# Patient Record
Sex: FEMALE | Race: WHITE | NOT HISPANIC OR LATINO | Employment: UNEMPLOYED | URBAN - METROPOLITAN AREA
[De-identification: names, ages, dates, MRNs, and addresses within clinical notes are randomized per-mention and may not be internally consistent; named-entity substitution may affect disease eponyms.]

---

## 2023-09-09 ENCOUNTER — HOSPITAL ENCOUNTER (INPATIENT)
Facility: HOSPITAL | Age: 63
LOS: 90 days | Discharge: NURSING, REHAB OR LONG-TERM CARE | End: 2023-12-08
Attending: PSYCHIATRY & NEUROLOGY | Admitting: PSYCHIATRY & NEUROLOGY
Payer: MEDICAID

## 2023-09-09 PROCEDURE — 9990 CHARGE CONVERSION

## 2023-09-10 LAB
APPEARANCE PLAS: ABNORMAL
CHOLEST SERPL-MCNC: 184 MG/DL (ref 133–200)
CHOLEST/HDLC SERPL: 4.1 RATIO
COLOR SPUN FLD: ABNORMAL
FASTING STATUS PATIENT QL REPORTED: ABNORMAL
HDLC SERPL-MCNC: 45 MG/DL
LDLC SERPL CALC-MCNC: 119 MG/DL (ref 65–130)
TRIGL SERPL-MCNC: 99 MG/DL (ref 40–150)

## 2023-09-10 PROCEDURE — 9990 CHARGE CONVERSION

## 2023-09-11 LAB
25(OH)D3 SERPL-MCNC: 11 NG/ML (ref 31–100)
ALBUMIN SERPL-MCNC: 3.9 GM/DL (ref 3.5–5)
ALBUMIN/GLOB SERPL: 1.3 RATIO (ref 1.5–3)
ALP BLD-CCNC: 97 U/L (ref 35–125)
ALT SERPL-CCNC: 15 U/L (ref 5–40)
ANION GAP SERPL CALCULATED.3IONS-SCNC: 10 MMOL/L (ref 0–19)
AST SERPL-CCNC: 15 U/L (ref 5–40)
BASOPHILS # BLD AUTO: 0.04 K/UL (ref 0–0.22)
BASOPHILS NFR BLD AUTO: 0.5 % (ref 0–1)
BILIRUB SERPL-MCNC: 0.2 MG/DL (ref 0.1–1.2)
BUN SERPL-MCNC: 13 MG/DL (ref 8–25)
BUN/CREAT SERPL: 21.7 RATIO (ref 8–21)
CALCIUM SERPL-MCNC: 9.8 MG/DL (ref 8.5–10.4)
CHLORIDE SERPL-SCNC: 104 MMOL/L (ref 97–107)
CO2 SERPL-SCNC: 22 MMOL/L (ref 24–31)
CREAT SERPL-MCNC: 0.6 MG/DL (ref 0.4–1.6)
DEPRECATED RDW RBC AUTO: 51.2 FL (ref 37–54)
DIFFERENTIAL METHOD BLD: ABNORMAL
EOSINOPHIL # BLD AUTO: 0.3 K/UL (ref 0–0.45)
EOSINOPHIL NFR BLD: 3.4 % (ref 0–3)
ERYTHROCYTE [DISTWIDTH] IN BLOOD BY AUTOMATED COUNT: 17.6 % (ref 11.7–15)
GFR SERPL CREATININE-BSD FRML MDRD: 102 ML/MIN/1.73 M2
GLOBULIN SER-MCNC: 2.9 G/DL (ref 1.9–3.7)
GLUCOSE SERPL-MCNC: 117 MG/DL (ref 65–99)
HBA1C MFR BLD: 5.8 % (ref 4–6)
HCT VFR BLD AUTO: 36.3 % (ref 36–44)
HGB BLD-MCNC: 11.9 GM/DL (ref 12–15)
IMM GRANULOCYTES # BLD AUTO: 0.02 K/UL (ref 0–0.1)
LYMPHOCYTES # BLD AUTO: 2.62 K/UL (ref 1.2–3.2)
LYMPHOCYTES NFR BLD MANUAL: 29.9 % (ref 20–40)
MCH RBC QN AUTO: 26.4 PG (ref 26–34)
MCHC RBC AUTO-ENTMCNC: 32.8 % (ref 31–37)
MCV RBC AUTO: 80.5 FL (ref 80–100)
MONOCYTES # BLD AUTO: 0.68 K/UL (ref 0–0.8)
MONOCYTES NFR BLD MANUAL: 7.8 % (ref 0–8)
NEUTROPHILS # BLD AUTO: 5.09 K/UL
NEUTROPHILS # BLD AUTO: 5.09 K/UL (ref 1.8–7.7)
NEUTROPHILS.IMMATURE NFR BLD: 0.2 % (ref 0–1)
NEUTS SEG NFR BLD: 58.2 % (ref 50–70)
NRBC BLD-RTO: 0 /100 WBC
PLATELET # BLD AUTO: 299 K/UL (ref 150–450)
PMV BLD AUTO: 9.6 CU (ref 7–12.6)
POTASSIUM SERPL-SCNC: 4.6 MMOL/L (ref 3.4–5.1)
PROT SERPL-MCNC: 6.8 G/DL (ref 5.9–7.9)
RBC # BLD AUTO: 4.51 M/UL (ref 4–4.9)
SODIUM SERPL-SCNC: 136 MMOL/L (ref 133–145)
TSH SERPL DL<=0.05 MIU/L-ACNC: 1.64 MIU/L (ref 0.27–4.2)
VIT B12 SERPL-MCNC: 402 PG/ML (ref 211–946)
WBC # BLD AUTO: 8.8 K/UL (ref 4.5–11)

## 2023-09-11 PROCEDURE — 84443 ASSAY THYROID STIM HORMONE: CPT

## 2023-09-11 PROCEDURE — 82306 VITAMIN D 25 HYDROXY: CPT

## 2023-09-11 PROCEDURE — 82607 VITAMIN B-12: CPT

## 2023-09-11 PROCEDURE — 85025 COMPLETE CBC W/AUTO DIFF WBC: CPT

## 2023-09-11 PROCEDURE — 97116 GAIT TRAINING THERAPY: CPT | Mod: GP

## 2023-09-11 PROCEDURE — 9990 CHARGE CONVERSION: Mod: GP

## 2023-09-11 PROCEDURE — 80053 COMPREHEN METABOLIC PANEL: CPT

## 2023-09-11 PROCEDURE — 97161 PT EVAL LOW COMPLEX 20 MIN: CPT | Mod: GP

## 2023-09-11 PROCEDURE — 83036 HEMOGLOBIN GLYCOSYLATED A1C: CPT

## 2023-09-11 PROCEDURE — 80061 LIPID PANEL: CPT

## 2023-09-12 PROCEDURE — 84443 ASSAY THYROID STIM HORMONE: CPT

## 2023-09-12 PROCEDURE — 97166 OT EVAL MOD COMPLEX 45 MIN: CPT | Mod: GO

## 2023-09-12 PROCEDURE — 82306 VITAMIN D 25 HYDROXY: CPT

## 2023-09-12 PROCEDURE — 9990 CHARGE CONVERSION

## 2023-09-12 PROCEDURE — 80053 COMPREHEN METABOLIC PANEL: CPT

## 2023-09-12 PROCEDURE — 82607 VITAMIN B-12: CPT

## 2023-09-12 PROCEDURE — 80061 LIPID PANEL: CPT

## 2023-09-12 PROCEDURE — 85025 COMPLETE CBC W/AUTO DIFF WBC: CPT

## 2023-09-12 PROCEDURE — 97530 THERAPEUTIC ACTIVITIES: CPT | Mod: GO

## 2023-09-12 PROCEDURE — 83036 HEMOGLOBIN GLYCOSYLATED A1C: CPT

## 2023-09-13 PROCEDURE — 97535 SELF CARE MNGMENT TRAINING: CPT | Mod: GO,CO

## 2023-09-13 PROCEDURE — 97116 GAIT TRAINING THERAPY: CPT | Mod: GP

## 2023-09-13 PROCEDURE — 97110 THERAPEUTIC EXERCISES: CPT | Mod: GP

## 2023-09-13 PROCEDURE — 9990 CHARGE CONVERSION

## 2023-09-14 PROCEDURE — 93005 ELECTROCARDIOGRAM TRACING: CPT

## 2023-09-14 PROCEDURE — 9990 CHARGE CONVERSION

## 2023-09-15 PROCEDURE — 9990 CHARGE CONVERSION: Mod: GP

## 2023-09-15 PROCEDURE — 97110 THERAPEUTIC EXERCISES: CPT | Mod: GP

## 2023-09-16 PROCEDURE — 9990 CHARGE CONVERSION

## 2023-09-17 LAB
AMPHETAMINES UR QL SCN>1000 NG/ML: NEGATIVE
BARBITURATES UR QL SCN>300 NG/ML: NEGATIVE
BENZODIAZ UR QL SCN>300 NG/ML: NEGATIVE
BZE UR QL SCN>300 NG/ML: NEGATIVE
CANNABINOIDS UR QL SCN>50 NG/ML: NEGATIVE
DRUG SCREEN COMMENT UR-IMP: NORMAL
FENTANYL+NORFENTANYL UR QL SCN: NEGATIVE
METHADONE UR QL SCN>300 NG/ML: NEGATIVE
OPIATES UR QL SCN>300 NG/ML: NEGATIVE
OXYCODONE UR QL: NEGATIVE
PCP UR QL SCN>25 NG/ML: NEGATIVE

## 2023-09-17 PROCEDURE — 80307 DRUG TEST PRSMV CHEM ANLYZR: CPT

## 2023-09-17 PROCEDURE — 9990 CHARGE CONVERSION

## 2023-09-18 PROCEDURE — 9990 CHARGE CONVERSION: Mod: GP

## 2023-09-18 PROCEDURE — 97116 GAIT TRAINING THERAPY: CPT | Mod: GP

## 2023-09-18 PROCEDURE — 80307 DRUG TEST PRSMV CHEM ANLYZR: CPT

## 2023-09-19 PROCEDURE — 9990 CHARGE CONVERSION

## 2023-09-19 PROCEDURE — 93005 ELECTROCARDIOGRAM TRACING: CPT

## 2023-09-20 PROCEDURE — 9990 CHARGE CONVERSION

## 2023-09-21 PROCEDURE — 9990 CHARGE CONVERSION

## 2023-09-22 PROCEDURE — 9990 CHARGE CONVERSION

## 2023-09-23 PROCEDURE — 9990 CHARGE CONVERSION

## 2023-09-24 PROCEDURE — 9990 CHARGE CONVERSION

## 2023-09-25 PROCEDURE — 9990 CHARGE CONVERSION

## 2023-09-26 PROCEDURE — 9990 CHARGE CONVERSION

## 2023-09-27 PROCEDURE — 9990 CHARGE CONVERSION

## 2023-09-28 DIAGNOSIS — M25.552 BILATERAL HIP PAIN: ICD-10-CM

## 2023-09-28 DIAGNOSIS — M25.551 BILATERAL HIP PAIN: ICD-10-CM

## 2023-09-28 DIAGNOSIS — Z93.3 COLOSTOMY PRESENT (MULTI): Chronic | ICD-10-CM

## 2023-09-28 DIAGNOSIS — E63.9 NUTRITIONAL DEFICIENCY: ICD-10-CM

## 2023-09-28 DIAGNOSIS — Z43.3 COLOSTOMY CARE (MULTI): ICD-10-CM

## 2023-09-28 DIAGNOSIS — F33.2 SEVERE RECURRENT MAJOR DEPRESSION WITHOUT PSYCHOTIC FEATURES (MULTI): Primary | ICD-10-CM

## 2023-09-28 DIAGNOSIS — F11.90 OPIOID USE DISORDER: ICD-10-CM

## 2023-09-28 PROCEDURE — 9990 CHARGE CONVERSION

## 2023-09-28 RX ORDER — CELECOXIB 100 MG/1
100 CAPSULE ORAL DAILY
Status: DISCONTINUED | OUTPATIENT
Start: 2023-09-30 | End: 2023-09-30

## 2023-09-28 RX ORDER — ACETAMINOPHEN 325 MG/1
650 TABLET ORAL EVERY 6 HOURS PRN
Status: DISCONTINUED | OUTPATIENT
Start: 2023-09-30 | End: 2023-12-08 | Stop reason: HOSPADM

## 2023-09-28 RX ORDER — ACETAMINOPHEN 500 MG
5 TABLET ORAL NIGHTLY
Status: DISCONTINUED | OUTPATIENT
Start: 2023-09-30 | End: 2023-10-23

## 2023-09-28 RX ORDER — QUETIAPINE FUMARATE 25 MG/1
25 TABLET, FILM COATED ORAL
Status: DISCONTINUED | OUTPATIENT
Start: 2023-09-30 | End: 2023-10-04

## 2023-09-28 RX ORDER — CHOLECALCIFEROL (VITAMIN D3) 125 MCG
125 CAPSULE ORAL DAILY
Status: DISCONTINUED | OUTPATIENT
Start: 2023-09-30 | End: 2023-12-08 | Stop reason: HOSPADM

## 2023-09-28 RX ORDER — HYDROXYZINE PAMOATE 25 MG/1
25 CAPSULE ORAL EVERY 8 HOURS PRN
Status: DISCONTINUED | OUTPATIENT
Start: 2023-09-30 | End: 2023-12-08 | Stop reason: HOSPADM

## 2023-09-28 RX ORDER — BUPROPION HYDROCHLORIDE 150 MG/1
150 TABLET ORAL DAILY
Status: DISCONTINUED | OUTPATIENT
Start: 2023-09-30 | End: 2023-10-03

## 2023-09-28 RX ORDER — LIDOCAINE 560 MG/1
1 PATCH PERCUTANEOUS; TOPICAL; TRANSDERMAL DAILY
Status: DISCONTINUED | OUTPATIENT
Start: 2023-09-30 | End: 2023-12-08 | Stop reason: HOSPADM

## 2023-09-28 RX ORDER — DIPHENHYDRAMINE HCL 25 MG
25 TABLET ORAL EVERY 8 HOURS PRN
Status: DISCONTINUED | OUTPATIENT
Start: 2023-09-30 | End: 2023-12-08 | Stop reason: HOSPADM

## 2023-09-28 RX ORDER — QUETIAPINE FUMARATE 300 MG/1
300 TABLET, FILM COATED ORAL NIGHTLY
Status: DISCONTINUED | OUTPATIENT
Start: 2023-09-30 | End: 2023-10-05

## 2023-09-28 RX ORDER — OLANZAPINE 5 MG/1
5 TABLET, ORALLY DISINTEGRATING ORAL EVERY 8 HOURS PRN
Status: DISCONTINUED | OUTPATIENT
Start: 2023-09-30 | End: 2023-12-08 | Stop reason: HOSPADM

## 2023-09-28 RX ORDER — OLANZAPINE 10 MG/2ML
5 INJECTION, POWDER, FOR SOLUTION INTRAMUSCULAR EVERY 8 HOURS PRN
Status: DISCONTINUED | OUTPATIENT
Start: 2023-09-30 | End: 2023-12-08 | Stop reason: HOSPADM

## 2023-09-28 RX ORDER — ALUMINUM HYDROXIDE, MAGNESIUM HYDROXIDE, AND SIMETHICONE 1200; 120; 1200 MG/30ML; MG/30ML; MG/30ML
30 SUSPENSION ORAL EVERY 6 HOURS PRN
Status: DISCONTINUED | OUTPATIENT
Start: 2023-09-30 | End: 2023-12-08 | Stop reason: HOSPADM

## 2023-09-29 PROCEDURE — 9990 CHARGE CONVERSION

## 2023-09-30 PROBLEM — Z93.3 COLOSTOMY PRESENT (MULTI): Chronic | Status: ACTIVE | Noted: 2023-09-30

## 2023-09-30 PROBLEM — F33.2 SEVERE RECURRENT MAJOR DEPRESSION WITHOUT PSYCHOTIC FEATURES (MULTI): Status: ACTIVE | Noted: 2023-09-30

## 2023-09-30 PROBLEM — F11.10 OPIOID ABUSE (MULTI): Chronic | Status: ACTIVE | Noted: 2023-09-30

## 2023-09-30 PROCEDURE — 1140000001 HC PRIVATE PSYCH ROOM DAILY

## 2023-09-30 PROCEDURE — 99232 SBSQ HOSP IP/OBS MODERATE 35: CPT | Performed by: PSYCHIATRY & NEUROLOGY

## 2023-09-30 RX ORDER — HYDROXYZINE PAMOATE 25 MG/1
25 CAPSULE ORAL 2 TIMES DAILY
COMMUNITY
End: 2024-02-09 | Stop reason: SDUPTHER

## 2023-09-30 RX ORDER — TRAZODONE HYDROCHLORIDE 150 MG/1
150 TABLET ORAL NIGHTLY
COMMUNITY
End: 2024-02-09 | Stop reason: SDUPTHER

## 2023-09-30 RX ORDER — ACETAMINOPHEN 325 MG/1
650 TABLET ORAL 2 TIMES DAILY
COMMUNITY

## 2023-09-30 RX ORDER — CELECOXIB 200 MG/1
200 CAPSULE ORAL DAILY
Status: DISCONTINUED | OUTPATIENT
Start: 2023-09-30 | End: 2023-12-08 | Stop reason: HOSPADM

## 2023-09-30 RX ORDER — ACETAMINOPHEN 500 MG
5 TABLET ORAL NIGHTLY
COMMUNITY

## 2023-09-30 RX ORDER — RISPERIDONE 2 MG/1
2 TABLET ORAL NIGHTLY
COMMUNITY
End: 2023-12-08 | Stop reason: HOSPADM

## 2023-09-30 RX ORDER — CALCIUM CARBONATE 300MG(750)
400 TABLET,CHEWABLE ORAL 2 TIMES DAILY
COMMUNITY
End: 2023-12-08 | Stop reason: HOSPADM

## 2023-09-30 RX ORDER — BUPROPION HYDROCHLORIDE 150 MG/1
TABLET ORAL
Status: DISPENSED
Start: 2023-09-30 | End: 2023-09-30

## 2023-09-30 RX ORDER — QUETIAPINE FUMARATE 25 MG/1
TABLET, FILM COATED ORAL
Status: DISPENSED
Start: 2023-09-30 | End: 2023-09-30

## 2023-09-30 RX ORDER — CHOLECALCIFEROL (VITAMIN D3) 125 MCG
CAPSULE ORAL
Status: DISPENSED
Start: 2023-09-30 | End: 2023-09-30

## 2023-09-30 ASSESSMENT — PAIN - FUNCTIONAL ASSESSMENT: PAIN_FUNCTIONAL_ASSESSMENT: FLACC (FACE, LEGS, ACTIVITY, CRY, CONSOLABILITY)

## 2023-09-30 ASSESSMENT — ENCOUNTER SYMPTOMS
AGITATION: 1
SLEEP DISTURBANCE: 1
NERVOUS/ANXIOUS: 1
DYSPHORIC MOOD: 1

## 2023-09-30 ASSESSMENT — PAIN SCALES - GENERAL: PAINLEVEL_OUTOF10: 2

## 2023-09-30 ASSESSMENT — PAIN DESCRIPTION - DESCRIPTORS: DESCRIPTORS: ACHING

## 2023-09-30 NOTE — NURSING NOTE
Behavior: Depression  Pt is isolative to her room, resistant to allowing staff to help empty her colostomy bag which exploded on her this shift.  Pt refusing all medications this shift.      Interventions:  Performed hygiene care, applied new colostomy and educated patient about emptying her colostomy, every 15 minute checks, give scheduled medications, environmental rounds,1:1 interaction.    Response:  Pt continues to resist encouragement to perform her own colostomy maintenance.  She continues to isolate to her room.      Plan:  Give scheduled medications, every 15 minute checks, environmental rounds, 1:1 interaction, encourage participation in therapeutic milieu,

## 2023-09-30 NOTE — GROUP NOTE
Group Topic: Art Creative   Group Date: 9/30/2023  Start Time: 1015  End Time: 1145  Facilitators: BRENDA Madera   Department: [facility] REHABTH VIRTUAL    Number of Participants: 5   Group Focus: self-awareness  Treatment Modality: Other: Recreation Therapy  Interventions utilized were exploration  Purpose: feelings    Name: Azul Roberts YOB: 1960   MR: [id number]      Facilitator: Recreational Therapist  Level of Participation: did not attend  Quality of Participation:  did not attend.  Interactions with others:     Mood/Affect:     Triggers (if applicable):   Cognition:     Progress: None  Comments:   Plan: continue with services

## 2023-09-30 NOTE — PROGRESS NOTES
"Azul Roberts is a 63 y.o. female on day 21 of admission presenting with Severe recurrent major depression without psychotic features (CMS/HCC).      Subjective     Case discussed with treatment team and chart reviewed.  Azul remains highly irritable and becoming easily agitated screaming and yelling swearing.  She reports she does not want to live anymore she just wants to be left alone so she can die.  She poorly tolerated visit from her .  She reports she would  like to go home.  Attempts to discuss with her reasons for admission are met with patient responding that she does not care.  Patient continues to escalate and agitate more as the encounter persists.  Patient asks to be left alone multiple times.  Eventually this provider obliges and leaves patient alone.       Objective     Last Recorded Vitals  Blood pressure 110/77, pulse 70, temperature 36.5 °C (97.7 °F), temperature source Temporal, resp. rate 20, height 1.626 m (5' 4\"), weight 79.4 kg (174 lb 15.7 oz), SpO2 99 %.    Review of Systems   Psychiatric/Behavioral:  Positive for agitation, behavioral problems, dysphoric mood, sleep disturbance and suicidal ideas. The patient is nervous/anxious.        Psychiatric ROS - Adult  Anxiety: General Anxiety Disorder (KAYLEEN)KAYLEEN Behaviors: difficult to control worry, difficulty concentrating, irritability, restlessness, and sleep disturbance  Depression: anhedonia, appetite increased, concentration, energy, helpless, hopeless, interest, persistent thoughts of death, psychomotor agitation, sleep decreased , suicidal thoughts, and withdrawn  Delirium: negative  Psychosis: negative  Ioana: negative  Safety Issues: passive death wish and suicidal ideation  Psychiatric ROS Comment: as noted    Physical Exam  Abdominal:      Comments: Presence of colostomy bag   Psychiatric:         Attention and Perception: She is inattentive.         Mood and Affect: Mood is anxious and depressed. Affect is labile, angry and " "inappropriate.         Behavior: Behavior is uncooperative, agitated, aggressive and withdrawn.         Thought Content: Thought content includes suicidal ideation.         Judgment: Judgment is impulsive.           Mental Status Examination  General Appearance: Disheveled., Malodorous., Averted gaze., and Limited eye contact.  Gait/Station:  antalgic gait with walker assist device  Speech: Loud volume, yelling  Mood: \"I dont care\"  Affect: Irritable and Angry  Thought Process:  impoverished  Thought Associations: No loosening of associations  Thought Content: suicidal  Perception: No perceptual abnormalities noted  Level of Consciousness: Drowsy  Orientation: Alert  Attention and Concentration: Mild impairment in attention  Recent Memory: Intact as evidenced by ability to recall details from the past 24 hours   Remote Memory: Intact as evidenced by ability to recall previous medical issues   Executive function: Intact  Language: Naming intact  Fund of knowledge: Good  Insight: Limited, as evidenced by disengagement  Judgment: Limited, as evidence by inability to reason through medical decision making and highly impaired reality testing       Psychiatric Risk Assessment  Violence Risk Assessment: major mental illness and substance abuse  Acute Risk of Harm to Others is Considered: moderate   Suicide Risk Assessment: , chronic medical illness, chronic pain, current psychiatric illness, feelings of hopelessness, global insomnia, history of trauma or abuse, life crisis (shame/despair), prior suicide attempt, severe anxiety, substance abuse, and suicidal ideations  Protective Factors against Suicide: adherence to  treatment and marriage/partnership  Acute Risk of Harm to Self is Considered: moderate    Relevant Results             Current Facility-Administered Medications   Medication Dose Route Frequency Provider Last Rate Last Admin    acetaminophen (Tylenol) tablet 650 mg  650 mg oral q6h PRN Eddie H " Subhash, DO        alum-mag hydroxide-simeth (Mylanta) 200-200-20 mg/5 mL oral suspension 30 mL  30 mL oral q6h PRN [facility] Subhash, DO        buPROPion XL (Wellbutrin XL) 24 hr tablet 150 mg  150 mg oral Daily Eddie  Subhash, DO        celecoxib (CeleBREX) capsule 200 mg  200 mg oral Daily Eddie  Subhash, DO        cholecalciferol (Vitamin D-3) capsule 125 mcg  125 mcg oral Daily Eddie  Subhash, DO        diphenhydrAMINE (Sominex) tablet 25 mg  25 mg oral q8h PRN [facility] Subhash, DO        hydrOXYzine pamoate (Vistaril) capsule 25 mg  25 mg oral q8h PRN [facility] Subhash, DO        lidocaine 4 % patch 1 patch  1 patch transdermal Daily Eddie  Subhash, DO        melatonin tablet 5 mg  5 mg oral Nightly Eddie  Subhash, DO        OLANZapine (ZyPREXA) injection 5 mg  5 mg intramuscular q8h PRN [facility] Subhash, DO        OLANZapine zydis (ZyPREXA) disintegrating tablet 5 mg  5 mg oral q8h PRN [facility] Subhash, DO        QUEtiapine (SEROquel) tablet 25 mg  25 mg oral Daily with breakfast Eddie H Subhash, DO        QUEtiapine (SEROquel) tablet 300 mg  300 mg oral Nightly [facility] Subhash, DO        traZODone (Desyrel) split tablet 150 mg  150 mg oral Nightly [facility] Subhash, DO           Assessment/Plan   Principal Problem:    Severe recurrent major depression without psychotic features (CMS/HCC)  Active Problems:    Colostomy present (CMS/HCC)    Opioid abuse (CMS/HCC)    Biological -     Wellbutrin xl 150 mg titration to 300 mg early next week for adjunctive depression treatment  Celexa 30 mg daily for depression, anxiety  Seroquel 25 mg daily, and 300 mg at bedtime for mood stabilization, depression  Trazodone 150 mg at bedtime for sleep     Discussion with patient regarding risk benefits and alternatives and side effects with opportunity to ask questions and questions answered.  Patient given consent to the plan as noted    2. Psychological -       Patient is encouraged to participate with  the therapeutic milieu and program and group therapy      3. Sociological -      Patient encouraged to cooperate with social work staff on issues relevant to discharge planning                      I spent 25 minutes in the professional and overall care of this patient.      Eddie Cullen, DO

## 2023-10-01 PROBLEM — M25.551 BILATERAL HIP PAIN: Status: ACTIVE | Noted: 2023-10-01

## 2023-10-01 PROBLEM — M25.552 BILATERAL HIP PAIN: Status: ACTIVE | Noted: 2023-10-01

## 2023-10-01 PROCEDURE — 99232 SBSQ HOSP IP/OBS MODERATE 35: CPT | Performed by: INTERNAL MEDICINE

## 2023-10-01 PROCEDURE — 2500000001 HC RX 250 WO HCPCS SELF ADMINISTERED DRUGS (ALT 637 FOR MEDICARE OP): Performed by: PSYCHIATRY & NEUROLOGY

## 2023-10-01 PROCEDURE — 2500000004 HC RX 250 GENERAL PHARMACY W/ HCPCS (ALT 636 FOR OP/ED): Performed by: PSYCHIATRY & NEUROLOGY

## 2023-10-01 PROCEDURE — 1140000001 HC PRIVATE PSYCH ROOM DAILY

## 2023-10-01 PROCEDURE — 99232 SBSQ HOSP IP/OBS MODERATE 35: CPT | Performed by: PSYCHIATRY & NEUROLOGY

## 2023-10-01 RX ADMIN — Medication 125 MCG: at 08:20

## 2023-10-01 RX ADMIN — QUETIAPINE FUMARATE 25 MG: 25 TABLET ORAL at 08:20

## 2023-10-01 RX ADMIN — QUETIAPINE FUMARATE 300 MG: 300 TABLET ORAL at 22:42

## 2023-10-01 RX ADMIN — CITALOPRAM HYDROBROMIDE 30 MG: 20 TABLET ORAL at 08:19

## 2023-10-01 RX ADMIN — Medication 5 MG: at 22:41

## 2023-10-01 RX ADMIN — BUPROPION HYDROCHLORIDE 150 MG: 150 TABLET, FILM COATED, EXTENDED RELEASE ORAL at 08:18

## 2023-10-01 ASSESSMENT — PAIN - FUNCTIONAL ASSESSMENT
PAIN_FUNCTIONAL_ASSESSMENT: FLACC (FACE, LEGS, ACTIVITY, CRY, CONSOLABILITY)

## 2023-10-01 ASSESSMENT — ENCOUNTER SYMPTOMS
SLEEP DISTURBANCE: 1
DYSPHORIC MOOD: 1
AGITATION: 1
NERVOUS/ANXIOUS: 1

## 2023-10-01 ASSESSMENT — PAIN SCALES - GENERAL
PAINLEVEL_OUTOF10: 3
PAINLEVEL_OUTOF10: 0 - NO PAIN
PAINLEVEL_OUTOF10: 2

## 2023-10-01 ASSESSMENT — PAIN DESCRIPTION - DESCRIPTORS: DESCRIPTORS: ACHING

## 2023-10-01 NOTE — GROUP NOTE
"Group Topic: Feeling Awareness/Expression   Group Date: 10/1/2023  Start Time: 1020  End Time: 1130  Facilitators: BRENDA Howard   Department: [facility]    Number of Participants: 8   Group Focus: communication, feeling awareness/expression, problem solving, and self-awareness  Treatment Modality: Other: Recreation Therapy  Interventions utilized were exploration and problem solving  Purpose: feelings and communication skills    Name: Azul Roberts YOB: 1960   MR: [id number]      Facilitator: Recreational Therapist  Level of Participation: did not attend  Quality of Participation:  Did not attend  Interactions with others:     Mood/Affect:     Triggers (if applicable):   Cognition:     Progress: None  Comments: Pt refused group reports \"doesn't feel well\"  Plan: continue with services      "

## 2023-10-01 NOTE — PROGRESS NOTES
"Azul Roberts is a 63 y.o. female seen for follow up at [facility].pain is fairly controlled.denies any other complaints.      Physical Exam   constitutional: Patient does not appear to be in any acute distress  Head and Face: NCAT  Eyes: Normal external exam, EOMI  ENT: Normal external inspection of ears and nose. Oropharynx normal.  Cardiovascular: RRR, S1/S2, no murmurs, rubs, or gallops, radial pulses +2, no edema of extremities  Pulmonary: CTAB, no respiratory distress.  Abdomen: +BS, soft, non-tender, nondistended, no guarding or rebound, no masses noted.colostomy present   MSK: No joint swelling, normal movements of all extremities. Range of motion- normal.  Skin- No lesions, contusions, or erythema.  Psychiatric: Judgment intact. Appropriate mood and behavior   Last Recorded Vitals  Blood pressure (!) 143/92, pulse 75, temperature 36.8 °C (98.2 °F), temperature source Oral, resp. rate 19, height 1.626 m (5' 4\"), weight 79.4 kg (174 lb 15.7 oz), SpO2 100 %.  Intake/Output last 3 Shifts:  No intake/output data recorded.    Relevant Results    Scheduled medications  buPROPion XL, 150 mg, oral, Daily  celecoxib, 200 mg, oral, Daily  cholecalciferol, 125 mcg, oral, Daily  citalopram, 30 mg, oral, Daily  lidocaine, 1 patch, transdermal, Daily  melatonin, 5 mg, oral, Nightly  QUEtiapine, 25 mg, oral, Daily with breakfast  QUEtiapine, 300 mg, oral, Nightly  traZODone, 150 mg, oral, Nightly      Continuous medications     PRN medications  PRN medications: acetaminophen, alum-mag hydroxide-simeth, diphenhydrAMINE, hydrOXYzine pamoate, OLANZapine, OLANZapine zydis                     Assessment/Plan   Principal Problem:    Severe recurrent major depression without psychotic features (CMS/HCC)  Active Problems:    Colostomy present (CMS/HCC)    Opioid abuse (CMS/HCC)    Bilateral hip pain    Pain is fairly controlled.pt. has been participating in activities.        I spent 20 minutes in the professional and overall " care of this patient.      Jay Knox MD

## 2023-10-01 NOTE — GROUP NOTE
Group Topic: Social Skills   Group Date: 10/1/2023  Start Time: 1525  End Time: 1610  Facilitators: BRENDA Howard   Department: [facility] Geriatric     Number of Participants: 9   Group Focus: other Leisure, relaxation, and social skills  Treatment Modality: Other: Recreation Therapy  Interventions utilized were other   and reminiscence  Purpose: other: social skills, relaxation    Name: Azul Roberts YOB: 1960   MR: [id number]      Facilitator: Recreational Therapist  Level of Participation: did not attend  Quality of Participation:  did not attend  Interactions with others:   Mood/Affect:     Triggers (if applicable):   Cognition:     Progress: None  Comments: pt refuses to attend group. Resting in bed at this time  Plan: continue with services

## 2023-10-01 NOTE — NURSING NOTE
"Pt rocking back and forth in chair, stated she was in pain.  She has been refusing Celebrex scheduled in the morning and Lidocaine patch.  Notified Dr Knox to see if the order for Celebrex can be changed to  as patient agreed to take it at HS.    Offered alternative medications available and other pain interventions and patient refused them stating \"I need to get out of here.\"  "

## 2023-10-01 NOTE — NURSING NOTE
SARAN NOTE    Problem:  Depression      Behavior:  Patient is in patient’s room and has just gotten into bed to sleep. Patient is irritable but calm. Patient's affect is blunted. Patient is not very talkative.      Interventions:  This nurse completed a shift assessment. This nurse attempted to administer patient's scheduled night time medications.      Response:  Patient remained irritable but calm throughout this interacting. Patient refused to take all of patient's tonight's scheduled night time medications. Patient allowed this nurse to conduct and complete a physical assessment.       Plan:   Continue to monitor for depression. Continue to monitor for patient safety.

## 2023-10-01 NOTE — PROGRESS NOTES
"Azul Roberts is a 63 y.o. female on day 22 of admission presenting with Severe recurrent major depression without psychotic features (CMS/[facility]).      Subjective     Case discussed with treatment team and chart reviewed.  Azul refused all of her medications yesterday.  She is less irritable today but remaining withdrawn and isolated.  We continued to approach with consideration for her ongoing impairment.  She remains brief with single or double word responses only and does not answer open-ended questions when posed.  She expresses frustration and intolerance of the encounter often sighing, making direct eye contact briefly, and then looking away from provider.  We continue to encourage Azul to accept her medications to give them a chance to become efficacious to help treat her symptoms.  We do note with her that we will discuss with social work reaching her  to consider her progress thus far although we do not yet observe a robust response.  She does not participate in group, her activities of daily living are poor and she is malodorous of feces, she continues to present with acute features consistent with that on admission requiring the stabilization offered by the hospitalization.       Objective     Last Recorded Vitals  Blood pressure (!) 143/92, pulse 75, temperature 36.8 °C (98.2 °F), temperature source Oral, resp. rate 19, height 1.626 m (5' 4\"), weight 79.4 kg (174 lb 15.7 oz), SpO2 100 %.    Review of Systems   Psychiatric/Behavioral:  Positive for agitation, behavioral problems, dysphoric mood, sleep disturbance and suicidal ideas. The patient is nervous/anxious.        Psychiatric ROS - Adult  Anxiety: General Anxiety Disorder (KAYLEEN)KAYLEEN Behaviors: difficult to control worry, difficulty concentrating, irritability, restlessness, and sleep disturbance  Depression: anhedonia, appetite increased, concentration, energy, helpless, hopeless, interest, persistent thoughts of death, psychomotor " "agitation, sleep decreased , suicidal thoughts, and withdrawn  Delirium: negative  Psychosis: negative  Ioana: negative  Safety Issues: passive death wish and suicidal ideation  Psychiatric ROS Comment: as noted    Physical Exam  Abdominal:      Comments: Presence of colostomy bag   Psychiatric:         Attention and Perception: She is inattentive.         Mood and Affect: Mood is anxious and depressed. Affect is labile, angry and inappropriate.         Behavior: Behavior is uncooperative, agitated, aggressive and withdrawn.         Thought Content: Thought content includes suicidal ideation.         Judgment: Judgment is impulsive.           Mental Status Examination  General Appearance: Disheveled., Malodorous., Averted gaze., and Limited eye contact.  Gait/Station:  antalgic gait with walker assist device  Speech: Loud volume, yelling  Mood: \"what do you want today?\"  Affect: Irritable and Angry  Thought Process:  impoverished  Thought Associations: No loosening of associations  Thought Content: suicidal  Perception: No perceptual abnormalities noted  Level of Consciousness: Drowsy  Orientation: Alert  Attention and Concentration: Mild impairment in attention  Recent Memory: Intact as evidenced by ability to recall details from the past 24 hours   Remote Memory: Intact as evidenced by ability to recall previous medical issues   Executive function: Intact  Language: Naming intact  Fund of knowledge: Good  Insight: Limited, as evidenced by disengagement  Judgment: Limited, as evidence by inability to reason through medical decision making and highly impaired reality testing       Psychiatric Risk Assessment  Violence Risk Assessment: major mental illness and substance abuse  Acute Risk of Harm to Others is Considered: moderate   Suicide Risk Assessment: , chronic medical illness, chronic pain, current psychiatric illness, feelings of hopelessness, global insomnia, history of trauma or abuse, life crisis " (shame/despair), prior suicide attempt, severe anxiety, substance abuse, and suicidal ideations  Protective Factors against Suicide: adherence to  treatment and marriage/partnership  Acute Risk of Harm to Self is Considered: moderate    Relevant Results             Current Facility-Administered Medications   Medication Dose Route Frequency Provider Last Rate Last Admin    acetaminophen (Tylenol) tablet 650 mg  650 mg oral q6h PRN Eddie Cullen, DO        alum-mag hydroxide-simeth (Mylanta) 200-200-20 mg/5 mL oral suspension 30 mL  30 mL oral q6h PRN Eddie Cullen, DO        buPROPion XL (Wellbutrin XL) 24 hr tablet 150 mg  150 mg oral Daily Eddie  Subhash, DO   150 mg at 10/01/23 0818    celecoxib (CeleBREX) capsule 200 mg  200 mg oral Daily Eddie Cullen, DO        cholecalciferol (Vitamin D-3) capsule 125 mcg  125 mcg oral Daily Eddie  Subhash, DO   125 mcg at 10/01/23 0820    citalopram (CeleXA) tablet 30 mg  30 mg oral Daily Eddie  Subhash, DO   30 mg at 10/01/23 0819    diphenhydrAMINE (Sominex) tablet 25 mg  25 mg oral q8h PRN Eddie  Subhash, DO        hydrOXYzine pamoate (Vistaril) capsule 25 mg  25 mg oral q8h PRN Eddie  Subhash, DO        lidocaine 4 % patch 1 patch  1 patch transdermal Daily Eddie NOAH Cullen, DO        melatonin tablet 5 mg  5 mg oral Nightly Eddie Cullen, DO        OLANZapine (ZyPREXA) injection 5 mg  5 mg intramuscular q8h PRN Eddierobert Cullen, DO        OLANZapine zydis (ZyPREXA) disintegrating tablet 5 mg  5 mg oral q8h PRN Eddie Cullen, DO        QUEtiapine (SEROquel) tablet 25 mg  25 mg oral Daily with breakfast Eddie Cullen, DO   25 mg at 10/01/23 0820    QUEtiapine (SEROquel) tablet 300 mg  300 mg oral Nightly Eddie Cullen, DO        traZODone (Desyrel) split tablet 150 mg  150 mg oral Nightly Eddie Cullen, DO           Assessment/Plan   Principal Problem:    Severe recurrent major depression without psychotic features  (CMS/[facility])  Active Problems:    Colostomy present (CMS/HCC)    Opioid abuse (CMS/[facility])    Bilateral hip pain    Biological -     Given sporadic/intermittent medication refusal, will delay titrations until able to more regularly accept the following:  Wellbutrin xl 150 mg titration to 300 mg early next week for adjunctive depression treatment  Celexa 30 mg daily for depression, anxiety  Seroquel 25 mg daily, and 300 mg at bedtime for mood stabilization, depression  Trazodone 150 mg at bedtime for sleep     Discussion with patient regarding risk benefits and alternatives and side effects with opportunity to ask questions and questions answered.  Patient given consent to the plan as noted    2. Psychological -       Patient is encouraged to participate with the therapeutic milieu and program and group therapy      3. Sociological -      Patient encouraged to cooperate with social work staff on issues relevant to discharge planning  Will plan for family outreach to obtain additional collateral and review progress and limitations from improvement thus far.                    I spent 25 minutes in the professional and overall care of this patient.      Eddie Cullen, DO

## 2023-10-01 NOTE — NURSING NOTE
Behavior:  Depression  Pt is not participating in groups this shift, she isolates to her room inbetween meals, she is not expressing her needs.  She did care for her colostomy bag this shift without prompting.  She took scheduled medications during breakfast but refused Celebrex and lidocaine patch    Interventions:  Pt given scheduled medications, every 15 minute checks, environmental rounds, encourage participation in therapeutic milieu, 1:1 interaction, open blinds in pts room.    Response:  Pt went to dinner and ate 100%, she sat out in the hallway for a short period of time.  She continues to empty her colostomy this shift.    Plan:  Give scheduled medications, every 15 minute checks, environmental rounds, encourage participation in therapeutic milieu, 1:1 interaction, open blinds in pts room.

## 2023-10-02 PROCEDURE — 2500000004 HC RX 250 GENERAL PHARMACY W/ HCPCS (ALT 636 FOR OP/ED): Performed by: PSYCHIATRY & NEUROLOGY

## 2023-10-02 PROCEDURE — 1140000001 HC PRIVATE PSYCH ROOM DAILY

## 2023-10-02 PROCEDURE — 99232 SBSQ HOSP IP/OBS MODERATE 35: CPT | Performed by: PSYCHIATRY & NEUROLOGY

## 2023-10-02 PROCEDURE — 2500000001 HC RX 250 WO HCPCS SELF ADMINISTERED DRUGS (ALT 637 FOR MEDICARE OP): Performed by: PSYCHIATRY & NEUROLOGY

## 2023-10-02 RX ADMIN — Medication 5 MG: at 21:35

## 2023-10-02 RX ADMIN — QUETIAPINE FUMARATE 25 MG: 25 TABLET ORAL at 08:53

## 2023-10-02 RX ADMIN — TRAZODONE HYDROCHLORIDE 150 MG: 50 TABLET ORAL at 21:35

## 2023-10-02 RX ADMIN — BUPROPION HYDROCHLORIDE 150 MG: 150 TABLET, FILM COATED, EXTENDED RELEASE ORAL at 08:53

## 2023-10-02 RX ADMIN — Medication 125 MCG: at 08:53

## 2023-10-02 RX ADMIN — CELECOXIB 200 MG: 200 CAPSULE ORAL at 08:53

## 2023-10-02 RX ADMIN — CITALOPRAM HYDROBROMIDE 30 MG: 20 TABLET ORAL at 08:53

## 2023-10-02 RX ADMIN — QUETIAPINE FUMARATE 300 MG: 300 TABLET ORAL at 21:34

## 2023-10-02 ASSESSMENT — PAIN SCALES - GENERAL
PAINLEVEL_OUTOF10: 0 - NO PAIN
PAINLEVEL_OUTOF10: 0 - NO PAIN

## 2023-10-02 ASSESSMENT — ENCOUNTER SYMPTOMS
AGITATION: 1
NERVOUS/ANXIOUS: 1
DYSPHORIC MOOD: 1
SLEEP DISTURBANCE: 1

## 2023-10-02 ASSESSMENT — COLUMBIA-SUICIDE SEVERITY RATING SCALE - C-SSRS
2. HAVE YOU ACTUALLY HAD ANY THOUGHTS OF KILLING YOURSELF?: NO
6. HAVE YOU EVER DONE ANYTHING, STARTED TO DO ANYTHING, OR PREPARED TO DO ANYTHING TO END YOUR LIFE?: NO
1. SINCE LAST CONTACT, HAVE YOU WISHED YOU WERE DEAD OR WISHED YOU COULD GO TO SLEEP AND NOT WAKE UP?: NO

## 2023-10-02 ASSESSMENT — PAIN - FUNCTIONAL ASSESSMENT
PAIN_FUNCTIONAL_ASSESSMENT: 0-10
PAIN_FUNCTIONAL_ASSESSMENT: 0-10

## 2023-10-02 NOTE — GROUP NOTE
Group Topic: Coping Skills   Group Date: 10/2/2023  Start Time: 1100  End Time: 1130  Facilitators: BRENDA Fernandes   Department: [facility] REHABTH VIRTUAL    Number of Participants: 5   Group Focus: activities of daily living skills, communication, concentration, and coping skills  Treatment Modality: Other: Recreation Therapy  Interventions utilized were clarification, exploration, orientation, patient education, problem solving, and reality testing  Purpose: coping skills, feelings, insight or knowledge, self-worth, and self-care    Name: Azul Roberts YOB: 1960   MR: [id number]      Facilitator: Recreational Therapist   Level of Participation: did not attend.  Pt is laying in her bed, refuses to attend group at this time.  Did sit in the hallway in a chair about senior care through the session.  Quality of Participation:  did not attend  Interactions with others:  did not attend  Mood/Affect:  did not attend  Triggers (if applicable): n/a  Cognition:  n/a  Progress: None  Comments: n/a  Plan: continue with services

## 2023-10-02 NOTE — NURSING NOTE
BIRP NOTE    Problem:  Depression      Behavior:  Earlier at 2158 on 10/1/2023, this nurse attempted to conduct a shift assessment and administer patient's scheduled night time medications, at this time patient was in patient room laying in bed sleeping and refused to talk all of patient's scheduled night time medications. Patient refused to allow this nurse to conduct a physical assessment. Now, Patient is sitting in a chair near the nurse station. Patient is pleasant and calm. Patient’s affect is blunted. Patient is not very talkative. Patient maintains brief eye contact.      Interventions:  This nurse completed a shift assessment and administered patient's scheduled night time medications.      Response:  Patient remained pleasant and calm and was cooperative throughout this shift assessment and medication administration.      Plan:  Continue to monitor for depression. Continue to monitor for patient safety.

## 2023-10-02 NOTE — CARE PLAN
LSW received several responses from [facility] declining pt due to behavioral concerns as well as that pt's Medicaid is [place] of [place].   Nikki Andrews, DRAGAN

## 2023-10-02 NOTE — GROUP NOTE
Group Topic: Social Skills   Group Date: 10/2/2023  Start Time: 1510  End Time: 1545  Facilitators: BRENDA Fernandes   Department: [facility] REHABTH VIRTUAL    Number of Participants: 6   Group Focus: communication, concentration, and social skills  Treatment Modality: Other: Recreation Therapy  Interventions utilized were reminiscence  Purpose: other: concentration    Name: Azul Roberts YOB: 1960   MR: [id number]      Facilitator: Recreational Therapist  Level of Participation: did not attend  Quality of Participation:  pt refused to attend group at this time.  Was sitting in the hallway watching staff and peers.  Declined invitation to join group.  Interactions with others:  n/a  Mood/Affect:  n/a  Triggers (if applicable): n/a  Cognition:  n/a  Progress: None  Comments: n/a  Plan: continue with services

## 2023-10-02 NOTE — CARE PLAN
The patient's goals for the shift include none    The clinical goals for the shift include no falls    Over the shift, the patient did make progress toward the following goals of not falling. Barriers to progression include weakness and lack of motivation to thrive. Recommendations to address these barriers include increased positive reinforcement.

## 2023-10-02 NOTE — PROGRESS NOTES
"Azul Roberts is a 63 y.o. female on day 23 of admission presenting with Severe recurrent major depression without psychotic features (CMS/[facility]).      Subjective     Case discussed with treatment team and chart reviewed.  Patient has resumed acceptance of medications.  Remains withdrawn, isolative.  Less irritable, but brief.  Being refused by multiple nursing homes on premise of behaviors, and out of state medicaid.  She reports \"nothing to live for\" and wavers when asked about wanting to die or stay alive.  She is not future oriented, and persists with poor ADLs.  Did accept shower from nursing today and was seen with increasingly mobilized affect briefly following the shower.  Limited gains to date.  Struggles to participate in discussions related to discharge treatment planning.  Cannot yet safely leave the hospital.         Objective     Last Recorded Vitals  Blood pressure 108/63, pulse 82, temperature 36.7 °C (98.1 °F), temperature source Oral, resp. rate 18, height 1.626 m (5' 4\"), weight 79.4 kg (174 lb 15.7 oz), SpO2 97 %.    Review of Systems   Psychiatric/Behavioral:  Positive for agitation, behavioral problems, dysphoric mood, sleep disturbance and suicidal ideas. The patient is nervous/anxious.        Psychiatric ROS - Adult  Anxiety: General Anxiety Disorder (KAYLEEN)AKYLEEN Behaviors: difficult to control worry, difficulty concentrating, irritability, restlessness, and sleep disturbance  Depression: anhedonia, appetite increased, concentration, energy, helpless, hopeless, interest, persistent thoughts of death, psychomotor agitation, sleep decreased , suicidal thoughts, and withdrawn  Delirium: negative  Psychosis: negative  Ioana: negative  Safety Issues: passive death wish and suicidal ideation  Psychiatric ROS Comment: as noted    Physical Exam  Abdominal:      Comments: Presence of colostomy bag   Psychiatric:         Attention and Perception: She is inattentive.         Mood and Affect: Mood is anxious " "and depressed. Affect is labile, angry and inappropriate.         Behavior: Behavior is uncooperative, agitated, aggressive and withdrawn.         Thought Content: Thought content includes suicidal ideation.         Judgment: Judgment is impulsive.           Mental Status Examination  General Appearance: Disheveled., Malodorous., Averted gaze., and Limited eye contact.  Gait/Station:  antalgic gait with walker assist device  Speech: minimal verbal responses, low volume  Mood: \"I have nothing to live for, why would I want to get better\"  Affect: less Irritable and Angry  Thought Process: remains impoverished  Thought Associations: No loosening of associations  Thought Content: suicidal  Perception: No perceptual abnormalities noted  Level of Consciousness: Drowsy  Orientation: Alert  Attention and Concentration: Mild impairment in attention  Recent Memory: Intact as evidenced by ability to recall details from the past 24 hours   Remote Memory: Intact as evidenced by ability to recall previous medical issues   Executive function: Intact  Language: Naming intact  Fund of knowledge: Good  Insight: Limited, as evidenced by disengagement  Judgment: Limited, as evidence by inability to reason through medical decision making and highly impaired reality testing       Psychiatric Risk Assessment  Violence Risk Assessment: major mental illness and substance abuse  Acute Risk of Harm to Others is Considered: moderate   Suicide Risk Assessment: , chronic medical illness, chronic pain, current psychiatric illness, feelings of hopelessness, global insomnia, history of trauma or abuse, life crisis (shame/despair), prior suicide attempt, severe anxiety, substance abuse, and suicidal ideations  Protective Factors against Suicide: adherence to  treatment and marriage/partnership  Acute Risk of Harm to Self is Considered: moderate    Relevant Results             Current Facility-Administered Medications   Medication Dose Route " Frequency Provider Last Rate Last Admin    acetaminophen (Tylenol) tablet 650 mg  650 mg oral q6h PRN Eddierobert Cullen, DO        alum-mag hydroxide-simeth (Mylanta) 200-200-20 mg/5 mL oral suspension 30 mL  30 mL oral q6h PRN Eddierobert Cullen, DO        buPROPion XL (Wellbutrin XL) 24 hr tablet 150 mg  150 mg oral Daily Eddie H Subhash, DO   150 mg at 10/02/23 0853    celecoxib (CeleBREX) capsule 200 mg  200 mg oral Daily Eddie H Subhash, DO   200 mg at 10/02/23 0853    cholecalciferol (Vitamin D-3) capsule 125 mcg  125 mcg oral Daily Eddie  Zamarbin, DO   125 mcg at 10/02/23 0853    citalopram (CeleXA) tablet 30 mg  30 mg oral Daily Eddie H Subhash, DO   30 mg at 10/02/23 0853    diphenhydrAMINE (Sominex) tablet 25 mg  25 mg oral q8h PRN Eddie  Subhash, DO        hydrOXYzine pamoate (Vistaril) capsule 25 mg  25 mg oral q8h PRN [facility] Subhash, DO        lidocaine 4 % patch 1 patch  1 patch transdermal Daily Joshua H Subhash, DO        melatonin tablet 5 mg  5 mg oral Nightly Eddie H Subhash, DO   5 mg at 10/01/23 2241    OLANZapine (ZyPREXA) injection 5 mg  5 mg intramuscular q8h PRN Eddie  Subhash, DO        OLANZapine zydis (ZyPREXA) disintegrating tablet 5 mg  5 mg oral q8h PRN Eddie  Subhash, DO        QUEtiapine (SEROquel) tablet 25 mg  25 mg oral Daily with breakfast Eddiecatina Cullen, DO   25 mg at 10/02/23 0853    QUEtiapine (SEROquel) tablet 300 mg  300 mg oral Nightly Eddie  Subhash, DO   300 mg at 10/01/23 2242    traZODone (Desyrel) split tablet 150 mg  150 mg oral Nightly Eddie NOAH Cullen, DO           Assessment/Plan   Principal Problem:    Severe recurrent major depression without psychotic features (CMS/HCC)  Active Problems:    Colostomy present (CMS/HCC)    Opioid abuse (CMS/HCC)    Bilateral hip pain    Biological -     Given sporadic/intermittent medication refusal, will delay titrations until able to more regularly accept the following:  Wellbutrin xl 150 mg  titration to 300 mg early next week for adjunctive depression treatment  Celexa 30 mg daily for depression, anxiety - if accepts dose tomorrow, will titrate for wednesday  Seroquel 25 mg daily, and 300 mg at bedtime for mood stabilization, depression  Trazodone 150 mg at bedtime for sleep     Discussion with patient regarding risk benefits and alternatives and side effects with opportunity to ask questions and questions answered.  Patient given consent to the plan as noted    2. Psychological -       Patient is encouraged to participate with the therapeutic milieu and program and group therapy      3. Sociological -      Patient encouraged to cooperate with social work staff on issues relevant to discharge planning  Will plan for family outreach to obtain additional collateral and review progress and limitations from improvement thus far.                    I spent 25 minutes in the professional and overall care of this patient.      Eddie Cullen, DO

## 2023-10-03 PROCEDURE — 1140000001 HC PRIVATE PSYCH ROOM DAILY

## 2023-10-03 PROCEDURE — 2500000001 HC RX 250 WO HCPCS SELF ADMINISTERED DRUGS (ALT 637 FOR MEDICARE OP): Performed by: PSYCHIATRY & NEUROLOGY

## 2023-10-03 PROCEDURE — 2500000004 HC RX 250 GENERAL PHARMACY W/ HCPCS (ALT 636 FOR OP/ED): Performed by: PSYCHIATRY & NEUROLOGY

## 2023-10-03 PROCEDURE — 99232 SBSQ HOSP IP/OBS MODERATE 35: CPT | Performed by: PSYCHIATRY & NEUROLOGY

## 2023-10-03 RX ORDER — BUPROPION HYDROCHLORIDE 300 MG/1
300 TABLET ORAL DAILY
Status: DISCONTINUED | OUTPATIENT
Start: 2023-10-04 | End: 2023-12-08 | Stop reason: HOSPADM

## 2023-10-03 RX ADMIN — QUETIAPINE FUMARATE 25 MG: 25 TABLET ORAL at 08:21

## 2023-10-03 RX ADMIN — TRAZODONE HYDROCHLORIDE 150 MG: 50 TABLET ORAL at 20:30

## 2023-10-03 RX ADMIN — Medication 125 MCG: at 08:18

## 2023-10-03 RX ADMIN — QUETIAPINE FUMARATE 300 MG: 300 TABLET ORAL at 20:30

## 2023-10-03 RX ADMIN — CELECOXIB 200 MG: 200 CAPSULE ORAL at 08:18

## 2023-10-03 RX ADMIN — CITALOPRAM HYDROBROMIDE 30 MG: 20 TABLET ORAL at 08:18

## 2023-10-03 RX ADMIN — BUPROPION HYDROCHLORIDE 150 MG: 150 TABLET, FILM COATED, EXTENDED RELEASE ORAL at 08:18

## 2023-10-03 RX ADMIN — Medication 5 MG: at 20:30

## 2023-10-03 ASSESSMENT — COLUMBIA-SUICIDE SEVERITY RATING SCALE - C-SSRS
6. HAVE YOU EVER DONE ANYTHING, STARTED TO DO ANYTHING, OR PREPARED TO DO ANYTHING TO END YOUR LIFE?: NO
1. SINCE LAST CONTACT, HAVE YOU WISHED YOU WERE DEAD OR WISHED YOU COULD GO TO SLEEP AND NOT WAKE UP?: NO
2. HAVE YOU ACTUALLY HAD ANY THOUGHTS OF KILLING YOURSELF?: NO

## 2023-10-03 ASSESSMENT — ENCOUNTER SYMPTOMS
DYSPHORIC MOOD: 1
SLEEP DISTURBANCE: 1
NERVOUS/ANXIOUS: 1

## 2023-10-03 ASSESSMENT — PAIN SCALES - GENERAL
PAINLEVEL_OUTOF10: 2
PAINLEVEL_OUTOF10: 1
PAINLEVEL_OUTOF10: 1
PAINLEVEL_OUTOF10: 2
PAINLEVEL_OUTOF10: 1

## 2023-10-03 ASSESSMENT — PAIN - FUNCTIONAL ASSESSMENT
PAIN_FUNCTIONAL_ASSESSMENT: FLACC (FACE, LEGS, ACTIVITY, CRY, CONSOLABILITY)

## 2023-10-03 NOTE — PROGRESS NOTES
"Azul Roberts is a 63 y.o. female on day 24 of admission presenting with Severe recurrent major depression without psychotic features (CMS/HCC).      Subjective     Case discussed with treatment team and chart reviewed.  Patient has resumed acceptance of medications and remains withdrawn, isolative but less irritable. Still brief.  Cracked a smile or two while speaking to provider.  When asked how she is doing, responded, \"how does it look?\"  Continues to endorse both emotional and physical discomfort impairing daily functioning.  Describes physical pain as 8/10.  Has no ambitions presently.  Presents as dreary.  Discussed possibility of trying to attend groups tomorrow, and again smiled, and said would consider this.  Provider offered would come and see patient earlier in the day tomorrow before scheduled group time to encourage group attendance, and patient responded, \"ok, ill try.\"  Made aware plan of titrating wellbutrin xl tomorrow to 300 mg daily to further treat depression.    SW contact patient  who notes patient must be functioning independently prior to returning home.  Progress thus far is limited to enable that level of function.      She emerges from her room for meals then quickly returning.  Often requests for door to be closed, and lights turned off.  Appears in bed for hours at a time without position change, and appears to be staring at the side of the bedside table.      Objective     Last Recorded Vitals  Blood pressure 111/73, pulse 69, temperature 36.4 °C (97.5 °F), temperature source Temporal, resp. rate 20, height 1.626 m (5' 4\"), weight 79.4 kg (174 lb 15.7 oz), SpO2 99 %.    Review of Systems   Psychiatric/Behavioral:  Positive for behavioral problems, dysphoric mood, sleep disturbance and suicidal ideas. The patient is nervous/anxious.        Psychiatric ROS - Adult  Anxiety: General Anxiety Disorder (KAYLEEN)KAYLEEN Behaviors: difficult to control worry, difficulty concentrating, " "irritability, restlessness, and sleep disturbance  Depression: anhedonia, appetite increased, concentration, energy, helpless, hopeless, interest, persistent thoughts of death, psychomotor agitation, sleep decreased , suicidal thoughts, and withdrawn  Delirium: negative  Psychosis: negative  Ioana: negative  Safety Issues: passive death wish and suicidal ideation  Psychiatric ROS Comment: as noted    Physical Exam  Abdominal:      Comments: Presence of colostomy bag   Psychiatric:         Attention and Perception: She is inattentive.         Mood and Affect: Mood is anxious and depressed. Affect is labile, flat and inappropriate.         Speech: Speech is delayed.         Behavior: Behavior is uncooperative, agitated, aggressive and withdrawn.         Thought Content: Thought content includes suicidal ideation.         Judgment: Judgment is impulsive.           Mental Status Examination  General Appearance: Disheveled., Malodorous., Averted gaze., and Limited eye contact.  Gait/Station:  antalgic gait with walker assist device  Speech: minimal verbal responses, low volume  Mood: \"I have nothing to live for, why would I want to get better\"  Affect: less Irritable and Angry  Thought Process: remains impoverished  Thought Associations: No loosening of associations  Thought Content: suicidal  Perception: No perceptual abnormalities noted  Level of Consciousness: Drowsy  Orientation: Alert  Attention and Concentration: Mild impairment in attention  Recent Memory: Intact as evidenced by ability to recall details from the past 24 hours   Remote Memory: Intact as evidenced by ability to recall previous medical issues   Executive function: Intact  Language: Naming intact  Fund of knowledge: Good  Insight: Limited, as evidenced by disengagement  Judgment: Limited, as evidence by inability to reason through medical decision making and highly impaired reality testing       Psychiatric Risk Assessment  Violence Risk Assessment: " major mental illness and substance abuse  Acute Risk of Harm to Others is Considered: moderate   Suicide Risk Assessment: , chronic medical illness, chronic pain, current psychiatric illness, feelings of hopelessness, global insomnia, history of trauma or abuse, life crisis (shame/despair), prior suicide attempt, severe anxiety, substance abuse, and suicidal ideations  Protective Factors against Suicide: adherence to  treatment and marriage/partnership  Acute Risk of Harm to Self is Considered: moderate    Relevant Results             Current Facility-Administered Medications   Medication Dose Route Frequency Provider Last Rate Last Admin    acetaminophen (Tylenol) tablet 650 mg  650 mg oral q6h PRN Eddie Cullen, DO        alum-mag hydroxide-simeth (Mylanta) 200-200-20 mg/5 mL oral suspension 30 mL  30 mL oral q6h PRN Eddie Cullen, DO        [START ON 10/4/2023] buPROPion XL (Wellbutrin XL) 24 hr tablet 300 mg  300 mg oral Daily Eddie NOAH Cullen, DO        celecoxib (CeleBREX) capsule 200 mg  200 mg oral Daily Eddie  Subhash, DO   200 mg at 10/03/23 0818    cholecalciferol (Vitamin D-3) capsule 125 mcg  125 mcg oral Daily Joshua H Subhash, DO   125 mcg at 10/03/23 0818    citalopram (CeleXA) tablet 30 mg  30 mg oral Daily Joshua  Zamarbin, DO   30 mg at 10/03/23 0818    diphenhydrAMINE (Sominex) tablet 25 mg  25 mg oral q8h PRN Eddie  Subhash, DO        hydrOXYzine pamoate (Vistaril) capsule 25 mg  25 mg oral q8h PRN Eddie  Subhash, DO        lidocaine 4 % patch 1 patch  1 patch transdermal Daily Eddie NOAH Cullen, DO        melatonin tablet 5 mg  5 mg oral Nightly Eddie NOAH Cullen, DO   5 mg at 10/02/23 2135    OLANZapine (ZyPREXA) injection 5 mg  5 mg intramuscular q8h PRN Eddie NOAH Cullen, DO        OLANZapine zydis (ZyPREXA) disintegrating tablet 5 mg  5 mg oral q8h PRN Eddie  Subhash, DO        QUEtiapine (SEROquel) tablet 25 mg  25 mg oral Daily with breakfast Eddie H  DO Subhash   25 mg at 10/03/23 0821    QUEtiapine (SEROquel) tablet 300 mg  300 mg oral Nightly Eddie Cullen DO   300 mg at 10/02/23 2134    traZODone (Desyrel) split tablet 150 mg  150 mg oral Nightly Eddie Cullen DO   150 mg at 10/02/23 2135       Assessment/Plan   Principal Problem:    Severe recurrent major depression without psychotic features (CMS/HCC)  Active Problems:    Colostomy present (CMS/HCC)    Opioid abuse (CMS/HCC)    Bilateral hip pain    Biological -     Wellbutrin xl to 300 mg for adjunctive depression treatment  Celexa 30 mg daily for depression, anxiety - if accepts dose tomorrow, will titrate for wednesday  Seroquel 25 mg daily, and 300 mg at bedtime for mood stabilization, depression  Trazodone 150 mg at bedtime for sleep     Discussion with patient regarding risk benefits and alternatives and side effects with opportunity to ask questions and questions answered.  Patient given consent to the plan as noted    2. Psychological -       Patient is encouraged to participate with the therapeutic milieu and program and group therapy      3. Sociological -      Patient encouraged to cooperate with social work staff on issues relevant to discharge planning  Will plan for family outreach to obtain additional collateral and review progress and limitations from improvement thus far.                    I spent 25 minutes in the professional and overall care of this patient.      Eddie Cullen DO

## 2023-10-03 NOTE — NURSING NOTE
"Problem: Depression    Behavior: Pt isolated herself to her room, refused to come out for a snack, denied need for self care, refused to empty colostomy bag. Pt repeatedly stating \"Oh God.....leave me alone\" toward this nurse, brief eye contact, irritability noted    Intervention: HS meds administered, snack offered, self care encouraged, supplies for colostomy care provided    Response: Pt is compliant with her medication, refused to proceed with self care regarding colostomy bag to be emptied, refused help from this nurse as well    Plan: positive reinforcement, encouragement towards self care  "

## 2023-10-03 NOTE — GROUP NOTE
Group Topic: Reminiscence   Group Date: 10/3/2023  Start Time: 1515  End Time: 1600  Facilitators: RANDALL FernandesS   Department: [facility]    Number of Participants: 7   Group Focus: self-esteem  Treatment Modality: Other: Recreation Therapy  Interventions utilized were reminiscence  Purpose: feelings    Name: Azul Roberts YOB: 1960   MR: [id number]      Facilitator: Recreational Therapist  Level of Participation: did not attend  Quality of Participation:  did not attend  Interactions with others:  Pt is laying in her bed and refused to attend group.  Irritable with mood and limited verbal interaction with CTRS with invitation.  Mood/Affect:  n/a  Triggers (if applicable): n/a  Cognition:  n/a  Progress: None  Comments: pt problem is depressed mood  Plan: continue with services

## 2023-10-03 NOTE — PROGRESS NOTES
Social Work Note    LSW spoke with pt's  regarding disposition and discharge. Pt's  states that he might be going out of state for work but this would not be for another 3-4 weeks. At which time he would be gone approximately 5-6 weeks. However this is not confirmed. Pt's  states that he will not be able to stay home with pt as he has to work to pay the bills. He would need pt to be able to take care of herself when she returns home. LSW discussed pt's uncooperativeness which has hindered her ability to admit to a SNF as well as the out of state Medicaid. LSW will attempt to work with pt on steps needed to covert Medicaid to [place]. Pt's  provided name and number to pt's David  to which LSW will reach out to for possible assistance.   DRAGAN Low

## 2023-10-03 NOTE — CARE PLAN
The patient's goals for the shift include no falls    The clinical goals for the shift include no falls    Over the shift, the patient did not make progress toward the following goals. Barriers to progression include refusing care. Recommendations to address these barriers include allow care and keep colostomy cared for.

## 2023-10-03 NOTE — CARE PLAN
The patient's goals for the shift include none    The clinical goals for the shift include no falls    Over the shift, the patient did not make progress toward the following goals. Barriers to progression include chronic pain. Recommendations to address these barriers include treatment adherence .

## 2023-10-03 NOTE — NURSING NOTE
"Pt refused to examine her colostomy bag this morning, stating \"It is fine,  leave me alone\", Patient refused to empty the bag.  "

## 2023-10-03 NOTE — NURSING NOTE
SARAN NOTE     Problem:  depression     Behavior:  Pt cooperative with staff. Pt isolative to her room and only comes out for meals. Pt took all medications except lidocaine patch.  Group Participation: pt does not attend group   Appetite/Meals: pt has a good appetite       Interventions:  1:1 provided, encouraged pt to attend group therapy and not isolate to her room, administered scheduled medications.    Response:  Pt continues to isolate to her room.      Plan:  Continue q15 minute checks and current care plan, maintain pt safety.  Alida Mendez RN

## 2023-10-03 NOTE — GROUP NOTE
"Group Topic: Personal Responsibility   Group Date: 10/3/2023  Start Time: 1000  End Time: 1100  Facilitators: RANDALL FernandesS   Department: [facility]    Number of Participants: 7   Group Focus: communication  Treatment Modality: Other: Recreation Therapy  Interventions utilized were clarification, exploration, mental fitness, orientation, and problem solving  Purpose: communication skills    Name: Azul Roberts YOB: 1960   MR: [id number]      Facilitator: Recreational Therapist  Level of Participation: did not attend  Quality of Participation:  did not attend  Interactions with others:  Pt refused to attend group at this time.  Was laying in her bed with the covers over her eyes.  Refuses to engage with CTRS and reported \"I am not coming, you can't make me cause I don't want to\".  CTRS engaged for a few minutes about the importance of attending session.  Pt displays irritable mood and becamse increasing irritable as visit went on.  Mood/Affect: angry and irritable  Triggers (if applicable): n/a  Cognition: not focused  Progress: Minimal  Comments: pt problem is depressed mood  Plan: continue with services      "

## 2023-10-04 PROCEDURE — 99232 SBSQ HOSP IP/OBS MODERATE 35: CPT | Performed by: INTERNAL MEDICINE

## 2023-10-04 PROCEDURE — 1140000001 HC PRIVATE PSYCH ROOM DAILY

## 2023-10-04 PROCEDURE — 2500000004 HC RX 250 GENERAL PHARMACY W/ HCPCS (ALT 636 FOR OP/ED): Performed by: PSYCHIATRY & NEUROLOGY

## 2023-10-04 PROCEDURE — 99232 SBSQ HOSP IP/OBS MODERATE 35: CPT | Performed by: PSYCHIATRY & NEUROLOGY

## 2023-10-04 PROCEDURE — 2500000001 HC RX 250 WO HCPCS SELF ADMINISTERED DRUGS (ALT 637 FOR MEDICARE OP): Performed by: PSYCHIATRY & NEUROLOGY

## 2023-10-04 RX ADMIN — QUETIAPINE FUMARATE 25 MG: 25 TABLET ORAL at 08:12

## 2023-10-04 RX ADMIN — BUPROPION HYDROCHLORIDE 300 MG: 300 TABLET, FILM COATED, EXTENDED RELEASE ORAL at 08:12

## 2023-10-04 RX ADMIN — CELECOXIB 200 MG: 200 CAPSULE ORAL at 08:13

## 2023-10-04 RX ADMIN — TRAZODONE HYDROCHLORIDE 150 MG: 50 TABLET ORAL at 20:23

## 2023-10-04 RX ADMIN — Medication 125 MCG: at 08:12

## 2023-10-04 RX ADMIN — QUETIAPINE FUMARATE 300 MG: 300 TABLET ORAL at 20:23

## 2023-10-04 RX ADMIN — Medication 5 MG: at 20:23

## 2023-10-04 RX ADMIN — CITALOPRAM HYDROBROMIDE 30 MG: 20 TABLET ORAL at 08:13

## 2023-10-04 ASSESSMENT — ENCOUNTER SYMPTOMS
WEAKNESS: 0
NERVOUS/ANXIOUS: 1
HEADACHES: 0
WHEEZING: 0
COUGH: 0
DIFFICULTY URINATING: 0
DYSPHORIC MOOD: 1
FEVER: 0
SLEEP DISTURBANCE: 1
SHORTNESS OF BREATH: 0
CHILLS: 0

## 2023-10-04 ASSESSMENT — COLUMBIA-SUICIDE SEVERITY RATING SCALE - C-SSRS
2. HAVE YOU ACTUALLY HAD ANY THOUGHTS OF KILLING YOURSELF?: NO
6. HAVE YOU EVER DONE ANYTHING, STARTED TO DO ANYTHING, OR PREPARED TO DO ANYTHING TO END YOUR LIFE?: NO
1. SINCE LAST CONTACT, HAVE YOU WISHED YOU WERE DEAD OR WISHED YOU COULD GO TO SLEEP AND NOT WAKE UP?: NO
1. SINCE LAST CONTACT, HAVE YOU WISHED YOU WERE DEAD OR WISHED YOU COULD GO TO SLEEP AND NOT WAKE UP?: NO
6. HAVE YOU EVER DONE ANYTHING, STARTED TO DO ANYTHING, OR PREPARED TO DO ANYTHING TO END YOUR LIFE?: NO
2. HAVE YOU ACTUALLY HAD ANY THOUGHTS OF KILLING YOURSELF?: NO

## 2023-10-04 ASSESSMENT — PAIN - FUNCTIONAL ASSESSMENT
PAIN_FUNCTIONAL_ASSESSMENT: 0-10
PAIN_FUNCTIONAL_ASSESSMENT: 0-10

## 2023-10-04 ASSESSMENT — PAIN SCALES - GENERAL
PAINLEVEL_OUTOF10: 0 - NO PAIN
PAINLEVEL_OUTOF10: 0 - NO PAIN

## 2023-10-04 NOTE — GROUP NOTE
Group Topic: Social Skills   Group Date: 10/4/2023  Start Time: 1500  End Time: 1550  Facilitators: RANDALL HowardS   Department: [facility]    Number of Participants: 7   Group Focus: other association, recall, memory, relaxation, reminiscence, and social skills  Treatment Modality: Other: Recreation Therapy  Interventions utilized were mental fitness and reminiscence  Purpose: other: Relaxation, memory recall, social skills    Name: Azul Roberts YOB: 1960   MR: [id number]      Facilitator: Recreational Therapist  Level of Participation: did not attend  Quality of Participation:  did not attend  Interactions with others: pt refuses group after maximum encouragement from CTRS. Laying in bed at this time  Mood/Affect: n/a     Triggers (if applicable): n/a   Cognition:  n/a  Progress: None  Comments: pt problem in depressed mood  Plan: continue with services

## 2023-10-04 NOTE — PROGRESS NOTES
[facility]: [facility]  PROGRESS NOTE      Azul Roberts is a 63 y.o. female that is presenting today for follow-up and general psych.  Patient has been more compliant with taking her medications.  Pain has been fairly controlled.  Patient colostomy has been working well.  Denies any significant complaints.  Review of Systems   Constitutional:  Negative for chills and fever.   HENT:  Negative for congestion and ear pain.    Eyes:  Negative for visual disturbance.   Respiratory:  Negative for cough, shortness of breath and wheezing.    Cardiovascular:  Negative for chest pain and leg swelling.   Genitourinary:  Negative for difficulty urinating.   Neurological:  Negative for weakness and headaches.   All other systems reviewed and are negative.       Objective   Vitals:    10/04/23 0659   BP: 120/73   Pulse: 69   Resp: 19   Temp: 36.3 °C (97.3 °F)   SpO2: 99%      Physical Exam   constitutional: Patient does not appear to be in any acute distress  Head and Face: NCAT  Eyes: Normal external exam, EOMI  ENT: Normal external inspection of ears and nose. Oropharynx normal.  Cardiovascular: RRR, S1/S2, no murmurs, rubs, or gallops, radial pulses +2, no edema of extremities  Pulmonary: CTAB, no respiratory distress.  Abdomen: +BS, soft, non-tender, nondistended, no guarding or rebound, no masses noted.  Colostomy present  MSK: No joint swelling, normal movements of all extremities. Range of motion- normal.  Skin- No lesions, contusions, or erythema.  Psychiatric: Judgment intact. Appropriate mood and behavior   Diagnostic Results   Lab Results   Component Value Date    GLUCOSE 113 (H) 09/05/2023    CALCIUM 10.6 (H) 09/05/2023     09/05/2023    K 3.7 09/05/2023    CO2 24 09/05/2023     09/05/2023    BUN 12 09/05/2023    CREATININE 0.72 09/05/2023     Lab Results   Component Value Date    ALT 12 09/05/2023    AST 14 09/05/2023    ALKPHOS 103 09/05/2023    BILITOT 0.4 09/05/2023     Lab  "Results   Component Value Date    WBC 13.2 (H) 09/05/2023    HGB 13.9 09/05/2023    HCT 43.0 09/05/2023    MCV 81 09/05/2023     09/05/2023     No results found for: \"CHOL\"  No results found for: \"HDL\"  No results found for: \"LDLCALC\"  No results found for: \"TRIG\"  No results found for: \"HGBA1C\"  Other labs not included in the list above were reviewed either before or during this encounter.    History    No past medical history on file.  No past surgical history on file.  No family history on file.  No Known Allergies  No current facility-administered medications on file prior to encounter.     Current Outpatient Medications on File Prior to Encounter   Medication Sig Dispense Refill    acetaminophen (Tylenol) 325 mg tablet Take 2 tablets (650 mg) by mouth 2 times a day.      hydrOXYzine pamoate (Vistaril) 25 mg capsule Take 1 capsule (25 mg) by mouth 2 times a day.      magnesium oxide (Mag-Ox) 400 mg tablet Take 1 tablet (400 mg) by mouth 2 times a day.      melatonin 5 mg tablet Take 1 tablet (5 mg) by mouth once daily at bedtime.      risperiDONE (RisperDAL) 2 mg tablet Take 1 tablet (2 mg) by mouth once daily at bedtime.      traZODone (Desyrel) 150 mg tablet Take 1 tablet (150 mg) by mouth once daily at bedtime.         There is no immunization history on file for this patient.  Patient's medical history was reviewed and updated either before or during this encounter.  ASSESSMENT / PLAN:  Patient Active Problem List   Diagnosis    Severe recurrent major depression without psychotic features (CMS/HCC)    Colostomy present (CMS/HCC)    Opioid abuse (CMS/HCC)    Bilateral hip pain      Hip pain is stable.  Patient has been more compliant with the medications.  Jay Knox MD   "

## 2023-10-04 NOTE — PROGRESS NOTES
Azul Roberts is a 63 y.o. female on day 25 of admission presenting with Severe recurrent major depression without psychotic features (CMS/[facility]).      Subjective     Case discussed with treatment team and chart reviewed.      Approach patient this morning as had been documented yesterday to encourage group participation.  She responded with a shoulder shrug and a sigh and a negative verbal response.  Reminded patient this was one of the goals for hospitalization today to demonstrate increased participation leading towards increased demonstration of independence.  Patient contested participating in group.  Group therapist also attempted to engage patient to this extent.  The therapist noted that the patient yelled at the therapist herself using profanity multiple times.  Patient did emerge from her room briefly and sat outside of the group room for a few minutes but then returned to her room continuing to yell profanity.  Patient continuing to make limited improvements during hospitalization thus far.    Patient's insurance company contesting continued stay despite patient's failure to demonstrate a robust treatment response thus far.  If patient was to discharge at this time she would require near immediate readmission for the same reasons that she is currently in this facility.  This would delay her from receiving the appropriate care necessary to stabilize her and as such continued hospitalization remains indicated at this time.  She is remaining adherent with medications that are being given to her however this is in a setting where medications are brought to her each tablet by itself with encouragement to accept them.  Her self-care remains minimal including that of her colostomy.  There is significant risk for skin infection and poor colostomy care in addition to medication mismanagement if she was to be discharged at this time.  Additionally she continues to endorse mood features reporting that she is  "depressed and hopeless with nothing to live for.  Discharge is absolutely recommended against for this patient.    Objective     Last Recorded Vitals  Blood pressure 120/73, pulse 69, temperature 36.3 °C (97.3 °F), temperature source Oral, resp. rate 19, height 1.626 m (5' 4\"), weight 79.4 kg (174 lb 15.7 oz), SpO2 99 %.    Review of Systems   Psychiatric/Behavioral:  Positive for behavioral problems, dysphoric mood, sleep disturbance and suicidal ideas. The patient is nervous/anxious.        Psychiatric ROS - Adult  Anxiety: General Anxiety Disorder (KAYLEEN)KAYLEEN Behaviors: difficult to control worry, difficulty concentrating, irritability, restlessness, and sleep disturbance  Depression: anhedonia, appetite increased, concentration, energy, helpless, hopeless, interest, persistent thoughts of death, psychomotor agitation, sleep decreased , suicidal thoughts, and withdrawn  Delirium: negative  Psychosis: negative  Ioana: negative  Safety Issues: passive death wish and suicidal ideation  Psychiatric ROS Comment: as noted    Physical Exam  Abdominal:      Comments: Presence of colostomy bag   Psychiatric:         Attention and Perception: She is inattentive.         Mood and Affect: Mood is anxious and depressed. Affect is labile, flat and inappropriate.         Speech: Speech is delayed.         Behavior: Behavior is uncooperative, agitated, aggressive and withdrawn.         Thought Content: Thought content includes suicidal ideation.         Judgment: Judgment is impulsive.           Mental Status Examination  General Appearance: Disheveled., Malodorous., Averted gaze., and Limited eye contact.  Gait/Station:  antalgic gait with walker assist device  Speech: minimal verbal responses, low volume  Mood: \"I dont care what I said yesterday, Im not going to group today\"  Affect: less Irritable and Angry  Thought Process: remains impoverished  Thought Associations: No loosening of associations  Thought Content: " suicidal  Perception: No perceptual abnormalities noted  Level of Consciousness: Drowsy  Orientation: Alert  Attention and Concentration: Mild impairment in attention  Recent Memory: Intact as evidenced by ability to recall details from the past 24 hours   Remote Memory: Intact as evidenced by ability to recall previous medical issues   Executive function: Intact  Language: Naming intact  Fund of knowledge: Good  Insight: Limited, as evidenced by disengagement  Judgment: Limited, as evidence by inability to reason through medical decision making and highly impaired reality testing       Psychiatric Risk Assessment  Violence Risk Assessment: major mental illness and substance abuse  Acute Risk of Harm to Others is Considered: moderate   Suicide Risk Assessment: , chronic medical illness, chronic pain, current psychiatric illness, feelings of hopelessness, global insomnia, history of trauma or abuse, life crisis (shame/despair), prior suicide attempt, severe anxiety, substance abuse, and suicidal ideations  Protective Factors against Suicide: adherence to  treatment and marriage/partnership  Acute Risk of Harm to Self is Considered: moderate    Relevant Results             Current Facility-Administered Medications   Medication Dose Route Frequency Provider Last Rate Last Admin    acetaminophen (Tylenol) tablet 650 mg  650 mg oral q6h PRN Eddie Cullen DO        alum-mag hydroxide-simeth (Mylanta) 200-200-20 mg/5 mL oral suspension 30 mL  30 mL oral q6h PRN Eddie Cullen DO        buPROPion XL (Wellbutrin XL) 24 hr tablet 300 mg  300 mg oral Daily Eddie Cullen DO   300 mg at 10/04/23 0812    celecoxib (CeleBREX) capsule 200 mg  200 mg oral Daily Eddie Cullen, DO   200 mg at 10/04/23 0813    cholecalciferol (Vitamin D-3) capsule 125 mcg  125 mcg oral Daily Eddie Cullen, DO   125 mcg at 10/04/23 0812    citalopram (CeleXA) tablet 30 mg  30 mg oral Daily Eddie Cullen, DO   30 mg at  10/04/23 0813    diphenhydrAMINE (Sominex) tablet 25 mg  25 mg oral q8h PRN [facility] Zadorotheaitz, DO        hydrOXYzine pamoate (Vistaril) capsule 25 mg  25 mg oral q8h PRN [facility] Zadorotheaitz, DO        lidocaine 4 % patch 1 patch  1 patch transdermal Daily Eddieua H Zadorotheaitz, DO        melatonin tablet 5 mg  5 mg oral Nightly Eddie  Zadorotheaitz, DO   5 mg at 10/03/23 2030    OLANZapine (ZyPREXA) injection 5 mg  5 mg intramuscular q8h PRN Eddie H Zarowitz, DO        OLANZapine zydis (ZyPREXA) disintegrating tablet 5 mg  5 mg oral q8h PRN [facility] Zadorotheaitz, DO        QUEtiapine (SEROquel) tablet 25 mg  25 mg oral Daily with breakfast Eddieua H Zadorotheaitz, DO   25 mg at 10/04/23 0812    QUEtiapine (SEROquel) tablet 300 mg  300 mg oral Nightly [facility] Zadorotheaitz, DO   300 mg at 10/03/23 2030    traZODone (Desyrel) tablet 150 mg  150 mg oral Nightly Eddie  Zadorotheaitz, DO           Assessment/Plan   Principal Problem:    Severe recurrent major depression without psychotic features (CMS/HCC)  Active Problems:    Colostomy present (CMS/HCC)    Opioid abuse (CMS/HCC)    Bilateral hip pain    Biological -     Wellbutrin xl to 300 mg for adjunctive depression treatment  Celexa 30 mg daily for depression, anxiety    Will shift all seroquel to HS to try and reduce factors that may be limiting daytime activation - Seroquel 300 mg qhs    Trazodone 150 mg at bedtime for sleep    Repeat EKG as routine to continue monitoring QTc     Discussion with patient regarding risk benefits and alternatives and side effects with opportunity to ask questions and questions answered.  Patient given consent to the plan as noted    2. Psychological -       Patient is encouraged to participate with the therapeutic milieu and program and group therapy      3. Sociological -      Patient encouraged to cooperate with social work staff on issues relevant to discharge planning  Will plan for family outreach to obtain additional collateral and review progress and  limitations from improvement thus far.                    I spent 25 minutes in the professional and overall care of this patient.      Eddie Cullen, DO

## 2023-10-04 NOTE — PROGRESS NOTES
Social Work Note    LSW contacted pt's  with [employer] [facility] [phone], regarding pt's relocation to [place] from [place]. CM will submit a request to the state of [place] to close out pt's Medicaid effective 9/9/23 which is the date that pt admitted to the hospital. CM will inquire as to what would need to take place to switch pt's Medicaid to [place], however she believes pt would need to then apply with the local office. This [facility] LSW will assist pt in applying for benefits.   DRAGAN Low

## 2023-10-04 NOTE — NURSING NOTE
"LIORP NOTE     Problem:  depression     Behavior:  Pt isolated herself to her room, refused to come out for a snack, repeatedly stated \"Leave me alone\" and \"I don't care\", refused colostomy care, denied SI  Group Participation: n/a  Appetite/Meals: WNL       Interventions:  HS snack offered, scheduled medication administered, colostomy care encouraged - supplies provided, 1:1 intervention provided, education regarding medication adherence implemented    Response:  Pt wasn't cooperative at the beginning, refused to take her medication but after this nurse's explanation of the reasons for medication adherence, Pt agreed and took her prescribed meds. Pt demonstrated her colostomy bag on request but refused to have it emptied, pulled the pants back on and asked to leave her room.      Plan:  Continue monitoring, adhere to care plan, provide positive reinforcement  "

## 2023-10-04 NOTE — PROGRESS NOTES
Recreation Therapy Note:     CTRS in to invite pt to group for MT group. PT refusing group with maximum encouragement. PT yells at this CTRS multiple times including using some profanity. CTRS returned to Pt room a few minutes later to encourage again and suggested pt try to come out in hallway and listen to MT group. PT agreeable to sit in hallway, but continues to yell with some profanity. PT stayed seated in hallway for a few minutes then returned to room. CTRS will continue to encourage pt to attend daily groups of choice.       Freya Murphy, CTRS

## 2023-10-04 NOTE — CARE PLAN
The patient's goals for the shift include no falls    The clinical goals for the shift include no falls    Over the shift, the patient did not make progress toward the following goals. Barriers to progression include unwillingness. Recommendations to address these barriers include encourage participation in group and encourage care for self.

## 2023-10-04 NOTE — CARE PLAN
The patient's goals for the shift include no falls    The clinical goals for the shift include no falls    Over the shift, the patient did not make progress toward the following goals. Barriers to progression include chronic pain, infrequent ambulation. Recommendations to address these barriers include positive reinforcement, pain management.

## 2023-10-04 NOTE — NURSING NOTE
Team discussion this day regarding pt care/current status/treatment plans. Present: Kiet Shin CNP, Lee CHOW, James SW, Connie RT, Lorna Shen RN, Armando RN

## 2023-10-05 PROCEDURE — 99232 SBSQ HOSP IP/OBS MODERATE 35: CPT | Performed by: PSYCHIATRY & NEUROLOGY

## 2023-10-05 PROCEDURE — 2500000001 HC RX 250 WO HCPCS SELF ADMINISTERED DRUGS (ALT 637 FOR MEDICARE OP): Performed by: PSYCHIATRY & NEUROLOGY

## 2023-10-05 PROCEDURE — 99232 SBSQ HOSP IP/OBS MODERATE 35: CPT | Performed by: INTERNAL MEDICINE

## 2023-10-05 PROCEDURE — 2500000004 HC RX 250 GENERAL PHARMACY W/ HCPCS (ALT 636 FOR OP/ED): Performed by: PSYCHIATRY & NEUROLOGY

## 2023-10-05 PROCEDURE — 1140000001 HC PRIVATE PSYCH ROOM DAILY

## 2023-10-05 RX ADMIN — BUPROPION HYDROCHLORIDE 300 MG: 300 TABLET, FILM COATED, EXTENDED RELEASE ORAL at 08:15

## 2023-10-05 RX ADMIN — QUETIAPINE FUMARATE 350 MG: 300 TABLET ORAL at 20:18

## 2023-10-05 RX ADMIN — Medication 5 MG: at 20:18

## 2023-10-05 RX ADMIN — Medication 125 MCG: at 08:15

## 2023-10-05 RX ADMIN — CELECOXIB 200 MG: 200 CAPSULE ORAL at 08:15

## 2023-10-05 RX ADMIN — TRAZODONE HYDROCHLORIDE 150 MG: 50 TABLET ORAL at 20:18

## 2023-10-05 RX ADMIN — CITALOPRAM HYDROBROMIDE 30 MG: 20 TABLET ORAL at 08:15

## 2023-10-05 ASSESSMENT — ENCOUNTER SYMPTOMS
SLEEP DISTURBANCE: 1
DYSPHORIC MOOD: 1
NERVOUS/ANXIOUS: 1

## 2023-10-05 ASSESSMENT — COLUMBIA-SUICIDE SEVERITY RATING SCALE - C-SSRS
2. HAVE YOU ACTUALLY HAD ANY THOUGHTS OF KILLING YOURSELF?: NO
6. HAVE YOU EVER DONE ANYTHING, STARTED TO DO ANYTHING, OR PREPARED TO DO ANYTHING TO END YOUR LIFE?: NO
2. HAVE YOU ACTUALLY HAD ANY THOUGHTS OF KILLING YOURSELF?: NO
2. HAVE YOU ACTUALLY HAD ANY THOUGHTS OF KILLING YOURSELF?: NO
1. SINCE LAST CONTACT, HAVE YOU WISHED YOU WERE DEAD OR WISHED YOU COULD GO TO SLEEP AND NOT WAKE UP?: NO
1. SINCE LAST CONTACT, HAVE YOU WISHED YOU WERE DEAD OR WISHED YOU COULD GO TO SLEEP AND NOT WAKE UP?: NO
6. HAVE YOU EVER DONE ANYTHING, STARTED TO DO ANYTHING, OR PREPARED TO DO ANYTHING TO END YOUR LIFE?: NO
6. HAVE YOU EVER DONE ANYTHING, STARTED TO DO ANYTHING, OR PREPARED TO DO ANYTHING TO END YOUR LIFE?: NO
1. SINCE LAST CONTACT, HAVE YOU WISHED YOU WERE DEAD OR WISHED YOU COULD GO TO SLEEP AND NOT WAKE UP?: NO

## 2023-10-05 ASSESSMENT — PAIN SCALES - GENERAL
PAINLEVEL_OUTOF10: 0 - NO PAIN

## 2023-10-05 ASSESSMENT — PAIN - FUNCTIONAL ASSESSMENT
PAIN_FUNCTIONAL_ASSESSMENT: 0-10

## 2023-10-05 NOTE — NURSING NOTE
SARAN NOTE     Problem:  Depression     Behavior:  Pt compliant with HS meds, pleasant with flat affect, came out for HS snack, gave short one word answers, returned immediately to room when finished with snack,   Group Participation: N/A  Appetite/Meals: N/A       Interventions:  HS med administered per MAR    Response:  Pt. Compliant with medication administration, remains quiet throughout the night     Plan:  Cont to monitor and assist

## 2023-10-05 NOTE — PROGRESS NOTES
"Azul Roberts is a 63 y.o. female on day 26 of admission presenting with Severe recurrent major depression without psychotic features (CMS/[facility]).      Subjective     Case discussed with treatment team and chart reviewed.  Initially refusing group this morning but then emerged and spent about half an hour seated outside the group room door.  Limited engagement for the remainder of the day.  On approach for encounter, she is still in bed as is typically seen.  She is supine staring at the ceiling.  She is less irritable and more easily engaged.  She endorses minimal improvement but notes that she is feeling \"okay\" today.  Previous encounters have been with patient reporting that she was far worse than okay.  She does endorse that she thinks she might be starting to feel somewhat better.  While continuing to endorse poor sleep, her overall function and self-care remain poor.  She and her room are malodorous of feces.  She remains brief during encounter with limited spontaneous speech.  She asks no questions when given the opportunity.  She is in agreement with endorsement to continue accepting medications as ordered.    Objective     Last Recorded Vitals  Blood pressure 116/78, pulse 68, temperature 36.6 °C (97.9 °F), temperature source Temporal, resp. rate 16, height 1.626 m (5' 4\"), weight 79.4 kg (174 lb 15.7 oz), SpO2 99 %.    Review of Systems   Psychiatric/Behavioral:  Positive for behavioral problems, dysphoric mood, sleep disturbance and suicidal ideas. The patient is nervous/anxious.        Psychiatric ROS - Adult  Anxiety: General Anxiety Disorder (KAYLEEN)KAYLEEN Behaviors: difficult to control worry, difficulty concentrating, irritability, restlessness, and sleep disturbance  Depression: anhedonia, appetite increased, concentration, energy, helpless, hopeless, interest, persistent thoughts of death, psychomotor agitation, sleep decreased , suicidal thoughts, and withdrawn  Delirium: negative  Psychosis: " "negative  Ioana: negative  Safety Issues: passive death wish and suicidal ideation  Psychiatric ROS Comment: as noted    Physical Exam  Abdominal:      Comments: Presence of colostomy bag   Psychiatric:         Attention and Perception: She is inattentive.         Mood and Affect: Mood is anxious and depressed. Affect is labile, flat and inappropriate.         Speech: Speech is delayed.         Behavior: Behavior is uncooperative, agitated, aggressive and withdrawn.         Thought Content: Thought content includes suicidal ideation.         Judgment: Judgment is impulsive.           Mental Status Examination  General Appearance: Disheveled., Malodorous., Averted gaze., and Limited eye contact.  Gait/Station:  antalgic gait with walker assist device  Speech: minimal verbal responses, low volume  Mood: \"okay\"  Affect: less Irritable and Angry  Thought Process: remains impoverished  Thought Associations: No loosening of associations  Thought Content: suicidal  Perception: No perceptual abnormalities noted  Level of Consciousness: Drowsy  Orientation: Alert  Attention and Concentration: Mild impairment in attention  Recent Memory: Intact as evidenced by ability to recall details from the past 24 hours   Remote Memory: Intact as evidenced by ability to recall previous medical issues   Executive function: Intact  Language: Naming intact  Fund of knowledge: Good  Insight: Limited, as evidenced by disengagement  Judgment: Limited, as evidence by inability to reason through medical decision making and highly impaired reality testing       Psychiatric Risk Assessment  Violence Risk Assessment: major mental illness and substance abuse  Acute Risk of Harm to Others is Considered: moderate   Suicide Risk Assessment: , chronic medical illness, chronic pain, current psychiatric illness, feelings of hopelessness, global insomnia, history of trauma or abuse, life crisis (shame/despair), prior suicide attempt, severe " anxiety, substance abuse, and suicidal ideations  Protective Factors against Suicide: adherence to  treatment and marriage/partnership  Acute Risk of Harm to Self is Considered: moderate    Relevant Results             Current Facility-Administered Medications   Medication Dose Route Frequency Provider Last Rate Last Admin    acetaminophen (Tylenol) tablet 650 mg  650 mg oral q6h PRN Eddie Cullen, DO        alum-mag hydroxide-simeth (Mylanta) 200-200-20 mg/5 mL oral suspension 30 mL  30 mL oral q6h PRN Eddierobert Cullen, DO        buPROPion XL (Wellbutrin XL) 24 hr tablet 300 mg  300 mg oral Daily Joshua H Zamarbin, DO   300 mg at 10/05/23 0815    celecoxib (CeleBREX) capsule 200 mg  200 mg oral Daily Joshua H Zamarbin, DO   200 mg at 10/05/23 0815    cholecalciferol (Vitamin D-3) capsule 125 mcg  125 mcg oral Daily Joshua H Zadorotheaitz, DO   125 mcg at 10/05/23 0815    citalopram (CeleXA) tablet 30 mg  30 mg oral Daily Joshua H Zamarbin, DO   30 mg at 10/05/23 0815    diphenhydrAMINE (Sominex) tablet 25 mg  25 mg oral q8h PRN [facility] Zamarbin, DO        hydrOXYzine pamoate (Vistaril) capsule 25 mg  25 mg oral q8h PRN Eddie  Zamarbin, DO        lidocaine 4 % patch 1 patch  1 patch transdermal Daily Joshua H Zamarbin, DO        melatonin tablet 5 mg  5 mg oral Nightly Eddieua H Zamarbin, DO   5 mg at 10/04/23 2023    OLANZapine (ZyPREXA) injection 5 mg  5 mg intramuscular q8h PRN Eddie  Subhash, DO        OLANZapine zydis (ZyPREXA) disintegrating tablet 5 mg  5 mg oral q8h PRN Eddie  Subhash, DO        QUEtiapine (SEROquel) tablet 300 mg  300 mg oral Nightly Joshua H Zamarbin, DO   300 mg at 10/04/23 2023    traZODone (Desyrel) tablet 150 mg  150 mg oral Nightly Eddie H Zadorotheaitz, DO   150 mg at 10/04/23 2023       Assessment/Plan   Principal Problem:    Severe recurrent major depression without psychotic features (CMS/HCC)  Active Problems:    Colostomy present (CMS/HCC)    Opioid abuse (CMS/HCC)     Bilateral hip pain    Biological -     Wellbutrin xl 300 mg for adjunctive depression treatment  Celexa 30 mg daily for depression, anxiety    Will shift all seroquel to HS to try and reduce factors that may be limiting daytime activation - Seroquel to 350 mg qhs    Trazodone 150 mg at bedtime for sleep    Repeat EKG as routine to continue monitoring QTc     Discussion with patient regarding risk benefits and alternatives and side effects with opportunity to ask questions and questions answered.  Patient given consent to the plan as noted    2. Psychological -       Patient is encouraged to participate with the therapeutic milieu and program and group therapy      3. Sociological -      Patient encouraged to cooperate with social work staff on issues relevant to discharge planning  Will plan for family outreach to obtain additional collateral and review progress and limitations from improvement thus far.                    I spent 25 minutes in the professional and overall care of this patient.      Eddie Cullen, DO

## 2023-10-05 NOTE — PROGRESS NOTES
"Social Work Note    LSW contacted [facility] regarding their Medicaid Pending Financial Resource Program. They stated that they are able to assist pt in applying for Medicaid and will accept pt's that are Medicaid Pending. However in pt's situation her Medicaid needs to be terminated with [facility] and receive documentation of that. LSW will follow up with pt's David  to see if she is able to assist with this. LSW met with pt to discuss the need to obtain [place] Medicaid and to complete application. Pt was irritable and had limited engagement with LSW. Pt attempted to discuss with pt about going to SNF upon discharge and was dismissive of LSW, stating \"I'll have to think about that\". LSW will continue to work with pt on her insurance and discharge planning.   DRAGAN Low   "

## 2023-10-05 NOTE — GROUP NOTE
Group Topic: Social Skills   Group Date: 10/5/2023  Start Time: 1500  End Time: 1600  Facilitators: BRENDA Howard   Department: [facility]    Number of Participants: 8   Group Focus: leisure skills and relaxation  Treatment Modality: Other: Recreation Therapy  Interventions utilized were leisure development  Purpose: other: leisure, socialization, relaxation    Name: Azul Roberts YOB: 1960   MR: [id number]      Facilitator: Recreational Therapist  Level of Participation: did not attend  Quality of Participation:  n/a  Interactions with others:  n/a  Mood/Affect:  n/a  Triggers (if applicable): n/a  Cognition:  n/a  Progress: None  Comments: pt problem is depressed mood   Plan: continue with services

## 2023-10-05 NOTE — GROUP NOTE
Group Topic: Problem Solving   Group Date: 10/5/2023  Start Time: 1000  End Time: 1110  Facilitators: BRENDA Howard   Department: [facility]    Number of Participants: 7   Group Focus: anxiety, communication, coping skills, feeling awareness/expression, problem solving, and self-awareness  Treatment Modality: Other: Recreation Therapy  Interventions utilized were other communication, positive thinking and problem solving  Purpose: coping skills, feelings, communication skills, and insight or knowledge    Name: Azul Roberts YOB: 1960   MR: [id number]      Facilitator: Recreational Therapist  Level of Participation: did not attend  Quality of Participation:  did not attend  Interactions with others: Pt is laying in her bed and refused to attend group.  Irritable with mood and limited verbal interaction. Pt agreeable to sit in hallway for 30 minutes when offered a snack    Mood/Affect:     Triggers (if applicable): n/a  Cognition:     Progress: None  Comments: pt problem is depressed mood  Plan: continue with services

## 2023-10-05 NOTE — PROGRESS NOTES
[facility]: [facility]  PROGRESS NOTE      Azul Roberts is a 63 y.o. female that is being seen follow-up at [facility]  Subjective   Patient has been stable.  Has been more compliant with the medications.  Colostomy is working well.  Denies any significant complaints.  Hip pain is fairly controlled.  Patient refused lidocaine patches.    ROS  Negative for fever or chills  Negative for sore throat, ear pain, nasal discharge  Negative for cough, shortness of breath or wheezing  Negative for chest pain, palpitations, swelling of legs  Negative for abdominal pain, patient has colostomy  Negative for urinary complaints  Negative for headache, dizziness, weakness or numbness  Positive for hip pain  Positive for depression and anxiety  All other systems reviewed and were negative   Vitals:    10/05/23 0616   BP: 116/78   Pulse: 68   Resp: 16   Temp: 36.6 °C (97.9 °F)   SpO2: 99%      Vitals:    09/28/23 1745   Weight: 79.4 kg (174 lb 15.7 oz)     Body mass index is 30.04 kg/m².  Physical Exam  Constitutional: Patient does not appear to be in any acute distress  Head and Face: NCAT  Eyes: Normal external exam, EOMI  ENT: Normal external inspection of ears and nose. Oropharynx normal.  Cardiovascular: RRR, S1/S2, no murmurs, rubs, or gallops, radial pulses +2, no edema of extremities  Pulmonary: CTAB, no respiratory distress.  Abdomen: +BS, soft, non-tender, nondistended, no guarding or rebound, no masses noted.  Colostomy present  MSK: No joint swelling, normal movements of all extremities. Range of motion- normal.  Skin- No lesions, contusions, or erythema.  Peripheral puslses palpable bilaterally 2+  Neuro: AAO X3, Cranial nerves 2-12 grossly intact,DTR 2+ in all 4 limbs   Psychiatric: Judgment intact. Appropriate mood and behavior   Diagnostic Results   Lab Results   Component Value Date    GLUCOSE 113 (H) 09/05/2023    CALCIUM 10.6 (H) 09/05/2023     09/05/2023    K 3.7 09/05/2023    CO2  "24 09/05/2023     09/05/2023    BUN 12 09/05/2023    CREATININE 0.72 09/05/2023     Lab Results   Component Value Date    ALT 12 09/05/2023    AST 14 09/05/2023    ALKPHOS 103 09/05/2023    BILITOT 0.4 09/05/2023     Lab Results   Component Value Date    WBC 13.2 (H) 09/05/2023    HGB 13.9 09/05/2023    HCT 43.0 09/05/2023    MCV 81 09/05/2023     09/05/2023     No results found for: \"CHOL\"  No results found for: \"HDL\"  No results found for: \"LDLCALC\"  No results found for: \"TRIG\"  No results found for: \"HGBA1C\"  Other labs not included in the list above were reviewed either before or during this encounter.    History    No past medical history on file.  No past surgical history on file.  No family history on file.  No Known Allergies  No current facility-administered medications on file prior to encounter.     Current Outpatient Medications on File Prior to Encounter   Medication Sig Dispense Refill    acetaminophen (Tylenol) 325 mg tablet Take 2 tablets (650 mg) by mouth 2 times a day.      hydrOXYzine pamoate (Vistaril) 25 mg capsule Take 1 capsule (25 mg) by mouth 2 times a day.      magnesium oxide (Mag-Ox) 400 mg tablet Take 1 tablet (400 mg) by mouth 2 times a day.      melatonin 5 mg tablet Take 1 tablet (5 mg) by mouth once daily at bedtime.      risperiDONE (RisperDAL) 2 mg tablet Take 1 tablet (2 mg) by mouth once daily at bedtime.      traZODone (Desyrel) 150 mg tablet Take 1 tablet (150 mg) by mouth once daily at bedtime.         There is no immunization history on file for this patient.  Patient's medical history was reviewed and updated either before or during this encounter.  ASSESSMENT / PLAN:    Patient Active Problem List   Diagnosis    Severe recurrent major depression without psychotic features (CMS/HCC)    Colostomy present (CMS/HCC)    Opioid abuse (CMS/HCC)    Bilateral hip pain   Patient's colostomy is working well.  Hip pain has been stable.  Patient is on antidepressant " medication and seen by geropsych team.  [unfilled]    Jay Knox MD

## 2023-10-05 NOTE — NURSING NOTE
SARAN NOTE     Problem:  Depression     Behavior:  Pt has been cooperative with staff giving care and doing her own self care.  She agreed to sit in the hallway during group for a half hour.  She took her scheduled medications and she is eating her meals.      Group Participation:   Patient did not participate in group    Appetite/Meals: patient is eating 100% meals       Interventions:  Encourage patient to perform own self care, 1:1 interaction with encouragement, every 15 minute checks, encourage participation in therapeutic milieu.    Response:  Patient did not go to afternoon group, she continues to empty her own colostomy.     Plan:  Encourage patient to perform own self care, every 15 min checks, 1:1 interaction, give scheduled medications.

## 2023-10-05 NOTE — NURSING NOTE
Team discussion this day regarding pt care/current status/treatment plans. Present: Chelsea ShinTre CNP, Lee CHOW, James SW, Connie RT, Lorna Shen RN, Heath RN, Opal BETHEA

## 2023-10-06 PROCEDURE — 99232 SBSQ HOSP IP/OBS MODERATE 35: CPT | Performed by: PSYCHIATRY & NEUROLOGY

## 2023-10-06 PROCEDURE — 2500000004 HC RX 250 GENERAL PHARMACY W/ HCPCS (ALT 636 FOR OP/ED): Performed by: PSYCHIATRY & NEUROLOGY

## 2023-10-06 PROCEDURE — 2500000001 HC RX 250 WO HCPCS SELF ADMINISTERED DRUGS (ALT 637 FOR MEDICARE OP): Performed by: PSYCHIATRY & NEUROLOGY

## 2023-10-06 PROCEDURE — 93010 ELECTROCARDIOGRAM REPORT: CPT | Performed by: INTERNAL MEDICINE

## 2023-10-06 PROCEDURE — 1140000001 HC PRIVATE PSYCH ROOM DAILY

## 2023-10-06 RX ADMIN — Medication 5 MG: at 20:06

## 2023-10-06 RX ADMIN — BUPROPION HYDROCHLORIDE 300 MG: 300 TABLET, FILM COATED, EXTENDED RELEASE ORAL at 08:11

## 2023-10-06 RX ADMIN — Medication 125 MCG: at 08:11

## 2023-10-06 RX ADMIN — CELECOXIB 200 MG: 200 CAPSULE ORAL at 08:11

## 2023-10-06 RX ADMIN — CITALOPRAM HYDROBROMIDE 30 MG: 20 TABLET ORAL at 08:11

## 2023-10-06 RX ADMIN — QUETIAPINE FUMARATE 350 MG: 300 TABLET ORAL at 20:06

## 2023-10-06 RX ADMIN — TRAZODONE HYDROCHLORIDE 150 MG: 50 TABLET ORAL at 20:06

## 2023-10-06 ASSESSMENT — COLUMBIA-SUICIDE SEVERITY RATING SCALE - C-SSRS
2. HAVE YOU ACTUALLY HAD ANY THOUGHTS OF KILLING YOURSELF?: NO
2. HAVE YOU ACTUALLY HAD ANY THOUGHTS OF KILLING YOURSELF?: NO
6. HAVE YOU EVER DONE ANYTHING, STARTED TO DO ANYTHING, OR PREPARED TO DO ANYTHING TO END YOUR LIFE?: NO
1. SINCE LAST CONTACT, HAVE YOU WISHED YOU WERE DEAD OR WISHED YOU COULD GO TO SLEEP AND NOT WAKE UP?: NO
1. SINCE LAST CONTACT, HAVE YOU WISHED YOU WERE DEAD OR WISHED YOU COULD GO TO SLEEP AND NOT WAKE UP?: NO
6. HAVE YOU EVER DONE ANYTHING, STARTED TO DO ANYTHING, OR PREPARED TO DO ANYTHING TO END YOUR LIFE?: NO

## 2023-10-06 ASSESSMENT — PAIN SCALES - GENERAL
PAINLEVEL_OUTOF10: 0 - NO PAIN

## 2023-10-06 ASSESSMENT — PAIN - FUNCTIONAL ASSESSMENT
PAIN_FUNCTIONAL_ASSESSMENT: 0-10

## 2023-10-06 ASSESSMENT — ENCOUNTER SYMPTOMS
NERVOUS/ANXIOUS: 1
DYSPHORIC MOOD: 1
SLEEP DISTURBANCE: 1

## 2023-10-06 NOTE — PROGRESS NOTES
REHAB Therapy Assessment & Treatment    Patient Name: Azul Roberts  MRN: [id number]  Today's Date: 10/6/2023    Recreation note : pt is alert and oriented x 1-2 with some confusion, memory loss and poor insight/judgement.  Pt continues to refuse most groups this week, displays very irritable  mood and becomes more annoyed with encouragement to attend group.  Pt is dismissive with attempts to engage and often displays no eye contact and flat affect with interaction.  Pt continues to require encouragement to complete ADL's, is eating well and sleeping well.  Will continue to encourage pt to attend groups of choice daily.

## 2023-10-06 NOTE — NURSING NOTE
"SARAN NOTE     Problem:  depression     Behavior:  Pt came out of her room for a snack, makes eye contact, pleasant mood, flat affect, cooperative, denied SI  Group Participation: n/a  Appetite/Meals: n/a       Interventions:  HS snack provided, scheduled medication administered, self care related to colostomy bag reviewed    Response:  Pt is compliant with her medication, refused to demonstrate her colostomy bag stating \"it was emptied earlier by the day nurse\", Pt reported that she's been taking care of her colostomy bag.     Plan:  Continue monitoring, provide positive reinforcement  "

## 2023-10-06 NOTE — GROUP NOTE
"Group Topic: Other   Group Date: 10/6/2023  Start Time: 1520  End Time: 1600  Facilitators: RANDALL FernandesS   Department: [facility] REHABTH VIRTUAL    Number of Participants: 5   Group Focus: concentration  Treatment Modality: Other: Recreation Therapy  Interventions utilized were mental fitness  Purpose: other: Memory Challenge    Name: Azul Roberts YOB: 1960   MR: [id number]      Facilitator: Recreational Therapist  Level of Participation: did not attend  Quality of Participation:  did not attend  Interactions with others:  Pt is laying in her bed and refused to attend session.  Reports \"I am not coming\".  CTRS reminded pt of agreement made this morning to attend the afternoon group.  Pt responded \"so what, I am not coming\".  Pt does leave her room and sit in a chair by the nurses station during the group.    Does engage with other unit staff at that time.    Mood/Affect:  did not attend  Triggers (if applicable): n/a  Cognition:  did not attend  Progress: None  Comments: pt problem is depressed mood.  Plan: continue with services      "

## 2023-10-06 NOTE — NURSING NOTE
"SARAN NOTE     Problem:  Depression     Behavior:  Patient laying in bed most of the day, she does get up for meals but would not get out of bed for group.  She is taking her medications as scheduled.  She reports her mood as   \"No good.\"  She has not been performing self care this shift.    Group Participation: No participation  Appetite/Meals: Patient is eating her meals, 100% at all meals so far.       Interventions:  Pt encouraged to get out of bed this shift, Pt given scheduled medications, 1:1 interaction with encouragement, every 15 minute checks, encourage patient to perform self care.    Response:  Pt has allowed staff to empty colostomy bag, she has not performed self care this shift.       Plan:  Encourage patient to perform self care, go to group, give scheduled medications, every 15 minute checks, 1:1 interaction.    "

## 2023-10-06 NOTE — PROGRESS NOTES
"Azul Roberts is a 63 y.o. female on day 27 of admission presenting with Severe recurrent major depression without psychotic features (CMS/HCC).      Subjective     Case discussed with treatment team and chart reviewed.  Refusing group even in the context of being offered a special privilege to attend group.  Remains disengaged withdrawn staying in bed.  Limited attention to completing activities of daily living.  Sarcastic in conversation often chuckling and laughing and chortling in response to queries posed to her.  Reports has no intentions of participating and reports that she feels like she will be stuck in the hospital \"forever\" and that she \"will not get better.\"  Remains highly depressed and withdrawn, neurovegetative.  Continues to demonstrate reduced irritability and has endorsed over the last couple days that she feels maybe the medications are starting to help a little bit to offer some level of improvement and relief but this is not recognized by a major change in her level of function at this time.    Objective     Last Recorded Vitals  Blood pressure 93/57, pulse 70, temperature 36.5 °C (97.7 °F), temperature source Oral, resp. rate 20, height 1.626 m (5' 4\"), weight 78.9 kg (174 lb), SpO2 98 %.    Review of Systems   Psychiatric/Behavioral:  Positive for behavioral problems, dysphoric mood, sleep disturbance and suicidal ideas. The patient is nervous/anxious.        Psychiatric ROS - Adult  Anxiety: General Anxiety Disorder (KAYLEEN)KAYLEEN Behaviors: difficult to control worry, difficulty concentrating, irritability, restlessness, and sleep disturbance  Depression: anhedonia, appetite increased, concentration, energy, helpless, hopeless, interest, persistent thoughts of death, psychomotor agitation, sleep decreased , suicidal thoughts, and withdrawn  Delirium: negative  Psychosis: negative  Ioana: negative  Safety Issues: passive death wish and suicidal ideation  Psychiatric ROS Comment: as " "noted    Physical Exam  Abdominal:      Comments: Presence of colostomy bag   Psychiatric:         Attention and Perception: She is inattentive.         Mood and Affect: Mood is anxious and depressed. Affect is labile, flat and inappropriate.         Speech: Speech is delayed.         Behavior: Behavior is uncooperative, agitated, aggressive and withdrawn.         Thought Content: Thought content includes suicidal ideation.         Judgment: Judgment is impulsive.           Mental Status Examination  General Appearance: Disheveled., Malodorous., Averted gaze., and Limited eye contact.  Gait/Station:  antalgic gait with walker assist device  Speech: minimal verbal responses, low volume  Mood: \"okay\"  Affect: less Irritable and Angry  Thought Process: remains impoverished  Thought Associations: No loosening of associations  Thought Content: suicidal  Perception: No perceptual abnormalities noted  Level of Consciousness: Drowsy  Orientation: Alert  Attention and Concentration: Mild impairment in attention  Recent Memory: Intact as evidenced by ability to recall details from the past 24 hours   Remote Memory: Intact as evidenced by ability to recall previous medical issues   Executive function: Intact  Language: Naming intact  Fund of knowledge: Good  Insight: Limited, as evidenced by disengagement  Judgment: Limited, as evidence by inability to reason through medical decision making and highly impaired reality testing       Psychiatric Risk Assessment  Violence Risk Assessment: major mental illness and substance abuse  Acute Risk of Harm to Others is Considered: moderate   Suicide Risk Assessment: , chronic medical illness, chronic pain, current psychiatric illness, feelings of hopelessness, global insomnia, history of trauma or abuse, life crisis (shame/despair), prior suicide attempt, severe anxiety, substance abuse, and suicidal ideations  Protective Factors against Suicide: adherence to  treatment and " marriage/partnership  Acute Risk of Harm to Self is Considered: moderate    Relevant Results             Current Facility-Administered Medications   Medication Dose Route Frequency Provider Last Rate Last Admin    acetaminophen (Tylenol) tablet 650 mg  650 mg oral q6h PRN Eddie Cullen, DO        alum-mag hydroxide-simeth (Mylanta) 200-200-20 mg/5 mL oral suspension 30 mL  30 mL oral q6h PRN Eddierobert Cullen, DO        buPROPion XL (Wellbutrin XL) 24 hr tablet 300 mg  300 mg oral Daily Eddie  Zamarbin, DO   300 mg at 10/06/23 0811    celecoxib (CeleBREX) capsule 200 mg  200 mg oral Daily Eddie  Zamarbin, DO   200 mg at 10/06/23 0811    cholecalciferol (Vitamin D-3) capsule 125 mcg  125 mcg oral Daily Eddie  Zamarbin, DO   125 mcg at 10/06/23 0811    citalopram (CeleXA) tablet 30 mg  30 mg oral Daily Eddie  Zamarbin, DO   30 mg at 10/06/23 0811    diphenhydrAMINE (Sominex) tablet 25 mg  25 mg oral q8h PRN [facility] Subhash, DO        hydrOXYzine pamoate (Vistaril) capsule 25 mg  25 mg oral q8h PRN [facility] Subhash, DO        lidocaine 4 % patch 1 patch  1 patch transdermal Daily Joshua H Subhash, DO        melatonin tablet 5 mg  5 mg oral Nightly Eddie  Subhash, DO   5 mg at 10/05/23 2018    OLANZapine (ZyPREXA) injection 5 mg  5 mg intramuscular q8h PRN Eddie  Subhash, DO        OLANZapine zydis (ZyPREXA) disintegrating tablet 5 mg  5 mg oral q8h PRN Eddie  Subhash, DO        QUEtiapine (SEROquel) tablet 350 mg  350 mg oral Nightly Eddie  Zamarbin, DO   350 mg at 10/05/23 2018    traZODone (Desyrel) tablet 150 mg  150 mg oral Nightly Eddie  Zamarbin, DO   150 mg at 10/05/23 2018       Assessment/Plan   Principal Problem:    Severe recurrent major depression without psychotic features (CMS/HCC)  Active Problems:    Colostomy present (CMS/HCC)    Opioid abuse (CMS/HCC)    Bilateral hip pain    Biological -     Wellbutrin xl 300 mg for adjunctive depression treatment  Celexa 30 mg daily for  depression, anxiety    Will shift all seroquel to HS to try and reduce factors that may be limiting daytime activation - Seroquel to 350 mg qhs    Trazodone 150 mg at bedtime for sleep    Repeat EKG as routine to continue monitoring Qtc - nursing report will perform today     Discussion with patient regarding risk benefits and alternatives and side effects with opportunity to ask questions and questions answered.  Patient given consent to the plan as noted    2. Psychological -       Patient is encouraged to participate with the therapeutic milieu and program and group therapy      3. Sociological -      Patient encouraged to cooperate with social work staff on issues relevant to discharge planning  Will plan for family outreach to obtain additional collateral and review progress and limitations from improvement thus far.                    I spent 25 minutes in the professional and overall care of this patient.      Eddie Cullen, DO

## 2023-10-06 NOTE — PROGRESS NOTES
Nutrition Follow up Note    Admitted 9/9/23 for MDD. Noted to be eating well since admission. No nutrition related concerns. Pt was admitted under [facility] clinicals and transferred to [facility].     9/27 - noted to be eating 100% of majority of meals. ensure ordered on 9/19 per pt request. she likely does not need supplemental nutrition but will continue to provide per psych.  9/18 - Pt admitted to [facility] for depression and psychosis. Seeing pt for LOS. Pt moved in from [place] with  to move in with sister in law.    Patient Active Problem List   Diagnosis    Severe recurrent major depression without psychotic features (CMS/HCC)    Colostomy present (CMS/HCC)    Opioid abuse (CMS/[facility])    Bilateral hip pain       has no past medical history on file.    has no past surgical history on file.         No Known Allergies       Current Facility-Administered Medications:     acetaminophen (Tylenol) tablet 650 mg, 650 mg, oral, q6h PRN, Eddie Cullen DO    alum-mag hydroxide-simeth (Mylanta) 200-200-20 mg/5 mL oral suspension 30 mL, 30 mL, oral, q6h PRN, Eddie Cullen, DO    buPROPion XL (Wellbutrin XL) 24 hr tablet 300 mg, 300 mg, oral, Daily, Eddie Cullen, , 300 mg at 10/06/23 0811    celecoxib (CeleBREX) capsule 200 mg, 200 mg, oral, Daily, Eddie Cullen, DO, 200 mg at 10/06/23 0811    cholecalciferol (Vitamin D-3) capsule 125 mcg, 125 mcg, oral, Daily, Eddie Cullen, DO, 125 mcg at 10/06/23 0811    citalopram (CeleXA) tablet 30 mg, 30 mg, oral, Daily, Eddie Cullen, DO, 30 mg at 10/06/23 0811    diphenhydrAMINE (Sominex) tablet 25 mg, 25 mg, oral, q8h PRN, Eddie Cullen, DO    hydrOXYzine pamoate (Vistaril) capsule 25 mg, 25 mg, oral, q8h PRN, Eddie Cullen, DO    lidocaine 4 % patch 1 patch, 1 patch, transdermal, Daily, Eddie Cullen DO    melatonin tablet 5 mg, 5 mg, oral, Nightly, Eddie Cullen DO, 5 mg at 10/05/23 2018    OLANZapine (ZyPREXA) injection 5 mg, 5  "mg, intramuscular, q8h PRN, Eddie Cullen DO    OLANZapine zydis (ZyPREXA) disintegrating tablet 5 mg, 5 mg, oral, q8h PRN, Eddie Cullen, DO    QUEtiapine (SEROquel) tablet 350 mg, 350 mg, oral, Nightly, Eddie Cullen, , 350 mg at 10/05/23 2018    traZODone (Desyrel) tablet 150 mg, 150 mg, oral, Nightly, Eddie Cullen, , 150 mg at 10/05/23 2018  Nutrition Pertinent meds: vitamin D, seroquel     Lab Results   Component Value Date    WBC 13.2 (H) 09/05/2023    HGB 13.9 09/05/2023    HCT 43.0 09/05/2023     09/05/2023    ALT 12 09/05/2023    AST 14 09/05/2023     09/05/2023    K 3.7 09/05/2023     09/05/2023    CREATININE 0.72 09/05/2023    BUN 12 09/05/2023    CO2 24 09/05/2023       Dietary Orders (From admission, onward)       Start     Ordered    09/28/23 1750  Adult diet Regular  Diet effective now        Question:  Diet type  Answer:  Regular    09/28/23 1800    09/28/23 1750  Oral nutritional supplements  Until discontinued        Comments: Chocolate ensure high protein   Question Answer Comment   Deliver with  three times daily between meals   Select supplement: Ensure High Protein        09/28/23 1800                     Food and Nutrient History  Energy Intake: Good > 75 %  Food and Nutrient History: good po intake documented    Anthropometrics:  Height: 162.6 cm (5' 4\")  Weight: 78.9 kg (174 lb)  BMI (Calculated): 29.85    IBW/kg (Dietitian Calculated): 54.55 kg     Adjusted Body Weight (kg): 60.91 kg    Estimated Energy Needs  Total Energy Estimated Needs (kCal): 1517 kCal  Method for Estimating Needs: 25 kcals/kg ABW    Estimated Protein Needs  Total Protein Estimated Needs (g): -12 g  Method for Estimating Needs: 0.8-1 g/kg ABW    Estimated Fluid Needs  Total Fluid Estimated Needs (mL): 1517 mL  Method for Estimating Needs: 1 ml/kcal ABW    Nutrition Focused Physical Findings: deferred - Pt not available at this time.     Physical Findings (Nutrition " Deficiency/Toxicity)  Skin:  (colostomy)    Nutrition Diagnosis:  Malnutrition Diagnosis  Patient has Malnutrition Diagnosis: No    Patient has Nutrition Diagnosis: Yes  Nutrition Diagnosis 1: No nutrition diagnosis at this time  Diagnosis Status (1): Ongoing           Nutrition Interventions/Recommendations: None at this time.    Education Documentation  No documentation found.      Nutrition Monitoring/Evaluation: None at this time.    RD Recommendations: None at this time.      Follow Up  Time Spent (min): 30 minutes  Last Date of Nutrition Visit: 10/06/23  Nutrition Follow-Up Needed?: 7-10 days  Follow up Comment: 10/16/23

## 2023-10-06 NOTE — GROUP NOTE
"Group Topic: Other   Group Date: 10/6/2023  Start Time: 1115  End Time: 1145  Facilitators: RANDALL FernandesS   Department: [facility] REHABTH VIRTUAL    Number of Participants: 7   Group Focus: other Getting to Know you  Treatment Modality: Other: Recreation Therapy  Interventions utilized were mental fitness, orientation, and reminiscence  Purpose: feelings    Name: Azul Roberts YOB: 1960   MR: [id number]      Facilitator:  did not attend  Level of Participation: did not attend  Quality of Participation:  Pt is laying in her bed with the cover up almost over her eyes.  Does not make any eye contact with CTRS during interaction and refuses to attend group at this time.  Reports \"I am not coming today\".  Pt does reports she \"might join later\".  CTRS reminds pt where group is being held in case she changes her mind.  Interactions with others:  did not attend  Mood/Affect:  did not attend  Triggers (if applicable): n/a  Cognition:  did not attend  Progress: None  Comments: pt problem is depressed mood  Plan: continue with services      "

## 2023-10-07 PROCEDURE — 2500000001 HC RX 250 WO HCPCS SELF ADMINISTERED DRUGS (ALT 637 FOR MEDICARE OP): Performed by: PSYCHIATRY & NEUROLOGY

## 2023-10-07 PROCEDURE — 99231 SBSQ HOSP IP/OBS SF/LOW 25: CPT

## 2023-10-07 PROCEDURE — 99232 SBSQ HOSP IP/OBS MODERATE 35: CPT | Performed by: INTERNAL MEDICINE

## 2023-10-07 PROCEDURE — 1140000001 HC PRIVATE PSYCH ROOM DAILY

## 2023-10-07 PROCEDURE — 2500000004 HC RX 250 GENERAL PHARMACY W/ HCPCS (ALT 636 FOR OP/ED): Performed by: PSYCHIATRY & NEUROLOGY

## 2023-10-07 RX ADMIN — CELECOXIB 200 MG: 200 CAPSULE ORAL at 09:15

## 2023-10-07 RX ADMIN — BUPROPION HYDROCHLORIDE 300 MG: 300 TABLET, FILM COATED, EXTENDED RELEASE ORAL at 09:15

## 2023-10-07 RX ADMIN — CITALOPRAM HYDROBROMIDE 30 MG: 20 TABLET ORAL at 09:15

## 2023-10-07 RX ADMIN — TRAZODONE HYDROCHLORIDE 150 MG: 50 TABLET ORAL at 20:49

## 2023-10-07 RX ADMIN — QUETIAPINE FUMARATE 350 MG: 300 TABLET ORAL at 20:49

## 2023-10-07 RX ADMIN — Medication 125 MCG: at 09:15

## 2023-10-07 RX ADMIN — Medication 5 MG: at 20:49

## 2023-10-07 ASSESSMENT — PAIN - FUNCTIONAL ASSESSMENT
PAIN_FUNCTIONAL_ASSESSMENT: 0-10
PAIN_FUNCTIONAL_ASSESSMENT: FLACC (FACE, LEGS, ACTIVITY, CRY, CONSOLABILITY)

## 2023-10-07 ASSESSMENT — ENCOUNTER SYMPTOMS
SLEEP DISTURBANCE: 1
NERVOUS/ANXIOUS: 1
DYSPHORIC MOOD: 1

## 2023-10-07 ASSESSMENT — PAIN SCALES - GENERAL
PAINLEVEL_OUTOF10: 0 - NO PAIN
PAINLEVEL_OUTOF10: 2

## 2023-10-07 NOTE — NURSING NOTE
SARAN NOTE     Problem:  Depression     Behavior:  Pt lying in bed when this RN came on shift. She requires a lot of encouragement to clean her colostomy but still refuses and resists no matter what is said. Pt maintains a flat affect. She was compliant with her HS medications tonight and she did come to the hallway for an HS snack. When she was finished, pt throws her garbage on the floor and goes back to her room.   Group Participation: n/a  Appetite/Meals: HS snack provided       Interventions:  HS medications given per MAR   Hs snack given  Minimal interaction with patient.     Response:  She is now resting in her bed, she is observed resting with her eyes closed, no s/s of distress noted     Plan:  Continue to monitor and assist

## 2023-10-07 NOTE — PROGRESS NOTES
[facility]: [facility]  PROGRESS NOTE      Azul Roberts is a 63 y.o. female that is being seen  today for follow-up geropsych.    Patient has been doing well has been more compliant with the medication.  Colostomy is working well.  Vital signs are stable.  Denies any significant issues at present.      ROS  Negative for fever or chills  Negative for sore throat, ear pain, nasal discharge  Negative for cough, shortness of breath or wheezing  Negative for chest pain, palpitations, swelling of legs  Negative for abdominal pain, constipation, diarrhea, blood in the stools  Negative for urinary complaints  Negative for headache, dizziness, weakness or numbness  Negative for joint pain  Negative for depression or anxiety  All other systems reviewed and were negative   Vitals:    10/07/23 0552   BP: 88/51   Pulse: 69   Resp: 16   Temp: 36.6 °C (97.9 °F)   SpO2: 96%      Vitals:    10/06/23 1400   Weight: 78.9 kg (174 lb)     Body mass index is 29.87 kg/m².  Physical Exam  Constitutional: Patient does not appear to be in any acute distress  Head and Face: NCAT  Eyes: Normal external exam, EOMI  ENT: Normal external inspection of ears and nose. Oropharynx normal.  Cardiovascular: RRR, S1/S2, no murmurs, rubs, or gallops, radial pulses +2, no edema of extremities  Pulmonary: CTAB, no respiratory distress.  Abdomen: +BS, soft, non-tender, nondistended, no guarding or rebound, no masses noted, colostomy present  MSK: No joint swelling, normal movements of all extremities. Range of motion- normal.  Skin- No lesions, contusions, or erythema.  Peripheral puslses palpable bilaterally 2+  Neuro: AAO X3, Cranial nerves 2-12 grossly intact,DTR 2+ in all 4 limbs   Psychiatric: Judgment intact. Appropriate mood and behavior    Diagnostic Results   Lab Results   Component Value Date    GLUCOSE 113 (H) 09/05/2023    CALCIUM 10.6 (H) 09/05/2023     09/05/2023    K 3.7 09/05/2023    CO2 24 09/05/2023    CL  "106 09/05/2023    BUN 12 09/05/2023    CREATININE 0.72 09/05/2023     Lab Results   Component Value Date    ALT 12 09/05/2023    AST 14 09/05/2023    ALKPHOS 103 09/05/2023    BILITOT 0.4 09/05/2023     Lab Results   Component Value Date    WBC 13.2 (H) 09/05/2023    HGB 13.9 09/05/2023    HCT 43.0 09/05/2023    MCV 81 09/05/2023     09/05/2023     No results found for: \"CHOL\"  No results found for: \"HDL\"  No results found for: \"LDLCALC\"  No results found for: \"TRIG\"  No results found for: \"HGBA1C\"  Other labs not included in the list above were reviewed either before or during this encounter.    History    No past medical history on file.  No past surgical history on file.  No family history on file.  No Known Allergies  No current facility-administered medications on file prior to encounter.     Current Outpatient Medications on File Prior to Encounter   Medication Sig Dispense Refill    acetaminophen (Tylenol) 325 mg tablet Take 2 tablets (650 mg) by mouth 2 times a day.      hydrOXYzine pamoate (Vistaril) 25 mg capsule Take 1 capsule (25 mg) by mouth 2 times a day.      magnesium oxide (Mag-Ox) 400 mg tablet Take 1 tablet (400 mg) by mouth 2 times a day.      melatonin 5 mg tablet Take 1 tablet (5 mg) by mouth once daily at bedtime.      risperiDONE (RisperDAL) 2 mg tablet Take 1 tablet (2 mg) by mouth once daily at bedtime.      traZODone (Desyrel) 150 mg tablet Take 1 tablet (150 mg) by mouth once daily at bedtime.         There is no immunization history on file for this patient.  Patient's medical history was reviewed and updated either before or during this encounter.  ASSESSMENT / PLAN:  Active Hospital Problems    Bilateral hip pain      *Severe recurrent major depression without psychotic features (CMS/HCC)      Colostomy present (CMS/HCC)      Opioid abuse (CMS/HCC)    Patient has been doing well pain has been fairly controlled.        Jay Knox MD   "

## 2023-10-07 NOTE — CARE PLAN
"The patient's goals for the shift include \"nothing\"    The clinical goals for the shift include encouraged to perform self care with colostomy    Over the shift, the patient did not make progress toward the following goals. Barriers to progression include pt unwilling to care for self. Recommendations to address these barriers include encourage and educate importance of ADLs.    "

## 2023-10-07 NOTE — PROGRESS NOTES
"Azul Roberts is a 63 y.o. female on day 28 of admission presenting with Severe recurrent major depression without psychotic features (CMS/HCC).      Subjective   Chart reviewed, discussed case with RN.  Pt laying in bed on her side this morning, irritable, stating, \"I'm fine\".  She replies, \"fine\" or \"ok\" to most questions.  She did not attend groups, remains isolative to room.  Nursing staff report that she is refusing to change her colostomy even with significant encouragement.  Has been cooperative with taking medications.     Objective     Last Recorded Vitals  Blood pressure 88/51, pulse 69, temperature 36.6 °C (97.9 °F), temperature source Oral, resp. rate 16, height 1.626 m (5' 4\"), weight 78.9 kg (174 lb), SpO2 96 %.    Review of Systems   Psychiatric/Behavioral:  Positive for behavioral problems, dysphoric mood, sleep disturbance and suicidal ideas. The patient is nervous/anxious.         Irritable       Psychiatric ROS - Adult  Did not engage with questions, stating \"fine\" or \"ok\" to questions    Physical Exam  Abdominal:      Comments: Presence of colostomy bag   Psychiatric:         Attention and Perception: She is inattentive.         Mood and Affect: Mood is anxious and depressed. Affect is flat.         Speech: Speech is delayed.         Behavior: Behavior is uncooperative, agitated and withdrawn.         Thought Content: Thought content includes suicidal ideation.         Judgment: Judgment is impulsive.     Mental Status Examination  General Appearance: Disheveled., Malodorous., Averted gaze., and Limited eye contact.  Gait/Station:  not observed  Speech: minimal verbal responses, low volume  Mood: \"I'm okay\"  Affect: less Irritable and Angry  Thought Process: remains impoverished  Thought Associations: No loosening of associations  Thought Content:  does not answer but has been reported to be suicidal  Perception: No perceptual abnormalities noted  Level of Consciousness: Alert  Orientation: " Alert  Attention and Concentration: Mild impairment in attention  Insight: Limited, as evidenced by disengagement  Judgment: Limited, as evidence by inability to reason through medical decision making and highly impaired reality testing     Psychiatric Risk Assessment  Violence Risk Assessment: major mental illness and substance abuse  Acute Risk of Harm to Others is Considered: moderate   Suicide Risk Assessment: , chronic medical illness, chronic pain, current psychiatric illness, feelings of hopelessness, global insomnia, history of trauma or abuse, life crisis (shame/despair), prior suicide attempt, severe anxiety, substance abuse, and suicidal ideations  Protective Factors against Suicide: adherence to  treatment and marriage/partnership  Acute Risk of Harm to Self is Considered: moderate    Relevant Results    Scheduled medications  buPROPion XL, 300 mg, oral, Daily  celecoxib, 200 mg, oral, Daily  cholecalciferol, 125 mcg, oral, Daily  citalopram, 30 mg, oral, Daily  lidocaine, 1 patch, transdermal, Daily  melatonin, 5 mg, oral, Nightly  QUEtiapine, 350 mg, oral, Nightly  traZODone, 150 mg, oral, Nightly      Continuous medications     PRN medications  PRN medications: acetaminophen, alum-mag hydroxide-simeth, diphenhydrAMINE, hydrOXYzine pamoate, OLANZapine, OLANZapine zydis       Assessment/Plan   Principal Problem:    Severe recurrent major depression without psychotic features (CMS/HCC)  Active Problems:    Colostomy present (CMS/HCC)    Opioid abuse (CMS/HCC)    Bilateral hip pain    Biological -     Wellbutrin xl 300 mg for adjunctive depression treatment  Celexa 30 mg daily for depression, anxiety    Will shift all seroquel to HS to try and reduce factors that may be limiting daytime activation - Seroquel to 350 mg qhs    Trazodone 150 mg at bedtime for sleep    Repeat EKG as routine to continue monitoring Qtc -      Discussion with patient regarding risk benefits and alternatives and side  effects with opportunity to ask questions and questions answered.  Patient given consent to the plan as noted    2. Psychological -       Patient is encouraged to participate with the therapeutic milieu and program and group therapy      3. Sociological -     Patient encouraged to cooperate with social work staff on issues relevant to discharge planning  Will plan for family outreach to obtain additional collateral and review progress and limitations from improvement thus far.       I spent 25 minutes in the professional and overall care of this patient.      Daisy Vasquez, APRN-CNP

## 2023-10-07 NOTE — GROUP NOTE
Group Topic: Symptom Management   Group Date: 10/7/2023  Start Time: 1100  End Time: 1145  Facilitators: BRENDA Madera   Department: [facility]    Number of Participants: 5   Group Focus: other Stress Management  Treatment Modality: Psychoeducation  Interventions utilized were patient education  Purpose: coping skills and insight or knowledge    Name: Azul Roberts YOB: 1960   MR: [id number]      Facilitator: Recreational Therapist  Level of Participation: did not attend  Quality of Participation:  pt was sitting in hallway and refused.   Interactions with others:   Mood/Affect:     Triggers (if applicable):  Cognition:     Progress: Minimal  Comments:   Pt problem is depression.  Plan: continue with services

## 2023-10-07 NOTE — GROUP NOTE
Group Topic: Leisure Skills   Group Date: 10/7/2023  Start Time: 1015  End Time: 1100  Facilitators: BRENDA Madera   Department: [facility]    Number of Participants: 5   Group Focus: leisure skills  Treatment Modality: Leisure Development  Interventions utilized were leisure development  Purpose: other: Pt will be able to focus on topic and Identify leisure activities and the benefits of leisure.    Name: Azul Roberts YOB: 1960   MR: [id number]      Facilitator: Recreational Therapist  Level of Participation: did not attend  Quality of Participation:  Pt in room with nurse .   Interactions with others:  NA  Mood/Affect:   Triggers (if applicable):   Cognition:   Progress: Moderate  Comments: Pt problem is depressed mood.  Plan: continue with services

## 2023-10-07 NOTE — NURSING NOTE
SARAN NOTE     Problem:  depression     Behavior:  Pt pleasant and cooperative. Pt did not want to change colostomy bag at first but then asked this nurse to help. This nurse helped pt and she has been pleasant all shift. Pt med compliant and makes needs known.  Group Participation: none  Appetite/Meals: pt has a good appetite       Interventions:  1:1 provided, redirected and reassured pt, encouraged pt to care for self, administered scheduled medications.    Response:  Pt continue as above.     Plan:  Continue q15 minute checks and current care plan, maintain pt safety.

## 2023-10-08 PROCEDURE — 99231 SBSQ HOSP IP/OBS SF/LOW 25: CPT

## 2023-10-08 PROCEDURE — 1140000001 HC PRIVATE PSYCH ROOM DAILY

## 2023-10-08 PROCEDURE — 2500000001 HC RX 250 WO HCPCS SELF ADMINISTERED DRUGS (ALT 637 FOR MEDICARE OP): Performed by: PSYCHIATRY & NEUROLOGY

## 2023-10-08 PROCEDURE — 2500000004 HC RX 250 GENERAL PHARMACY W/ HCPCS (ALT 636 FOR OP/ED): Performed by: PSYCHIATRY & NEUROLOGY

## 2023-10-08 RX ADMIN — Medication 5 MG: at 20:57

## 2023-10-08 RX ADMIN — QUETIAPINE FUMARATE 350 MG: 300 TABLET ORAL at 20:57

## 2023-10-08 RX ADMIN — CITALOPRAM HYDROBROMIDE 30 MG: 20 TABLET ORAL at 08:23

## 2023-10-08 RX ADMIN — TRAZODONE HYDROCHLORIDE 150 MG: 50 TABLET ORAL at 20:57

## 2023-10-08 RX ADMIN — BUPROPION HYDROCHLORIDE 300 MG: 300 TABLET, FILM COATED, EXTENDED RELEASE ORAL at 08:23

## 2023-10-08 RX ADMIN — Medication 125 MCG: at 08:23

## 2023-10-08 RX ADMIN — CELECOXIB 200 MG: 200 CAPSULE ORAL at 08:23

## 2023-10-08 ASSESSMENT — COLUMBIA-SUICIDE SEVERITY RATING SCALE - C-SSRS
6. HAVE YOU EVER DONE ANYTHING, STARTED TO DO ANYTHING, OR PREPARED TO DO ANYTHING TO END YOUR LIFE?: NO
2. HAVE YOU ACTUALLY HAD ANY THOUGHTS OF KILLING YOURSELF?: NO
2. HAVE YOU ACTUALLY HAD ANY THOUGHTS OF KILLING YOURSELF?: NO
6. HAVE YOU EVER DONE ANYTHING, STARTED TO DO ANYTHING, OR PREPARED TO DO ANYTHING TO END YOUR LIFE?: NO
1. SINCE LAST CONTACT, HAVE YOU WISHED YOU WERE DEAD OR WISHED YOU COULD GO TO SLEEP AND NOT WAKE UP?: NO
1. SINCE LAST CONTACT, HAVE YOU WISHED YOU WERE DEAD OR WISHED YOU COULD GO TO SLEEP AND NOT WAKE UP?: NO

## 2023-10-08 ASSESSMENT — PAIN SCALES - GENERAL
PAINLEVEL_OUTOF10: 0 - NO PAIN

## 2023-10-08 ASSESSMENT — PAIN - FUNCTIONAL ASSESSMENT
PAIN_FUNCTIONAL_ASSESSMENT: 0-10

## 2023-10-08 ASSESSMENT — ENCOUNTER SYMPTOMS
NERVOUS/ANXIOUS: 1
DYSPHORIC MOOD: 1
SLEEP DISTURBANCE: 1

## 2023-10-08 NOTE — GROUP NOTE
"Group Topic: Leisure Skills   Group Date: 10/8/2023  Start Time: 1015  End Time: 1145  Facilitators: Kenneth Benedict CTRS   Department: [facility]    Number of Participants: 6   Group Focus: concentration, leisure skills, and social skills  Treatment Modality: Leisure Development and Skills Training  Interventions utilized were leisure development, mental fitness, and problem solving  Purpose: other: To help boost mood . Relieve symptoms of anxiety, depressions or worry.     Name: Azul Roberts YOB: 1960   MR: [id number]      Facilitator: Recreational Therapist  Level of Participation: did not attend  Quality of Participation:  Pt refused was in bed sleeping.  Interactions with others:  Pr did not attend .   Mood/Affect:  Depressed , withdrawn, when CTRS encourage participation she made a loud noise and said \"No\"  Triggers (if applicable):   Cognition:  unable to assess.   Progress: Minimal  Comments: Pt problem is depressed mood.  Plan: continue with services      "

## 2023-10-08 NOTE — NURSING NOTE
SARAN NOTE     Problem:  depression     Behavior:  Pt isolative to room, does not attend group therapy, lays flat in bed with blinds closed and lights off. Does not want to be bothered by any means.  Group Participation: pt does not attend group therapy  Appetite/Meals: pt has a good appetite, eating 100% of meals.       Interventions:  1:1 provided, reassured pt and encouraged pt to come out for group therapy and to engage with others, administered scheduled medications.    Response:  Pt continues as above.     Plan:  Continue q15 minute checks and current care plan, maintain pt safety.

## 2023-10-08 NOTE — PROGRESS NOTES
"Azul Roberts is a 63 y.o. female on day 29 of admission presenting with Severe recurrent major depression without psychotic features (CMS/HCC).      Subjective   Chart reviewed, discussed case with RN.  Limited change today.  Pt continues to be disengaged.  States, \"I'm ok\" to questions, clipped responses. RN reports that she was able to convince Azul to change her colostomy bag yesterday and that she asked for help.      Objective     Last Recorded Vitals  Blood pressure 106/70, pulse 69, temperature 36.5 °C (97.7 °F), temperature source Oral, resp. rate 17, height 1.626 m (5' 4\"), weight 78.9 kg (174 lb), SpO2 98 %.    Review of Systems   Psychiatric/Behavioral:  Positive for behavioral problems, dysphoric mood, sleep disturbance and suicidal ideas. The patient is nervous/anxious.         Irritable       Psychiatric ROS - Adult  Did not engage with questions, stating \"fine\" or \"ok\" to questions    Physical Exam  Abdominal:      Comments: Presence of colostomy bag   Psychiatric:         Attention and Perception: She is inattentive.         Mood and Affect: Mood is anxious and depressed. Affect is flat.         Speech: Speech is delayed.         Behavior: Behavior is uncooperative, agitated and withdrawn.         Thought Content: Thought content includes suicidal ideation.         Judgment: Judgment is impulsive.     Mental Status Examination  General Appearance: Disheveled., Malodorous., Averted gaze., and Limited eye contact.  Gait/Station:  not observed  Speech: minimal verbal responses, low volume  Mood: \"I'm okay\"  Affect: less Irritable and Angry  Thought Process: remains impoverished  Thought Associations: No loosening of associations  Thought Content:  does not answer but has been reported to be suicidal  Perception: No perceptual abnormalities noted  Level of Consciousness: Alert  Orientation: Alert  Attention and Concentration: Mild impairment in attention  Insight: Limited, as evidenced by " disengagement  Judgment: Limited, as evidence by inability to reason through medical decision making and highly impaired reality testing     Psychiatric Risk Assessment  Violence Risk Assessment: major mental illness and substance abuse  Acute Risk of Harm to Others is Considered: moderate   Suicide Risk Assessment: , chronic medical illness, chronic pain, current psychiatric illness, feelings of hopelessness, global insomnia, history of trauma or abuse, life crisis (shame/despair), prior suicide attempt, severe anxiety, substance abuse, and suicidal ideations  Protective Factors against Suicide: adherence to  treatment and marriage/partnership  Acute Risk of Harm to Self is Considered: moderate    Relevant Results    Scheduled medications  buPROPion XL, 300 mg, oral, Daily  celecoxib, 200 mg, oral, Daily  cholecalciferol, 125 mcg, oral, Daily  citalopram, 30 mg, oral, Daily  lidocaine, 1 patch, transdermal, Daily  melatonin, 5 mg, oral, Nightly  QUEtiapine, 350 mg, oral, Nightly  traZODone, 150 mg, oral, Nightly      Continuous medications     PRN medications  PRN medications: acetaminophen, alum-mag hydroxide-simeth, diphenhydrAMINE, hydrOXYzine pamoate, OLANZapine, OLANZapine zydis       Assessment/Plan   Principal Problem:    Severe recurrent major depression without psychotic features (CMS/HCC)  Active Problems:    Colostomy present (CMS/HCC)    Opioid abuse (CMS/HCC)    Bilateral hip pain    Biological -     Wellbutrin xl 300 mg for adjunctive depression treatment  Celexa 30 mg daily for depression, anxiety    Will shift all seroquel to HS to try and reduce factors that may be limiting daytime activation - Seroquel to 350 mg qhs    Trazodone 150 mg at bedtime for sleep    EKG reviewed. NSR, HR 72 Cpo=454.     Discussion with patient regarding risk benefits and alternatives and side effects with opportunity to ask questions and questions answered.  Patient given consent to the plan as noted    2.  Psychological -       Patient is encouraged to participate with the therapeutic milieu and program and group therapy      3. Sociological -     Patient encouraged to cooperate with social work staff on issues relevant to discharge planning  Will plan for family outreach to obtain additional collateral and review progress and limitations from improvement thus far.       I spent 25 minutes in the professional and overall care of this patient.      Daisy Vasquez, APRN-CNP

## 2023-10-08 NOTE — NURSING NOTE
SARAN NOTE     Problem:  Depression     Behavior:  Pt is isolative to her room but does come out of room for snack when it is time. She is irritable, doesn't not like to be interacted with majority of the time on night shift. Pt complains and huffs when staff come into her room to perform tasks like pass meds. Pt was compliant with her HS medications. She is able to make her needs known-still refusing to empty or perform self care to her colostomy without much encouragement.   Group Participation: N/a  Appetite/Meals: Hs snack provided       Interventions:  HS medications given whole with water, no issues noted  Assessment completed  HS snack provided    Response:  Pt continues to rest in her bed throughout the night, no issues noted. No s/s of distress noted     Plan:  Continue to monitor and assist

## 2023-10-08 NOTE — CARE PLAN
The patient's goals for the shift include to go home    The clinical goals for the shift include encourage self care, encourage social engagement    Over the shift, the patient did not make progress toward the following goals. Barriers to progression include pt not caring for self and isolating to room. Recommendations to address these barriers include encourage participation in group and attendance, as well as care for self.

## 2023-10-09 PROCEDURE — 2500000004 HC RX 250 GENERAL PHARMACY W/ HCPCS (ALT 636 FOR OP/ED): Performed by: PSYCHIATRY & NEUROLOGY

## 2023-10-09 PROCEDURE — 99232 SBSQ HOSP IP/OBS MODERATE 35: CPT | Performed by: REGISTERED NURSE

## 2023-10-09 PROCEDURE — 2500000001 HC RX 250 WO HCPCS SELF ADMINISTERED DRUGS (ALT 637 FOR MEDICARE OP): Performed by: PSYCHIATRY & NEUROLOGY

## 2023-10-09 PROCEDURE — 1140000001 HC PRIVATE PSYCH ROOM DAILY

## 2023-10-09 RX ADMIN — CITALOPRAM HYDROBROMIDE 30 MG: 20 TABLET ORAL at 08:27

## 2023-10-09 RX ADMIN — QUETIAPINE FUMARATE 350 MG: 300 TABLET ORAL at 20:13

## 2023-10-09 RX ADMIN — Medication 125 MCG: at 08:27

## 2023-10-09 RX ADMIN — TRAZODONE HYDROCHLORIDE 150 MG: 50 TABLET ORAL at 20:12

## 2023-10-09 RX ADMIN — Medication 5 MG: at 20:12

## 2023-10-09 RX ADMIN — BUPROPION HYDROCHLORIDE 300 MG: 300 TABLET, FILM COATED, EXTENDED RELEASE ORAL at 08:27

## 2023-10-09 RX ADMIN — CELECOXIB 200 MG: 200 CAPSULE ORAL at 08:28

## 2023-10-09 ASSESSMENT — COLUMBIA-SUICIDE SEVERITY RATING SCALE - C-SSRS
6. HAVE YOU EVER DONE ANYTHING, STARTED TO DO ANYTHING, OR PREPARED TO DO ANYTHING TO END YOUR LIFE?: NO
1. SINCE LAST CONTACT, HAVE YOU WISHED YOU WERE DEAD OR WISHED YOU COULD GO TO SLEEP AND NOT WAKE UP?: NO
1. SINCE LAST CONTACT, HAVE YOU WISHED YOU WERE DEAD OR WISHED YOU COULD GO TO SLEEP AND NOT WAKE UP?: NO
2. HAVE YOU ACTUALLY HAD ANY THOUGHTS OF KILLING YOURSELF?: NO
1. SINCE LAST CONTACT, HAVE YOU WISHED YOU WERE DEAD OR WISHED YOU COULD GO TO SLEEP AND NOT WAKE UP?: NO
6. HAVE YOU EVER DONE ANYTHING, STARTED TO DO ANYTHING, OR PREPARED TO DO ANYTHING TO END YOUR LIFE?: NO
6. HAVE YOU EVER DONE ANYTHING, STARTED TO DO ANYTHING, OR PREPARED TO DO ANYTHING TO END YOUR LIFE?: NO

## 2023-10-09 ASSESSMENT — PAIN - FUNCTIONAL ASSESSMENT
PAIN_FUNCTIONAL_ASSESSMENT: 0-10

## 2023-10-09 ASSESSMENT — PAIN SCALES - GENERAL
PAINLEVEL_OUTOF10: 0 - NO PAIN

## 2023-10-09 ASSESSMENT — ENCOUNTER SYMPTOMS
DYSPHORIC MOOD: 1
SLEEP DISTURBANCE: 1
NERVOUS/ANXIOUS: 1

## 2023-10-09 NOTE — NURSING NOTE
SARAN NOTE     Problem:  Depression     Behavior:  Pt is resistant to care, she refuses when staff attempts  to encourage her to shower, she did empty her colostomy bag early this morning.      Group Participation: no participation in group  Appetite/Meals: Pt eating 100% meals       Interventions:  Much encouragement to get patient to shower, 1:1 interaction, give scheduled medications, every 15 min checks, encourage participation in therapeutic milieu.    Response:  Pt continues to refuse a shower, she has been emptying her colostomy.  She is also going to meals and eating 100%.       Plan:  Encourage patient to shower, 1:1 interaction, give scheduled medications, every 15 min checks, encourage participation in therapeutic milieu.

## 2023-10-09 NOTE — PROGRESS NOTES
"Azul Roberts is a 63 y.o. female on day 30 of admission presenting with Severe recurrent major depression without psychotic features (CMS/HCC).      Subjective   Case discussed with treatment team and chart reviewed. No significant changes. States that her mood is \"fine\" and continues to give one word responses to questions during the interview. Pt remains withdrawn staying in bed and completely isolating. She is disengaged and does not want to participate in group therapy or leave her room.  She states that her depression is better compared to when she came in on admission, however still reports that her depression is a \"9/10\" in severity.  Nursing staff this morning reports that patient remains disinterested in changing her colostomy bag. She denies any active thoughts of self harm or harming others today.     Objective     Last Recorded Vitals  Blood pressure 96/65, pulse 80, temperature 36.8 °C (98.2 °F), temperature source Oral, resp. rate 17, height 1.626 m (5' 4\"), weight 78.9 kg (174 lb), SpO2 97 %.    Review of Systems   Psychiatric/Behavioral:  Positive for behavioral problems, dysphoric mood, sleep disturbance and suicidal ideas. The patient is nervous/anxious.        Psychiatric ROS - Adult  Anxiety: General Anxiety Disorder (KAYLEEN)KAYLEEN Behaviors: difficult to control worry, difficulty concentrating, irritability, restlessness, and sleep disturbance  Depression: anhedonia, appetite increased, concentration, energy, helpless, hopeless, interest, persistent thoughts of death, psychomotor agitation, sleep decreased , suicidal thoughts, and withdrawn  Delirium: negative  Psychosis: negative  Ioana: negative  Safety Issues: passive death wish and suicidal ideation  Psychiatric ROS Comment: as noted    Physical Exam  Abdominal:      Comments: Presence of colostomy bag   Psychiatric:         Attention and Perception: She is inattentive.         Mood and Affect: Mood is anxious and depressed. Affect is labile, " "flat and inappropriate.         Speech: Speech is delayed.         Behavior: Behavior is uncooperative, agitated, aggressive and withdrawn.         Thought Content: Thought content includes suicidal ideation.         Judgment: Judgment is impulsive.           Mental Status Examination  General Appearance: Disheveled., Malodorous., Averted gaze., and Limited eye contact.  Gait/Station: Not assessed today   Speech: minimal verbal responses, low volume  Mood: \"fine\"  Affect: less Irritable and Angry  Thought Process: remains impoverished  Thought Associations: No loosening of associations  Thought Content: suicidal  Perception: No perceptual abnormalities noted  Level of Consciousness: Drowsy  Orientation: Alert  Attention and Concentration: Mild impairment in attention  Recent Memory: Intact as evidenced by ability to recall details from the past 24 hours   Executive function: Intact  Language: Naming intact  Fund of knowledge: Good  Insight: Limited, as evidenced by disengagement  Judgment: Limited, as evidence by inability to reason through medical decision making and highly impaired reality testing       Psychiatric Risk Assessment  Violence Risk Assessment: major mental illness and substance abuse  Acute Risk of Harm to Others is Considered: moderate   Suicide Risk Assessment: , chronic medical illness, chronic pain, current psychiatric illness, feelings of hopelessness, global insomnia, history of trauma or abuse, life crisis (shame/despair), prior suicide attempt, severe anxiety, substance abuse, and suicidal ideations  Protective Factors against Suicide: adherence to  treatment and marriage/partnership  Acute Risk of Harm to Self is Considered: moderate    Relevant Results             Current Facility-Administered Medications   Medication Dose Route Frequency Provider Last Rate Last Admin    acetaminophen (Tylenol) tablet 650 mg  650 mg oral q6h PRN Eddie Cullen,         alum-mag " hydroxide-simeth (Mylanta) 200-200-20 mg/5 mL oral suspension 30 mL  30 mL oral q6h PRN Eddieua H Zadorotheaitz, DO        buPROPion XL (Wellbutrin XL) 24 hr tablet 300 mg  300 mg oral Daily Eddieua H Zarowitz, DO   300 mg at 10/09/23 0827    celecoxib (CeleBREX) capsule 200 mg  200 mg oral Daily Joshua H Zarowitz, DO   200 mg at 10/09/23 0828    cholecalciferol (Vitamin D-3) capsule 125 mcg  125 mcg oral Daily Joshua H Zarowitz, DO   125 mcg at 10/09/23 0827    citalopram (CeleXA) tablet 30 mg  30 mg oral Daily Eddieua H Zarowitz, DO   30 mg at 10/09/23 0827    diphenhydrAMINE (Sominex) tablet 25 mg  25 mg oral q8h PRN [facility] Zadorotheaitz, DO        hydrOXYzine pamoate (Vistaril) capsule 25 mg  25 mg oral q8h PRN Eddie  Zadorotheaitz, DO        lidocaine 4 % patch 1 patch  1 patch transdermal Daily Joshua H Zarowitz, DO        melatonin tablet 5 mg  5 mg oral Nightly Eddieua H Zarowitz, DO   5 mg at 10/08/23 2057    OLANZapine (ZyPREXA) injection 5 mg  5 mg intramuscular q8h PRN Eddie  Zadorotheaitz, DO        OLANZapine zydis (ZyPREXA) disintegrating tablet 5 mg  5 mg oral q8h PRN Eddie  Zarowitz, DO        QUEtiapine (SEROquel) tablet 350 mg  350 mg oral Nightly Eddie  Zadorotheaitz, DO   350 mg at 10/08/23 2057    traZODone (Desyrel) tablet 150 mg  150 mg oral Nightly Eddie  Zarowitz, DO   150 mg at 10/08/23 2057       Assessment/Plan   Principal Problem:    Severe recurrent major depression without psychotic features (CMS/HCC)  Active Problems:    Colostomy present (CMS/HCC)    Opioid abuse (CMS/HCC)    Bilateral hip pain    Biological -   Cont meds as ordered below.  Wellbutrin xl 300 mg for adjunctive depression treatment  Celexa 30 mg daily for depression, anxiety    Will shift all seroquel to HS to try and reduce factors that may be limiting daytime activation - Seroquel to 350 mg qhs    Trazodone 150 mg at bedtime for sleep    ECG (10/4/23): Normal sinus rhythm. Heart  rate 72 bpm. Qtc 442     Discussion with patient  regarding risk benefits and alternatives and side effects with opportunity to ask questions and questions answered.  Patient given consent to the plan as noted    2. Psychological -       Patient is encouraged to participate with the therapeutic milieu and program and group therapy      3. Sociological -      Patient encouraged to cooperate with social work staff on issues relevant to discharge planning  Will plan for family outreach to obtain additional collateral and review progress and limitations from improvement thus far.                    I spent 25 minutes in the professional and overall care of this patient.      JERRY DENT    I reviewed this medcial student note today.

## 2023-10-09 NOTE — GROUP NOTE
Group Topic: Social Skills   Group Date: 10/9/2023  Start Time: 1510  End Time: 1600  Facilitators: BRENDA Howard   Department: [facility]    Number of Participants: 6   Group Focus: affirmation, feeling awareness/expression, reminiscence, self-awareness, self-esteem, and social skills  Treatment Modality: Other: Recreation Therapy  Interventions utilized were other socialization and reminiscence, self awareness/expression  Purpose: feelings, self-worth, and other: increase socialization,  decrease anxiety    Name: Azul Roberts YOB: 1960   MR: [id number]      Facilitator: Recreational Therapist  Level of Participation: did not attend  Quality of Participation: PT refused group.   Interactions with others: Pt sitting in hallway by nurses station  Mood/Affect: flat, withdrawn  Triggers (if applicable): n/m  Cognition:  unable to assess  Progress: None  Comments: pt problem is depressed mood  Plan: continue with services

## 2023-10-09 NOTE — NURSING NOTE
"SARAN NOTE     Problem:  Depression     Behavior:  Pt is isolative to her room, she does not want to partake in hs snack tonight. She refuses to perform self care to her colostomy- is always telling staff that \"it's fine\" or \"I just emptied it\" despite being able to see that the bag is full. Pt is disheveled, she doesn't not maintains eye contact, her affect is flat and she gets very irritable and starts huffing and puffing when staff enters her room.   Group Participation: n/a  Appetite/Meals: declined hs snack       Interventions:  HS medications administered whole with water  Encouraged pt to use the restroom and empty her colostomy before bed.     Response:  \"Its fine alright?! Stop asking me to do that.\"   Pt continues to rest in bed, no s/s of distress noted. No needs voiced.      Plan:  Continue to monitor and assist. Maintain q15 minutes rounds for safety.   "

## 2023-10-10 PROCEDURE — 2500000004 HC RX 250 GENERAL PHARMACY W/ HCPCS (ALT 636 FOR OP/ED): Performed by: PSYCHIATRY & NEUROLOGY

## 2023-10-10 PROCEDURE — 2500000001 HC RX 250 WO HCPCS SELF ADMINISTERED DRUGS (ALT 637 FOR MEDICARE OP): Performed by: PSYCHIATRY & NEUROLOGY

## 2023-10-10 PROCEDURE — 1140000001 HC PRIVATE PSYCH ROOM DAILY

## 2023-10-10 PROCEDURE — 99232 SBSQ HOSP IP/OBS MODERATE 35: CPT | Performed by: PSYCHIATRY & NEUROLOGY

## 2023-10-10 RX ADMIN — CITALOPRAM HYDROBROMIDE 30 MG: 20 TABLET ORAL at 08:11

## 2023-10-10 RX ADMIN — CELECOXIB 200 MG: 200 CAPSULE ORAL at 08:11

## 2023-10-10 RX ADMIN — QUETIAPINE FUMARATE 350 MG: 300 TABLET ORAL at 20:16

## 2023-10-10 RX ADMIN — TRAZODONE HYDROCHLORIDE 150 MG: 50 TABLET ORAL at 20:16

## 2023-10-10 RX ADMIN — Medication 125 MCG: at 08:11

## 2023-10-10 RX ADMIN — Medication 5 MG: at 20:16

## 2023-10-10 RX ADMIN — BUPROPION HYDROCHLORIDE 300 MG: 300 TABLET, FILM COATED, EXTENDED RELEASE ORAL at 08:11

## 2023-10-10 ASSESSMENT — PAIN SCALES - GENERAL
PAINLEVEL_OUTOF10: 0 - NO PAIN

## 2023-10-10 ASSESSMENT — PAIN - FUNCTIONAL ASSESSMENT
PAIN_FUNCTIONAL_ASSESSMENT: 0-10

## 2023-10-10 ASSESSMENT — ENCOUNTER SYMPTOMS
DYSPHORIC MOOD: 1
NERVOUS/ANXIOUS: 1
SLEEP DISTURBANCE: 1

## 2023-10-10 ASSESSMENT — COLUMBIA-SUICIDE SEVERITY RATING SCALE - C-SSRS
1. SINCE LAST CONTACT, HAVE YOU WISHED YOU WERE DEAD OR WISHED YOU COULD GO TO SLEEP AND NOT WAKE UP?: NO
2. HAVE YOU ACTUALLY HAD ANY THOUGHTS OF KILLING YOURSELF?: NO
6. HAVE YOU EVER DONE ANYTHING, STARTED TO DO ANYTHING, OR PREPARED TO DO ANYTHING TO END YOUR LIFE?: NO

## 2023-10-10 NOTE — NURSING NOTE
Team discussion this day regarding pt care/current status/treatment plans. Present: Kiet Shin CNP, Kassidy CNP, Lee SW, James SW, Nina RTEffie, Lorna RN, Heath BETHEA

## 2023-10-10 NOTE — PROGRESS NOTES
Social Work Note    LS[facility] yesterday, in order for pt's to apply for [place] Medicaid. Pt had Medicaid when she was in [place]. This was requested to be terminated so that pt could apply in [place] to have in [facility] Medicaid.   DRAGAN Low

## 2023-10-10 NOTE — NURSING NOTE
SARAN NOTE     Problem:  Depression     Behavior:  Patient unwilling to participate in self care, refusing shower, she complains when staff attempts to enourage her to perform self care, she is taking scheduled medications and sitting in the hallway during group.    Group Participation: Patient sits in the hallway    Appetite/Meals: Pt eating 100% meals       Interventions:  Open the blinds to patient's room, encourage patient to participate in self care and milieu, give scheduled medications, every 15 minute checks, 1:1 interaction.    Response:  Pt took a shower this afternoon, she allowed staff to observe if she emptied her colostomy bag.      Plan:  encourage patient to participate in self care and milieu, give scheduled medications, every 15 minute checks, 1:1 interaction.

## 2023-10-10 NOTE — GROUP NOTE
Group Topic: Other   Group Date: 10/10/2023  Start Time: 1510  End Time: 1600  Facilitators: RANDALL FernandesS   Department: [facility]    Number of Participants: 5   Group Focus: concentration  Treatment Modality: Other: Recreation Therapy  Interventions utilized were group exercise and mental fitness  Purpose: other: Mental exercise    Name: Azul Roberts YOB: 1960   MR: [id number]      Facilitator: Recreational Therapist  Level of Participation: did not attend  Quality of Participation:  did not attend  Interactions with others:  did not attend  Mood/Affect:  did not attend  Triggers (if applicable): n/a  Cognition:  did not attend  Progress: None  Comments: Pt problem is depressed mood.  Pt is laying in her bed, refuses to engage with CTRS and is dismissive with invitation.  Refuses to attend group at this time.  Plan: continue with services

## 2023-10-10 NOTE — NURSING NOTE
"SARAN NOTE     Problem:  depression     Behavior:  Pt isolated herself to her room, came out for a snack, denied need for self care, denied SI. Pt's appearance is disheveled, she speaks in short sentences, fair eye contact, low mood, refusing to maintain conversation, repeatedly says \"Oh God\", irritable   Group Participation: n/a  Appetite/Meals: had a snack       Interventions:  1:1 intervention provided, Hs medication administered, self care encouraged    Response:  Pt is compliant with her medication, demonstrated colostomy bag upon request     Plan:  Continue monitoring, provide positive reinforcement  "

## 2023-10-10 NOTE — PROGRESS NOTES
Azul Roberts is a 63 y.o. female on day 31 of admission presenting with Severe recurrent major depression without psychotic features (CMS/[facility]).      Subjective   Case discussed with treatment team and chart reviewed. No significant changes in the level of function.  Azul does however endorse and demonstrate perhaps an increased level of appreciation for the circumstance.  She notes that she has been ill mentally for an extended period of time even well before leaving [place] to come to [place] which has caused major dysfunctions in performing activities of daily living.  She remains anergic, withdrawn, and isolated/self-secluded to her room.  Fathoming participation results in increased irritability and challenging the need for such levels of participation.  However, she does recognize that her current level of functioning is not consistent with independence.  She notes that she does not feel she could transition home at this time and that she has significant additional gains to be made prior to this being considered.  She is open to considering nursing home placement although scoffs at this idea with an audible response when presented to her.  She does not report whether she feels being hospitalized longer or being transitioned to a nursing home would be a better setting for her.  She did shower today after great encouragement by staff eventually acquiescing but she did not participate in group and became irritable with staff members who were encouraging her to participate in group.  She continues to spend most of the day in bed not leaving her room but for meals.  She while she remains brief during encounter as she is less so she is starting to use sentences in full when speaking to this provider and she has not yelled nor sworn at this provider in several days.  She remains at grave risk for decompensation if discharged currently.  She is not functioning independently to take care of herself including  "ADLs medications.  Her sedentary presentation requires further stabilization prior to being able to transition to a less restrictive setting.    Objective     Last Recorded Vitals  Blood pressure 90/51, pulse 68, temperature 36.5 °C (97.7 °F), temperature source Oral, resp. rate 17, height 1.626 m (5' 4\"), weight 78.9 kg (174 lb), SpO2 98 %.    Review of Systems   Psychiatric/Behavioral:  Positive for behavioral problems, dysphoric mood, sleep disturbance and suicidal ideas. The patient is nervous/anxious.        Psychiatric ROS - Adult  Anxiety: General Anxiety Disorder (KAYLEEN)KAYLEEN Behaviors: difficult to control worry, difficulty concentrating, irritability, restlessness, and sleep disturbance  Depression: anhedonia, appetite increased, concentration, energy, helpless, hopeless, interest, persistent thoughts of death, psychomotor agitation, sleep decreased , suicidal thoughts, and withdrawn  Delirium: negative  Psychosis: negative  Ioana: negative  Safety Issues: passive death wish and suicidal ideation  Psychiatric ROS Comment: as noted    Physical Exam  Abdominal:      Comments: Presence of colostomy bag   Psychiatric:         Attention and Perception: She is inattentive.         Mood and Affect: Mood is anxious and depressed. Affect is labile, flat and inappropriate.         Speech: Speech is delayed.         Behavior: Behavior is uncooperative, agitated, aggressive and withdrawn.         Thought Content: Thought content includes suicidal ideation.         Judgment: Judgment is impulsive.           Mental Status Examination  General Appearance: Disheveled., Malodorous., Averted gaze., and Limited eye contact.  Gait/Station: Not assessed today   Speech: minimal verbal responses, low volume  Mood: \"fine\"  Affect: less Irritable and Angry  Thought Process: remains impoverished  Thought Associations: No loosening of associations  Thought Content: suicidal  Perception: No perceptual abnormalities noted  Level of " Consciousness: Drowsy  Orientation: Alert  Attention and Concentration: Mild impairment in attention  Recent Memory: Intact as evidenced by ability to recall details from the past 24 hours   Executive function: Intact  Language: Naming intact  Fund of knowledge: Good  Insight: Limited, as evidenced by disengagement  Judgment: Limited, as evidence by inability to reason through medical decision making and highly impaired reality testing       Psychiatric Risk Assessment  Violence Risk Assessment: major mental illness and substance abuse  Acute Risk of Harm to Others is Considered: moderate   Suicide Risk Assessment: , chronic medical illness, chronic pain, current psychiatric illness, feelings of hopelessness, global insomnia, history of trauma or abuse, life crisis (shame/despair), prior suicide attempt, severe anxiety, substance abuse, and suicidal ideations  Protective Factors against Suicide: adherence to  treatment and marriage/partnership  Acute Risk of Harm to Self is Considered: moderate    Relevant Results             Current Facility-Administered Medications   Medication Dose Route Frequency Provider Last Rate Last Admin    acetaminophen (Tylenol) tablet 650 mg  650 mg oral q6h PRN Eddie Cullen DO        alum-mag hydroxide-simeth (Mylanta) 200-200-20 mg/5 mL oral suspension 30 mL  30 mL oral q6h PRN Eddie Cullen DO        buPROPion XL (Wellbutrin XL) 24 hr tablet 300 mg  300 mg oral Daily Eddie Cullen, DO   300 mg at 10/10/23 0811    celecoxib (CeleBREX) capsule 200 mg  200 mg oral Daily Eddie Cullen, DO   200 mg at 10/10/23 0811    cholecalciferol (Vitamin D-3) capsule 125 mcg  125 mcg oral Daily Eddie Cullen, DO   125 mcg at 10/10/23 0811    citalopram (CeleXA) tablet 30 mg  30 mg oral Daily Eddie Cullen, DO   30 mg at 10/10/23 0811    diphenhydrAMINE (Sominex) tablet 25 mg  25 mg oral q8h PRN Eddie Cullen DO        hydrOXYzine pamoate (Vistaril) capsule 25  mg  25 mg oral q8h PRN Eddie Cullen, DO        lidocaine 4 % patch 1 patch  1 patch transdermal Daily Eddie Cullen,         melatonin tablet 5 mg  5 mg oral Nightly Eddie Cullen, DO   5 mg at 10/09/23 2012    OLANZapine (ZyPREXA) injection 5 mg  5 mg intramuscular q8h PRN Eddie Cullen, DO        OLANZapine zydis (ZyPREXA) disintegrating tablet 5 mg  5 mg oral q8h PRN Eddie Cullen, DO        QUEtiapine (SEROquel) tablet 350 mg  350 mg oral Nightly Eddie H Subhash, DO   350 mg at 10/09/23 2013    traZODone (Desyrel) tablet 150 mg  150 mg oral Nightly Eddie NOAH Cullen, DO   150 mg at 10/09/23 2012       Assessment/Plan   Principal Problem:    Severe recurrent major depression without psychotic features (CMS/HCC)  Active Problems:    Colostomy present (CMS/HCC)    Opioid abuse (CMS/HCC)    Bilateral hip pain    Biological -   Cont meds as ordered below.  Wellbutrin xl 300 mg for adjunctive depression treatment  Celexa 30 mg daily for depression, anxiety  seroquel 350 mg qhs    Trazodone 150 mg at bedtime for sleep    ECG (10/4/23): Normal sinus rhythm. Heart  rate 72 bpm. Qtc 442     Discussion with patient regarding risk benefits and alternatives and side effects with opportunity to ask questions and questions answered.  Patient given consent to the plan as noted    2. Psychological -       Patient is encouraged to participate with the therapeutic milieu and program and group therapy      3. Sociological -      Patient encouraged to cooperate with social work staff on issues relevant to discharge planning  Will plan for family outreach to obtain additional collateral and review progress and limitations from improvement thus far.                    I spent 25 minutes in the professional and overall care of this patient.      Eddie Cullen DO    I reviewed this medcial student note today.

## 2023-10-11 PROCEDURE — 99232 SBSQ HOSP IP/OBS MODERATE 35: CPT | Performed by: INTERNAL MEDICINE

## 2023-10-11 PROCEDURE — 1140000001 HC PRIVATE PSYCH ROOM DAILY

## 2023-10-11 PROCEDURE — 2500000004 HC RX 250 GENERAL PHARMACY W/ HCPCS (ALT 636 FOR OP/ED): Performed by: PSYCHIATRY & NEUROLOGY

## 2023-10-11 PROCEDURE — 2500000001 HC RX 250 WO HCPCS SELF ADMINISTERED DRUGS (ALT 637 FOR MEDICARE OP): Performed by: PSYCHIATRY & NEUROLOGY

## 2023-10-11 PROCEDURE — 99232 SBSQ HOSP IP/OBS MODERATE 35: CPT | Performed by: PSYCHIATRY & NEUROLOGY

## 2023-10-11 RX ADMIN — TRAZODONE HYDROCHLORIDE 150 MG: 50 TABLET ORAL at 21:52

## 2023-10-11 RX ADMIN — Medication 5 MG: at 21:52

## 2023-10-11 RX ADMIN — QUETIAPINE FUMARATE 350 MG: 300 TABLET ORAL at 21:52

## 2023-10-11 RX ADMIN — BUPROPION HYDROCHLORIDE 300 MG: 300 TABLET, FILM COATED, EXTENDED RELEASE ORAL at 08:22

## 2023-10-11 RX ADMIN — CELECOXIB 200 MG: 200 CAPSULE ORAL at 08:23

## 2023-10-11 RX ADMIN — CITALOPRAM HYDROBROMIDE 30 MG: 20 TABLET ORAL at 08:22

## 2023-10-11 RX ADMIN — Medication 125 MCG: at 08:22

## 2023-10-11 ASSESSMENT — COLUMBIA-SUICIDE SEVERITY RATING SCALE - C-SSRS
1. SINCE LAST CONTACT, HAVE YOU WISHED YOU WERE DEAD OR WISHED YOU COULD GO TO SLEEP AND NOT WAKE UP?: NO
6. HAVE YOU EVER DONE ANYTHING, STARTED TO DO ANYTHING, OR PREPARED TO DO ANYTHING TO END YOUR LIFE?: NO
2. HAVE YOU ACTUALLY HAD ANY THOUGHTS OF KILLING YOURSELF?: NO

## 2023-10-11 ASSESSMENT — PAIN SCALES - GENERAL
PAINLEVEL_OUTOF10: 0 - NO PAIN
PAINLEVEL_OUTOF10: 2

## 2023-10-11 ASSESSMENT — ENCOUNTER SYMPTOMS
NERVOUS/ANXIOUS: 1
SLEEP DISTURBANCE: 1
DYSPHORIC MOOD: 1

## 2023-10-11 NOTE — NURSING NOTE
Team discussion regarding patients current status/plan of care: Dr Cullen, Shannan Gan CNS, Alida Mendez RN, Martha Mcdonald RN, Joey Azul SW, Nikki Andrews SW, Nancy Kelley RT, Keke Ordoñez, NM

## 2023-10-11 NOTE — GROUP NOTE
Group Topic: Other   Group Date: 10/11/2023  Start Time: 1515  End Time: 1600  Facilitators: BRENDA Fernandes   Department: [facility]    Number of Participants: 6   Group Focus: concentration  Treatment Modality: Other: Recreation Therapy  Interventions utilized were mental fitness  Purpose: other: fun, memory jogging    Name: Azlu Roberts YOB: 1960   MR: [id number]      Facilitator: Recreational Therapist  Level of Participation: did not attend  Quality of Participation:  did not attend  Interactions with others:  did not attend  Mood/Affect:  did not attend  Triggers (if applicable): n/a  Cognition:  did not attend  Progress: None  Comments: pt problem is depressed mood.  Pt refused to attend group but did sit at the nurses station during group.  Plan: continue with services

## 2023-10-11 NOTE — PROGRESS NOTES
"Azul Roberts is a 63 y.o. female on day 32 of admission presenting with Severe recurrent major depression without psychotic features (CMS/[facility]).      Subjective   Case discussed with treatment team and chart reviewed.     Improvement being seen today.  Friendlier, more engaged, out of room more.  Seen as supportive to other patients.  Less irritable, notes that she wants to get better but doesn't know how.  Sarcastically funny, but appropriate and on point.  Willing to continue working with team to seek further gains prior to discharge.  Willing to attempt tomorrow to be more engaged as was today.  Endorsed that is having a hard time trusting the treatment team, but open to trying to follow recommendations more to seek said needed improvements.  Remains depressed, wishing to be withdrawn, and self isolating.    Peer reviewer reports continued stay was denied despite findings as noted, and lack of overall progress.  Patient remains acute, lacking independence due to impaired function stemming from exacerbated mental health condition.  It is most unlikely given how long patient had been decompensated that she would have dramatically improved thus far, but we are seeing minimal gains accumulate within the past few days likely significant for upcoming robust response.  She continues to benefit from inpatient level of psychiatric care as such while these gains further consolidate.      Objective     Last Recorded Vitals  Blood pressure 101/68, pulse 74, temperature 36.5 °C (97.7 °F), temperature source Temporal, resp. rate 19, height 1.626 m (5' 4\"), weight 78.9 kg (174 lb), SpO2 96 %.    Review of Systems   Psychiatric/Behavioral:  Positive for behavioral problems, dysphoric mood, sleep disturbance and suicidal ideas. The patient is nervous/anxious.        Psychiatric ROS - Adult  Anxiety: General Anxiety Disorder (KAYLEEN)KAYLEEN Behaviors: difficult to control worry, difficulty concentrating, irritability, restlessness, and " "sleep disturbance  Depression: anhedonia, appetite increased, concentration, energy, helpless, hopeless, interest, persistent thoughts of death, psychomotor agitation, sleep decreased , suicidal thoughts, and withdrawn  Delirium: negative  Psychosis: negative  Ioana: negative  Safety Issues: passive death wish and suicidal ideation  Psychiatric ROS Comment: as noted    Physical Exam  Abdominal:      Comments: Presence of colostomy bag   Psychiatric:         Attention and Perception: She is inattentive.         Mood and Affect: Mood is anxious and depressed. Affect is labile, flat and inappropriate.         Speech: Speech is delayed.         Behavior: Behavior is uncooperative, agitated, aggressive and withdrawn.         Thought Content: Thought content includes suicidal ideation.         Judgment: Judgment is impulsive.           Mental Status Examination  General Appearance:  less disheveled, malodorous, improved gaze and eye contact.  Gait/Station: ambulating with walker assist device  Speech: increasing verbal responses  Mood: \"how do I look?\"  Affect: less Irritable and Angry  Thought Process: remains impoverished  but less so  Thought Associations: No loosening of associations  Thought Content: depressed, less hopeless, persists as helpless  Perception: No perceptual abnormalities noted  Level of Consciousness: Drowsy  Orientation: Alert  Attention and Concentration: Mild impairment in attention  Recent Memory: Intact as evidenced by ability to recall details from the past 24 hours   Executive function: Intact  Language: Naming intact  Fund of knowledge: Good  Insight: Limited, as evidenced by disengagement  Judgment: Limited, as evidence by inability to reason through medical decision making and highly impaired reality testing       Psychiatric Risk Assessment  Violence Risk Assessment: major mental illness and substance abuse  Acute Risk of Harm to Others is Considered: moderate   Suicide Risk Assessment: " , chronic medical illness, chronic pain, current psychiatric illness, feelings of hopelessness, global insomnia, history of trauma or abuse, life crisis (shame/despair), prior suicide attempt, severe anxiety, substance abuse, and suicidal ideations  Protective Factors against Suicide: adherence to  treatment and marriage/partnership  Acute Risk of Harm to Self is Considered: moderate    Relevant Results             Current Facility-Administered Medications   Medication Dose Route Frequency Provider Last Rate Last Admin    acetaminophen (Tylenol) tablet 650 mg  650 mg oral q6h PRN Eddie Cullen, DO        alum-mag hydroxide-simeth (Mylanta) 200-200-20 mg/5 mL oral suspension 30 mL  30 mL oral q6h PRN Eddie Cullen, DO        buPROPion XL (Wellbutrin XL) 24 hr tablet 300 mg  300 mg oral Daily Eddie  Subhash, DO   300 mg at 10/11/23 0822    celecoxib (CeleBREX) capsule 200 mg  200 mg oral Daily Eddie  Subhash, DO   200 mg at 10/11/23 0823    cholecalciferol (Vitamin D-3) capsule 125 mcg  125 mcg oral Daily Eddie  Subhash, DO   125 mcg at 10/11/23 0822    citalopram (CeleXA) tablet 30 mg  30 mg oral Daily Eddie  Sbuhash, DO   30 mg at 10/11/23 0822    diphenhydrAMINE (Sominex) tablet 25 mg  25 mg oral q8h PRN Eddie  Subhash, DO        hydrOXYzine pamoate (Vistaril) capsule 25 mg  25 mg oral q8h PRN Eddie  Subhash, DO        lidocaine 4 % patch 1 patch  1 patch transdermal Daily Eddie Cullen, DO        melatonin tablet 5 mg  5 mg oral Nightly Eddie Cullen, DO   5 mg at 10/10/23 2016    OLANZapine (ZyPREXA) injection 5 mg  5 mg intramuscular q8h PRN Eddie Cullen, DO        OLANZapine zydis (ZyPREXA) disintegrating tablet 5 mg  5 mg oral q8h PRN Eddie Cullen, DO        QUEtiapine (SEROquel) tablet 350 mg  350 mg oral Nightly Eddie Cullen, DO   350 mg at 10/10/23 2016    traZODone (Desyrel) tablet 150 mg  150 mg oral Nightly Eddie Cullen DO   150 mg at  10/10/23 2016       Assessment/Plan   Principal Problem:    Severe recurrent major depression without psychotic features (CMS/HCC)  Active Problems:    Colostomy present (CMS/HCC)    Opioid abuse (CMS/HCC)    Bilateral hip pain    Biological -   Cont meds as ordered below as she is starting to show a clinical response:  Wellbutrin xl 300 mg for adjunctive depression treatment  Celexa 30 mg daily for depression, anxiety  seroquel 350 mg qhs    Trazodone 150 mg at bedtime for sleep    ECG (10/4/23): Normal sinus rhythm. Heart  rate 72 bpm. Qtc 442     Discussion with patient regarding risk benefits and alternatives and side effects with opportunity to ask questions and questions answered.  Patient given consent to the plan as noted    2. Psychological -       Patient is encouraged to participate with the therapeutic milieu and program and group therapy      3. Sociological -      Patient encouraged to cooperate with social work staff on issues relevant to discharge planning  Will plan for family outreach to obtain additional collateral and review progress and limitations from improvement thus far.                    I spent 25 minutes in the professional and overall care of this patient.      Eddie Cullen, DO    I reviewed this medcial student note today.

## 2023-10-11 NOTE — NURSING NOTE
SARAN NOTE     Problem:  depression     Behavior:  Pt isolated herself to her room, came out for a snack, low mood, low energy, did not address her needs, answers questions in one word or short sentences, denied SI  Group Participation: n/a  Appetite/Meals: had a snack       Interventions:  Scheduled medication administered, self care reviewed, colostomy bag inspected    Response:  Pt is compliant with her medication, was cooperative during colostomy assessment but refused to have it emptied, agreed to have it performed in the morning. Pt stated she had a shower today     Plan:  Continue monitoring, provide positive reinforcement

## 2023-10-11 NOTE — CARE PLAN
The patient's goals for the shift include none    The clinical goals for the shift include no fall    Over the shift, the patient did not make progress toward the following goals. Barriers to progression include unacceptance of staff help. Recommendations to address these barriers include encourage pt to allow staff to help with pt care.

## 2023-10-11 NOTE — PROGRESS NOTES
[facility]: [facility]  PROGRESS NOTE      Azul Roberts is a 63 y.o. female that is being seen  today for follow-up at [facility].    Subjective   Patient has been doing well has been able to take medications.  Vital signs are stable.  Colostomy has been working well.  He denies any new medical complaints      ROS  Negative for fever or chills  Negative for sore throat, ear pain, nasal discharge  Negative for cough, shortness of breath or wheezing  Negative for chest pain, palpitations, swelling of legs  Negative for abdominal pain, constipation, diarrhea, blood in the stools  Negative for urinary complaints  Negative for headache, dizziness, weakness or numbness  Negative for joint pain  Positive for depression and anxiety  All other systems reviewed and were negative   Vitals:    10/11/23 0644   BP: 101/68   Pulse: 74   Resp: 19   Temp: 36.5 °C (97.7 °F)   SpO2: 96%      Vitals:    10/06/23 1400   Weight: 78.9 kg (174 lb)     Body mass index is 29.87 kg/m².  Physical Exam  Constitutional: Patient does not appear to be in any acute distress  Head and Face: NCAT  Eyes: Normal external exam, EOMI  ENT: Normal external inspection of ears and nose. Oropharynx normal.  Cardiovascular: RRR, S1/S2, no murmurs, rubs, or gallops, radial pulses +2, no edema of extremities  Pulmonary: CTAB, no respiratory distress.  Abdomen: +BS, soft, non-tender, nondistended, no guarding or rebound, no masses noted.  Colostomy present  MSK: positive for hip pain   Skin- No lesions, contusions, or erythema.  Peripheral puslses palpable bilaterally 2+  Neuro: AAO X3, Cranial nerves 2-12 grossly intact,DTR 2+ in all 4 limbs   Psychiatric: Judgment intact. Appropriate mood and behavior    Diagnostic Results   Lab Results   Component Value Date    GLUCOSE 113 (H) 09/05/2023    CALCIUM 10.6 (H) 09/05/2023     09/05/2023    K 3.7 09/05/2023    CO2 24 09/05/2023     09/05/2023    BUN 12 09/05/2023    CREATININE  "0.72 09/05/2023     Lab Results   Component Value Date    ALT 12 09/05/2023    AST 14 09/05/2023    ALKPHOS 103 09/05/2023    BILITOT 0.4 09/05/2023     Lab Results   Component Value Date    WBC 13.2 (H) 09/05/2023    HGB 13.9 09/05/2023    HCT 43.0 09/05/2023    MCV 81 09/05/2023     09/05/2023     No results found for: \"CHOL\"  No results found for: \"HDL\"  No results found for: \"LDLCALC\"  No results found for: \"TRIG\"  No results found for: \"HGBA1C\"  Other labs not included in the list above were reviewed either before or during this encounter.    History    No past medical history on file.  No past surgical history on file.  No family history on file.  No Known Allergies  No current facility-administered medications on file prior to encounter.     Current Outpatient Medications on File Prior to Encounter   Medication Sig Dispense Refill    acetaminophen (Tylenol) 325 mg tablet Take 2 tablets (650 mg) by mouth 2 times a day.      hydrOXYzine pamoate (Vistaril) 25 mg capsule Take 1 capsule (25 mg) by mouth 2 times a day.      magnesium oxide (Mag-Ox) 400 mg tablet Take 1 tablet (400 mg) by mouth 2 times a day.      melatonin 5 mg tablet Take 1 tablet (5 mg) by mouth once daily at bedtime.      risperiDONE (RisperDAL) 2 mg tablet Take 1 tablet (2 mg) by mouth once daily at bedtime.      traZODone (Desyrel) 150 mg tablet Take 1 tablet (150 mg) by mouth once daily at bedtime.         There is no immunization history on file for this patient.  Patient's medical history was reviewed and updated either before or during this encounter.  ASSESSMENT / PLAN:  Active Hospital Problems    Bilateral hip pain      *Severe recurrent major depression without psychotic features (CMS/HCC)      Colostomy present (CMS/HCC)      Opioid abuse (CMS/HCC)    Patient has been doing well.  Colostomy has been working well vital signs are stable.  Patient has been on psych medications and behavior has been better.        Jay S " MD Lisette

## 2023-10-11 NOTE — GROUP NOTE
"Group Topic: Coping Skills   Group Date: 10/11/2023  Start Time: 1100  End Time: 1145  Facilitators: BRENDA Fernandes   Department: [facility]    Number of Participants: 5   Group Focus: activities of daily living skills, coping skills, daily focus, and self-awareness  Treatment Modality: Other: Recreation Therapy  Interventions utilized were clarification, exploration, mental fitness, and problem solving  Purpose: coping skills and insight or knowledge    Name: Azul Roberts YOB: 1960   MR: [id number]      Facilitator: Recreational Therapist  Level of Participation: did not attend  Quality of Participation:  did not attend  Interactions with others:  did not attend  Mood/Affect:  did not attend  Triggers (if applicable): n/a  Cognition:  did not attend  Progress: None  Comments: Pt problem is depressed mood.  Pt declines invitation to join group.  Pt does sit in a chair outside her room by the nurses station.  Pt reports \"I am not going to group but I will sit here\".   Plan: continue with services      "

## 2023-10-12 ENCOUNTER — APPOINTMENT (OUTPATIENT)
Dept: CARDIOLOGY | Facility: HOSPITAL | Age: 63
End: 2023-10-12
Payer: MEDICAID

## 2023-10-12 LAB
ATRIAL RATE: 72 BPM
P AXIS: 64 DEGREES
P OFFSET: 172 MS
P ONSET: 116 MS
PR INTERVAL: 196 MS
Q ONSET: 214 MS
QRS COUNT: 12 BEATS
QRS DURATION: 90 MS
QT INTERVAL: 404 MS
QTC CALCULATION(BAZETT): 442 MS
QTC FREDERICIA: 429 MS
R AXIS: 44 DEGREES
T AXIS: 50 DEGREES
T OFFSET: 416 MS
VENTRICULAR RATE: 72 BPM

## 2023-10-12 PROCEDURE — 2500000004 HC RX 250 GENERAL PHARMACY W/ HCPCS (ALT 636 FOR OP/ED): Performed by: PSYCHIATRY & NEUROLOGY

## 2023-10-12 PROCEDURE — 2500000001 HC RX 250 WO HCPCS SELF ADMINISTERED DRUGS (ALT 637 FOR MEDICARE OP): Performed by: PSYCHIATRY & NEUROLOGY

## 2023-10-12 PROCEDURE — 93005 ELECTROCARDIOGRAM TRACING: CPT

## 2023-10-12 PROCEDURE — 99232 SBSQ HOSP IP/OBS MODERATE 35: CPT | Performed by: PSYCHIATRY & NEUROLOGY

## 2023-10-12 PROCEDURE — 1140000001 HC PRIVATE PSYCH ROOM DAILY

## 2023-10-12 PROCEDURE — 99232 SBSQ HOSP IP/OBS MODERATE 35: CPT | Performed by: INTERNAL MEDICINE

## 2023-10-12 RX ADMIN — CITALOPRAM HYDROBROMIDE 30 MG: 20 TABLET ORAL at 08:36

## 2023-10-12 RX ADMIN — TRAZODONE HYDROCHLORIDE 150 MG: 50 TABLET ORAL at 20:29

## 2023-10-12 RX ADMIN — QUETIAPINE FUMARATE 350 MG: 300 TABLET ORAL at 20:28

## 2023-10-12 RX ADMIN — Medication 125 MCG: at 08:36

## 2023-10-12 RX ADMIN — BUPROPION HYDROCHLORIDE 300 MG: 300 TABLET, FILM COATED, EXTENDED RELEASE ORAL at 08:36

## 2023-10-12 RX ADMIN — CELECOXIB 200 MG: 200 CAPSULE ORAL at 08:36

## 2023-10-12 RX ADMIN — Medication 5 MG: at 20:29

## 2023-10-12 ASSESSMENT — COLUMBIA-SUICIDE SEVERITY RATING SCALE - C-SSRS
6. HAVE YOU EVER DONE ANYTHING, STARTED TO DO ANYTHING, OR PREPARED TO DO ANYTHING TO END YOUR LIFE?: NO
6. HAVE YOU EVER DONE ANYTHING, STARTED TO DO ANYTHING, OR PREPARED TO DO ANYTHING TO END YOUR LIFE?: NO
2. HAVE YOU ACTUALLY HAD ANY THOUGHTS OF KILLING YOURSELF?: NO
6. HAVE YOU EVER DONE ANYTHING, STARTED TO DO ANYTHING, OR PREPARED TO DO ANYTHING TO END YOUR LIFE?: NO
2. HAVE YOU ACTUALLY HAD ANY THOUGHTS OF KILLING YOURSELF?: NO
1. SINCE LAST CONTACT, HAVE YOU WISHED YOU WERE DEAD OR WISHED YOU COULD GO TO SLEEP AND NOT WAKE UP?: NO
2. HAVE YOU ACTUALLY HAD ANY THOUGHTS OF KILLING YOURSELF?: NO

## 2023-10-12 ASSESSMENT — PAIN - FUNCTIONAL ASSESSMENT
PAIN_FUNCTIONAL_ASSESSMENT: 0-10

## 2023-10-12 ASSESSMENT — PAIN SCALES - GENERAL
PAINLEVEL_OUTOF10: 0 - NO PAIN

## 2023-10-12 ASSESSMENT — ENCOUNTER SYMPTOMS
SLEEP DISTURBANCE: 1
DYSPHORIC MOOD: 1
NERVOUS/ANXIOUS: 1

## 2023-10-12 NOTE — NURSING NOTE
"BIRP NOTE     Problem:  DEPRESSION     Behavior:  Pt pleasant and cooperative this shift. Pt out of room much more this shift, engaging and smiling with staff members, encouraging other peers to be nice to staff! \"They are just here to help you.\"  Group Participation: pt sat outside of group room  Appetite/Meals: pt has a good appetite       Interventions:  1:1 provided, redirected and reassured pt, encouraged pt to come to staff for any needs or assistance, administered scheduled medications.    Response:  Pt continues to be pleasant and stay out of room.     Plan:  Maintain pt safety.   "

## 2023-10-12 NOTE — NURSING NOTE
BIRP NOTE     Problem:  depression     Behavior:  Pt pleasant and cooperative with staff and care, pt up out of room a lot of shift. Pt smiling and laughing more this shift.  Group Participation: pt sat outside of group room  Appetite/Meals: pt has a good appetite       Interventions:  1:1 provided, reassured pt and encourage pt to come to staff for any needs or assistance, administered scheduled medications    Response:  Pt continues to sit in hallway, up out of room.     Plan:  Maintain pt safety.

## 2023-10-12 NOTE — PROGRESS NOTES
[facility]: [facility]  PROGRESS NOTE      Azul Roberts is a 63 y.o. female that is being seen  today for follow-up at [facility].  Subjective   Patient has been taking her medication.  Patient still try to keep herself in the room.  Has been eating well colostomy has been working well.  Pain has been stable.  Patient sometimes refuses lidocaine patch      ROS  Negative for fever or chills  Negative for sore throat, ear pain, nasal discharge  Negative for cough, shortness of breath or wheezing  Negative for chest pain, palpitations, swelling of legs  Negative for abdominal pain, constipation, diarrhea, blood in the stools.  Has colostomy  Negative for urinary complaints  Negative for headache, dizziness, weakness or numbness  Negative for joint pain  Negative for depression or anxiety  All other systems reviewed and were negative   Vitals:    10/12/23 0614   BP: 102/60   Pulse: 82   Resp: 18   Temp: 36.4 °C (97.5 °F)   SpO2: 97%      Vitals:    10/06/23 1400   Weight: 78.9 kg (174 lb)     Body mass index is 29.87 kg/m².  Physical Exam  Constitutional: Patient does not appear to be in any acute distress  Head and Face: NCAT  Eyes: Normal external exam, EOMI  ENT: Normal external inspection of ears and nose. Oropharynx normal.  Cardiovascular: RRR, S1/S2, no murmurs, rubs, or gallops, radial pulses +2, no edema of extremities  Pulmonary: CTAB, no respiratory distress.  Abdomen: +BS, soft, non-tender, nondistended, no guarding or rebound, no masses noted  MSK: No joint swelling, normal movements of all extremities. Range of motion- normal.  Skin- No lesions, contusions, or erythema.  Peripheral puslses palpable bilaterally 2+  Neuro: AAO X3, Cranial nerves 2-12 grossly intact,DTR 2+ in all 4 limbs   Psychiatric: Judgment intact. Appropriate mood and behavior    Diagnostic Results   Lab Results   Component Value Date    GLUCOSE 113 (H) 09/05/2023    CALCIUM 10.6 (H) 09/05/2023      "09/05/2023    K 3.7 09/05/2023    CO2 24 09/05/2023     09/05/2023    BUN 12 09/05/2023    CREATININE 0.72 09/05/2023     Lab Results   Component Value Date    ALT 12 09/05/2023    AST 14 09/05/2023    ALKPHOS 103 09/05/2023    BILITOT 0.4 09/05/2023     Lab Results   Component Value Date    WBC 13.2 (H) 09/05/2023    HGB 13.9 09/05/2023    HCT 43.0 09/05/2023    MCV 81 09/05/2023     09/05/2023     No results found for: \"CHOL\"  No results found for: \"HDL\"  No results found for: \"LDLCALC\"  No results found for: \"TRIG\"  No results found for: \"HGBA1C\"  Other labs not included in the list above were reviewed either before or during this encounter.    History    No past medical history on file.  No past surgical history on file.  No family history on file.  No Known Allergies  No current facility-administered medications on file prior to encounter.     Current Outpatient Medications on File Prior to Encounter   Medication Sig Dispense Refill    acetaminophen (Tylenol) 325 mg tablet Take 2 tablets (650 mg) by mouth 2 times a day.      hydrOXYzine pamoate (Vistaril) 25 mg capsule Take 1 capsule (25 mg) by mouth 2 times a day.      magnesium oxide (Mag-Ox) 400 mg tablet Take 1 tablet (400 mg) by mouth 2 times a day.      melatonin 5 mg tablet Take 1 tablet (5 mg) by mouth once daily at bedtime.      risperiDONE (RisperDAL) 2 mg tablet Take 1 tablet (2 mg) by mouth once daily at bedtime.      traZODone (Desyrel) 150 mg tablet Take 1 tablet (150 mg) by mouth once daily at bedtime.         There is no immunization history on file for this patient.  Patient's medical history was reviewed and updated either before or during this encounter.  ASSESSMENT / PLAN:  Active Hospital Problems    Bilateral hip pain      *Severe recurrent major depression without psychotic features (CMS/HCC)      Colostomy present (CMS/HCC)      Opioid abuse (CMS/HCC)       Patient vital signs are stable.  Patient overall has been stable.  " Has been taking her medication.  Does not participate in much of the activities.      Jay Knox MD

## 2023-10-12 NOTE — PROGRESS NOTES
Social Work Note    LSW attempted to call [facility]'s automated system to inquired about pt's Medicaid. Pt's information did not match the records and was unable to obtain information at this time. LSW will have to call back during normal business hours and speak with a representative for more information.   DRAGAN Low

## 2023-10-12 NOTE — PROGRESS NOTES
"Azul Roberts is a 63 y.o. female on day 33 of admission presenting with Severe recurrent major depression without psychotic features (CMS/[facility]).      Subjective   Case discussed with treatment team and chart reviewed.     Continues with gains as recognized yesterday.  Staying out of her room more and sitting by the nursing station during group.  Continues to decline to enter the group room reporting \"I am not a people person.\"  Remains hopeless endorsed by reporting she does not believe things will happen that are typically routine and have been happening.  For example, patient says she does not believe that dinner is coming.  She scoffs and chuckles in response.  She is able to mobilize her affect some more but notes that she does not feel that she is getting better yet.  She continues to endorse poor sleep and we discussed with her that given her daytime energy and activity levels are quite low it is probable that she is not able to sleep at night because she is not expending enough energy in the day and also finds herself napping for significant periods of time during the day.  We encouraged her to stay out of her room as much as possible use of tabletop pedaling machine or go for walks on the unit.  To this as well she scoffs and chuckle sarcastically in response to this provider.  She has no other requests or acute concerns noted today.  She does remain depressed appearing so with limited function as has been described extensively in other charting thus far during her hospitalization.  Hospitalization remaining the least restrictive means at this time while working to transition her to [place] [employer] for nursing home consideration.  There is no other option at this time reasonable for providing the necessary level of care to maintain her basic needs.    Objective     Last Recorded Vitals  Blood pressure 102/60, pulse 82, temperature 36.4 °C (97.5 °F), temperature source Oral, resp. rate 18, height 1.626 m (5' " "4\"), weight 78.9 kg (174 lb), SpO2 97 %.    Review of Systems   Psychiatric/Behavioral:  Positive for behavioral problems, dysphoric mood, sleep disturbance and suicidal ideas. The patient is nervous/anxious.        Psychiatric ROS - Adult  Anxiety: General Anxiety Disorder (KAYLEEN)KAYLEEN Behaviors: difficult to control worry, difficulty concentrating, irritability, restlessness, and sleep disturbance  Depression: anhedonia, appetite increased, concentration, energy, helpless, hopeless, interest, persistent thoughts of death, psychomotor agitation, sleep decreased , suicidal thoughts, and withdrawn  Delirium: negative  Psychosis: negative  Ioana: negative  Safety Issues: passive death wish and suicidal ideation  Psychiatric ROS Comment: as noted    Physical Exam  Abdominal:      Comments: Presence of colostomy bag   Psychiatric:         Attention and Perception: She is inattentive.         Mood and Affect: Mood is anxious and depressed. Affect is labile, flat and inappropriate.         Speech: Speech is delayed.         Behavior: Behavior is uncooperative, agitated, aggressive and withdrawn.         Thought Content: Thought content includes suicidal ideation.         Judgment: Judgment is impulsive.           Mental Status Examination  General Appearance:  less disheveled, malodorous, improved gaze and eye contact. Rocking in stereotypic fashion while seated near nursing station.  Gait/Station: ambulating with walker assist device  Speech: increasing verbal responses  Mood: \"aint nothing gonna happen like you say\"  Affect: less Irritable and Angry, sarcastic at times  Thought Process: remains impoverished  but less so  Thought Associations: No loosening of associations  Thought Content: depressed, less hopeless, persists as helpless  Perception: No perceptual abnormalities noted  Level of Consciousness: Drowsy  Orientation: Alert  Attention and Concentration: Mild impairment in attention  Recent Memory: Intact as " evidenced by ability to recall details from the past 24 hours   Executive function: Intact  Language: Naming intact  Fund of knowledge: Good  Insight: Limited, as evidenced by disengagement  Judgment: Limited, as evidence by inability to reason through medical decision making and highly impaired reality testing       Psychiatric Risk Assessment  Violence Risk Assessment: major mental illness and substance abuse  Acute Risk of Harm to Others is Considered: moderate   Suicide Risk Assessment: , chronic medical illness, chronic pain, current psychiatric illness, feelings of hopelessness, global insomnia, history of trauma or abuse, life crisis (shame/despair), prior suicide attempt, severe anxiety, substance abuse, and suicidal ideations  Protective Factors against Suicide: adherence to  treatment and marriage/partnership  Acute Risk of Harm to Self is Considered: moderate    Relevant Results             Current Facility-Administered Medications   Medication Dose Route Frequency Provider Last Rate Last Admin    acetaminophen (Tylenol) tablet 650 mg  650 mg oral q6h PRN Eddie Cullen DO        alum-mag hydroxide-simeth (Mylanta) 200-200-20 mg/5 mL oral suspension 30 mL  30 mL oral q6h PRN Eddie Cullen DO        buPROPion XL (Wellbutrin XL) 24 hr tablet 300 mg  300 mg oral Daily Eddie Cullen, DO   300 mg at 10/12/23 0836    celecoxib (CeleBREX) capsule 200 mg  200 mg oral Daily Eddie Cullen, DO   200 mg at 10/12/23 0836    cholecalciferol (Vitamin D-3) capsule 125 mcg  125 mcg oral Daily Eddie Cullen, DO   125 mcg at 10/12/23 0836    citalopram (CeleXA) tablet 30 mg  30 mg oral Daily Eddie Cullen, DO   30 mg at 10/12/23 0836    diphenhydrAMINE (Sominex) tablet 25 mg  25 mg oral q8h PRN Eddie Cullen,         hydrOXYzine pamoate (Vistaril) capsule 25 mg  25 mg oral q8h PRN Eddie Cullen, DO        lidocaine 4 % patch 1 patch  1 patch transdermal Daily Eddie Cullen DO         melatonin tablet 5 mg  5 mg oral Nightly Eddie Cullen, DO   5 mg at 10/11/23 2152    OLANZapine (ZyPREXA) injection 5 mg  5 mg intramuscular q8h PRN Eddie Cullen, DO        OLANZapine zydis (ZyPREXA) disintegrating tablet 5 mg  5 mg oral q8h PRN Eddie H Subhash, DO        QUEtiapine (SEROquel) tablet 350 mg  350 mg oral Nightly Eddie H Zadorotheaitz, DO   350 mg at 10/11/23 2152    traZODone (Desyrel) tablet 150 mg  150 mg oral Nightly Eddie H Zarowitz, DO   150 mg at 10/11/23 2152       Assessment/Plan   Principal Problem:    Severe recurrent major depression without psychotic features (CMS/HCC)  Active Problems:    Colostomy present (CMS/HCC)    Opioid abuse (CMS/HCC)    Bilateral hip pain    Biological -   Cont meds as ordered below as she is starting to show a clinical response:  Wellbutrin xl 300 mg for adjunctive depression treatment  Celexa 30 mg daily for depression, anxiety  seroquel 350 mg qhs    Trazodone 150 mg at bedtime for sleep    ECG (10/4/23): Normal sinus rhythm. Heart  rate 72 bpm. Qtc 442     Discussion with patient regarding risk benefits and alternatives and side effects with opportunity to ask questions and questions answered.  Patient given consent to the plan as noted    2. Psychological -       Patient is encouraged to participate with the therapeutic milieu and program and group therapy      3. Sociological -      Patient encouraged to cooperate with social work staff on issues relevant to discharge planning  Will plan for family outreach to obtain additional collateral and review progress and limitations from improvement thus far.                    I spent 25 minutes in the professional and overall care of this patient.      Eddie Cullen DO    I reviewed this medcial student note today.

## 2023-10-12 NOTE — GROUP NOTE
"Group Topic: Self-Care/Wellness   Group Date: 10/12/2023  Start Time: 1000  End Time: 1100  Facilitators: RANDALL FernandesS   Department: [facility]    Number of Participants: 5   Group Focus: anxiety, clarity of thought, communication, concentration, and coping skills  Treatment Modality: Other: Recreation Therapy  Interventions utilized were exploration, mental fitness, orientation, and patient education  Purpose: insight or knowledge and self-care    Name: Azul Roberts YOB: 1960   MR: [id number]      Facilitator: Recreational Therapist  Level of Participation: did not attend  Quality of Participation:  pt did not attend  Interactions with others:  did not attend  Mood/Affect:  did not attend  Triggers (if applicable): n/a  Cognition:  did not attend  Progress: None  Comments: Pt problem is depressed mood.  Pt refused to attend group at this time but was cooperative and more pleasant with interaction with CTRS compared to previous days.  CTRS engaged with pt for about 15 minutes before group began.  Pt identifies she does not \"want to come to group, but I will come and sit at the nurses station\".  Pt mood was calm and cooperative and was not grumpy or sarcastic with interaction.  Pt made good eye contact with CTRS also.  Plan: continue with services      "

## 2023-10-12 NOTE — NURSING NOTE
SARAN NOTE     Problem:  Depression     Behavior:  Pt is observed sitting in the hallway at the beginning of the shift. She is flat and blunted but she is calm, pleasant and friendly. Pt is compliant with her medications. She has minimal interaction with this RN and zero social engagement with peers. HS snack provided.   Group Participation: n/a  Appetite/Meals: meals and snack       Interventions:  HS medications given whole with water, no issues noted. Assessment completed.   HS snack provided.     Response:  Pt sat in the hallway until she was finished with her snack, she took her trash to the garbage tonight opposed to throwing it on the floor like she generally does. Pt has been resting in her bed throughout the night, no s/s of distress  noted.     Plan:  Continue to monitor and assist. Maintain q15 minutes rounds for safety.

## 2023-10-12 NOTE — GROUP NOTE
"Group Topic: Other   Group Date: 10/12/2023  Start Time: 1515  End Time: 1600  Facilitators: BRENDA Fernandes   Department: [facility]    Number of Participants: 7   Group Focus: concentration  Treatment Modality: Other: Recreation Therapy  Interventions utilized were mental fitness  Purpose: other: Mind joggers    Name: Azul Roberts YOB: 1960   MR: [id number]      Facilitator: Recreational Therapist  Level of Participation: did not attend  Quality of Participation:  did not attend  Interactions with others:  did not attend  Mood/Affect:  did not attend  Triggers (if applicable): n/a  Cognition:  did not attend  Progress: None  Comments: pt problem depressed mood.  Pt refused to attend group, reports not \"being a group person\".  Does agree to sit at the nurses station during group.    Plan: continue with services      "

## 2023-10-12 NOTE — CARE PLAN
The patient's goals for the shift include to get better    The clinical goals for the shift include no falls    Over the shift, the patient did not make progress toward the following goals. Barriers to progression include encourage daily care. Recommendations to address these barriers include educate on importance of daily care.

## 2023-10-13 PROCEDURE — 2500000001 HC RX 250 WO HCPCS SELF ADMINISTERED DRUGS (ALT 637 FOR MEDICARE OP): Performed by: PSYCHIATRY & NEUROLOGY

## 2023-10-13 PROCEDURE — 99232 SBSQ HOSP IP/OBS MODERATE 35: CPT | Performed by: PSYCHIATRY & NEUROLOGY

## 2023-10-13 PROCEDURE — 1140000001 HC PRIVATE PSYCH ROOM DAILY

## 2023-10-13 PROCEDURE — 2500000004 HC RX 250 GENERAL PHARMACY W/ HCPCS (ALT 636 FOR OP/ED): Performed by: PSYCHIATRY & NEUROLOGY

## 2023-10-13 PROCEDURE — 99232 SBSQ HOSP IP/OBS MODERATE 35: CPT | Performed by: INTERNAL MEDICINE

## 2023-10-13 RX ORDER — LORAZEPAM 1 MG/1
1 TABLET ORAL 3 TIMES DAILY
Status: DISCONTINUED | OUTPATIENT
Start: 2023-10-13 | End: 2023-10-15

## 2023-10-13 RX ADMIN — CITALOPRAM HYDROBROMIDE 30 MG: 20 TABLET ORAL at 08:19

## 2023-10-13 RX ADMIN — CELECOXIB 200 MG: 200 CAPSULE ORAL at 08:20

## 2023-10-13 RX ADMIN — Medication 125 MCG: at 08:19

## 2023-10-13 RX ADMIN — Medication 5 MG: at 21:43

## 2023-10-13 RX ADMIN — BUPROPION HYDROCHLORIDE 300 MG: 300 TABLET, FILM COATED, EXTENDED RELEASE ORAL at 08:19

## 2023-10-13 RX ADMIN — LORAZEPAM 1 MG: 1 TABLET ORAL at 17:13

## 2023-10-13 RX ADMIN — QUETIAPINE FUMARATE 350 MG: 300 TABLET ORAL at 21:45

## 2023-10-13 RX ADMIN — LORAZEPAM 1 MG: 1 TABLET ORAL at 21:43

## 2023-10-13 RX ADMIN — TRAZODONE HYDROCHLORIDE 150 MG: 50 TABLET ORAL at 21:43

## 2023-10-13 ASSESSMENT — COLUMBIA-SUICIDE SEVERITY RATING SCALE - C-SSRS
2. HAVE YOU ACTUALLY HAD ANY THOUGHTS OF KILLING YOURSELF?: NO
2. HAVE YOU ACTUALLY HAD ANY THOUGHTS OF KILLING YOURSELF?: NO
6. HAVE YOU EVER DONE ANYTHING, STARTED TO DO ANYTHING, OR PREPARED TO DO ANYTHING TO END YOUR LIFE?: NO
6. HAVE YOU EVER DONE ANYTHING, STARTED TO DO ANYTHING, OR PREPARED TO DO ANYTHING TO END YOUR LIFE?: NO
1. SINCE LAST CONTACT, HAVE YOU WISHED YOU WERE DEAD OR WISHED YOU COULD GO TO SLEEP AND NOT WAKE UP?: NO
1. SINCE LAST CONTACT, HAVE YOU WISHED YOU WERE DEAD OR WISHED YOU COULD GO TO SLEEP AND NOT WAKE UP?: NO

## 2023-10-13 ASSESSMENT — ENCOUNTER SYMPTOMS
DYSPHORIC MOOD: 1
SLEEP DISTURBANCE: 1
NERVOUS/ANXIOUS: 1

## 2023-10-13 ASSESSMENT — PAIN SCALES - GENERAL
PAINLEVEL_OUTOF10: 0 - NO PAIN
PAINLEVEL_OUTOF10: 3

## 2023-10-13 NOTE — PROGRESS NOTES
"Azul Roberts is a 63 y.o. female on day 34 of admission presenting with Severe recurrent major depression without psychotic features (CMS/[facility]).      Subjective   Case discussed with treatment team and chart reviewed.     Continues to present with limited motivation to engage in the milieu.  Staying in bed much of the day.  Limited responses and very brief on interaction.  Reports that she feels hopeless and \"does not know what to do.\"  Discussed with patient we are seeking transition from [place] to [place] Medicaid such that we may evaluate placement in a convalescence facility.  Patient reports feeling disheartened to understand this but recognizing there is no other optimal setting for her at this time.  Her ability to take care of herself remains limited and there remains grave concern if she was to transition to the community at this time in an independent living arrangement she would quickly destabilize likely requiring readmission.  Without encouragement, she does not participate in activities of daily living, she would not be able to administer her medications to herself reliably, she would have no supervision as her  is not home with her to be able to help with these tasks.  Additionally she continues to endorse feeling depressed hopeless helpless more early humiliated and unable to function.  She has been cleared by physical and occupational therapy not meeting criteria for a skilled nursing setting otherwise.  She remains depressed self isolated anhedonic with predominant neurovegetative presentation.  She remains less irritable and is more willing to engage with provider on approach but again provides very little content and discussion.    Considering a catatonia spectrum to her current presentation.  We will introduce low-dose benzodiazepines to evaluate for response and improvement.    Objective     Last Recorded Vitals  Blood pressure 146/83, pulse 74, temperature 36.5 °C (97.7 °F), " "temperature source Temporal, resp. rate 20, height 1.626 m (5' 4\"), weight 78.9 kg (174 lb), SpO2 100 %.    Review of Systems   Psychiatric/Behavioral:  Positive for behavioral problems, dysphoric mood, sleep disturbance and suicidal ideas. The patient is nervous/anxious.        Psychiatric ROS - Adult  Anxiety: General Anxiety Disorder (KAYLEEN)KAYLEEN Behaviors: difficult to control worry, difficulty concentrating, irritability, restlessness, and sleep disturbance  Depression: anhedonia, appetite increased, concentration, energy, helpless, hopeless, interest, persistent thoughts of death, psychomotor agitation, sleep decreased , suicidal thoughts, and withdrawn  Delirium: negative  Psychosis: negative  Ioana: negative  Safety Issues: passive death wish and suicidal ideation  Psychiatric ROS Comment: as noted    Physical Exam  Abdominal:      Comments: Presence of colostomy bag   Psychiatric:         Attention and Perception: She is inattentive.         Mood and Affect: Mood is anxious and depressed. Affect is labile, flat and inappropriate.         Speech: Speech is delayed.         Behavior: Behavior is uncooperative, agitated, aggressive and withdrawn.         Thought Content: Thought content includes suicidal ideation.         Judgment: Judgment is impulsive.           Mental Status Examination  General Appearance:  less disheveled, malodorous, improved gaze and eye contact. In bed, staring at ceiling  Gait/Station: ambulating with walker assist device  Speech: increasing verbal responses  Mood: no response given when asked her mood today - made eye contact and remained silent  Affect: less Irritable and Angry,  Thought Process: remains impoverished  but less so  Thought Associations: No loosening of associations  Thought Content: depressed, less hopeless, persists as helpless  Perception: No perceptual abnormalities noted  Level of Consciousness: Drowsy  Orientation: Alert  Attention and Concentration: Mild impairment " in attention  Recent Memory: Intact as evidenced by ability to recall details from the past 24 hours   Executive function: Intact  Language: Naming intact  Fund of knowledge: Good  Insight: Limited, as evidenced by disengagement  Judgment: Limited, as evidence by inability to reason through medical decision making and highly impaired reality testing       Psychiatric Risk Assessment  Violence Risk Assessment: major mental illness and substance abuse  Acute Risk of Harm to Others is Considered: moderate   Suicide Risk Assessment: , chronic medical illness, chronic pain, current psychiatric illness, feelings of hopelessness, global insomnia, history of trauma or abuse, life crisis (shame/despair), prior suicide attempt, severe anxiety, substance abuse, and suicidal ideations  Protective Factors against Suicide: adherence to  treatment and marriage/partnership  Acute Risk of Harm to Self is Considered: moderate    Relevant Results             Current Facility-Administered Medications   Medication Dose Route Frequency Provider Last Rate Last Admin    acetaminophen (Tylenol) tablet 650 mg  650 mg oral q6h PRN Eddie Cullen,         alum-mag hydroxide-simeth (Mylanta) 200-200-20 mg/5 mL oral suspension 30 mL  30 mL oral q6h PRN Eddie Cullen,         buPROPion XL (Wellbutrin XL) 24 hr tablet 300 mg  300 mg oral Daily Eddie Cullen, DO   300 mg at 10/13/23 0819    celecoxib (CeleBREX) capsule 200 mg  200 mg oral Daily Eddie Cullen, DO   200 mg at 10/13/23 0820    cholecalciferol (Vitamin D-3) capsule 125 mcg  125 mcg oral Daily Eddie Cullen, DO   125 mcg at 10/13/23 0819    citalopram (CeleXA) tablet 30 mg  30 mg oral Daily Eddie Cullen, DO   30 mg at 10/13/23 0819    diphenhydrAMINE (Sominex) tablet 25 mg  25 mg oral q8h PRN Eddie Cullen, DO        hydrOXYzine pamoate (Vistaril) capsule 25 mg  25 mg oral q8h PRN Eddie Cullen, DO        lidocaine 4 % patch 1 patch  1 patch  transdermal Daily Eddie Cullen DO        melatonin tablet 5 mg  5 mg oral Nightly Eddie NOAH Cullen, DO   5 mg at 10/12/23 2029    OLANZapine (ZyPREXA) injection 5 mg  5 mg intramuscular q8h PRN Eddie Cullen, DO        OLANZapine zydis (ZyPREXA) disintegrating tablet 5 mg  5 mg oral q8h PRN Eddie Cullen, DO        QUEtiapine (SEROquel) tablet 350 mg  350 mg oral Nightly Eddie H Zadorotheaitz, DO   350 mg at 10/12/23 2028    traZODone (Desyrel) tablet 150 mg  150 mg oral Nightly Eddie H Zarowitz, DO   150 mg at 10/12/23 2029       Assessment/Plan   Principal Problem:    Severe recurrent major depression without psychotic features (CMS/HCC)  Active Problems:    Colostomy present (CMS/HCC)    Opioid abuse (CMS/HCC)    Bilateral hip pain    Biological -   Cont meds as ordered below as she is starting to show a clinical response:  Wellbutrin xl 300 mg for adjunctive depression treatment  Celexa 30 mg daily for depression, anxiety  seroquel 350 mg qhs  Trazodone 150 mg at bedtime for sleep    Initiate ativan 1 mg tid for catatonia spectrum presentation    ECG (10/4/23): Normal sinus rhythm. Heart  rate 72 bpm. Qtc 442     Discussion with patient regarding risk benefits and alternatives and side effects with opportunity to ask questions and questions answered.  Patient given consent to the plan as noted    2. Psychological -       Patient is encouraged to participate with the therapeutic milieu and program and group therapy      3. Sociological -      Patient encouraged to cooperate with social work staff on issues relevant to discharge planning  Will plan for family outreach to obtain additional collateral and review progress and limitations from improvement thus far.                    I spent 25 minutes in the professional and overall care of this patient.      Eddie Cullen DO    I reviewed this medcial student note today.

## 2023-10-13 NOTE — CARE PLAN
Over the shift, the patient did make progress toward the following goals. Barriers to progression include lack of understanding and focus on education. Recommendations to address these barriers include walking around swiftly w/ the walker receiving heavy downward force.  Problem: Fall/Injury  Goal: Verbalize understanding of personal risk factors for fall in the hospital  Outcome: Progressing   The patient's goals for the shift include be happy    The clinical goals for the shift include no falls

## 2023-10-13 NOTE — GROUP NOTE
Group Topic: Other   Group Date: 10/13/2023  Start Time: 1100  End Time: 1145  Facilitators: BRENDA Fernandes   Department: [facility]    Number of Participants: 6   Group Focus: relaxation and reminiscence  Treatment Modality: Other: Recreation Therapy  Interventions utilized were mental fitness and reminiscence  Purpose: other: Fun    Name: Azul Roberts YOB: 1960   MR: [id number]      Facilitator: Recreational Therapist  Level of Participation: active  Quality of Participation: appropriate/pleasant, cooperative, and distractible  Interactions with others:  Pt displays cooperative and pleasant behaviors and mood.  Does appear to be laughing inappropriately and nodding her head as if she is engaging with someone who is not present.    Mood/Affect: appropriate  Triggers (if applicable): n/a  Cognition:  appropriate  Progress: Moderate  Comments: pt problem is delusional thought  Plan: continue with services

## 2023-10-13 NOTE — PROGRESS NOTES
Physical Therapy                 Therapy Communication Note    Patient Name: Azul Roberts  MRN: [id number]  Today's Date: 10/13/2023     Discipline: Physical Therapy    Missed Visit Reason: Missed Visit Reason: Other (Comment) (Patient initially evaluated on 9/11/23. Was discharged from therapy 9/20/23 as patient was up without assist, ad adriana,on roxana unit and reprted she was at her baseline and didin't want physical therapy.)    Missed Time: Attempt    Comment: spoke with  regarding patient up ad adriana on unit without assist, no skilled PT needs, patient not agreeable to therapy, and PT will not be putting patient on POC.

## 2023-10-13 NOTE — PROGRESS NOTES
[facility]: [facility]  PROGRESS NOTE      Azul Roberts is a 63 y.o. female that is being seen  today for follow up..  Subjective   Pt. Is being seen for follow up.  Patient has been at her baseline.  Denies any significant complaints      ROS  Negative for fever or chills  Negative for sore throat, ear pain, nasal discharge  Negative for cough, shortness of breath or wheezing  Negative for chest pain, palpitations, swelling of legs  Negative for abdominal pain, constipation, diarrhea, blood in the stools,has colostomy  Negative for urinary complaints  Negative for headache, dizziness, weakness or numbness  Negative for joint pain  Positive for depression or anxiety  All other systems reviewed and were negative   Vitals:    10/13/23 0500   BP: 146/83   Pulse: 74   Resp: 20   Temp: 36.5 °C (97.7 °F)   SpO2: 100%      Vitals:    10/06/23 1400   Weight: 78.9 kg (174 lb)     Body mass index is 29.87 kg/m².  Physical Exam  Constitutional: Patient does not appear to be in any acute distress  Head and Face: NCAT  Eyes: Normal external exam, EOMI  ENT: Normal external inspection of ears and nose. Oropharynx normal.  Cardiovascular: RRR, S1/S2, no murmurs, rubs, or gallops, radial pulses +2, no edema of extremities  Pulmonary: CTAB, no respiratory distress.  Abdomen: +BS, soft, non-tender, nondistended, no guarding or rebound, no masses noted  MSK: No joint swelling, normal movements of all extremities. Range of motion- normal.  Skin- No lesions, contusions, or erythema.  Peripheral puslses palpable bilaterally 2+  Neuro: AAO X3, Cranial nerves 2-12 grossly intact,DTR 2+ in all 4 limbs       Diagnostic Results   Lab Results   Component Value Date    GLUCOSE 113 (H) 09/05/2023    CALCIUM 10.6 (H) 09/05/2023     09/05/2023    K 3.7 09/05/2023    CO2 24 09/05/2023     09/05/2023    BUN 12 09/05/2023    CREATININE 0.72 09/05/2023     Lab Results   Component Value Date    ALT 12 09/05/2023  "   AST 14 09/05/2023    ALKPHOS 103 09/05/2023    BILITOT 0.4 09/05/2023     Lab Results   Component Value Date    WBC 13.2 (H) 09/05/2023    HGB 13.9 09/05/2023    HCT 43.0 09/05/2023    MCV 81 09/05/2023     09/05/2023     No results found for: \"CHOL\"  No results found for: \"HDL\"  No results found for: \"LDLCALC\"  No results found for: \"TRIG\"  No results found for: \"HGBA1C\"  Other labs not included in the list above were reviewed either before or during this encounter.    History    No past medical history on file.  No past surgical history on file.  No family history on file.  No Known Allergies  No current facility-administered medications on file prior to encounter.     Current Outpatient Medications on File Prior to Encounter   Medication Sig Dispense Refill    acetaminophen (Tylenol) 325 mg tablet Take 2 tablets (650 mg) by mouth 2 times a day.      hydrOXYzine pamoate (Vistaril) 25 mg capsule Take 1 capsule (25 mg) by mouth 2 times a day.      magnesium oxide (Mag-Ox) 400 mg tablet Take 1 tablet (400 mg) by mouth 2 times a day.      melatonin 5 mg tablet Take 1 tablet (5 mg) by mouth once daily at bedtime.      risperiDONE (RisperDAL) 2 mg tablet Take 1 tablet (2 mg) by mouth once daily at bedtime.      traZODone (Desyrel) 150 mg tablet Take 1 tablet (150 mg) by mouth once daily at bedtime.     Scheduled medications  buPROPion XL, 300 mg, oral, Daily  celecoxib, 200 mg, oral, Daily  cholecalciferol, 125 mcg, oral, Daily  citalopram, 30 mg, oral, Daily  lidocaine, 1 patch, transdermal, Daily  melatonin, 5 mg, oral, Nightly  QUEtiapine, 350 mg, oral, Nightly  traZODone, 150 mg, oral, Nightly      Continuous medications     PRN medications  PRN medications: acetaminophen, alum-mag hydroxide-simeth, diphenhydrAMINE, hydrOXYzine pamoate, OLANZapine, OLANZapine zydis     There is no immunization history on file for this patient.  Patient's medical history was reviewed and updated either before or during this " encounter.  ASSESSMENT / PLAN:  Active Hospital Problems    Bilateral hip pain      *Severe recurrent major depression without psychotic features (CMS/HCC)      Colostomy present (CMS/HCC)      Opioid abuse (CMS/HCC)     Pt. Has been stable.clostomy is working well.        Jay Knox MD

## 2023-10-13 NOTE — CARE PLAN
The patient's goals for the shift include to get better    The clinical goals for the shift include no falls    Over the shift, the patient did not make progress toward the following goals.   Problem: Fall/Injury  Goal: Not fall by end of shift  Outcome: Met  Goal: Be free from injury by end of the shift  Outcome: Met     Problem: Pain  Goal: My pain/discomfort is manageable  Outcome: Met

## 2023-10-13 NOTE — GROUP NOTE
"Group Topic: Other   Group Date: 10/13/2023  Start Time: 1100  End Time: 1145  Facilitators: RANDALL FernandesS   Department: [facility] REHABTH VIRTUAL    Number of Participants: 6   Group Focus: relaxation and reminiscence  Treatment Modality: Other: Recreation Therpay  Interventions utilized were reminiscence  Purpose: other: Fun    Name: Azul Roberts YOB: 1960   MR: [id number]      Facilitator: Recreational Therapist  Level of Participation: did not attend  Quality of Participation:  did not attend  Interactions with others:  did not attend  Mood/Affect:  did not attend  Triggers (if applicable): n/a  Cognition:  did not attend  Progress: None  Comments: Pt problem is depressed mood.  Pt is laying in her bed when CTRS invites her to attend group.  Pt refuses to attend session but does engage for about 10 minutes with CTRS.  Pt displays flat affect, complains of pain in her hip (RN aware) and reports \"wanting to feel better\".  Pts mood was less irritable than previous visits and eye contact was more appropriate.  Pt did agree to sit in  the hallway during session and CTRS did see pt sitting outside her room at the nurses station for some time during group.    Plan: continue with services      "

## 2023-10-13 NOTE — NURSING NOTE
"LIORP NOTE     Problem:  Depression; not doing ADL's     Behavior:  Reclusive to room with the exception of snack time    Appetite/Meals: ate HS snacks       Interventions:  Encouraged to continue caring for her colostomy herself    Response:  Irritable, \"oh my god, I am\"     Plan:  Maintain pt safety  "

## 2023-10-13 NOTE — PROGRESS NOTES
SARAHW contacted [facility] regarding pt's Medicaid application. After a significant amount of time on hold LSW was informed that they have not processed her application as of yet as her information is not coming up in the system. LSW was instructed to follow up approximately Wednesday next week, to allow significant time for them to process the application.   LSW attempted to contact pt's  to provide update on pt's disposition and left a message requesting a returned phone call.     Nikki Andrews, DRAGNA

## 2023-10-13 NOTE — PROGRESS NOTES
REHAB Therapy Assessment & Treatment    Patient Name: Azul Roberts  MRN: [id number]  Today's Date: 10/13/2023      Recreation note : pt is alert and oriented x 2-3 with some confusion and poor insight/judgement.  Attends few groups of choice this week as she continues to refuse most interactions.  Pt is slightly more cooperative with sitting outside her room in the hallway during the day, but continues to display irritable mood with encouragement to join group.  Pt more cooperative with completing ADL's but often has a flat affect with interactions.  Pt is eating and sleeping well.  Will continue to encourage pt to attend groups of choice daily.

## 2023-10-13 NOTE — GROUP NOTE
Group Topic: Other   Group Date: 10/13/2023  Start Time: 1530  End Time: 1600  Facilitators: BRENDA Fernandes   Department: [facility] REHABTH VIRTUAL    Number of Participants: 5   Group Focus: other [employer]  Treatment Modality: Other: Recreation Therapy  Interventions utilized were reminiscence  Purpose: other: Fun    Name: Azul Roberts YOB: 1960   MR: [id number]      Facilitator: Recreational Therapist  Level of Participation: did not attend  Quality of Participation:  did not attend  Interactions with others:  did not attend  Mood/Affect:  did not attend  Triggers (if applicable): n/a  Cognition:  did not attend  Progress: None  Comments: pt problem is depressed mood. Pt refused to attend group at this time.  Plan: continue with services

## 2023-10-14 PROCEDURE — 99232 SBSQ HOSP IP/OBS MODERATE 35: CPT | Performed by: PSYCHIATRY & NEUROLOGY

## 2023-10-14 PROCEDURE — 2500000004 HC RX 250 GENERAL PHARMACY W/ HCPCS (ALT 636 FOR OP/ED): Performed by: PSYCHIATRY & NEUROLOGY

## 2023-10-14 PROCEDURE — 2500000001 HC RX 250 WO HCPCS SELF ADMINISTERED DRUGS (ALT 637 FOR MEDICARE OP): Performed by: PSYCHIATRY & NEUROLOGY

## 2023-10-14 PROCEDURE — 1140000001 HC PRIVATE PSYCH ROOM DAILY

## 2023-10-14 RX ADMIN — BUPROPION HYDROCHLORIDE 300 MG: 300 TABLET, FILM COATED, EXTENDED RELEASE ORAL at 08:51

## 2023-10-14 RX ADMIN — CITALOPRAM HYDROBROMIDE 30 MG: 20 TABLET ORAL at 08:51

## 2023-10-14 RX ADMIN — CELECOXIB 200 MG: 200 CAPSULE ORAL at 08:51

## 2023-10-14 RX ADMIN — Medication 125 MCG: at 08:51

## 2023-10-14 RX ADMIN — QUETIAPINE FUMARATE 350 MG: 300 TABLET ORAL at 21:01

## 2023-10-14 RX ADMIN — Medication 5 MG: at 21:01

## 2023-10-14 RX ADMIN — TRAZODONE HYDROCHLORIDE 150 MG: 50 TABLET ORAL at 21:02

## 2023-10-14 RX ADMIN — LORAZEPAM 1 MG: 1 TABLET ORAL at 21:01

## 2023-10-14 RX ADMIN — LORAZEPAM 1 MG: 1 TABLET ORAL at 08:55

## 2023-10-14 RX ADMIN — LORAZEPAM 1 MG: 1 TABLET ORAL at 15:35

## 2023-10-14 ASSESSMENT — PAIN SCALES - GENERAL
PAINLEVEL_OUTOF10: 0 - NO PAIN
PAINLEVEL_OUTOF10: 0 - NO PAIN

## 2023-10-14 ASSESSMENT — ENCOUNTER SYMPTOMS
DYSPHORIC MOOD: 1
SLEEP DISTURBANCE: 1
NERVOUS/ANXIOUS: 1

## 2023-10-14 ASSESSMENT — COLUMBIA-SUICIDE SEVERITY RATING SCALE - C-SSRS
2. HAVE YOU ACTUALLY HAD ANY THOUGHTS OF KILLING YOURSELF?: NO
1. SINCE LAST CONTACT, HAVE YOU WISHED YOU WERE DEAD OR WISHED YOU COULD GO TO SLEEP AND NOT WAKE UP?: NO
6. HAVE YOU EVER DONE ANYTHING, STARTED TO DO ANYTHING, OR PREPARED TO DO ANYTHING TO END YOUR LIFE?: NO

## 2023-10-14 ASSESSMENT — PAIN - FUNCTIONAL ASSESSMENT
PAIN_FUNCTIONAL_ASSESSMENT: 0-10
PAIN_FUNCTIONAL_ASSESSMENT: 0-10

## 2023-10-14 NOTE — CARE PLAN
Problem: Fall/Injury  Goal: Verbalize understanding of personal risk factors for fall in the hospital  Outcome: Met   The patient's goals for the shift include be happy    The clinical goals for the shift include no falls    Over the shift, the patient did not make progress toward the following goals.

## 2023-10-14 NOTE — NURSING NOTE
BIRP NOTE     Problem:  Depression; not doing ADL's     Behavior:  Reclusive to room    Group Participation: no HS group  Appetite/Meals: snack given       Interventions:  Medicate per orders    Response:  receptive    Plan:  Maintain safety of pt

## 2023-10-14 NOTE — GROUP NOTE
Group Topic: Self-Care/Wellness   Group Date: 10/14/2023  Start Time: 1015  End Time: 1135  Facilitators: BRENDA Howard   Department: [facility]    Number of Participants: 8   Group Focus: other healthy living and social skills  Treatment Modality: Other: Recreation Therapy  Interventions utilized were mental fitness and patient education  Purpose: self-care and other: social skills    Name: Azul Roberts YOB: 1960   MR: [id number]      Facilitator: Recreational Therapist  Level of Participation: did not attend  Quality of Participation:  did not attend  Interactions with others:  did not attend  Mood/Affect:  n/a  Triggers (if applicable): n/a  Cognition:  n/a  Progress: None  Comments: pt problem is depressed mood. Pt refuses group at this time  Plan: continue with services

## 2023-10-14 NOTE — NURSING NOTE
SARAN NOTE     Problem:  depression     Behavior:  Pt isolates to room inbetween meals, she does not attend group, she is taking scheduled medications and allowing staff to check to see if she has emptied her colostomy.    Group Participation: Does not attend group    Appetite/Meals: Pt eats 100% meals       Interventions:  1:1 interaction, encourage pt to go to group, give scheduled medications, every 15 minute checks, open blinds to room.    Response:  Pt continues to be unwilling to participate in group, she comes out to eat meals and snacks.     Plan:  Encourage participation in milieu, open blinds to room, 1:1 interaction, give scheduled medications, every 15 minute checks.

## 2023-10-14 NOTE — PROGRESS NOTES
"Azul Roberts is a 63 y.o. female on day 35 of admission presenting with Severe recurrent major depression without psychotic features (CMS/HCC).      Subjective   Case discussed with treatment team and chart reviewed.     Continues to present with limited motivation to engage in the milieu.  Staying in bed much of the day.  Limited responses and very brief on interaction.    No discernible improvement from Ativan for potentially treating catatonia spectrum.  Patient made aware after an additional 24 hours if nonresponse to Ativan will be discontinued.  She reports understanding.  She contributes little to nothing to the encounter today.  She is more malodorous of feces again today.  She is found in bed staring at the wall.  She is without spontaneous speech.  She is delayed in responding to queries made of her.  She has no requests and she has nothing else to offer she reports.  She requests for the lights to be turned off and her door closed.  She is not participating in group.    She is not able to take care of herself and this is a setting required to maintain her basic needs at this time.    Objective     Last Recorded Vitals  Blood pressure 126/64, pulse 74, temperature 36.5 °C (97.7 °F), temperature source Temporal, resp. rate 20, height 1.626 m (5' 4\"), weight 78.9 kg (174 lb), SpO2 97 %.    Review of Systems   Psychiatric/Behavioral:  Positive for behavioral problems, dysphoric mood, sleep disturbance and suicidal ideas. The patient is nervous/anxious.        Psychiatric ROS - Adult  Anxiety: General Anxiety Disorder (KAYLEEN)KAYLEEN Behaviors: difficult to control worry, difficulty concentrating, irritability, restlessness, and sleep disturbance  Depression: anhedonia, appetite increased, concentration, energy, helpless, hopeless, interest, persistent thoughts of death, psychomotor agitation, sleep decreased , suicidal thoughts, and withdrawn  Delirium: negative  Psychosis: negative  Ioana: negative  Safety " Issues: passive death wish and suicidal ideation  Psychiatric ROS Comment: as noted    Physical Exam  Abdominal:      Comments: Presence of colostomy bag   Psychiatric:         Attention and Perception: She is inattentive.         Mood and Affect: Mood is anxious and depressed. Affect is labile, flat and inappropriate.         Speech: Speech is delayed.         Behavior: Behavior is uncooperative, agitated, aggressive and withdrawn.         Thought Content: Thought content includes suicidal ideation.         Judgment: Judgment is impulsive.           Mental Status Examination  General Appearance:  less disheveled, malodorous, improved gaze and eye contact. In bed, staring at ceiling  Gait/Station: ambulating with walker assist device  Speech: increasing verbal responses  Mood: no response given when asked her mood today - made eye contact and remained silent  Affect: less Irritable and Angry,  Thought Process: remains impoverished  but less so  Thought Associations: No loosening of associations  Thought Content: depressed, less hopeless, persists as helpless  Perception: No perceptual abnormalities noted  Level of Consciousness: Drowsy  Orientation: Alert  Attention and Concentration: Mild impairment in attention  Recent Memory: Intact as evidenced by ability to recall details from the past 24 hours   Executive function: Intact  Language: Naming intact  Fund of knowledge: Good  Insight: Limited, as evidenced by disengagement  Judgment: Limited, as evidence by inability to reason through medical decision making and highly impaired reality testing       Psychiatric Risk Assessment  Violence Risk Assessment: major mental illness and substance abuse  Acute Risk of Harm to Others is Considered: moderate   Suicide Risk Assessment: , chronic medical illness, chronic pain, current psychiatric illness, feelings of hopelessness, global insomnia, history of trauma or abuse, life crisis (shame/despair), prior suicide  attempt, severe anxiety, substance abuse, and suicidal ideations  Protective Factors against Suicide: adherence to  treatment and marriage/partnership  Acute Risk of Harm to Self is Considered: moderate    Relevant Results             Current Facility-Administered Medications   Medication Dose Route Frequency Provider Last Rate Last Admin    acetaminophen (Tylenol) tablet 650 mg  650 mg oral q6h PRN Eddie Cullen, DO        alum-mag hydroxide-simeth (Mylanta) 200-200-20 mg/5 mL oral suspension 30 mL  30 mL oral q6h PRN Eddie Cullen, DO        buPROPion XL (Wellbutrin XL) 24 hr tablet 300 mg  300 mg oral Daily Eddie H Subhash, DO   300 mg at 10/14/23 0851    celecoxib (CeleBREX) capsule 200 mg  200 mg oral Daily Eddieua H Zamarbin, DO   200 mg at 10/14/23 0851    cholecalciferol (Vitamin D-3) capsule 125 mcg  125 mcg oral Daily Eddieua H Zamarbin, DO   125 mcg at 10/14/23 0851    citalopram (CeleXA) tablet 30 mg  30 mg oral Daily Joshua H Subhash, DO   30 mg at 10/14/23 0851    diphenhydrAMINE (Sominex) tablet 25 mg  25 mg oral q8h PRN Eddie Cullen, DO        hydrOXYzine pamoate (Vistaril) capsule 25 mg  25 mg oral q8h PRN Eddie  Subhash, DO        lidocaine 4 % patch 1 patch  1 patch transdermal Daily Eddie NOAH Cullen, DO        LORazepam (Ativan) tablet 1 mg  1 mg oral TID Eddie Cullen, DO   1 mg at 10/14/23 0855    melatonin tablet 5 mg  5 mg oral Nightly Eddie Cullen, DO   5 mg at 10/13/23 2143    OLANZapine (ZyPREXA) injection 5 mg  5 mg intramuscular q8h PRN Eddie Cullen, DO        OLANZapine zydis (ZyPREXA) disintegrating tablet 5 mg  5 mg oral q8h PRN Eddie Cullen, DO        QUEtiapine (SEROquel) tablet 350 mg  350 mg oral Nightly Eddie Cullen, DO   350 mg at 10/13/23 2145    traZODone (Desyrel) tablet 150 mg  150 mg oral Nightly Eddie Cullen DO   150 mg at 10/13/23 1222       Assessment/Plan   Principal Problem:    Severe recurrent major depression without  psychotic features (CMS/HCC)  Active Problems:    Colostomy present (CMS/HCC)    Opioid abuse (CMS/HCC)    Bilateral hip pain    Biological -   Cont meds as ordered below as she is starting to show a clinical response:  Wellbutrin xl 300 mg for adjunctive depression treatment  Celexa 30 mg daily for depression, anxiety  seroquel 350 mg qhs  Trazodone 150 mg at bedtime for sleep    Additional 24 hrs of ativan 1 mg tid for catatonia spectrum presentation, then will discontinue if no recognized gains    ECG (10/4/23): Normal sinus rhythm. Heart  rate 72 bpm. Qtc 442     Discussion with patient regarding risk benefits and alternatives and side effects with opportunity to ask questions and questions answered.  Patient given consent to the plan as noted    2. Psychological -       Patient is encouraged to participate with the therapeutic milieu and program and group therapy      3. Sociological -      Patient encouraged to cooperate with social work staff on issues relevant to discharge planning  Will plan for family outreach to obtain additional collateral and review progress and limitations from improvement thus far.                    I spent 25 minutes in the professional and overall care of this patient.      Eddie Cullen, DO    I reviewed this medcial student note today.

## 2023-10-14 NOTE — GROUP NOTE
Group Topic: Music   Group Date: 10/14/2023  Start Time: 1520  End Time: 1600  Facilitators: BRENDA Howard   Department: [facility] Geriatric     Number of Participants: 4   Group Focus: music therapy, relaxation, reminiscence, and social skills  Treatment Modality: Other: Recreation Therapy  Interventions utilized were mental fitness and reminiscence  Purpose: other: cognitive skills, social functioning, relaxation    Name: Azul Roberts YOB: 1960   MR: [id number]      Facilitator: Recreational Therapist  Level of Participation: did not attend  Quality of Participation:  n/a  Interactions with others:  n/a  Mood/Affect:  n/a  Triggers (if applicable): n/a  Cognition:  n/a  Progress: None  Comments: pt problem is depressed mood. Pt declined invitation to group. Pt is resting in bed   Plan: continue with services

## 2023-10-15 PROCEDURE — 99232 SBSQ HOSP IP/OBS MODERATE 35: CPT | Performed by: PSYCHIATRY & NEUROLOGY

## 2023-10-15 PROCEDURE — 2500000004 HC RX 250 GENERAL PHARMACY W/ HCPCS (ALT 636 FOR OP/ED): Performed by: PSYCHIATRY & NEUROLOGY

## 2023-10-15 PROCEDURE — 2500000001 HC RX 250 WO HCPCS SELF ADMINISTERED DRUGS (ALT 637 FOR MEDICARE OP): Performed by: PSYCHIATRY & NEUROLOGY

## 2023-10-15 PROCEDURE — 1140000001 HC PRIVATE PSYCH ROOM DAILY

## 2023-10-15 RX ADMIN — BUPROPION HYDROCHLORIDE 300 MG: 300 TABLET, FILM COATED, EXTENDED RELEASE ORAL at 08:52

## 2023-10-15 RX ADMIN — CITALOPRAM HYDROBROMIDE 30 MG: 20 TABLET ORAL at 08:52

## 2023-10-15 RX ADMIN — Medication 5 MG: at 21:16

## 2023-10-15 RX ADMIN — Medication 125 MCG: at 08:53

## 2023-10-15 RX ADMIN — TRAZODONE HYDROCHLORIDE 150 MG: 50 TABLET ORAL at 21:16

## 2023-10-15 RX ADMIN — QUETIAPINE FUMARATE 350 MG: 300 TABLET ORAL at 21:15

## 2023-10-15 RX ADMIN — CELECOXIB 200 MG: 200 CAPSULE ORAL at 08:53

## 2023-10-15 RX ADMIN — LORAZEPAM 1 MG: 1 TABLET ORAL at 08:51

## 2023-10-15 ASSESSMENT — PAIN - FUNCTIONAL ASSESSMENT
PAIN_FUNCTIONAL_ASSESSMENT: 0-10

## 2023-10-15 ASSESSMENT — ENCOUNTER SYMPTOMS
NERVOUS/ANXIOUS: 1
SLEEP DISTURBANCE: 1
DYSPHORIC MOOD: 1

## 2023-10-15 ASSESSMENT — COLUMBIA-SUICIDE SEVERITY RATING SCALE - C-SSRS
6. HAVE YOU EVER DONE ANYTHING, STARTED TO DO ANYTHING, OR PREPARED TO DO ANYTHING TO END YOUR LIFE?: NO
1. SINCE LAST CONTACT, HAVE YOU WISHED YOU WERE DEAD OR WISHED YOU COULD GO TO SLEEP AND NOT WAKE UP?: NO
1. SINCE LAST CONTACT, HAVE YOU WISHED YOU WERE DEAD OR WISHED YOU COULD GO TO SLEEP AND NOT WAKE UP?: NO
2. HAVE YOU ACTUALLY HAD ANY THOUGHTS OF KILLING YOURSELF?: NO
6. HAVE YOU EVER DONE ANYTHING, STARTED TO DO ANYTHING, OR PREPARED TO DO ANYTHING TO END YOUR LIFE?: NO
2. HAVE YOU ACTUALLY HAD ANY THOUGHTS OF KILLING YOURSELF?: NO
6. HAVE YOU EVER DONE ANYTHING, STARTED TO DO ANYTHING, OR PREPARED TO DO ANYTHING TO END YOUR LIFE?: NO
2. HAVE YOU ACTUALLY HAD ANY THOUGHTS OF KILLING YOURSELF?: NO
1. SINCE LAST CONTACT, HAVE YOU WISHED YOU WERE DEAD OR WISHED YOU COULD GO TO SLEEP AND NOT WAKE UP?: NO

## 2023-10-15 ASSESSMENT — PAIN SCALES - GENERAL
PAINLEVEL_OUTOF10: 0 - NO PAIN

## 2023-10-15 NOTE — GROUP NOTE
Group Topic: Social Skills   Group Date: 10/15/2023  Start Time: 1515  End Time: 1600  Facilitators: BRENDA Howard   Department: [facility] REHABTH VIRTUAL    Number of Participants: 10   Group Focus: feeling awareness/expression, reminiscence, and social skills  Treatment Modality: Other: Recreation Therapy  Interventions utilized were group exercise, other focus, and reminiscence  Purpose: coping skills, feelings, and other: ROM    Name: Azul Roberts YOB: 1960   MR: [id number]      Facilitator: Recreational Therapist  Level of Participation: did not attend  Quality of Participation:  n/a  Interactions with others:  n/a  Mood/Affect:  n/a  Triggers (if applicable): n/a  Cognition:  n/a  Progress: None  Comments: pt problem is depressed mood. Pt declined to attend group  Plan: continue with services

## 2023-10-15 NOTE — PROGRESS NOTES
"Azul Roberts is a 63 y.o. female on day 36 of admission presenting with Severe recurrent major depression without psychotic features (CMS/[facility]).      Subjective   Case discussed with treatment team and chart reviewed.     Minimal change noted from use of Ativan.  Discussed with patient we will discontinue at this time.  Patient endorses and agrees that it was unhelpful.  Patient also made aware that long-term use of this medication is ill-advised particularly with lack of efficacy from its use.  Patient remains disengaged withdrawn staying in bed.  She reports \"nothing new\" as her mood today.  She continues to chuckle sarcastically when suggestions about engaging in milieu are made to her.  She is more malodorous today.  She is taking medications and continues not to endorse any improvements in her mood.      Objective     Last Recorded Vitals  Blood pressure 105/72, pulse 70, temperature 36.2 °C (97.2 °F), temperature source Temporal, resp. rate 20, height 1.626 m (5' 4\"), weight 78.9 kg (174 lb), SpO2 97 %.    Review of Systems   Psychiatric/Behavioral:  Positive for behavioral problems, dysphoric mood, sleep disturbance and suicidal ideas. The patient is nervous/anxious.        Psychiatric ROS - Adult  Anxiety: General Anxiety Disorder (KAYLEEN)KAYLEEN Behaviors: difficult to control worry, difficulty concentrating, irritability, restlessness, and sleep disturbance  Depression: anhedonia, appetite increased, concentration, energy, helpless, hopeless, interest, persistent thoughts of death, psychomotor agitation, sleep decreased , suicidal thoughts, and withdrawn  Delirium: negative  Psychosis: negative  Ioana: negative  Safety Issues: passive death wish and suicidal ideation  Psychiatric ROS Comment: as noted    Physical Exam  Abdominal:      Comments: Presence of colostomy bag   Psychiatric:         Attention and Perception: She is inattentive.         Mood and Affect: Mood is anxious and depressed. Affect is labile, " "flat and inappropriate.         Speech: Speech is delayed.         Behavior: Behavior is uncooperative, agitated, aggressive and withdrawn.         Thought Content: Thought content includes suicidal ideation.         Judgment: Judgment is impulsive.           Mental Status Examination  General Appearance:  less disheveled, malodorous, improved gaze and eye contact. In bed, staring at ceiling  Gait/Station: ambulating with walker assist device  Speech: increasing verbal responses  Mood: \"nothing new\"  Affect: less Irritable and Angry,  Thought Process: remains impoverished  but less so  Thought Associations: No loosening of associations  Thought Content: depressed, less hopeless, persists as helpless  Perception: No perceptual abnormalities noted  Level of Consciousness: Drowsy  Orientation: Alert  Attention and Concentration: Mild impairment in attention  Recent Memory: Intact as evidenced by ability to recall details from the past 24 hours   Executive function: Intact  Language: Naming intact  Fund of knowledge: Good  Insight: Limited, as evidenced by disengagement  Judgment: Limited, as evidence by inability to reason through medical decision making and highly impaired reality testing       Psychiatric Risk Assessment  Violence Risk Assessment: major mental illness and substance abuse  Acute Risk of Harm to Others is Considered: moderate   Suicide Risk Assessment: , chronic medical illness, chronic pain, current psychiatric illness, feelings of hopelessness, global insomnia, history of trauma or abuse, life crisis (shame/despair), prior suicide attempt, severe anxiety, substance abuse, and suicidal ideations  Protective Factors against Suicide: adherence to  treatment and marriage/partnership  Acute Risk of Harm to Self is Considered: moderate    Relevant Results             Current Facility-Administered Medications   Medication Dose Route Frequency Provider Last Rate Last Admin    acetaminophen (Tylenol) " tablet 650 mg  650 mg oral q6h PRN [facility] Zamarbin, DO        alum-mag hydroxide-simeth (Mylanta) 200-200-20 mg/5 mL oral suspension 30 mL  30 mL oral q6h PRN [facility] Zadorotheaitz, DO        buPROPion XL (Wellbutrin XL) 24 hr tablet 300 mg  300 mg oral Daily Eddie  Zamarbin, DO   300 mg at 10/15/23 0852    celecoxib (CeleBREX) capsule 200 mg  200 mg oral Daily Eddie  Zadorotheaitz, DO   200 mg at 10/15/23 0853    cholecalciferol (Vitamin D-3) capsule 125 mcg  125 mcg oral Daily Eddie H Zarowitz, DO   125 mcg at 10/15/23 0853    citalopram (CeleXA) tablet 30 mg  30 mg oral Daily Eddie  Zarowitz, DO   30 mg at 10/15/23 0852    diphenhydrAMINE (Sominex) tablet 25 mg  25 mg oral q8h PRN [facility] Zamarbin, DO        hydrOXYzine pamoate (Vistaril) capsule 25 mg  25 mg oral q8h PRN [facility] Zadorotheaitz, DO        lidocaine 4 % patch 1 patch  1 patch transdermal Daily Eddie  Zadorotheaitz, DO        melatonin tablet 5 mg  5 mg oral Nightly Eddie  Zadorotheaitz, DO   5 mg at 10/14/23 2101    OLANZapine (ZyPREXA) injection 5 mg  5 mg intramuscular q8h PRN [facility] Zamarbin, DO        OLANZapine zydis (ZyPREXA) disintegrating tablet 5 mg  5 mg oral q8h PRN [facility] Zadorotheaitz, DO        QUEtiapine (SEROquel) tablet 350 mg  350 mg oral Nightly [facility] Zadorotheaitz, DO   350 mg at 10/14/23 2101    traZODone (Desyrel) tablet 150 mg  150 mg oral Nightly Eddie  Zarowitz, DO   150 mg at 10/14/23 2102       Assessment/Plan   Principal Problem:    Severe recurrent major depression without psychotic features (CMS/HCC)  Active Problems:    Colostomy present (CMS/HCC)    Opioid abuse (CMS/HCC)    Bilateral hip pain    Biological -   Cont meds as ordered below as she is starting to show a clinical response:  Wellbutrin xl 300 mg for adjunctive depression treatment  Celexa 30 mg daily for depression, anxiety  seroquel 350 mg qhs  Trazodone 150 mg at bedtime for sleep    discontinue ativan due to no change in function  Limited concern of catatonic  presentation given 48 hrs trial    ECG (10/4/23): Normal sinus rhythm. Heart  rate 72 bpm. Qtc 442     Discussion with patient regarding risk benefits and alternatives and side effects with opportunity to ask questions and questions answered.  Patient given consent to the plan as noted    2. Psychological -       Patient is encouraged to participate with the therapeutic milieu and program and group therapy      3. Sociological -      Patient encouraged to cooperate with social work staff on issues relevant to discharge planning  Will plan for family outreach to obtain additional collateral and review progress and limitations from improvement thus far.                    I spent 25 minutes in the professional and overall care of this patient.      Eddie Cullen, DO    I reviewed this medcial student note today.

## 2023-10-15 NOTE — CARE PLAN
Problem: Fall/Injury  Goal: Not fall by end of shift  Outcome: Met  Goal: Be free from injury by end of the shift  Outcome: Met   The patient's goals for the shift include I dont know    The clinical goals for the shift include pariticpate in milieu    Over the shift, the patient did not make progress toward the following goals.

## 2023-10-15 NOTE — NURSING NOTE
Problem:  Depression; not doing ADL's     Behavior:  Reclusive to room     Group Participation: no HS group  Appetite/Meals: snack given        Interventions:  Medicate per orders     Response:  receptive     Plan:  Maintain safety of pt

## 2023-10-15 NOTE — GROUP NOTE
Group Topic: Safety   Group Date: 10/15/2023  Start Time: 1015  End Time: 1130  Facilitators: BRENDA Howard   Department: [facility]    Number of Participants: 9   Group Focus: coping skills, problem solving, safety plan, and self-awareness  Treatment Modality: Other: Recreation Therapy  Interventions utilized were patient education and problem solving  Purpose: coping skills and feelings    Name: Azul Roberts YOB: 1960   MR: [id number]      Facilitator: Recreational Therapist  Level of Participation: did not attend  Quality of Participation:  n/a  Interactions with others:  n/a  Mood/Affect:  n/a  Triggers (if applicable): n/a  Cognition:  n/a  Progress: None  Comments: pt problem is depressed mood. Pt declines to attend group. Resting in room  Plan: continue with services

## 2023-10-15 NOTE — NURSING NOTE
SARAN NOTE     Problem:  depression     Behavior:  Pt isolates to room inbetween meals.  She has nhot been to group this shift.  She does come out for snacks.  Pt states her mood is not good.  She denies suicidal ideation.     She states she is emptying her colostomy.    Group Participation: no participation  Appetite/Meals: eating 100% meals       Interventions:  1:1 interaction, encouraged pt to go to group, every 15 min checks, open blinds to room, give scheduled medications.    Response:  Pt continues to isolate to her room, she comes out for snacks and meals.     Plan:  1:1 interaction, encouraged pt to go to group, every 15 min checks, open blinds to room, give scheduled medications.

## 2023-10-16 PROCEDURE — 1140000001 HC PRIVATE PSYCH ROOM DAILY

## 2023-10-16 PROCEDURE — 2500000004 HC RX 250 GENERAL PHARMACY W/ HCPCS (ALT 636 FOR OP/ED): Performed by: PSYCHIATRY & NEUROLOGY

## 2023-10-16 PROCEDURE — 2500000001 HC RX 250 WO HCPCS SELF ADMINISTERED DRUGS (ALT 637 FOR MEDICARE OP): Performed by: PSYCHIATRY & NEUROLOGY

## 2023-10-16 PROCEDURE — 99232 SBSQ HOSP IP/OBS MODERATE 35: CPT | Performed by: INTERNAL MEDICINE

## 2023-10-16 PROCEDURE — 97166 OT EVAL MOD COMPLEX 45 MIN: CPT | Mod: GO

## 2023-10-16 PROCEDURE — 99232 SBSQ HOSP IP/OBS MODERATE 35: CPT | Performed by: PSYCHIATRY & NEUROLOGY

## 2023-10-16 RX ORDER — DULOXETIN HYDROCHLORIDE 20 MG/1
20 CAPSULE, DELAYED RELEASE ORAL DAILY
Status: DISCONTINUED | OUTPATIENT
Start: 2023-10-16 | End: 2023-10-18

## 2023-10-16 RX ADMIN — BUPROPION HYDROCHLORIDE 300 MG: 300 TABLET, FILM COATED, EXTENDED RELEASE ORAL at 08:17

## 2023-10-16 RX ADMIN — Medication 125 MCG: at 08:18

## 2023-10-16 RX ADMIN — Medication 5 MG: at 20:06

## 2023-10-16 RX ADMIN — TRAZODONE HYDROCHLORIDE 150 MG: 50 TABLET ORAL at 21:00

## 2023-10-16 RX ADMIN — DULOXETINE HYDROCHLORIDE 20 MG: 20 CAPSULE, DELAYED RELEASE ORAL at 17:04

## 2023-10-16 RX ADMIN — QUETIAPINE FUMARATE 350 MG: 300 TABLET ORAL at 20:06

## 2023-10-16 RX ADMIN — CITALOPRAM HYDROBROMIDE 30 MG: 20 TABLET ORAL at 08:18

## 2023-10-16 RX ADMIN — CELECOXIB 200 MG: 200 CAPSULE ORAL at 08:18

## 2023-10-16 ASSESSMENT — COLUMBIA-SUICIDE SEVERITY RATING SCALE - C-SSRS
1. SINCE LAST CONTACT, HAVE YOU WISHED YOU WERE DEAD OR WISHED YOU COULD GO TO SLEEP AND NOT WAKE UP?: NO
1. SINCE LAST CONTACT, HAVE YOU WISHED YOU WERE DEAD OR WISHED YOU COULD GO TO SLEEP AND NOT WAKE UP?: NO
2. HAVE YOU ACTUALLY HAD ANY THOUGHTS OF KILLING YOURSELF?: NO
2. HAVE YOU ACTUALLY HAD ANY THOUGHTS OF KILLING YOURSELF?: NO
6. HAVE YOU EVER DONE ANYTHING, STARTED TO DO ANYTHING, OR PREPARED TO DO ANYTHING TO END YOUR LIFE?: NO
6. HAVE YOU EVER DONE ANYTHING, STARTED TO DO ANYTHING, OR PREPARED TO DO ANYTHING TO END YOUR LIFE?: NO

## 2023-10-16 ASSESSMENT — COGNITIVE AND FUNCTIONAL STATUS - GENERAL
DAILY ACTIVITIY SCORE: 13
DRESSING REGULAR LOWER BODY CLOTHING: A LOT
HELP NEEDED FOR BATHING: A LOT
TOILETING: A LOT
EATING MEALS: A LITTLE
DRESSING REGULAR UPPER BODY CLOTHING: A LOT
PERSONAL GROOMING: A LOT

## 2023-10-16 ASSESSMENT — ACTIVITIES OF DAILY LIVING (ADL)
ADL_ASSISTANCE: NEEDS ASSISTANCE
BATHING_ASSISTANCE: NOT PERFORMED

## 2023-10-16 ASSESSMENT — ENCOUNTER SYMPTOMS
SLEEP DISTURBANCE: 1
NERVOUS/ANXIOUS: 1
DYSPHORIC MOOD: 1

## 2023-10-16 ASSESSMENT — PAIN - FUNCTIONAL ASSESSMENT: PAIN_FUNCTIONAL_ASSESSMENT: 0-10

## 2023-10-16 ASSESSMENT — PAIN SCALES - GENERAL
PAINLEVEL_OUTOF10: 3
PAINLEVEL_OUTOF10: 8

## 2023-10-16 NOTE — GROUP NOTE
"Group Topic: Other   Group Date: 10/16/2023  Start Time: 1510  End Time: 1600  Facilitators: RANDALL FernandesS   Department: [facility]    Number of Participants: 8   Group Focus: other Lifestories  Treatment Modality: Other: Recreation Therapy  Interventions utilized were exploration and reminiscence  Purpose: feelings    Name: Azul Roberts YOB: 1960   MR: [id number]      Facilitator: Recreational Therapist  Level of Participation: did not attend  Quality of Participation:  did not attend  Interactions with others:  did not attend  Mood/Affect:  did not attend  Triggers (if applicable): n/a  Cognition:  did not attend  Progress: None  Comments: pt problem is depressed mood.  Pt refused to attend group at this time  Reports \"I don't want to go\".  CTRS explains importance of attending group therapy and pt continues to decline.  Poor eye contact with short interaction.  Plan: continue with services      "

## 2023-10-16 NOTE — PROGRESS NOTES
Occupational Therapy    Evaluation    Patient Name: Azul Roberts  MRN: [id number]  Today's Date: 10/16/2023  Time Calculation  Start Time: 0905  Stop Time: 0915  Time Calculation (min): 10 min    Assessment  IP OT Assessment  OT Assessment: 62 y/o female here with severe recurrent depression.  On eval, she presents below baseline, requiring increased assist for xfers, mobility and self care d/t decreased strength, balance, safety awareness. Would benefit from skilled OT services to maximize safety/IND prior to DC.  Prognosis: Good  Barriers to Discharge: None  Evaluation/Treatment Tolerance: Patient limited by fatigue  Medical Staff Made Aware: Yes  End of Session Communication: Bedside nurse  End of Session Patient Position: Bed, 2 rail up  Plan:  Treatment Interventions: ADL retraining, Functional transfer training, UE strengthening/ROM, Patient/family training, Endurance training, Cognitive reorientation, Equipment evaluation/education, Compensatory technique education  OT Frequency: 3 times per week  OT Discharge Recommendations: Moderate intensity level of continued care  Equipment Recommended upon Discharge: Wheeled walker  OT Recommended Transfer Status: Assist of 1    Subjective   General:  General  Reason for Referral: decreased ADLs. safety assessment  Referred By: Dr. Cullen  Past Medical History Relevant to Rehab: depression, anxiety, colostomy bag, opiod abuse, hip pain  Prior to Session Communication: Bedside nurse  Patient Position Received: Bed, 2 rail up  Preferred Learning Style: verbal  General Comment: Cleared by nsg, pt met in supine, agreeable to therapy with some encouragement.  Irritable at times but cooperative.    Precautions:  Medical Precautions: Fall precautions    Pain:  Pain Assessment  Pain Assessment: 0-10  Pain Score: 8  Pain Type: Acute pain  Pain Location: Hip  Pain Orientation: Left    Objective   Cognition:  Overall Cognitive Status: Impaired (depressed, flat affect. Poor  safety awareness.)    Home Living:  Type of Home: House  Lives With: Spouse  Home Adaptive Equipment: Walker rolling or standard  Home Layout: One level  Home Access: Stairs to enter with rails  Entrance Stairs-Number of Steps: 4   Prior Function:  Level of Pana: Needs assistance with ADLs, Needs assistance with homemaking, Needs assistance with functional transfers  Receives Help From: Family  ADL Assistance: Needs assistance (reports spouse was assisting with ADLs, cannot elaborate much more than that)  Homemaking Assistance: Needs assistance (spouse does all IADLs)  Ambulatory Assistance: Needs assistance  Transfers: Assistive device  Gait: Assistive device  Hand Dominance: Left  IADL History:  Mode of Transportation: Family (spouse does all driving)  ADL:  Eating Assistance: Stand by  Eating Deficit: Setup  Grooming Assistance: Moderate  Grooming Deficit: Brushing hair  Bathing Assistance: Not performed  LE Dressing Assistance: Moderate (per clinical judgement)  Toileting Assistance with Device: Maximal  Toileting Deficit: Incontinent  Activity Tolerance:  Endurance: Decreased tolerance for upright activites (d/t pain and prolonged immobility)  Bed Mobility/Transfers: Bed Mobility  Bed Mobility: Yes  Bed Mobility 1  Bed Mobility 1: Supine to sitting  Level of Assistance 1: Close supervision  Bed Mobility Comments 1: increased effort required  Bed Mobility 2  Bed Mobility  2: Sitting to supine  Level of Assistance 2: Close supervision  Bed Mobility Comments 2: cues for safety, impulsive   and Transfers  Transfer: Yes  Transfer 1  Technique 1: Sit to stand  Transfer Device 1: Walker  Transfer Level of Assistance 1: Minimum assistance  Trials/Comments 1: Min A for STS from EOB, cues for hand placement/walker mgmt, increased effort d/t c/o pain  Transfers 2  Technique 2:  (functional mobility)  Transfer Device 2: Walker  Transfer Level of Assistance 2: Minimum assistance  Trials/Comments 2: short household  distance mobility with FWW, requires cues to maintain proximity to device.  unsteady at times.  Transfers 3  Transfer From 3: Stand to  Transfer to 3: Toilet  Technique 3: Stand to sit  Transfer Device 3: Walker  Transfer Level of Assistance 3: Moderate assistance  Trials/Comments 3: poor awareness to body positoining in relation to toilet. requires mod A to maintain safety. highly impulsive at times.    IADL's:   Mode of Transportation: Family (spouse does all driving)    Strength:  Strength Comments: 4-/5 at UEs  Extremities: RUE   RUE : Exceptions to WFL  RUE Strength  RUE Overall Strength:  (4-/5 grossly) and LUE   LUE: Exceptions to WFL  LUE Strength  LUE Overall Strength:  (4-/5 grossly)    Outcome Measures: [facility] Daily Activity  Putting on and taking off regular lower body clothing: A lot  Bathing (including washing, rinsing, drying): A lot  Putting on and taking off regular upper body clothing: A lot  Toileting, which includes using toilet, bedpan or urinal: A lot  Taking care of personal grooming such as brushing teeth: A lot  Eating Meals: A little  Daily Activity - Total Score: 13    Education Documentation  Body Mechanics, taught by Heber Goins OT at 10/16/2023  9:58 AM.  Learner: Patient  Readiness: Acceptance  Method: Explanation  Response: Needs Reinforcement    Precautions, taught by Heber Goins OT at 10/16/2023  9:58 AM.  Learner: Patient  Readiness: Acceptance  Method: Explanation  Response: Needs Reinforcement    ADL Training, taught by Heber Goins OT at 10/16/2023  9:58 AM.  Learner: Patient  Readiness: Acceptance  Method: Explanation  Response: Needs Reinforcement    Education Comments  No comments found.      Goals:   Encounter Problems       Encounter Problems (Active)       Balance       LTG - Patient will maintain standing and sitting balance to allow for completion of daily activities (Progressing)       Start:  10/16/23    Expected End:  10/30/23               Bathing        LTG - Patient will utilize adaptive techniques to bathe body (Progressing)       Start:  10/16/23    Expected End:  10/30/23               Dressings Lower Extremities       LTG - Patient will utilize adaptive techniques/equipment to dress lower body (Progressing)       Start:  10/16/23    Expected End:  10/30/23               Grooming       LTG - Patient will demonstrate use of appropriate Intervention for safe grooming habits (Progressing)       Start:  10/16/23    Expected End:  10/30/23               Mobility       LTG - Patient will ambulate household distance (Progressing)       Start:  10/16/23    Expected End:  10/30/23               Safety       LTG - Patient will demonstrate safety requirements appropriate to situation/environment (Progressing)       Start:  10/16/23    Expected End:  10/30/23

## 2023-10-16 NOTE — PROGRESS NOTES
"Azul Roberts is a 63 y.o. female on day 37 of admission presenting with Severe recurrent major depression without psychotic features (CMS/Formerly [facility]).      Subjective   Case discussed with treatment team and chart reviewed.     Presents as largely unchanged.  Staying in room disengaged from milieu not participating with unit function.  Not attending group.  Ativan was unhelpful so it was discontinued yesterday.  No effects of its discontinuation noted.  Discussion with patient today reviewing bipolar disorder history patient denies previous bipolar disorder or symptoms of jensen.  As such, discussed with patient transitioning to SNRI antidepressant medication in lieu of Celexa.  She is agreeable with this.  She maintains a pessimistic attitude answering most questions as though has been asked to solve and answer a much larger task.  She sighs heavily often and provide sarcastic responses in reply.  She is aware we will start the new medication today and titrate as such.  She continues to endorse feeling depressed unable to take care of herself with no motivation to get out of bed or do anything.  Despite routine on the unit including daily meals and groups patient reports she does not believe that these will continue to happen.  She is not making threats toward others and unless she is asked if she is having suicidal thoughts does not speak about these spontaneously.      Objective     Last Recorded Vitals  Blood pressure 118/81, pulse 63, temperature 36.8 °C (98.2 °F), temperature source Temporal, resp. rate 18, height 1.626 m (5' 4\"), weight 78.9 kg (174 lb), SpO2 97 %.    Review of Systems   Psychiatric/Behavioral:  Positive for behavioral problems, dysphoric mood, sleep disturbance and suicidal ideas. The patient is nervous/anxious.        Psychiatric ROS - Adult  Anxiety: General Anxiety Disorder (KAYLEEN)KAYLEEN Behaviors: difficult to control worry, difficulty concentrating, irritability, restlessness, and sleep " "disturbance  Depression: anhedonia, appetite increased, concentration, energy, helpless, hopeless, interest, persistent thoughts of death, psychomotor agitation, sleep decreased , suicidal thoughts, and withdrawn  Delirium: negative  Psychosis: negative  Ioana: negative  Safety Issues: passive death wish and suicidal ideation  Psychiatric ROS Comment: as noted    Physical Exam  Abdominal:      Comments: Presence of colostomy bag   Psychiatric:         Attention and Perception: She is inattentive.         Mood and Affect: Mood is anxious and depressed. Affect is labile, flat and inappropriate.         Speech: Speech is delayed.         Behavior: Behavior is uncooperative, agitated, aggressive and withdrawn.         Thought Content: Thought content includes suicidal ideation.         Judgment: Judgment is impulsive.           Mental Status Examination  General Appearance:  less disheveled, malodorous, improved gaze and eye contact. In bed, staring at ceiling  Gait/Station: ambulating with walker assist device  Speech: increasing verbal responses  Mood: \"what now?\"  Affect: less Irritable and Angry,  Thought Process: remains impoverished  but less so  Thought Associations: No loosening of associations  Thought Content: depressed, less hopeless, persists as helpless  Perception: No perceptual abnormalities noted  Level of Consciousness: Drowsy  Orientation: Alert  Attention and Concentration: Mild impairment in attention  Recent Memory: Intact as evidenced by ability to recall details from the past 24 hours   Executive function: Intact  Language: Naming intact  Fund of knowledge: Good  Insight: Limited, as evidenced by disengagement  Judgment: Limited, as evidence by inability to reason through medical decision making and highly impaired reality testing       Psychiatric Risk Assessment  Violence Risk Assessment: major mental illness and substance abuse  Acute Risk of Harm to Others is Considered: moderate   Suicide " Risk Assessment: , chronic medical illness, chronic pain, current psychiatric illness, feelings of hopelessness, global insomnia, history of trauma or abuse, life crisis (shame/despair), prior suicide attempt, severe anxiety, substance abuse, and suicidal ideations  Protective Factors against Suicide: adherence to  treatment and marriage/partnership  Acute Risk of Harm to Self is Considered: moderate    Relevant Results             Current Facility-Administered Medications   Medication Dose Route Frequency Provider Last Rate Last Admin    acetaminophen (Tylenol) tablet 650 mg  650 mg oral q6h PRN Eddie Cullen, DO        alum-mag hydroxide-simeth (Mylanta) 200-200-20 mg/5 mL oral suspension 30 mL  30 mL oral q6h PRN Eddie Cullen, DO        buPROPion XL (Wellbutrin XL) 24 hr tablet 300 mg  300 mg oral Daily Eddie Cullen, DO   300 mg at 10/16/23 0817    celecoxib (CeleBREX) capsule 200 mg  200 mg oral Daily Eddie Cullen, DO   200 mg at 10/16/23 0818    cholecalciferol (Vitamin D-3) capsule 125 mcg  125 mcg oral Daily Eddie  Subhash, DO   125 mcg at 10/16/23 0818    citalopram (CeleXA) tablet 30 mg  30 mg oral Daily Eddie  Subhash, DO   30 mg at 10/16/23 0818    diphenhydrAMINE (Sominex) tablet 25 mg  25 mg oral q8h PRN Eddie Cullen, DO        DULoxetine (Cymbalta) DR capsule 20 mg  20 mg oral Daily Eddie Cullen, DO        hydrOXYzine pamoate (Vistaril) capsule 25 mg  25 mg oral q8h PRN Eddie Cullen, DO        lidocaine 4 % patch 1 patch  1 patch transdermal Daily Eddie Cullen, DO        melatonin tablet 5 mg  5 mg oral Nightly Eddie Cullen, DO   5 mg at 10/15/23 2116    OLANZapine (ZyPREXA) injection 5 mg  5 mg intramuscular q8h PRN Eddie Cullen, DO        OLANZapine zydis (ZyPREXA) disintegrating tablet 5 mg  5 mg oral q8h PRN Eddie Cullen, DO        QUEtiapine (SEROquel) tablet 350 mg  350 mg oral Nightly Eddie Cullen DO   350 mg at 10/15/23  2115    traZODone (Desyrel) tablet 150 mg  150 mg oral Nightly Eddie Cullen DO   150 mg at 10/15/23 2116       Assessment/Plan   Principal Problem:    Severe recurrent major depression without psychotic features (CMS/HCC)  Active Problems:    Colostomy present (CMS/HCC)    Opioid abuse (CMS/HCC)    Bilateral hip pain    Biological -   Cont meds as ordered below as she is starting to show a clinical response:  Wellbutrin xl 300 mg for adjunctive depression treatment  Celexa 30 mg daily for depression, anxiety  seroquel 350 mg qhs  Trazodone 150 mg at bedtime for sleep    Initiate cymbalta 20 mg daily for depression  Plan to taper and discontinue celexa once on potential treatment dose of cymbalta (~60 mg or higher)    ECG (10/4/23): Normal sinus rhythm. Heart  rate 72 bpm. Qtc 442     Discussion with patient regarding risk benefits and alternatives and side effects with opportunity to ask questions and questions answered.  Patient given consent to the plan as noted    2. Psychological -       Patient is encouraged to participate with the therapeutic milieu and program and group therapy      3. Sociological -      Patient encouraged to cooperate with social work staff on issues relevant to discharge planning  [place] Medicaid have directed us not to call them again until Wednesday of this week to check on an update.  Plan remains transition to convalescence nursing care once this becomes available to patient                    I spent 25 minutes in the professional and overall care of this patient.      Eddie Cullen DO

## 2023-10-16 NOTE — NURSING NOTE
BIRP NOTE     Problem:  Depression     Behavior:  Pt isolative to room this shift. OOR for snack then back to room. Compliant with meds, dismissive with care. Pt rates depression 8/10. Denies SI, plan, intent.  Group Participation: n/a  Appetite/Meals: HS snack provided       Interventions:  1:1 provided with reassurance. Evening meds given per orders. Encouraged to use call light for needs.    Response:  Pt resting in bed at this time. No s/s of distress noted.     Plan:  Will continue to monitor behaviors and effectiveness of interventions while maintaining pt safety.

## 2023-10-16 NOTE — PROGRESS NOTES
Nutrition Follow up Note    Pt remains unable to care for self although is noted to be eating well at all meals. Social work working on Medicaid application.     Lab Results   Component Value Date    WBC 8.8 09/11/2023    HGB 11.9 (L) 09/11/2023    HCT 36.3 09/11/2023     09/11/2023    CHOL 184 09/11/2023    TRIG 99 09/11/2023    HDL 45 (L) 09/11/2023    ALT 15 09/11/2023    AST 15 09/11/2023     09/11/2023    K 4.6 09/11/2023     09/11/2023    CREATININE 0.6 09/11/2023    BUN 13 09/11/2023    CO2 22 (L) 09/11/2023    TSH 1.64 09/11/2023    HGBA1C 5.8 09/11/2023       Current Facility-Administered Medications:     acetaminophen (Tylenol) tablet 650 mg, 650 mg, oral, q6h PRN, Eddie Cullen DO    alum-mag hydroxide-simeth (Mylanta) 200-200-20 mg/5 mL oral suspension 30 mL, 30 mL, oral, q6h PRN, Eddie Cullen DO    buPROPion XL (Wellbutrin XL) 24 hr tablet 300 mg, 300 mg, oral, Daily, Eddie Cullen DO, 300 mg at 10/16/23 0817    celecoxib (CeleBREX) capsule 200 mg, 200 mg, oral, Daily, Eddie Cullen DO, 200 mg at 10/16/23 0818    cholecalciferol (Vitamin D-3) capsule 125 mcg, 125 mcg, oral, Daily, Eddie Cullen DO, 125 mcg at 10/16/23 0818    citalopram (CeleXA) tablet 30 mg, 30 mg, oral, Daily, Eddie Cullen DO, 30 mg at 10/16/23 0818    diphenhydrAMINE (Sominex) tablet 25 mg, 25 mg, oral, q8h PRN, Eddie Cullen DO    hydrOXYzine pamoate (Vistaril) capsule 25 mg, 25 mg, oral, q8h PRN, Eddie Cullen DO    lidocaine 4 % patch 1 patch, 1 patch, transdermal, Daily, Eddie Cullen DO    melatonin tablet 5 mg, 5 mg, oral, Nightly, Eddie Cullen DO, 5 mg at 10/15/23 2116    OLANZapine (ZyPREXA) injection 5 mg, 5 mg, intramuscular, q8h PRN, Eddie Cullen DO    OLANZapine zydis (ZyPREXA) disintegrating tablet 5 mg, 5 mg, oral, q8h PRN, Eddie Cullen DO    QUEtiapine (SEROquel) tablet 350 mg, 350 mg, oral, Nightly, Eddie Cullen DO, 350 mg at  "10/15/23 2115    traZODone (Desyrel) tablet 150 mg, 150 mg, oral, Nightly, Eddie Cullen, , 150 mg at 10/15/23 2116    Dietary Orders (From admission, onward)       Start     Ordered    09/28/23 1750  Adult diet Regular  Diet effective now        Question:  Diet type  Answer:  Regular    09/28/23 1800    09/28/23 1750  Oral nutritional supplements  Until discontinued        Comments: Chocolate ensure high protein   Question Answer Comment   Deliver with  three times daily between meals   Select supplement: Ensure High Protein        09/28/23 1800                  Food and Nutrient History  Energy Intake: Good > 75 %    Anthropometrics:  Height: 162.6 cm (5' 4\")  Weight: 78.9 kg (174 lb)  BMI (Calculated): 29.85    Weight Change: 0    Weight Change  Weight History / % Weight Change: no updated wt    IBW/kg (Dietitian Calculated): 54.55 kg     Adjusted Body Weight (kg): 60.91 kg    Estimated Energy Needs  Total Energy Estimated Needs (kCal): 1517 kCal  Method for Estimating Needs: 25 kcals/kg ABW    Estimated Protein Needs  Total Protein Estimated Needs (g):  (49-61)  Method for Estimating Needs: 0.8-1 g/kg ABW    Estimated Fluid Needs  Total Fluid Estimated Needs (mL): 1517 mL  Method for Estimating Needs: 1 ml/kcal ABW    Nutrition Focused Physical Findings: deferred -Pt not available at this time.   Physical Findings (Nutrition Deficiency/Toxicity)  Skin:  (colostomy)    Nutrition Diagnosis:  Malnutrition Diagnosis  Patient has Malnutrition Diagnosis: No    Patient has Nutrition Diagnosis: No    Nutrition Interventions/Recommendations: None at this time.    Education Documentation  No documentation found.      Nutrition Monitoring/Evaluation: None at this time.    RD Recommendations: None at this time.      Follow Up  Time Spent (min): 30 minutes  Last Date of Nutrition Visit: 10/16/23  Nutrition Follow-Up Needed?: 7-10 days  Follow up Comment: 10/26/23    "

## 2023-10-16 NOTE — CARE PLAN
The patient's goals for the shift include i don't have any    The clinical goals for the shift include no falls    Over the shift, the patient did make progress toward the following goals. Barriers to progression include lack of motivation and energy. Recommendations to address these barriers include continued encouragement, and positive reinforcement when active in Tx. plan.      Problem: Fall/Injury  Goal: Verbalize understanding of risk factor reduction measures to prevent injury from fall in the home  Outcome: Progressing     Problem: Fall/Injury  Goal: Use assistive devices by end of the shift  Outcome: Progressing     Problem: Fall/Injury  Goal: Pace activities to prevent fatigue by end of the shift  Outcome: Progressing

## 2023-10-16 NOTE — CARE PLAN
The patient's goals for the shift include sleep    The clinical goals for the shift include no falls    Over the shift, the patient did not make progress toward the following goals. Barriers to progression include lack of motivation, any sense of self care is absent. Recommendations to address these barriers include further encouragement in a gentle and nurturing manner.      Problem: Fall/Injury  Goal: Verbalize understanding of personal risk factors for fall in the hospital  10/16/2023 1939 by Narcisa Darden RN  Outcome: Progressing  10/16/2023 0802 by Narcisa Darden RN  Outcome: Progressing     Problem: Grooming  Goal: LTG - Patient will demonstrate use of appropriate Intervention for safe grooming habits  Outcome: Progressing

## 2023-10-16 NOTE — CARE PLAN
Problem: Balance  Goal: LTG - Patient will maintain standing and sitting balance to allow for completion of daily activities  Outcome: Progressing     Problem: Bathing  Goal: LTG - Patient will utilize adaptive techniques to bathe body  Outcome: Progressing     Problem: Dressings Lower Extremities  Goal: LTG - Patient will utilize adaptive techniques/equipment to dress lower body  Outcome: Progressing     Problem: Grooming  Goal: LTG - Patient will demonstrate use of appropriate Intervention for safe grooming habits  Outcome: Progressing     Problem: Mobility  Goal: LTG - Patient will ambulate household distance  Outcome: Progressing     Problem: Safety  Goal: LTG - Patient will demonstrate safety requirements appropriate to situation/environment  Outcome: Progressing

## 2023-10-16 NOTE — GROUP NOTE
Group Topic: Other   Group Date: 10/16/2023  Start Time: 1100  End Time: 1130  Facilitators: BRENDA Fernandes   Department: [facility]    Number of Participants: 8   Group Focus: other Values  Treatment Modality: Other: Recreation Therapy  Interventions utilized were exploration, orientation, reality testing, and reminiscence  Purpose: feelings and insight or knowledge    Name: Azul Roberts YOB: 1960   MR: [id number]      Facilitator: Recreational Therapist  Level of Participation: did not attend  Quality of Participation:  did not attend  Interactions with others:  did not attend  Mood/Affect:  did not attend  Triggers (if applicable): n/a  Cognition:  did not attend  Progress: None  Comments: pt problem is depressed mood.  Pt Is laying in her bed and refuses to attend group at this time.  Limited eye contact and limited interaction.  Plan: continue with services

## 2023-10-16 NOTE — PROGRESS NOTES
[facility]: [facility]  PROGRESS NOTE      Azul Roberts is a 63 y.o. female that is being seen  today for follow up at [facility]..  Subjective   Patient has been staying in her room.  Does not participate in activities.  Pain is fairly controlled.  Patient has been eating moderately.  Not able to take care of herself.        Vitals:    10/16/23 0600   BP: 118/81   Pulse: 63   Resp: 18   Temp: 36.8 °C (98.2 °F)   SpO2: 97%      Vitals:    10/16/23 1300   Weight: 78.9 kg (174 lb)     Body mass index is 29.87 kg/m².  Physical Exam  Constitutional: Patient does not appear to be in any acute distress  Cardiovascular: RRR, S1/S2, no murmurs, rubs, or gallops, radial pulses +2, no edema of extremities  Pulmonary: CTAB, no respiratory distress.  Abdomen: +BS, soft, non-tender, nondistended, no guarding or rebound, no masses noted.  Colostomy present working well  MSK: Hip joint pain   Skin- No lesions, contusions, or erythema.  Peripheral puslses palpable bilaterally 2+  Neuro: AAO X3, Cranial nerves 2-12 grossly intact,DTR 2+ in all 4 limbs   Psychiatric: Judgment intact. Appropriate mood and behavior    Diagnostic Results   Lab Results   Component Value Date    GLUCOSE 117 (H) 09/11/2023    CALCIUM 9.8 09/11/2023     09/11/2023    K 4.6 09/11/2023    CO2 22 (L) 09/11/2023     09/11/2023    BUN 13 09/11/2023    CREATININE 0.6 09/11/2023     Lab Results   Component Value Date    ALT 15 09/11/2023    AST 15 09/11/2023    ALKPHOS 97 09/11/2023    BILITOT 0.2 09/11/2023     Lab Results   Component Value Date    WBC 8.8 09/11/2023    HGB 11.9 (L) 09/11/2023    HCT 36.3 09/11/2023    MCV 80.5 09/11/2023     09/11/2023     Lab Results   Component Value Date    CHOL 184 09/11/2023     Lab Results   Component Value Date    HDL 45 (L) 09/11/2023     Lab Results   Component Value Date    LDLCALC 119 09/11/2023     Lab Results   Component Value Date    TRIG 99 09/11/2023     Lab Results    Component Value Date    HGBA1C 5.8 09/11/2023     Other labs not included in the list above were reviewed either before or during this encounter.    History    No past medical history on file.  No past surgical history on file.  No family history on file.  No Known Allergies  No current facility-administered medications on file prior to encounter.     Current Outpatient Medications on File Prior to Encounter   Medication Sig Dispense Refill    acetaminophen (Tylenol) 325 mg tablet Take 2 tablets (650 mg) by mouth 2 times a day.      hydrOXYzine pamoate (Vistaril) 25 mg capsule Take 1 capsule (25 mg) by mouth 2 times a day.      magnesium oxide (Mag-Ox) 400 mg tablet Take 1 tablet (400 mg) by mouth 2 times a day.      melatonin 5 mg tablet Take 1 tablet (5 mg) by mouth once daily at bedtime.      risperiDONE (RisperDAL) 2 mg tablet Take 1 tablet (2 mg) by mouth once daily at bedtime.      traZODone (Desyrel) 150 mg tablet Take 1 tablet (150 mg) by mouth once daily at bedtime.         There is no immunization history on file for this patient.  Patient's medical history was reviewed and updated either before or during this encounter.  ASSESSMENT / PLAN:  Active Hospital Problems    Bilateral hip pain      *Severe recurrent major depression without psychotic features (CMS/HCC)      Colostomy present (CMS/HCC)      Opioid abuse (CMS/HCC)    Patient has been staying in the room not participating in activities.  Geropsych team on the case.  No significant new medical issues        Jay Knox MD

## 2023-10-17 PROCEDURE — 1140000001 HC PRIVATE PSYCH ROOM DAILY

## 2023-10-17 PROCEDURE — 2500000004 HC RX 250 GENERAL PHARMACY W/ HCPCS (ALT 636 FOR OP/ED): Performed by: PSYCHIATRY & NEUROLOGY

## 2023-10-17 PROCEDURE — 2500000001 HC RX 250 WO HCPCS SELF ADMINISTERED DRUGS (ALT 637 FOR MEDICARE OP): Performed by: PSYCHIATRY & NEUROLOGY

## 2023-10-17 PROCEDURE — 99232 SBSQ HOSP IP/OBS MODERATE 35: CPT | Performed by: PSYCHIATRY & NEUROLOGY

## 2023-10-17 RX ADMIN — CITALOPRAM HYDROBROMIDE 30 MG: 20 TABLET ORAL at 08:15

## 2023-10-17 RX ADMIN — CELECOXIB 200 MG: 200 CAPSULE ORAL at 08:15

## 2023-10-17 RX ADMIN — DULOXETINE HYDROCHLORIDE 20 MG: 20 CAPSULE, DELAYED RELEASE ORAL at 08:15

## 2023-10-17 RX ADMIN — QUETIAPINE FUMARATE 350 MG: 300 TABLET ORAL at 22:18

## 2023-10-17 RX ADMIN — BUPROPION HYDROCHLORIDE 300 MG: 300 TABLET, FILM COATED, EXTENDED RELEASE ORAL at 08:15

## 2023-10-17 RX ADMIN — Medication 5 MG: at 22:18

## 2023-10-17 RX ADMIN — Medication 125 MCG: at 08:14

## 2023-10-17 RX ADMIN — TRAZODONE HYDROCHLORIDE 150 MG: 50 TABLET ORAL at 22:18

## 2023-10-17 ASSESSMENT — COLUMBIA-SUICIDE SEVERITY RATING SCALE - C-SSRS
1. SINCE LAST CONTACT, HAVE YOU WISHED YOU WERE DEAD OR WISHED YOU COULD GO TO SLEEP AND NOT WAKE UP?: NO
6. HAVE YOU EVER DONE ANYTHING, STARTED TO DO ANYTHING, OR PREPARED TO DO ANYTHING TO END YOUR LIFE?: NO
2. HAVE YOU ACTUALLY HAD ANY THOUGHTS OF KILLING YOURSELF?: NO
1. SINCE LAST CONTACT, HAVE YOU WISHED YOU WERE DEAD OR WISHED YOU COULD GO TO SLEEP AND NOT WAKE UP?: NO
2. HAVE YOU ACTUALLY HAD ANY THOUGHTS OF KILLING YOURSELF?: NO
6. HAVE YOU EVER DONE ANYTHING, STARTED TO DO ANYTHING, OR PREPARED TO DO ANYTHING TO END YOUR LIFE?: NO

## 2023-10-17 ASSESSMENT — PAIN - FUNCTIONAL ASSESSMENT: PAIN_FUNCTIONAL_ASSESSMENT: 0-10

## 2023-10-17 ASSESSMENT — ENCOUNTER SYMPTOMS
NERVOUS/ANXIOUS: 1
DYSPHORIC MOOD: 1
SLEEP DISTURBANCE: 1

## 2023-10-17 ASSESSMENT — PAIN SCALES - GENERAL
PAINLEVEL_OUTOF10: 0 - NO PAIN
PAINLEVEL_OUTOF10: 3

## 2023-10-17 NOTE — GROUP NOTE
Group Topic: Dialectical Behavioral Therapy - Emotional Regulation   Group Date: 10/17/2023  Start Time: 0950  End Time: 1030  Facilitators: BRENDA Howard   Department: [facility]    Number of Participants: 6   Group Focus: coping skills and feeling awareness/expression  Treatment Modality: Other: Recreation Therapy  Interventions utilized were exploration and mental fitness  Purpose: coping skills, feelings, and insight or knowledge    Name: Azul Roberts YOB: 1960   MR: [id number]      Facilitator: Recreational Therapist  Level of Participation: did not attend  Quality of Participation:  n/a  Interactions with others:  n/a  Mood/Affect:  n/a  Triggers (if applicable): n/a  Cognition:  n/a  Progress: None  Comments: pt problem is depressed mood Pt refuses invitation to group. Handout offered and pt declined to accept  Plan: continue with services

## 2023-10-17 NOTE — PROGRESS NOTES
"Azul Roberts is a 63 y.o. female on day 38 of admission presenting with Severe recurrent major depression without psychotic features (CMS/HCC).      Subjective   Case discussed with treatment team and chart reviewed.     Patient has no significant changes since yesterday. She remains in her room, withdrawn and laying in bed. Upon being asked about her willingness to participate in the interview patient responded \"oh God\" and to \"just get it over with.\" She continues to give short one word responses to most questions and is unwilling to provide more details regarding her mood which she states is \"not good.\" When asked about group, she states that she does not want to participate and wants to remain in her room. She states that she has not noticed any side effects since starting her SNRI yesterday. States that she does not sleep well, and never has. She specifically denied any thoughts of self harm or harming others.     Patient observed out of her room later in the afternoon sitting by the nursing station opposite from the group room.  She remains malodorous of feces likely related to care of her colostomy.  Nursing did report that she appears to be taking better care of her colostomy however still with significant concern for her level of independence to return from the hospital at this functional level.        Objective     Last Recorded Vitals  Blood pressure 112/80, pulse 71, temperature 36.3 °C (97.3 °F), temperature source Temporal, resp. rate 19, height 1.626 m (5' 4\"), weight 78.9 kg (174 lb), SpO2 100 %.    Review of Systems   Psychiatric/Behavioral:  Positive for behavioral problems, dysphoric mood, sleep disturbance and suicidal ideas. The patient is nervous/anxious.        Psychiatric ROS - Adult  Anxiety: General Anxiety Disorder (KAYLEEN)KAYLEEN Behaviors: difficult to control worry, difficulty concentrating, irritability, restlessness, and sleep disturbance  Depression: anhedonia, appetite increased, " "concentration, energy, helpless, hopeless, interest, persistent thoughts of death, psychomotor agitation, sleep decreased , suicidal thoughts, and withdrawn  Delirium: negative  Psychosis: negative  Ioana: negative  Safety Issues: passive death wish and suicidal ideation  Psychiatric ROS Comment: as noted    Physical Exam  Abdominal:      Comments: Presence of colostomy bag   Psychiatric:         Attention and Perception: She is inattentive.         Mood and Affect: Mood is anxious and depressed. Affect is labile, flat and inappropriate.         Speech: Speech is delayed.         Behavior: Behavior is uncooperative, agitated, aggressive and withdrawn.         Thought Content: Thought content includes suicidal ideation.         Judgment: Judgment is impulsive.           Mental Status Examination  General Appearance:  less disheveled, malodorous, improved gaze and eye contact. In bed, turned to the side staring at the ground  Gait/Station: ambulating with walker assist device  Speech: increasing verbal responses which are short   Mood: \"not good\"  Affect: less Irritable and Angry,  Thought Process: remains impoverished  but less so  Thought Associations: No loosening of associations  Thought Content: depressed, less hopeless, persists as helpless  Perception: No perceptual abnormalities noted  Level of Consciousness: Drowsy  Orientation: Alert  Attention and Concentration: Mild impairment in attention  Recent Memory: Intact as evidenced by ability to recall details from the past 24 hours   Executive function: Intact  Language: Naming intact  Fund of knowledge: Good  Insight: Limited, as evidenced by disengagement  Judgment: Limited, as evidence by inability to reason through medical decision making and highly impaired reality testing       Psychiatric Risk Assessment  Violence Risk Assessment: major mental illness and substance abuse  Acute Risk of Harm to Others is Considered: moderate   Suicide Risk Assessment: " , chronic medical illness, chronic pain, current psychiatric illness, feelings of hopelessness, global insomnia, history of trauma or abuse, life crisis (shame/despair), prior suicide attempt, severe anxiety, substance abuse, and suicidal ideations  Protective Factors against Suicide: adherence to  treatment and marriage/partnership  Acute Risk of Harm to Self is Considered: moderate    Relevant Results             Current Facility-Administered Medications   Medication Dose Route Frequency Provider Last Rate Last Admin    acetaminophen (Tylenol) tablet 650 mg  650 mg oral q6h PRN Eddie Cullen, DO        alum-mag hydroxide-simeth (Mylanta) 200-200-20 mg/5 mL oral suspension 30 mL  30 mL oral q6h PRN Eddie Cullen, DO        buPROPion XL (Wellbutrin XL) 24 hr tablet 300 mg  300 mg oral Daily Eddie Cullen, DO   300 mg at 10/17/23 0815    celecoxib (CeleBREX) capsule 200 mg  200 mg oral Daily Eddie Cullen, DO   200 mg at 10/17/23 0815    cholecalciferol (Vitamin D-3) capsule 125 mcg  125 mcg oral Daily Eddie Cullen, DO   125 mcg at 10/17/23 0814    citalopram (CeleXA) tablet 30 mg  30 mg oral Daily Eddie Cullen, DO   30 mg at 10/17/23 0815    diphenhydrAMINE (Sominex) tablet 25 mg  25 mg oral q8h PRN Eddie Cullen, DO        DULoxetine (Cymbalta) DR capsule 20 mg  20 mg oral Daily Eddie Cullen, DO   20 mg at 10/17/23 0815    hydrOXYzine pamoate (Vistaril) capsule 25 mg  25 mg oral q8h PRN Eddie Cullen, DO        lidocaine 4 % patch 1 patch  1 patch transdermal Daily Eddie Cullen DO        melatonin tablet 5 mg  5 mg oral Nightly Eddie Cullen, DO   5 mg at 10/16/23 2006    OLANZapine (ZyPREXA) injection 5 mg  5 mg intramuscular q8h PRN Eddie Cullen, DO        OLANZapine zydis (ZyPREXA) disintegrating tablet 5 mg  5 mg oral q8h PRN Eddie Cullen,         QUEtiapine (SEROquel) tablet 350 mg  350 mg oral Nightly Eddie Cullen DO   350 mg at  10/16/23 2006    traZODone (Desyrel) tablet 150 mg  150 mg oral Nightly Eddie Cullen DO   150 mg at 10/16/23 2100       Assessment/Plan   Principal Problem:    Severe recurrent major depression without psychotic features (CMS/HCC)  Active Problems:    Colostomy present (CMS/HCC)    Opioid abuse (CMS/HCC)    Bilateral hip pain    Biological -   Cymbalta 20 mg daily for depression, ideally titrate to target dose of ~60 mg or higher - will titrate to 30 mg on Thursday  Wellbutrin xl 300 mg for adjunctive depression treatment  Celexa 30 mg daily for depression, anxiety, plan to taper and discontinue once on potential treatment dose of Cymbalta - will begin taper on Thursday  seroquel 350 mg at bedtime  Trazodone 150 mg at bedtime for sleep      ECG (10/4/23): Normal sinus rhythm. Heart  rate 72 bpm. Qtc 442     Discussion with patient regarding risk benefits and alternatives and side effects with opportunity to ask questions and questions answered.  Patient given consent to the plan as noted    2. Psychological -       Patient is encouraged to participate with the therapeutic milieu and program and group therapy      3. Sociological -      Patient encouraged to cooperate with social work staff on issues relevant to discharge planning  [place] Medicaid have directed us not to call them again until Wednesday of this week to check on an update.  Plan remains transition to convalescence nursing care once this becomes available to patient        I spent 25 minutes in the professional and overall care of this patient.      Ba Carrizales    I have personally seen and examined the patient and performed the medical decision-making components. I have reviewed the medical student documentation and/or resident documentation and verified the findings in the note as written with additions or exceptions as stated in the body of the note.    Eddie Cullen DO

## 2023-10-17 NOTE — GROUP NOTE
Group Topic: Social Skills   Group Date: 10/17/2023  Start Time: 1510  End Time: 1600  Facilitators: BRENDA Howard   Department: [facility] RE[facility]IRT[facility]    Number of Participants: 8   Group Focus: leisure skills and social skills  Treatment Modality: Other: Recreation Therapy  Interventions utilized were group exercise and mental fitness  Purpose: communication skills and other: social skills, team building    Name: Azul Roberts YOB: 1960   MR: [id number]      Facilitator: Recreational Therapist  Level of Participation: did not attend  Quality of Participation:  n/a  Interactions with others:  n/a  Mood/Affect:  n/a  Triggers (if applicable): n/a  Cognition:  n/a  Progress: None  Comments: pt problem is depressed mood. Pt declines group invitation  Plan: continue with services

## 2023-10-17 NOTE — PROGRESS NOTES
Occupational Therapy                 Therapy Communication Note    Patient Name: Azul Roberts  MRN: [id number]  Today's Date: 10/17/2023     Discipline: Occupational Therapy    Missed Visit Reason: Patient refused    Missed Time: Attempt

## 2023-10-17 NOTE — PROGRESS NOTES
"Occupational Therapy                 Therapy Communication Note    Patient Name: Azul Roberts  MRN: [id number]  Today's Date: 10/17/2023     Discipline: Occupational Therapy    Missed Visit Reason: Missed Visit Reason: Patient refused    Missed Time: Attempt    Comment: Therapist approached Pt at 10:45 Pt supine stating \"Not today\"  "

## 2023-10-17 NOTE — CARE PLAN
The patient's goals for the shift include don't care    The clinical goals for the shift include no falls    Over the shift, the patient did not make progress toward the following goals. Barriers to progression include apathy and hopelessness. Recommendations to address these barriers include positive reinforcement and cautious guidance toward encouraging ADLs .      Problem: Fall/Injury  Goal: Verbalize understanding of personal risk factors for fall in the hospital  10/17/2023 0910 by Narcisa Darden RN  Outcome: Progressing  10/16/2023 1939 by Narcisa Darden RN  Outcome: Progressing     Problem: Fall/Injury  Goal: Pace activities to prevent fatigue by end of the shift  10/17/2023 0910 by Narcisa Darden RN  Outcome: Progressing  10/16/2023 1939 by Narcisa Darden RN  Outcome: Progressing

## 2023-10-17 NOTE — GROUP NOTE
Group Topic: Other   Group Date: 10/17/2023  Start Time: 1030  End Time: 1100  Facilitators: BRENDA Howard   Department: [facility] RE[facility]IRT[facility]    Number of Participants: 6   Group Focus: other Stress Management  Treatment Modality: Other: Recreation Therapy  Interventions utilized were patient education  Purpose: coping skills and insight or knowledge    Name: Azul Roberts YOB: 1960   MR: [id number]      Facilitator: Recreational Therapist  Level of Participation: did not attend  Quality of Participation:  n/a  Interactions with others:  n/a  Mood/Affect:  n/a  Triggers (if applicable): n/a  Cognition:  n/a  Progress: None  Comments: pt problem is depressed mood  Plan: continue with services

## 2023-10-17 NOTE — GROUP NOTE
Group Topic: Dialectical Behavioral Therapy - Emotional Regulation   Group Date: 10/17/2023  Start Time: 0950  End Time: 1030  Facilitators: BRENDA Howard   Department: [facility] REH[facility]IRT[facility]    Number of Participants: 6   Group Focus: coping skills and feeling awareness/expression  Treatment Modality: Other: Recreation Therapy  Interventions utilized were exploration and mental fitness  Purpose: coping skills, feelings, and insight or knowledge    Name: Azul Roberts YOB: 1960   MR: [id number]      Facilitator: Recreational Therapist  Level of Participation: did not attend  Quality of Participation:  n/a  Interactions with others:  n/a  Mood/Affect:  n/a  Triggers (if applicable): n/a  Cognition:  n/a  Progress: None  Comments: pt problem Is depressed mood.   Plan: continue with services

## 2023-10-18 PROCEDURE — 99232 SBSQ HOSP IP/OBS MODERATE 35: CPT | Performed by: INTERNAL MEDICINE

## 2023-10-18 PROCEDURE — 99232 SBSQ HOSP IP/OBS MODERATE 35: CPT | Performed by: PSYCHIATRY & NEUROLOGY

## 2023-10-18 PROCEDURE — 2500000001 HC RX 250 WO HCPCS SELF ADMINISTERED DRUGS (ALT 637 FOR MEDICARE OP): Performed by: PSYCHIATRY & NEUROLOGY

## 2023-10-18 PROCEDURE — 2500000004 HC RX 250 GENERAL PHARMACY W/ HCPCS (ALT 636 FOR OP/ED): Performed by: PSYCHIATRY & NEUROLOGY

## 2023-10-18 PROCEDURE — 1140000001 HC PRIVATE PSYCH ROOM DAILY

## 2023-10-18 RX ORDER — CITALOPRAM 20 MG/1
20 TABLET, FILM COATED ORAL DAILY
Status: DISCONTINUED | OUTPATIENT
Start: 2023-10-19 | End: 2023-10-23

## 2023-10-18 RX ORDER — DULOXETIN HYDROCHLORIDE 30 MG/1
30 CAPSULE, DELAYED RELEASE ORAL DAILY
Status: DISCONTINUED | OUTPATIENT
Start: 2023-10-19 | End: 2023-10-23

## 2023-10-18 RX ADMIN — DULOXETINE HYDROCHLORIDE 20 MG: 20 CAPSULE, DELAYED RELEASE ORAL at 08:23

## 2023-10-18 RX ADMIN — Medication 5 MG: at 20:06

## 2023-10-18 RX ADMIN — CITALOPRAM HYDROBROMIDE 30 MG: 20 TABLET ORAL at 08:23

## 2023-10-18 RX ADMIN — CELECOXIB 200 MG: 200 CAPSULE ORAL at 08:23

## 2023-10-18 RX ADMIN — BUPROPION HYDROCHLORIDE 300 MG: 300 TABLET, FILM COATED, EXTENDED RELEASE ORAL at 08:23

## 2023-10-18 RX ADMIN — TRAZODONE HYDROCHLORIDE 150 MG: 50 TABLET ORAL at 20:06

## 2023-10-18 RX ADMIN — Medication 125 MCG: at 08:23

## 2023-10-18 RX ADMIN — QUETIAPINE FUMARATE 350 MG: 300 TABLET ORAL at 20:06

## 2023-10-18 ASSESSMENT — COLUMBIA-SUICIDE SEVERITY RATING SCALE - C-SSRS
6. HAVE YOU EVER DONE ANYTHING, STARTED TO DO ANYTHING, OR PREPARED TO DO ANYTHING TO END YOUR LIFE?: NO
2. HAVE YOU ACTUALLY HAD ANY THOUGHTS OF KILLING YOURSELF?: NO
1. SINCE LAST CONTACT, HAVE YOU WISHED YOU WERE DEAD OR WISHED YOU COULD GO TO SLEEP AND NOT WAKE UP?: NO

## 2023-10-18 ASSESSMENT — ENCOUNTER SYMPTOMS
DYSPHORIC MOOD: 1
SLEEP DISTURBANCE: 1
NERVOUS/ANXIOUS: 1

## 2023-10-18 ASSESSMENT — PAIN SCALES - GENERAL
PAINLEVEL_OUTOF10: 5 - MODERATE PAIN
PAINLEVEL_OUTOF10: 0 - NO PAIN

## 2023-10-18 ASSESSMENT — PAIN - FUNCTIONAL ASSESSMENT
PAIN_FUNCTIONAL_ASSESSMENT: 0-10
PAIN_FUNCTIONAL_ASSESSMENT: 0-10

## 2023-10-18 NOTE — CARE PLAN
The patient's goals for the shift include unsure    The clinical goals for the shift include no falls    Over the shift, the patient did not make progress toward the following goals. Barriers to progression include isolating to room. Recommendations to address these barriers include encouraged participation and activity with peers.

## 2023-10-18 NOTE — PROGRESS NOTES
LSW waited on hold with Medicaid for over an hour. LSW was informed that they have not received the application for pt. LSW resent the application and will follow up daily until confirmation that it has been received and processed.       Nikki Andrews, DRAGAN

## 2023-10-18 NOTE — NURSING NOTE
"SARAN NOTE     Problem:  Depression     Behavior:  Pt isolative to room. Awake in bed for med pass. Resistant but cooperative with care. Dismissive with assessment questions, pt states \"not good\" when asked about her depression, but does not elaborate. Denies SI, plan, intent this shift. Compliant with meds.  Group Participation: n/a  Appetite/Meals: Declined evening snack       Interventions:  1:1 provided with reassurance. Evening meds given per orders. Encouraged to make needs known.    Response:  Pt calm, resting in bed at this time.      Plan:  Will continue to monitor behaviors and effectiveness of interventions while maintaining pt safety.    "

## 2023-10-18 NOTE — PROGRESS NOTES
"Occupational Therapy                 Therapy Communication Note    Patient Name: Azul Roberts  MRN: [id number]  Today's Date: 10/18/2023     Discipline: Occupational Therapy    Missed Visit Reason: Missed Visit Reason: Patient refused    Missed Time: Attempt    Comment:Therapist approached Pt at 6:45a Pt supine stating \"not today\"despite gentle discussion.   "

## 2023-10-18 NOTE — PROGRESS NOTES
"[facility]: [facility]  PROGRESS NOTE      Azul Roberts is a 63 y.o. female that is being seen  today for follow up..  Subjective   Pt. Is being seen for follow up.  Patient has been at her baseline.  Denies any significant complaints      ROS  Negative for fever or chills  Negative for sore throat, ear pain, nasal discharge  Negative for cough, shortness of breath or wheezing  Negative for chest pain, palpitations, swelling of legs  Negative for abdominal pain, constipation, diarrhea, blood in the stools,has colostomy  Negative for urinary complaints  Negative for headache, dizziness, weakness or numbness  Negative for joint pain  Positive for depression or anxiety  All other systems reviewed and were negative       10/15/2023     4:42 PM 10/16/2023     6:00 AM 10/16/2023     1:00 PM 10/16/2023     4:00 PM 10/17/2023     6:23 AM 10/17/2023     4:13 PM 10/18/2023     5:00 AM   Vitals   Systolic 100 118  104 112 103 93   Diastolic 67 81  58 80 58 60   Heart Rate 77 63  79 71 91 69   Temp 36.6 °C (97.9 °F) 36.8 °C (98.2 °F)  36.9 °C (98.4 °F) 36.3 °C (97.3 °F) 37.1 °C (98.8 °F) 36.5 °C (97.7 °F)   Resp 18 18  18 19 18 18   Height (in)   1.626 m (5' 4\")       Weight (lb)   174       BMI   29.87 kg/m2       BSA (m2)   1.89 m2            Body mass index is 29.87 kg/m².  Physical Exam  Constitutional: Patient does not appear to be in any acute distress  Head and Face: NCAT  Eyes: Normal external exam, EOMI  ENT: Normal external inspection of ears and nose. Oropharynx normal.  Cardiovascular: RRR, S1/S2, no murmurs, rubs, or gallops, radial pulses +2, no edema of extremities  Pulmonary: CTAB, no respiratory distress.  Abdomen: +BS, soft, non-tender, nondistended, no guarding or rebound, no masses noted  MSK: No joint swelling, normal movements of all extremities. Range of motion- normal.  Skin- No lesions, contusions, or erythema.  Peripheral puslses palpable bilaterally 2+  Neuro: AAO X3, " "Cranial nerves 2-12 grossly intact,DTR 2+ in all 4 limbs       Diagnostic Results   Lab Results   Component Value Date    GLUCOSE 113 (H) 09/05/2023    CALCIUM 10.6 (H) 09/05/2023     09/05/2023    K 3.7 09/05/2023    CO2 24 09/05/2023     09/05/2023    BUN 12 09/05/2023    CREATININE 0.72 09/05/2023     Lab Results   Component Value Date    ALT 12 09/05/2023    AST 14 09/05/2023    ALKPHOS 103 09/05/2023    BILITOT 0.4 09/05/2023     Lab Results   Component Value Date    WBC 13.2 (H) 09/05/2023    HGB 13.9 09/05/2023    HCT 43.0 09/05/2023    MCV 81 09/05/2023     09/05/2023     No results found for: \"CHOL\"  No results found for: \"HDL\"  No results found for: \"LDLCALC\"  No results found for: \"TRIG\"  No results found for: \"HGBA1C\"  Other labs not included in the list above were reviewed either before or during this encounter.    History    No past medical history on file.  No past surgical history on file.  No family history on file.  No Known Allergies  No current facility-administered medications on file prior to encounter.     Current Outpatient Medications on File Prior to Encounter   Medication Sig Dispense Refill    acetaminophen (Tylenol) 325 mg tablet Take 2 tablets (650 mg) by mouth 2 times a day.      hydrOXYzine pamoate (Vistaril) 25 mg capsule Take 1 capsule (25 mg) by mouth 2 times a day.      magnesium oxide (Mag-Ox) 400 mg tablet Take 1 tablet (400 mg) by mouth 2 times a day.      melatonin 5 mg tablet Take 1 tablet (5 mg) by mouth once daily at bedtime.      risperiDONE (RisperDAL) 2 mg tablet Take 1 tablet (2 mg) by mouth once daily at bedtime.      traZODone (Desyrel) 150 mg tablet Take 1 tablet (150 mg) by mouth once daily at bedtime.     Scheduled medications  buPROPion XL, 300 mg, oral, Daily  celecoxib, 200 mg, oral, Daily  cholecalciferol, 125 mcg, oral, Daily  citalopram, 30 mg, oral, Daily  lidocaine, 1 patch, transdermal, Daily  melatonin, 5 mg, oral, Nightly  QUEtiapine, " 350 mg, oral, Nightly  traZODone, 150 mg, oral, Nightly      Continuous medications     PRN medications  PRN medications: acetaminophen, alum-mag hydroxide-simeth, diphenhydrAMINE, hydrOXYzine pamoate, OLANZapine, OLANZapine zydis     There is no immunization history on file for this patient.  Patient's medical history was reviewed and updated either before or during this encounter.  ASSESSMENT / PLAN:  Active Hospital Problems    Bilateral hip pain      *Severe recurrent major depression without psychotic features (CMS/HCC)      Colostomy present (CMS/HCC)      Opioid abuse (CMS/HCC)     Pt. Has been stable.clostomy is working well.        Jay Knox MD

## 2023-10-18 NOTE — PROGRESS NOTES
"Azul Roberts is a 63 y.o. female on day 39 of admission presenting with Severe recurrent major depression without psychotic features (CMS/[facility]).      Subjective   Case discussed with treatment team and chart reviewed.     Patient in her room lying in bed on initial interview today. No significant changes. Continues to report mood as \"not good\" and denies getting any sleep despite nursing reports that patient sleeps at night. Responds to most questions in the interview with one word answers and does not make eye contact. She denied wanting to harm herself or others, but with ongoing passive suicidal ideation stating that she would rather not wake up tomorrow if she could choose that versus seeing where she could be in 6 months from now. Continues to deny wanting to participate in group or wanting to sit outside of group.     Provider note:  Perhaps a mild change in her level of activation today.  Patient is seen out of her room sitting in the rocking chairs by the cafeteria.  She presents as calm but still disengaged endorsing severe symptoms.  We discussed titration of medication as has been the plan.  Social work is pending updates from Medicaid regarding transitioning to the [place] Medicaid service.    Objective     Last Recorded Vitals  Blood pressure 93/60, pulse 69, temperature 36.5 °C (97.7 °F), temperature source Oral, resp. rate 18, height 1.626 m (5' 4\"), weight 78.9 kg (174 lb), SpO2 99 %.    Review of Systems   Psychiatric/Behavioral:  Positive for behavioral problems, dysphoric mood, sleep disturbance and suicidal ideas. The patient is nervous/anxious.        Psychiatric ROS - Adult  Anxiety: General Anxiety Disorder (KAYLEEN)KAYLEEN Behaviors: difficult to control worry, difficulty concentrating, irritability, restlessness, and sleep disturbance  Depression: anhedonia, appetite increased, concentration, energy, helpless, hopeless, interest, persistent thoughts of death, psychomotor agitation, sleep decreased , " "suicidal thoughts, and withdrawn  Delirium: negative  Psychosis: negative  Ioana: negative  Safety Issues: passive death wish and suicidal ideation  Psychiatric ROS Comment: as noted    Physical Exam  Abdominal:      Comments: Presence of colostomy bag   Psychiatric:         Attention and Perception: She is inattentive.         Mood and Affect: Mood is anxious and depressed. Affect is labile, flat and inappropriate.         Speech: Speech is delayed.         Behavior: Behavior is uncooperative, agitated, aggressive and withdrawn.         Thought Content: Thought content includes suicidal ideation.         Judgment: Judgment is impulsive.         Mental Status Examination  General Appearance:  less disheveled, malodorous, improved gaze and eye contact. In bed, turned to the side staring at the ground  Gait/Station: ambulating with walker assist device  Speech: increasing verbal responses which are short   Mood: \"not good\"  Affect: less Irritable and Angry,  Thought Process: remains impoverished  but less so  Thought Associations: No loosening of associations  Thought Content: depressed, less hopeless, persists as helpless  Perception: No perceptual abnormalities noted  Level of Consciousness: Drowsy  Orientation: Alert  Attention and Concentration: Mild impairment in attention  Recent Memory: Intact as evidenced by ability to recall details from the past 24 hours   Executive function: Intact  Language: Naming intact  Fund of knowledge: Good  Insight: Limited, as evidenced by disengagement  Judgment: Limited, as evidence by inability to reason through medical decision making and highly impaired reality testing       Psychiatric Risk Assessment  Violence Risk Assessment: major mental illness and substance abuse  Acute Risk of Harm to Others is Considered: moderate   Suicide Risk Assessment: , chronic medical illness, chronic pain, current psychiatric illness, feelings of hopelessness, global insomnia, history " of trauma or abuse, life crisis (shame/despair), prior suicide attempt, severe anxiety, substance abuse, and suicidal ideations  Protective Factors against Suicide: adherence to  treatment and marriage/partnership  Acute Risk of Harm to Self is Considered: moderate    Relevant Results             Current Facility-Administered Medications   Medication Dose Route Frequency Provider Last Rate Last Admin    acetaminophen (Tylenol) tablet 650 mg  650 mg oral q6h PRN Eddie Cullen, DO        alum-mag hydroxide-simeth (Mylanta) 200-200-20 mg/5 mL oral suspension 30 mL  30 mL oral q6h PRN Eddie Cullen, DO        buPROPion XL (Wellbutrin XL) 24 hr tablet 300 mg  300 mg oral Daily Eddie Cullen, DO   300 mg at 10/18/23 0823    celecoxib (CeleBREX) capsule 200 mg  200 mg oral Daily Eddie H Subhash, DO   200 mg at 10/18/23 0823    cholecalciferol (Vitamin D-3) capsule 125 mcg  125 mcg oral Daily Eddie  Subhash, DO   125 mcg at 10/18/23 0823    citalopram (CeleXA) tablet 30 mg  30 mg oral Daily Eddie H Subhash, DO   30 mg at 10/18/23 0823    diphenhydrAMINE (Sominex) tablet 25 mg  25 mg oral q8h PRN Eddie Cullen, DO        DULoxetine (Cymbalta) DR capsule 20 mg  20 mg oral Daily Eddiecatina Cullen, DO   20 mg at 10/18/23 0823    hydrOXYzine pamoate (Vistaril) capsule 25 mg  25 mg oral q8h PRN Eddie Cullen, DO        lidocaine 4 % patch 1 patch  1 patch transdermal Daily Eddie Cullen, DO        melatonin tablet 5 mg  5 mg oral Nightly Eddie Cullen, DO   5 mg at 10/17/23 2218    OLANZapine (ZyPREXA) injection 5 mg  5 mg intramuscular q8h PRN Eddie Cullen, DO        OLANZapine zydis (ZyPREXA) disintegrating tablet 5 mg  5 mg oral q8h PRN Eddie Cullen, DO        QUEtiapine (SEROquel) tablet 350 mg  350 mg oral Nightly Eddie Cullen, DO   350 mg at 10/17/23 2218    traZODone (Desyrel) tablet 150 mg  150 mg oral Nightly Eddie Cullen DO   150 mg at 10/17/23 2218        Assessment/Plan   Principal Problem:    Severe recurrent major depression without psychotic features (CMS/HCC)  Active Problems:    Colostomy present (CMS/HCC)    Opioid abuse (CMS/HCC)    Bilateral hip pain    Biological -   Cymbalta titration to 30 mg daily for depression, ideally titrate to target dose of ~60 mg or higher as tolerated and beneficial    Celexa down to 20 mg daily for depression, anxiety, plan to taper and discontinue once on potential treatment dose of Cymbalta - continue taper to discontinue    Continue unchanged:  Wellbutrin xl 300 mg for adjunctive depression treatment  seroquel 350 mg at bedtime  Trazodone 150 mg at bedtime for sleep      ECG (10/4/23): Normal sinus rhythm. Heart  rate 72 bpm. Qtc 442     Discussion with patient regarding risk benefits and alternatives and side effects with opportunity to ask questions and questions answered.  Patient given consent to the plan as noted    2. Psychological -       Patient is encouraged to participate with the therapeutic milieu and program and group therapy      3. Sociological -      Patient encouraged to cooperate with social work staff on issues relevant to discharge planning  [place] Medicaid have directed us not to call them again until Wednesday of this week to check on an update.  Plan remains transition to convalescence nursing care once this becomes available to patient        I spent 25 minutes in the professional and overall care of this patient.      Ba Carrizales    I have personally seen and examined the patient and performed the medical decision-making components. I have reviewed the medical student documentation and/or resident documentation and verified the findings in the note as written with additions or exceptions as stated in the body of the note.    Eddie Cullen, DO

## 2023-10-18 NOTE — GROUP NOTE
Group Topic: Cognitive Focus   Group Date: 10/18/2023  Start Time: 1400  End Time: 1445  Facilitators: BRENDA Madera   Department: [facility]    Number of Participants: 11   Group Focus: problem solving  Treatment Modality: Other: Recreation Therapy  Interventions utilized were problem solving  Purpose: other: Memory Recall.     Name: Azul Roberts YOB: 1960   MR: [id number]      Facilitator: Recreational Therapist  Level of Participation: moderate  Quality of Participation: attentive  Interactions with others: appropriate  Mood/Affect: depressed  Triggers (if applicable): N/A  Cognition: concrete  Progress: Moderate  Comments: pt problem is depressed mood.  Plan: continue with services

## 2023-10-18 NOTE — NURSING NOTE
SARAN NOTE     Problem:  depression     Behavior:  Pt pleasant and cooperative with staff and care, pt isolative to her room this shift.  Group Participation: no  Appetite/Meals: good appetite       Interventions:  1:1 provided, reassured pt and encouraged pt to come to staff for any needs or assistance, administered scheduled medications.    Response:  Pt continues to isolate to her room.     Plan:  Maintain pt safety.

## 2023-10-19 PROCEDURE — 2500000001 HC RX 250 WO HCPCS SELF ADMINISTERED DRUGS (ALT 637 FOR MEDICARE OP): Performed by: PSYCHIATRY & NEUROLOGY

## 2023-10-19 PROCEDURE — 97535 SELF CARE MNGMENT TRAINING: CPT | Mod: GO,CO

## 2023-10-19 PROCEDURE — 1140000001 HC PRIVATE PSYCH ROOM DAILY

## 2023-10-19 PROCEDURE — 99232 SBSQ HOSP IP/OBS MODERATE 35: CPT | Performed by: INTERNAL MEDICINE

## 2023-10-19 PROCEDURE — 97530 THERAPEUTIC ACTIVITIES: CPT | Mod: GO,CO

## 2023-10-19 PROCEDURE — 2500000004 HC RX 250 GENERAL PHARMACY W/ HCPCS (ALT 636 FOR OP/ED): Performed by: PSYCHIATRY & NEUROLOGY

## 2023-10-19 RX ADMIN — TRAZODONE HYDROCHLORIDE 150 MG: 50 TABLET ORAL at 20:20

## 2023-10-19 RX ADMIN — CITALOPRAM HYDROBROMIDE 20 MG: 20 TABLET ORAL at 08:56

## 2023-10-19 RX ADMIN — DULOXETINE HYDROCHLORIDE 30 MG: 30 CAPSULE, DELAYED RELEASE ORAL at 08:56

## 2023-10-19 RX ADMIN — BUPROPION HYDROCHLORIDE 300 MG: 300 TABLET, FILM COATED, EXTENDED RELEASE ORAL at 08:56

## 2023-10-19 RX ADMIN — Medication 125 MCG: at 08:56

## 2023-10-19 RX ADMIN — QUETIAPINE FUMARATE 350 MG: 300 TABLET ORAL at 20:20

## 2023-10-19 RX ADMIN — Medication 5 MG: at 20:20

## 2023-10-19 RX ADMIN — CELECOXIB 200 MG: 200 CAPSULE ORAL at 08:56

## 2023-10-19 ASSESSMENT — COLUMBIA-SUICIDE SEVERITY RATING SCALE - C-SSRS
2. HAVE YOU ACTUALLY HAD ANY THOUGHTS OF KILLING YOURSELF?: NO
2. HAVE YOU ACTUALLY HAD ANY THOUGHTS OF KILLING YOURSELF?: NO
6. HAVE YOU EVER DONE ANYTHING, STARTED TO DO ANYTHING, OR PREPARED TO DO ANYTHING TO END YOUR LIFE?: NO
1. SINCE LAST CONTACT, HAVE YOU WISHED YOU WERE DEAD OR WISHED YOU COULD GO TO SLEEP AND NOT WAKE UP?: NO
6. HAVE YOU EVER DONE ANYTHING, STARTED TO DO ANYTHING, OR PREPARED TO DO ANYTHING TO END YOUR LIFE?: NO
1. SINCE LAST CONTACT, HAVE YOU WISHED YOU WERE DEAD OR WISHED YOU COULD GO TO SLEEP AND NOT WAKE UP?: NO

## 2023-10-19 ASSESSMENT — ENCOUNTER SYMPTOMS
DYSPHORIC MOOD: 1
NERVOUS/ANXIOUS: 1
SLEEP DISTURBANCE: 1

## 2023-10-19 ASSESSMENT — COGNITIVE AND FUNCTIONAL STATUS - GENERAL
DRESSING REGULAR LOWER BODY CLOTHING: A LITTLE
PERSONAL GROOMING: A LITTLE
HELP NEEDED FOR BATHING: A LITTLE
DAILY ACTIVITIY SCORE: 20
TOILETING: A LITTLE

## 2023-10-19 ASSESSMENT — PAIN - FUNCTIONAL ASSESSMENT: PAIN_FUNCTIONAL_ASSESSMENT: 0-10

## 2023-10-19 ASSESSMENT — PAIN SCALES - GENERAL: PAINLEVEL_OUTOF10: 9

## 2023-10-19 ASSESSMENT — ACTIVITIES OF DAILY LIVING (ADL): HOME_MANAGEMENT_TIME_ENTRY: 15

## 2023-10-19 NOTE — GROUP NOTE
Group Topic: Other   Group Date: 10/19/2023  Start Time: 1515  End Time: 1600  Facilitators: BRENDA Fernandes   Department: [facility]    Number of Participants: 7   Group Focus: reality orientation, reminiscence, and self-esteem  Treatment Modality: Other: Recreation Therapy  Interventions utilized were exploration, mental fitness, and reminiscence  Purpose: feelings and self-worth    Name: Azul Roberts YOB: 1960   MR: [id number]      Facilitator: Recreational Therapist  Level of Participation: did not attend  Quality of Participation:  did not attend  Interactions with others:  did not attend  Mood/Affect:  did not attend  Triggers (if applicable): n/a  Cognition:  did not attend  Progress: None  Comments: pt problem is depressed mood.  Pt is sitting in the hallway at the nurses station.  Pt declines invitation to join group but does sit in the hallway for about half of the session.  After that, pt returns to her room.  Plan: continue with services

## 2023-10-19 NOTE — GROUP NOTE
"Group Topic: Reminiscence   Group Date: 10/19/2023  Start Time: 1000  End Time: 1050  Facilitators: BRENDA Fernandes   Department: [facility]    Number of Participants: 6   Group Focus: other Lifestories  Treatment Modality: Other: Recreation Therapy  Interventions utilized were exploration, mental fitness, and reminiscence  Purpose: feelings and communication skills    Name: Azul Roberts YOB: 1960   MR: [id number]      Facilitator: Recreational Therapist  Level of Participation: did not attend  Quality of Participation:  did not attend  Interactions with others:  did not attend  Mood/Affect:  did not attend  Triggers (if applicable): n/a  Cognition:  did not attend  Progress: None  Comments: pt problem is depressed mood.  Pt is laying in her bed, refuses to attend group at this time.  Reports \"feeling like I am never going to get out of here\".  CTRS encourages pt to attend group  \"to help get her mind offf of things\".  Pt declined.    Plan: continue with services      "

## 2023-10-19 NOTE — NURSING NOTE
"SARAN NOTE     Problem:  depression     Behavior:  Pt came out of her room for a snack, addressed her needs, denied SI. Pt is rocking in a regular chair due to \"chronic hip pain\" as stated by the Pt.  Group Participation: n/a  Appetite/Meals: appropriate       Interventions:  HS snack provided, scheduled medication administered, pain relieving methods addressed and discussed, colostomy care reviewed    Response:  Pt is cooperative and compliant with her meds, refused any pain intervention stating \"whatever you have is not strong enough\", Pt reported taking care of her colostomy bag promptly     Plan:  Adhere to care plan, continue monitoring  "

## 2023-10-19 NOTE — NURSING NOTE
Offered PRN pain medication or ice pack or pillows for repositioning and patient refuses all pain relieving measures stating they wont help her pain.  Pt has also been refusing Lidocain patch everyday.

## 2023-10-19 NOTE — PROGRESS NOTES
[facility]: [facility]  PROGRESS NOTE      Azul Roberts is a 63 y.o. female that is being seen  today for follow up at [facility]   Pt. Is being seen for follow up.  Remains in her room.  Does not possibly much in the activities.  Pain has been fairly controlled.  Patient's colostomy is working well.  Vital signs have been stable  ROS  Negative for fever or chills  Negative for sore throat, ear pain, nasal discharge  Negative for cough, shortness of breath or wheezing  Negative for chest pain, palpitations, swelling of legs  Negative for abdominal pain, constipation, diarrhea, blood in the stools,has colostomy  Negative for urinary complaints  Negative for headache, dizziness, weakness or numbness  Negative for joint pain  Positive for depression or anxiety  All other systems reviewed and were negative  Vitals:    10/19/23 0500   BP: 94/59   Pulse: 71   Resp: 18   Temp: 36.5 °C (97.7 °F)   SpO2: 98%      Body mass index is 29.87 kg/m².  Physical Exam  Constitutional: Patient does not appear to be in any acute distress  Head and Face: NCAT  Eyes: Normal external exam, EOMI  ENT: Normal external inspection of ears and nose. Oropharynx normal.  Cardiovascular: RRR, S1/S2, no murmurs, rubs, or gallops, radial pulses +2, no edema of extremities  Pulmonary: CTAB, no respiratory distress.  Abdomen: +BS, soft, non-tender, nondistended, no guarding or rebound, no masses noted.colostomy present   MSK: hip joint pain   Skin- No lesions, contusions, or erythema.  Peripheral puslses palpable bilaterally 2+  Neuro: AAO X3, Cranial nerves 2-12 grossly intact,DTR 2+ in all 4 limbs       Diagnostic Results   Lab Results   Component Value Date    GLUCOSE 113 (H) 09/05/2023    CALCIUM 10.6 (H) 09/05/2023     09/05/2023    K 3.7 09/05/2023    CO2 24 09/05/2023     09/05/2023    BUN 12 09/05/2023    CREATININE 0.72 09/05/2023     Lab Results   Component Value Date    ALT 12 09/05/2023     "AST 14 09/05/2023    ALKPHOS 103 09/05/2023    BILITOT 0.4 09/05/2023     Lab Results   Component Value Date    WBC 13.2 (H) 09/05/2023    HGB 13.9 09/05/2023    HCT 43.0 09/05/2023    MCV 81 09/05/2023     09/05/2023     No results found for: \"CHOL\"  No results found for: \"HDL\"  No results found for: \"LDLCALC\"  No results found for: \"TRIG\"  No results found for: \"HGBA1C\"  Other labs not included in the list above were reviewed either before or during this encounter.    History    No past medical history on file.  No past surgical history on file.  No family history on file.  No Known Allergies  No current facility-administered medications on file prior to encounter.     Current Outpatient Medications on File Prior to Encounter   Medication Sig Dispense Refill    acetaminophen (Tylenol) 325 mg tablet Take 2 tablets (650 mg) by mouth 2 times a day.      hydrOXYzine pamoate (Vistaril) 25 mg capsule Take 1 capsule (25 mg) by mouth 2 times a day.      magnesium oxide (Mag-Ox) 400 mg tablet Take 1 tablet (400 mg) by mouth 2 times a day.      melatonin 5 mg tablet Take 1 tablet (5 mg) by mouth once daily at bedtime.      risperiDONE (RisperDAL) 2 mg tablet Take 1 tablet (2 mg) by mouth once daily at bedtime.      traZODone (Desyrel) 150 mg tablet Take 1 tablet (150 mg) by mouth once daily at bedtime.     Scheduled medications  buPROPion XL, 300 mg, oral, Daily  celecoxib, 200 mg, oral, Daily  cholecalciferol, 125 mcg, oral, Daily  citalopram, 30 mg, oral, Daily  lidocaine, 1 patch, transdermal, Daily  melatonin, 5 mg, oral, Nightly  QUEtiapine, 350 mg, oral, Nightly  traZODone, 150 mg, oral, Nightly      Continuous medications     PRN medications  PRN medications: acetaminophen, alum-mag hydroxide-simeth, diphenhydrAMINE, hydrOXYzine pamoate, OLANZapine, OLANZapine zydis     There is no immunization history on file for this patient.  Patient's medical history was reviewed and updated either before or during this " encounter.  ASSESSMENT / PLAN:  Active Hospital Problems    Bilateral hip pain      *Severe recurrent major depression without psychotic features (CMS/HCC)      Colostomy present (CMS/HCC)      Opioid abuse (CMS/HCC)     Pt. Has been stable.clostomy is working well.  Hip pain has been fairly controlled with current medications.  Patient still is not participating much in the activities.  Mostly stays in the room.  Has been eating adequately        Jay Knox MD

## 2023-10-19 NOTE — PROGRESS NOTES
"LSW met with pt to discuss disposition and discharge. Pt states that she is \"not good\" as well as that she wants to go home. LSW commended pt on attending group yesterday but reports that it was \"awful\". LSW encouraged pt to attend group tomorrow and that doing so as well as socializing with other patients would be good for her. LSW encouraged pt to engage in an effort to get her home where she wants to be. Pt presented less irritable and more willing to answer questions. LSW to continue to provide support and encouragement to pt.       LSW contacted Medicaid and according to the automated system it does not appear that they have processed pt's application. LSW will call again tomorrow to inquire about pt's Medicaid status.     Nikki Andrews, DRAGAN      "

## 2023-10-19 NOTE — NURSING NOTE
SARAN NOTE     Problem:  depression     Behavior:  Pt isolates to room between meals, she did come out during group and sat in the hallway.  She still describes her mood as bad but denies suicidal ideation.    Group Participation: sitting in the hallway    Appetite/Meals: eating 100% meals       Interventions:  Encourage pt to participate in milieu, open blinds, give scheduled medications, every 15 minute checks, 1:1 interaction.    Response:  Pt sat out in hallway for afternoon group, she still wont empty her colostomy bag.  She ate 100% dinner     Plan:  Encourage pt to participate in milieu, open blinds, give scheduled medications, every 15 minute checks, 1:1 interaction.  \

## 2023-10-19 NOTE — PROGRESS NOTES
Occupational Therapy    OT Treatment    Patient Name: Azul Roberts  MRN: [id number]  Today's Date: 10/19/2023  Time Calculation  Start Time: 1348  Stop Time: 1415  Time Calculation (min): 27 min         Assessment:  Medical Staff Made Aware: Yes (Discussion with NSG.)  End of Session Communication: Bedside nurse (OTR/L)  Medical Staff Made Aware: Yes (Discussion with NSG.)  Plan:  Treatment Interventions: ADL retraining, Functional transfer training, Equipment evaluation/education, Compensatory technique education  OT Frequency: 3 times per week  OT Discharge Recommendations: Moderate intensity level of continued care  Equipment Recommended upon Discharge: Wheeled walker  OT Recommended Transfer Status: Stand by assist  Treatment Interventions: ADL retraining, Functional transfer training, Equipment evaluation/education, Compensatory technique education    Subjective   General:  OT Received On: 10/19/23  Reason for Referral: decreased ADLs. safety assessment  Referred By: Dr. Cullen  Past Medical History Relevant to Rehab: depression, anxiety, colostomy bag, opiod abuse, hip pain  Missed Visit: Yes  Missed Visit Reason: Patient refused  Prior to Session Communication: Bedside nurse  Patient Position Received: Bed, 2 rail up  Preferred Learning Style: verbal  General Comment: Discussion with NSG Pt declining treatment  Vital Signs:     Pain:  Pain Assessment  Pain Assessment: 0-10  Pain Score: 9  Pain Type: Chronic pain  Pain Location: Hip  Pain Orientation: Left  Pain Frequency: Constant/continuous  Pain Onset: Ongoing  Pain Interventions: Repositioned (NSG notified)    Objective    Activities of Daily Living: Grooming  Grooming Level of Assistance: Modified independent  Grooming Where Assessed: Standing sinkside, Edge of bed  Grooming Comments: Pt washing hands/seated to comb hair       LE Dressing  LE Dressing: Yes  LE Dressing Adaptive Equipment: Reacher, Sock aide, Devon/  Pants Level of Assistance:  Close supervision  Sock Level of Assistance: Close supervision  LE Dressing Where Assessed: Edge of bed    Toileting  Toileting Level of Assistance: Setup  Where Assessed: Toilet  Toileting Comments: Pt adjusting clothing prior/after no wiping Pt +colostomy bag  Functional Standing Tolerance:  Time: 5 minutes  Activity: grooming  Bed Mobility/Transfers:      Transfer 1  Transfer From 1: Bed to, Stand to  Transfer to 1: Toilet  Technique 1: Sit to stand  Transfer Device 1: Walker (wheeled walker)  Transfer Level of Assistance 1: Distant supervision    Toilet Transfers  Toilet Transfer From: Rolling walker  Toilet Transfer Type: To and from  Toilet Transfer to: Standard toilet  Toilet Transfer Technique: Ambulating  Toilet Transfers: Supervision       Therapy/Activity:      Therapeutic Activity  Therapeutic Activity Performed: Yes (functional mobility 20' x 2 Dist S d/t Pt guarding LLE use RW)         Outcome Measures:[facility] Daily Activity  Putting on and taking off regular lower body clothing: A little  Bathing (including washing, rinsing, drying): A little  Putting on and taking off regular upper body clothing: None  Toileting, which includes using toilet, bedpan or urinal: A little  Taking care of personal grooming such as brushing teeth: A little  Eating Meals: None  Daily Activity - Total Score: 20      Education Documentation  Body Mechanics, taught by ALLA Lopez at 10/19/2023  2:18 PM.  Learner: Patient  Readiness: Acceptance  Method: Explanation, Demonstration, Teach-back  Response: Verbalizes Understanding, Demonstrated Understanding    Precautions, taught by ALLA Lopez at 10/19/2023  2:18 PM.  Learner: Patient  Readiness: Acceptance  Method: Explanation, Demonstration, Teach-back  Response: Verbalizes Understanding, Demonstrated Understanding    ADL Training, taught by ALLA Lopez at 10/19/2023  2:18 PM.  Learner: Patient  Readiness: Acceptance  Method: Explanation, Demonstration,  Teach-back  Response: Verbalizes Understanding, Demonstrated Understanding    Education Comments  No comments found.        OP EDUCATION:  Education  Individual(s) Educated: Patient  Education Provided: Fall precautons, Risk and benefits of OT discussed with patient or other, POC discussed and agreed upon  Risk and Benefits Discussed with Patient/Caregiver/Other: yes  Patient/Caregiver Demonstrated Understanding: yes  Plan of Care Discussed and Agreed Upon: yes  Patient Response to Education: Patient/Caregiver Verbalized Understanding of Information    Goals:  Encounter Problems       Encounter Problems (Active)       Balance       LTG - Patient will maintain standing and sitting balance to allow for completion of daily activities (Progressing)       Start:  10/16/23    Expected End:  10/30/23               Bathing       LTG - Patient will utilize adaptive techniques to bathe body (Progressing)       Start:  10/16/23    Expected End:  10/30/23               Dressings Lower Extremities       LTG - Patient will utilize adaptive techniques/equipment to dress lower body (Progressing)       Start:  10/16/23    Expected End:  10/30/23               Grooming       LTG - Patient will demonstrate use of appropriate Intervention for safe grooming habits (Progressing)       Start:  10/16/23    Expected End:  10/30/23               Mobility       LTG - Patient will ambulate household distance (Progressing)       Start:  10/16/23    Expected End:  10/30/23               Safety       LTG - Patient will demonstrate safety requirements appropriate to situation/environment (Progressing)       Start:  10/16/23    Expected End:  10/30/23

## 2023-10-19 NOTE — PROGRESS NOTES
"Azul Roberts is a 63 y.o. female on day 40 of admission presenting with Severe recurrent major depression without psychotic features (CMS/[facility]).      Subjective   Case discussed with treatment team and chart reviewed.     Patient is noted to be sitting outside of group therapy this morning prior to initial interview.     In the afternoon, patient is seen spending more time outside of her room sitting out in the hallway next to some of the other patients on the unit who are engaged in conversation. Pt also seen asking for assistance with emptying her colostomy bag earlier this afternoon.     On interview today, pt is seen out in the hallway eating snacks. Does not engage with eye contact, but pt appears more minimally more animated today. States that she is \"emotional\" and that she \"wants to go home\" and \"I've had better days.\" She states that she is still agreeable to treatment plan and titration of her Cymbalta and tapering of the Celexa. Is not overall engaged in the interview and continues to answer most questions with one or two words answers, or grunts.     Objective     Last Recorded Vitals  Blood pressure 94/59, pulse 71, temperature 36.5 °C (97.7 °F), temperature source Temporal, resp. rate 18, height 1.626 m (5' 4\"), weight 78.9 kg (174 lb), SpO2 98 %.    Review of Systems   Psychiatric/Behavioral:  Positive for behavioral problems, dysphoric mood, sleep disturbance and suicidal ideas. The patient is nervous/anxious.        Psychiatric ROS - Adult  Anxiety: General Anxiety Disorder (KAYLEEN)KAYLEEN Behaviors: difficult to control worry, difficulty concentrating, irritability, restlessness, and sleep disturbance  Depression: anhedonia, appetite increased, concentration, energy, helpless, hopeless, interest, persistent thoughts of death, psychomotor agitation, sleep decreased , suicidal thoughts, and withdrawn  Delirium: negative  Psychosis: negative  Ioana: negative  Safety Issues: passive death wish and suicidal " "ideation  Psychiatric ROS Comment: as noted    Physical Exam  Abdominal:      Comments: Presence of colostomy bag   Psychiatric:         Attention and Perception: She is inattentive.         Mood and Affect: Mood is anxious and depressed. Affect is labile, flat and inappropriate.         Speech: Speech is delayed.         Behavior: Behavior is uncooperative, agitated, aggressive and withdrawn.         Thought Content: Thought content includes suicidal ideation.         Judgment: Judgment is impulsive.           Mental Status Examination  General Appearance:  less disheveled, malodorous, improved gaze and eye contact. In bed, turned to the side staring at the ground  Gait/Station: ambulating with walker assist device  Speech: increasing verbal responses which are short   Mood: \"not good\"  Affect: less Irritable and Angry,  Thought Process: remains impoverished  but less so  Thought Associations: No loosening of associations  Thought Content: depressed, less hopeless, persists as helpless  Perception: No perceptual abnormalities noted  Level of Consciousness: Drowsy  Orientation: Alert  Attention and Concentration: Mild impairment in attention  Recent Memory: Intact as evidenced by ability to recall details from the past 24 hours   Executive function: Intact  Language: Naming intact  Fund of knowledge: Good  Insight: Limited, as evidenced by disengagement  Judgment: Limited, as evidence by inability to reason through medical decision making and highly impaired reality testing       Psychiatric Risk Assessment  Violence Risk Assessment: major mental illness and substance abuse  Acute Risk of Harm to Others is Considered: moderate   Suicide Risk Assessment: , chronic medical illness, chronic pain, current psychiatric illness, feelings of hopelessness, global insomnia, history of trauma or abuse, life crisis (shame/despair), prior suicide attempt, severe anxiety, substance abuse, and suicidal " ideations  Protective Factors against Suicide: adherence to  treatment and marriage/partnership  Acute Risk of Harm to Self is Considered: moderate    Relevant Results  {If you would like to pull in Medications, type .meds     If you would like to pull in Lab results for the last 24 hours, type .[id number]    If you would like to pull in Imaging results, type .imgrslt :99}      {Link to Stroke Scoring tools - Link :99}     Current Facility-Administered Medications   Medication Dose Route Frequency Provider Last Rate Last Admin    acetaminophen (Tylenol) tablet 650 mg  650 mg oral q6h PRN Eddie Cullen, DO        alum-mag hydroxide-simeth (Mylanta) 200-200-20 mg/5 mL oral suspension 30 mL  30 mL oral q6h PRN Eddie Cullen, DO        buPROPion XL (Wellbutrin XL) 24 hr tablet 300 mg  300 mg oral Daily Eddie Cullen, DO   300 mg at 10/19/23 0856    celecoxib (CeleBREX) capsule 200 mg  200 mg oral Daily Eddie Cullen, DO   200 mg at 10/19/23 0856    cholecalciferol (Vitamin D-3) capsule 125 mcg  125 mcg oral Daily Eddie Cullen, DO   125 mcg at 10/19/23 0856    citalopram (CeleXA) tablet 20 mg  20 mg oral Daily Eddie Cullen, DO   20 mg at 10/19/23 0856    diphenhydrAMINE (Sominex) tablet 25 mg  25 mg oral q8h PRN Eddie Cullen, DO        DULoxetine (Cymbalta) DR capsule 30 mg  30 mg oral Daily Eddie Cullen, DO   30 mg at 10/19/23 0856    hydrOXYzine pamoate (Vistaril) capsule 25 mg  25 mg oral q8h PRN Eddie Cullen, DO        lidocaine 4 % patch 1 patch  1 patch transdermal Daily Eddie Cullen, DO        melatonin tablet 5 mg  5 mg oral Nightly Eddie Cullen, DO   5 mg at 10/18/23 2006    OLANZapine (ZyPREXA) injection 5 mg  5 mg intramuscular q8h PRN Eddie Cullen, DO        OLANZapine zydis (ZyPREXA) disintegrating tablet 5 mg  5 mg oral q8h PRN Eddie Cullen DO        QUEtiapine (SEROquel) tablet 350 mg  350 mg oral Nightly Eddie Cullen DO   350 mg at  10/18/23 2006    traZODone (Desyrel) tablet 150 mg  150 mg oral Nightly Eddie Cullen, DO   150 mg at 10/18/23 2006       Assessment/Plan   Principal Problem:    Severe recurrent major depression without psychotic features (CMS/HCC)  Active Problems:    Colostomy present (CMS/HCC)    Opioid abuse (CMS/HCC)    Bilateral hip pain    Biological -   Continue current meds as ordered below:     Cymbalta titration to 30 mg daily for depression, ideally titrate to target dose of ~60 mg or higher as tolerated and beneficial  Celexa 20 mg daily for depression, anxiety, plan to taper and discontinue once on potential treatment dose of Cymbalta - continue taper to discontinue    Continue unchanged:  Wellbutrin xl 300 mg for adjunctive depression treatment  seroquel 350 mg at bedtime  Trazodone 150 mg at bedtime for sleep      ECG (10/4/23): Normal sinus rhythm. Heart  rate 72 bpm. Qtc 442     Discussion with patient regarding risk benefits and alternatives and side effects with opportunity to ask questions and questions answered.  Patient given consent to the plan as noted    2. Psychological -       Patient is encouraged to participate with the therapeutic milieu and program and group therapy      3. Sociological -      Patient encouraged to cooperate with social work staff on issues relevant to discharge planning  [place] Medicaid have directed us not to call them again until Wednesday of this week to check on an update.  Plan remains transition to convalescence nursing care once this becomes available to patient    {This patient does not have an ACP note on file for this encounter, please fill one out - Advance Care Planning Activity :99}    I spent 25 minutes in the professional and overall care of this patient.      Ba Carrizales    I have personally seen and examined the patient and performed the medical decision-making components. I have reviewed the medical student documentation and/or resident documentation and  verified the findings in the note as written with additions or exceptions as stated in the body of the note.    JERRY DENT

## 2023-10-20 PROCEDURE — 99232 SBSQ HOSP IP/OBS MODERATE 35: CPT

## 2023-10-20 PROCEDURE — 2500000004 HC RX 250 GENERAL PHARMACY W/ HCPCS (ALT 636 FOR OP/ED): Performed by: PSYCHIATRY & NEUROLOGY

## 2023-10-20 PROCEDURE — 1140000001 HC PRIVATE PSYCH ROOM DAILY

## 2023-10-20 PROCEDURE — 99232 SBSQ HOSP IP/OBS MODERATE 35: CPT | Performed by: INTERNAL MEDICINE

## 2023-10-20 PROCEDURE — 2500000001 HC RX 250 WO HCPCS SELF ADMINISTERED DRUGS (ALT 637 FOR MEDICARE OP): Performed by: PSYCHIATRY & NEUROLOGY

## 2023-10-20 RX ADMIN — Medication 125 MCG: at 08:27

## 2023-10-20 RX ADMIN — Medication 5 MG: at 20:04

## 2023-10-20 RX ADMIN — DULOXETINE HYDROCHLORIDE 30 MG: 30 CAPSULE, DELAYED RELEASE ORAL at 08:27

## 2023-10-20 RX ADMIN — QUETIAPINE FUMARATE 350 MG: 300 TABLET ORAL at 20:04

## 2023-10-20 RX ADMIN — CITALOPRAM HYDROBROMIDE 20 MG: 20 TABLET ORAL at 08:27

## 2023-10-20 RX ADMIN — TRAZODONE HYDROCHLORIDE 150 MG: 50 TABLET ORAL at 20:04

## 2023-10-20 RX ADMIN — CELECOXIB 200 MG: 200 CAPSULE ORAL at 08:27

## 2023-10-20 RX ADMIN — BUPROPION HYDROCHLORIDE 300 MG: 300 TABLET, FILM COATED, EXTENDED RELEASE ORAL at 08:27

## 2023-10-20 ASSESSMENT — ENCOUNTER SYMPTOMS
NERVOUS/ANXIOUS: 1
DYSPHORIC MOOD: 1
SLEEP DISTURBANCE: 1

## 2023-10-20 ASSESSMENT — COLUMBIA-SUICIDE SEVERITY RATING SCALE - C-SSRS
2. HAVE YOU ACTUALLY HAD ANY THOUGHTS OF KILLING YOURSELF?: NO
2. HAVE YOU ACTUALLY HAD ANY THOUGHTS OF KILLING YOURSELF?: NO
6. HAVE YOU EVER DONE ANYTHING, STARTED TO DO ANYTHING, OR PREPARED TO DO ANYTHING TO END YOUR LIFE?: NO
2. HAVE YOU ACTUALLY HAD ANY THOUGHTS OF KILLING YOURSELF?: NO
1. SINCE LAST CONTACT, HAVE YOU WISHED YOU WERE DEAD OR WISHED YOU COULD GO TO SLEEP AND NOT WAKE UP?: NO
6. HAVE YOU EVER DONE ANYTHING, STARTED TO DO ANYTHING, OR PREPARED TO DO ANYTHING TO END YOUR LIFE?: NO
1. SINCE LAST CONTACT, HAVE YOU WISHED YOU WERE DEAD OR WISHED YOU COULD GO TO SLEEP AND NOT WAKE UP?: NO
1. SINCE LAST CONTACT, HAVE YOU WISHED YOU WERE DEAD OR WISHED YOU COULD GO TO SLEEP AND NOT WAKE UP?: NO
6. HAVE YOU EVER DONE ANYTHING, STARTED TO DO ANYTHING, OR PREPARED TO DO ANYTHING TO END YOUR LIFE?: NO

## 2023-10-20 ASSESSMENT — PAIN - FUNCTIONAL ASSESSMENT
PAIN_FUNCTIONAL_ASSESSMENT: FLACC (FACE, LEGS, ACTIVITY, CRY, CONSOLABILITY)
PAIN_FUNCTIONAL_ASSESSMENT: 0-10

## 2023-10-20 ASSESSMENT — PAIN SCALES - GENERAL
PAINLEVEL_OUTOF10: 0 - NO PAIN
PAINLEVEL_OUTOF10: 0 - NO PAIN

## 2023-10-20 NOTE — PROGRESS NOTES
LSW met with pt to provide update on Medicaid application. Pt states that her  will need to provide his information as she does not know his SSN. Pt presents less irritable and willing to answer questions. LSW encouraged pt to attend the afternoon group. Pt states that she will try. When approached later at the time of group pt is seen sitting outside the group room and declines to attend. LSW left message for pt's  requesting information needed for Medicaid.       Nikki Andrews, DRAGAN

## 2023-10-20 NOTE — PROGRESS NOTES
LSW contacted S regarding pt's Medicaid application. LSW was informed that they have received it and will start to process it. LSW was informed that they will need pt's 's information including: SSN, , and income. LSW will contact pt's  and try to obtain, in order to assist pt with [place] Medicaid, which is need for placement.     Nikki Andrews, DRAGAN

## 2023-10-20 NOTE — PROGRESS NOTES
10/20/23 1340   OT Last Visit   OT Received On 10/20/23   General   Missed Visit Yes   Missed Visit Reason Patient refused

## 2023-10-20 NOTE — PROGRESS NOTES
"Azul Roberts is a 63 y.o. female on day 41 of admission presenting with Severe recurrent major depression without psychotic features (CMS/HCC).      Subjective   Case discussed with treatment team and chart reviewed.     Patient sitting outside of common room for interview.  Requested we go to her room or into the common room which is currently empty, however she declines, appearing to be put out from the suggestion.  She is rocking back and forth in her chair and when asked why she states her hip is hurting and Tylenol doesn't help.  She states she needs something stronger, \"an injection of something\".    No significant changes, appears slightly less irritable then past interactions.  Remains disengaged, although a bit more interactive. She reports continued depression which is \"worse\" than it was on admission.  She states this is due to her wanting to go home.  She continues to endorse passive suicidal ideation, denies plan or intent.     Objective     Last Recorded Vitals  Blood pressure 125/78, pulse 70, temperature 36.4 °C (97.5 °F), temperature source Temporal, resp. rate 19, height 1.626 m (5' 4\"), weight 78.9 kg (174 lb), SpO2 99 %.    Review of Systems   Psychiatric/Behavioral:  Positive for behavioral problems, dysphoric mood, sleep disturbance and suicidal ideas. The patient is nervous/anxious.      Psychiatric ROS - Adult  Anxiety: General Anxiety Disorder (KAYLEEN)KAYLEEN Behaviors: difficult to control worry, difficulty concentrating, irritability, restlessness, and sleep disturbance  Depression: anhedonia, appetite increased, concentration, energy, helpless, hopeless, interest, persistent thoughts of death, psychomotor agitation, sleep decreased , suicidal thoughts, and withdrawn  Delirium: negative  Psychosis: negative  Ioana: negative  Safety Issues: passive death wish and suicidal ideation  Psychiatric ROS Comment: as noted    Physical Exam  Abdominal:      Comments: Presence of colostomy bag " "  Psychiatric:         Attention and Perception: She is inattentive.         Mood and Affect: Mood is anxious and depressed. Affect is labile, flat and inappropriate.         Speech: Speech is delayed.         Behavior: Behavior is uncooperative, agitated, aggressive and withdrawn.         Thought Content: Thought content includes suicidal ideation.         Judgment: Judgment is impulsive.     Mental Status Examination  General Appearance: Disheveled. and Limited eye contact.   Gait/Station: ambulating with walker assist device  Speech: increasing verbal responses which are short   Mood: \"not good\"  Affect: less Irritable and Angry,  Thought Process: remains impoverished  but less so  Thought Associations: No loosening of associations  Thought Content: depressed, less hopeless, persists as helpless  Perception: No perceptual abnormalities noted  Level of Consciousness: Drowsy  Orientation: Alert  Attention and Concentration: Mild impairment in attention  Recent Memory: Intact as evidenced by ability to recall details from the past 24 hours   Executive function: Intact  Language: Naming intact  Fund of knowledge: Good  Insight: Limited, as evidenced by disengagement  Judgment: Limited, as evidence by inability to reason through medical decision making and highly impaired reality testing       Psychiatric Risk Assessment  Violence Risk Assessment: major mental illness and substance abuse  Acute Risk of Harm to Others is Considered: moderate   Suicide Risk Assessment: , chronic medical illness, chronic pain, current psychiatric illness, feelings of hopelessness, global insomnia, history of trauma or abuse, life crisis (shame/despair), prior suicide attempt, severe anxiety, substance abuse, and suicidal ideations  Protective Factors against Suicide: adherence to  treatment and marriage/partnership  Acute Risk of Harm to Self is Considered: moderate    Relevant Results  Scheduled medications  buPROPion XL, 300 " mg, oral, Daily  celecoxib, 200 mg, oral, Daily  cholecalciferol, 125 mcg, oral, Daily  citalopram, 20 mg, oral, Daily  DULoxetine, 30 mg, oral, Daily  lidocaine, 1 patch, transdermal, Daily  melatonin, 5 mg, oral, Nightly  QUEtiapine, 350 mg, oral, Nightly  traZODone, 150 mg, oral, Nightly      Continuous medications     PRN medications  PRN medications: acetaminophen, alum-mag hydroxide-simeth, diphenhydrAMINE, hydrOXYzine pamoate, OLANZapine, OLANZapine zydis         Assessment/Plan   Principal Problem:    Severe recurrent major depression without psychotic features (CMS/HCC)  Active Problems:    Colostomy present (CMS/HCC)    Opioid abuse (CMS/HCC)    Bilateral hip pain    Biological -   Cymbalta titration to 30 mg daily for depression, ideally titrate to target dose of ~60 mg or higher as tolerated and beneficial    Celexa down to 20 mg daily for depression, anxiety, plan to taper and discontinue once on potential treatment dose of Cymbalta - continue taper to discontinue    Continue unchanged:  Wellbutrin xl 300 mg for adjunctive depression treatment  seroquel 350 mg at bedtime  Trazodone 150 mg at bedtime for sleep    ECG (10/4/23): Normal sinus rhythm. Heart  rate 72 bpm. Qtc 442     Discussion with patient regarding risk benefits and alternatives and side effects with opportunity to ask questions and questions answered.  Patient given consent to the plan as noted    2. Psychological -       Patient is encouraged to participate with the therapeutic milieu and program and group therapy    3. Sociological -      Patient encouraged to cooperate with social work staff on issues relevant to discharge planning  [place] Medicaid have directed us not to call them again until Wednesday of this week to check on an update.  Plan remains transition to convalescence nursing care once this becomes available to patient      Daisy Vasquez, APRN-CNP

## 2023-10-20 NOTE — NURSING NOTE
BIRP NOTE     Problem:  Depression     Behavior:  Patient is in sitting in a chair near the nurse station. Patient is pleasant and calm. Patient's affect is blunted. Patient is very little talkative. Patient maintains fair eye contact.    Group Participation: N/A  Appetite/Meals: N/A       Interventions:  This nurse completed a shift assessment and administered patient's scheduled night time medications.    Response:  Patient remained pleasant and calm and was cooperative throughout this shift assessment and medication administration.     Plan:  Continue to monitor for depression. Continue to monitor for patient safety.

## 2023-10-20 NOTE — GROUP NOTE
"Group Topic: Other   Group Date: 10/20/2023  Start Time: 1100  End Time: 1120  Facilitators: Nancy Kelley CTRS   Department: [facility] REHABTH VIRTUAL    Number of Participants: 4   Group Focus: other Music is...  Treatment Modality: Other: Recreation Therapy  Interventions utilized were exploration and mental fitness  Purpose: feelings and self-care    Name: Azul Roberts YOB: 1960   MR: [id number]      Facilitator: Recreational Therapist  Level of Participation: did not attend  Quality of Participation:  did not attend  Interactions with others:  did not attend  Mood/Affect:  did not attend  Triggers (if applicable): n/a  Cognition:  did not attend  Progress: None  Comments: pt problem is depressed mood.  Pt is sitting in the hallway when CTRS invites pt to join group.  Pt declines, reports \"I don't want to go\".  Pt does continue to sit in the hallway for about half of the group time.    Plan: continue with services      "

## 2023-10-20 NOTE — NURSING NOTE
SARAN NOTE     Problem:  Depression     Behavior:  Pt is coming out of her room more often throughout the day, she is cooperative with care but still complains, she is taking her scheduled medications.    Group Participation: Pt sits in hallway during group    Appetite/Meals: 100%       Interventions:  Give scheduled medications, every 15 minute checks, encourage participation in milieu, 1:1 interaction    Response:  Pt did allow staff to empty her colostomy this afternoon.  She sat out in the hallway this afternoon.  She reported feeling hopeless that she will get out of here.     Plan:  Give scheduled medications, every 15 minute checks, encourage participation in milieu, 1:1 interaction

## 2023-10-20 NOTE — PROGRESS NOTES
" REHAB Therapy Assessment & Treatment    Patient Name: Azul Roberts  MRN: [id number]  Today's Date: 10/20/2023      Recreation note : pt is alert and oriented x 2-3 with some confusion, memory loss and poor insight/judgement.  Continues to refuse to attend groups this week but has been sitting in the hallway more and more.  Pt expresses frustration and sadness at complex discharge situation and reports feeling \"like I am never going to get out of here\".  Pt continue to require encouragement to participate in ADL's.  Pt is eating well and sleeping well.      "

## 2023-10-20 NOTE — GROUP NOTE
Group Topic: Other   Group Date: 10/20/2023  Start Time: 1500  End Time: 1600  Facilitators: RANDALL FernandesS   Department: [facility] REHABTH VIRTUAL    Number of Participants: 8   Group Focus: other Music memories  Treatment Modality: Other: Recreation Therapy  Interventions utilized were exploration, mental fitness, and reminiscence  Purpose: feelings    Name: Azul Roberts YOB: 1960   MR: [id number]      Facilitator: Recreational Therapist  Level of Participation: did not attend  Quality of Participation:  did not attend  Interactions with others:  did not attend  Mood/Affect:  did not attend  Triggers (if applicable): n/a  Cognition:  did not attend  Progress: None  Comments: pt problem is depressed mood.  Pt was sitting in the hallway when CTRS invited her to attend group.  Pt declined invitation but sat in the hallway for the entire length of session.  Plan: continue with services

## 2023-10-21 PROCEDURE — 1140000001 HC PRIVATE PSYCH ROOM DAILY

## 2023-10-21 PROCEDURE — 2500000001 HC RX 250 WO HCPCS SELF ADMINISTERED DRUGS (ALT 637 FOR MEDICARE OP): Performed by: PSYCHIATRY & NEUROLOGY

## 2023-10-21 PROCEDURE — 2500000004 HC RX 250 GENERAL PHARMACY W/ HCPCS (ALT 636 FOR OP/ED): Performed by: PSYCHIATRY & NEUROLOGY

## 2023-10-21 RX ADMIN — BUPROPION HYDROCHLORIDE 300 MG: 300 TABLET, FILM COATED, EXTENDED RELEASE ORAL at 08:13

## 2023-10-21 RX ADMIN — Medication 5000 UNITS: at 08:12

## 2023-10-21 RX ADMIN — CITALOPRAM HYDROBROMIDE 20 MG: 20 TABLET ORAL at 08:13

## 2023-10-21 RX ADMIN — QUETIAPINE FUMARATE 350 MG: 300 TABLET ORAL at 20:32

## 2023-10-21 RX ADMIN — TRAZODONE HYDROCHLORIDE 150 MG: 50 TABLET ORAL at 20:32

## 2023-10-21 RX ADMIN — CELECOXIB 200 MG: 200 CAPSULE ORAL at 08:13

## 2023-10-21 RX ADMIN — Medication 5 MG: at 20:32

## 2023-10-21 RX ADMIN — DULOXETINE HYDROCHLORIDE 30 MG: 30 CAPSULE, DELAYED RELEASE ORAL at 08:13

## 2023-10-21 ASSESSMENT — PAIN - FUNCTIONAL ASSESSMENT: PAIN_FUNCTIONAL_ASSESSMENT: FLACC (FACE, LEGS, ACTIVITY, CRY, CONSOLABILITY)

## 2023-10-21 ASSESSMENT — PAIN SCALES - GENERAL: PAINLEVEL_OUTOF10: 0 - NO PAIN

## 2023-10-21 NOTE — GROUP NOTE
Group Topic: Other   Group Date: 10/21/2023  Start Time: 1015  End Time: 1100  Facilitators: BRENDA Fernandes   Department: [facility] REHABTH VIRTUAL    Number of Participants: 6   Group Focus: other Leisure awareness  Treatment Modality: Other: Recreation therapy  Interventions utilized were exploration, mental fitness, orientation, and problem solving  Purpose: feelings and insight or knowledge    Name: Azul Roberts YOB: 1960   MR: [id number]      Facilitator: Recreational Therapist  Level of Participation: did not attend  Quality of Participation:  did not attend  Interactions with others:  did not attend  Mood/Affect:  did not attend  Triggers (if applicable): n/a  Cognition:  did not attend  Progress: None  Comments: pt problem is depressed mood.  Pt is sitting outside the group room during group.  Plan: continue with services

## 2023-10-21 NOTE — PROGRESS NOTES
[facility]: [facility]  PROGRESS NOTE      Azul Roberts is a 63 y.o. female that is being seen  today for follow up at [facility]   Pt. Is being seen for follow up.  Remains in her room.  Does not possibly much in the activities.  Pain has been fairly controlled.  Patient's colostomy is working well.  Vital signs have been stable  ROS  Negative for fever or chills  Negative for sore throat, ear pain, nasal discharge  Negative for cough, shortness of breath or wheezing  Negative for chest pain, palpitations, swelling of legs  Negative for abdominal pain, constipation, diarrhea, blood in the stools,has colostomy  Negative for urinary complaints  Negative for headache, dizziness, weakness or numbness  Negative for joint pain  Positive for depression or anxiety  All other systems reviewed and were negative  Vitals:    10/20/23 1640   BP: 113/73   Pulse: 78   Resp: 16   Temp: 36.4 °C (97.5 °F)   SpO2: 100%      Body mass index is 29.87 kg/m².  Physical Exam  Constitutional: Patient does not appear to be in any acute distress  Head and Face: NCAT  Eyes: Normal external exam, EOMI  ENT: Normal external inspection of ears and nose. Oropharynx normal.  Cardiovascular: RRR, S1/S2, no murmurs, rubs, or gallops, radial pulses +2, no edema of extremities  Pulmonary: CTAB, no respiratory distress.  Abdomen: +BS, soft, non-tender, nondistended, no guarding or rebound, no masses noted.colostomy present   MSK: hip joint pain   Skin- No lesions, contusions, or erythema.  Peripheral puslses palpable bilaterally 2+  Neuro: AAO X3, Cranial nerves 2-12 grossly intact,DTR 2+ in all 4 limbs       Diagnostic Results   Lab Results   Component Value Date    GLUCOSE 113 (H) 09/05/2023    CALCIUM 10.6 (H) 09/05/2023     09/05/2023    K 3.7 09/05/2023    CO2 24 09/05/2023     09/05/2023    BUN 12 09/05/2023    CREATININE 0.72 09/05/2023     Lab Results   Component Value Date    ALT 12 09/05/2023  "   AST 14 09/05/2023    ALKPHOS 103 09/05/2023    BILITOT 0.4 09/05/2023     Lab Results   Component Value Date    WBC 13.2 (H) 09/05/2023    HGB 13.9 09/05/2023    HCT 43.0 09/05/2023    MCV 81 09/05/2023     09/05/2023     No results found for: \"CHOL\"  No results found for: \"HDL\"  No results found for: \"LDLCALC\"  No results found for: \"TRIG\"  No results found for: \"HGBA1C\"  Other labs not included in the list above were reviewed either before or during this encounter.    History    No past medical history on file.  No past surgical history on file.  No family history on file.  No Known Allergies  No current facility-administered medications on file prior to encounter.     Current Outpatient Medications on File Prior to Encounter   Medication Sig Dispense Refill    acetaminophen (Tylenol) 325 mg tablet Take 2 tablets (650 mg) by mouth 2 times a day.      hydrOXYzine pamoate (Vistaril) 25 mg capsule Take 1 capsule (25 mg) by mouth 2 times a day.      magnesium oxide (Mag-Ox) 400 mg tablet Take 1 tablet (400 mg) by mouth 2 times a day.      melatonin 5 mg tablet Take 1 tablet (5 mg) by mouth once daily at bedtime.      risperiDONE (RisperDAL) 2 mg tablet Take 1 tablet (2 mg) by mouth once daily at bedtime.      traZODone (Desyrel) 150 mg tablet Take 1 tablet (150 mg) by mouth once daily at bedtime.     Scheduled medications  buPROPion XL, 300 mg, oral, Daily  celecoxib, 200 mg, oral, Daily  cholecalciferol, 125 mcg, oral, Daily  citalopram, 30 mg, oral, Daily  lidocaine, 1 patch, transdermal, Daily  melatonin, 5 mg, oral, Nightly  QUEtiapine, 350 mg, oral, Nightly  traZODone, 150 mg, oral, Nightly      Continuous medications     PRN medications  PRN medications: acetaminophen, alum-mag hydroxide-simeth, diphenhydrAMINE, hydrOXYzine pamoate, OLANZapine, OLANZapine zydis     There is no immunization history on file for this patient.  Patient's medical history was reviewed and updated either before or during this " encounter.  ASSESSMENT / PLAN:  Active Hospital Problems    Bilateral hip pain      *Severe recurrent major depression without psychotic features (CMS/HCC)      Colostomy present (CMS/HCC)      Opioid abuse (CMS/HCC)     Pt. Has been stable.clostomy is working well.  Hip pain has been fairly controlled with current medications.  Patient still is not participating much in the activities.  Mostly stays in the room.  Has been eating adequately        Jay Knox MD

## 2023-10-21 NOTE — GROUP NOTE
Group Topic: Other   Group Date: 10/21/2023  Start Time: 0945  End Time: 1015  Facilitators: BRENDA Fernandes   Department: [facility] REHABTH VIRTUAL    Number of Participants: 6   Group Focus: other What's up?  Treatment Modality: Other: Recreation Therpay  Interventions utilized were exploration, mental fitness, orientation, and reminiscence  Purpose: feelings and insight or knowledge    Name: Azul Roberts YOB: 1960   MR: [id number]      Facilitator: Recreational Therapist  Level of Participation: did not attend  Quality of Participation:  did not attend  Interactions with others:  did not attend  Mood/Affect:  did not attend  Triggers (if applicable): n/a  Cognition:  did not attend  Progress: None  Comments: pt problem is depressed mood.  Refuses to attend group at this time.  Does sit in the hallway and watches staff at the nurses station.    Plan: continue with services

## 2023-10-21 NOTE — CARE PLAN
The patient's goals for the shift include doesn't matter    The clinical goals for the shift include encourage participation in milieu    Over the shift, the patient did not make progress toward the following goals. Barriers to progression include lack of interest in Tx. Plan and self care. Recommendations to address these barriers include continual gentle positive encouragement.

## 2023-10-21 NOTE — GROUP NOTE
Group Topic: Other   Group Date: 10/21/2023  Start Time: 1330  End Time: 1400  Facilitators: BRENDA Fernandes   Department: [facility] REHABTH VIRTUAL    Number of Participants: 1   Group Focus: communication, concentration, depression, and feeling awareness/expression  Treatment Modality: Other: Recreation therapy  Interventions utilized were exploration, mental fitness, orientation, and reality testing  Purpose: feelings, insight or knowledge, and self-worth    Name: Azul Roberts YOB: 1960   MR: [id number]      Facilitator: Recreational Therapist  Level of Participation: active  Quality of Participation: cooperative and engaged  Interactions with others: appropriate  Mood/Affect: blunted  Triggers (if applicable): n/a  Cognition:  pt engages with CTRS, poor eye contact but displays calm behaviors and jokes a little bit.  Smiles a few times during interaction.  Progress: Moderate  Comments: pt problem is depressed mood.  Plan: continue with services

## 2023-10-21 NOTE — GROUP NOTE
Group Topic: Other   Group Date: 10/21/2023  Start Time: 1500  End Time: 1600  Facilitators: BRENDA Fernandes   Department: [facility] REHABTH VIRTUAL    Number of Participants: 5   Group Focus: other Name that tune...Country style  Treatment Modality: Other: Recreation therapy  Interventions utilized were exploration, mental fitness, and reminiscence  Purpose: feelings and other: fun    Name: Azul Roberts YOB: 1960   MR: [id number]      Facilitator: Recreational Therapist  Level of Participation: did not attend  Quality of Participation:  did not attend  Interactions with others:  did not attend  Mood/Affect:  did not attend  Triggers (if applicable): n/a  Cognition:  did not attend  Progress: None  Comments: pt problem is depressed mood.  Pt refuses to attend group but does agree to sit in the hallway during group.    Plan: continue with services

## 2023-10-22 PROCEDURE — 2500000001 HC RX 250 WO HCPCS SELF ADMINISTERED DRUGS (ALT 637 FOR MEDICARE OP): Performed by: PSYCHIATRY & NEUROLOGY

## 2023-10-22 PROCEDURE — 2500000004 HC RX 250 GENERAL PHARMACY W/ HCPCS (ALT 636 FOR OP/ED): Performed by: PSYCHIATRY & NEUROLOGY

## 2023-10-22 PROCEDURE — 1140000001 HC PRIVATE PSYCH ROOM DAILY

## 2023-10-22 RX ADMIN — BUPROPION HYDROCHLORIDE 300 MG: 300 TABLET, FILM COATED, EXTENDED RELEASE ORAL at 08:17

## 2023-10-22 RX ADMIN — CITALOPRAM HYDROBROMIDE 20 MG: 20 TABLET ORAL at 08:17

## 2023-10-22 RX ADMIN — CELECOXIB 200 MG: 200 CAPSULE ORAL at 08:19

## 2023-10-22 RX ADMIN — TRAZODONE HYDROCHLORIDE 150 MG: 50 TABLET ORAL at 21:07

## 2023-10-22 RX ADMIN — Medication 125 MCG: at 08:18

## 2023-10-22 RX ADMIN — QUETIAPINE FUMARATE 350 MG: 300 TABLET ORAL at 21:07

## 2023-10-22 RX ADMIN — DULOXETINE HYDROCHLORIDE 30 MG: 30 CAPSULE, DELAYED RELEASE ORAL at 08:18

## 2023-10-22 RX ADMIN — Medication 5 MG: at 21:07

## 2023-10-22 ASSESSMENT — PAIN SCALES - GENERAL
PAINLEVEL_OUTOF10: 0 - NO PAIN
PAINLEVEL_OUTOF10: 3
PAINLEVEL_OUTOF10: 2

## 2023-10-22 NOTE — NURSING NOTE
SARAN NOTE     Problem:  Depression     Behavior:  Pt out of room for a significant amount of time without a group or snack time but no peer interaction witnessed.     Group Participation: no HS group  Appetite/Meals: 100% of all meals and snacks       Interventions:  Encouraged to continue being out on the milieu and taking care of her colostomy herself.     Response:  Pt more talkative and upbeat.     Plan:  Continue to encourage milieu participation and praise for timely care of colostomy.

## 2023-10-22 NOTE — CARE PLAN
Problem: Fall/Injury  Goal: Verbalize understanding of personal risk factors for fall in the hospital  Outcome: Met  Goal: Verbalize understanding of risk factor reduction measures to prevent injury from fall in the home  Outcome: Met   The patient's goals for the shift include doesn't matter    The clinical goals for the shift include encourage participation in milieu    Over the shift, the patient did not make progress toward the following goals.

## 2023-10-22 NOTE — GROUP NOTE
Group Topic: Self Esteem   Group Date: 10/22/2023  Start Time: 1030  End Time: 1100  Facilitators: BRENDA Fernandes   Department: [facility] REHABTH VIRTUAL    Number of Participants: 6   Group Focus: self-esteem  Treatment Modality: Other: Recreation therapy  Interventions utilized were exploration, mental fitness, and problem solving  Purpose: feelings, self-worth, and self-care    Name: Azul Roberts YOB: 1960   MR: [id number]      Facilitator: Recreational Therapist  Level of Participation: did not attend  Quality of Participation:  did not attend  Interactions with others:  did not attend  Mood/Affect:  did not attend  Triggers (if applicable): n/a  Cognition:  did not attend  Progress: None  Comments: pt problem is depressed mood.  Pt continues to sit at the nurses station during group.  Plan: continue with services

## 2023-10-22 NOTE — GROUP NOTE
Group Topic: Spiritual/Devotional/Thought of the Day   Group Date: 10/22/2023  Start Time: 0945  End Time: 1030  Facilitators: BRENDA Fernandes   Department: [facility] REHABTH VIRTUAL    Number of Participants: 4   Group Focus: affirmation, daily focus, feeling awareness/expression, mindfulness, and self-awareness  Treatment Modality: Other: Recreation Therapy  Interventions utilized were exploration and mental fitness  Purpose: feelings, insight or knowledge, and self-worth    Name: Azul Roberts YOB: 1960   MR: [id number]      Facilitator: Recreational Therapist  Level of Participation: did not attend  Quality of Participation:  did not attend  Interactions with others:  did not attend  Mood/Affect:  did not attend  Triggers (if applicable): n/a  Cognition:  did not attend  Progress: None  Comments: pt problem is depressed mood.  Pt refused to attend group at this time.  Does sit in the hallway and engage with staff at the station during group.  Plan: continue with services

## 2023-10-22 NOTE — GROUP NOTE
Group Topic: Other   Group Date: 10/22/2023  Start Time: 1530  End Time: 1600  Facilitators: BRENDA Fernandes   Department: [facility] REHABTH VIRTUAL    Number of Participants: 5   Group Focus: other [facility]  Treatment Modality: Other: Recreation therapy  Interventions utilized were mental fitness  Purpose: Mental exercise    Name: Azul Roberts YOB: 1960   MR: [id number]      Facilitator: Recreational Therapist  Level of Participation: did not attend  Quality of Participation:  did not attend  Interactions with others:  did not attend  Mood/Affect:  did not attend  Triggers (if applicable): n/a  Cognition:  did not attend  Progress: None  Comments: pt problem is depressed mood.  Pt declined invitation to join group but does sit in the hallway for the entire length of session and even after group.  Plan: continue with services

## 2023-10-22 NOTE — CARE PLAN
The patient's goals for the shift include not sure    The clinical goals for the shift include encourage participation in milieu    Over the shift, the patient did make progress toward the following goals. Barriers to progression include lack of motivation and interest in self care. Recommendations to address these barriers include gentle but continual positive reinforecement.      Problem: Fall/Injury  Goal: Use assistive devices by end of the shift  Outcome: Progressing     Problem: Fall/Injury  Goal: Use assistive devices by end of the shift  Outcome: Progressing

## 2023-10-23 ENCOUNTER — APPOINTMENT (OUTPATIENT)
Dept: RADIOLOGY | Facility: HOSPITAL | Age: 63
End: 2023-10-23
Payer: MEDICAID

## 2023-10-23 PROCEDURE — 99232 SBSQ HOSP IP/OBS MODERATE 35: CPT | Performed by: REGISTERED NURSE

## 2023-10-23 PROCEDURE — 97535 SELF CARE MNGMENT TRAINING: CPT | Mod: GO

## 2023-10-23 PROCEDURE — 73502 X-RAY EXAM HIP UNI 2-3 VIEWS: CPT | Mod: LT,FY

## 2023-10-23 PROCEDURE — 1140000001 HC PRIVATE PSYCH ROOM DAILY

## 2023-10-23 PROCEDURE — 99232 SBSQ HOSP IP/OBS MODERATE 35: CPT | Performed by: INTERNAL MEDICINE

## 2023-10-23 PROCEDURE — 2500000001 HC RX 250 WO HCPCS SELF ADMINISTERED DRUGS (ALT 637 FOR MEDICARE OP): Performed by: PSYCHIATRY & NEUROLOGY

## 2023-10-23 PROCEDURE — 2500000004 HC RX 250 GENERAL PHARMACY W/ HCPCS (ALT 636 FOR OP/ED): Performed by: PSYCHIATRY & NEUROLOGY

## 2023-10-23 RX ORDER — CITALOPRAM 10 MG/1
10 TABLET ORAL DAILY
Status: DISCONTINUED | OUTPATIENT
Start: 2023-10-24 | End: 2023-10-25

## 2023-10-23 RX ORDER — DULOXETIN HYDROCHLORIDE 20 MG/1
40 CAPSULE, DELAYED RELEASE ORAL DAILY
Status: DISCONTINUED | OUTPATIENT
Start: 2023-10-24 | End: 2023-10-25

## 2023-10-23 RX ORDER — ACETAMINOPHEN 500 MG
5 TABLET ORAL NIGHTLY PRN
Status: DISCONTINUED | OUTPATIENT
Start: 2023-10-23 | End: 2023-11-14

## 2023-10-23 RX ADMIN — BUPROPION HYDROCHLORIDE 300 MG: 300 TABLET, FILM COATED, EXTENDED RELEASE ORAL at 08:20

## 2023-10-23 RX ADMIN — QUETIAPINE FUMARATE 350 MG: 300 TABLET ORAL at 20:12

## 2023-10-23 RX ADMIN — CELECOXIB 200 MG: 200 CAPSULE ORAL at 08:20

## 2023-10-23 RX ADMIN — DULOXETINE HYDROCHLORIDE 30 MG: 30 CAPSULE, DELAYED RELEASE ORAL at 08:20

## 2023-10-23 RX ADMIN — CITALOPRAM HYDROBROMIDE 20 MG: 20 TABLET ORAL at 08:20

## 2023-10-23 RX ADMIN — Medication 125 MCG: at 08:20

## 2023-10-23 ASSESSMENT — PAIN - FUNCTIONAL ASSESSMENT
PAIN_FUNCTIONAL_ASSESSMENT: FLACC (FACE, LEGS, ACTIVITY, CRY, CONSOLABILITY)
PAIN_FUNCTIONAL_ASSESSMENT: 0-10
PAIN_FUNCTIONAL_ASSESSMENT: 0-10

## 2023-10-23 ASSESSMENT — ENCOUNTER SYMPTOMS
SLEEP DISTURBANCE: 1
DYSPHORIC MOOD: 1
NERVOUS/ANXIOUS: 1

## 2023-10-23 ASSESSMENT — COGNITIVE AND FUNCTIONAL STATUS - GENERAL
EATING MEALS: A LITTLE
TOILETING: A LOT
DRESSING REGULAR LOWER BODY CLOTHING: A LOT
HELP NEEDED FOR BATHING: A LITTLE
PERSONAL GROOMING: A LITTLE
DAILY ACTIVITIY SCORE: 16
DRESSING REGULAR UPPER BODY CLOTHING: A LITTLE

## 2023-10-23 ASSESSMENT — COLUMBIA-SUICIDE SEVERITY RATING SCALE - C-SSRS
1. SINCE LAST CONTACT, HAVE YOU WISHED YOU WERE DEAD OR WISHED YOU COULD GO TO SLEEP AND NOT WAKE UP?: NO
2. HAVE YOU ACTUALLY HAD ANY THOUGHTS OF KILLING YOURSELF?: NO
2. HAVE YOU ACTUALLY HAD ANY THOUGHTS OF KILLING YOURSELF?: NO
6. HAVE YOU EVER DONE ANYTHING, STARTED TO DO ANYTHING, OR PREPARED TO DO ANYTHING TO END YOUR LIFE?: NO
1. SINCE LAST CONTACT, HAVE YOU WISHED YOU WERE DEAD OR WISHED YOU COULD GO TO SLEEP AND NOT WAKE UP?: NO
6. HAVE YOU EVER DONE ANYTHING, STARTED TO DO ANYTHING, OR PREPARED TO DO ANYTHING TO END YOUR LIFE?: NO

## 2023-10-23 ASSESSMENT — ACTIVITIES OF DAILY LIVING (ADL): HOME_MANAGEMENT_TIME_ENTRY: 8

## 2023-10-23 ASSESSMENT — PAIN SCALES - GENERAL
PAINLEVEL_OUTOF10: 1
PAINLEVEL_OUTOF10: 6
PAINLEVEL_OUTOF10: 8

## 2023-10-23 NOTE — NURSING NOTE
SARAN NOTE     Problem:  depression     Behavior:  Pt was sitting in the hallway at the beginning of the shift, greeted this nurse, made eye contact, smiled. Pt is cooperative, pleasant, denied SI, addressed her needs appropriately.  Group Participation: n/a  Appetite/Meals: HS snack provided     Interventions:  1:1 intervention with active listening provided, scheduled medication administered, medication side effects reviewed, colostomy care discussed    Response:  Pt is compliant with her medication, reported that she's been taking care of her colostomy bag, asked for assistance to relieve the gas from the bag, verbalized understanding of medication taken and side effects to watch     Plan:  Continue monitoring

## 2023-10-23 NOTE — NURSING NOTE
05:35 Pt reassessed for pain following the fall earlier at night. Pt reported left hip pain is worsening, refused interventions  05:53 Dr. Knox notified via secure chat

## 2023-10-23 NOTE — CARE PLAN
Problem: Balance  Goal: LTG - Patient will maintain standing and sitting balance to allow for completion of daily activities  Outcome: Progressing     Problem: Grooming  Goal: LTG - Patient will demonstrate use of appropriate Intervention for safe grooming habits  Outcome: Progressing     Problem: Mobility  Goal: LTG - Patient will ambulate household distance  Outcome: Progressing     Problem: Safety  Goal: LTG - Patient will demonstrate safety requirements appropriate to situation/environment  Outcome: Progressing

## 2023-10-23 NOTE — PROGRESS NOTES
Occupational Therapy    Occupational Therapy Treatment     10/23/23 0704   OT Last Visit   OT Received On 10/23/23   General   Reason for Referral decreased ADLs   Referred By Dr. Cullen   Past Medical History Relevant to Rehab depression, anxiety, colostomy bag, opiod abuse, hip pain   Prior to Session Communication Bedside nurse   Patient Position Received Bed, 2 rail up   Preferred Learning Style verbal   General Comment Cleared by nsg, pt met in supine, agreeable to therapy with some encouragement. Pt reports she fell off of toilet last night   Pain Assessment   Pain Assessment 0-10   Pain Score 6   Pain Type Acute pain   Pain Location Hip   Pain Orientation Left   Grooming   Grooming Level of Assistance Setup   Grooming Where Assessed Edge of bed   Grooming Comments Pt requires set up assist for grooming tasks while sitting EOB.  Requires vc to thoroughly complete   Toileting   Toileting Comments declined   Bed Mobility   Bed Mobility Yes   Bed Mobility 1   Bed Mobility 1 Supine to sitting   Level of Assistance 1 Close supervision   Bed Mobility Comments 1 HOB slightly elevated to enhance ease of transition. Cues for safety. C/o pain   Bed Mobility 2   Bed Mobility  2 Sitting to supine   Level of Assistance 2 Close supervision   Bed Mobility Comments 2 For safe and controlled descent   Transfers   Transfer Yes   Transfer 1   Transfer From 1 Bed to   Transfer to 1 Stand   Technique 1 Sit to stand   Transfer Device 1 Walker   Transfer Level of Assistance 1 Contact guard   Trials/Comments 1 STS from EOB x 5 reps to increase strength/safety during functional xfers. Cues for fully upright posture, hand placement/walker mgmt   Therapeutic Exercise   Therapeutic Exercise Performed Yes   Therapeutic Exercise Activity 1 BUE ther ex performed with pt seated unsupported at EOB to challenge strength and activity tolerance in preparation for ADLs. Emphasis on shoulder flexion, bicep flexion, scapular  retraction/protraction. Rest breaks provided as needed.   IP OT Assessment   OT Assessment Recommend continued OT services to address remaining deficits and POC   Prognosis Good   Barriers to Discharge None   Evaluation/Treatment Tolerance Patient limited by pain   Medical Staff Made Aware Yes   End of Session Communication Bedside nurse   End of Session Patient Position Bed, 2 rail up   OT Assessment   OT Assessment Results Decreased ADL status;Decreased safe judgment during ADL;Decreased cognition;Decreased endurance;Decreased functional mobility;Decreased IADLs   Education   Individual(s) Educated Patient   Education Provided Fall precautons;Risk and benefits of OT discussed with patient or other;POC discussed and agreed upon   Risk and Benefits Discussed with Patient/Caregiver/Other yes   Patient/Caregiver Demonstrated Understanding yes   Plan of Care Discussed and Agreed Upon yes   Patient Response to Education Patient/Caregiver Verbalized Understanding of Information   Inpatient/Swing Bed or Outpatient   Inpatient/Swing Bed or Outpatient Inpatient   Inpatient Plan   Treatment Interventions ADL retraining;Functional transfer training;UE strengthening/ROM;Cognitive reorientation;Patient/family training;Equipment evaluation/education;Endurance training;Compensatory technique education   OT Frequency 3 times per week   OT Discharge Recommendations Moderate intensity level of continued care   Equipment Recommended upon Discharge Wheeled walker   OT Recommended Transfer Status Assist of 1

## 2023-10-23 NOTE — GROUP NOTE
Group Topic: Other   Group Date: 10/23/2023  Start Time: 1520  End Time: 1545  Facilitators: BRENDA Fernandes   Department: [facility] REHABTH VIRTUAL    Number of Participants: 4   Group Focus: reminiscence  Treatment Modality: Other: Recreation therapy  Interventions utilized were exploration and reminiscence  Purpose: feelings and insight or knowledge    Name: Azul Roberts YOB: 1960   MR: [id number]      Facilitator: Recreational Therapist  Level of Participation: did not attend  Quality of Participation:  did not attend  Interactions with others:  did not attend  Mood/Affect:  did not attend  Triggers (if applicable): n/a  Cognition:  did not attend  Progress: None  Comments: pt problem is depressed mood.  Pt is sitting in the hallway, refuses to attend group at this time.  Sits in the hallway without prompting for the rest of the afternoon.  Plan: continue with services

## 2023-10-23 NOTE — PROGRESS NOTES
DRAGAN met with pt who was up sitting in a wheelchair initially in the hallway but then proceeded to her room. Pt feel last night and is in pain. Pt wanted to lay down and did not want to talk. Pt did answer a few questions but was otherwise dismissive. Pt at the time was scheduled to have an x-ray of her hip. DRAGAN continues to assist pt in switching her Medicaid from [place] to [place]. DRAGAN has left a message for pt's  in regarding to additional documentation needed. DRAGAN will continue to try to obtain.     Nikki Andrews, DRAGAN

## 2023-10-23 NOTE — PROGRESS NOTES
[facility]: [facility]  PROGRESS NOTE      Azul Roberts is a 63 y.o. female that is being seen  today for follow up at [facility]   Pt. Is being seen for follow up.  Patient fell in the bathroom yesterday.  Patient complained of follow-up left hip pain which has been chronic.  X-ray of the hip ordered.  Otherwise patient was able to walk with a walker after the fall.  Denies hitting her head.        ROS  Negative for fever or chills  Negative for sore throat, ear pain, nasal discharge  Negative for cough, shortness of breath or wheezing  Negative for chest pain, palpitations, swelling of legs  Negative for abdominal pain, constipation, diarrhea, blood in the stools,has colostomy  Negative for urinary complaints  Negative for headache, dizziness, weakness or numbness  Negative for joint pain  Positive for depression or anxiety  All other systems reviewed and were negative  Vitals:    10/23/23 0500   BP: 96/66   Pulse: 66   Resp: 17   Temp: 36 °C (96.8 °F)   SpO2: 99%      Body mass index is 29.87 kg/m².  Physical Exam  Constitutional: Patient does not appear to be in any acute distress  Head and Face: NCAT  Eyes: Normal external exam, EOMI  ENT: Normal external inspection of ears and nose. Oropharynx normal.  Cardiovascular: RRR, S1/S2, no murmurs, rubs, or gallops, radial pulses +2, no edema of extremities  Pulmonary: CTAB, no respiratory distress.  Abdomen: +BS, soft, non-tender, nondistended, no guarding or rebound, no masses noted.colostomy present   MSK: hip joint pain   Skin- No lesions, contusions, or erythema.  Peripheral puslses palpable bilaterally 2+  Neuro: AAO X3, Cranial nerves 2-12 grossly intact,DTR 2+ in all 4 limbs       Diagnostic Results   Lab Results   Component Value Date    GLUCOSE 113 (H) 09/05/2023    CALCIUM 10.6 (H) 09/05/2023     09/05/2023    K 3.7 09/05/2023    CO2 24 09/05/2023     09/05/2023    BUN 12 09/05/2023    CREATININE 0.72  "09/05/2023     Lab Results   Component Value Date    ALT 12 09/05/2023    AST 14 09/05/2023    ALKPHOS 103 09/05/2023    BILITOT 0.4 09/05/2023     Lab Results   Component Value Date    WBC 13.2 (H) 09/05/2023    HGB 13.9 09/05/2023    HCT 43.0 09/05/2023    MCV 81 09/05/2023     09/05/2023     No results found for: \"CHOL\"  No results found for: \"HDL\"  No results found for: \"LDLCALC\"  No results found for: \"TRIG\"  No results found for: \"HGBA1C\"  Other labs not included in the list above were reviewed either before or during this encounter.    History    No past medical history on file.  No past surgical history on file.  No family history on file.  No Known Allergies  No current facility-administered medications on file prior to encounter.     Current Outpatient Medications on File Prior to Encounter   Medication Sig Dispense Refill    acetaminophen (Tylenol) 325 mg tablet Take 2 tablets (650 mg) by mouth 2 times a day.      hydrOXYzine pamoate (Vistaril) 25 mg capsule Take 1 capsule (25 mg) by mouth 2 times a day.      magnesium oxide (Mag-Ox) 400 mg tablet Take 1 tablet (400 mg) by mouth 2 times a day.      melatonin 5 mg tablet Take 1 tablet (5 mg) by mouth once daily at bedtime.      risperiDONE (RisperDAL) 2 mg tablet Take 1 tablet (2 mg) by mouth once daily at bedtime.      traZODone (Desyrel) 150 mg tablet Take 1 tablet (150 mg) by mouth once daily at bedtime.     Scheduled medications  buPROPion XL, 300 mg, oral, Daily  celecoxib, 200 mg, oral, Daily  cholecalciferol, 125 mcg, oral, Daily  citalopram, 30 mg, oral, Daily  lidocaine, 1 patch, transdermal, Daily  melatonin, 5 mg, oral, Nightly  QUEtiapine, 350 mg, oral, Nightly  traZODone, 150 mg, oral, Nightly      Continuous medications     PRN medications  PRN medications: acetaminophen, alum-mag hydroxide-simeth, diphenhydrAMINE, hydrOXYzine pamoate, OLANZapine, OLANZapine zydis     There is no immunization history on file for this " patient.  Patient's medical history was reviewed and updated either before or during this encounter.  ASSESSMENT / PLAN:  Active Hospital Problems    Bilateral hip pain      *Severe recurrent major depression without psychotic features (CMS/HCC)      Colostomy present (CMS/HCC)      Opioid abuse (CMS/[facility])  S/p fall.  Hip x-rays pending.  Patient is on a high dose of Seroquel 325 mg daily.  Patient was also on trazodone 150 mg.  Nurse practitioner is reviewing the psych medication which possibly can give her hypertension and dizziness and increase chances of fall.    Pt. Has been stable.clostomy is working well.        Jay Knox MD

## 2023-10-23 NOTE — NURSING NOTE
"Patient was noted in her bathroom on the floor at 01:25, sitting by the wall next to the toilet with colostomy bag opened and feces covering Pt's leg and floor, Pt last observed sleeping in her bed during 15 min roundings, Pt is A+Ox3, she stated \"I got up from the toilet, felt dizzy and fell on by butt\", Pt denied hitting the head.   Physical assessment completed, colostomy bag emptied, Pt cleaned up, vital signs obtained: BP 80/70, HR 78 while sitting on the floor, then Pt was assisted to the toilet BP 78/53 HR 80, Pt asymptomatic, denied dizziness, was able to get up from the toilet and walk to her bed. Pt has chronic back pain and stated that the pain level was not affected by the fall, vital signs obtained third time when Pt was in bed: BP 97/60, HR78, T 36.4, SpO2 98%, RR 18. Small abrasion noted on left elbow, non-skid yellow socks in place prior to fall, education provided to patient to call for assistance prior to getting up, demonstrated call light use.   Dr. Knox notified at 01:43, no new orders, continue monitoring, offer PRN pain relieving medication.  02:00 Pt was reassessed, denied pain at this time, refused pain intervention. Pt is resting in bed, no s/s of distress noted  "

## 2023-10-23 NOTE — CARE PLAN
The patient's goals for the shift include no falls    The clinical goals for the shift include no falls    Over the shift, the patient did not make progress toward the following goals. Barriers to progression include dizziness, hypotension. Recommendations to address these barriers include monitor for BP and dizziness.    Problem: Risk for falls  Goal: I will remain free from falls  Outcome: Not Progressing

## 2023-10-23 NOTE — PROGRESS NOTES
"Azul Roberts is a 63 y.o. female on day 44 of admission presenting with Severe recurrent major depression without psychotic features (CMS/[facility]).      Subjective   Case discussed with treatment team and chart reviewed.     Patient sitting outside of common room for interview.  She says she continues tofeel depressed and does not want to be alive anymore(no intent or plan).  She states this is due to her wanting to go home. She fell last night and her hip is hurting- she will have an xray today.  She reported to nursing that she was dizzy when stood-says not all the time- BP was lower and nursing is monitoring and encouraging fluids.  She cont to not attend group. She continues to endorse passive suicidal ideation, denies plan or intent.     Objective     Last Recorded Vitals  Blood pressure 96/66, pulse 66, temperature 36 °C (96.8 °F), temperature source Temporal, resp. rate 17, height 1.626 m (5' 4\"), weight 78.9 kg (174 lb), SpO2 99 %.    Review of Systems   Psychiatric/Behavioral:  Positive for behavioral problems, dysphoric mood, sleep disturbance and suicidal ideas. The patient is nervous/anxious.      Psychiatric ROS - Adult  Anxiety: General Anxiety Disorder (KAYLEEN)KAYLEEN Behaviors: difficult to control worry, difficulty concentrating, irritability, restlessness, and sleep disturbance  Depression: anhedonia, appetite increased, concentration, energy, helpless, hopeless, interest, persistent thoughts of death, psychomotor agitation, sleep decreased , suicidal thoughts, and withdrawn  Delirium: negative  Psychosis: negative  Ioana: negative  Safety Issues: passive death wish and suicidal ideation  Psychiatric ROS Comment: as noted    Physical Exam  Abdominal:      Comments: Presence of colostomy bag   Psychiatric:         Attention and Perception: She is inattentive.         Mood and Affect: Mood is anxious and depressed. Affect is labile, flat and inappropriate.         Speech: Speech is delayed.         Behavior: " "Behavior is uncooperative, agitated, aggressive and withdrawn.         Thought Content: Thought content includes suicidal ideation.         Judgment: Judgment is impulsive.     Mental Status Examination  General Appearance: Disheveled. and Limited eye contact.   Gait/Station: ambulating with walker assist device  Speech: increasing verbal responses which are short   Mood: \"not good\"  Affect: less Irritable and Angry,  Thought Process: remains impoverished  but less so  Thought Associations: No loosening of associations  Thought Content: depressed, less hopeless, persists as helpless  Perception: No perceptual abnormalities noted  Level of Consciousness: Drowsy  Orientation: Alert  Attention and Concentration: Mild impairment in attention  Recent Memory: Intact as evidenced by ability to recall details from the past 24 hours   Executive function: Intact  Language: Naming intact  Fund of knowledge: Good  Insight: Limited, as evidenced by disengagement  Judgment: Limited, as evidence by inability to reason through medical decision making and highly impaired reality testing       Psychiatric Risk Assessment  Violence Risk Assessment: major mental illness and substance abuse  Acute Risk of Harm to Others is Considered: moderate   Suicide Risk Assessment: , chronic medical illness, chronic pain, current psychiatric illness, feelings of hopelessness, global insomnia, history of trauma or abuse, life crisis (shame/despair), prior suicide attempt, severe anxiety, substance abuse, and suicidal ideations  Protective Factors against Suicide: adherence to  treatment and marriage/partnership  Acute Risk of Harm to Self is Considered: moderate    Relevant Results  Scheduled medications  buPROPion XL, 300 mg, oral, Daily  celecoxib, 200 mg, oral, Daily  cholecalciferol, 125 mcg, oral, Daily  citalopram, 20 mg, oral, Daily  DULoxetine, 30 mg, oral, Daily  lidocaine, 1 patch, transdermal, Daily  melatonin, 5 mg, oral, " Nightly  QUEtiapine, 350 mg, oral, Nightly  traZODone, 150 mg, oral, Nightly      Continuous medications     PRN medications  PRN medications: acetaminophen, alum-mag hydroxide-simeth, diphenhydrAMINE, hydrOXYzine pamoate, OLANZapine, OLANZapine zydis         Assessment/Plan   Principal Problem:    Severe recurrent major depression without psychotic features (CMS/HCC)  Active Problems:    Colostomy present (CMS/HCC)    Opioid abuse (CMS/HCC)    Bilateral hip pain    Biological -   Cymbalta titration to 40 mg daily for depression, ideally titrate to target dose of ~60 mg or higher as tolerated and beneficial    Celexa down to 10mg daily for depression, anxiety, plan to taper and discontinue once on potential treatment dose of Cymbalta - continue taper to discontinue    Continue:  Wellbutrin xl 300 mg for adjunctive depression treatment  seroquel 350 mg at bedtime  Will change Trazodone 150 mg at bedtime for sleep too prn    ECG (10/4/23): Normal sinus rhythm. Heart  rate 72 bpm. Qtc 442     Discussion with patient regarding risk benefits and alternatives and side effects with opportunity to ask questions and questions answered.  Patient given consent to the plan as noted    2. Psychological -       Patient is encouraged to participate with the therapeutic milieu and program and group therapy    3. Sociological -      Patient encouraged to cooperate with social work staff on issues relevant to discharge planning  [place] Medicaid have directed us not to call them again until Wednesday of this week to check on an update.  Plan remains transition to convalescence nursing care once this becomes available to patient      Shannan Gan, JANET-CNS

## 2023-10-23 NOTE — GROUP NOTE
Group Topic: Other   Group Date: 10/23/2023  Start Time: 1100  End Time: 1140  Facilitators: BRENDA Fernandes   Department: [facility] REHABTH VIRTUAL    Number of Participants: 5   Group Focus: reminiscence  Treatment Modality: Other: Recreation therapy  Interventions utilized were exploration, reality testing, and reminiscence  Purpose: feelings    Name: Azul Roberts YOB: 1960   MR: [id number]      Facilitator: Recreational Therapist  Level of Participation: did not attend  Quality of Participation:  did not attend  Interactions with others:  did not attend  Mood/Affect:  did not attend  Triggers (if applicable): n/a  Cognition:  did not attend  Progress: None  Comments: pt problem is depressed mood.   Plan: continue with services

## 2023-10-23 NOTE — PROGRESS NOTES
" REHAB Therapy Assessment & Treatment    Patient Name: Azul Roberts  MRN: [id number]  Today's Date: 10/23/2023      Recreation note : pt is alert and oriented x 2 with some confusion and memory loss.  Refused group today but did agree to sit in the hallway.  Pt did engage with CTRS for about 15 minutes.  Smiles at a few comments made with CTRS and engaged about how she feels.  Poor eye contact with interaction.  Reports she \"feels like I am never going to get out of here\".  CTRS re-assures pt we are working on a safe and appropriate discharge plan.  Pt smiles and says \"thanks\".  Will continue to encourage pt to attend groups of choice daily.       "

## 2023-10-23 NOTE — NURSING NOTE
SARAN NOTE     Problem:  depression     Behavior:  Pt has been out of her room more often this shift.  She is taking scheduled medications and sitting out in the hallway for groups.  She has been cooperative with care    Group Participation: sits in hallway  Appetite/Meals: 100%       Interventions:  Give scheduled medications, every 15 minute checks, encouraged patient to drink water throughout the day, encouraged participation in milieu.    Response:  Pt stayed up out of her room most of the day, she drank water when asked and allowed her colostomy to be birped and emptied.     Plan:  Give scheduled medications, every 15 minute checks, encouraged patient to drink water throughout the day, encouraged participation in milieu.

## 2023-10-24 PROCEDURE — 2500000001 HC RX 250 WO HCPCS SELF ADMINISTERED DRUGS (ALT 637 FOR MEDICARE OP): Performed by: REGISTERED NURSE

## 2023-10-24 PROCEDURE — 99232 SBSQ HOSP IP/OBS MODERATE 35: CPT | Performed by: REGISTERED NURSE

## 2023-10-24 PROCEDURE — 1140000001 HC PRIVATE PSYCH ROOM DAILY

## 2023-10-24 PROCEDURE — 2500000001 HC RX 250 WO HCPCS SELF ADMINISTERED DRUGS (ALT 637 FOR MEDICARE OP): Performed by: PSYCHIATRY & NEUROLOGY

## 2023-10-24 PROCEDURE — 2500000004 HC RX 250 GENERAL PHARMACY W/ HCPCS (ALT 636 FOR OP/ED): Performed by: PSYCHIATRY & NEUROLOGY

## 2023-10-24 RX ADMIN — DULOXETINE HYDROCHLORIDE 40 MG: 20 CAPSULE, DELAYED RELEASE ORAL at 08:19

## 2023-10-24 RX ADMIN — CELECOXIB 200 MG: 200 CAPSULE ORAL at 08:19

## 2023-10-24 RX ADMIN — CITALOPRAM HYDROBROMIDE 10 MG: 10 TABLET ORAL at 08:19

## 2023-10-24 RX ADMIN — QUETIAPINE FUMARATE 350 MG: 300 TABLET ORAL at 21:19

## 2023-10-24 RX ADMIN — Medication 125 MCG: at 08:19

## 2023-10-24 RX ADMIN — BUPROPION HYDROCHLORIDE 300 MG: 300 TABLET, FILM COATED, EXTENDED RELEASE ORAL at 08:19

## 2023-10-24 ASSESSMENT — COLUMBIA-SUICIDE SEVERITY RATING SCALE - C-SSRS
6. HAVE YOU EVER DONE ANYTHING, STARTED TO DO ANYTHING, OR PREPARED TO DO ANYTHING TO END YOUR LIFE?: NO
1. SINCE LAST CONTACT, HAVE YOU WISHED YOU WERE DEAD OR WISHED YOU COULD GO TO SLEEP AND NOT WAKE UP?: NO
2. HAVE YOU ACTUALLY HAD ANY THOUGHTS OF KILLING YOURSELF?: NO
6. HAVE YOU EVER DONE ANYTHING, STARTED TO DO ANYTHING, OR PREPARED TO DO ANYTHING TO END YOUR LIFE?: NO
2. HAVE YOU ACTUALLY HAD ANY THOUGHTS OF KILLING YOURSELF?: NO
1. SINCE LAST CONTACT, HAVE YOU WISHED YOU WERE DEAD OR WISHED YOU COULD GO TO SLEEP AND NOT WAKE UP?: NO

## 2023-10-24 ASSESSMENT — ENCOUNTER SYMPTOMS
DYSPHORIC MOOD: 1
NERVOUS/ANXIOUS: 1
SLEEP DISTURBANCE: 1

## 2023-10-24 ASSESSMENT — PAIN SCALES - GENERAL
PAINLEVEL_OUTOF10: 0 - NO PAIN
PAINLEVEL_OUTOF10: 0 - NO PAIN

## 2023-10-24 NOTE — PROGRESS NOTES
"Azul Roberts is a 63 y.o. female on day 45 of admission presenting with Severe recurrent major depression without psychotic features (CMS/HCC).    Subjective          Objective     Physical Exam    Last Recorded Vitals  Blood pressure 120/81, pulse 73, temperature 36.5 °C (97.7 °F), temperature source Temporal, resp. rate 18, height 1.626 m (5' 4\"), weight 78.9 kg (174 lb), SpO2 100 %.  Intake/Output last 3 Shifts:  No intake/output data recorded.    Relevant Results                             Assessment/Plan   Principal Problem:    Severe recurrent major depression without psychotic features (CMS/HCC)  Active Problems:    Colostomy present (CMS/HCC)    Opioid abuse (CMS/HCC)    Bilateral hip pain               Nikki Andrews, DRAGAN      "

## 2023-10-24 NOTE — NURSING NOTE
SARAN NOTE     Problem:  depression     Behavior:  Pt has been out of her room sitting in the hallway more often throughout the shift.  She is complaining less when staff performs ADL care with her.  She is taking scheduled medications and sitting in hallway during group.  Group Participation: partially  Appetite/Meals: 100%       Interventions:  1:1 interaction, open blinds to room, encourage participation in group, give scheduled medications, every 15 minute checks    Response:  Pt continues to come out of her room and spend time in the hallway, she smiled a little this afternoon.       Plan:  1:1 interaction, open blinds to room, encourage participation in group, give scheduled medications, every 15 minute checks

## 2023-10-24 NOTE — PROGRESS NOTES
SARAHW spoke with pt's  who provided his information so that it can be shared with JFS for Medicaid purposes. Pt's  will gather bank statements and provide those as soon as he is able to, likely within the next couple of days.       Nikki Andrews, DRAAGN

## 2023-10-24 NOTE — GROUP NOTE
Group Topic: Other   Group Date: 10/24/2023  Start Time: 1045  End Time: 1115  Facilitators: BRENDA Howard   Department: [facility] REHABTH VIRTUAL    Number of Participants: 5   Group Focus: other   Social skills, Trivi  Treatment Modality: Other: Recreation Therapy  Interventions utilized were mental fitness  Purpose: other: social skills, mental fitness    Name: Azul Roberts YOB: 1960   MR: [id number]      Facilitator: Recreational Therapist  Level of Participation: did not attend  Quality of Participation:  did not attend  Interactions with others:  did not attend  Mood/Affect:  n/a  Triggers (if applicable): n/a  Cognition:  n/a  Progress: None  Comments: pt problem id depressed mood   Plan: continue with services

## 2023-10-24 NOTE — PROGRESS NOTES
"zAul Roberts is a 63 y.o. female on day 45 of admission presenting with Severe recurrent major depression without psychotic features (CMS/[facility]).      Subjective   Case discussed with treatment team and chart reviewed.     Patient sitting outside of common area for interview.  She says she continues tofeel depressed.   Nursing reports she slept 8hs with no prns- she says she laid awake(provider told her prns are available as needed but explained wanted to minimize meds that might effect her balance and BP)  She cont to not attend group. She continues to endorse passive suicidal ideation, denies plan or intent.     Objective     Last Recorded Vitals  Blood pressure 120/81, pulse 73, temperature 36.5 °C (97.7 °F), temperature source Temporal, resp. rate 18, height 1.626 m (5' 4\"), weight 78.9 kg (174 lb), SpO2 100 %.    Review of Systems   Psychiatric/Behavioral:  Positive for behavioral problems, dysphoric mood, sleep disturbance and suicidal ideas. The patient is nervous/anxious.      Psychiatric ROS - Adult  Anxiety: General Anxiety Disorder (KAYLEEN)KAYLEEN Behaviors: difficult to control worry, difficulty concentrating, irritability, restlessness, and sleep disturbance  Depression: anhedonia, appetite increased, concentration, energy, helpless, hopeless, interest, persistent thoughts of death, psychomotor agitation, sleep decreased , suicidal thoughts, and withdrawn  Delirium: negative  Psychosis: negative  Ioana: negative  Safety Issues: passive death wish and suicidal ideation  Psychiatric ROS Comment: as noted    Physical Exam  Abdominal:      Comments: Presence of colostomy bag   Psychiatric:         Attention and Perception: She is inattentive.         Mood and Affect: Mood is anxious and depressed. Affect is labile, flat and inappropriate.         Speech: Speech is delayed.         Behavior: Behavior is uncooperative, agitated, aggressive and withdrawn.         Thought Content: Thought content includes suicidal " "ideation.         Judgment: Judgment is impulsive.     Mental Status Examination  General Appearance: Disheveled. and Limited eye contact.   Gait/Station: ambulating with walker assist device  Speech: increasing verbal responses which are short   Mood: \"not good\"  Affect: less Irritable and Angry,  Thought Process: remains impoverished  but less so  Thought Associations: No loosening of associations  Thought Content: depressed, less hopeless, persists as helpless  Perception: No perceptual abnormalities noted  Level of Consciousness: Drowsy  Orientation: Alert  Attention and Concentration: Mild impairment in attention  Recent Memory: Intact as evidenced by ability to recall details from the past 24 hours   Executive function: Intact  Language: Naming intact  Fund of knowledge: Good  Insight: Limited, as evidenced by disengagement  Judgment: Limited, as evidence by inability to reason through medical decision making and highly impaired reality testing       Psychiatric Risk Assessment  Violence Risk Assessment: major mental illness and substance abuse  Acute Risk of Harm to Others is Considered: moderate   Suicide Risk Assessment: , chronic medical illness, chronic pain, current psychiatric illness, feelings of hopelessness, global insomnia, history of trauma or abuse, life crisis (shame/despair), prior suicide attempt, severe anxiety, substance abuse, and suicidal ideations  Protective Factors against Suicide: adherence to  treatment and marriage/partnership  Acute Risk of Harm to Self is Considered: moderate    Relevant Results  Scheduled medications  buPROPion XL, 300 mg, oral, Daily  celecoxib, 200 mg, oral, Daily  cholecalciferol, 125 mcg, oral, Daily  citalopram, 10 mg, oral, Daily  DULoxetine, 40 mg, oral, Daily  lidocaine, 1 patch, transdermal, Daily  QUEtiapine, 350 mg, oral, Nightly      Continuous medications     PRN medications  PRN medications: acetaminophen, alum-mag hydroxide-peterson, " diphenhydrAMINE, hydrOXYzine pamoate, melatonin, OLANZapine, OLANZapine zydis, traZODone         Assessment/Plan   Principal Problem:    Severe recurrent major depression without psychotic features (CMS/HCC)  Active Problems:    Colostomy present (CMS/HCC)    Opioid abuse (CMS/HCC)    Bilateral hip pain    Biological -   Cymbalta titration to 40 mg daily for depression, ideally titrate to target dose of ~60 mg or higher as tolerated and beneficial    Celexa down to 10mg daily for depression, anxiety, plan to taper and discontinue once on potential treatment dose of Cymbalta - continue taper to discontinue    Continue:  Wellbutrin xl 300 mg for adjunctive depression treatment  seroquel 350 mg at bedtime  Will change Trazodone 150 mg  and melatonin at bedtime for sleep prn  Plan to discontinue celexa thursday and increase cymbalta 60mg every day fro depression/anxiety    ECG (10/4/23): Normal sinus rhythm. Heart  rate 72 bpm. Qtc 442     Discussion with patient regarding risk benefits and alternatives and side effects with opportunity to ask questions and questions answered.  Patient given consent to the plan as noted    2. Psychological -       Patient is encouraged to participate with the therapeutic milieu and program and group therapy    3. Sociological -      Patient encouraged to cooperate with social work staff on issues relevant to discharge planning  [place] Medicaid have directed us not to call them again until Wednesday of this week to check on an update.  Plan remains transition to convalescence nursing care once this becomes available to patient      Shannan Gan, JANET-CNS

## 2023-10-24 NOTE — PROGRESS NOTES
SARAHW attempted to contact pt's  again regarding his information which is needed to further process the Medicaid application. DRAGAN is working with pt to establish [place] Medicaid benefits. SARAHW requested a returned phone call with this information so that it can be provided to JFS.       Nikki Andrews, DRAGAN

## 2023-10-24 NOTE — NURSING NOTE
BIRP NOTE     Problem:  Depression     Behavior:  Patient is sitting in patient’s wheelchair near the nurse station and waiting for a snack. Patient is pleasant and calm. Patient's affect is blunted. Patient is very little talkative. Patient maintains brief eye contact.    Group Participation: N/A  Appetite/Meals: N/A       Interventions:  This nurse completed a shift assessment and administered patient's scheduled night time medications.    Response:  Patient remained pleasant and calm and was cooperative throughout this shift assessment and medication administration.     Plan:  Continue to monitor for depression. Continue to monitor for patient safety.

## 2023-10-24 NOTE — GROUP NOTE
Group Topic: Self Esteem   Group Date: 10/24/2023  Start Time: 1000  End Time: 1045  Facilitators: RANDALL HowardS   Department: [facility] REHABTH VIRTUAL    Number of Participants: 5   Group Focus: affirmation and self-esteem  Treatment Modality: Other: Recreational Therapy  Interventions utilized were mental fitness  Purpose: self-worth and self-care    Name: Azul Roberts YOB: 1960   MR: [id number]      Facilitator: Recreational Therapist  Level of Participation: did not attend  Quality of Participation:  did not attend  Interactions with others:  did not attend  Mood/Affect:  n/a  Triggers (if applicable): n/a  Cognition:  n/a  Progress: None  Comments: pt problem is depressed mood. Pt sitting in hallway and refuses to attend group. Pt affect seems brighter and more pleasant then previous interactions with this CTRS.   Plan: continue with services

## 2023-10-24 NOTE — GROUP NOTE
Group Topic: Other   Group Date: 10/24/2023  Start Time: 1515  End Time: 1555  Facilitators: BRENDA Howard   Department: [facility] REHABTH VIRTUAL    Number of Participants: 9   Group Focus: relaxation  Treatment Modality: Other: Recreation Therapy  Interventions utilized were mental fitness and other Relaxation   Purpose: self-care and other: Wellness, relaxation     Name: Azul Roberts YOB: 1960   MR: [id number]      Facilitator: Recreational Therapist  Level of Participation: did not attend  Quality of Participation:  did not attend  Interactions with others:  did not attend  Mood/Affect:  n/a  Triggers (if applicable): n/a  Cognition:  n/a  Progress: None  Comments: pt problem is depressed mood. Pt refuses to attend group session but does come out of room and sits in the hallway.   Plan: continue with services

## 2023-10-24 NOTE — PROGRESS NOTES
DRAGAN met with pt who was sitting up in wheelchair near nurses station. DRAGAN spoke briefly with pt who reports that she is hopeless and thinks she is never getting leaving. SARAHW encouraged pt to participate in therapy and activities to help improve her mood and get her closer to getting home. SARAHW discussed that it can be a slower process to getting to where things need to be but eventually she will get there. Pt presented less irritable and was able to open up a little. Pt is presenting with some progress in mood.     Nikki Andrews, DRAGAN

## 2023-10-25 PROCEDURE — 2500000001 HC RX 250 WO HCPCS SELF ADMINISTERED DRUGS (ALT 637 FOR MEDICARE OP): Performed by: PSYCHIATRY & NEUROLOGY

## 2023-10-25 PROCEDURE — 97530 THERAPEUTIC ACTIVITIES: CPT | Mod: GO,CO

## 2023-10-25 PROCEDURE — 1140000001 HC PRIVATE PSYCH ROOM DAILY

## 2023-10-25 PROCEDURE — 2500000004 HC RX 250 GENERAL PHARMACY W/ HCPCS (ALT 636 FOR OP/ED): Performed by: PSYCHIATRY & NEUROLOGY

## 2023-10-25 PROCEDURE — 97535 SELF CARE MNGMENT TRAINING: CPT | Mod: GO,CO

## 2023-10-25 PROCEDURE — 2500000001 HC RX 250 WO HCPCS SELF ADMINISTERED DRUGS (ALT 637 FOR MEDICARE OP): Performed by: REGISTERED NURSE

## 2023-10-25 PROCEDURE — 99232 SBSQ HOSP IP/OBS MODERATE 35: CPT | Performed by: INTERNAL MEDICINE

## 2023-10-25 PROCEDURE — 99232 SBSQ HOSP IP/OBS MODERATE 35: CPT | Performed by: REGISTERED NURSE

## 2023-10-25 RX ORDER — DULOXETIN HYDROCHLORIDE 60 MG/1
60 CAPSULE, DELAYED RELEASE ORAL DAILY
Status: DISCONTINUED | OUTPATIENT
Start: 2023-10-26 | End: 2023-10-29

## 2023-10-25 RX ADMIN — CITALOPRAM HYDROBROMIDE 10 MG: 10 TABLET ORAL at 10:07

## 2023-10-25 RX ADMIN — BUPROPION HYDROCHLORIDE 300 MG: 300 TABLET, FILM COATED, EXTENDED RELEASE ORAL at 10:07

## 2023-10-25 RX ADMIN — DULOXETINE HYDROCHLORIDE 40 MG: 20 CAPSULE, DELAYED RELEASE ORAL at 10:07

## 2023-10-25 RX ADMIN — CELECOXIB 200 MG: 200 CAPSULE ORAL at 10:06

## 2023-10-25 RX ADMIN — Medication 125 MCG: at 10:08

## 2023-10-25 RX ADMIN — QUETIAPINE FUMARATE 350 MG: 300 TABLET ORAL at 20:22

## 2023-10-25 ASSESSMENT — COLUMBIA-SUICIDE SEVERITY RATING SCALE - C-SSRS
6. HAVE YOU EVER DONE ANYTHING, STARTED TO DO ANYTHING, OR PREPARED TO DO ANYTHING TO END YOUR LIFE?: NO
2. HAVE YOU ACTUALLY HAD ANY THOUGHTS OF KILLING YOURSELF?: NO
1. SINCE LAST CONTACT, HAVE YOU WISHED YOU WERE DEAD OR WISHED YOU COULD GO TO SLEEP AND NOT WAKE UP?: NO
6. HAVE YOU EVER DONE ANYTHING, STARTED TO DO ANYTHING, OR PREPARED TO DO ANYTHING TO END YOUR LIFE?: NO
1. SINCE LAST CONTACT, HAVE YOU WISHED YOU WERE DEAD OR WISHED YOU COULD GO TO SLEEP AND NOT WAKE UP?: NO
2. HAVE YOU ACTUALLY HAD ANY THOUGHTS OF KILLING YOURSELF?: NO

## 2023-10-25 ASSESSMENT — COGNITIVE AND FUNCTIONAL STATUS - GENERAL
TOILETING: A LITTLE
DAILY ACTIVITIY SCORE: 21
DRESSING REGULAR LOWER BODY CLOTHING: A LITTLE
HELP NEEDED FOR BATHING: A LITTLE

## 2023-10-25 ASSESSMENT — ENCOUNTER SYMPTOMS
SLEEP DISTURBANCE: 1
NERVOUS/ANXIOUS: 1
DYSPHORIC MOOD: 1

## 2023-10-25 ASSESSMENT — PAIN SCALES - GENERAL
PAINLEVEL_OUTOF10: 7
PAINLEVEL_OUTOF10: 3
PAINLEVEL_OUTOF10: 0 - NO PAIN
PAINLEVEL_OUTOF10: 0 - NO PAIN

## 2023-10-25 ASSESSMENT — PAIN SCALES - PAIN ASSESSMENT IN ADVANCED DEMENTIA (PAINAD)
BREATHING: NORMAL
BREATHING: NORMAL

## 2023-10-25 ASSESSMENT — ACTIVITIES OF DAILY LIVING (ADL)
HOME_MANAGEMENT_TIME_ENTRY: 38
BATHING_LEVEL_OF_ASSISTANCE: MINIMUM ASSISTANCE
BATHING_WHERE_ASSESSED: SHOWER

## 2023-10-25 ASSESSMENT — PAIN - FUNCTIONAL ASSESSMENT
PAIN_FUNCTIONAL_ASSESSMENT: 0-10
PAIN_FUNCTIONAL_ASSESSMENT: 0-10

## 2023-10-25 NOTE — CARE PLAN
The patient's goals for the shift include I dont know    The clinical goals for the shift include encourage participation in milieu    Over the shift, the patient did not make progress toward the following goals. Barriers to progression include n/a. Recommendations to address these barriers include n/a.

## 2023-10-25 NOTE — PROGRESS NOTES
[facility]: [facility]  PROGRESS NOTE      Azul Roberts is a 63 y.o. female that is being seen  today for follow up at [facility]   Pt. Is being seen for follow up.XRAY results are pending.Pain is fairly controlled.        ROS  Negative for fever or chills  Negative for sore throat, ear pain, nasal discharge  Negative for cough, shortness of breath or wheezing  Negative for chest pain, palpitations, swelling of legs  Negative for abdominal pain, constipation, diarrhea, blood in the stools,has colostomy  Negative for urinary complaints  Negative for headache, dizziness, weakness or numbness  Negative for joint pain  Positive for depression or anxiety  All other systems reviewed and were negative  Vitals:    10/23/23 0500   BP: 96/66   Pulse: 66   Resp: 17   Temp: 36 °C (96.8 °F)   SpO2: 99%      Body mass index is 29.87 kg/m².  Physical Exam  Constitutional: Patient does not appear to be in any acute distress  Head and Face: NCAT  Eyes: Normal external exam, EOMI  ENT: Normal external inspection of ears and nose. Oropharynx normal.  Cardiovascular: RRR, S1/S2, no murmurs, rubs, or gallops, radial pulses +2, no edema of extremities  Pulmonary: CTAB, no respiratory distress.  Abdomen: +BS, soft, non-tender, nondistended, no guarding or rebound, no masses noted.colostomy present   MSK: hip joint pain   Skin- No lesions, contusions, or erythema.  Peripheral puslses palpable bilaterally 2+  Neuro: AAO X3, Cranial nerves 2-12 grossly intact,DTR 2+ in all 4 limbs       Diagnostic Results   Lab Results   Component Value Date    GLUCOSE 113 (H) 09/05/2023    CALCIUM 10.6 (H) 09/05/2023     09/05/2023    K 3.7 09/05/2023    CO2 24 09/05/2023     09/05/2023    BUN 12 09/05/2023    CREATININE 0.72 09/05/2023     Lab Results   Component Value Date    ALT 12 09/05/2023    AST 14 09/05/2023    ALKPHOS 103 09/05/2023    BILITOT 0.4 09/05/2023     Lab Results   Component Value Date     "WBC 13.2 (H) 09/05/2023    HGB 13.9 09/05/2023    HCT 43.0 09/05/2023    MCV 81 09/05/2023     09/05/2023     No results found for: \"CHOL\"  No results found for: \"HDL\"  No results found for: \"LDLCALC\"  No results found for: \"TRIG\"  No results found for: \"HGBA1C\"  Other labs not included in the list above were reviewed either before or during this encounter.    History    No past medical history on file.  No past surgical history on file.  No family history on file.  No Known Allergies  No current facility-administered medications on file prior to encounter.     Current Outpatient Medications on File Prior to Encounter   Medication Sig Dispense Refill    acetaminophen (Tylenol) 325 mg tablet Take 2 tablets (650 mg) by mouth 2 times a day.      hydrOXYzine pamoate (Vistaril) 25 mg capsule Take 1 capsule (25 mg) by mouth 2 times a day.      magnesium oxide (Mag-Ox) 400 mg tablet Take 1 tablet (400 mg) by mouth 2 times a day.      melatonin 5 mg tablet Take 1 tablet (5 mg) by mouth once daily at bedtime.      risperiDONE (RisperDAL) 2 mg tablet Take 1 tablet (2 mg) by mouth once daily at bedtime.      traZODone (Desyrel) 150 mg tablet Take 1 tablet (150 mg) by mouth once daily at bedtime.     Scheduled medications  buPROPion XL, 300 mg, oral, Daily  celecoxib, 200 mg, oral, Daily  cholecalciferol, 125 mcg, oral, Daily  citalopram, 30 mg, oral, Daily  lidocaine, 1 patch, transdermal, Daily  melatonin, 5 mg, oral, Nightly  QUEtiapine, 350 mg, oral, Nightly  traZODone, 150 mg, oral, Nightly      Continuous medications     PRN medications  PRN medications: acetaminophen, alum-mag hydroxide-simeth, diphenhydrAMINE, hydrOXYzine pamoate, OLANZapine, OLANZapine zydis     There is no immunization history on file for this patient.  Patient's medical history was reviewed and updated either before or during this encounter.  ASSESSMENT / PLAN:  Active Hospital Problems    Bilateral hip pain      *Severe recurrent major " depression without psychotic features (CMS/HCC)      Colostomy present (CMS/HCC)      Opioid abuse (CMS/HCC)  S/p fall.  Hip x-rays pending.     Pt. Has been stable.clostomy is working well.        Jay Knox MD

## 2023-10-25 NOTE — CARE PLAN
The patient's goals for the shift include taking showers.    The clinical goals for the shift include good hygiene.    Over the shift, the patient did make progress toward the following goals. Progression included of taking a shower today. Recommendations to address these barriers include taking a shower daily.    Problem: Fall/Injury  Goal: Use assistive devices by end of the shift  Outcome: Not Progressing  Goal: Pace activities to prevent fatigue by end of the shift  Outcome: Not Progressing     Problem: Bathing  Goal: LTG - Patient will utilize adaptive techniques to bathe body  Outcome: Not Progressing     Problem: Dressings Lower Extremities  Goal: LTG - Patient will utilize adaptive techniques/equipment to dress lower body  Outcome: Not Progressing     Problem: Grooming  Goal: LTG - Patient will demonstrate use of appropriate Intervention for safe grooming habits  Outcome: Not Progressing     Problem: Risk for falls  Goal: I will remain free from falls  Outcome: Not Progressing

## 2023-10-25 NOTE — NURSING NOTE
BIRP NOTE     Problem:  SI     Behavior:  Denies any SI  Group Participation: no  Appetite/Meals: 100x3       Interventions:  Advised too seek staff with any issues    Response:  Pt states she understands to seek staff with any issues     Plan:  Will continue to monitor

## 2023-10-25 NOTE — PROGRESS NOTES
Nutrition Follow up Note    Pt continues to eat well.    Lab Results   Component Value Date    WBC 8.8 09/11/2023    HGB 11.9 (L) 09/11/2023    HCT 36.3 09/11/2023     09/11/2023    CHOL 184 09/11/2023    TRIG 99 09/11/2023    HDL 45 (L) 09/11/2023    ALT 15 09/11/2023    AST 15 09/11/2023     09/11/2023    K 4.6 09/11/2023     09/11/2023    CREATININE 0.6 09/11/2023    BUN 13 09/11/2023    CO2 22 (L) 09/11/2023    TSH 1.64 09/11/2023    HGBA1C 5.8 09/11/2023       Current Facility-Administered Medications:     acetaminophen (Tylenol) tablet 650 mg, 650 mg, oral, q6h PRN, Eddie Cullen DO    alum-mag hydroxide-simeth (Mylanta) 200-200-20 mg/5 mL oral suspension 30 mL, 30 mL, oral, q6h PRN, Eddei Cullen DO    buPROPion XL (Wellbutrin XL) 24 hr tablet 300 mg, 300 mg, oral, Daily, Eddie Cullen DO, 300 mg at 10/25/23 1007    celecoxib (CeleBREX) capsule 200 mg, 200 mg, oral, Daily, Eddie Cullen DO, 200 mg at 10/25/23 1006    cholecalciferol (Vitamin D-3) capsule 125 mcg, 125 mcg, oral, Daily, Eddie Cullen DO, 125 mcg at 10/25/23 1008    diphenhydrAMINE (Sominex) tablet 25 mg, 25 mg, oral, q8h PRN, Eddie Cullen DO    [START ON 10/26/2023] DULoxetine (Cymbalta) DR capsule 60 mg, 60 mg, oral, Daily, JANET Sanon-CNS    hydrOXYzine pamoate (Vistaril) capsule 25 mg, 25 mg, oral, q8h PRN, Eddie Cullen DO    lidocaine 4 % patch 1 patch, 1 patch, transdermal, Daily, Eddie Cullen DO    melatonin tablet 5 mg, 5 mg, oral, Nightly PRN, Shannan Gan, APRN-CNS    OLANZapine (ZyPREXA) injection 5 mg, 5 mg, intramuscular, q8h PRN, Eddie Cullen DO    OLANZapine zydis (ZyPREXA) disintegrating tablet 5 mg, 5 mg, oral, q8h PRN, Eddie Cullen DO    QUEtiapine (SEROquel) tablet 350 mg, 350 mg, oral, Nightly, Eddie Cullen DO, 350 mg at 10/24/23 2119    traZODone (Desyrel) tablet 150 mg, 150 mg, oral, Nightly PRN, Shannan MENDOZA  "Malik, APRN-CNS    Dietary Orders (From admission, onward)       Start     Ordered    09/28/23 1750  Adult diet Regular  Diet effective now        Question:  Diet type  Answer:  Regular    09/28/23 1800    09/28/23 1750  Oral nutritional supplements  Until discontinued        Comments: Chocolate ensure high protein   Question Answer Comment   Deliver with  three times daily between meals   Select supplement: Ensure High Protein        09/28/23 1800                  Food and Nutrient History  Energy Intake: Good > 75 %    Anthropometrics:  Ht: 162.6 cm (5' 4\"), Wt: 78.9 kg (174 lb), BMI: 29.85    Weight Change  Weight History / % Weight Change: no updated wt    IBW/kg (Dietitian Calculated): 54.55 kg    Adjusted Body Weight (kg): 60.91 kg    Estimated Energy Needs  Total Energy Estimated Needs (kCal): 1517 kCal  Method for Estimating Needs: 25 kcals/kg ABW    Estimated Protein Needs  Total Protein Estimated Needs (g):  (49-61)  Method for Estimating Needs: 0.8-1 g/kg ABW    Estimated Fluid Needs  Total Fluid Estimated Needs (mL): 1517 mL  Method for Estimating Needs: 1 ml/kcal ABW    Nutrition Focused Physical Findings: deferred   Physical Findings (Nutrition Deficiency/Toxicity)  Skin:  (colostomy)    Nutrition Diagnosis:  Malnutrition Diagnosis  Patient has Malnutrition Diagnosis: No    Patient has Nutrition Diagnosis: No    Nutrition Interventions/Recommendations: None at this time.    Education Documentation  No documentation found.      Nutrition Monitoring/Evaluation: None at this time.    RD Recommendations: None at this time.      Follow Up  Time Spent (min): 20 minutes  Last Date of Nutrition Visit: 10/25/23  Nutrition Follow-Up Needed?: 7-10 days  Follow up Comment: 11/1/23    "

## 2023-10-25 NOTE — GROUP NOTE
Group Topic: Other   Group Date: 10/25/2023  Start Time: 1515  End Time: 1600  Facilitators: BRENDA Fernandes   Department: [facility]    Number of Participants: 5   Group Focus: other Life questions  Treatment Modality: Other: Recreation Therapy  Interventions utilized were exploration, mental fitness, reminiscence, and story telling  Purpose: feelings    Name: Azul Roberts YOB: 1960   MR: [id number]      Facilitator: Recreational Therapist  Level of Participation: did not attend  Quality of Participation:  did not attend  Interactions with others:  did not attend  Mood/Affect:  did not attend  Triggers (if applicable): n/a  Cognition:  did not attend  Progress: None  Comments: pt problem is depressed mood.  Pt is resting in her room.  Declines invitation to join group.   Plan: continue with services

## 2023-10-25 NOTE — PROGRESS NOTES
"Azul Roberts is a 63 y.o. female on day 46 of admission presenting with Severe recurrent major depression without psychotic features (CMS/HCC).      Subjective   Case discussed with treatment team and chart reviewed.     Patient sitting outside of common area for interview.  She says she continues tofeel depressed.   Nursing reports she slept 9hs with no prns.  She cont to not attend group. She continues to endorse passive suicidal ideation, denies plan or intent.     Objective     Last Recorded Vitals  Blood pressure 113/71, pulse 72, temperature 36.4 °C (97.5 °F), temperature source Temporal, resp. rate 18, height 1.626 m (5' 4\"), weight 78.9 kg (174 lb), SpO2 100 %.    Review of Systems   Psychiatric/Behavioral:  Positive for behavioral problems, dysphoric mood, sleep disturbance and suicidal ideas. The patient is nervous/anxious.      Psychiatric ROS - Adult  Anxiety: General Anxiety Disorder (KAYLEEN)KAYLEEN Behaviors: difficult to control worry, difficulty concentrating, irritability, restlessness, and sleep disturbance  Depression: anhedonia, appetite increased, concentration, energy, helpless, hopeless, interest, persistent thoughts of death, psychomotor agitation, sleep decreased , suicidal thoughts, and withdrawn  Delirium: negative  Psychosis: negative  Ioana: negative  Safety Issues: passive death wish and suicidal ideation  Psychiatric ROS Comment: as noted    Physical Exam  Abdominal:      Comments: Presence of colostomy bag   Psychiatric:         Attention and Perception: She is inattentive.         Mood and Affect: Mood is anxious and depressed. Affect is labile, flat and inappropriate.         Speech: Speech is delayed.         Behavior: Behavior is uncooperative, agitated, aggressive and withdrawn.         Thought Content: Thought content includes suicidal ideation.         Judgment: Judgment is impulsive.     Mental Status Examination  General Appearance: Disheveled. and Limited eye contact. " "  Gait/Station: ambulating with walker assist device  Speech: increasing verbal responses which are short   Mood: \"not good\"  Affect: less Irritable and Angry,  Thought Process: remains impoverished  but less so  Thought Associations: No loosening of associations  Thought Content: depressed, less hopeless, persists as helpless  Perception: No perceptual abnormalities noted  Level of Consciousness: Drowsy  Orientation: Alert  Attention and Concentration: Mild impairment in attention  Recent Memory: Intact as evidenced by ability to recall details from the past 24 hours   Executive function: Intact  Language: Naming intact  Fund of knowledge: Good  Insight: Limited, as evidenced by disengagement  Judgment: Limited, as evidence by inability to reason through medical decision making and highly impaired reality testing       Psychiatric Risk Assessment  Violence Risk Assessment: major mental illness and substance abuse  Acute Risk of Harm to Others is Considered: moderate   Suicide Risk Assessment: , chronic medical illness, chronic pain, current psychiatric illness, feelings of hopelessness, global insomnia, history of trauma or abuse, life crisis (shame/despair), prior suicide attempt, severe anxiety, substance abuse, and suicidal ideations  Protective Factors against Suicide: adherence to  treatment and marriage/partnership  Acute Risk of Harm to Self is Considered: moderate    Relevant Results  Scheduled medications  buPROPion XL, 300 mg, oral, Daily  celecoxib, 200 mg, oral, Daily  cholecalciferol, 125 mcg, oral, Daily  citalopram, 10 mg, oral, Daily  DULoxetine, 40 mg, oral, Daily  lidocaine, 1 patch, transdermal, Daily  QUEtiapine, 350 mg, oral, Nightly      Continuous medications     PRN medications  PRN medications: acetaminophen, alum-mag hydroxide-simeth, diphenhydrAMINE, hydrOXYzine pamoate, melatonin, OLANZapine, OLANZapine zydis, traZODone         Assessment/Plan   Principal Problem:    Severe " recurrent major depression without psychotic features (CMS/HCC)  Active Problems:    Colostomy present (CMS/HCC)    Opioid abuse (CMS/HCC)    Bilateral hip pain    Biological -   Cymbalta titration to 60 mg daily for depression, ideally titrate to target dose of ~60 mg or higher as tolerated and beneficial    Will discontinue Celexa  for depression, anxiety, plan to taper and discontinue once on potential treatment dose of Cymbalta - continue taper to discontinue    Continue:  Wellbutrin xl 300 mg for adjunctive depression treatment  seroquel 350 mg at bedtime  Will change Trazodone 150 mg  and melatonin at bedtime for sleep prn  Plan to discontinue celexa thursday and increase cymbalta 60mg every day fro depression/anxiety    ECG (10/4/23): Normal sinus rhythm. Heart  rate 72 bpm. Qtc 442     Discussion with patient regarding risk benefits and alternatives and side effects with opportunity to ask questions and questions answered.  Patient given consent to the plan as noted    2. Psychological -       Patient is encouraged to participate with the therapeutic milieu and program and group therapy    3. Sociological -      Patient encouraged to cooperate with social work staff on issues relevant to discharge planning  [place] Medicaid have directed us not to call them again until Wednesday of this week to check on an update.  Plan remains transition to convalescence nursing care once this becomes available to patient      JANET Sanon-CNS

## 2023-10-25 NOTE — GROUP NOTE
Group Topic: Other   Group Date: 10/25/2023  Start Time: 1100  End Time: 1140  Facilitators: BRENDA Fernandes   Department: [facility]    Number of Participants: 5   Group Focus: other Let's talk  Treatment Modality: Other: Recreation Therapy  Interventions utilized were exploration, mental fitness, and reminiscence  Purpose: feelings    Name: Azul Roberts YOB: 1960   MR: [id number]      Facilitator: Recreational Therapist  Level of Participation: did not attend  Quality of Participation:  did not attend  Interactions with others:  did not attend  Mood/Affect:  did not attend  Triggers (if applicable): n/a  Cognition:  did not attend  Progress: None  Comments: pt problem is depressed mood.  Pt refused to attend group at this time but does sit in the hallway near the nurses station.    Plan: continue with services

## 2023-10-25 NOTE — PROGRESS NOTES
LSW encouraged pt to attend group. Pt politely declined. LSW stated that she had to ask and pt responded with a smile. Pt's mood appears to be improving slightly. Pt has improvement with coming out of her room and will sit in the hallway at times throughout the day. Pt has been agreeable to working with OT. There appears to not be an order in for PT and therefore pt has not been working with them. Pt continues to be in a wheelchair for safety after fall over the weekend. Pt utilizes the wheelchair appropriately.     Nikki Andrews, DRAGAN

## 2023-10-25 NOTE — PROGRESS NOTES
Occupational Therapy    OT Treatment    Patient Name: Azul Roberts  MRN: [id number]  Today's Date: 10/25/2023  Time Calculation  Start Time: 1418  Stop Time: 1514  Time Calculation (min): 56 min         Assessment:  Medical Staff Made Aware: Yes (Discussion with NSG.)  End of Session Communication: Bedside nurse (OTR/L)  Medical Staff Made Aware: Yes (Discussion with NSG.)  Plan:  Treatment Interventions: ADL retraining, Functional transfer training, Endurance training, Patient/family training, Compensatory technique education  OT Frequency: 3 times per week  OT Discharge Recommendations: Moderate intensity level of continued care  Equipment Recommended upon Discharge: Wheeled walker  OT Recommended Transfer Status: Assist of 1  Treatment Interventions: ADL retraining, Functional transfer training, Endurance training, Patient/family training, Compensatory technique education    Subjective   General:  OT Received On: 10/25/23  Reason for Referral: decreased ADLs  Referred By: Dr. Cullen  Past Medical History Relevant to Rehab: depression, anxiety, colostomy bag, opiod abuse, hip pain  Missed Visit: Yes  Missed Visit Reason: Patient refused  Prior to Session Communication: Bedside nurse (OTR/L)  Patient Position Received: Up in chair  Preferred Learning Style: verbal  General Comment: Conferred with NSG.  Pt agreeable to skilled therapeutic intervention.  Vital Signs:     Pain:  Pain Assessment  Pain Assessment: 0-10  Pain Score: 7  Pain Type: Chronic pain  Pain Location: Hip  Pain Orientation: Left  Pain Frequency: Constant/continuous  Pain Onset: Ongoing  Pain Interventions: Relaxation technique, Shower, Therapeutic touch, Other (Comment) (Discussion with NSG.)    Objective    Activities of Daily Living: Grooming  Grooming Level of Assistance: Modified independent  Grooming Where Assessed: Standing sinkside, Edge of bed  Grooming Comments: Pt washing hands/seated to comb hair    UE Bathing  UE Bathing Level of  Assistance: Setup  UE Bathing Where Assessed: Shower (seated shower chair)    LE Bathing  LE Bathing Level of Assistance: Minimum assistance  LE Bathing Where Assessed: Shower (seated shower chair)  LE Bathing Comments: seated shower chair assist LLE d/t hip comfort    UE Dressing  UE Dressing Level of Assistance: Modified independent  UE Dressing Where Assessed: Wheelchair  UE Dressing Comments: Pt doff/don pullover garment    LE Dressing  LE Dressing: Yes  LE Dressing Adaptive Equipment: Reacher, Sock aide, Devon/  Pants Level of Assistance: Distant supervision  Sock Level of Assistance: Setup  LE Dressing Where Assessed: Wheelchair  LE Dressing Comments: Pt doff/don socks pants use AE however  initially resistant    Toileting  Toileting Level of Assistance: Modified independent  Where Assessed: Toilet  Toileting Comments: Pt manage colostomy bag  Functional Standing Tolerance:    Bed Mobility/Transfers:   Toilet Transfers  Toilet Transfer From: Wheelchair  Toilet Transfer Type: To and from  Toilet Transfer to: Standard toilet  Toilet Transfer Technique: Stand pivot  Toilet Transfers: Modified independence  Toilet Transfers Comments: grab bar use  Shower Transfers  Shower Transfer From: Wheelchair  Shower Transfer Type: To and from  Shower Transfer to: Shower seat with back  Shower Transfer Technique: Stand pivot  Shower Transfers: Supervision  Shower Transfers Comments: grab bar use     Outcome Measures:[facility] Daily Activity  Putting on and taking off regular lower body clothing: A little  Bathing (including washing, rinsing, drying): A little  Putting on and taking off regular upper body clothing: None  Toileting, which includes using toilet, bedpan or urinal: A little  Taking care of personal grooming such as brushing teeth: None  Eating Meals: None  Daily Activity - Total Score: 21    Education Documentation  No documentation found.  Education Comments  No comments found.        OP  EDUCATION:  Education  Individual(s) Educated: Patient  Education Provided: Fall precautons, Risk and benefits of OT discussed with patient or other, POC discussed and agreed upon  Risk and Benefits Discussed with Patient/Caregiver/Other: yes  Patient/Caregiver Demonstrated Understanding: yes  Plan of Care Discussed and Agreed Upon: yes  Patient Response to Education: Patient/Caregiver Verbalized Understanding of Information    Goals:  Encounter Problems       Encounter Problems (Active)       Balance       LTG - Patient will maintain standing and sitting balance to allow for completion of daily activities (Progressing)       Start:  10/16/23    Expected End:  10/30/23               Bathing       LTG - Patient will utilize adaptive techniques to bathe body (Progressing)       Start:  10/16/23    Expected End:  10/30/23               Dressings Lower Extremities       LTG - Patient will utilize adaptive techniques/equipment to dress lower body (Progressing)       Start:  10/16/23    Expected End:  10/30/23               Grooming       LTG - Patient will demonstrate use of appropriate Intervention for safe grooming habits (Progressing)       Start:  10/16/23    Expected End:  10/30/23               Mobility       LTG - Patient will ambulate household distance (Progressing)       Start:  10/16/23    Expected End:  10/30/23               Safety       LTG - Patient will demonstrate safety requirements appropriate to situation/environment (Progressing)       Start:  10/16/23    Expected End:  10/30/23

## 2023-10-25 NOTE — NURSING NOTE
SARAN NOTE     Problem:  Depression     Behavior:  Pt was cooperative with taking meds. Pt was not talkative with flat affect. When asked about her colostomy pt became agitated and said it was fine. Bag was full of stool and pt said it would be fine for the night. Pt was uncooperative when she was told that it needed to be emptied.         Interventions:  Colostomy care was provided.  HS meds given     Response:  Pt remained in bed throughout shift, pt was not happy about colostomy being emptied but allowed us to do so, took HS meds without incident     Plan:  Monitor pt during the night, provide colostomy care if needed, cont with 15 min checks.

## 2023-10-25 NOTE — CARE PLAN
Problem: Fall/Injury  Goal: Use assistive devices by end of the shift  10/25/2023 1546 by Martha Mcdonald RN  Outcome: Progressing  10/25/2023 1545 by Martha Mcdonald RN    The clinical goals for the shift include using a wheelchair to assist with ambulating.    Over the shift, the patient did make progress toward the following goals. Barriers to progression include knowing limits for ambulating. Recommendations to address these barriers include using a wheelchair during the shift.    Problem: Fall/Injury  Goal: Use assistive devices by end of the shift  10/25/2023 1546 by Martha Mcdonald RN  Outcome: Progressing  10/25/2023 1545 by Martha Mcdonald RN  Outcome: Not Progressing  Goal: Pace activities to prevent fatigue by end of the shift  10/25/2023 1546 by Martha Mcdonald RN  Outcome: Progressing  10/25/2023 1545 by Martha Mcdonald RN  Outcome: Not Progressing     Problem: Bathing  Goal: LTG - Patient will utilize adaptive techniques to bathe body  10/25/2023 1546 by Martha Mcdonald RN  Outcome: Progressing  10/25/2023 1545 by Martha Mcdonald RN  Outcome: Not Progressing     Problem: Dressings Lower Extremities  Goal: LTG - Patient will utilize adaptive techniques/equipment to dress lower body  10/25/2023 1546 by Martha Mcdonald RN  Outcome: Progressing  10/25/2023 1545 by Martha Mcdonald RN  Outcome: Not Progressing     Problem: Grooming  Goal: LTG - Patient will demonstrate use of appropriate Intervention for safe grooming habits  10/25/2023 1546 by Martha Mcdonald RN  Outcome: Progressing  10/25/2023 1545 by Martha Mcdonald RN  Outcome: Not Progressing     Problem: Risk for falls  Goal: I will remain free from falls  10/25/2023 1546 by Martha Mcdonald RN  Outcome: Progressing  10/25/2023 1545 by Martha Mcdonald RN  Outcome: Not Progressing

## 2023-10-26 PROCEDURE — 2500000004 HC RX 250 GENERAL PHARMACY W/ HCPCS (ALT 636 FOR OP/ED): Performed by: PSYCHIATRY & NEUROLOGY

## 2023-10-26 PROCEDURE — 99232 SBSQ HOSP IP/OBS MODERATE 35: CPT | Performed by: REGISTERED NURSE

## 2023-10-26 PROCEDURE — 2500000001 HC RX 250 WO HCPCS SELF ADMINISTERED DRUGS (ALT 637 FOR MEDICARE OP): Performed by: PSYCHIATRY & NEUROLOGY

## 2023-10-26 PROCEDURE — 1140000001 HC PRIVATE PSYCH ROOM DAILY

## 2023-10-26 PROCEDURE — 2500000002 HC RX 250 W HCPCS SELF ADMINISTERED DRUGS (ALT 637 FOR MEDICARE OP, ALT 636 FOR OP/ED): Performed by: REGISTERED NURSE

## 2023-10-26 RX ADMIN — QUETIAPINE FUMARATE 350 MG: 300 TABLET ORAL at 20:39

## 2023-10-26 RX ADMIN — Medication 125 MCG: at 08:10

## 2023-10-26 RX ADMIN — DULOXETINE HYDROCHLORIDE 60 MG: 60 CAPSULE, DELAYED RELEASE ORAL at 08:10

## 2023-10-26 RX ADMIN — BUPROPION HYDROCHLORIDE 300 MG: 300 TABLET, FILM COATED, EXTENDED RELEASE ORAL at 08:10

## 2023-10-26 RX ADMIN — CELECOXIB 200 MG: 200 CAPSULE ORAL at 08:10

## 2023-10-26 ASSESSMENT — COLUMBIA-SUICIDE SEVERITY RATING SCALE - C-SSRS
6. HAVE YOU EVER DONE ANYTHING, STARTED TO DO ANYTHING, OR PREPARED TO DO ANYTHING TO END YOUR LIFE?: NO
6. HAVE YOU EVER DONE ANYTHING, STARTED TO DO ANYTHING, OR PREPARED TO DO ANYTHING TO END YOUR LIFE?: NO
1. SINCE LAST CONTACT, HAVE YOU WISHED YOU WERE DEAD OR WISHED YOU COULD GO TO SLEEP AND NOT WAKE UP?: NO
2. HAVE YOU ACTUALLY HAD ANY THOUGHTS OF KILLING YOURSELF?: NO
1. SINCE LAST CONTACT, HAVE YOU WISHED YOU WERE DEAD OR WISHED YOU COULD GO TO SLEEP AND NOT WAKE UP?: NO
2. HAVE YOU ACTUALLY HAD ANY THOUGHTS OF KILLING YOURSELF?: NO

## 2023-10-26 ASSESSMENT — ENCOUNTER SYMPTOMS
DYSPHORIC MOOD: 1
NERVOUS/ANXIOUS: 1
SLEEP DISTURBANCE: 1

## 2023-10-26 ASSESSMENT — PAIN SCALES - GENERAL
PAINLEVEL_OUTOF10: 3

## 2023-10-26 ASSESSMENT — PAIN - FUNCTIONAL ASSESSMENT: PAIN_FUNCTIONAL_ASSESSMENT: 0-10

## 2023-10-26 NOTE — GROUP NOTE
Group Topic: Other   Group Date: 10/26/2023  Start Time: 1000  End Time: 1100  Facilitators: BRENDA Fernandes   Department: [facility] REHABTH VIRTUAL    Number of Participants: 5   Group Focus: other Tickling your Funnybone  Treatment Modality: Other: Recreation Therapy  Interventions utilized were exploration, mental fitness, patient education, and problem solving  Purpose: feelings and self-care    Name: Azul Roberts YOB: 1960   MR: [id number]      Facilitator: Recreational Therapist  Level of Participation: did not attend  Quality of Participation:  did not attend  Interactions with others:  did not attend  Mood/Affect:  did not attend  Triggers (if applicable): n/a  Cognition:  did not attend  Progress: None  Comments: pt problem is depressed mood.  Pt refused to attend group at this time.  Does sit in the hallway by the nurses station during group.  Plan: continue with services

## 2023-10-26 NOTE — CARE PLAN
The patient's goals for the shift include use w/c    The clinical goals for the shift include encourage use of w/c    Over the shift, the patient did not make progress toward the following goals. Barriers to progression include n/a. Recommendations to address these barriers include n/a.

## 2023-10-26 NOTE — NURSING NOTE
SARAN NOTE     Problem:  Depression     Behavior:    Pt. Spent some time in w/c this evening in [place] by nurses station, refused hs snack, cooperative with flat affect, hs care offered, very little conversation      Interventions:  HS meds given, shift assessment completed    Response:  Pt remained cooperative, refused any hs care, took hs meds w/out incident.     Plan:  Continue to monitor for safety and any negative behaviors

## 2023-10-26 NOTE — GROUP NOTE
Group Topic: Reminiscence   Group Date: 10/26/2023  Start Time: 1515  End Time: 1600  Facilitators: BRENDA Fernandes   Department: [facility] REHABTH VIRTUAL    Number of Participants: 5   Group Focus: mindfulness and reminiscence  Treatment Modality: Other: Recreation therapy  Interventions utilized were exploration, mental fitness, and reminiscence  Purpose: other: increase socialization, elevate mood, increase alertness, enhance self esteem, increase stimulation    Name: Azul Roberts YOB: 1960   MR: [id number]      Facilitator: Recreational Therapist  Level of Participation: did not attend  Quality of Participation:  did not attend  Interactions with others:  did not attend  Mood/Affect:  did not attend  Triggers (if applicable): n/a  Cognition:  did not attend  Progress: None  Comments: pt problem is depressed mood.  Pt refused to attend group at this time.  Did sit in the hallway during group and engage with Rns at the station.  Plan: continue with services

## 2023-10-26 NOTE — PROGRESS NOTES
"LSW met briefly with pt. Pt states that \"things are no better\" and there is no change. Pt continues to present hopeless and unwilling to engage. As well as unwilling to participate in groups. Pt has limited interactions with staff and not interaction with other patients. Pt will come out of her room at times during the day and sit by the nurses station for a short period of time but then will go back into her room and lay down. LSW received bank statements from pt's  and will forward them to Medicaid to further process pt's application.       Nikki Andrews, DRAGAN      "

## 2023-10-26 NOTE — CARE PLAN
The patient's goals for the shift include I don't know    The clinical goals for the shift include promote rest    Over the shift, the patient did make progress toward the following goals. Barriers to progression include pain and reluctance to try our pain prevention offerings. Recommendations to address these barriers include continued encouragement.      Problem: Fall/Injury  Goal: Use assistive devices by end of the shift  Outcome: Progressing     Problem: Fall/Injury  Goal: Pace activities to prevent fatigue by end of the shift  Outcome: Progressing

## 2023-10-26 NOTE — PROGRESS NOTES
"Azul Roberts is a 63 y.o. female on day 47 of admission presenting with Severe recurrent major depression without psychotic features (CMS/[facility]).      Subjective   Case discussed with treatment team and chart reviewed.     Patient sitting outside of common area for interview.  She says she continues tofeel depressed.   Nursing reports she slept well with no prns.  She cont to not attend group but is sitting in hallway outside of group room more- less isolating self. She continues to endorse passive suicidal ideation, denies plan or intent. Her  is coming tovisit this weekend.  Xray report came back with report of no fracture in hip- nursing will report result to medical team.    Objective     Last Recorded Vitals  Blood pressure 132/81, pulse 79, temperature 36.5 °C (97.7 °F), temperature source Temporal, resp. rate 16, height 1.626 m (5' 4\"), weight 78.9 kg (174 lb), SpO2 100 %.    Review of Systems   Psychiatric/Behavioral:  Positive for behavioral problems, dysphoric mood, sleep disturbance and suicidal ideas. The patient is nervous/anxious.      Psychiatric ROS - Adult  Anxiety: General Anxiety Disorder (KAYLEEN)KAYLEEN Behaviors: difficult to control worry, difficulty concentrating, irritability, restlessness, and sleep disturbance  Depression: anhedonia, appetite increased, concentration, energy, helpless, hopeless, interest, persistent thoughts of death, psychomotor agitation, sleep decreased , suicidal thoughts, and withdrawn  Delirium: negative  Psychosis: negative  Ioana: negative  Safety Issues: passive death wish and suicidal ideation  Psychiatric ROS Comment: as noted    Physical Exam  Abdominal:      Comments: Presence of colostomy bag   Psychiatric:         Attention and Perception: She is inattentive.         Mood and Affect: Mood is anxious and depressed. Affect is labile, flat and inappropriate.         Speech: Speech is delayed.         Behavior: Behavior is uncooperative, agitated, aggressive and " "withdrawn.         Thought Content: Thought content includes suicidal ideation.         Judgment: Judgment is impulsive.     Mental Status Examination  General Appearance: Disheveled. and Limited eye contact.   Gait/Station: ambulating with walker assist device  Speech: increasing verbal responses which are short   Mood: \"not good\"  Affect: less Irritable and Angry,  Thought Process: remains impoverished  but less so  Thought Associations: No loosening of associations  Thought Content: depressed, less hopeless, persists as helpless  Perception: No perceptual abnormalities noted  Level of Consciousness: Drowsy  Orientation: Alert  Attention and Concentration: Mild impairment in attention  Recent Memory: Intact as evidenced by ability to recall details from the past 24 hours   Executive function: Intact  Language: Naming intact  Fund of knowledge: Good  Insight: Limited, as evidenced by disengagement  Judgment: Limited, as evidence by inability to reason through medical decision making and highly impaired reality testing       Psychiatric Risk Assessment  Violence Risk Assessment: major mental illness and substance abuse  Acute Risk of Harm to Others is Considered: moderate   Suicide Risk Assessment: , chronic medical illness, chronic pain, current psychiatric illness, feelings of hopelessness, global insomnia, history of trauma or abuse, life crisis (shame/despair), prior suicide attempt, severe anxiety, substance abuse, and suicidal ideations  Protective Factors against Suicide: adherence to  treatment and marriage/partnership  Acute Risk of Harm to Self is Considered: moderate    Relevant Results  Scheduled medications  buPROPion XL, 300 mg, oral, Daily  celecoxib, 200 mg, oral, Daily  cholecalciferol, 125 mcg, oral, Daily  DULoxetine, 60 mg, oral, Daily  lidocaine, 1 patch, transdermal, Daily  QUEtiapine, 350 mg, oral, Nightly      Continuous medications     PRN medications  PRN medications: " acetaminophen, alum-mag hydroxide-simeth, diphenhydrAMINE, hydrOXYzine pamoate, melatonin, OLANZapine, OLANZapine zydis, traZODone         Assessment/Plan   Principal Problem:    Severe recurrent major depression without psychotic features (CMS/HCC)  Active Problems:    Colostomy present (CMS/HCC)    Opioid abuse (CMS/HCC)    Bilateral hip pain    Biological -   Cymbalta titration to 60 mg daily for depression, ideally titrate to target dose of ~60 mg or higher as tolerated and beneficial    Will discontinue Celexa  for depression, anxiety, plan to taper and discontinue once on potential treatment dose of Cymbalta - continue taper to discontinue    Continue:  Wellbutrin xl 300 mg for adjunctive depression treatment  seroquel 350 mg at bedtime  Will change Trazodone 150 mg  and melatonin at bedtime for sleep prn  Plan to discontinue celexa thursday and increase cymbalta 60mg every day fro depression/anxiety    ECG (10/4/23): Normal sinus rhythm. Heart  rate 72 bpm. Qtc 442     Discussion with patient regarding risk benefits and alternatives and side effects with opportunity to ask questions and questions answered.  Patient given consent to the plan as noted  Cont meds as ordered.  2. Psychological -       Patient is encouraged to participate with the therapeutic milieu and program and group therapy    3. Sociological -      Patient encouraged to cooperate with social work staff on issues relevant to discharge planning  [place] Medicaid have directed us not to call them again until Wednesday of this week to check on an update.  Plan remains transition to convalescence nursing care once this becomes available to patient      Shannan Gan, JANET-CNS

## 2023-10-27 PROCEDURE — 97161 PT EVAL LOW COMPLEX 20 MIN: CPT | Mod: GP

## 2023-10-27 PROCEDURE — 99232 SBSQ HOSP IP/OBS MODERATE 35: CPT | Performed by: REGISTERED NURSE

## 2023-10-27 PROCEDURE — 99232 SBSQ HOSP IP/OBS MODERATE 35: CPT | Performed by: INTERNAL MEDICINE

## 2023-10-27 PROCEDURE — 1140000001 HC PRIVATE PSYCH ROOM DAILY

## 2023-10-27 PROCEDURE — 97530 THERAPEUTIC ACTIVITIES: CPT | Mod: GP

## 2023-10-27 PROCEDURE — 2500000002 HC RX 250 W HCPCS SELF ADMINISTERED DRUGS (ALT 637 FOR MEDICARE OP, ALT 636 FOR OP/ED): Performed by: REGISTERED NURSE

## 2023-10-27 PROCEDURE — 2500000001 HC RX 250 WO HCPCS SELF ADMINISTERED DRUGS (ALT 637 FOR MEDICARE OP): Performed by: PSYCHIATRY & NEUROLOGY

## 2023-10-27 PROCEDURE — 2500000004 HC RX 250 GENERAL PHARMACY W/ HCPCS (ALT 636 FOR OP/ED): Performed by: PSYCHIATRY & NEUROLOGY

## 2023-10-27 RX ADMIN — Medication 125 MCG: at 10:18

## 2023-10-27 RX ADMIN — CELECOXIB 200 MG: 200 CAPSULE ORAL at 10:19

## 2023-10-27 RX ADMIN — DULOXETINE HYDROCHLORIDE 60 MG: 60 CAPSULE, DELAYED RELEASE ORAL at 10:18

## 2023-10-27 RX ADMIN — QUETIAPINE FUMARATE 350 MG: 300 TABLET ORAL at 20:28

## 2023-10-27 RX ADMIN — BUPROPION HYDROCHLORIDE 300 MG: 300 TABLET, FILM COATED, EXTENDED RELEASE ORAL at 10:18

## 2023-10-27 ASSESSMENT — COGNITIVE AND FUNCTIONAL STATUS - GENERAL
MOVING FROM LYING ON BACK TO SITTING ON SIDE OF FLAT BED WITH BEDRAILS: A LITTLE
CLIMB 3 TO 5 STEPS WITH RAILING: A LOT
MOBILITY SCORE: 17
TURNING FROM BACK TO SIDE WHILE IN FLAT BAD: A LITTLE
WALKING IN HOSPITAL ROOM: A LITTLE
STANDING UP FROM CHAIR USING ARMS: A LITTLE
MOVING TO AND FROM BED TO CHAIR: A LITTLE

## 2023-10-27 ASSESSMENT — ENCOUNTER SYMPTOMS
NERVOUS/ANXIOUS: 1
DYSPHORIC MOOD: 1
SLEEP DISTURBANCE: 1

## 2023-10-27 ASSESSMENT — COLUMBIA-SUICIDE SEVERITY RATING SCALE - C-SSRS
1. SINCE LAST CONTACT, HAVE YOU WISHED YOU WERE DEAD OR WISHED YOU COULD GO TO SLEEP AND NOT WAKE UP?: NO
6. HAVE YOU EVER DONE ANYTHING, STARTED TO DO ANYTHING, OR PREPARED TO DO ANYTHING TO END YOUR LIFE?: NO
2. HAVE YOU ACTUALLY HAD ANY THOUGHTS OF KILLING YOURSELF?: NO
2. HAVE YOU ACTUALLY HAD ANY THOUGHTS OF KILLING YOURSELF?: NO
6. HAVE YOU EVER DONE ANYTHING, STARTED TO DO ANYTHING, OR PREPARED TO DO ANYTHING TO END YOUR LIFE?: NO
1. SINCE LAST CONTACT, HAVE YOU WISHED YOU WERE DEAD OR WISHED YOU COULD GO TO SLEEP AND NOT WAKE UP?: NO

## 2023-10-27 ASSESSMENT — PAIN - FUNCTIONAL ASSESSMENT
PAIN_FUNCTIONAL_ASSESSMENT: 0-10
PAIN_FUNCTIONAL_ASSESSMENT: 0-10

## 2023-10-27 ASSESSMENT — PAIN SCALES - GENERAL
PAINLEVEL_OUTOF10: 0 - NO PAIN
PAINLEVEL_OUTOF10: 9

## 2023-10-27 NOTE — GROUP NOTE
Group Topic: Music Therapy   Group Date: 10/27/2023  Start Time: 1000  End Time: 1100  Facilitators: GABRIELA Garcia   Department: [facility] EXPRESSIVE THER VIRTUAL    Number of Participants: 6   Group Focus: music therapy  Treatment Modality: Music Therapy  Interventions Utilized were: active music engagement, empathic listening/validating emotions, reminiscence, sharing/discussion, songwriting/composition, and support      Name: Azul Roberts YOB: 1960   MR: [id number]      Level of Participation: withdrawn  Quality of Participation: appropriate/pleasant  Interactions with others: appropriate  Mood/Affect: appropriate, closed / guarded, and tearful  Cognition, Pre Treatment: inattentive  Cognition, Post Treatment: attentive  Progress: Moderate  Plan: continue with services

## 2023-10-27 NOTE — PROGRESS NOTES
SARAHW submitted new PASRR for pt as previously completed application expires tomorrow. SARAHW contacted [facility] and was informed that the documents that they need are a statement by the  reporting no current income and the termination of Medicaid from [place]. LSW will assist pt obtain these documents and provide to [facility] for [place] Medicaid. Pt has been working with PT and OT with level of moderate intensity. Pt potentially may be able to admit to a facility under skilled, however Medicaid would at least need to be pending for that to take place.       Nikki Andrews, DRAGAN

## 2023-10-27 NOTE — PROGRESS NOTES
"Physical Therapy    Physical Therapy Evaluation & Treatment    Patient Name: Azul Roberts  MRN: [id number]  Today's Date: 10/27/2023   Time Calculation  Start Time: 0915  Stop Time: 0940  Time Calculation (min): 25 min    Assessment/Plan   PT Assessment  PT Assessment Results: Decreased strength, Decreased endurance, Impaired balance, Decreased mobility, Decreased coordination, Decreased safety awareness, Pain  Rehab Prognosis: Good  Evaluation/Treatment Tolerance: Patient limited by pain, Treatment limited secondary to agitation  Medical Staff Made Aware: Yes  Barriers to Participation: Coping skills  End of Session Communication:  (PCA---nurse in a meeting)  Assessment Comment:  (pt demonstrates need for fluctuating min assist of 1 to distant supervision of 1 for the above mobility; + increased fall risk due to functional and safety insight deficits; + becomes \" irritated\" easily with PT. Pt is functioning below baseline;)  End of Session Patient Position:  (up in w/c at nurse's station)  IP OR SWING BED PT PLAN  Inpatient or Swing Bed: Inpatient  PT Plan  Treatment/Interventions: Bed mobility, Transfer training, Gait training, Balance training, Strengthening, Endurance training, Therapeutic exercise, Therapeutic activity, Wheelchair management  PT Plan: Skilled PT  PT Frequency: 3 times per week  Equipment Recommended upon Discharge: Wheeled walker  PT Recommended Transfer Status: Assist x1      Subjective     General Visit Information:  General  Reason for Referral: impaired mobility; recent fall  Past Medical History Relevant to Rehab: severe depression, colostomy, bilateral hip pain, opiod abuse  Family/Caregiver Present: No  Patient Position Received:  (up in w/c at nurse's station)  General Comment:  (pt reluctantly agreeable to therapy)  Home Living:  Home Living  Type of Home: House  Lives With: Spouse  Home Access: Stairs to enter with rails  Entrance Stairs-Rails: Right  Entrance Stairs-Number of Steps: " " (2)  Home Living Comments:  (spouse works full time per pt; patient easily \" irriated\" with questions regarding home situation---limited verbal responses)  Prior Level of Function:  Prior Function Per Pt/Caregiver Report  Level of [place]: Independent with ADLs and functional transfers  Receives Help From:  (spouse per pt)  Prior Function Comments:  (pt with limited verbal responses)  Precautions:  Precautions  Medical Precautions: Fall precautions  Precautions Comment:  (+ colostomy)  Vital Signs:       Objective   Pain:  Pain Assessment  Pain Assessment: 0-10  Pain Score: 9  Pain Type: Acute pain  Pain Location: Hip  Pain Orientation: Left  Pain Descriptors:  (\" It hurts\"---patient's response)  Pain Frequency: Constant/continuous  Pain Onset:  (s/p fall in the bathroom 10/23/23---X-ray of left hip negative)  Patient's Stated Pain Goal: No pain  Pain Interventions:  (use of FWW during amb; pain meds per MD prescribed regimine)  Cognition:  Cognition  Safety/Judgement:  (decreased safety insight and carryover during transfers, amb with FWw)  Impulsive: Mildly    General Assessments:  General Observation  General Observation:  (pt appears \" irritated\" while working with PT during session. Frequent breathing heavy, eye rolling, grunting.)             Activity Tolerance  Endurance: Decreased tolerance for upright activites    Sensation  Light Touch: No apparent deficits    Coordination  Movements are Fluid and Coordinated:  (impaired left LE due to left hip pain complaints)    Postural Control  Posture Comment:  (mild forward head, protracted shldrs)    Static Sitting Balance  Static Sitting-Balance Support: Feet supported  Static Sitting-Level of Assistance: Distant supervision  Static Sitting-Comment/Number of Minutes:  (5 min----good balance)  Dynamic Sitting Balance  Dynamic Sitting-Balance Support: Feet supported  Dynamic Sitting-Comments:  (good; distant supervision of 1)    Static Standing Balance  Static " "Standing-Balance Support: Bilateral upper extremity supported  Static Standing-Level of Assistance: Minimum assistance  Static Standing-Comment/Number of Minutes:  (1 min with fair + balance)  Dynamic Standing Balance  Dynamic Standing-Balance Support: Bilateral upper extremity supported  Dynamic Standing-Comments:  (1 min with fair balance and min assist of 1 to steady, using FWw)  Functional Assessments:  Transfers  Transfer: Yes  Transfer 1  Transfer to 1: Sit  Technique 1: Sit to stand  Transfer Device 1: Walker  Transfer Level of Assistance 1: Contact guard  Trials/Comments 1:  (sit to stand from w/c with contact guard of 1, verbal/tactile cues for proper bilateral hand placement)  Transfers 2  Transfer From 2: Stand to  Transfer to 2: Sit  Technique 2: Stand to sit  Transfer Device 2: Walker  Trials/Comments 2:  (stand to sit with contact guard of 1, verbal/tactile cues for proper bilateral hand placement)    Ambulation/Gait Training  Ambulation/Gait Training Performed: Yes  Ambulation/Gait Training 1  Surface 1: Level tile  Device 1: Rolling walker  Assistance 1: Minimum assistance  Comments/Distance (ft) 1:  (pt amb 20 ft x 2 with FWW, + turns, min assist of 1, verbal cues for erect posture, staying close to FWW at all times; pt \" grunting and breathing heavy\" during amb trial.)    Wheelchair Activities  Propulsion:  (pt self propelled manual w/c on level surfaces 30 ft x 2 with + turns, distant supervision of 1)  Extremity/Trunk Assessments:     RLE   RLE : Exceptions to WFL  Strength RLE  R Hip Flexion: 3+/5  R Hip Extension: 3+/5  R Hip ABduction: 3+/5  R Hip ADduction: 3+/5  R Knee Flexion: 3+/5  R Knee Extension: 4-/5  R Ankle Dorsiflexion: 3+/5  R Ankle Plantar Flexion: 3+/5  LLE   LLE : Exceptions to WFL  Strength LLE  L Hip Flexion: 2-/5  L Hip Extension: 2-/5  L Hip ABduction: 2-/5  L Hip ADduction: 2-/5  L Knee Flexion: 3+/5  L Knee Extension: 3+/5  L Ankle Dorsiflexion: 3+/5  L Ankle Plantar " "Flexion: 3+/5  Treatments:  Therapeutic Activity  Therapeutic Activity Performed: Yes (see transfers, amb with FWW, w/c mobility)    Ambulation/Gait Training  Ambulation/Gait Training Performed: Yes  Ambulation/Gait Training 1  Surface 1: Level tile  Device 1: Rolling walker  Assistance 1: Minimum assistance  Comments/Distance (ft) 1:  (pt amb 20 ft x 2 with FWW, + turns, min assist of 1, verbal cues for erect posture, staying close to FWW at all times; pt \" grunting and breathing heavy\" during amb trial.)  Transfers  Transfer: Yes  Transfer 1  Transfer to 1: Sit  Technique 1: Sit to stand  Transfer Device 1: Walker  Transfer Level of Assistance 1: Contact guard  Trials/Comments 1:  (sit to stand from w/c with contact guard of 1, verbal/tactile cues for proper bilateral hand placement)  Transfers 2  Transfer From 2: Stand to  Transfer to 2: Sit  Technique 2: Stand to sit  Transfer Device 2: Walker  Trials/Comments 2:  (stand to sit with contact guard of 1, verbal/tactile cues for proper bilateral hand placement)    Wheelchair Activities  Propulsion:  (pt self propelled manual w/c on level surfaces 30 ft x 2 with + turns, distant supervision of 1)  Outcome Measures:  [facility] Basic Mobility  Turning from your back to your side while in a flat bed without using bedrails: A little  Moving from lying on your back to sitting on the side of a flat bed without using bedrails: A little  Moving to and from bed to chair (including a wheelchair): A little  Standing up from a chair using your arms (e.g. wheelchair or bedside chair): A little  To walk in hospital room: A little  Climbing 3-5 steps with railing: A lot  Basic Mobility - Total Score: 17    Encounter Problems       Encounter Problems (Active)       Balance       STG - Maintains dynamic standing balance with upper extremity support (Progressing)       Start:  10/27/23    Expected End:  11/10/23       INTERVENTIONS:  1. Practice standing with minimal support.  2. Educate " patient about standing tolerance.  3. Educate patient about independence with gait, transfers, and ADL's.  4. Educate patient about use of assistive device.  5. Educate patient about self-directed care.            Balance       LTG - Patient will maintain standing and sitting balance to allow for completion of daily activities (Progressing)       Start:  10/16/23    Expected End:  10/30/23               Mobility       STG - Patient will ambulate 300 ft with FWW , + turns, distant supervision of 1. (Progressing)       Start:  10/27/23    Expected End:  11/10/23               Mobility       LTG - Patient will ambulate household distance (Progressing)       Start:  10/16/23    Expected End:  10/30/23               Pain       pt will demonstrate < 4/10 left hip pain during functional mobility/therapy session (Progressing)       Start:  10/27/23    Expected End:  11/10/23               Safety       LTG - Patient will demonstrate safety requirements appropriate to situation/environment (Progressing)       Start:  10/16/23    Expected End:  10/30/23               Transfers       STG - Patient to transfer to and from sit to supine mod independent (Progressing)       Start:  10/27/23    Expected End:  11/10/23            STG - Patient will transfer sit to and from stand mod independent (Progressing)       Start:  10/27/23    Expected End:  11/10/23                   Education Documentation  Mobility Training, taught by Debbie Reveles PT at 10/27/2023 10:20 AM.  Learner: Patient  Readiness: Acceptance  Method: Explanation, Demonstration  Response: Needs Reinforcement    Education Comments  No comments found.

## 2023-10-27 NOTE — PROGRESS NOTES
DRAGAN left a message for pt's  with David, Steph [phone], to inquire as to whether she would be able to assist in getting termination letter from [place] Medicaid.     DRAGAN Low

## 2023-10-27 NOTE — PROGRESS NOTES
" REHAB Therapy Assessment & Treatment    Patient Name: Azul Roberts  MRN: [id number]  Today's Date: 10/27/2023      Recreation note : pt is alert and oriented x 2-3 with some confusion and poor insight/judgement.  Pt has refused groups this week, reporting she \"is not interested in going to anything\".  Also declines offers of word searches, books and coloring sheets.  Pt has displayed cooperative behaviors and mood is more pleasant with interactions.  Pt has been much less isolative this week, sits and engages with staff at the nurses station often during the day.  Pt continues to require encouragement to perform ADL's.  Pt denies SI but does continues to endorse frustration with long hospital stay.  Pt is eating well and sleeping well.  Will continue to encourage pt to attend groups of choice daily.       "

## 2023-10-27 NOTE — NURSING NOTE
BIRP NOTE     Problem:  SI     Behavior:  Denies SI but is depressed about colostomy bag  Group Participation: none  Appetite/Meals: 100x3       Interventions:  Advised to seek staff with any issues    Response:  Pt states she understands to seek staff with any concerns     Plan:  Will continue to monitor

## 2023-10-27 NOTE — PROGRESS NOTES
"LSW met with pt to discuss status of Medicaid. LSW had pt sign an Authorized Representative form in order to speak with JFS in [place] regarding a termination of coverage which is needed by local J[employer]. Pt agreed to sign. LSW discussed with pt about the goal of SNF upon discharge. LSW asked if pt would like to go to a facility that is closer to home so that it would be easier for her  to visit, to which she agreed and then began to cry. Pt became emotional about how hopeless she is regarding the situation and that she feels that things will never get better. Pt states \"I just want to walk again\". LSW explained that is what we all want and are working on. LSW explained the importance of participation such as with therapy. LSW provided encouragement and commended pt on the improvements she has been making lately. LSW offered to have pt speak with her  on the phone and she said that would make things worse. Pt was able to laugh and smile at an funny event that happened on the unit. DRAGAN will continue to work with pt regarding Medicaid and SNF placement.       Nikki Andrews, DRAGAN      "

## 2023-10-27 NOTE — NURSING NOTE
"SARAN NOTE     Problem:  Depression     Behavior:  Pt isolative to room. OOR briefly during snack time, but only requested rootbeer, declined snack. Flat affect noted, minimal interaction with staff. Pt endorses depression, reports her day was \"uneventful.\" Pt was heard groaning when transferring self to  in room, but declined offers of assist and PRN pain meds.  Group Participation: n/a  Appetite/Meals: Refused HS snack     Interventions:  1:1 provided with reassurance. Evening meds given per orders. Encouraged to use call light for needs.    Response:  Pt calm, appears to be resting in bed.     Plan:  Will continue to monitor behaviors and effectiveness of interventions while maintaining pt safety.    "

## 2023-10-27 NOTE — GROUP NOTE
Group Topic: Other   Group Date: 10/27/2023  Start Time: 1500  End Time: 1545  Facilitators: BRENDA Fernandes   Department: [facility]    Number of Participants: 5   Group Focus: other Top 5...  Treatment Modality: Other: RecreationTherapy  Interventions utilized were exploration, mental fitness, reminiscence, and story telling  Purpose: other: increase socialization,  , stimulate memory, elevate mood, express feelings, increase stimulation, enhance self esteem     Name: Azul Roberts YOB: 1960   MR: [id number]      Facilitator: Recreational Therapist  Level of Participation: did not attend  Quality of Participation:  did not attend  Interactions with others:  did not attend  Mood/Affect:  did not attend  Triggers (if applicable): n/a  Cognition:  did not attend  Progress: None  Comments: pt problem is depressed mood.  Pt refuses to attend group.  Does make good eye contact, smile and engages during a visit before group.  Declines offers for independent leisure activities.   Plan: continue with services

## 2023-10-27 NOTE — PROGRESS NOTES
[facility]: [facility]  PROGRESS NOTE      Azul Roberts is a 63 y.o. female that is being seen  today for follow up at [facility]   Pt. Is being seen for follow up.X-RAY hip is negative for any fracture . Pain is fairly controled  ROS  Negative for fever or chills  Negative for sore throat, ear pain, nasal discharge  Negative for cough, shortness of breath or wheezing  Negative for chest pain, palpitations, swelling of legs  Negative for abdominal pain, constipation, diarrhea, blood in the stools,has colostomy  Negative for urinary complaints  Negative for headache, dizziness, weakness or numbness  Negative for joint pain  Positive for depression or anxiety  All other systems reviewed and were negative  Vitals:    10/27/23 0610   BP: 96/66   Pulse: 81   Resp: 16   Temp: 36.3 °C (97.3 °F)   SpO2: 98%      Body mass index is 29.87 kg/m².  Physical Exam  Constitutional: Patient does not appear to be in any acute distress  Head and Face: NCAT  Eyes: Normal external exam, EOMI  ENT: Normal external inspection of ears and nose. Oropharynx normal.  Cardiovascular: RRR, S1/S2, no murmurs, rubs, or gallops, radial pulses +2, no edema of extremities  Pulmonary: CTAB, no respiratory distress.  Abdomen: +BS, soft, non-tender, nondistended, no guarding or rebound, no masses noted.colostomy present   MSK: hip joint pain   Skin- No lesions, contusions, or erythema.  Peripheral puslses palpable bilaterally 2+  Neuro: AAO X3, Cranial nerves 2-12 grossly intact,DTR 2+ in all 4 limbs       Diagnostic Results   Lab Results   Component Value Date    GLUCOSE 113 (H) 09/05/2023    CALCIUM 10.6 (H) 09/05/2023     09/05/2023    K 3.7 09/05/2023    CO2 24 09/05/2023     09/05/2023    BUN 12 09/05/2023    CREATININE 0.72 09/05/2023     Lab Results   Component Value Date    ALT 12 09/05/2023    AST 14 09/05/2023    ALKPHOS 103 09/05/2023    BILITOT 0.4 09/05/2023     Lab Results   Component  "Value Date    WBC 13.2 (H) 09/05/2023    HGB 13.9 09/05/2023    HCT 43.0 09/05/2023    MCV 81 09/05/2023     09/05/2023     No results found for: \"CHOL\"  No results found for: \"HDL\"  No results found for: \"LDLCALC\"  No results found for: \"TRIG\"  No results found for: \"HGBA1C\"  Other labs not included in the list above were reviewed either before or during this encounter.    History    No past medical history on file.  No past surgical history on file.  No family history on file.  No Known Allergies  No current facility-administered medications on file prior to encounter.     Current Outpatient Medications on File Prior to Encounter   Medication Sig Dispense Refill    acetaminophen (Tylenol) 325 mg tablet Take 2 tablets (650 mg) by mouth 2 times a day.      hydrOXYzine pamoate (Vistaril) 25 mg capsule Take 1 capsule (25 mg) by mouth 2 times a day.      magnesium oxide (Mag-Ox) 400 mg tablet Take 1 tablet (400 mg) by mouth 2 times a day.      melatonin 5 mg tablet Take 1 tablet (5 mg) by mouth once daily at bedtime.      risperiDONE (RisperDAL) 2 mg tablet Take 1 tablet (2 mg) by mouth once daily at bedtime.      traZODone (Desyrel) 150 mg tablet Take 1 tablet (150 mg) by mouth once daily at bedtime.     Scheduled medications  buPROPion XL, 300 mg, oral, Daily  celecoxib, 200 mg, oral, Daily  cholecalciferol, 125 mcg, oral, Daily  citalopram, 30 mg, oral, Daily  lidocaine, 1 patch, transdermal, Daily  melatonin, 5 mg, oral, Nightly  QUEtiapine, 350 mg, oral, Nightly  traZODone, 150 mg, oral, Nightly      Continuous medications     PRN medications  PRN medications: acetaminophen, alum-mag hydroxide-simeth, diphenhydrAMINE, hydrOXYzine pamoate, OLANZapine, OLANZapine zydis     There is no immunization history on file for this patient.  Patient's medical history was reviewed and updated either before or during this encounter.  ASSESSMENT / PLAN:  Active Hospital Problems    Bilateral hip pain      *Severe " recurrent major depression without psychotic features (CMS/HCC)      Colostomy present (CMS/HCC)      Opioid abuse (CMS/HCC)  S/p fall.  Hip x-rays negative      Pt. Has been stable.clostomy is working well.        Jay Knox MD

## 2023-10-27 NOTE — PROGRESS NOTES
"Azul Roberts is a 63 y.o. female on day 48 of admission presenting with Severe recurrent major depression without psychotic features (CMS/[facility]).      Subjective   Case discussed with treatment team and chart reviewed.     Patient sitting outside of common area for interview.  She says she continues tofeel depressed.   Nursing reports she slept 7hs with no prns.  Appetie ok. She cont to not attend group but is sitting in hallway outside of group room more- less isolating self. She continues to endorse passive suicidal ideation, denies plan or intent. Her  is coming tovisit this weekend.      Objective     Last Recorded Vitals  Blood pressure 96/66, pulse 81, temperature 36.3 °C (97.3 °F), temperature source Oral, resp. rate 16, height 1.626 m (5' 4\"), weight 78.9 kg (174 lb), SpO2 98 %.    Review of Systems   Psychiatric/Behavioral:  Positive for behavioral problems, dysphoric mood, sleep disturbance and suicidal ideas. The patient is nervous/anxious.      Psychiatric ROS - Adult  Anxiety: General Anxiety Disorder (KAYLEEN)KAYLEEN Behaviors: difficult to control worry, difficulty concentrating, irritability, restlessness, and sleep disturbance  Depression: anhedonia, appetite increased, concentration, energy, helpless, hopeless, interest, persistent thoughts of death, psychomotor agitation, sleep decreased , suicidal thoughts, and withdrawn  Delirium: negative  Psychosis: negative  Ioana: negative  Safety Issues: passive death wish and suicidal ideation  Psychiatric ROS Comment: as noted    Physical Exam  Abdominal:      Comments: Presence of colostomy bag   Psychiatric:         Attention and Perception: She is inattentive.         Mood and Affect: Mood is anxious and depressed. Affect is labile, flat and inappropriate.         Speech: Speech is delayed.         Behavior: Behavior is uncooperative, agitated, aggressive and withdrawn.         Thought Content: Thought content includes suicidal ideation.         " "Judgment: Judgment is impulsive.     Mental Status Examination  General Appearance: Disheveled. and Limited eye contact.   Gait/Station: ambulating with walker assist device  Speech: increasing verbal responses which are short   Mood: \"not good\"  Affect: less Irritable and Angry,  Thought Process: remains impoverished  but less so  Thought Associations: No loosening of associations  Thought Content: depressed, less hopeless, persists as helpless  Perception: No perceptual abnormalities noted  Level of Consciousness: Drowsy  Orientation: Alert  Attention and Concentration: Mild impairment in attention  Recent Memory: Intact as evidenced by ability to recall details from the past 24 hours   Executive function: Intact  Language: Naming intact  Fund of knowledge: Good  Insight: Limited, as evidenced by disengagement  Judgment: Limited, as evidence by inability to reason through medical decision making and highly impaired reality testing       Psychiatric Risk Assessment  Violence Risk Assessment: major mental illness and substance abuse  Acute Risk of Harm to Others is Considered: moderate   Suicide Risk Assessment: , chronic medical illness, chronic pain, current psychiatric illness, feelings of hopelessness, global insomnia, history of trauma or abuse, life crisis (shame/despair), prior suicide attempt, severe anxiety, substance abuse, and suicidal ideations  Protective Factors against Suicide: adherence to  treatment and marriage/partnership  Acute Risk of Harm to Self is Considered: moderate    Relevant Results  Scheduled medications  buPROPion XL, 300 mg, oral, Daily  celecoxib, 200 mg, oral, Daily  cholecalciferol, 125 mcg, oral, Daily  DULoxetine, 60 mg, oral, Daily  lidocaine, 1 patch, transdermal, Daily  QUEtiapine, 350 mg, oral, Nightly      Continuous medications     PRN medications  PRN medications: acetaminophen, alum-mag hydroxide-simeth, diphenhydrAMINE, hydrOXYzine pamoate, melatonin, " OLANZapine, OLANZapine zydis, traZODone         Assessment/Plan   Principal Problem:    Severe recurrent major depression without psychotic features (CMS/HCC)  Active Problems:    Colostomy present (CMS/HCC)    Opioid abuse (CMS/HCC)    Bilateral hip pain    Biological -   Cymbalta titration to 60 mg daily for depression, ideally titrate to target dose of ~60 mg or higher as tolerated and beneficial    Will discontinue Celexa  for depression, anxiety, plan to taper and discontinue once on potential treatment dose of Cymbalta - continue taper to discontinue    Continue:  Wellbutrin xl 300 mg for adjunctive depression treatment  seroquel 350 mg at bedtime  Will change Trazodone 150 mg  and melatonin at bedtime for sleep prn  Plan to discontinue celexa thursday and increase cymbalta 60mg every day fro depression/anxiety  No adverse side effects of medications noted or reported.  10/27/23 Cont current meds.  ECG (10/4/23): Normal sinus rhythm. Heart  rate 72 bpm. Qtc 442     Discussion with patient regarding risk benefits and alternatives and side effects with opportunity to ask questions and questions answered.  Patient given consent to the plan as noted  Cont meds as ordered.  2. Psychological -       Patient is encouraged to participate with the therapeutic milieu and program and group therapy    3. Sociological -      Patient encouraged to cooperate with social work staff on issues relevant to discharge planning  [place] Medicaid have directed us not to call them again until Wednesday of this week to check on an update.  Plan remains transition to convalescence nursing care once this becomes available to patient      Shannan Gan, JANET-CNS

## 2023-10-27 NOTE — GROUP NOTE
Group Topic: Other   Group Date: 10/27/2023  Start Time: 1110  End Time: 1145  Facilitators: BRENDA Fernandes   Department: [facility] REHABTH [facility]IRTUAL    Number of Participants: 6   Group Focus: other Top 5  Treatment Modality: Other: Recreation Therapy  Interventions utilized were exploration, mental fitness, and reminiscence  Purpose: other: increase attention span, enhance self esteem,  increase stimulation, increase alertness, increase activity level, elevate mood, stimulate memory, increase socialization      Name: Azul Roberts YOB: 1960   MR: [id number]      Facilitator: Recreational Therapist  Level of Participation: did not attend  Quality of Participation:  did not attend  Interactions with others:  did not attend  Mood/Affect:  did not attend  Triggers (if applicable): n/a  Cognition:  did not attend  Progress: None  Comments: pt problem is depressed mood.  Pt refused to attend group but she sits at the nursing station during session.  Pt engages with staff and declines offers for independent leisure activities.  Plan: continue with services

## 2023-10-28 PROCEDURE — 99232 SBSQ HOSP IP/OBS MODERATE 35: CPT | Performed by: STUDENT IN AN ORGANIZED HEALTH CARE EDUCATION/TRAINING PROGRAM

## 2023-10-28 PROCEDURE — 2500000002 HC RX 250 W HCPCS SELF ADMINISTERED DRUGS (ALT 637 FOR MEDICARE OP, ALT 636 FOR OP/ED): Performed by: REGISTERED NURSE

## 2023-10-28 PROCEDURE — 2500000001 HC RX 250 WO HCPCS SELF ADMINISTERED DRUGS (ALT 637 FOR MEDICARE OP): Performed by: PSYCHIATRY & NEUROLOGY

## 2023-10-28 PROCEDURE — 1140000001 HC PRIVATE PSYCH ROOM DAILY

## 2023-10-28 PROCEDURE — 2500000004 HC RX 250 GENERAL PHARMACY W/ HCPCS (ALT 636 FOR OP/ED): Performed by: PSYCHIATRY & NEUROLOGY

## 2023-10-28 RX ADMIN — Medication 125 MCG: at 08:13

## 2023-10-28 RX ADMIN — QUETIAPINE FUMARATE 350 MG: 300 TABLET ORAL at 20:07

## 2023-10-28 RX ADMIN — CELECOXIB 200 MG: 200 CAPSULE ORAL at 15:13

## 2023-10-28 RX ADMIN — DULOXETINE HYDROCHLORIDE 60 MG: 60 CAPSULE, DELAYED RELEASE ORAL at 08:13

## 2023-10-28 RX ADMIN — BUPROPION HYDROCHLORIDE 300 MG: 300 TABLET, FILM COATED, EXTENDED RELEASE ORAL at 08:13

## 2023-10-28 ASSESSMENT — COLUMBIA-SUICIDE SEVERITY RATING SCALE - C-SSRS
6. HAVE YOU EVER DONE ANYTHING, STARTED TO DO ANYTHING, OR PREPARED TO DO ANYTHING TO END YOUR LIFE?: NO
6. HAVE YOU EVER DONE ANYTHING, STARTED TO DO ANYTHING, OR PREPARED TO DO ANYTHING TO END YOUR LIFE?: NO
1. SINCE LAST CONTACT, HAVE YOU WISHED YOU WERE DEAD OR WISHED YOU COULD GO TO SLEEP AND NOT WAKE UP?: NO
6. HAVE YOU EVER DONE ANYTHING, STARTED TO DO ANYTHING, OR PREPARED TO DO ANYTHING TO END YOUR LIFE?: NO
2. HAVE YOU ACTUALLY HAD ANY THOUGHTS OF KILLING YOURSELF?: NO

## 2023-10-28 ASSESSMENT — PAIN - FUNCTIONAL ASSESSMENT: PAIN_FUNCTIONAL_ASSESSMENT: 0-10

## 2023-10-28 ASSESSMENT — ENCOUNTER SYMPTOMS
NERVOUS/ANXIOUS: 1
SLEEP DISTURBANCE: 1
DYSPHORIC MOOD: 1

## 2023-10-28 ASSESSMENT — PAIN SCALES - GENERAL
PAINLEVEL_OUTOF10: 0 - NO PAIN
PAINLEVEL_OUTOF10: 0 - NO PAIN

## 2023-10-28 NOTE — NURSING NOTE
SARAN NOTE     Problem:  Depression     Behavior:  Pt has a blunted affect, smiling occasionally and interacting some with staff, she is complaining less, and emptying her own colostomy.  She is taking scheduled medications.    Group Participation: Pt sitting outside in hallway  Appetite/Meals: 100%       Interventions:  1:1 interaction, give scheduled medications, every 15 minute checks, encourage participation in milieu.    Response:  Pt attempted to empty her own colostomy this afternoon, she called for assistance as her bag began to come off as she was emptying it.  Assisted patient to change the bag.  She did most of the work.  Pt did not complain throughout.  Positive reinforcement and encouragement given to the patient.     Plan:  1:1 interaction, give scheduled medications, every 15 minute checks, encourage participation in milieu.

## 2023-10-28 NOTE — GROUP NOTE
Group Topic: Dialectical Behavioral Therapy - Emotional Regulation   Group Date: 10/28/2023  Start Time: 1010  End Time: 1100  Facilitators: BRENDA Howard   Department: [facility] REHABTH [facility]IRTUAL    Number of Participants: 4   Group Focus: coping skills and feeling awareness/expression  Treatment Modality: Other: Recreation Therapy  Interventions utilized were exploration, mental fitness, and reminiscence  Purpose: coping skills, feelings, and insight or knowledge    Name: Azul Roberts YOB: 1960   MR: [id number]      Facilitator: Recreational Therapist  Level of Participation: did not attend  Quality of Participation:  did not attend  Interactions with others:  did not attend  Mood/Affect:  did not attend  Triggers (if applicable): n/a  Cognition:  did not attend  Progress: None  Comments: pt problem is depressed mood. Pt pleasant with CTRS during interaction in room. Declines invitation to group. Pt declines worksheet offered.    Plan: continue with services

## 2023-10-28 NOTE — PROGRESS NOTES
"Azul Roberts is a 63 y.o. female on day 49 of admission presenting with Severe recurrent major depression without psychotic features (CMS/HCC).      Subjective   Case discussed with treatment team and chart reviewed.     Met with patient in her room, she states that \"life in general is bad\" and she is upset about being admitted. States the primary team \"has no plan to get [her] out of here\" and does want to speak to me any further.      Objective     Last Recorded Vitals  Blood pressure 120/74, pulse 82, temperature 36.8 °C (98.2 °F), temperature source Oral, resp. rate 16, height 1.626 m (5' 4\"), weight 78.9 kg (174 lb), SpO2 99 %.    Review of Systems   Psychiatric/Behavioral:  Positive for behavioral problems, dysphoric mood, sleep disturbance and suicidal ideas. The patient is nervous/anxious.      Psychiatric ROS - Adult  Anxiety: General Anxiety Disorder (KAYLEEN)KAYLEEN Behaviors: difficult to control worry, difficulty concentrating, irritability, restlessness, and sleep disturbance  Depression: anhedonia, appetite increased, concentration, energy, helpless, hopeless, interest, persistent thoughts of death, psychomotor agitation, sleep decreased , suicidal thoughts, and withdrawn  Delirium: negative  Psychosis: negative  Ioana: negative  Safety Issues: passive death wish and suicidal ideation  Psychiatric ROS Comment: as noted    Physical Exam  Abdominal:      Comments: Presence of colostomy bag   Psychiatric:         Attention and Perception: She is inattentive.         Mood and Affect: Mood is anxious and depressed. Affect is labile, flat and inappropriate.         Speech: Speech is delayed.         Behavior: Behavior is uncooperative, agitated, aggressive and withdrawn.         Thought Content: Thought content includes suicidal ideation.         Judgment: Judgment is impulsive.     Mental Status Examination  General Appearance: Disheveled. and Limited eye contact.   Gait/Station: not assessed  Speech: swetha " "verbal responses   Mood: \"life in general is bad\"  Affect: Irritable and Angry,  Thought Process: remains impoverished , perseverative  Thought Associations: No loosening of associations  Thought Content: depressed, hopeless, persists as helpless  Perception: No perceptual abnormalities noted  Level of Consciousness: Alert  Orientation: Alert  Attention and Concentration: Mild impairment in attention  Recent Memory: Intact as evidenced by ability to recall details from the past 24 hours   Executive function: Intact  Language: Naming intact  Fund of knowledge: Good  Insight: Limited, as evidenced by disengagement  Judgment: Limited, as evidence by inability to reason through medical decision making and highly impaired reality testing       Psychiatric Risk Assessment  Violence Risk Assessment: major mental illness and substance abuse  Acute Risk of Harm to Others is Considered: moderate   Suicide Risk Assessment: , chronic medical illness, chronic pain, current psychiatric illness, feelings of hopelessness, global insomnia, history of trauma or abuse, life crisis (shame/despair), prior suicide attempt, severe anxiety, substance abuse, and suicidal ideations  Protective Factors against Suicide: adherence to  treatment and marriage/partnership  Acute Risk of Harm to Self is Considered: moderate    Relevant Results  Scheduled medications  buPROPion XL, 300 mg, oral, Daily  celecoxib, 200 mg, oral, Daily  cholecalciferol, 125 mcg, oral, Daily  DULoxetine, 60 mg, oral, Daily  lidocaine, 1 patch, transdermal, Daily  QUEtiapine, 350 mg, oral, Nightly      Continuous medications     PRN medications  PRN medications: acetaminophen, alum-mag hydroxide-simeth, diphenhydrAMINE, hydrOXYzine pamoate, melatonin, OLANZapine, OLANZapine zydis, traZODone         Assessment/Plan   Principal Problem:    Severe recurrent major depression without psychotic features (CMS/HCC)  Active Problems:    Colostomy present (CMS/HCC)    " Opioid abuse (CMS/HCC)    Bilateral hip pain    Biological -   Cymbalta titration to 60 mg daily for depression, ideally titrate to target dose of ~60 mg or higher as tolerated and beneficial  -discontinued Celexa    Continue:  Wellbutrin xl 300 mg for adjunctive depression treatment  seroquel 350 mg at bedtime  changed Trazodone 150 mg  and melatonin at bedtime for sleep prn  No adverse side effects of medications noted or reported.  10/27/23 Cont current meds.  ECG (10/4/23): Normal sinus rhythm. Heart  rate 72 bpm. Qtc 442     Discussion with patient regarding risk benefits and alternatives and side effects with opportunity to ask questions and questions answered.  Patient given consent to the plan as noted  Cont meds as ordered.  2. Psychological -       Patient is encouraged to participate with the therapeutic milieu and program and group therapy    3. Sociological -      Patient encouraged to cooperate with social work staff on issues relevant to discharge planning  [place] Medicaid have directed us not to call them again until Wednesday of this week to check on an update.  Plan remains transition to convalescence nursing care once this becomes available to patient      Massimo Barroso MD

## 2023-10-28 NOTE — GROUP NOTE
Group Topic: Stress Reduction/Relaxation   Group Date: 10/28/2023  Start Time: 1510  End Time: 1555  Facilitators: RANDALL HowardS   Department: [facility] REHABTH VIRTUAL    Number of Participants: 5   Group Focus: mindfulness and relaxation  Treatment Modality: Other: Recreation Therapy  Interventions utilized were other Guided Imagery   Purpose: self-care and other: relaxation     Name: Azul Roberts YOB: 1960   MR: [id number]      Facilitator: Recreational Therapist  Level of Participation: did not attend  Quality of Participation:  did not attend  Interactions with others:  did not attend  Mood/Affect:  did not attend  Triggers (if applicable): n/a  Cognition:  did not attend  Progress: None  Comments: pt problem is depressed mood. Pt declines invitation to attend group. Pt in room tearful after visit with . Pt thanked this CTRS for speaking with her.   Plan: continue with services

## 2023-10-28 NOTE — GROUP NOTE
Group Topic: Other   Group Date: 10/28/2023  Start Time: 1100  End Time: 1130  Facilitators: BRENDA Howard   Department: [facility] REHABTH VIRTUAL    Number of Participants: 4   Group Focus: other Stress management  Treatment Modality: Other: Recreation Therapy  Interventions utilized were patient education  Purpose: coping skills and insight or knowledge    Name: Azul Roberts YOB: 1960   MR: [id number]      Facilitator: Recreational Therapist  Level of Participation: did not attend  Quality of Participation:  did not attend  Interactions with others:  did not attend  Mood/Affect:  did not attend  Triggers (if applicable): n/a  Cognition:  did not attend  Progress: None  Comments: pt problem is depressed mood. Pt came out of room and was in hallway by nurses station when group ended  Plan: continue with services

## 2023-10-28 NOTE — PROGRESS NOTES
Physical Therapy                 Therapy Communication Note    Patient Name: Azul Roberts  MRN: [id number]  Today's Date: 10/28/2023     Discipline: Physical Therapy    Missed Visit Reason: Missed Visit Reason: Patient refused    Missed Time: Attempt    Comment: Patient in wheelchair near nurses station. Patient tearful as her souse just left from visiting hours. Benefit of therapy explained and gentle encouragement provided. Patient continues to decline therapy.

## 2023-10-28 NOTE — PROGRESS NOTES
Occupational Therapy                 Therapy Communication Note    Patient Name: Azul Roberts  MRN: [id number]  Today's Date: 10/28/2023     Discipline: Occupational Therapy    Missed Visit Reason: Missed Visit Reason: Patient refused    Missed Time: Attempt    Comment:  Pt laying supine in bed when OT entered the room.  Pt declined OT services and stated her back hurt and did not want to get up.  Educated pt on the importance of OT, however pt continued to decline.

## 2023-10-29 PROCEDURE — 2500000002 HC RX 250 W HCPCS SELF ADMINISTERED DRUGS (ALT 637 FOR MEDICARE OP, ALT 636 FOR OP/ED): Performed by: REGISTERED NURSE

## 2023-10-29 PROCEDURE — 2500000001 HC RX 250 WO HCPCS SELF ADMINISTERED DRUGS (ALT 637 FOR MEDICARE OP): Performed by: PSYCHIATRY & NEUROLOGY

## 2023-10-29 PROCEDURE — 99232 SBSQ HOSP IP/OBS MODERATE 35: CPT | Performed by: STUDENT IN AN ORGANIZED HEALTH CARE EDUCATION/TRAINING PROGRAM

## 2023-10-29 PROCEDURE — 2500000004 HC RX 250 GENERAL PHARMACY W/ HCPCS (ALT 636 FOR OP/ED): Performed by: PSYCHIATRY & NEUROLOGY

## 2023-10-29 PROCEDURE — 1140000001 HC PRIVATE PSYCH ROOM DAILY

## 2023-10-29 RX ADMIN — CELECOXIB 200 MG: 200 CAPSULE ORAL at 15:30

## 2023-10-29 RX ADMIN — Medication 125 MCG: at 08:17

## 2023-10-29 RX ADMIN — BUPROPION HYDROCHLORIDE 300 MG: 300 TABLET, FILM COATED, EXTENDED RELEASE ORAL at 08:17

## 2023-10-29 RX ADMIN — QUETIAPINE FUMARATE 350 MG: 300 TABLET ORAL at 20:09

## 2023-10-29 RX ADMIN — DULOXETINE HYDROCHLORIDE 60 MG: 60 CAPSULE, DELAYED RELEASE ORAL at 08:17

## 2023-10-29 ASSESSMENT — COLUMBIA-SUICIDE SEVERITY RATING SCALE - C-SSRS
2. HAVE YOU ACTUALLY HAD ANY THOUGHTS OF KILLING YOURSELF?: NO
1. SINCE LAST CONTACT, HAVE YOU WISHED YOU WERE DEAD OR WISHED YOU COULD GO TO SLEEP AND NOT WAKE UP?: NO
6. HAVE YOU EVER DONE ANYTHING, STARTED TO DO ANYTHING, OR PREPARED TO DO ANYTHING TO END YOUR LIFE?: NO
6. HAVE YOU EVER DONE ANYTHING, STARTED TO DO ANYTHING, OR PREPARED TO DO ANYTHING TO END YOUR LIFE?: NO
1. SINCE LAST CONTACT, HAVE YOU WISHED YOU WERE DEAD OR WISHED YOU COULD GO TO SLEEP AND NOT WAKE UP?: NO
2. HAVE YOU ACTUALLY HAD ANY THOUGHTS OF KILLING YOURSELF?: NO

## 2023-10-29 ASSESSMENT — ENCOUNTER SYMPTOMS
SLEEP DISTURBANCE: 1
DYSPHORIC MOOD: 1
NERVOUS/ANXIOUS: 1

## 2023-10-29 ASSESSMENT — PAIN SCALES - GENERAL
PAINLEVEL_OUTOF10: 0 - NO PAIN
PAINLEVEL_OUTOF10: 0 - NO PAIN

## 2023-10-29 NOTE — GROUP NOTE
Group Topic: Leisure Skills   Group Date: 10/29/2023  Start Time: 1010  End Time: 1110  Facilitators: BRENDA Howard   Department: [facility]    Number of Participants: 4   Group Focus: leisure skills  Treatment Modality: Other: Recreation Therapy  Interventions utilized were leisure development  Purpose: other: Leisure development , creative thinkinking    Name: Azul Roberts YOB: 1960   MR: [id number]      Facilitator: Recreational Therapist  Level of Participation: did not attend  Quality of Participation:  did not attend  Interactions with others:  did not attend  Mood/Affect:  did not attend  Triggers (if applicable): n/a  Cognition:  did not attend  Progress: None  Comments: pt problem is depressed mood. Pt laying in bed resting. Refuses to attend group. Pt pleasant with interaction  Plan: continue with services

## 2023-10-29 NOTE — PROGRESS NOTES
"Azul Roberts is a 63 y.o. female on day 50 of admission presenting with Severe recurrent major depression without psychotic features (CMS/[facility]).      Subjective   Case discussed with treatment team and chart reviewed.     Continues to refuse PT and OT, not engaging in groups.    On meeting with patient, she states she did not sleep well due to unit noise, and still feels depressed and hopeless. Is amenable to uptitration on duloxetine and for \"anything that could help.\"    Objective     Last Recorded Vitals  Blood pressure 118/80, pulse 80, temperature 36.5 °C (97.7 °F), temperature source Temporal, resp. rate 16, height 1.626 m (5' 4\"), weight 78.9 kg (174 lb), SpO2 98 %.    Review of Systems   Psychiatric/Behavioral:  Positive for behavioral problems, dysphoric mood, sleep disturbance and suicidal ideas. The patient is nervous/anxious.      Psychiatric ROS - Adult  Anxiety: General Anxiety Disorder (KAYLEEN)KAYLEEN Behaviors: difficult to control worry, difficulty concentrating, irritability, restlessness, and sleep disturbance  Depression: anhedonia, appetite increased, concentration, energy, helpless, hopeless, interest, persistent thoughts of death, psychomotor agitation, sleep decreased , suicidal thoughts, and withdrawn  Delirium: negative  Psychosis: negative  Ioana: negative  Safety Issues: passive death wish and suicidal ideation  Psychiatric ROS Comment: as noted    Physical Exam  Abdominal:      Comments: Presence of colostomy bag   Psychiatric:         Attention and Perception: She is inattentive.         Mood and Affect: Mood is anxious and depressed. Affect is labile, flat and inappropriate.         Speech: Speech is delayed.         Behavior: Behavior is uncooperative, agitated, aggressive and withdrawn.         Thought Content: Thought content includes suicidal ideation.         Judgment: Judgment is impulsive.     Mental Status Examination  General Appearance: Disheveled. and Limited eye contact. " "  Gait/Station: not assessed  Speech: swetha verbal responses   Mood: \"depressed\"  Affect: Dysphoric, constricted ,  Thought Process: remains impoverished , perseverative  Thought Associations: No loosening of associations  Thought Content: depressed, hopeless, persists as helpless  Perception: No perceptual abnormalities noted  Level of Consciousness: Alert  Orientation: Alert  Attention and Concentration: Mild impairment in attention  Recent Memory: Intact as evidenced by ability to recall details from the past 24 hours   Executive function: Intact  Language: Naming intact  Fund of knowledge: Good  Insight: Limited, as evidenced by disengagement  Judgment: Limited, as evidence by inability to reason through medical decision making and highly impaired reality testing       Psychiatric Risk Assessment  Violence Risk Assessment: major mental illness and substance abuse  Acute Risk of Harm to Others is Considered: moderate   Suicide Risk Assessment: , chronic medical illness, chronic pain, current psychiatric illness, feelings of hopelessness, global insomnia, history of trauma or abuse, life crisis (shame/despair), prior suicide attempt, severe anxiety, substance abuse, and suicidal ideations  Protective Factors against Suicide: adherence to  treatment and marriage/partnership  Acute Risk of Harm to Self is Considered: moderate    Relevant Results  Scheduled medications  buPROPion XL, 300 mg, oral, Daily  celecoxib, 200 mg, oral, Daily  cholecalciferol, 125 mcg, oral, Daily  DULoxetine, 60 mg, oral, Daily  lidocaine, 1 patch, transdermal, Daily  QUEtiapine, 350 mg, oral, Nightly      Continuous medications     PRN medications  PRN medications: acetaminophen, alum-mag hydroxide-simeth, diphenhydrAMINE, hydrOXYzine pamoate, melatonin, OLANZapine, OLANZapine zydis, traZODone         Assessment/Plan   Principal Problem:    Severe recurrent major depression without psychotic features (CMS/HCC)  Active Problems:    " Colostomy present (CMS/HCC)    Opioid abuse (CMS/HCC)    Bilateral hip pain    Biological -   Increase Cymbalta titration to 90 mg daily for depression, ideally titrate to as tolerated and beneficial  -discontinued Celexa    Continue:  Wellbutrin xl 300 mg for adjunctive depression treatment  seroquel 350 mg at bedtime  changed Trazodone 150 mg  and melatonin at bedtime for sleep prn  No adverse side effects of medications noted or reported.  ECG (10/4/23): Normal sinus rhythm. Heart  rate 72 bpm. Qtc 442     Discussion with patient regarding risk benefits and alternatives and side effects with opportunity to ask questions and questions answered.  Patient given consent to the plan as noted  Cont meds as ordered.  2. Psychological -       Patient is encouraged to participate with the therapeutic milieu and program and group therapy    3. Sociological -      Patient encouraged to cooperate with social work staff on issues relevant to discharge planning  [place] Medicaid have directed us not to call them again until Wednesday of this week to check on an update.  Plan remains transition to convalescence nursing care once this becomes available to patient      Massimo Barroso MD

## 2023-10-29 NOTE — NURSING NOTE
SARAN NOTE     Problem:  Depression     Behavior:  Pt up this morning for breakfast, she did not get out of bed for group this morning.  She is taking her scheduled medications, and emptying her own colostomy, some assistance as far as prompting still needed at times with emptying her colostomy.      Group Participation: sat in hallway for afternoon group  Appetite/Meals: 100%       Interventions:  Pt given scheduled medications, every 15 minute checks, 1:1 interaction, encouraged to participate in milieu, open blinds to room    Response:  Pt has been emtying and burping her colostomy bag when prompted.  She has not been resistant to care.  She did not go to am group. Pt also laughing along with staff at [facility] this afternoon.     Plan:  Give scheduled medications, every 15 minute checks, 1:1 interaction, encouraged to participate in milieu, open blinds to room

## 2023-10-29 NOTE — CARE PLAN
The patient's goals for the shift include to sleep    The clinical goals for the shift include maintain safety; promote sleep    Over the shift, the patient did not make progress toward the following goals. Barriers to progression include n/a. Recommendations to address these barriers include n/a.

## 2023-10-29 NOTE — GROUP NOTE
"Group Topic: Other   Group Date: 10/29/2023  Start Time: 1510  End Time: 1550  Facilitators: RANDALL HowardS   Department: [facility] REH[facility]IRT[facility]    Number of Participants: 5   Group Focus: other [facility]  Treatment Modality: Other: Recreation Therapy  Interventions utilized were other mental fitness , patient education  Purpose: other: increase socialization, increase stimulation, increase self esteem    Name: Azul Roberts YOB: 1960   MR: [id number]      Facilitator: Recreational Therapist  Level of Participation: did not attend  Quality of Participation:  did not attend  Interactions with others:  did not attend  Mood/Affect:  did not attend  Triggers (if applicable): n/a  Cognition:  did not attend  Progress: None  Comments: pt problem is depressed mood. Pt in hallway and declines to enter group room. Pt states \"I can you from here.\" Pt stays in hallway and makes eye contact with CTRS through windows multiple times. Displays brighter affect with interactions.   Plan: continue with services      "

## 2023-10-29 NOTE — NURSING NOTE
BIRP NOTE     Problem:  Depression     Behavior:  Pt laying in bed; refused hs snack; cooperative w/ flat affect; hs care offered     Interventions:  Hs meds administered; assessment completed    Response:  Pt remained pleasant and cooperative; refused hs care; compliant w/ hs meds     Plan:  Continue to monitor for safety and negative behaviors

## 2023-10-30 PROCEDURE — 97110 THERAPEUTIC EXERCISES: CPT | Mod: GP

## 2023-10-30 PROCEDURE — 97530 THERAPEUTIC ACTIVITIES: CPT | Mod: GO,CO

## 2023-10-30 PROCEDURE — 97116 GAIT TRAINING THERAPY: CPT | Mod: GP

## 2023-10-30 PROCEDURE — 2500000001 HC RX 250 WO HCPCS SELF ADMINISTERED DRUGS (ALT 637 FOR MEDICARE OP): Performed by: PSYCHIATRY & NEUROLOGY

## 2023-10-30 PROCEDURE — 97535 SELF CARE MNGMENT TRAINING: CPT | Mod: GO,CO

## 2023-10-30 PROCEDURE — 1140000001 HC PRIVATE PSYCH ROOM DAILY

## 2023-10-30 PROCEDURE — 2500000004 HC RX 250 GENERAL PHARMACY W/ HCPCS (ALT 636 FOR OP/ED): Performed by: PSYCHIATRY & NEUROLOGY

## 2023-10-30 PROCEDURE — 99232 SBSQ HOSP IP/OBS MODERATE 35: CPT | Performed by: INTERNAL MEDICINE

## 2023-10-30 PROCEDURE — 2500000002 HC RX 250 W HCPCS SELF ADMINISTERED DRUGS (ALT 637 FOR MEDICARE OP, ALT 636 FOR OP/ED): Performed by: STUDENT IN AN ORGANIZED HEALTH CARE EDUCATION/TRAINING PROGRAM

## 2023-10-30 PROCEDURE — 2500000001 HC RX 250 WO HCPCS SELF ADMINISTERED DRUGS (ALT 637 FOR MEDICARE OP): Performed by: STUDENT IN AN ORGANIZED HEALTH CARE EDUCATION/TRAINING PROGRAM

## 2023-10-30 PROCEDURE — 99232 SBSQ HOSP IP/OBS MODERATE 35: CPT | Performed by: PSYCHIATRY & NEUROLOGY

## 2023-10-30 RX ADMIN — DULOXETINE HYDROCHLORIDE 90 MG: 60 CAPSULE, DELAYED RELEASE ORAL at 10:00

## 2023-10-30 RX ADMIN — Medication 125 MCG: at 10:00

## 2023-10-30 RX ADMIN — CELECOXIB 200 MG: 200 CAPSULE ORAL at 17:01

## 2023-10-30 RX ADMIN — BUPROPION HYDROCHLORIDE 300 MG: 300 TABLET, FILM COATED, EXTENDED RELEASE ORAL at 10:00

## 2023-10-30 RX ADMIN — QUETIAPINE FUMARATE 350 MG: 300 TABLET ORAL at 20:37

## 2023-10-30 ASSESSMENT — ENCOUNTER SYMPTOMS
NERVOUS/ANXIOUS: 1
SLEEP DISTURBANCE: 1
DYSPHORIC MOOD: 1

## 2023-10-30 ASSESSMENT — ACTIVITIES OF DAILY LIVING (ADL)
BATHING_COMMENTS: PT DECLINED
HOME_MANAGEMENT_TIME_ENTRY: 14

## 2023-10-30 ASSESSMENT — COGNITIVE AND FUNCTIONAL STATUS - GENERAL
HELP NEEDED FOR BATHING: A LITTLE
DRESSING REGULAR LOWER BODY CLOTHING: A LITTLE
DAILY ACTIVITIY SCORE: 21
TOILETING: A LITTLE

## 2023-10-30 ASSESSMENT — PAIN - FUNCTIONAL ASSESSMENT
PAIN_FUNCTIONAL_ASSESSMENT: 0-10
PAIN_FUNCTIONAL_ASSESSMENT: 0-10

## 2023-10-30 ASSESSMENT — PAIN SCALES - GENERAL
PAINLEVEL_OUTOF10: 0 - NO PAIN
PAINLEVEL_OUTOF10: 0 - NO PAIN

## 2023-10-30 NOTE — CARE PLAN
The patient's goals for the shift include to sit up longer during the day    The clinical goals for the shift include maintain safety and focus on her colostomy bag.     Over the shift, the patient did not make progress toward the following goals. Barriers to progression include taking care of her hygiene. Recommendations to address these barriers include empty colostomy bag at least BID or as needed.

## 2023-10-30 NOTE — NURSING NOTE
SARAN NOTE     Problem:  Depression     Behavior:  Pt is resting in her bed already at the beginning of shift. She is calm and pleasant on approach. Compliant with her HS medications but declined wanting to get up from bed and have snack tonight.   Group Participation: n/a  Appetite/Meals: Declined       Interventions:  Hs medications administered whole with water  Assessment completed  Colostomy bag fine at this time    Response:  Pt is currently still resting in bed, no s/s of distress noted.      Plan:  Continue to monitor and assist. Maintain q15 minutes rounds for safety.

## 2023-10-30 NOTE — PROGRESS NOTES
Physical Therapy       10/30/23 2916-6862   PT  Visit   PT Received On 10/30/23   Response to Previous Treatment   (Pt cantankerous)   Cognition   Overall Cognitive Status Impaired   Impulsive Mildly   Strength RLE   R Hip Flexion 3+/5   R Hip Extension 3+/5   R Hip ABduction 3+/5   R Hip ADduction 3+/5   R Knee Flexion 3+/5   R Knee Extension 4-/5   R Ankle Dorsiflexion 3+/5   R Ankle Plantar Flexion 3+/5   Strength LLE   L Hip Flexion 2/5   L Hip Extension 2/5   L Hip ABduction 2/5   L Hip ADduction 2/5   L Knee Flexion 3+/5   L Knee Extension 3+/5   L Ankle Dorsiflexion 3+/5   L Ankle Plantar Flexion 3+/5   Therapeutic Exercise   Therapeutic Exercise Performed Yes   Therapeutic Exercise Activity 1 Seated LAQ 2x15   Therapeutic Exercise Activity 2 Standing Heel Raises 2x10 b   Therapeutic Exercise Activity 3 Seated Hip Abd 2x15  (green tband)   Therapeutic Exercise Activity 4 seated Hip Add with ball 2x15   Therapeutic Exercise Activity 5 Seated Hamstring Curls with ther band 2x15  (green tband)   Therapeutic Activity   Therapeutic Activity Performed Yes   Therapeutic Activity 1 Ambulation in to BR to empty Colostomy with RW   Ambulation/Gait Training   Ambulation/Gait Training Performed Yes   Ambulation/Gait Training 1   Surface 1 Level tile   Device 1 Rolling walker   Assistance 1 Contact guard   Quality of Gait 1 Inconsistent stride length   Comments/Distance (ft) 1 75, 50  LLE leg length discrepancy, 9/10 Left hip pain when loading.   Transfer 1   Transfer From 1 Sit to   Transfer to 1 Stand   Technique 1 Sit to stand;Stand to sit   Transfer Device 1 Walker   Transfer Level of Assistance 1 Close supervision   Transfers 2   Transfer From 2 Stand to   Transfer to 2 Sit   Transfer Device 2 Walker   Transfer Level of Assistance 2 Close supervision   Trials/Comments 2 with RW   PT Assessment   PT Assessment Results Decreased strength;Decreased endurance;Impaired balance;Decreased mobility;Decreased  coordination;Decreased safety awareness;Pain   Rehab Prognosis Good   Evaluation/Treatment Tolerance Patient limited by pain   Strengths Support of Caregivers   Barriers to Participation Coping skills   PT Plan   Treatment/Interventions Transfer training;Gait training;Therapeutic exercise;Therapeutic activity;Wheelchair management;Strengthening;Balance training  Discussed with Nsg increasing Ambulation with RW with staff.  Pt remains fall risk.    PT Plan Skilled PT   PT Frequency 3 times per week   Equipment Recommended upon Discharge Wheeled walker   PT Recommended Transfer Status Assist x1

## 2023-10-30 NOTE — PROGRESS NOTES
"Azul Roberts is a 63 y.o. female on day 51 of admission presenting with Severe recurrent major depression without psychotic features (CMS/[facility]).      Subjective   Case discussed with treatment team and chart reviewed.     Engaging physical and occupational therapy this morning.  However, continues to stay mostly in her room out of the milieu during the day.  She was seen briefly sitting in her wheelchair by the group room this morning but then returned to her room for the afternoon.  She continues to report that her mood is \"shitty.\"  She does appear to be with gains since last seen by this provider however.  She is more conversant and smiling during encounter.  She is less irritable.  She is leaving her bedroom door open where she had been keeping it closed continuously before.  Social work is updated that [place] Medicaid has received the appropriate paperwork to process her application but for the statement from [place] that she is no longer eligible for [place] Medicaid.  We continue to work toward placement to convalescence from this facility.  Her medications for depression were adjusted including Cymbalta to 90 mg with her other medications being maintained.  She denies any side effects or intolerances.  She notes she does not recognize any improvements thus far however she is clearly doing better than when she first arrived at the facility.  She is less pessimistic however continues to lack future orientation.  She describes feeling stuck in the current moment and that things will \"never get better.\"    Objective     Last Recorded Vitals  Blood pressure 112/72, pulse 82, temperature 36.9 °C (98.4 °F), temperature source Oral, resp. rate 16, height 1.626 m (5' 4\"), weight 78.9 kg (174 lb), SpO2 98 %.    Review of Systems   Psychiatric/Behavioral:  Positive for behavioral problems, dysphoric mood, sleep disturbance and suicidal ideas. The patient is nervous/anxious.      Psychiatric ROS - " "Adult  Anxiety: General Anxiety Disorder (KAYLEEN)KAYLEEN Behaviors: difficult to control worry, difficulty concentrating, irritability, restlessness, and sleep disturbance  Depression: anhedonia, appetite increased, concentration, energy, helpless, hopeless, interest, persistent thoughts of death, psychomotor agitation, sleep decreased , suicidal thoughts, and withdrawn  Delirium: negative  Psychosis: negative  Ioana: negative  Safety Issues: passive death wish and suicidal ideation  Psychiatric ROS Comment: as noted    Physical Exam  Abdominal:      Comments: Presence of colostomy bag   Psychiatric:         Attention and Perception: She is inattentive.         Mood and Affect: Mood is anxious and depressed. Affect is labile, flat and inappropriate.         Speech: Speech is delayed.         Behavior: Behavior is uncooperative, agitated, aggressive and withdrawn.         Thought Content: Thought content includes suicidal ideation.         Judgment: Judgment is impulsive.     Mental Status Examination  General Appearance:  less disheveled with improved eye contact, not malodorous on approach.   Gait/Station: using wheelchair to move about the milieu  Speech: brief, conversational volume  Mood: \"shitty\"  Affect: Dysphoric, constricted ,  Thought Process: remains impoverished but less so, perseverative  Thought Associations: No loosening of associations  Thought Content: depressed, hopeless, persists as helpless  Perception: No perceptual abnormalities noted  Level of Consciousness: Alert  Orientation: Alert  Attention and Concentration: Mild impairment in attention  Recent Memory: Intact as evidenced by ability to recall details from the past 24 hours   Executive function: Intact  Language: Naming intact  Fund of knowledge: Good  Insight: Limited, as evidenced by disengagement  Judgment: Limited, as evidence by inability to reason through medical decision making and highly impaired reality testing       Psychiatric Risk " Assessment  Violence Risk Assessment: major mental illness and substance abuse  Acute Risk of Harm to Others is Considered: moderate   Suicide Risk Assessment: , chronic medical illness, chronic pain, current psychiatric illness, feelings of hopelessness, global insomnia, history of trauma or abuse, life crisis (shame/despair), prior suicide attempt, severe anxiety, substance abuse, and suicidal ideations  Protective Factors against Suicide: adherence to  treatment and marriage/partnership  Acute Risk of Harm to Self is Considered: moderate    Relevant Results  Scheduled medications  buPROPion XL, 300 mg, oral, Daily  celecoxib, 200 mg, oral, Daily  cholecalciferol, 125 mcg, oral, Daily  DULoxetine, 90 mg, oral, Daily  lidocaine, 1 patch, transdermal, Daily  QUEtiapine, 350 mg, oral, Nightly      Continuous medications     PRN medications  PRN medications: acetaminophen, alum-mag hydroxide-simeth, diphenhydrAMINE, hydrOXYzine pamoate, melatonin, OLANZapine, OLANZapine zydis, traZODone         Assessment/Plan   Principal Problem:    Severe recurrent major depression without psychotic features (CMS/HCC)  Active Problems:    Colostomy present (CMS/HCC)    Opioid abuse (CMS/HCC)    Bilateral hip pain    Biological -   Cymbalta 90 mg daily for depression, ideally titrate to as tolerated and beneficial  Wellbutrin xl 300 mg for adjunctive depression treatment  seroquel 350 mg at bedtime  Trazodone 150 mg  nd melatonin at bedtime for sleep prn  No adverse side effects of medications noted or reported.  ECG (10/4/23): Normal sinus rhythm. Heart  rate 72 bpm. Qtc 442     Discussion with patient regarding risk benefits and alternatives and side effects with opportunity to ask questions and questions answered.  Patient given consent to the plan as noted    2. Psychological -       Patient is encouraged to participate with the therapeutic milieu and program and group therapy    3. Sociological -      Patient encouraged  to cooperate with social work staff on issues relevant to discharge planning  Pending [place] medicaid and then transition to convalescence nursing care once this becomes available to patient    30 minutes spent coordinating care for patient on this day    Eddie Cullen DO

## 2023-10-30 NOTE — GROUP NOTE
Group Topic: Other   Group Date: 10/30/2023  Start Time: 1500  End Time: 1600  Facilitators: BRENDA Fernandes   Department: [facility] RE[facility]IRT[facility]    Number of Participants: 5   Group Focus: other [employer] Game  Treatment Modality: Other: Recreation Therapy  Interventions utilized were mental fitness  Purpose: other: increase stimulation, stimulate memory, increase activity level    Name: Azul Roberts YOB: 1960   MR: [id number]      Facilitator: Recreational Therapist  Level of Participation: did not attend  Quality of Participation:  did not attend  Interactions with others:  did not attend  Mood/Affect:  did not attend  Triggers (if applicable): n/a  Cognition:  did not attend  Progress: None  Comments: pt problem is depressed mood.  Pt is sitting in the hallway, refused to attend group.  Refused offers for independent leisure activities.   Plan: continue with services

## 2023-10-30 NOTE — PROGRESS NOTES
LSW received a phone call back from pt's [employer]anger. CM reported that she filed for termination of Medicaid with the state of [place] 21 days ago. CM recommended that a phone call be placed to [employer] who manages Medicaid in that state and provided the phone number of [phone]. As well as she also suggested to contact [employer] ([phone]) and report pt's change in residency. LSW will assist pt in contacting both parties.     DRAGAN Rey

## 2023-10-30 NOTE — PROGRESS NOTES
[facility]: [facility]  PROGRESS NOTE      Azul Roberts is a 63 y.o. female that is being seen  today for follow up at [facility]   Pt. Is being seen for follow up.X-RAY hip is negative for any fracture . Pain is fairly controled  ROS  Negative for fever or chills  Negative for sore throat, ear pain, nasal discharge  Negative for cough, shortness of breath or wheezing  Negative for chest pain, palpitations, swelling of legs  Negative for abdominal pain, constipation, diarrhea, blood in the stools,has colostomy  Negative for urinary complaints  Negative for headache, dizziness, weakness or numbness  Negative for joint pain  Positive for depression or anxiety  All other systems reviewed and were negative  Vitals:    10/30/23 0500   BP: 112/72   Pulse: 82   Resp: 16   Temp: 36.9 °C (98.4 °F)   SpO2: 98%       Body mass index is 29.87 kg/m².  Physical Exam  Constitutional: Patient does not appear to be in any acute distress  Head and Face: NCAT  Eyes: Normal external exam, EOMI  ENT: Normal external inspection of ears and nose. Oropharynx normal.  Cardiovascular: RRR, S1/S2, no murmurs, rubs, or gallops, radial pulses +2, no edema of extremities  Pulmonary: CTAB, no respiratory distress.  Abdomen: +BS, soft, non-tender, nondistended, no guarding or rebound, no masses noted.colostomy present   MSK: hip joint pain   Skin- No lesions, contusions, or erythema.  Peripheral puslses palpable bilaterally 2+  Neuro: AAO X3, Cranial nerves 2-12 grossly intact,DTR 2+ in all 4 limbs       Diagnostic Results   Lab Results   Component Value Date    GLUCOSE 113 (H) 09/05/2023    CALCIUM 10.6 (H) 09/05/2023     09/05/2023    K 3.7 09/05/2023    CO2 24 09/05/2023     09/05/2023    BUN 12 09/05/2023    CREATININE 0.72 09/05/2023     Lab Results   Component Value Date    ALT 12 09/05/2023    AST 14 09/05/2023    ALKPHOS 103 09/05/2023    BILITOT 0.4 09/05/2023     Lab Results   Component  "Value Date    WBC 13.2 (H) 09/05/2023    HGB 13.9 09/05/2023    HCT 43.0 09/05/2023    MCV 81 09/05/2023     09/05/2023     No results found for: \"CHOL\"  No results found for: \"HDL\"  No results found for: \"LDLCALC\"  No results found for: \"TRIG\"  No results found for: \"HGBA1C\"  Other labs not included in the list above were reviewed either before or during this encounter.    History    No past medical history on file.  No past surgical history on file.  No family history on file.  No Known Allergies  No current facility-administered medications on file prior to encounter.     Current Outpatient Medications on File Prior to Encounter   Medication Sig Dispense Refill    acetaminophen (Tylenol) 325 mg tablet Take 2 tablets (650 mg) by mouth 2 times a day.      hydrOXYzine pamoate (Vistaril) 25 mg capsule Take 1 capsule (25 mg) by mouth 2 times a day.      magnesium oxide (Mag-Ox) 400 mg tablet Take 1 tablet (400 mg) by mouth 2 times a day.      melatonin 5 mg tablet Take 1 tablet (5 mg) by mouth once daily at bedtime.      risperiDONE (RisperDAL) 2 mg tablet Take 1 tablet (2 mg) by mouth once daily at bedtime.      traZODone (Desyrel) 150 mg tablet Take 1 tablet (150 mg) by mouth once daily at bedtime.     Scheduled medications  buPROPion XL, 300 mg, oral, Daily  celecoxib, 200 mg, oral, Daily  cholecalciferol, 125 mcg, oral, Daily  citalopram, 30 mg, oral, Daily  lidocaine, 1 patch, transdermal, Daily  melatonin, 5 mg, oral, Nightly  QUEtiapine, 350 mg, oral, Nightly  traZODone, 150 mg, oral, Nightly      Continuous medications     PRN medications  PRN medications: acetaminophen, alum-mag hydroxide-simeth, diphenhydrAMINE, hydrOXYzine pamoate, OLANZapine, OLANZapine zydis     There is no immunization history on file for this patient.  Patient's medical history was reviewed and updated either before or during this encounter.  ASSESSMENT / PLAN:  Active Hospital Problems    Bilateral hip pain      *Severe " recurrent major depression without psychotic features (CMS/HCC)      Colostomy present (CMS/HCC)      Opioid abuse (CMS/HCC)  S/p fall.  Hip x-rays negative      Pt. Has been stable.clostomy is working well.        Jay Knox MD

## 2023-10-30 NOTE — PROGRESS NOTES
LSW received results back from [employer] and they responded that the original PASRR is still in effect and there is no need to complete another one. LSW will fax BELLO to [place] [employer] in order to obtain termination letter. SARAHW is also waiting for income statement from pt's  to provide to Medicaid to further process the application.       Nikki Andrews, DRAGAN

## 2023-10-30 NOTE — PROGRESS NOTES
Occupational Therapy    OT Treatment    Patient Name: Azul Roberts  MRN: [id number]  Today's Date: 10/30/2023  Time Calculation  Start Time: 0710  Stop Time: 0734  Time Calculation (min): 24 min         Assessment:  Medical Staff Made Aware: Yes (Discussion with NSG.)  End of Session Communication: Bedside nurse (OTR/L)  Medical Staff Made Aware: Yes (Discussion with NSG.)  Barriers to Participation: Coping skills (Pt presenting with self limiting behaviors  yellling out throughout session NSG informed)  Plan:  Treatment Interventions: ADL retraining, Functional transfer training, Endurance training, Compensatory technique education  OT Frequency: 3 times per week  OT Discharge Recommendations: Moderate intensity level of continued care  Equipment Recommended upon Discharge: Wheeled walker  OT Recommended Transfer Status: Stand by assist  Treatment Interventions: ADL retraining, Functional transfer training, Endurance training, Compensatory technique education    Subjective   General:  OT Received On: 10/30/23  Reason for Referral: impaired mobility; recent fall  Referred By: Dr. Cullen  Past Medical History Relevant to Rehab: severe depression, colostomy, bilateral hip pain, opiod abuse  Missed Visit: Yes  Missed Visit Reason: Patient refused  Prior to Session Communication: Bedside nurse  Patient Position Received: Bed, 0 rail up  Preferred Learning Style: verbal  General Comment: Conferred with NSG.  Pt agreeable to skilled therapeutic intervention.  Vital Signs:     Pain:  Pain Assessment  Pain Assessment: 0-10  Pain Score: 0 - No pain  Pain Type: Chronic pain  Pain Location: Hip  Pain Orientation: Left  Pain Frequency: Constant/continuous  Pain Onset: Ongoing  Pain Interventions: Relaxation technique, Shower, Therapeutic touch, Other (Comment) (Discussion with NSG.)    Objective    Activities of Daily Living: Grooming  Grooming Level of Assistance: Modified independent  Grooming Where Assessed: Standing  sinkside  Grooming Comments: items in reach pt brushing teeth seated to comb hair    UE Bathing  UE Bathing Level of Assistance: Setup  UE Bathing Where Assessed: Shower (seated shower chair)  UE Bathing Comments: Pt declined    LE Bathing  LE Bathing Level of Assistance: Minimum assistance  LE Bathing Where Assessed: Shower (seated shower chair)  LE Bathing Comments: Pt declined    UE Dressing  UE Dressing Level of Assistance: Modified independent  UE Dressing Where Assessed: Edge of bed  UE Dressing Comments: Pt doff/son pullover garment    LE Dressing  LE Dressing: Yes  LE Dressing Adaptive Equipment: Other (Comment) (Pt declined AE)  Pants Level of Assistance: Setup  Sock Level of Assistance:  (Pt declined)  LE Dressing Where Assessed: Edge of bed  LE Dressing Comments: Pt efficiently doff/don pants no UB support in stance no LOB    Toileting  Toileting Level of Assistance: Modified independent  Where Assessed: Toilet  Toileting Comments: Pt adjusting clothing prioor/after no hygiene  Functional Standing Tolerance:  Time: 5 minutes  Activity: grooming  Functional Standing Tolerance Comments: Pt using unilateral hand support  Bed Mobility/Transfers: Bed Mobility  Bed Mobility: Yes  Bed Mobility 1  Bed Mobility 1: Supine to sitting  Level of Assistance 1: Independent  Bed Mobility Comments 1: bed to neutral no use side transfer bar    Transfers  Transfer: Yes  Transfer 1  Transfer From 1: Bed to  Transfer to 1: Sit  Technique 1: Sit to stand  Transfer Device 1: Walker  Transfer Level of Assistance 1: Distant supervision (rolling walker)  Transfers 2  Transfer From 2: Stand to  Transfer to 2: Wheelchair  Technique 2: Stand pivot  Transfer Device 2: Walker  Transfer Level of Assistance 2: Distant supervision  Trials/Comments 2:  (rolling walker cues hand placement)    Toilet Transfers  Toilet Transfer From: Rolling walker  Toilet Transfer Type: To and from  Toilet Transfer to: Standard toilet  Toilet Transfer  Technique: Ambulating  Toilet Transfers: Supervision  Toilet Transfers Comments: grab bar use  Modalities:       Therapy/Activity: Therapeutic Activity  Therapeutic Activity Performed: Yes (functional mobilitu 15' x 2 Dist S for turns)  Sensory:       Outcome Measures:[facility] Daily Activity  Putting on and taking off regular lower body clothing: A little  Bathing (including washing, rinsing, drying): A little  Putting on and taking off regular upper body clothing: None  Toileting, which includes using toilet, bedpan or urinal: A little  Taking care of personal grooming such as brushing teeth: None  Eating Meals: None  Daily Activity - Total Score: 21        Education Documentation  Mobility Training, taught by ALLA Lopez at 10/30/2023  7:38 AM.  Learner: Patient  Readiness: Nonacceptance  Method: Explanation  Response: Needs Reinforcement    Body Mechanics, taught by ALLA Lopez at 10/30/2023  7:38 AM.  Learner: Patient  Readiness: Nonacceptance  Method: Explanation  Response: Needs Reinforcement    Precautions, taught by ALLA Lopez at 10/30/2023  7:38 AM.  Learner: Patient  Readiness: Nonacceptance  Method: Explanation  Response: Needs Reinforcement    ADL Training, taught by ALLA Lopez at 10/30/2023  7:38 AM.  Learner: Patient  Readiness: Nonacceptance  Method: Explanation  Response: Needs Reinforcement    Education Comments  No comments found.        OP EDUCATION:  Education  Individual(s) Educated: Patient  Education Provided: Fall precautons, Risk and benefits of OT discussed with patient or other, POC discussed and agreed upon  Risk and Benefits Discussed with Patient/Caregiver/Other: yes  Patient/Caregiver Demonstrated Understanding: yes  Plan of Care Discussed and Agreed Upon: yes  Patient Response to Education: Patient/Caregiver Verbalized Understanding of Information    Goals:  Encounter Problems       Encounter Problems (Active)       Balance       STG - Maintains dynamic  standing balance with upper extremity support (Progressing)       Start:  10/27/23    Expected End:  11/10/23       INTERVENTIONS:  1. Practice standing with minimal support.  2. Educate patient about standing tolerance.  3. Educate patient about independence with gait, transfers, and ADL's.  4. Educate patient about use of assistive device.  5. Educate patient about self-directed care.            Balance       LTG - Patient will maintain standing and sitting balance to allow for completion of daily activities (Progressing)       Start:  10/16/23    Expected End:  10/30/23               Bathing       LTG - Patient will utilize adaptive techniques to bathe body (Progressing)       Start:  10/16/23    Expected End:  10/30/23               Dressings Lower Extremities       LTG - Patient will utilize adaptive techniques/equipment to dress lower body (Progressing)       Start:  10/16/23    Expected End:  10/30/23               Grooming       LTG - Patient will demonstrate use of appropriate Intervention for safe grooming habits (Progressing)       Start:  10/16/23    Expected End:  10/30/23               Mobility       STG - Patient will ambulate 300 ft with FWW , + turns, distant supervision of 1. (Progressing)       Start:  10/27/23    Expected End:  11/10/23               Mobility       LTG - Patient will ambulate household distance (Progressing)       Start:  10/16/23    Expected End:  10/30/23               Safety       LTG - Patient will demonstrate safety requirements appropriate to situation/environment (Progressing)       Start:  10/16/23    Expected End:  10/30/23               Transfers       STG - Patient to transfer to and from sit to supine mod independent (Progressing)       Start:  10/27/23    Expected End:  11/10/23            STG - Patient will transfer sit to and from stand mod independent (Progressing)       Start:  10/27/23    Expected End:  11/10/23

## 2023-10-30 NOTE — GROUP NOTE
Group Topic: Self-Care/Wellness   Group Date: 10/30/2023  Start Time: 1100  End Time: 1140  Facilitators: RANDALL FernandesS   Department: [facility]    Number of Participants: 5   Group Focus: other Self Care Tips  Treatment Modality: Other: Recreation Therapy  Interventions utilized were clarification, exploration, mental fitness, and patient education  Purpose: other: Increase attention, Enhance self esteem, increase stimulation, express feelings, increase activity level, elevated mood, increase socialization.    Name: Azul Roberts YOB: 1960   MR: [id number]      Facilitator: Recreational Therapist  Level of Participation: did not attend  Quality of Participation:  did not attend  Interactions with others:  did not attend  Mood/Affect:  did not attend  Triggers (if applicable): n/a  Cognition:  did not attend  Progress: None  Comments: pt problem is depressed mood.  Pt refused to attend group.  Does sit outside the group room during session.  Accepts handout about group topic after group.  Irritable with mood upon approach.  Refuses to engage with CTRS much about group topic.  Plan: continue with services

## 2023-10-31 PROCEDURE — 2500000001 HC RX 250 WO HCPCS SELF ADMINISTERED DRUGS (ALT 637 FOR MEDICARE OP): Performed by: PSYCHIATRY & NEUROLOGY

## 2023-10-31 PROCEDURE — 97530 THERAPEUTIC ACTIVITIES: CPT | Mod: GO,CO

## 2023-10-31 PROCEDURE — 1140000001 HC PRIVATE PSYCH ROOM DAILY

## 2023-10-31 PROCEDURE — 99232 SBSQ HOSP IP/OBS MODERATE 35: CPT | Performed by: PSYCHIATRY & NEUROLOGY

## 2023-10-31 PROCEDURE — 2500000001 HC RX 250 WO HCPCS SELF ADMINISTERED DRUGS (ALT 637 FOR MEDICARE OP): Performed by: REGISTERED NURSE

## 2023-10-31 PROCEDURE — 2500000002 HC RX 250 W HCPCS SELF ADMINISTERED DRUGS (ALT 637 FOR MEDICARE OP, ALT 636 FOR OP/ED): Performed by: STUDENT IN AN ORGANIZED HEALTH CARE EDUCATION/TRAINING PROGRAM

## 2023-10-31 PROCEDURE — 2500000001 HC RX 250 WO HCPCS SELF ADMINISTERED DRUGS (ALT 637 FOR MEDICARE OP): Performed by: STUDENT IN AN ORGANIZED HEALTH CARE EDUCATION/TRAINING PROGRAM

## 2023-10-31 PROCEDURE — 99232 SBSQ HOSP IP/OBS MODERATE 35: CPT | Performed by: INTERNAL MEDICINE

## 2023-10-31 PROCEDURE — 2500000004 HC RX 250 GENERAL PHARMACY W/ HCPCS (ALT 636 FOR OP/ED): Performed by: PSYCHIATRY & NEUROLOGY

## 2023-10-31 RX ADMIN — TRAZODONE HYDROCHLORIDE 150 MG: 50 TABLET ORAL at 20:19

## 2023-10-31 RX ADMIN — BUPROPION HYDROCHLORIDE 300 MG: 300 TABLET, FILM COATED, EXTENDED RELEASE ORAL at 08:47

## 2023-10-31 RX ADMIN — QUETIAPINE FUMARATE 350 MG: 300 TABLET ORAL at 20:18

## 2023-10-31 RX ADMIN — CELECOXIB 200 MG: 200 CAPSULE ORAL at 15:28

## 2023-10-31 RX ADMIN — DULOXETINE HYDROCHLORIDE 90 MG: 60 CAPSULE, DELAYED RELEASE ORAL at 08:47

## 2023-10-31 RX ADMIN — Medication 125 MCG: at 08:47

## 2023-10-31 ASSESSMENT — ENCOUNTER SYMPTOMS
NERVOUS/ANXIOUS: 1
SLEEP DISTURBANCE: 1
DECREASED CONCENTRATION: 1
DYSPHORIC MOOD: 1

## 2023-10-31 ASSESSMENT — COLUMBIA-SUICIDE SEVERITY RATING SCALE - C-SSRS
1. SINCE LAST CONTACT, HAVE YOU WISHED YOU WERE DEAD OR WISHED YOU COULD GO TO SLEEP AND NOT WAKE UP?: NO
1. SINCE LAST CONTACT, HAVE YOU WISHED YOU WERE DEAD OR WISHED YOU COULD GO TO SLEEP AND NOT WAKE UP?: NO
6. HAVE YOU EVER DONE ANYTHING, STARTED TO DO ANYTHING, OR PREPARED TO DO ANYTHING TO END YOUR LIFE?: NO
6. HAVE YOU EVER DONE ANYTHING, STARTED TO DO ANYTHING, OR PREPARED TO DO ANYTHING TO END YOUR LIFE?: NO
2. HAVE YOU ACTUALLY HAD ANY THOUGHTS OF KILLING YOURSELF?: NO
6. HAVE YOU EVER DONE ANYTHING, STARTED TO DO ANYTHING, OR PREPARED TO DO ANYTHING TO END YOUR LIFE?: NO
2. HAVE YOU ACTUALLY HAD ANY THOUGHTS OF KILLING YOURSELF?: NO
2. HAVE YOU ACTUALLY HAD ANY THOUGHTS OF KILLING YOURSELF?: NO
1. SINCE LAST CONTACT, HAVE YOU WISHED YOU WERE DEAD OR WISHED YOU COULD GO TO SLEEP AND NOT WAKE UP?: NO

## 2023-10-31 ASSESSMENT — COGNITIVE AND FUNCTIONAL STATUS - GENERAL
TOILETING: A LITTLE
HELP NEEDED FOR BATHING: A LITTLE
DRESSING REGULAR LOWER BODY CLOTHING: A LITTLE
DAILY ACTIVITIY SCORE: 21

## 2023-10-31 ASSESSMENT — PAIN - FUNCTIONAL ASSESSMENT
PAIN_FUNCTIONAL_ASSESSMENT: 0-10
PAIN_FUNCTIONAL_ASSESSMENT: 0-10

## 2023-10-31 ASSESSMENT — PAIN SCALES - GENERAL
PAINLEVEL_OUTOF10: 0 - NO PAIN
PAINLEVEL_OUTOF10: 0 - NO PAIN

## 2023-10-31 NOTE — PROGRESS NOTES
Nutrition Follow up Note    No significant changes. Continues to eat well.     Lab Results   Component Value Date    WBC 8.8 09/11/2023    HGB 11.9 (L) 09/11/2023    HCT 36.3 09/11/2023     09/11/2023    CHOL 184 09/11/2023    TRIG 99 09/11/2023    HDL 45 (L) 09/11/2023    ALT 15 09/11/2023    AST 15 09/11/2023     09/11/2023    K 4.6 09/11/2023     09/11/2023    CREATININE 0.6 09/11/2023    BUN 13 09/11/2023    CO2 22 (L) 09/11/2023    TSH 1.64 09/11/2023    HGBA1C 5.8 09/11/2023       Current Facility-Administered Medications:     acetaminophen (Tylenol) tablet 650 mg, 650 mg, oral, q6h PRN, Eddie Cullen DO    alum-mag hydroxide-simeth (Mylanta) 200-200-20 mg/5 mL oral suspension 30 mL, 30 mL, oral, q6h PRN, Eddie Cullen DO    buPROPion XL (Wellbutrin XL) 24 hr tablet 300 mg, 300 mg, oral, Daily, Eddie Cullen DO, 300 mg at 10/31/23 0847    celecoxib (CeleBREX) capsule 200 mg, 200 mg, oral, Daily, Eddie Cullen DO, 200 mg at 10/30/23 1701    cholecalciferol (Vitamin D-3) capsule 125 mcg, 125 mcg, oral, Daily, Eddie Cullen DO, 125 mcg at 10/31/23 0847    diphenhydrAMINE (Sominex) tablet 25 mg, 25 mg, oral, q8h PRN, Eddie Cullen DO    DULoxetine (Cymbalta) DR capsule 90 mg, 90 mg, oral, Daily, Massimo Barroso MD, 90 mg at 10/31/23 0847    hydrOXYzine pamoate (Vistaril) capsule 25 mg, 25 mg, oral, q8h PRN, Eddie Cullen DO    lidocaine 4 % patch 1 patch, 1 patch, transdermal, Daily, Eddie Cullen DO    melatonin tablet 5 mg, 5 mg, oral, Nightly PRN, Shannan Gan, APRN-CNS    OLANZapine (ZyPREXA) injection 5 mg, 5 mg, intramuscular, q8h PRN, Eddie Cullen DO    OLANZapine zydis (ZyPREXA) disintegrating tablet 5 mg, 5 mg, oral, q8h PRN, Eddie Cullen DO    QUEtiapine (SEROquel) tablet 350 mg, 350 mg, oral, Nightly, Eddie Cullen DO, 350 mg at 10/30/23 2037    traZODone (Desyrel) tablet 150 mg, 150 mg, oral, Nightly PRN, Shannan MENDOZA  "Malik, APRN-CNS    Dietary Orders (From admission, onward)       Start     Ordered    09/28/23 1750  Adult diet Regular  Diet effective now        Question:  Diet type  Answer:  Regular    09/28/23 1800    09/28/23 1750  Oral nutritional supplements  Until discontinued        Comments: Chocolate ensure high protein   Question Answer Comment   Deliver with  three times daily between meals   Select supplement: Ensure High Protein        09/28/23 1800                  Food and Nutrient History  Energy Intake: Good > 75 %    Anthropometrics:  Ht: 162.6 cm (5' 4\"), Wt: 78.9 kg (174 lb), BMI: 29.85    Weight Change  Weight History / % Weight Change: last updated wt from 10/16    IBW/kg (Dietitian Calculated): 54.55 kg    Adjusted Body Weight (kg): 60.91 kg    Estimated Energy Needs  Total Energy Estimated Needs (kCal): 1517 kCal  Method for Estimating Needs: 25 kcals/kg ABW    Estimated Protein Needs  Total Protein Estimated Needs (g):  (49-61)  Method for Estimating Needs: 0.8-1 g/kg ABW    Estimated Fluid Needs  Total Fluid Estimated Needs (mL): 1517 mL  Method for Estimating Needs: 1 ml/kcal ABW    Nutrition Diagnosis:  Malnutrition Diagnosis  Patient has Malnutrition Diagnosis: No    Patient has Nutrition Diagnosis: No  Nutrition Diagnosis 1: No nutrition diagnosis at this time  Diagnosis Status (1): Ongoing      Nutrition Interventions/Recommendations:  Food and/or Nutrient Delivery Interventions  Interventions: Medical food supplement  Medical Food Supplement: Commercial beverage  Goal: discontinue ensure - pt is eating 100% of all meals.    Education Documentation  No documentation found.      Nutrition Monitoring/Evaluation: None at this time.    RD Recommendations: Recommend obtaining current wt.     Follow Up  Time Spent (min): 20 minutes  Last Date of Nutrition Visit: 10/31/23  Nutrition Follow-Up Needed?: 7-10 days  Follow up Comment: 11/10/23    "

## 2023-10-31 NOTE — PROGRESS NOTES
LSW assisted pt in contacted [place] [employer] through [employer] at [phone], to request a termination letter. LSW inquired about the the option to have the letter faxed and was informed that it could only be mailed. LSW provided pt's current address where it will be sent and should be received in 7-10 business days. LSW will notify pt's  to look out for this letter and provide at time of arrival.     Nikki Andrews, DRAGAN

## 2023-10-31 NOTE — GROUP NOTE
Group Topic: Other   Group Date: 10/31/2023  Start Time: 1515  End Time: 1600  Facilitators: BRENDA Fernandes   Department: [facility]    Number of Participants: 6   Group Focus: other Can you name 5...  Treatment Modality: Other: Recreation Therapy  Interventions utilized were mental fitness and reminiscence  Purpose: other: increase attention, enhance self esteem, increase stimulation, increase activity level,increase socialization.    Name: Azul Roberts YOB: 1960   MR: [id number]      Facilitator: Recreational Therapist  Level of Participation: did not attend  Quality of Participation:  did not attend  Interactions with others:  did not attend  Mood/Affect:  did not attend  Triggers (if applicable): n/a  Cognition:  did not attend  Progress: None  Comments: pt problem is depressed mood.  Pt refuses to attend group at this time.  Sitting out at the nurses station people watching and engaging with other unit staff.  Plan: continue with services

## 2023-10-31 NOTE — PROGRESS NOTES
"LSW spoke with pt who states that she is not feeling well today. Pt is observed sitting out in the hallway for most of the afternoon engaging with staff. LSW commended pt on doing so. Pt states \"Im trying\". LSW encouraged pt to continue to do so. Pt responded with a smile. Pt is showing some improvement in the last several days to a week from when she first admitted. Pt is reportedly doing better at her self care and without prompting. Pt likely to still required SNF upon placement once Medicaid is pending. However pt's progress is moving closer to discharge.     Nikki Andrews, SARAHW      "

## 2023-10-31 NOTE — NURSING NOTE
LIORP NOTE     Problem:  SI     Behavior:  Denies any SI today  Group Participation: no  Appetite/Meals: 100x3       Interventions:  Advised to seek staff with any issues    Response:  Pt states she understands to seek staff with any concerns     Plan:  Will continue to monitor

## 2023-10-31 NOTE — PROGRESS NOTES
[facility]: [facility]  PROGRESS NOTE      Azul Roberts is a 63 y.o. female that is being seen  today for follow up at [facility]  Subjective   [facility]  Patient has been at her baseline.  Colostomy has been working well.  Pain has been fairly controlled.  Patient is still not able to take good care of herself.  Patient is being seen by [facility] team for depression and anxiety.  ROS  Negative for fever or chills  Negative for sore throat, ear pain, nasal discharge  Negative for cough, shortness of breath or wheezing  Negative for chest pain, palpitations, swelling of legs  Negative for abdominal pain, constipation, diarrhea, blood in the stools,has colostomy  Negative for urinary complaints  Negative for headache, dizziness, weakness or numbness  Negative for joint pain  Positive for depression or anxiety  All other systems reviewed and were negative  Vitals:    10/31/23 0616   BP: 142/85   Pulse: 74   Resp: 19   Temp: 36.6 °C (97.9 °F)   SpO2: 98%       Body mass index is 29.87 kg/m².  Physical Exam  Constitutional: Patient does not appear to be in any acute distress  Head and Face: NCAT  Eyes: Normal external exam, EOMI  ENT: Normal external inspection of ears and nose. Oropharynx normal.  Cardiovascular: RRR, S1/S2, no murmurs, rubs, or gallops, radial pulses +2, no edema of extremities  Pulmonary: CTAB, no respiratory distress.  Abdomen: +BS, soft, non-tender, nondistended, no guarding or rebound, no masses noted.colostomy present   MSK: hip joint pain   Skin- No lesions, contusions, or erythema.  Peripheral puslses palpable bilaterally 2+  Neuro: AAO X3, Cranial nerves 2-12 grossly intact,DTR 2+ in all 4 limbs       Diagnostic Results   Lab Results   Component Value Date    GLUCOSE 113 (H) 09/05/2023    CALCIUM 10.6 (H) 09/05/2023     09/05/2023    K 3.7 09/05/2023    CO2 24 09/05/2023     09/05/2023    BUN 12 09/05/2023     "CREATININE 0.72 09/05/2023     Lab Results   Component Value Date    ALT 12 09/05/2023    AST 14 09/05/2023    ALKPHOS 103 09/05/2023    BILITOT 0.4 09/05/2023     Lab Results   Component Value Date    WBC 13.2 (H) 09/05/2023    HGB 13.9 09/05/2023    HCT 43.0 09/05/2023    MCV 81 09/05/2023     09/05/2023     No results found for: \"CHOL\"  No results found for: \"HDL\"  No results found for: \"LDLCALC\"  No results found for: \"TRIG\"  No results found for: \"HGBA1C\"  Other labs not included in the list above were reviewed either before or during this encounter.    History    No past medical history on file.  No past surgical history on file.  No family history on file.  No Known Allergies  No current facility-administered medications on file prior to encounter.     Current Outpatient Medications on File Prior to Encounter   Medication Sig Dispense Refill    acetaminophen (Tylenol) 325 mg tablet Take 2 tablets (650 mg) by mouth 2 times a day.      hydrOXYzine pamoate (Vistaril) 25 mg capsule Take 1 capsule (25 mg) by mouth 2 times a day.      magnesium oxide (Mag-Ox) 400 mg tablet Take 1 tablet (400 mg) by mouth 2 times a day.      melatonin 5 mg tablet Take 1 tablet (5 mg) by mouth once daily at bedtime.      risperiDONE (RisperDAL) 2 mg tablet Take 1 tablet (2 mg) by mouth once daily at bedtime.      traZODone (Desyrel) 150 mg tablet Take 1 tablet (150 mg) by mouth once daily at bedtime.     Scheduled medications  buPROPion XL, 300 mg, oral, Daily  celecoxib, 200 mg, oral, Daily  cholecalciferol, 125 mcg, oral, Daily  citalopram, 30 mg, oral, Daily  lidocaine, 1 patch, transdermal, Daily  melatonin, 5 mg, oral, Nightly  QUEtiapine, 350 mg, oral, Nightly  traZODone, 150 mg, oral, Nightly      Continuous medications     PRN medications  PRN medications: acetaminophen, alum-mag hydroxide-simeth, diphenhydrAMINE, hydrOXYzine pamoate, OLANZapine, OLANZapine zydis     There is no immunization history on file for this " patient.  Patient's medical history was reviewed and updated either before or during this encounter.  ASSESSMENT / PLAN:  Active Hospital Problems    Bilateral hip pain      *Severe recurrent major depression without psychotic features (CMS/HCC)      Colostomy present (CMS/HCC)      Opioid abuse (CMS/HCC)  S/p fall.  Hip x-rays negative      Pt. Has been stable.clostomy is working well.        Jay Knox MD

## 2023-10-31 NOTE — PROGRESS NOTES
Physical Therapy                 Therapy Communication Note    Patient Name: Azul Roberts  MRN: [id number]  Today's Date: 10/31/2023     Discipline: Physical Therapy    Missed Visit Reason: Missed Visit Reason:  (Patient declined this date. Reports fatigue.)    Missed Time: Attempt    Comment:

## 2023-10-31 NOTE — CARE PLAN
The patient's goals for the shift include better hygiene and empty colostomy bag BID or PRN    The clinical goals for the shift include promote rest    Over the shift, the patient did make progress toward the following goals. Barriers to progression include group time, rest and hygiene.. Recommendations to address these barriers include group, rest and hygiene..

## 2023-10-31 NOTE — PROGRESS NOTES
"Azul Roberts is a 63 y.o. female on day 52 of admission presenting with Severe recurrent major depression without psychotic features (CMS/[facility]).      Subjective   Case discussed with treatment team and chart reviewed.     Azul continues to demonstrate gains consolidation and maintenance.  She is out of her room in the wheelchair by the nursing station interacting with the nurses and listening to country music.  She chuckle sarcastically during conversation but appropriately.  She continues to report that she does not feel well, is depressed and feels continues to function in a limited fashion but she is trying to present herself \"in all my glory.\"  We continue to encourage participation to further strengthen her improvements thus far.      She is aware and it continues to be discussed with her that we are waiting for [place] Medicaid to send a letter of termination such that [place] Medicaid can prevail.  [place] indicate this will arrive via snail male to her home address which could take an additional 7 to 10 days.  Pending this, we will then be able to coordinate convalescence nursing care from this facility.  Patient is continuing to tolerate medications but notes that her sleep is poor.  It was noted to be 7 hours overnight by nursing but patient reports she slept poorly.  We will revert to schedule trazodone at night to further aid with sleep and assess for its efficacy moving forward.  No other medication changes indicated and this was discussed with patient today.    Patient was asked to consider how she would function if discharged home at this time.  She notes she likely would remain sedentary and not taking her medications nor functioning to meet basic needs including cleaning herself nor preparing meals for herself.  This would likely lead to further decompensation and need for readmission for stabilization.  To this extent we discussed with her that continued hospitalization remains " "indicated at this time.  She does not outwardly express having suicidal ideations but continues to function poorly due to mental health symptoms which have interfered for significant period of time leading to the need for this hospitalization.  Without being stabilized patient will continue to struggle significantly and not be able to return to the community in this condition.    Objective     Last Recorded Vitals  Blood pressure 111/77, pulse 90, temperature 36.8 °C (98.2 °F), temperature source Temporal, resp. rate 18, height 1.626 m (5' 4\"), weight 78.9 kg (174 lb), SpO2 100 %.    Review of Systems   Psychiatric/Behavioral:  Positive for decreased concentration, dysphoric mood and sleep disturbance. The patient is nervous/anxious.      Psychiatric ROS - Adult  Anxiety: General Anxiety Disorder (KAYLEEN)KAYLEEN Behaviors: difficult to control worry, difficulty concentrating, irritability, restlessness, and sleep disturbance  Depression: anhedonia, appetite increased, concentration, energy, helpless, hopeless, interest, persistent thoughts of death, psychomotor agitation, sleep decreased , suicidal thoughts, and withdrawn  Delirium: negative  Psychosis: negative  Ioana: negative  Safety Issues: passive death wish and suicidal ideation  Psychiatric ROS Comment: as noted    Physical Exam  Abdominal:      Comments: Presence of colostomy bag   Psychiatric:         Attention and Perception: Attention and perception normal.         Mood and Affect: Mood is anxious and depressed. Affect is flat and inappropriate.         Behavior: Behavior is slowed.     Mental Status Examination  General Appearance:  less disheveled with improved eye contact, not malodorous on approach. Sitting in wheelchair near nursing station speaking with staff listening to country music.  Gait/Station: using wheelchair to move about the milieu  Speech: brief, conversational volume  Mood: \"here I am in all my glory\"  Affect: less Dysphoric, constricted " ,  Thought Process: remains impoverished but less so  Thought Associations: No loosening of associations  Thought Content: depressed, hopeless, persists as helpless  Perception: No perceptual abnormalities noted  Level of Consciousness: Alert  Orientation: Alert  Attention and Concentration: Mild impairment in attention  Recent Memory: Intact as evidenced by ability to recall details from the past 24 hours   Executive function: Intact  Language: Naming intact  Fund of knowledge: Good  Insight: Limited, as evidenced by disengagement but this is notable for improvement over the past 48-72 hrs  Judgment: Fair, as evidenced by ability to reason through medical decision making, compliance with treatment recommendations, and improving      Psychiatric Risk Assessment  Violence Risk Assessment: major mental illness and substance abuse  Acute Risk of Harm to Others is Considered: moderate   Suicide Risk Assessment: , chronic medical illness, chronic pain, current psychiatric illness, feelings of hopelessness, global insomnia, history of trauma or abuse, life crisis (shame/despair), prior suicide attempt, severe anxiety, substance abuse, and suicidal ideations  Protective Factors against Suicide: adherence to  treatment and marriage/partnership  Acute Risk of Harm to Self is Considered: moderate    Relevant Results  Scheduled medications  buPROPion XL, 300 mg, oral, Daily  celecoxib, 200 mg, oral, Daily  cholecalciferol, 125 mcg, oral, Daily  DULoxetine, 90 mg, oral, Daily  lidocaine, 1 patch, transdermal, Daily  QUEtiapine, 350 mg, oral, Nightly      Continuous medications     PRN medications  PRN medications: acetaminophen, alum-mag hydroxide-simeth, diphenhydrAMINE, hydrOXYzine pamoate, melatonin, OLANZapine, OLANZapine zydis, traZODone         Assessment/Plan   Principal Problem:    Severe recurrent major depression without psychotic features (CMS/HCC)  Active Problems:    Colostomy present (CMS/HCC)    Opioid  abuse (CMS/HCC)    Bilateral hip pain    Biological -   Cymbalta 90 mg daily for depression, ideally titrate to as tolerated and beneficial  Wellbutrin xl 300 mg for adjunctive depression treatment  seroquel 350 mg at bedtime  Trazodone 150 mg  nd melatonin at bedtime for sleep prn  No adverse side effects of medications noted or reported.  ECG (10/4/23): Normal sinus rhythm. Heart  rate 72 bpm. Qtc 442     Discussion with patient regarding risk benefits and alternatives and side effects with opportunity to ask questions and questions answered.  Patient given consent to the plan as noted    2. Psychological -       Patient is encouraged to participate with the therapeutic milieu and program and group therapy    3. Sociological -      Patient encouraged to cooperate with social work staff on issues relevant to discharge planning  Pending [place] medicaid and then transition to convalescence nursing care once this becomes available to patient    25 minutes spent coordinating care for patient on this day    Eddie Cullen DO

## 2023-10-31 NOTE — GROUP NOTE
Group Topic: Self-Care/Wellness   Group Date: 10/31/2023  Start Time: 1000  End Time: 1100  Facilitators: BRENDA Fernandes   Department: [facility]    Number of Participants: 5   Group Focus: activities of daily living skills, daily focus, personal responsibility, and safety plan  Treatment Modality: Other: Recreation Therapy   Interventions utilized were clarification, mental fitness, patient education, problem solving, and reality testing  Purpose: other: increase attention/concentration, enhance self esteem, increase stimulation, stimulate memory, elevate mood, express feelings    Name: Azul Roberts YOB: 1960   MR: [id number]      Facilitator: Recreational Therapist  Level of Participation: did not attend  Quality of Participation:  did not attend  Interactions with others:  did not attend  Mood/Affect:  did not attend  Triggers (if applicable): n/a  Cognition:  did not attend  Progress: None  Comments: pt problem is depressed mood.  Pt refused to attend group at this time.  Is sitting in the hallway outside the group room during group.  Plan: continue with services

## 2023-10-31 NOTE — PROGRESS NOTES
Occupational Therapy    OT Treatment    Patient Name: Azul Roberts  MRN: [id number]  Today's Date: 10/31/2023  Time Calculation  Start Time: 0703  Stop Time: 0711  Time Calculation (min): 8 min         Assessment:  Medical Staff Made Aware: Yes (Discussion with NSG.)  End of Session Communication: Bedside nurse (OTR/L)  Medical Staff Made Aware: Yes (Discussion with NSG.)  Barriers to Participation: Coping skills (Pt presenting with self limiting behaviors  yellling out throughout session NSG informed)  Plan:  Treatment Interventions: Functional transfer training, Endurance training, Equipment evaluation/education  OT Frequency: 3 times per week  OT Discharge Recommendations: Moderate intensity level of continued care  Equipment Recommended upon Discharge: Wheeled walker  OT Recommended Transfer Status: Stand by assist  OT - OK to Discharge: Yes  Treatment Interventions: Functional transfer training, Endurance training, Equipment evaluation/education    Subjective   General:  OT Received On: 10/31/23  Reason for Referral: impaired mobility; recent fall  Referred By: Dr. Cullen  Past Medical History Relevant to Rehab: severe depression, colostomy, bilateral hip pain, opiod abuse  Missed Visit: No  Missed Visit Reason: Patient refused  Family/Caregiver Present: No  Prior to Session Communication: Bedside nurse  Patient Position Received: Bed, 0 rail up  Preferred Learning Style: verbal  General Comment: Therapist aproaching Pt whom seated w/c at NSG station Pt initialy agreeable however upon initiation session Pt declining each task.  Session shortened d/t Pts behaviors.  Vital Signs:     Pain:  Pain Assessment  Pain Assessment: 0-10  Pain Score: 0 - No pain  Pain Type: Chronic pain  Pain Location: Hip  Pain Orientation: Left  Pain Frequency: Constant/continuous  Pain Onset: Ongoing  Pain Interventions: Relaxation technique, Shower, Therapeutic touch, Other (Comment) (Discussion with NSG.)    Objective       Functional Standing Tolerance:  Time: 2 minutes  Activity: functional mobility  Functional Standing Tolerance Comments: RW level  Bed Mobility/Transfers: Bed Mobility  Bed Mobility: Yes  Bed Mobility 1  Bed Mobility 1: Scooting  Level of Assistance 1: Independent  Bed Mobility Comments 1: bed to neutral no use side transfer bar    Transfers  Transfer: Yes  Transfer 1  Transfer From 1: Sit to  Transfer to 1: Stand  Technique 1: Sit to stand, Stand to sit  Transfer Device 1: Walker (rolling)  Transfer Level of Assistance 1: Close supervision  Transfers 2  Transfer From 2: Stand to  Transfer to 2: Sit  Technique 2: Stand pivot  Transfer Device 2: Walker  Transfer Level of Assistance 2: Close supervision  Trials/Comments 2: rolling cues hand placement      Therapy/Activity: Therapeutic Activity  Therapeutic Activity Performed: Yes  Therapeutic Activity 1: Functional mobility 15' x 2 RW ClClose S with turns and transition        Outcome Measures:[facility] Daily Activity  Putting on and taking off regular lower body clothing: A little  Bathing (including washing, rinsing, drying): A little  Putting on and taking off regular upper body clothing: None  Toileting, which includes using toilet, bedpan or urinal: A little  Taking care of personal grooming such as brushing teeth: None  Eating Meals: None  Daily Activity - Total Score: 21      Education Documentation  Mobility Training, taught by ALLA Lopez at 10/30/2023  7:38 AM.  Learner: Patient  Readiness: Nonacceptance  Method: Explanation  Response: Needs Reinforcement    Body Mechanics, taught by ALLA Lopez at 10/30/2023  7:38 AM.  Learner: Patient  Readiness: Nonacceptance  Method: Explanation  Response: Needs Reinforcement    Precautions, taught by ALLA Lopez at 10/30/2023  7:38 AM.  Learner: Patient  Readiness: Nonacceptance  Method: Explanation  Response: Needs Reinforcement    ADL Training, taught by ALLA Lopez at 10/30/2023  7:38  AM.  Learner: Patient  Readiness: Nonacceptance  Method: Explanation  Response: Needs Reinforcement    Education Comments  No comments found.        OP EDUCATION:  Education  Individual(s) Educated: Patient  Education Provided: Fall precautons, Risk and benefits of OT discussed with patient or other, POC discussed and agreed upon  Risk and Benefits Discussed with Patient/Caregiver/Other: yes  Patient/Caregiver Demonstrated Understanding: yes  Plan of Care Discussed and Agreed Upon: yes  Patient Response to Education: Patient/Caregiver Verbalized Understanding of Information    Goals:  Encounter Problems       Encounter Problems (Active)       Balance       STG - Maintains dynamic standing balance with upper extremity support (Progressing)       Start:  10/27/23    Expected End:  11/10/23       INTERVENTIONS:  1. Practice standing with minimal support.  2. Educate patient about standing tolerance.  3. Educate patient about independence with gait, transfers, and ADL's.  4. Educate patient about use of assistive device.  5. Educate patient about self-directed care.            Balance       LTG - Patient will maintain standing and sitting balance to allow for completion of daily activities (Progressing)       Start:  10/16/23    Expected End:  11/06/23               Bathing       LTG - Patient will utilize adaptive techniques to bathe body (Progressing)       Start:  10/16/23    Expected End:  11/06/23               Dressings Lower Extremities       LTG - Patient will utilize adaptive techniques/equipment to dress lower body (Progressing)       Start:  10/16/23    Expected End:  11/06/23               Grooming       LTG - Patient will demonstrate use of appropriate Intervention for safe grooming habits (Progressing)       Start:  10/16/23    Expected End:  11/06/23               Mobility       STG - Patient will ambulate 300 ft with FWW , + turns, distant supervision of 1. (Progressing)       Start:  10/27/23     Expected End:  11/10/23               Mobility       LTG - Patient will ambulate household distance (Progressing)       Start:  10/16/23    Expected End:  11/06/23               Safety       LTG - Patient will demonstrate safety requirements appropriate to situation/environment (Progressing)       Start:  10/16/23    Expected End:  11/06/23               Transfers       STG - Patient to transfer to and from sit to supine mod independent (Progressing)       Start:  10/27/23    Expected End:  11/10/23            STG - Patient will transfer sit to and from stand mod independent (Progressing)       Start:  10/27/23    Expected End:  11/10/23

## 2023-10-31 NOTE — NURSING NOTE
SARAN NOTE     Problem:  depression     Behavior:  Pt was seen briefly sitting in the hallway in the beginning of the shift, no agitation and/or irritability noted, denied SI, cooperative and pleasant, makes eye contact appropriately.  Group Participation: n/a  Appetite/Meals: refused HS snack       Interventions:  1:1 intervention provided, HS snack offered, scheduled medication administered, self care routine addressed and discussed, fall risk interventions reviewed    Response:  Pt is compliant with scheduled medication, verbalized understanding of safety precautions to reduce fall risk, reported taking care of her colostomy bag and having showers on the regular basis     Plan:  Continue monitoring, encourage self care and implement positive reinforcement

## 2023-11-01 PROCEDURE — 2500000004 HC RX 250 GENERAL PHARMACY W/ HCPCS (ALT 636 FOR OP/ED): Performed by: PSYCHIATRY & NEUROLOGY

## 2023-11-01 PROCEDURE — 1140000001 HC PRIVATE PSYCH ROOM DAILY

## 2023-11-01 PROCEDURE — 97530 THERAPEUTIC ACTIVITIES: CPT | Mod: GO,CO

## 2023-11-01 PROCEDURE — 2500000001 HC RX 250 WO HCPCS SELF ADMINISTERED DRUGS (ALT 637 FOR MEDICARE OP): Performed by: STUDENT IN AN ORGANIZED HEALTH CARE EDUCATION/TRAINING PROGRAM

## 2023-11-01 PROCEDURE — 2500000001 HC RX 250 WO HCPCS SELF ADMINISTERED DRUGS (ALT 637 FOR MEDICARE OP): Performed by: PSYCHIATRY & NEUROLOGY

## 2023-11-01 PROCEDURE — 2500000002 HC RX 250 W HCPCS SELF ADMINISTERED DRUGS (ALT 637 FOR MEDICARE OP, ALT 636 FOR OP/ED): Performed by: STUDENT IN AN ORGANIZED HEALTH CARE EDUCATION/TRAINING PROGRAM

## 2023-11-01 PROCEDURE — 99232 SBSQ HOSP IP/OBS MODERATE 35: CPT | Performed by: INTERNAL MEDICINE

## 2023-11-01 PROCEDURE — 97535 SELF CARE MNGMENT TRAINING: CPT | Mod: GO,CO

## 2023-11-01 PROCEDURE — 99232 SBSQ HOSP IP/OBS MODERATE 35: CPT | Performed by: PSYCHIATRY & NEUROLOGY

## 2023-11-01 RX ADMIN — CELECOXIB 200 MG: 200 CAPSULE ORAL at 16:04

## 2023-11-01 RX ADMIN — Medication 125 MCG: at 08:25

## 2023-11-01 RX ADMIN — BUPROPION HYDROCHLORIDE 300 MG: 300 TABLET, FILM COATED, EXTENDED RELEASE ORAL at 08:25

## 2023-11-01 RX ADMIN — QUETIAPINE FUMARATE 350 MG: 300 TABLET ORAL at 20:23

## 2023-11-01 RX ADMIN — DULOXETINE HYDROCHLORIDE 90 MG: 60 CAPSULE, DELAYED RELEASE ORAL at 08:25

## 2023-11-01 ASSESSMENT — COGNITIVE AND FUNCTIONAL STATUS - GENERAL
DAILY ACTIVITIY SCORE: 22
DRESSING REGULAR LOWER BODY CLOTHING: A LITTLE
HELP NEEDED FOR BATHING: A LITTLE

## 2023-11-01 ASSESSMENT — COLUMBIA-SUICIDE SEVERITY RATING SCALE - C-SSRS
6. HAVE YOU EVER DONE ANYTHING, STARTED TO DO ANYTHING, OR PREPARED TO DO ANYTHING TO END YOUR LIFE?: NO
2. HAVE YOU ACTUALLY HAD ANY THOUGHTS OF KILLING YOURSELF?: NO
2. HAVE YOU ACTUALLY HAD ANY THOUGHTS OF KILLING YOURSELF?: NO
1. SINCE LAST CONTACT, HAVE YOU WISHED YOU WERE DEAD OR WISHED YOU COULD GO TO SLEEP AND NOT WAKE UP?: NO
1. SINCE LAST CONTACT, HAVE YOU WISHED YOU WERE DEAD OR WISHED YOU COULD GO TO SLEEP AND NOT WAKE UP?: NO
6. HAVE YOU EVER DONE ANYTHING, STARTED TO DO ANYTHING, OR PREPARED TO DO ANYTHING TO END YOUR LIFE?: NO

## 2023-11-01 ASSESSMENT — ENCOUNTER SYMPTOMS
DYSPHORIC MOOD: 1
SLEEP DISTURBANCE: 1
NERVOUS/ANXIOUS: 1
DECREASED CONCENTRATION: 1

## 2023-11-01 ASSESSMENT — PAIN SCALES - GENERAL: PAINLEVEL_OUTOF10: 0 - NO PAIN

## 2023-11-01 ASSESSMENT — ACTIVITIES OF DAILY LIVING (ADL): HOME_MANAGEMENT_TIME_ENTRY: 15

## 2023-11-01 ASSESSMENT — PAIN - FUNCTIONAL ASSESSMENT: PAIN_FUNCTIONAL_ASSESSMENT: 0-10

## 2023-11-01 NOTE — GROUP NOTE
Group Topic: Goals   Group Date: 11/1/2023  Start Time: 0730  End Time: 0800  Facilitators: Alondra Nicholson   Department: [facility]    Number of Participants: 7   Group Focus: check in, communication, coping skills, daily focus, feeling awareness/expression, and self-awareness  Treatment Modality: Patient-Centered Therapy  Interventions utilized were exploration and support  Purpose: coping skills, feelings, communication skills, self-worth, self-care, and trigger / craving management    Name: Azul Roberts YOB: 1960   MR: [id number]      Facilitator: cooperative  Level of Participation: active  Quality of Participation: cooperative  Interactions with others: appropriate  Mood/Affect: appropriate  Triggers (if applicable): n/a  Cognition: concrete  Progress: Moderate  Comments: pt problem is depressed mood.     Plan: follow-up needed

## 2023-11-01 NOTE — PROGRESS NOTES
Occupational Therapy    OT Treatment    Patient Name: Azul Roberts  MRN: [id number]  Today's Date: 11/1/2023  Time Calculation  Start Time: 0658  Stop Time: 0721  Time Calculation (min): 23 min         Assessment:  Medical Staff Made Aware: Yes (Discussion with NSG.)  End of Session Communication: Bedside nurse (OTR/L)  Medical Staff Made Aware: Yes (Discussion with NSG.)  Barriers to Participation: Coping skills (Pt presenting with self limiting behaviors  yellling out throughout session NSG informed)  Plan:  Treatment Interventions: ADL retraining, Functional transfer training, Equipment evaluation/education, Compensatory technique education  OT Frequency: 3 times per week  OT Discharge Recommendations: Low intensity level of continued care  Equipment Recommended upon Discharge: Wheeled walker  OT Recommended Transfer Status: Stand by assist  OT - OK to Discharge: Yes  Treatment Interventions: ADL retraining, Functional transfer training, Equipment evaluation/education, Compensatory technique education    Subjective   General:  OT Received On: 11/01/23  Reason for Referral: impaired mobility; recent fall  Referred By: Dr. Cullen  Past Medical History Relevant to Rehab: severe depression, colostomy, bilateral hip pain, opiod abuse  Missed Visit: No  Missed Visit Reason: Patient refused  Family/Caregiver Present: No  Prior to Session Communication: Bedside nurse (OTR/L)  Patient Position Received: Bed, 2 rail up  Preferred Learning Style: verbal  General Comment: Conferred with NSG.  Pt agreeable to skilled therapeutic intervention.  Vital Signs:     Pain:  Pain Assessment  Pain Assessment: 0-10  Pain Score: 0 - No pain  Pain Type: Chronic pain  Pain Location: Hip  Pain Orientation: Left  Pain Frequency: Constant/continuous  Pain Onset: Ongoing  Pain Interventions: Relaxation technique, Shower, Therapeutic touch, Other (Comment) (Discussion with NSG.)    Objective    Activities of Daily Living: Grooming  Grooming  Level of Assistance: Modified independent  Grooming Where Assessed: Standing sinkside  Grooming Comments: items in reach Pt washing hands    LE Dressing  LE Dressing: Yes  LE Dressing Adaptive Equipment: Reacher, Sock aide  Pants Level of Assistance: Setup  Sock Level of Assistance: Modified independent  LE Dressing Where Assessed: Wheelchair  LE Dressing Comments: Pt efficiently doff/don socks with AE    Toileting  Toileting Level of Assistance: Modified independent  Where Assessed: Toilet  Toileting Comments: Pt adjusting clothing prioor/after no hygiene  Functional Standing Tolerance:  Time: 2 minutes  Activity: functional mobility and stance phase ADL skills  Functional Standing Tolerance Comments: RW level  Bed Mobility/Transfers: Bed Mobility  Bed Mobility: Yes  Bed Mobility 1  Bed Mobility 1: Supine to sitting  Level of Assistance 1: Independent  Bed Mobility Comments 1: bed to neutral no use side transfer bar    Transfers  Transfer: Yes  Transfer 1  Transfer From 1: Sit to  Transfer to 1: Stand  Technique 1: Sit to stand  Transfer Device 1: Walker (Rolling)  Transfer Level of Assistance 1: Distant supervision  Transfers 2  Transfer From 2: Stand to  Transfer to 2: Sit  Technique 2: Stand to sit  Transfer Device 2: Walker  Transfer Level of Assistance 2: Distant supervision  Trials/Comments 2: use RW cues hand placement    Toilet Transfers  Toilet Transfer From: Rolling walker  Toilet Transfer Type: To and from  Toilet Transfer to: Standard toilet  Toilet Transfer Technique: Ambulating  Toilet Transfers: Supervision  Toilet Transfers Comments: grab bar use       Therapy/Activity: Therapeutic Activity  Therapeutic Activity Performed: Yes  Therapeutic Activity 1: Functional mobility 15' x 2 RW ClClose S with turns and transition    Outcome Measures:[facility] Daily Activity  Putting on and taking off regular lower body clothing: A little  Bathing (including washing, rinsing, drying): A little  Putting on and taking  off regular upper body clothing: None  Toileting, which includes using toilet, bedpan or urinal: None  Taking care of personal grooming such as brushing teeth: None  Eating Meals: None  Daily Activity - Total Score: 22          OP EDUCATION:  Education  Individual(s) Educated: Patient  Education Provided: Fall precautons, Risk and benefits of OT discussed with patient or other, POC discussed and agreed upon  Risk and Benefits Discussed with Patient/Caregiver/Other: yes  Patient/Caregiver Demonstrated Understanding: yes  Plan of Care Discussed and Agreed Upon: yes  Patient Response to Education: Patient/Caregiver Verbalized Understanding of Information    Goals:  Encounter Problems       Encounter Problems (Active)       Balance       STG - Maintains dynamic standing balance with upper extremity support (Progressing)       Start:  10/27/23    Expected End:  11/10/23       INTERVENTIONS:  1. Practice standing with minimal support.  2. Educate patient about standing tolerance.  3. Educate patient about independence with gait, transfers, and ADL's.  4. Educate patient about use of assistive device.  5. Educate patient about self-directed care.            Balance       LTG - Patient will maintain standing and sitting balance to allow for completion of daily activities (Progressing)       Start:  10/16/23    Expected End:  11/06/23               Bathing       LTG - Patient will utilize adaptive techniques to bathe body (Progressing)       Start:  10/16/23    Expected End:  11/06/23               Dressings Lower Extremities       LTG - Patient will utilize adaptive techniques/equipment to dress lower body (Progressing)       Start:  10/16/23    Expected End:  11/06/23               Grooming       LTG - Patient will demonstrate use of appropriate Intervention for safe grooming habits (Progressing)       Start:  10/16/23    Expected End:  11/06/23               Mobility       STG - Patient will ambulate 300 ft with FWW , +  turns, distant supervision of 1. (Progressing)       Start:  10/27/23    Expected End:  11/10/23               Mobility       LTG - Patient will ambulate household distance (Progressing)       Start:  10/16/23    Expected End:  11/06/23               Safety       LTG - Patient will demonstrate safety requirements appropriate to situation/environment (Progressing)       Start:  10/16/23    Expected End:  11/06/23               Transfers       STG - Patient to transfer to and from sit to supine mod independent (Progressing)       Start:  10/27/23    Expected End:  11/10/23            STG - Patient will transfer sit to and from stand mod independent (Progressing)       Start:  10/27/23    Expected End:  11/10/23

## 2023-11-01 NOTE — GROUP NOTE
Group Topic: Other   Group Date: 11/1/2023  Start Time: 1515  End Time: 1615  Facilitators: BRENDA Fernandes   Department: [facility]    Number of Participants: 8   Group Focus: reminiscence  Treatment Modality: Other: Recreation Therapy  Interventions utilized were exploration, reminiscence, and story telling  Purpose: feelings and other: increase socialization, stimulate memory, elevate mood, express feelings, increase stimulation    Name: Azul Roberts YOB: 1960   MR: [id number]      Facilitator: Recreational Therapist  Level of Participation: active  Quality of Participation: appropriate/pleasant, attentive, cooperative, engaged, and initiates communication  Interactions with others: appropriate and gave feedback  Mood/Affect: appropriate and bright  Triggers (if applicable): n/a  Cognition: coherent/clear  Progress: Significant  Comments: pt problem is depressed mood.  Plan: continue with services

## 2023-11-01 NOTE — PROGRESS NOTES
"Azul Roberts is a 63 y.o. female on day 53 of admission presenting with Severe recurrent major depression without psychotic features (CMS/[facility]).      Subjective   Case discussed with treatment team and chart reviewed.     Met with patient after dinner where she quickly consumed her meal including chicken and cherry pie.  Bit by bit, Azul continues to show improvement in function.  Continues with significant hip and back pain, but spent much if not most of today out of her room.  Using wheelchair to move about the unit.  Interacting with staff, more optimistic, no observed hostility, seen smiling, socializing even with other patients.  Plan remains unchanged, and patient remains aware following daily discussion on this topic.  We are seeking further gains to level of independence to return home, else continue to wait for convalescence nursing care through Medicaid when approved.  Patient with no other complaints nor requests today during encounter.  She does continue to endorse feeling hopeless and helpless, still struggling to greatly explain when asked, but relates this more now to her physical discomfort, and notes that she is less on edge, keyed up, and feeling less depressed than when she first arrived.  She did offer that she felt the facility was not going to be able to help her when she got her, but now recognizes some improvements afforded by the admission being the primary intervention to help her recover.    Objective     Last Recorded Vitals  Blood pressure 94/69, pulse 87, temperature 36.8 °C (98.2 °F), temperature source Oral, resp. rate 18, height 1.626 m (5' 4\"), weight 78.9 kg (174 lb), SpO2 99 %.    Review of Systems   Psychiatric/Behavioral:  Positive for decreased concentration, dysphoric mood and sleep disturbance. The patient is nervous/anxious.      Psychiatric ROS - Adult  Anxiety: General Anxiety Disorder (KAYLEEN)KAYLEEN Behaviors: difficult to control worry, difficulty concentrating, " "irritability, restlessness, and sleep disturbance  Depression: anhedonia, appetite increased, concentration, energy, helpless, hopeless, interest, persistent thoughts of death, psychomotor agitation, sleep decreased , suicidal thoughts, and withdrawn  Delirium: negative  Psychosis: negative  Ioana: negative  Safety Issues: passive death wish and suicidal ideation  Psychiatric ROS Comment: as noted    Physical Exam  Abdominal:      Comments: Presence of colostomy bag   Psychiatric:         Attention and Perception: Attention and perception normal.         Mood and Affect: Mood is anxious and depressed. Affect is flat and inappropriate.         Behavior: Behavior is slowed.     Mental Status Examination  General Appearance:  less disheveled with improved eye contact, not malodorous on approach. Sitting in wheelchair emerging from dinner service in cafeteria.  Gait/Station: using wheelchair to move about the milieu  Speech: brief, conversational volume  Mood: \"I'm ok today, I just hurt\"  Affect: less Dysphoric, constricted ,  Thought Process: remains impoverished but less so  Thought Associations: No loosening of associations  Thought Content: depressed, hopeless, persists as helpless  Perception: No perceptual abnormalities noted  Level of Consciousness: Alert  Orientation: Alert  Attention and Concentration: Mild impairment in attention  Recent Memory: Intact as evidenced by ability to recall details from the past 24 hours   Executive function: Intact  Language: Naming intact  Fund of knowledge: Good  Insight: Limited, as evidenced by disengagement but this is notable for improvement over the past 48-72 hrs  Judgment: Fair, as evidenced by ability to reason through medical decision making, compliance with treatment recommendations, and improving      Psychiatric Risk Assessment  Violence Risk Assessment: major mental illness and substance abuse  Acute Risk of Harm to Others is Considered: moderate   Suicide Risk " Assessment: , chronic medical illness, chronic pain, current psychiatric illness, feelings of hopelessness, global insomnia, history of trauma or abuse, life crisis (shame/despair), prior suicide attempt, severe anxiety, substance abuse, and suicidal ideations  Protective Factors against Suicide: adherence to  treatment and marriage/partnership  Acute Risk of Harm to Self is Considered: moderate    Relevant Results  Scheduled medications  buPROPion XL, 300 mg, oral, Daily  celecoxib, 200 mg, oral, Daily  cholecalciferol, 125 mcg, oral, Daily  DULoxetine, 90 mg, oral, Daily  lidocaine, 1 patch, transdermal, Daily  QUEtiapine, 350 mg, oral, Nightly      Continuous medications     PRN medications  PRN medications: acetaminophen, alum-mag hydroxide-simeth, diphenhydrAMINE, hydrOXYzine pamoate, melatonin, OLANZapine, OLANZapine zydis, traZODone         Assessment/Plan   Principal Problem:    Severe recurrent major depression without psychotic features (CMS/HCC)  Active Problems:    Colostomy present (CMS/HCC)    Opioid abuse (CMS/HCC)    Bilateral hip pain    Biological -   Cymbalta 90 mg daily for depression, ideally titrate to as tolerated and beneficial  Wellbutrin xl 300 mg for adjunctive depression treatment  seroquel 350 mg at bedtime  Trazodone 150 mg and melatonin at bedtime for sleep prn  No adverse side effects of medications noted or reported.  ECG (10/4/23): Normal sinus rhythm. Heart  rate 72 bpm. Qtc 442     Discussion with patient regarding risk benefits and alternatives and side effects with opportunity to ask questions and questions answered.  Patient given consent to the plan as noted    2. Psychological -       Patient is encouraged to participate with the therapeutic milieu and program and group therapy    3. Sociological -      Patient encouraged to cooperate with social work staff on issues relevant to discharge planning  Pending [place] medicaid and then transition to convalescence nursing  care once this becomes available to patient    25 minutes spent coordinating care for patient on this day    Eddie Cullen, DO

## 2023-11-01 NOTE — PROGRESS NOTES
[facility]: [facility]  PROGRESS NOTE      Azul Roberts is a 63 y.o. female that is being seen  today for follow up at [facility]  Subjective   [facility]  Patient has been at her baseline.  Colostomy has been working well.  Pain has been fairly controlled.  Patient is still not able to take good care of herself.  Patient is being seen by [facility] team for depression and anxiety.  ROS  Negative for fever or chills  Negative for sore throat, ear pain, nasal discharge  Negative for cough, shortness of breath or wheezing  Negative for chest pain, palpitations, swelling of legs  Negative for abdominal pain, constipation, diarrhea, blood in the stools,has colostomy  Negative for urinary complaints  Negative for headache, dizziness, weakness or numbness  Negative for joint pain  Positive for depression or anxiety  All other systems reviewed and were negative  Vitals:    11/01/23 0638   BP: 136/78   Pulse: 78   Resp: 18   Temp: 36.6 °C (97.9 °F)   SpO2: 99%      Body mass index is 29.87 kg/m².  Physical Exam  Constitutional: Patient does not appear to be in any acute distress  Head and Face: NCAT  Eyes: Normal external exam, EOMI  ENT: Normal external inspection of ears and nose. Oropharynx normal.  Cardiovascular: RRR, S1/S2, no murmurs, rubs, or gallops, radial pulses +2, no edema of extremities  Pulmonary: CTAB, no respiratory distress.  Abdomen: +BS, soft, non-tender, nondistended, no guarding or rebound, no masses noted.colostomy present   MSK: hip joint pain   Skin- No lesions, contusions, or erythema.  Peripheral puslses palpable bilaterally 2+  Neuro: AAO X3, Cranial nerves 2-12 grossly intact,DTR 2+ in all 4 limbs       Diagnostic Results   Lab Results   Component Value Date    GLUCOSE 113 (H) 09/05/2023    CALCIUM 10.6 (H) 09/05/2023     09/05/2023    K 3.7 09/05/2023    CO2 24 09/05/2023     09/05/2023    BUN 12 09/05/2023     "CREATININE 0.72 09/05/2023     Lab Results   Component Value Date    ALT 12 09/05/2023    AST 14 09/05/2023    ALKPHOS 103 09/05/2023    BILITOT 0.4 09/05/2023     Lab Results   Component Value Date    WBC 13.2 (H) 09/05/2023    HGB 13.9 09/05/2023    HCT 43.0 09/05/2023    MCV 81 09/05/2023     09/05/2023     No results found for: \"CHOL\"  No results found for: \"HDL\"  No results found for: \"LDLCALC\"  No results found for: \"TRIG\"  No results found for: \"HGBA1C\"  Other labs not included in the list above were reviewed either before or during this encounter.    History    No past medical history on file.  No past surgical history on file.  No family history on file.  No Known Allergies  No current facility-administered medications on file prior to encounter.     Current Outpatient Medications on File Prior to Encounter   Medication Sig Dispense Refill    acetaminophen (Tylenol) 325 mg tablet Take 2 tablets (650 mg) by mouth 2 times a day.      hydrOXYzine pamoate (Vistaril) 25 mg capsule Take 1 capsule (25 mg) by mouth 2 times a day.      magnesium oxide (Mag-Ox) 400 mg tablet Take 1 tablet (400 mg) by mouth 2 times a day.      melatonin 5 mg tablet Take 1 tablet (5 mg) by mouth once daily at bedtime.      risperiDONE (RisperDAL) 2 mg tablet Take 1 tablet (2 mg) by mouth once daily at bedtime.      traZODone (Desyrel) 150 mg tablet Take 1 tablet (150 mg) by mouth once daily at bedtime.     Scheduled medications  buPROPion XL, 300 mg, oral, Daily  celecoxib, 200 mg, oral, Daily  cholecalciferol, 125 mcg, oral, Daily  citalopram, 30 mg, oral, Daily  lidocaine, 1 patch, transdermal, Daily  melatonin, 5 mg, oral, Nightly  QUEtiapine, 350 mg, oral, Nightly  traZODone, 150 mg, oral, Nightly      Continuous medications     PRN medications  PRN medications: acetaminophen, alum-mag hydroxide-simeth, diphenhydrAMINE, hydrOXYzine pamoate, OLANZapine, OLANZapine zydis     There is no immunization history on file for this " patient.  Patient's medical history was reviewed and updated either before or during this encounter.  ASSESSMENT / PLAN:  Active Hospital Problems    Bilateral hip pain      *Severe recurrent major depression without psychotic features (CMS/HCC)      Colostomy present (CMS/HCC)      Opioid abuse (CMS/HCC)  S/p fall.  Hip x-rays negative      Pt. Has been stable.clostomy is working well.        Jay Knox MD

## 2023-11-01 NOTE — CARE PLAN
The patient's goals for the shift include better hygiene    The clinical goals for the shift include group time, safety and hygiene.     Over the shift, the patient did make progress toward the following goals. Barriers to progression include using wheelchair . Recommendations to address these barriers include taking showers, using the wheel chair and taking care of her colostomy bag as needed..

## 2023-11-01 NOTE — GROUP NOTE
Group Topic: Other   Group Date: 11/1/2023  Start Time: 1100  End Time: 1140  Facilitators: BRENDA Fernandes   Department: [facility] REHABTH [facility]IRTUAL    Number of Participants: 5   Group Focus: other Halloween memories  Treatment Modality: Other: Recreation Therapy  Interventions utilized were mental fitness, reminiscence, and story telling  Purpose: feelings and other: increase attention, enhance self esteem, express feelings, stimulate memory, elevate mood, increase socialization.    Name: Azul Roberts YOB: 1960   MR: [id number]      Facilitator: Recreational Therapist  Level of Participation: did not attend  Quality of Participation:  did not attend  Interactions with others:  did not attend  Mood/Affect:  did not attend  Triggers (if applicable): n/a  Cognition:  did not attend  Progress: None  Comments: pt problem is depressed mood.  Pt declines invitation to join group.  Is sitting in the hallway engaging with staff.  Also working on a word search puzzle.  Plan: continue with services

## 2023-11-01 NOTE — NURSING NOTE
BIRP NOTE     Problem:  depression     Behavior:  Pt denies depression, played a game with the nurse, did a word search and and went to group.  Group Participation: yes  Appetite/Meals: 100x3       Interventions:  Advised to seek staff with any issues     Response:  Pt states she understands to seek staff with any concerns     Plan:  Will continue to monitor

## 2023-11-01 NOTE — PROGRESS NOTES
LSW met with pt who was observed transferring herself around the unit in a wheelchair. LSW provided encouragement to pt. Pt presented with improved mood and affect. Pt was smiling and referenced to being happy with her progress. Pt reports that she is trying. Pt is making great progress. SARAHW will continue to work with pt on obtaining [place] Medicaid and with discharge to SNF.     Nikki Andrews, DRAGAN

## 2023-11-01 NOTE — NURSING NOTE
SARAN NOTE     Problem:  Depression     Behavior:  Pt came out for the snack, was socially engaged wit the staff, addressed her needs, makes eye contact, denies SI, pleasant and cooperative, emptied colostomy bag independently, asked for appropriate supplies.  Group Participation: n/a  Appetite/Meals: hs snack provided       Interventions:  1:1 intervention provided, scheduled medication administered    Response:  Pt is compliant with medication,     Plan:  monitoring

## 2023-11-02 PROCEDURE — 2500000001 HC RX 250 WO HCPCS SELF ADMINISTERED DRUGS (ALT 637 FOR MEDICARE OP): Performed by: PSYCHIATRY & NEUROLOGY

## 2023-11-02 PROCEDURE — 2500000004 HC RX 250 GENERAL PHARMACY W/ HCPCS (ALT 636 FOR OP/ED): Performed by: PSYCHIATRY & NEUROLOGY

## 2023-11-02 PROCEDURE — 97110 THERAPEUTIC EXERCISES: CPT | Mod: GP,CQ

## 2023-11-02 PROCEDURE — 2500000001 HC RX 250 WO HCPCS SELF ADMINISTERED DRUGS (ALT 637 FOR MEDICARE OP): Performed by: STUDENT IN AN ORGANIZED HEALTH CARE EDUCATION/TRAINING PROGRAM

## 2023-11-02 PROCEDURE — 2500000002 HC RX 250 W HCPCS SELF ADMINISTERED DRUGS (ALT 637 FOR MEDICARE OP, ALT 636 FOR OP/ED): Performed by: STUDENT IN AN ORGANIZED HEALTH CARE EDUCATION/TRAINING PROGRAM

## 2023-11-02 PROCEDURE — 1140000001 HC PRIVATE PSYCH ROOM DAILY

## 2023-11-02 PROCEDURE — 99231 SBSQ HOSP IP/OBS SF/LOW 25: CPT | Performed by: PSYCHIATRY & NEUROLOGY

## 2023-11-02 RX ADMIN — QUETIAPINE FUMARATE 350 MG: 300 TABLET ORAL at 20:36

## 2023-11-02 RX ADMIN — Medication 125 MCG: at 08:11

## 2023-11-02 RX ADMIN — CELECOXIB 200 MG: 200 CAPSULE ORAL at 14:52

## 2023-11-02 RX ADMIN — DULOXETINE HYDROCHLORIDE 90 MG: 60 CAPSULE, DELAYED RELEASE ORAL at 08:11

## 2023-11-02 RX ADMIN — BUPROPION HYDROCHLORIDE 300 MG: 300 TABLET, FILM COATED, EXTENDED RELEASE ORAL at 08:11

## 2023-11-02 ASSESSMENT — PAIN SCALES - GENERAL
PAINLEVEL_OUTOF10: 0 - NO PAIN
PAINLEVEL_OUTOF10: 9
PAINLEVEL_OUTOF10: 0 - NO PAIN

## 2023-11-02 ASSESSMENT — PAIN - FUNCTIONAL ASSESSMENT
PAIN_FUNCTIONAL_ASSESSMENT: FLACC (FACE, LEGS, ACTIVITY, CRY, CONSOLABILITY)
PAIN_FUNCTIONAL_ASSESSMENT: 0-10
PAIN_FUNCTIONAL_ASSESSMENT: FLACC (FACE, LEGS, ACTIVITY, CRY, CONSOLABILITY)

## 2023-11-02 ASSESSMENT — ENCOUNTER SYMPTOMS
NERVOUS/ANXIOUS: 1
SLEEP DISTURBANCE: 1
DYSPHORIC MOOD: 1
DECREASED CONCENTRATION: 1

## 2023-11-02 ASSESSMENT — COGNITIVE AND FUNCTIONAL STATUS - GENERAL
TURNING FROM BACK TO SIDE WHILE IN FLAT BAD: A LITTLE
CLIMB 3 TO 5 STEPS WITH RAILING: A LOT
MOBILITY SCORE: 18
STANDING UP FROM CHAIR USING ARMS: A LITTLE
WALKING IN HOSPITAL ROOM: A LITTLE
MOVING TO AND FROM BED TO CHAIR: A LITTLE

## 2023-11-02 NOTE — PROGRESS NOTES
"Azul Roberts is a 63 y.o. female on day 54 of admission presenting with Severe recurrent major depression without psychotic features (CMS/[facility]).      Subjective   Case discussed with treatment team and chart reviewed.     Each day Azul continues to demonstrate improvement more so than the previous days.  She is more engaged today than she had been yesterday or other days.  She continues to increase socialization.  She is staying out of her room more and has been seen wheeling herself around the unit, down the halls, and back to the central area near the nursing station.    She is observed attending group and participating.  She is demonstrating more linear and logical thoughts.  Though she is brief during discussion, she is goal directed without loosening of association.  She is denying acute suicidality.  She does continue to endorse pain but she is working with the therapy teams appropriately to help medicate this.  We continue to await for Medicaid as has been discussed extensively.  Sleep reported as 8 hrs by staff, and patient continues to report is poor related to pain.  Notes that reintroduction of trazodone as scheduled was unhelpful.  As such, noted that would change trazodone back to PRN to reduce potentially unneeded medications.    Objective     Last Recorded Vitals  Blood pressure 110/74, pulse 82, temperature 36.6 °C (97.9 °F), temperature source Oral, resp. rate 17, height 1.626 m (5' 4\"), weight 78.9 kg (174 lb), SpO2 100 %.    Review of Systems   Psychiatric/Behavioral:  Positive for decreased concentration, dysphoric mood and sleep disturbance. The patient is nervous/anxious.      Psychiatric ROS - Adult  Anxiety: General Anxiety Disorder (KAYLEEN)KAYLEEN Behaviors: difficult to control worry, difficulty concentrating, irritability, restlessness, and sleep disturbance  Depression: anhedonia, appetite increased, concentration, energy, helpless, hopeless, interest, persistent thoughts of death, " "psychomotor agitation, sleep decreased , suicidal thoughts, and withdrawn  Delirium: negative  Psychosis: negative  Ioana: negative  Safety Issues: passive death wish and suicidal ideation  Psychiatric ROS Comment: as noted    Physical Exam  Abdominal:      Comments: Presence of colostomy bag   Psychiatric:         Attention and Perception: Attention and perception normal.         Mood and Affect: Mood is anxious and depressed. Affect is flat and inappropriate.         Behavior: Behavior is slowed.     Mental Status Examination  General Appearance:  less disheveled with improved eye contact, not malodorous on approach. Sitting in wheelchair in group  Gait/Station: using wheelchair to move about the milieu  Speech: brief, conversational volume  Mood: \"ok\"  Affect: less Dysphoric, constricted ,  Thought Process: remains impoverished but less so  Thought Associations: No loosening of associations  Thought Content: depressed, hopeless, persists as helpless  Perception: No perceptual abnormalities noted  Level of Consciousness: Alert  Orientation: Alert  Attention and Concentration: Mild impairment in attention  Recent Memory: Intact as evidenced by ability to recall details from the past 24 hours   Executive function: Intact  Language: Naming intact  Fund of knowledge: Good  Insight: Fair, as evidenced by increasing participation in discussion about her scenario and symptoms and their improvement  Judgment: Fair, as evidenced by ability to reason through medical decision making, compliance with treatment recommendations, and improving      Psychiatric Risk Assessment  Violence Risk Assessment: major mental illness and substance abuse  Acute Risk of Harm to Others is Considered: moderate   Suicide Risk Assessment: , chronic medical illness, chronic pain, current psychiatric illness, feelings of hopelessness, global insomnia, history of trauma or abuse, life crisis (shame/despair), prior suicide attempt, severe " anxiety, substance abuse, and suicidal ideations  Protective Factors against Suicide: adherence to  treatment and marriage/partnership  Acute Risk of Harm to Self is Considered: moderate    Relevant Results  Scheduled medications  buPROPion XL, 300 mg, oral, Daily  celecoxib, 200 mg, oral, Daily  cholecalciferol, 125 mcg, oral, Daily  DULoxetine, 90 mg, oral, Daily  lidocaine, 1 patch, transdermal, Daily  QUEtiapine, 350 mg, oral, Nightly      Continuous medications     PRN medications  PRN medications: acetaminophen, alum-mag hydroxide-simeth, diphenhydrAMINE, hydrOXYzine pamoate, melatonin, OLANZapine, OLANZapine zydis, traZODone         Assessment/Plan   Principal Problem:    Severe recurrent major depression without psychotic features (CMS/HCC)  Active Problems:    Colostomy present (CMS/HCC)    Opioid abuse (CMS/HCC)    Bilateral hip pain    Biological -   Cymbalta 90 mg daily for depression, ideally titrate to as tolerated and beneficial  Wellbutrin xl 300 mg for adjunctive depression treatment  seroquel 350 mg at bedtime  Trazodone 150 mg PRN and melatonin at bedtime for sleep  No adverse side effects of medications noted or reported.  ECG (10/4/23): Normal sinus rhythm. Heart  rate 72 bpm. Qtc 442     Discussion with patient regarding risk benefits and alternatives and side effects with opportunity to ask questions and questions answered.  Patient given consent to the plan as noted    2. Psychological -       Patient is encouraged to participate with the therapeutic milieu and program and group therapy    3. Sociological -      Patient encouraged to cooperate with social work staff on issues relevant to discharge planning  Pending [place] medicaid and then transition to convalescence nursing care once this becomes available to patient    25 minutes spent coordinating care for patient on this day    Eddie Cullen DO

## 2023-11-02 NOTE — PROGRESS NOTES
DRAGAN met with pt who was sitting in wheelchair in the hallway. Pt was observed attending group this afternoon. When asked about group she said they played [employer] and that she did ok. Pt presented with improved affect and mood. Pt engages with staff but minimal engagement with other pt's. Pt has much improvement over the past week. LSW will inquire with PT about pt continuing to use wheelchair and not resuming use of walker.       Nikki Andrews, DRAGAN

## 2023-11-02 NOTE — PROGRESS NOTES
Physical Therapy       11/02/23 2982-5930   PT  Visit   PT Received On 11/02/23   Response to Previous Treatment Patient unable to report, no changes reported from family or staff   General   Reason for Referral impaired mobility; recent fall   Referred By Dr. Cullen   Past Medical History Relevant to Rehab severe depression, colostomy, bilateral hip pain, opiod abuse   Patient Position Received Bed, 1 rail up   Preferred Learning Style verbal   General Comment Conferred with NSG.  Pt agreeable to skilled therapeutic intervention.   Precautions   Medical Precautions Fall precautions   Pain Assessment   Pain Assessment 0-10   Pain Score 9   Pain Type Acute pain   Pain Location Hip   Pain Orientation Left   Pain Frequency Constant/continuous   Therapeutic Exercise   Therapeutic Exercise Activity 1 supine BLE heel slides, LLE with limited ROM due to pain, 1x10 reps   Therapeutic Exercise Activity 2 supine bridging 1x10 reps   Therapeutic Exercise Activity 3 supine hip abduction, 1x10 reps, RLE only (LLU too painful)   Therapeutic Exercise Activity 4 supine SAQ, 1x10 reps, BLE   Bed Mobility 1   Bed Mobility 1 Rolling right;Scooting;Rolling left   Level of Assistance 1 Independent   Bed Mobility Comments 1 bed flat   Activity Tolerance   Endurance Decreased tolerance for upright activites   PT Assessment   PT Assessment Results Decreased strength;Decreased endurance;Impaired balance;Decreased mobility;Decreased coordination;Decreased safety awareness;Pain   Rehab Prognosis Good   Evaluation/Treatment Tolerance Patient limited by pain   Medical Staff Made Aware Yes   Strengths Support of Caregivers   Barriers to Participation Coping skills   PT Plan   Inpatient/Swing Bed or Outpatient Inpatient   PT Plan   Treatment/Interventions Transfer training;Gait training;Balance training;Strengthening;Therapeutic exercise;Therapeutic activity   PT Plan Skilled PT   PT Frequency 3 times per week   Equipment Recommended upon  Discharge Wheeled walker   PT Recommended Transfer Status Assist x1      11/02/23 [id number]   [facility] Basic Mobility   Turning from your back to your side while in a flat bed without using bedrails 4   Moving from lying on your back to sitting on the side of a flat bed without using bedrails 3   Moving to and from bed to chair (including a wheelchair) 3   Standing up from a chair using your arms (e.g. wheelchair or bedside chair) 3   To walk in hospital room 3   Climbing 3-5 steps with railing 2   Basic Mobility - Total Score 18

## 2023-11-02 NOTE — GROUP NOTE
Group Topic: Other   Group Date: 11/2/2023  Start Time: 1500  End Time: 1540  Facilitators: BRENDA Howard   Department: [facility] REHABTH VIRTUAL    Number of Participants: 7   Group Focus: social skills, Mental Fitness   Treatment Modality: Other: Recreation Therapy ,   Interventions utilized were mental fitness  Purpose: other: Social Skills, Mental Fitness    Name: Azul Roberts YOB: 1960   MR: [id number]      Facilitator: Recreational Therapist  Level of Participation: active  Quality of Participation: appropriate/pleasant, attentive, cooperative, and engaged  Interactions with others: appropriate  Mood/Affect: appropriate, brightens with interaction, and positive  Triggers (if applicable): n/a  Cognition: coherent/clear  Progress: Significant  Comments: pt problem is depressed mood  Plan: continue with services

## 2023-11-02 NOTE — GROUP NOTE
Group Topic: Other   Group Date: 11/2/2023  Start Time: 1000  End Time: 1100  Facilitators: BRENDA Howard   Department: [facility] REHABTH VIRTUAL    Number of Participants: 6   Group Focus: other self-disclosure  Treatment Modality: Other: Recreation Therapy  Interventions utilized were exploration and reminiscence  Purpose: feelings and other: social skills    Name: Azul Roberts YOB: 1960   MR: [id number]      Facilitator: Recreational Therapist  Level of Participation: did not attend  Quality of Participation:  did not attend   Interactions with others:  did not attend  Mood/Affect:  did not attend  Triggers (if applicable): n/a  Cognition:  did not attend  Progress: None  Comments: pt problem is depressed mood. Pt declines invitation to group. Pt up and in hallway when group ended  Plan: continue with services

## 2023-11-02 NOTE — NURSING NOTE
SARAN NOTE     Problem:  depression     Behavior:  Pt is taking scheduled medications, she did not go to group but sat in the hallway. She stated that her mood is better.  She is taking care of her colostomy.    Group Participation: partial in the morning and attended group in the afternoon.  Appetite/Meals: 100%       Interventions:  Pt given scheduled medications, every 15 minute checks, encourage participation in milieu    Response:  Pt has been up in the hallway this afternoon, she also attended group this afternoon.     Plan:  Pt given scheduled medications, every 15 minute checks, encourage participation in milieu

## 2023-11-03 PROCEDURE — 99232 SBSQ HOSP IP/OBS MODERATE 35: CPT | Performed by: INTERNAL MEDICINE

## 2023-11-03 PROCEDURE — 2500000002 HC RX 250 W HCPCS SELF ADMINISTERED DRUGS (ALT 637 FOR MEDICARE OP, ALT 636 FOR OP/ED): Performed by: STUDENT IN AN ORGANIZED HEALTH CARE EDUCATION/TRAINING PROGRAM

## 2023-11-03 PROCEDURE — 99231 SBSQ HOSP IP/OBS SF/LOW 25: CPT | Performed by: PSYCHIATRY & NEUROLOGY

## 2023-11-03 PROCEDURE — 2500000004 HC RX 250 GENERAL PHARMACY W/ HCPCS (ALT 636 FOR OP/ED): Performed by: PSYCHIATRY & NEUROLOGY

## 2023-11-03 PROCEDURE — 2500000001 HC RX 250 WO HCPCS SELF ADMINISTERED DRUGS (ALT 637 FOR MEDICARE OP): Performed by: STUDENT IN AN ORGANIZED HEALTH CARE EDUCATION/TRAINING PROGRAM

## 2023-11-03 PROCEDURE — 1140000001 HC PRIVATE PSYCH ROOM DAILY

## 2023-11-03 PROCEDURE — 2500000001 HC RX 250 WO HCPCS SELF ADMINISTERED DRUGS (ALT 637 FOR MEDICARE OP): Performed by: PSYCHIATRY & NEUROLOGY

## 2023-11-03 RX ADMIN — QUETIAPINE FUMARATE 350 MG: 300 TABLET ORAL at 20:59

## 2023-11-03 RX ADMIN — Medication 125 MCG: at 08:17

## 2023-11-03 RX ADMIN — BUPROPION HYDROCHLORIDE 300 MG: 300 TABLET, FILM COATED, EXTENDED RELEASE ORAL at 08:17

## 2023-11-03 RX ADMIN — CELECOXIB 200 MG: 200 CAPSULE ORAL at 15:34

## 2023-11-03 RX ADMIN — DULOXETINE HYDROCHLORIDE 90 MG: 60 CAPSULE, DELAYED RELEASE ORAL at 08:17

## 2023-11-03 ASSESSMENT — ENCOUNTER SYMPTOMS
DECREASED CONCENTRATION: 1
DYSPHORIC MOOD: 1
SLEEP DISTURBANCE: 1
NERVOUS/ANXIOUS: 1

## 2023-11-03 ASSESSMENT — PAIN SCALES - GENERAL
PAINLEVEL_OUTOF10: 0 - NO PAIN
PAINLEVEL_OUTOF10: 0 - NO PAIN

## 2023-11-03 NOTE — CARE PLAN
The patient's goals for the shift include I dont know, try to go to a group    The clinical goals for the shift include promote self care    Over the shift, the patient did not make progress toward the following goals. Barriers to progression include needs encouragement. Recommendations to address these barriers include encourage patient.

## 2023-11-03 NOTE — PROGRESS NOTES
SARAHW contacted pt's  by email regarding documents needed for Medicaid application. These include a statement from him indicating that he is not currently working as he indicated. As well as the termination letter from [place] Medicaid which should be arriving in the mail to their residence. SARAHW requested that he provide these documents as soon as possible to further process her [place] Medicaid application.     As long as pt continues to be compliant with and progress with therapy pt might be able to admit to a facility initially for skilled services. Otherwise pt will admit to a facility under convalescent stay until pt is able to manage her needs and return home. LSW will continue to assist pt with discharge plan and the necessary steps involved.     Nikki Andrews, DRAGAN

## 2023-11-03 NOTE — PROGRESS NOTES
REHAB Therapy Assessment & Treatment    Patient Name: Azul Roberts  MRN: [id number]  Today's Date: 11/3/2023    Recreation Therapy note: Pt is alert and oriented x 2-3 with some poor insight/judgement. Pt continues to refuse most groups daily, though has attend two afternoon groups this week which pt displayed brighter affect and engaged easily with staff and peers. Pt expressed enjoyment from group to this CTRS. Pt can be seen out of room in hallway more. PT attends dining room for all meals. Will continue to encourage daily groups of choice.

## 2023-11-03 NOTE — GROUP NOTE
Group Topic: Reminiscence   Group Date: 11/3/2023  Start Time: 1100  End Time: 1130  Facilitators: BRENDA Howard   Department: [facility] REHABTH VIRTUAL    Number of Participants: 5   Group Focus: reminiscence  Treatment Modality: Other: Recreation Therapy  Interventions utilized were reminiscence  Purpose: feelings    Name: Azul Roberts YOB: 1960   MR: [id number]      Facilitator: Recreational Therapist  Level of Participation: did not attend  Quality of Participation:  did not attend  Interactions with others:  did not attend   Mood/Affect:  did not attend   Triggers (if applicable): n/a  Cognition:  did not attend  Progress: None  Comments: pt problem is depressed mood  Plan: continue with services

## 2023-11-03 NOTE — PROGRESS NOTES
DRAGAN met with pt who was sitting outside of the group room listening to music therapy. SARAHW engaged with pt about music and what she likes. She responded by saying she enjoys Country music. DRAGAN and pt discussed some bands that she likes. Pt will engage when approached which is progress than upon admission. Pt presents less irritable and will improved affect.       Nikki Andrews, DRAGAN

## 2023-11-03 NOTE — NURSING NOTE
SARAN NOTE     Problem:  Depression, SI     Behavior:  Patient out in group room with peers, she denies SI, no behaviors noted  Group Participation: n/a  Appetite/Meals: n/a       Interventions:  1:1 Medications administered per orders    Response:  Patient is calm and cooperative      Plan:  Continue with current plan of care

## 2023-11-03 NOTE — PROGRESS NOTES
[facility]: [facility]  PROGRESS NOTE      Azul Roberts is a 63 y.o. female that is being seen  today for follow up at [facility]  Subjective   [facility]  Patient has been at her baseline.  Colostomy has been working well.  Pain has been fairly controlled.  Patient is still not able to take good care of herself.  Patient is being seen by [facility] team for depression and anxiety.  ROS  Negative for fever or chills  Negative for sore throat, ear pain, nasal discharge  Negative for cough, shortness of breath or wheezing  Negative for chest pain, palpitations, swelling of legs  Negative for abdominal pain, constipation, diarrhea, blood in the stools,has colostomy  Negative for urinary complaints  Negative for headache, dizziness, weakness or numbness  Negative for joint pain  Positive for depression or anxiety  All other systems reviewed and were negative  Vitals:    11/03/23 0500   BP: 104/71   Pulse: 86   Resp: 17   Temp: 36.4 °C (97.5 °F)   SpO2: 100%      Body mass index is 29.87 kg/m².  Physical Exam  Constitutional: Patient does not appear to be in any acute distress  Head and Face: NCAT  Eyes: Normal external exam, EOMI  ENT: Normal external inspection of ears and nose. Oropharynx normal.  Cardiovascular: RRR, S1/S2, no murmurs, rubs, or gallops, radial pulses +2, no edema of extremities  Pulmonary: CTAB, no respiratory distress.  Abdomen: +BS, soft, non-tender, nondistended, no guarding or rebound, no masses noted.colostomy present   MSK: hip joint pain   Skin- No lesions, contusions, or erythema.  Peripheral puslses palpable bilaterally 2+  Neuro: AAO X3, Cranial nerves 2-12 grossly intact,DTR 2+ in all 4 limbs       Diagnostic Results   Lab Results   Component Value Date    GLUCOSE 113 (H) 09/05/2023    CALCIUM 10.6 (H) 09/05/2023     09/05/2023    K 3.7 09/05/2023    CO2 24 09/05/2023     09/05/2023    BUN 12 09/05/2023     "CREATININE 0.72 09/05/2023     Lab Results   Component Value Date    ALT 12 09/05/2023    AST 14 09/05/2023    ALKPHOS 103 09/05/2023    BILITOT 0.4 09/05/2023     Lab Results   Component Value Date    WBC 13.2 (H) 09/05/2023    HGB 13.9 09/05/2023    HCT 43.0 09/05/2023    MCV 81 09/05/2023     09/05/2023     No results found for: \"CHOL\"  No results found for: \"HDL\"  No results found for: \"LDLCALC\"  No results found for: \"TRIG\"  No results found for: \"HGBA1C\"  Other labs not included in the list above were reviewed either before or during this encounter.    History    No past medical history on file.  No past surgical history on file.  No family history on file.  No Known Allergies  No current facility-administered medications on file prior to encounter.     Current Outpatient Medications on File Prior to Encounter   Medication Sig Dispense Refill    acetaminophen (Tylenol) 325 mg tablet Take 2 tablets (650 mg) by mouth 2 times a day.      hydrOXYzine pamoate (Vistaril) 25 mg capsule Take 1 capsule (25 mg) by mouth 2 times a day.      magnesium oxide (Mag-Ox) 400 mg tablet Take 1 tablet (400 mg) by mouth 2 times a day.      melatonin 5 mg tablet Take 1 tablet (5 mg) by mouth once daily at bedtime.      risperiDONE (RisperDAL) 2 mg tablet Take 1 tablet (2 mg) by mouth once daily at bedtime.      traZODone (Desyrel) 150 mg tablet Take 1 tablet (150 mg) by mouth once daily at bedtime.     Scheduled medications  buPROPion XL, 300 mg, oral, Daily  celecoxib, 200 mg, oral, Daily  cholecalciferol, 125 mcg, oral, Daily  citalopram, 30 mg, oral, Daily  lidocaine, 1 patch, transdermal, Daily  melatonin, 5 mg, oral, Nightly  QUEtiapine, 350 mg, oral, Nightly  traZODone, 150 mg, oral, Nightly      Continuous medications     PRN medications  PRN medications: acetaminophen, alum-mag hydroxide-simeth, diphenhydrAMINE, hydrOXYzine pamoate, OLANZapine, OLANZapine zydis     There is no immunization history on file for this " patient.  Patient's medical history was reviewed and updated either before or during this encounter.  ASSESSMENT / PLAN:  Active Hospital Problems    Bilateral hip pain      *Severe recurrent major depression without psychotic features (CMS/HCC)      Colostomy present (CMS/HCC)      Opioid abuse (CMS/HCC)  S/p fall.  Hip x-rays negative      Pt. Has been stable.clostomy is working well.        Jay Knox MD

## 2023-11-03 NOTE — NURSING NOTE
SARAN NOTE     Problem:  Depression     Behavior:  Pt did her own hair this morning, she is emptying her own colostomy, she sat out in the g[place] listening to group this morning.  She is taking her scheduled medications.  Group Participation: Partial   Appetite/Meals: 100%       Interventions:  Pt given scheduled medications, encouraged to go to group, 1:1 interaction, every 15 minute checks    Response:  Pt came out this afternoon for snack and sat in the hallway for a little while, she went back to her room and did not attend afternoon group.     Plan:   Give scheduled medications, encouraged to go to group, 1:1 interaction, every 15 minute checks

## 2023-11-03 NOTE — GROUP NOTE
Group Topic: Music Therapy   Group Date: 11/3/2023  Start Time: 1000  End Time: 1100  Facilitators: GABRIELA Garcia   Department: [facility] EXPRESSIVE THER VIRTUAL    Number of Participants: 7   Group Focus: music therapy  Treatment Modality: Music Therapy  Interventions Utilized were: active music engagement, education/instruction, empathic listening/validating emotions, sharing/discussion, and support      Name: Azul Roberts YOB: 1960   MR: [id number]      Level of Participation:  listened in from hallway  Quality of Participation: appropriate/pleasant and attentive  Interactions with others:  no interaction with others during this session  Mood/Affect: appropriate  Cognition, Pre Treatment: attentive  Cognition, Post Treatment: attentive  Progress: Moderate  Plan: continue with services

## 2023-11-03 NOTE — GROUP NOTE
Group Topic: Other   Group Date: 11/3/2023  Start Time: 1500  End Time: 1600  Facilitators: BRENDA Howard   Department: [facility] REHABTH VIRTUAL    Number of Participants: 5   Group Focus: feeling awareness/expression, goals, reminiscence, self-awareness, and social skills  Treatment Modality: Other: Recreation Therapy  Interventions utilized were group exercise and reminiscence  Purpose: coping skills and other: increase socialization, increase mental fitness, Increase ROM, Increase focus     Name: Azul Roberts YOB: 1960   MR: [id number]      Facilitator: Recreational Therapist  Level of Participation: did not attend  Quality of Participation:  did not attend  Interactions with others:  did not attend  Mood/Affect:  did not attend  Triggers (if applicable): n/a  Cognition:  did not attend  Progress: None  Comments: pt problem is depressed mood  Plan: continue with services

## 2023-11-03 NOTE — PROGRESS NOTES
"Azul Roberts is a 63 y.o. female on day 55 of admission presenting with Severe recurrent major depression without psychotic features (CMS/[facility]).    Level 1 note    Subjective   Case discussed with treatment team and chart reviewed.     More irritable today, declining group this morning and afternoon.  Remains improved overall, but not at nor near baseline.  Awaiting optimal discharge.  No requests nor new acute issues today.  Mood is \"ok.\"      Objective     Last Recorded Vitals  Blood pressure 104/71, pulse 86, temperature 36.4 °C (97.5 °F), temperature source Temporal, resp. rate 17, height 1.626 m (5' 4\"), weight 78.9 kg (174 lb), SpO2 100 %.    Review of Systems   Psychiatric/Behavioral:  Positive for decreased concentration, dysphoric mood and sleep disturbance. The patient is nervous/anxious.      Psychiatric ROS - Adult  Anxiety: General Anxiety Disorder (KAYLEEN)KAYLEEN Behaviors: difficult to control worry, difficulty concentrating, irritability, restlessness, and sleep disturbance  Depression: anhedonia, appetite increased, concentration, energy, helpless, hopeless, interest, persistent thoughts of death, psychomotor agitation, sleep decreased , suicidal thoughts, and withdrawn  Delirium: negative  Psychosis: negative  Ioana: negative  Safety Issues: passive death wish and suicidal ideation  Psychiatric ROS Comment: as noted    Physical Exam  Abdominal:      Comments: Presence of colostomy bag   Psychiatric:         Attention and Perception: Attention and perception normal.         Mood and Affect: Mood is anxious and depressed. Affect is flat and inappropriate.         Behavior: Behavior is slowed.     Mental Status Examination  General Appearance:  less disheveled with improved eye contact, not malodorous on approach. Sitting in wheelchair in group  Gait/Station: using wheelchair to move about the milieu  Speech: brief, conversational volume  Mood: \"ok\"  Affect: less Dysphoric, constricted ,  Thought Process: " remains impoverished but less so  Thought Associations: No loosening of associations  Thought Content: depressed, hopeless, persists as helpless  Perception: No perceptual abnormalities noted  Level of Consciousness: Alert  Orientation: Alert  Attention and Concentration: Mild impairment in attention  Recent Memory: Intact as evidenced by ability to recall details from the past 24 hours   Executive function: Intact  Language: Naming intact  Fund of knowledge: Good  Insight: Fair, as evidenced by increasing participation in discussion about her scenario and symptoms and their improvement  Judgment: Fair, as evidenced by ability to reason through medical decision making, compliance with treatment recommendations, and improving      Psychiatric Risk Assessment  Violence Risk Assessment: major mental illness and substance abuse  Acute Risk of Harm to Others is Considered: moderate   Suicide Risk Assessment: , chronic medical illness, chronic pain, current psychiatric illness, feelings of hopelessness, global insomnia, history of trauma or abuse, life crisis (shame/despair), prior suicide attempt, severe anxiety, substance abuse, and suicidal ideations  Protective Factors against Suicide: adherence to  treatment and marriage/partnership  Acute Risk of Harm to Self is Considered: moderate    Relevant Results  Scheduled medications  buPROPion XL, 300 mg, oral, Daily  celecoxib, 200 mg, oral, Daily  cholecalciferol, 125 mcg, oral, Daily  DULoxetine, 90 mg, oral, Daily  lidocaine, 1 patch, transdermal, Daily  QUEtiapine, 350 mg, oral, Nightly      Continuous medications     PRN medications  PRN medications: acetaminophen, alum-mag hydroxide-simeth, diphenhydrAMINE, hydrOXYzine pamoate, melatonin, OLANZapine, OLANZapine zydis, traZODone         Assessment/Plan   Principal Problem:    Severe recurrent major depression without psychotic features (CMS/HCC)  Active Problems:    Colostomy present (CMS/HCC)    Opioid abuse  (CMS/HCC)    Bilateral hip pain    Biological -   Cymbalta 90 mg daily for depression, ideally titrate to as tolerated and beneficial  Wellbutrin xl 300 mg for adjunctive depression treatment  seroquel 350 mg at bedtime  Trazodone 150 mg PRN and melatonin at bedtime for sleep  No adverse side effects of medications noted or reported.  ECG (10/4/23): Normal sinus rhythm. Heart  rate 72 bpm. Qtc 442     Discussion with patient regarding risk benefits and alternatives and side effects with opportunity to ask questions and questions answered.  Patient given consent to the plan as noted    2. Psychological -       Patient is encouraged to participate with the therapeutic milieu and program and group therapy    3. Sociological -      Patient encouraged to cooperate with social work staff on issues relevant to discharge planning  Pending [place] medicaid and then transition to convalescence nursing care once this becomes available to patient    15 minutes spent coordinating care for patient on this day    Parts of this chart have been completed using voice recognition software.  Please excuse any errors of transcription.  Despite the medical decision making time stamp, my medical decision making has taken place during the patient's entire visit.  Thought process and reason for plan has been formulated from the time that I saw the patient until the time of disposition and is not specific to one specific moment during their visit and furthermore the medical decision making encompasses the entire chart and not only that represented in this note.      Eddie Cullen,

## 2023-11-04 PROCEDURE — 2500000001 HC RX 250 WO HCPCS SELF ADMINISTERED DRUGS (ALT 637 FOR MEDICARE OP): Performed by: PSYCHIATRY & NEUROLOGY

## 2023-11-04 PROCEDURE — 97116 GAIT TRAINING THERAPY: CPT | Mod: GP

## 2023-11-04 PROCEDURE — 99232 SBSQ HOSP IP/OBS MODERATE 35: CPT | Performed by: PSYCHIATRY & NEUROLOGY

## 2023-11-04 PROCEDURE — 2500000001 HC RX 250 WO HCPCS SELF ADMINISTERED DRUGS (ALT 637 FOR MEDICARE OP): Performed by: STUDENT IN AN ORGANIZED HEALTH CARE EDUCATION/TRAINING PROGRAM

## 2023-11-04 PROCEDURE — 2500000001 HC RX 250 WO HCPCS SELF ADMINISTERED DRUGS (ALT 637 FOR MEDICARE OP): Performed by: REGISTERED NURSE

## 2023-11-04 PROCEDURE — 97110 THERAPEUTIC EXERCISES: CPT | Mod: GP

## 2023-11-04 PROCEDURE — 1140000001 HC PRIVATE PSYCH ROOM DAILY

## 2023-11-04 PROCEDURE — 2500000002 HC RX 250 W HCPCS SELF ADMINISTERED DRUGS (ALT 637 FOR MEDICARE OP, ALT 636 FOR OP/ED): Performed by: STUDENT IN AN ORGANIZED HEALTH CARE EDUCATION/TRAINING PROGRAM

## 2023-11-04 PROCEDURE — 2500000004 HC RX 250 GENERAL PHARMACY W/ HCPCS (ALT 636 FOR OP/ED): Performed by: PSYCHIATRY & NEUROLOGY

## 2023-11-04 RX ADMIN — QUETIAPINE FUMARATE 350 MG: 300 TABLET ORAL at 20:32

## 2023-11-04 RX ADMIN — TRAZODONE HYDROCHLORIDE 150 MG: 50 TABLET ORAL at 20:32

## 2023-11-04 RX ADMIN — DULOXETINE HYDROCHLORIDE 90 MG: 60 CAPSULE, DELAYED RELEASE ORAL at 09:05

## 2023-11-04 RX ADMIN — BUPROPION HYDROCHLORIDE 300 MG: 300 TABLET, FILM COATED, EXTENDED RELEASE ORAL at 09:05

## 2023-11-04 RX ADMIN — CELECOXIB 200 MG: 200 CAPSULE ORAL at 16:52

## 2023-11-04 RX ADMIN — Medication 125 MCG: at 09:05

## 2023-11-04 ASSESSMENT — ENCOUNTER SYMPTOMS
SLEEP DISTURBANCE: 1
DYSPHORIC MOOD: 1
NERVOUS/ANXIOUS: 1
DECREASED CONCENTRATION: 1

## 2023-11-04 ASSESSMENT — PAIN - FUNCTIONAL ASSESSMENT
PAIN_FUNCTIONAL_ASSESSMENT: 0-10

## 2023-11-04 ASSESSMENT — COLUMBIA-SUICIDE SEVERITY RATING SCALE - C-SSRS
1. SINCE LAST CONTACT, HAVE YOU WISHED YOU WERE DEAD OR WISHED YOU COULD GO TO SLEEP AND NOT WAKE UP?: NO
6. HAVE YOU EVER DONE ANYTHING, STARTED TO DO ANYTHING, OR PREPARED TO DO ANYTHING TO END YOUR LIFE?: NO
1. SINCE LAST CONTACT, HAVE YOU WISHED YOU WERE DEAD OR WISHED YOU COULD GO TO SLEEP AND NOT WAKE UP?: NO
2. HAVE YOU ACTUALLY HAD ANY THOUGHTS OF KILLING YOURSELF?: NO
6. HAVE YOU EVER DONE ANYTHING, STARTED TO DO ANYTHING, OR PREPARED TO DO ANYTHING TO END YOUR LIFE?: NO
2. HAVE YOU ACTUALLY HAD ANY THOUGHTS OF KILLING YOURSELF?: NO

## 2023-11-04 ASSESSMENT — COGNITIVE AND FUNCTIONAL STATUS - GENERAL
STANDING UP FROM CHAIR USING ARMS: A LITTLE
TURNING FROM BACK TO SIDE WHILE IN FLAT BAD: A LITTLE
MOBILITY SCORE: 18
MOVING TO AND FROM BED TO CHAIR: A LITTLE
WALKING IN HOSPITAL ROOM: A LITTLE
CLIMB 3 TO 5 STEPS WITH RAILING: A LOT

## 2023-11-04 ASSESSMENT — PAIN SCALES - GENERAL
PAINLEVEL_OUTOF10: 9
PAINLEVEL_OUTOF10: 0 - NO PAIN
PAINLEVEL_OUTOF10: 0 - NO PAIN

## 2023-11-04 NOTE — CARE PLAN
"The patient's goals for the shift include \"not sure right now\"    The clinical goals for the shift include encourage self care, maintain safety    Over the shift, the patient did not make progress toward the following goals. Barriers to progression include isolating to room. Recommendations to address these barriers include encourage pt to stay out of her room and engage with staff or peers, attend group therapy.    "

## 2023-11-04 NOTE — PROGRESS NOTES
"Azul Roberts is a 63 y.o. female on day 56 of admission presenting with Severe recurrent major depression without psychotic features (CMS/[facility]).        Subjective   Case discussed with treatment team and chart reviewed.     More withdrawn and pessimistic today.  Reporting does not see the improvement that we the treatment team are seeing from her this far into her admission.  Continues to endorse feeling depressed with positive responses to low energy, lack of interest, feeling hopeless and helpless, feeling sad, feeling demoralized.    No acute complaints offered however.  Maintaining calm behaviors.  Out of her room roaming the unit in her wheelchair this morning listening to group but not entering the group room.  Continuing to await appropriate documentation from [place] Medicaid to transition to [place] and then to a nursing home.  Is very brief during encounter offering limited content with significantly reduced spontaneity.    Patient has declined consideration of ECT when discussed with her.  Can make referral for the transitioning facility to consider this modality of intervention.      Objective     Last Recorded Vitals  Blood pressure 113/70, pulse 92, temperature 36.8 °C (98.2 °F), temperature source Oral, resp. rate 18, height 1.626 m (5' 4\"), weight 78.9 kg (174 lb), SpO2 99 %.    Review of Systems   Psychiatric/Behavioral:  Positive for decreased concentration, dysphoric mood and sleep disturbance. The patient is nervous/anxious.      Psychiatric ROS - Adult  Anxiety: General Anxiety Disorder (KAYLEEN)KAYLEEN Behaviors: difficult to control worry, difficulty concentrating, irritability, restlessness, and sleep disturbance  Depression: anhedonia, appetite increased, concentration, energy, helpless, hopeless, interest, persistent thoughts of death, psychomotor agitation, sleep decreased , suicidal thoughts, and withdrawn  Delirium: negative  Psychosis: negative  Ioana: negative  Safety Issues: passive " "death wish and suicidal ideation  Psychiatric ROS Comment: as noted    Physical Exam  Abdominal:      Comments: Presence of colostomy bag   Psychiatric:         Attention and Perception: Attention and perception normal.         Mood and Affect: Mood is anxious and depressed. Affect is flat and inappropriate.         Behavior: Behavior is slowed.     Mental Status Examination  General Appearance:  less disheveled with improved eye contact, not malodorous on approach. Sitting in wheelchair in group  Gait/Station: using wheelchair to move about the milieu  Speech: brief, conversational volume  Mood: \"ok\"  Affect: less Dysphoric, constricted ,  Thought Process: remains impoverished but less so  Thought Associations: No loosening of associations  Thought Content: depressed, hopeless, persists as helpless  Perception: No perceptual abnormalities noted  Level of Consciousness: Alert  Orientation: Alert  Attention and Concentration: Mild impairment in attention  Recent Memory: Intact as evidenced by ability to recall details from the past 24 hours   Executive function: Intact  Language: Naming intact  Fund of knowledge: Good  Insight: Fair, as evidenced by increasing participation in discussion about her scenario and symptoms and their improvement  Judgment: Fair, as evidenced by ability to reason through medical decision making, compliance with treatment recommendations, and improving      Psychiatric Risk Assessment  Violence Risk Assessment: major mental illness and substance abuse  Acute Risk of Harm to Others is Considered: moderate   Suicide Risk Assessment: , chronic medical illness, chronic pain, current psychiatric illness, feelings of hopelessness, global insomnia, history of trauma or abuse, life crisis (shame/despair), prior suicide attempt, severe anxiety, substance abuse, and suicidal ideations  Protective Factors against Suicide: adherence to  treatment and marriage/partnership  Acute Risk of Harm to " Self is Considered: moderate    Relevant Results  Scheduled medications  buPROPion XL, 300 mg, oral, Daily  celecoxib, 200 mg, oral, Daily  cholecalciferol, 125 mcg, oral, Daily  DULoxetine, 90 mg, oral, Daily  lidocaine, 1 patch, transdermal, Daily  QUEtiapine, 350 mg, oral, Nightly      Continuous medications     PRN medications  PRN medications: acetaminophen, alum-mag hydroxide-simeth, diphenhydrAMINE, hydrOXYzine pamoate, melatonin, OLANZapine, OLANZapine zydis, traZODone         Assessment/Plan   Principal Problem:    Severe recurrent major depression without psychotic features (CMS/[facility])  Active Problems:    Colostomy present (CMS/HCC)    Opioid abuse (CMS/HCC)    Bilateral hip pain    Biological -   Cymbalta 90 mg daily for depression, ideally titrate to as tolerated and beneficial  Wellbutrin xl 300 mg for adjunctive depression treatment  seroquel 350 mg at bedtime  Trazodone 150 mg PRN and melatonin at bedtime for sleep  No adverse side effects of medications noted or reported.  ECG (10/4/23): Normal sinus rhythm. Heart  rate 72 bpm. Qtc 442    Declines ECT consideration.  Will reapproach to discuss.  ECT is not available at this facility and this would include transitioning to another psychiatric hospital for this level of service or pursuing as an outpatient on discharge.    Discussion with patient regarding risk benefits and alternatives and side effects with opportunity to ask questions and questions answered.  Patient given consent to the plan as noted    2. Psychological -     Patient is encouraged to participate with the therapeutic milieu and program and group therapy    3. Sociological -      Patient encouraged to cooperate with social work staff on issues relevant to discharge planning  Pending [place] medicaid and then transition to convalescence nursing care once this becomes available to patient    25 minutes spent coordinating care for patient on this day    Parts of this chart have been  completed using voice recognition software.  Please excuse any errors of transcription.  Despite the medical decision making time stamp, my medical decision making has taken place during the patient's entire visit.  Thought process and reason for plan has been formulated from the time that I saw the patient until the time of disposition and is not specific to one specific moment during their visit and furthermore the medical decision making encompasses the entire chart and not only that represented in this note.      Eddie Cullen, DO

## 2023-11-04 NOTE — NURSING NOTE
BIRP NOTE     Problem:  depression     Behavior:  Pt pleasant and cooperative with staff and care. Pt out of the room a lot this shift, smiling often, makes needs known.  Group Participation: no  Appetite/Meals: good       Interventions:  1:1 provided, administered scheduled medications.    Response:  Pt continues as above.     Plan:  Maintain pt safety

## 2023-11-04 NOTE — GROUP NOTE
Group Topic: Leisure Skills   Group Date: 11/4/2023  Start Time: 0945  End Time: 1100  Facilitators: BRENDA Fernandes   Department: [facility] RE[facility] VIRTUAL    Number of Participants: 6   Group Focus: leisure skills  Treatment Modality: Other: Recreation therapy  Interventions utilized were exploration, leisure development, mental fitness, and problem solving  Purpose: coping skills, feelings, and insight or knowledge    Name: Azul Roberts YOB: 1960   MR: [id number]      Facilitator: Recreational Therapist  Level of Participation: did not attend  Quality of Participation:  did not attend  Interactions with others:  did not attend  Mood/Affect:  did not attend  Triggers (if applicable): n/a  Cognition:  did not attend  Progress: None  Comments: pt problem is depressed mood.  Pt is resting in her room, declines invitation to join group at this time.   Plan: continue with services

## 2023-11-04 NOTE — PROGRESS NOTES
Physical Therapy       11/04/23 6032-9762   PT  Visit   PT Received On 11/04/23   General   Past Medical History Relevant to Rehab severe depression, colostomy, bilateral hip pain, opiod abuse   Pain Assessment   Pain Assessment 0-10   Pain Score 9   Pain Type Chronic pain   Pain Location Hip   Pain Orientation Left   Pain Frequency Constant/continuous   Cognition   Impulsive Mildly   Therapeutic Exercise   Therapeutic Exercise Performed Yes   Therapeutic Exercise Activity 1 LAQ 1 x 15   Therapeutic Exercise Activity 2 seated hip flex 1 x 15, minimal movement left LE   Therapeutic Exercise Activity 3 seated hip abd with green theraband 1 x 15   Therapeutic Exercise Activity 4 seated hamstring curls with green theraband 1 x 15   Ambulation/Gait Training   Ambulation/Gait Training Performed Yes   Ambulation/Gait Training 1   Surface 1 Level tile   Device 1 Rolling walker   Assistance 1 Contact guard   Quality of Gait 1 Inconsistent stride length   Comments/Distance (ft) 1 75' x 2 left LE leg length longer, antalgic gait, effortful,   Transfers   Transfer Yes   Transfer 1   Transfer From 1 Sit to   Transfer to 1 Stand   Technique 1 Sit to stand;Stand to sit   Transfer Device 1 Walker   Transfer Level of Assistance 1 Close supervision   Wheelchair Activities   Propulsion Yes   Propulsion Type 1 Manual   Level 1 Level tile   Method 1 Right lower extremity;Left lower extremity   Level of Assistance 1 Distant supervision   Description/Details 1 100'   PT Assessment   Evaluation/Treatment Tolerance Patient limited by pain   Strengths Support of Caregivers   Barriers to Participation Coping skills   Assessment Comment Patient slightly impulsove with transfers and gait. Requires nsafety cues. Easily frustrated with therapy. Limited by chronic pain left hip.   Education   Individual(s) Educated Patient   Education Comment Reviwed importance of particapting in therapy.   PT Plan   Inpatient/Swing Bed or Outpatient Inpatient    PT Plan   Treatment/Interventions Transfer training;Gait training;Strengthening;Therapeutic exercise;Wheelchair management   PT Plan Skilled PT   PT Discharge Recommendations Low intensity level of continued care   Equipment Recommended upon Discharge Wheeled walker   PT Recommended Transfer Status Assist x1      11/04/23 1420   [facility] Basic Mobility   Turning from your back to your side while in a flat bed without using bedrails 4   Moving from lying on your back to sitting on the side of a flat bed without using bedrails 3   Moving to and from bed to chair (including a wheelchair) 3   Standing up from a chair using your arms (e.g. wheelchair or bedside chair) 3   To walk in hospital room 3   Climbing 3-5 steps with railing 2   Basic Mobility - Total Score 18

## 2023-11-05 PROCEDURE — 2500000002 HC RX 250 W HCPCS SELF ADMINISTERED DRUGS (ALT 637 FOR MEDICARE OP, ALT 636 FOR OP/ED): Performed by: STUDENT IN AN ORGANIZED HEALTH CARE EDUCATION/TRAINING PROGRAM

## 2023-11-05 PROCEDURE — 2500000004 HC RX 250 GENERAL PHARMACY W/ HCPCS (ALT 636 FOR OP/ED): Performed by: PSYCHIATRY & NEUROLOGY

## 2023-11-05 PROCEDURE — 1140000001 HC PRIVATE PSYCH ROOM DAILY

## 2023-11-05 PROCEDURE — 2500000001 HC RX 250 WO HCPCS SELF ADMINISTERED DRUGS (ALT 637 FOR MEDICARE OP): Performed by: PSYCHIATRY & NEUROLOGY

## 2023-11-05 PROCEDURE — 2500000001 HC RX 250 WO HCPCS SELF ADMINISTERED DRUGS (ALT 637 FOR MEDICARE OP): Performed by: STUDENT IN AN ORGANIZED HEALTH CARE EDUCATION/TRAINING PROGRAM

## 2023-11-05 PROCEDURE — 99232 SBSQ HOSP IP/OBS MODERATE 35: CPT | Performed by: INTERNAL MEDICINE

## 2023-11-05 PROCEDURE — 99232 SBSQ HOSP IP/OBS MODERATE 35: CPT | Performed by: PSYCHIATRY & NEUROLOGY

## 2023-11-05 RX ADMIN — CELECOXIB 200 MG: 200 CAPSULE ORAL at 15:51

## 2023-11-05 RX ADMIN — DULOXETINE HYDROCHLORIDE 90 MG: 60 CAPSULE, DELAYED RELEASE ORAL at 08:28

## 2023-11-05 RX ADMIN — QUETIAPINE FUMARATE 350 MG: 300 TABLET ORAL at 20:20

## 2023-11-05 RX ADMIN — Medication 125 MCG: at 08:28

## 2023-11-05 RX ADMIN — BUPROPION HYDROCHLORIDE 300 MG: 300 TABLET, FILM COATED, EXTENDED RELEASE ORAL at 08:28

## 2023-11-05 ASSESSMENT — ENCOUNTER SYMPTOMS
DECREASED CONCENTRATION: 1
NERVOUS/ANXIOUS: 1
DYSPHORIC MOOD: 1
SLEEP DISTURBANCE: 1

## 2023-11-05 ASSESSMENT — PAIN SCALES - GENERAL
PAINLEVEL_OUTOF10: 0 - NO PAIN

## 2023-11-05 ASSESSMENT — PAIN - FUNCTIONAL ASSESSMENT
PAIN_FUNCTIONAL_ASSESSMENT: 0-10

## 2023-11-05 NOTE — CARE PLAN
"The patient's goals for the shift include \"not sure right now\"    The clinical goals for the shift include maintain safety    Over the shift, the patient did not make progress toward the following goals. Barriers to progression include isolating to room. Recommendations to address these barriers include encourage pt to come out of room and attend group therapy.    "

## 2023-11-05 NOTE — GROUP NOTE
Group Topic: Other   Group Date: 11/5/2023  Start Time: 0950  End Time: 1030  Facilitators: BRENDA Howard   Department: [facility] REHAB[facility] VIRTUAL    Number of Participants: 4   Group Focus: communication, feeling awareness/expression, goals, and self-awareness  Treatment Modality: Other: Recreation Therapy  Interventions utilized were exploration and problem solving  Purpose: feelings and insight or knowledge    Name: Azul Roberts YOB: 1960   MR: [id number]      Facilitator: Recreational Therapist  Level of Participation: did not attend  Quality of Participation:  did not attend   Interactions with others:  did not attend  Mood/Affect:  did not attend  Triggers (if applicable): n/a  Cognition:  did not attend  Progress: None  Comments: pt problem is depressed mood. Pt declined group and handout offered.   Plan: continue with services

## 2023-11-05 NOTE — CARE PLAN
"  Problem: Fall/Injury  Goal: Use assistive devices by end of the shift  Outcome: Met  Goal: Pace activities to prevent fatigue by end of the shift  Outcome: Met   The patient's goals for the shift include \"not sure right now\"    The clinical goals for the shift include encourage self care, maintain safety    Over the shift, the patient did not make progress toward the following goals.   "

## 2023-11-05 NOTE — GROUP NOTE
Group Topic: Other   Group Date: 11/5/2023  Start Time: 1030  End Time: 1100  Facilitators: BRENDA Howard   Department: [facility] REHAB[facility] VIRTUAL    Number of Participants: 5   Group Focus: concentration, other  Treatment Modality: Other: Recreation Therapy  Interventions utilized were mental fitness  Purpose: other: increase socialization , increase attention span, increase self esteem, increase stimulation, elevate mood    Name: Azul Roberts YOB: 1960   MR: [id number]      Facilitator: Recreational Therapist  Level of Participation: did not attend  Quality of Participation:  did not attend  Interactions with others:  did not attend  Mood/Affect:  did not attend  Triggers (if applicable): n/a  Cognition:  did not attend  Progress: None  Comments: pt problem is depressed mood  Plan: continue with services

## 2023-11-05 NOTE — GROUP NOTE
Group Topic: Stress Reduction/Relaxation   Group Date: 11/5/2023  Start Time: 1100  End Time: 1120  Facilitators: BRENDA Howard   Department: [facility] REHAB[facility] VIRTUAL    Number of Participants: 3   Group Focus: mindfulness and relaxation  Treatment Modality: Other: Recreation Therapy  Interventions utilized were other Guided Imagery   Purpose: self-care and other: stress reduction, relaxation,     Name: Azul Roberts YOB: 1960   MR: [id number]      Facilitator: Recreational Therapist  Level of Participation: did not attend  Quality of Participation:  did not attend  Interactions with others:  did not attend  Mood/Affect:  did not attend  Triggers (if applicable): n/a  Cognition:  did not attend  Progress: None  Comments: pt problem is depressed mood  Plan: continue with services

## 2023-11-05 NOTE — NURSING NOTE
SARAN NOTE     Problem:  depression     Behavior:  Pt pleasant and engages with staff this shift, pt smiles often and is making her needs known.  Group Participation: no  Appetite/Meals: good       Interventions:  Administered scheduled medications    Response:  Pt continues as above   Plan:  Maintain pt safety

## 2023-11-05 NOTE — PROGRESS NOTES
"Azul Roberts is a 63 y.o. female on day 57 of admission presenting with Severe recurrent major depression without psychotic features (CMS/[facility]).        Subjective   Case discussed with treatment team and chart reviewed.     Overall this appears to be a fair day for patient.  She did not attend group but remains calm.  She is able to mobilize her affect mildly during interaction.  When posed with the question about feeling if she could get return home she responds, \"I do not know.\"  She is not able to explain nor coordinate in discussion how she would caretake for herself if was to transition to home at this time.  We discussed the differences between pursuing nursing home and returning to the community at this time and she remains with minimal response to this query.  She notes though that her  does have to work so she would be at home alone during the day.  Given the level of encouragement for patient to perform basic tasks in this facility even with this level of care, it is ill-advised for patient to return to a setting where she does not have continuous care available to her.  This is why we are pursuing nursing home placement still at this time.  She was observed socializing with other patients earlier in the day and reports that she enjoys this.  Additionally, she recognizes that returning home would mean she will have very limited socialization on a daily basis.  She cannot describe how she would fill her time during the day.  She remains largely unable to participate in optimal discharge planning discussions to this extent and remains with need for ongoing acute services at this level until she can be transitioned to a nursing setting.    Though she has been hospitalized for 57 days, this timeline is not out of an expected round given how decompensated patient was on admission.  The intervention remains hospitalization with a stabilizing environment.  She is benefiting from this environment.  She " "will decompensate if she does not discharge to a setting that can continue a similar level of care for longer term.      Objective     Last Recorded Vitals  Blood pressure 101/69, pulse 81, temperature 37 °C (98.6 °F), temperature source Temporal, resp. rate 18, height 1.626 m (5' 4\"), weight 78.9 kg (174 lb), SpO2 100 %.    Review of Systems   Psychiatric/Behavioral:  Positive for decreased concentration, dysphoric mood and sleep disturbance. The patient is nervous/anxious.      Psychiatric ROS - Adult  Anxiety: General Anxiety Disorder (KAYLEEN)KAYLEEN Behaviors: difficult to control worry, difficulty concentrating, irritability, restlessness, and sleep disturbance  Depression: anhedonia, appetite increased, concentration, energy, helpless, hopeless, interest, persistent thoughts of death, psychomotor agitation, sleep decreased , suicidal thoughts, and withdrawn  Delirium: negative  Psychosis: negative  Ioana: negative  Safety Issues: passive death wish and suicidal ideation  Psychiatric ROS Comment: as noted    Physical Exam  Abdominal:      Comments: Presence of colostomy bag   Psychiatric:         Attention and Perception: Attention and perception normal.         Mood and Affect: Mood is anxious and depressed. Affect is flat and inappropriate.         Behavior: Behavior is slowed.     Mental Status Examination  General Appearance:  less disheveled with improved eye contact, not malodorous on approach. Sitting in wheelchair in hallway by herself  Gait/Station: using wheelchair to move about the milieu  Speech: brief, conversational volume  Mood: \"I dont know\"  Affect: less Dysphoric, constricted ,  Thought Process: remains impoverished but less so  Thought Associations: No loosening of associations  Thought Content: depressed, hopeless, persists as helpless  Perception: No perceptual abnormalities noted  Level of Consciousness: Alert  Orientation: Alert  Attention and Concentration: Mild impairment in attention  Recent " Memory: Intact as evidenced by ability to recall details from the past 24 hours   Executive function: Intact  Language: Naming intact  Fund of knowledge: Good  Insight: Fair, as evidenced by increasing participation in discussion about her scenario and symptoms and their improvement  Judgment: Fair, as evidenced by ability to reason through medical decision making, compliance with treatment recommendations, and improving      Psychiatric Risk Assessment  Violence Risk Assessment: major mental illness and substance abuse  Acute Risk of Harm to Others is Considered: moderate   Suicide Risk Assessment: , chronic medical illness, chronic pain, current psychiatric illness, feelings of hopelessness, global insomnia, history of trauma or abuse, life crisis (shame/despair), prior suicide attempt, severe anxiety, substance abuse, and suicidal ideations  Protective Factors against Suicide: adherence to  treatment and marriage/partnership  Acute Risk of Harm to Self is Considered: moderate    Relevant Results  Scheduled medications  buPROPion XL, 300 mg, oral, Daily  celecoxib, 200 mg, oral, Daily  cholecalciferol, 125 mcg, oral, Daily  DULoxetine, 90 mg, oral, Daily  lidocaine, 1 patch, transdermal, Daily  QUEtiapine, 350 mg, oral, Nightly      Continuous medications     PRN medications  PRN medications: acetaminophen, alum-mag hydroxide-simeth, diphenhydrAMINE, hydrOXYzine pamoate, melatonin, OLANZapine, OLANZapine zydis, traZODone         Assessment/Plan   Principal Problem:    Severe recurrent major depression without psychotic features (CMS/HCC)  Active Problems:    Colostomy present (CMS/HCC)    Opioid abuse (CMS/HCC)    Bilateral hip pain    Biological -   Cymbalta 90 mg daily for depression, ideally titrate to as tolerated and beneficial  Wellbutrin xl 300 mg for adjunctive depression treatment  seroquel 350 mg at bedtime  Trazodone 150 mg PRN and melatonin at bedtime for sleep  No adverse side effects of  medications noted or reported.  ECG (10/4/23): Normal sinus rhythm. Heart  rate 72 bpm. Qtc 442    Declined ECT consideration during discussion on 11/4.  Will reapproach to discuss.  ECT is not available at this facility and this would include transitioning to another psychiatric hospital for this level of service or pursuing as an outpatient on discharge.    Discussion with patient regarding risk benefits and alternatives and side effects with opportunity to ask questions and questions answered.  Patient given consent to the plan as noted    2. Psychological -     Patient is encouraged to participate with the therapeutic milieu and program and group therapy    3. Sociological -      Patient encouraged to cooperate with social work staff on issues relevant to discharge planning  Pending [place] medicaid and then transition to convalescence nursing care once this becomes available to patient    25 minutes spent coordinating care for patient on this day    Parts of this chart have been completed using voice recognition software.  Please excuse any errors of transcription.  Despite the medical decision making time stamp, my medical decision making has taken place during the patient's entire visit.  Thought process and reason for plan has been formulated from the time that I saw the patient until the time of disposition and is not specific to one specific moment during their visit and furthermore the medical decision making encompasses the entire chart and not only that represented in this note.      Eddie Cullen, DO

## 2023-11-05 NOTE — PROGRESS NOTES
[facility]: [facility]  PROGRESS NOTE      Azul Roberts is a 63 y.o. female that is being seen  today for follow up at [facility]  Subjective   [facility]  Patient has been at her baseline.  Colostomy has been working well.  Pain has been fairly controlled.    ROS  Negative for fever or chills  Negative for sore throat, ear pain, nasal discharge  Negative for cough, shortness of breath or wheezing  Negative for chest pain, palpitations, swelling of legs  Negative for abdominal pain, constipation, diarrhea, blood in the stools,has colostomy  Negative for urinary complaints  Negative for headache, dizziness, weakness or numbness  Negative for joint pain  Positive for depression or anxiety  All other systems reviewed and were negative  Vitals:    11/03/23 0500   BP: 104/71   Pulse: 86   Resp: 17   Temp: 36.4 °C (97.5 °F)   SpO2: 100%      Body mass index is 29.87 kg/m².  Physical Exam  Constitutional: Patient does not appear to be in any acute distress  Head and Face: NCAT  Eyes: Normal external exam, EOMI  ENT: Normal external inspection of ears and nose. Oropharynx normal.  Cardiovascular: RRR, S1/S2, no murmurs, rubs, or gallops, radial pulses +2, no edema of extremities  Pulmonary: CTAB, no respiratory distress.  Abdomen: +BS, soft, non-tender, nondistended, no guarding or rebound, no masses noted.colostomy present   MSK: hip joint pain   Skin- No lesions, contusions, or erythema.  Peripheral puslses palpable bilaterally 2+  Neuro: AAO X3, Cranial nerves 2-12 grossly intact,DTR 2+ in all 4 limbs       Diagnostic Results   Lab Results   Component Value Date    GLUCOSE 113 (H) 09/05/2023    CALCIUM 10.6 (H) 09/05/2023     09/05/2023    K 3.7 09/05/2023    CO2 24 09/05/2023     09/05/2023    BUN 12 09/05/2023    CREATININE 0.72 09/05/2023     Lab Results   Component Value Date    ALT 12 09/05/2023    AST 14 09/05/2023    ALKPHOS 103  "09/05/2023    BILITOT 0.4 09/05/2023     Lab Results   Component Value Date    WBC 13.2 (H) 09/05/2023    HGB 13.9 09/05/2023    HCT 43.0 09/05/2023    MCV 81 09/05/2023     09/05/2023     No results found for: \"CHOL\"  No results found for: \"HDL\"  No results found for: \"LDLCALC\"  No results found for: \"TRIG\"  No results found for: \"HGBA1C\"  Other labs not included in the list above were reviewed either before or during this encounter.    History    No past medical history on file.  No past surgical history on file.  No family history on file.  No Known Allergies  No current facility-administered medications on file prior to encounter.     Current Outpatient Medications on File Prior to Encounter   Medication Sig Dispense Refill    acetaminophen (Tylenol) 325 mg tablet Take 2 tablets (650 mg) by mouth 2 times a day.      hydrOXYzine pamoate (Vistaril) 25 mg capsule Take 1 capsule (25 mg) by mouth 2 times a day.      magnesium oxide (Mag-Ox) 400 mg tablet Take 1 tablet (400 mg) by mouth 2 times a day.      melatonin 5 mg tablet Take 1 tablet (5 mg) by mouth once daily at bedtime.      risperiDONE (RisperDAL) 2 mg tablet Take 1 tablet (2 mg) by mouth once daily at bedtime.      traZODone (Desyrel) 150 mg tablet Take 1 tablet (150 mg) by mouth once daily at bedtime.     Scheduled medications  buPROPion XL, 300 mg, oral, Daily  celecoxib, 200 mg, oral, Daily  cholecalciferol, 125 mcg, oral, Daily  citalopram, 30 mg, oral, Daily  lidocaine, 1 patch, transdermal, Daily  melatonin, 5 mg, oral, Nightly  QUEtiapine, 350 mg, oral, Nightly  traZODone, 150 mg, oral, Nightly      Continuous medications     PRN medications  PRN medications: acetaminophen, alum-mag hydroxide-simeth, diphenhydrAMINE, hydrOXYzine pamoate, OLANZapine, OLANZapine zydis     There is no immunization history on file for this patient.  Patient's medical history was reviewed and updated either before or during this encounter.  ASSESSMENT / " PLAN:  Active Hospital Problems    Bilateral hip pain      *Severe recurrent major depression without psychotic features (CMS/HCC)      Colostomy present (CMS/HCC)      Opioid abuse (CMS/HCC)  S/p fall.  Hip x-rays negative      Pt. Has been stable.clostomy is working well.        Jay Knox MD

## 2023-11-06 PROCEDURE — 2500000001 HC RX 250 WO HCPCS SELF ADMINISTERED DRUGS (ALT 637 FOR MEDICARE OP): Performed by: REGISTERED NURSE

## 2023-11-06 PROCEDURE — 97110 THERAPEUTIC EXERCISES: CPT | Mod: GP

## 2023-11-06 PROCEDURE — 2500000002 HC RX 250 W HCPCS SELF ADMINISTERED DRUGS (ALT 637 FOR MEDICARE OP, ALT 636 FOR OP/ED): Performed by: STUDENT IN AN ORGANIZED HEALTH CARE EDUCATION/TRAINING PROGRAM

## 2023-11-06 PROCEDURE — 2500000004 HC RX 250 GENERAL PHARMACY W/ HCPCS (ALT 636 FOR OP/ED): Performed by: PSYCHIATRY & NEUROLOGY

## 2023-11-06 PROCEDURE — 2500000001 HC RX 250 WO HCPCS SELF ADMINISTERED DRUGS (ALT 637 FOR MEDICARE OP): Performed by: PSYCHIATRY & NEUROLOGY

## 2023-11-06 PROCEDURE — 2500000001 HC RX 250 WO HCPCS SELF ADMINISTERED DRUGS (ALT 637 FOR MEDICARE OP): Performed by: STUDENT IN AN ORGANIZED HEALTH CARE EDUCATION/TRAINING PROGRAM

## 2023-11-06 PROCEDURE — 99231 SBSQ HOSP IP/OBS SF/LOW 25: CPT | Performed by: PSYCHIATRY & NEUROLOGY

## 2023-11-06 PROCEDURE — 1140000001 HC PRIVATE PSYCH ROOM DAILY

## 2023-11-06 PROCEDURE — 97535 SELF CARE MNGMENT TRAINING: CPT | Mod: GO,CO

## 2023-11-06 PROCEDURE — 99232 SBSQ HOSP IP/OBS MODERATE 35: CPT | Performed by: INTERNAL MEDICINE

## 2023-11-06 PROCEDURE — 97530 THERAPEUTIC ACTIVITIES: CPT | Mod: GO,CO

## 2023-11-06 RX ADMIN — DULOXETINE HYDROCHLORIDE 90 MG: 60 CAPSULE, DELAYED RELEASE ORAL at 08:13

## 2023-11-06 RX ADMIN — QUETIAPINE FUMARATE 350 MG: 300 TABLET ORAL at 20:55

## 2023-11-06 RX ADMIN — Medication 125 MCG: at 08:13

## 2023-11-06 RX ADMIN — CELECOXIB 200 MG: 200 CAPSULE ORAL at 15:17

## 2023-11-06 RX ADMIN — BUPROPION HYDROCHLORIDE 300 MG: 300 TABLET, FILM COATED, EXTENDED RELEASE ORAL at 08:13

## 2023-11-06 RX ADMIN — TRAZODONE HYDROCHLORIDE 150 MG: 50 TABLET ORAL at 20:55

## 2023-11-06 ASSESSMENT — PAIN - FUNCTIONAL ASSESSMENT
PAIN_FUNCTIONAL_ASSESSMENT: 0-10

## 2023-11-06 ASSESSMENT — COGNITIVE AND FUNCTIONAL STATUS - GENERAL
DAILY ACTIVITIY SCORE: 22
HELP NEEDED FOR BATHING: A LITTLE
CLIMB 3 TO 5 STEPS WITH RAILING: A LOT
DRESSING REGULAR LOWER BODY CLOTHING: A LITTLE
WALKING IN HOSPITAL ROOM: A LITTLE
MOVING TO AND FROM BED TO CHAIR: A LITTLE
STANDING UP FROM CHAIR USING ARMS: A LITTLE
TURNING FROM BACK TO SIDE WHILE IN FLAT BAD: A LITTLE
MOBILITY SCORE: 18

## 2023-11-06 ASSESSMENT — PAIN SCALES - GENERAL
PAINLEVEL_OUTOF10: 0 - NO PAIN
PAINLEVEL_OUTOF10: 8
PAINLEVEL_OUTOF10: 8

## 2023-11-06 ASSESSMENT — ENCOUNTER SYMPTOMS
DYSPHORIC MOOD: 1
NERVOUS/ANXIOUS: 1
DECREASED CONCENTRATION: 1
SLEEP DISTURBANCE: 1

## 2023-11-06 ASSESSMENT — COLUMBIA-SUICIDE SEVERITY RATING SCALE - C-SSRS
1. SINCE LAST CONTACT, HAVE YOU WISHED YOU WERE DEAD OR WISHED YOU COULD GO TO SLEEP AND NOT WAKE UP?: NO
2. HAVE YOU ACTUALLY HAD ANY THOUGHTS OF KILLING YOURSELF?: NO
2. HAVE YOU ACTUALLY HAD ANY THOUGHTS OF KILLING YOURSELF?: NO
1. SINCE LAST CONTACT, HAVE YOU WISHED YOU WERE DEAD OR WISHED YOU COULD GO TO SLEEP AND NOT WAKE UP?: NO
6. HAVE YOU EVER DONE ANYTHING, STARTED TO DO ANYTHING, OR PREPARED TO DO ANYTHING TO END YOUR LIFE?: NO
6. HAVE YOU EVER DONE ANYTHING, STARTED TO DO ANYTHING, OR PREPARED TO DO ANYTHING TO END YOUR LIFE?: NO

## 2023-11-06 ASSESSMENT — ACTIVITIES OF DAILY LIVING (ADL)
HOME_MANAGEMENT_TIME_ENTRY: 15
BATHING_COMMENTS: PT DECLINED

## 2023-11-06 NOTE — PROGRESS NOTES
"Azul Roberts is a 63 y.o. female on day 58 of admission presenting with Severe recurrent major depression without psychotic features (CMS/[facility]).    Level 1 note    Subjective   Case discussed with treatment team and chart reviewed.     Persists with limited change.  Brief in discussion.  Limited spontaneous content.  Continues to respond \"I do not know\" to most questions asked.  Seen in her room in the afternoon but out in her wheelchair in the milieu in the morning.  Group attendance is limited.  Attends meals.  Grooming and hygiene are fair to poor.    Objective     Last Recorded Vitals  Blood pressure 121/79, pulse 76, temperature 36.8 °C (98.2 °F), temperature source Oral, resp. rate 17, height 1.626 m (5' 4\"), weight 78.9 kg (174 lb), SpO2 98 %.    Review of Systems   Psychiatric/Behavioral:  Positive for decreased concentration, dysphoric mood and sleep disturbance. The patient is nervous/anxious.      Psychiatric ROS - Adult  Anxiety: General Anxiety Disorder (KAYLEEN)KAYLEEN Behaviors: difficult to control worry, difficulty concentrating, irritability, restlessness, and sleep disturbance  Depression: anhedonia, appetite increased, concentration, energy, helpless, hopeless, interest, persistent thoughts of death, psychomotor agitation, sleep decreased , suicidal thoughts, and withdrawn  Delirium: negative  Psychosis: negative  Ioana: negative  Safety Issues: passive death wish and suicidal ideation  Psychiatric ROS Comment: as noted    Physical Exam  Abdominal:      Comments: Presence of colostomy bag   Psychiatric:         Attention and Perception: Attention and perception normal.         Mood and Affect: Mood is anxious and depressed. Affect is flat and inappropriate.         Behavior: Behavior is slowed.     Mental Status Examination  General Appearance:  less disheveled with improved eye contact, not malodorous on approach. Sitting in wheelchair in hallway by herself  Gait/Station: using wheelchair to move " "about the milieu  Speech: brief, conversational volume  Mood: \"I dont know\"  Affect: less Dysphoric, constricted ,  Thought Process: remains impoverished but less so  Thought Associations: No loosening of associations  Thought Content: depressed, hopeless, persists as helpless  Perception: No perceptual abnormalities noted  Level of Consciousness: Alert  Orientation: Alert  Attention and Concentration: Mild impairment in attention  Recent Memory: Intact as evidenced by ability to recall details from the past 24 hours   Executive function: Intact  Language: Naming intact  Fund of knowledge: Good  Insight: Fair, as evidenced by increasing participation in discussion about her scenario and symptoms and their improvement  Judgment: Fair, as evidenced by ability to reason through medical decision making, compliance with treatment recommendations, and improving      Psychiatric Risk Assessment  Violence Risk Assessment: major mental illness and substance abuse  Acute Risk of Harm to Others is Considered: moderate   Suicide Risk Assessment: , chronic medical illness, chronic pain, current psychiatric illness, feelings of hopelessness, global insomnia, history of trauma or abuse, life crisis (shame/despair), prior suicide attempt, severe anxiety, substance abuse, and suicidal ideations  Protective Factors against Suicide: adherence to  treatment and marriage/partnership  Acute Risk of Harm to Self is Considered: moderate    Relevant Results  Scheduled medications  buPROPion XL, 300 mg, oral, Daily  celecoxib, 200 mg, oral, Daily  cholecalciferol, 125 mcg, oral, Daily  DULoxetine, 90 mg, oral, Daily  lidocaine, 1 patch, transdermal, Daily  QUEtiapine, 350 mg, oral, Nightly      Continuous medications     PRN medications  PRN medications: acetaminophen, alum-mag hydroxide-simeth, diphenhydrAMINE, hydrOXYzine pamoate, melatonin, OLANZapine, OLANZapine zydis, traZODone         Assessment/Plan   Principal Problem:    " Severe recurrent major depression without psychotic features (CMS/HCC)  Active Problems:    Colostomy present (CMS/HCC)    Opioid abuse (CMS/HCC)    Bilateral hip pain    Biological -   Cymbalta 90 mg daily for depression, ideally titrate to as tolerated and beneficial  Wellbutrin xl 300 mg for adjunctive depression treatment  seroquel 350 mg at bedtime  Trazodone 150 mg PRN and melatonin at bedtime for sleep  No adverse side effects of medications noted or reported.  ECG (10/4/23): Normal sinus rhythm. Heart  rate 72 bpm. Qtc 442    Declined ECT consideration during discussion on 11/4.  Will reapproach to discuss.  ECT is not available at this facility and this would include transitioning to another psychiatric hospital for this level of service or pursuing as an outpatient on discharge.    Discussion with patient regarding risk benefits and alternatives and side effects with opportunity to ask questions and questions answered.  Patient given consent to the plan as noted    2. Psychological -     Patient is encouraged to participate with the therapeutic milieu and program and group therapy    3. Sociological -      Patient encouraged to cooperate with social work staff on issues relevant to discharge planning  Pending [place] medicaid and then transition to convalescence nursing care once this becomes available to patient    20 minutes spent coordinating care for patient on this day    Parts of this chart have been completed using voice recognition software.  Please excuse any errors of transcription.  Despite the medical decision making time stamp, my medical decision making has taken place during the patient's entire visit.  Thought process and reason for plan has been formulated from the time that I saw the patient until the time of disposition and is not specific to one specific moment during their visit and furthermore the medical decision making encompasses the entire chart and not only that represented in this  note.      Eddie Cullen, DO

## 2023-11-06 NOTE — CARE PLAN
Problem: Balance  Goal: STG - Maintains dynamic standing balance with upper extremity support  Description: INTERVENTIONS:  1. Practice standing with minimal support.  2. Educate patient about standing tolerance.  3. Educate patient about independence with gait, transfers, and ADL's.  4. Educate patient about use of assistive device.  5. Educate patient about self-directed care.  Outcome: Progressing     Problem: Mobility  Goal: STG - Patient will ambulate 300 ft with FWW , + turns, distant supervision of 1.  Outcome: Progressing     Problem: Transfers  Goal: STG - Patient to transfer to and from sit to supine mod independent  Outcome: Progressing  Goal: STG - Patient will transfer sit to and from stand mod independent  Outcome: Progressing

## 2023-11-06 NOTE — GROUP NOTE
Group Topic: Feeling Awareness/Expression   Group Date: 11/6/2023  Start Time: 1500  End Time: 1600  Facilitators: BRENDA Howard   Department: [facility]    Number of Participants: 6   Group Focus: feeling awareness/expression and social skills  Treatment Modality: Other: Recreation Therapy  Interventions utilized were exploration and reminiscence  Purpose: feelings, insight or knowledge, and other: exploration, increase self esteem, increase stimulation     Name: Azul Roberts YOB: 1960   MR: [id number]      Facilitator: Recreational Therapist  Level of Participation: did not attend  Quality of Participation:  did not attend   Interactions with others:  did not attend   Mood/Affect:  did not attend  Triggers (if applicable): n/a  Cognition:  did not attend  Progress: None  Comments: pt problem is depressed mood. PT resting in room after PT session  Plan: continue with services

## 2023-11-06 NOTE — NURSING NOTE
SARAN NOTE     Problem:  Depression     Behavior:  Pt is pleasant, has blunted/flat affect but smiling at times, Pt sat in hallway for group, she styled her hair and emptied her colostomy this morning.  She is taking her scheduled medications.  Group Participation: Partial    Appetite/Meals: 100%       Interventions:  Pt given scheduled medications, every 15 minute checks, encouraged to go to group, 1:1 interaction    Response:  Pt did not go to afternoon group, she did work with OT/PT.  She continues to perform self care with little prompting.     Plan:  Pt given scheduled medications, every 15 minute checks, encouraged to go to group, 1:1 interaction

## 2023-11-06 NOTE — PROGRESS NOTES
Physical Therapy       11/06/23 [id number]   PT  Visit   PT Received On 11/06/23   General   Reason for Referral impaired mobility; recent fall   Referred By Dr. Cullen   Past Medical History Relevant to Rehab severe depression, colostomy, bilateral hip pain, opiod abuse   Patient Position Received Up in chair   Precautions   Medical Precautions Fall precautions   Pain Assessment   Pain Assessment 0-10   Pain Score 8   Pain Type Chronic pain   Pain Location Hip   Pain Orientation Left   Pain Frequency Constant/continuous   Cognition   Impulsive Mildly   Therapeutic Exercise   Therapeutic Exercise Performed Yes   Therapeutic Exercise Activity 1 LAQ 1 x 15   Therapeutic Exercise Activity 2 seated hip flex 1 x 15   Therapeutic Exercise Activity 3 standing heel raises at gonzalez rail 1 x 15   Therapeutic Exercise Activity 4 standing hip flex 1 x 15 at gonzalez rail   Therapeutic Exercise Activity 5 standing knee flex at gonzalez rail 1 x 15   Ambulation/Gait Training   Ambulation/Gait Training Performed No  (Patient refused ambulation attempts even with gentle encouragment and benefits explained)   Transfers   Transfer Yes   Transfer 1   Technique 1 Stand to sit;Sit to stand   Transfer Device 1   (at gonzalez rail)   Transfer Level of Assistance 1 Distant supervision   Wheelchair Activities   Propulsion Yes   Propulsion Type 1 Manual   Level 1 Level tile   Method 1 Right lower extremity;Left lower extremity   Level of Assistance 1 Distant supervision   Description/Details 1 100'   Activity Tolerance   Endurance Decreased tolerance for upright activites   PT Assessment   Barriers to Participation Attitude of self   Assessment Comment Patient with self limiting behaviors that impact rehab and therapy participation.   End of Session Patient Position Up in chair   Education   Individual(s) Educated Patient   Education Comment Reviewed importance and benefits of particapting in therapy.   PT Plan   Treatment/Interventions Transfer  training;Gait training;Therapeutic exercise;Wheelchair management;Strengthening   PT Frequency 3 times per week   PT Discharge Recommendations Low intensity level of continued care   Equipment Recommended upon Discharge Wheeled walker   PT Recommended Transfer Status Assist x1   PT - OK to Discharge Yes      11/06/23 1431   [facility] Basic Mobility   Turning from your back to your side while in a flat bed without using bedrails 4   Moving from lying on your back to sitting on the side of a flat bed without using bedrails 3   Moving to and from bed to chair (including a wheelchair) 3   Standing up from a chair using your arms (e.g. wheelchair or bedside chair) 3   To walk in hospital room 3   Climbing 3-5 steps with railing 2   Basic Mobility - Total Score 18     Encounter Problems       Encounter Problems (Active)       Balance       STG - Maintains dynamic standing balance with upper extremity support (Progressing)       Start:  10/27/23    Expected End:  11/10/23       INTERVENTIONS:  1. Practice standing with minimal support.  2. Educate patient about standing tolerance.  3. Educate patient about independence with gait, transfers, and ADL's.  4. Educate patient about use of assistive device.  5. Educate patient about self-directed care.            Balance       LTG - Patient will maintain standing and sitting balance to allow for completion of daily activities (Progressing)       Start:  10/16/23    Expected End:  11/06/23               Mobility       STG - Patient will ambulate 300 ft with FWW , + turns, distant supervision of 1. (Progressing)       Start:  10/27/23    Expected End:  11/10/23               Mobility       LTG - Patient will ambulate household distance (Progressing)       Start:  10/16/23    Expected End:  11/06/23               Pain       pt will demonstrate < 4/10 left hip pain during functional mobility/therapy session (Not Progressing)       Start:  10/27/23    Expected End:  11/10/23                Safety       LTG - Patient will demonstrate safety requirements appropriate to situation/environment (Progressing)       Start:  10/16/23    Expected End:  11/06/23               Transfers       STG - Patient to transfer to and from sit to supine mod independent (Progressing)       Start:  10/27/23    Expected End:  11/10/23            STG - Patient will transfer sit to and from stand mod independent (Progressing)       Start:  10/27/23    Expected End:  11/10/23

## 2023-11-06 NOTE — PROGRESS NOTES
Occupational Therapy    OT Treatment    Patient Name: Azul Roberts  MRN: [id number]  Today's Date: 11/6/2023  Time Calculation  Start Time: 1400  Stop Time: 1430  Time Calculation (min): 30 min         Assessment:  Medical Staff Made Aware: Yes (Discussion with NSG.)  End of Session Communication: Bedside nurse (OTR/L)  Medical Staff Made Aware: Yes (Discussion with NSG.)  Barriers to Participation: Coping skills (Pt presenting with self limiting behaviors  yellling out throughout session NSG informed)  Plan:  Treatment Interventions: ADL retraining, Functional transfer training, Endurance training, UE strengthening/ROM, Patient/family training, Compensatory technique education  OT Frequency: 3 times per week  OT Discharge Recommendations: Low intensity level of continued care  Equipment Recommended upon Discharge: Wheeled walker  OT Recommended Transfer Status: Stand by assist  OT - OK to Discharge: Yes  Treatment Interventions: ADL retraining, Functional transfer training, Endurance training, UE strengthening/ROM, Patient/family training, Compensatory technique education    Subjective   General:  OT Received On: 11/06/23  Reason for Referral: impaired mobility; recent fall  Referred By: Dr. Cullen  Past Medical History Relevant to Rehab: severe depression, colostomy, bilateral hip pain, opiod abuse  Missed Visit: No  Missed Visit Reason: Patient refused  Family/Caregiver Present: No  Prior to Session Communication: Bedside nurse  Patient Position Received: Up in chair  Preferred Learning Style: verbal  General Comment: Conferred with NSG.  Pt agreeable to skilled therapeutic intervention. Initially hesitant however coopoerative.  Precautions:     Vital Signs:     Pain:  Pain Assessment  Pain Assessment: 0-10  Pain Score: 8  Pain Type: Chronic pain  Pain Location: Hip  Pain Orientation: Left  Pain Frequency: Constant/continuous  Pain Onset: Ongoing  Pain Interventions: Relaxation technique, Shower, Therapeutic  touch, Other (Comment) (Discussion with NSG.)    Objective    Cognition:     Coordination:     Activities of Daily Living: Feeding  Feeding Level of Assistance: Independent  Feeding Where Assessed: Chair  Feeding Comments: Pt self feed cracker and pop snack to include open packages    Grooming  Grooming Level of Assistance: Modified independent  Grooming Where Assessed: Standing sinkside  Grooming Comments: items in reach Pt washing hands, brushing teeth combing hair    UE Bathing  UE Bathing Level of Assistance: Setup  UE Bathing Where Assessed: Shower (seated shower chair)  UE Bathing Comments: Pt declined    LE Bathing  LE Bathing Level of Assistance: Minimum assistance  LE Bathing Where Assessed: Shower (seated shower chair)  LE Bathing Comments: Pt declined    UE Dressing  UE Dressing Level of Assistance: Modified independent  UE Dressing Where Assessed: Edge of bed  UE Dressing Comments: Pt  declined    LE Dressing  LE Dressing: Yes  LE Dressing Adaptive Equipment: Reacher, Sock aide  Pants Level of Assistance: Setup  Sock Level of Assistance: Modified independent  LE Dressing Where Assessed: Wheelchair  LE Dressing Comments: Pt efficiently doff/don socks with AE    Toileting  Toileting Level of Assistance: Modified independent  Where Assessed: Toilet  Toileting Comments: Pt adjusting clothing prior/after/no hygiene  Functional Standing Tolerance:  Time: 3 minutes  Activity: therapeutic exercise  Functional Standing Tolerance Comments: No UB support /LOB Pt engaged in stance task  Bed Mobility/Transfers: Bed Mobility  Bed Mobility: Yes  Bed Mobility 1  Bed Mobility 1: Sitting to supine  Level of Assistance 1: Independent  Bed Mobility Comments 1: bed to neutral  Bed Mobility 2  Bed Mobility  2: Supine to sitting  Level of Assistance 2: Independent  Bed Mobility Comments 2: bed to neutral    Transfers  Transfer: Yes  Transfer 1  Transfer From 1: Stand to  Transfer to 1: Sit, Chair with arms  Technique 1: Stand  to sit, Sit to stand  Transfer Device 1: Walker (wheeled)  Transfer Level of Assistance 1: Distant supervision  Transfers 2  Transfer From 2: Stand to  Transfer to 2: Sit  Technique 2: Stand to sit  Transfer Device 2: Walker  Transfer Level of Assistance 2: Distant supervision  Trials/Comments 2: use RW cues hand placement    Toilet Transfers  Toilet Transfer From: Rolling walker  Toilet Transfer Type: To and from  Toilet Transfer to: Standard toilet  Toilet Transfer Technique: Ambulating  Toilet Transfers: Supervision  Toilet Transfers Comments: grab bar use       Shower Transfers  Shower Transfer From: Wheelchair  Shower Transfer Type: To and from  Shower Transfer to: Shower seat with back  Shower Transfer Technique: Stand pivot  Shower Transfers: Supervision  Shower Transfers Comments: grab bar use    Therapy/Activity: Therapeutic Exercise  Therapeutic Exercise Performed: Yes  Therapeutic Exercise Activity 1: BUE HEP 15 reps x 1 set  in stance 1# resistance:shoulder flexion, elbow flexion, forearm pronation/supination, shoulder abduction/adduction with vc for joint alignment, pacing and counting reps aloud fair follow though.  Task to increase functional ADL skills.    Therapeutic Activity  Therapeutic Activity Performed: Yes  Therapeutic Activity 1: Functional mobility RW 15' x 2 to include turns and backing Dist S.  Other Activity:     Outcome Measures:[facility] Daily Activity  Putting on and taking off regular lower body clothing: A little  Bathing (including washing, rinsing, drying): A little  Putting on and taking off regular upper body clothing: None  Toileting, which includes using toilet, bedpan or urinal: None  Taking care of personal grooming such as brushing teeth: None  Eating Meals: None  Daily Activity - Total Score: 22        Education Documentation  Body Mechanics, taught by ALLA Lopez at 11/6/2023  2:31 PM.  Learner: Patient  Readiness: Acceptance  Method: Explanation, Teach-back  Response:  Demonstrated Understanding  Comment: Instructed wtih ;use AE LB catre to reduce pain , transfer safety, functional mobility, balance training    Precautions, taught by ALLA Lopez at 11/6/2023  2:31 PM.  Learner: Patient  Readiness: Acceptance  Method: Explanation, Teach-back  Response: Demonstrated Understanding  Comment: Instructed wtih ;use AE LB catre to reduce pain , transfer safety, functional mobility, balance training    ADL Training, taught by ALLA Lopez at 11/6/2023  2:31 PM.  Learner: Patient  Readiness: Acceptance  Method: Explanation, Teach-back  Response: Demonstrated Understanding  Comment: Instructed wtih ;use AE LB catre to reduce pain , transfer safety, functional mobility, balance training    Education Comments  No comments found.        OP EDUCATION:  Education  Individual(s) Educated: Patient  Education Provided: Fall precautons, Risk and benefits of OT discussed with patient or other, POC discussed and agreed upon  Risk and Benefits Discussed with Patient/Caregiver/Other: yes  Patient/Caregiver Demonstrated Understanding: yes  Plan of Care Discussed and Agreed Upon: yes  Patient Response to Education: Patient/Caregiver Verbalized Understanding of Information    Goals:  Encounter Problems       Encounter Problems (Active)       Balance       STG - Maintains dynamic standing balance with upper extremity support (Progressing)       Start:  10/27/23    Expected End:  11/10/23       INTERVENTIONS:  1. Practice standing with minimal support.  2. Educate patient about standing tolerance.  3. Educate patient about independence with gait, transfers, and ADL's.  4. Educate patient about use of assistive device.  5. Educate patient about self-directed care.            Balance       LTG - Patient will maintain standing and sitting balance to allow for completion of daily activities (Progressing)       Start:  10/16/23    Expected End:  11/06/23               Bathing       LTG - Patient  will utilize adaptive techniques to bathe body (Progressing)       Start:  10/16/23    Expected End:  11/06/23               Dressings Lower Extremities       LTG - Patient will utilize adaptive techniques/equipment to dress lower body (Progressing)       Start:  10/16/23    Expected End:  11/06/23               Grooming       LTG - Patient will demonstrate use of appropriate Intervention for safe grooming habits (Progressing)       Start:  10/16/23    Expected End:  11/06/23               Mobility       STG - Patient will ambulate 300 ft with FWW , + turns, distant supervision of 1. (Progressing)       Start:  10/27/23    Expected End:  11/10/23               Mobility       LTG - Patient will ambulate household distance (Progressing)       Start:  10/16/23    Expected End:  11/06/23               Safety       LTG - Patient will demonstrate safety requirements appropriate to situation/environment (Progressing)       Start:  10/16/23    Expected End:  11/06/23               Transfers       STG - Patient to transfer to and from sit to supine mod independent (Progressing)       Start:  10/27/23    Expected End:  11/10/23            STG - Patient will transfer sit to and from stand mod independent (Progressing)       Start:  10/27/23    Expected End:  11/10/23

## 2023-11-06 NOTE — GROUP NOTE
Group Topic: Other   Group Date: 11/6/2023  Start Time: 1100  End Time: 1135  Facilitators: BRENDA Howard   Department: [facility] REHABTH VIRTUAL    Number of Participants: 4   Group Focus: other following instructions, and social skills, memory re-call  Treatment Modality: Other: Recreation Therapy  Interventions utilized were mental fitness, reminiscence, and story telling  Purpose: other: increase attention span, increase memory re-call, increase self esteem, elevate mood,     Name: Azul Roberts YOB: 1960   MR: [id number]      Facilitator: Recreational Therapist  Level of Participation: did not attend  Quality of Participation:  did not attend  Interactions with others:  did not attend  Mood/Affect:  did not attend  Triggers (if applicable): n/a  Cognition:  did not attend  Progress: None  Comments: pt problem is depressed mood. Pt refused group. Agreed to attend afternoon session.  Plan: continue with services

## 2023-11-06 NOTE — PROGRESS NOTES
[facility]: [facility]  PROGRESS NOTE      Azul Roberts is a 63 y.o. female that is being seen  today for follow up at [facility]  Subjective   Patient is being seen for follow-up at [facility].  Patient has been at her baseline.  Colostomy has been working well.  Pain has been fairly controlled.    ROS  Negative for fever or chills  Negative for sore throat, ear pain, nasal discharge  Negative for cough, shortness of breath or wheezing  Negative for chest pain, palpitations, swelling of legs  Negative for abdominal pain, constipation, diarrhea, blood in the stools,has colostomy  Negative for urinary complaints  Negative for headache, dizziness, weakness or numbness  Negative for joint pain  Positive for depression or anxiety  All other systems reviewed and were negative  Vitals:    11/06/23 0645   BP: 121/79   Pulse: 76   Resp: 17   Temp: 36.8 °C (98.2 °F)   SpO2: 98%      Body mass index is 29.87 kg/m².  Physical Exam  Constitutional: Patient does not appear to be in any acute distress  Head and Face: NCAT  Eyes: Normal external exam, EOMI  ENT: Normal external inspection of ears and nose. Oropharynx normal.  Cardiovascular: RRR, S1/S2, no murmurs, rubs, or gallops, radial pulses +2, no edema of extremities  Pulmonary: CTAB, no respiratory distress.  Abdomen: +BS, soft, non-tender, nondistended, no guarding or rebound, no masses noted.colostomy present   MSK: hip joint pain   Skin- No lesions, contusions, or erythema.  Peripheral puslses palpable bilaterally 2+  Neuro: AAO X3, Cranial nerves 2-12 grossly intact,DTR 2+ in all 4 limbs       Diagnostic Results   Lab Results   Component Value Date    GLUCOSE 113 (H) 09/05/2023    CALCIUM 10.6 (H) 09/05/2023     09/05/2023    K 3.7 09/05/2023    CO2 24 09/05/2023     09/05/2023    BUN 12 09/05/2023    CREATININE 0.72 09/05/2023     Lab Results   Component Value Date    ALT 12 09/05/2023    AST 14 09/05/2023    ALKPHOS 103  "09/05/2023    BILITOT 0.4 09/05/2023     Lab Results   Component Value Date    WBC 13.2 (H) 09/05/2023    HGB 13.9 09/05/2023    HCT 43.0 09/05/2023    MCV 81 09/05/2023     09/05/2023     No results found for: \"CHOL\"  No results found for: \"HDL\"  No results found for: \"LDLCALC\"  No results found for: \"TRIG\"  No results found for: \"HGBA1C\"  Other labs not included in the list above were reviewed either before or during this encounter.    History    No past medical history on file.  No past surgical history on file.  No family history on file.  No Known Allergies  No current facility-administered medications on file prior to encounter.     Current Outpatient Medications on File Prior to Encounter   Medication Sig Dispense Refill    acetaminophen (Tylenol) 325 mg tablet Take 2 tablets (650 mg) by mouth 2 times a day.      hydrOXYzine pamoate (Vistaril) 25 mg capsule Take 1 capsule (25 mg) by mouth 2 times a day.      magnesium oxide (Mag-Ox) 400 mg tablet Take 1 tablet (400 mg) by mouth 2 times a day.      melatonin 5 mg tablet Take 1 tablet (5 mg) by mouth once daily at bedtime.      risperiDONE (RisperDAL) 2 mg tablet Take 1 tablet (2 mg) by mouth once daily at bedtime.      traZODone (Desyrel) 150 mg tablet Take 1 tablet (150 mg) by mouth once daily at bedtime.     Scheduled medications  buPROPion XL, 300 mg, oral, Daily  celecoxib, 200 mg, oral, Daily  cholecalciferol, 125 mcg, oral, Daily  citalopram, 30 mg, oral, Daily  lidocaine, 1 patch, transdermal, Daily  melatonin, 5 mg, oral, Nightly  QUEtiapine, 350 mg, oral, Nightly  traZODone, 150 mg, oral, Nightly      Continuous medications     PRN medications  PRN medications: acetaminophen, alum-mag hydroxide-simeth, diphenhydrAMINE, hydrOXYzine pamoate, OLANZapine, OLANZapine zydis     There is no immunization history on file for this patient.  Patient's medical history was reviewed and updated either before or during this encounter.  ASSESSMENT / " PLAN:  Active Hospital Problems    Bilateral hip pain      *Severe recurrent major depression without psychotic features (CMS/HCC)      Colostomy present (CMS/HCC)      Opioid abuse (CMS/HCC)  S/p fall.  Hip x-rays negative      Pt. Has been stable.clostomy is working well.        Jay Knox MD

## 2023-11-06 NOTE — NURSING NOTE
SARAN NOTE     Problem:  Depression     Behavior:  Pt was pleasant on approach. She was observed sitting in the hallway after report. Pt was interacting with another patient appropriately. Cooperative with care- emptying colostomy bag on her own without much prompting. Pt has a brighter affect. Smiling at times. Compliant with medications.   Group Participation: n/a  Appetite/Meals: Declined HS snack     Interventions:  HS medications given per Mar  Assessment completed    Response:  No interventions need at this time. Pt continues to be pleasant. No s/s of distress noted  Encouraged pt to ring for any needs.      Plan:  Continue to monitor and assist. Maintain q15 minutes rounds for safety.

## 2023-11-07 PROCEDURE — 2500000001 HC RX 250 WO HCPCS SELF ADMINISTERED DRUGS (ALT 637 FOR MEDICARE OP): Performed by: STUDENT IN AN ORGANIZED HEALTH CARE EDUCATION/TRAINING PROGRAM

## 2023-11-07 PROCEDURE — 97530 THERAPEUTIC ACTIVITIES: CPT | Mod: GP,CQ

## 2023-11-07 PROCEDURE — 1140000001 HC PRIVATE PSYCH ROOM DAILY

## 2023-11-07 PROCEDURE — 97530 THERAPEUTIC ACTIVITIES: CPT | Mod: GO,CO

## 2023-11-07 PROCEDURE — 99232 SBSQ HOSP IP/OBS MODERATE 35: CPT | Performed by: PSYCHIATRY & NEUROLOGY

## 2023-11-07 PROCEDURE — 2500000004 HC RX 250 GENERAL PHARMACY W/ HCPCS (ALT 636 FOR OP/ED): Performed by: PSYCHIATRY & NEUROLOGY

## 2023-11-07 PROCEDURE — 2500000002 HC RX 250 W HCPCS SELF ADMINISTERED DRUGS (ALT 637 FOR MEDICARE OP, ALT 636 FOR OP/ED): Performed by: STUDENT IN AN ORGANIZED HEALTH CARE EDUCATION/TRAINING PROGRAM

## 2023-11-07 PROCEDURE — 2500000001 HC RX 250 WO HCPCS SELF ADMINISTERED DRUGS (ALT 637 FOR MEDICARE OP): Performed by: PSYCHIATRY & NEUROLOGY

## 2023-11-07 RX ADMIN — QUETIAPINE FUMARATE 350 MG: 300 TABLET ORAL at 21:34

## 2023-11-07 RX ADMIN — CELECOXIB 200 MG: 200 CAPSULE ORAL at 16:07

## 2023-11-07 RX ADMIN — DULOXETINE HYDROCHLORIDE 90 MG: 60 CAPSULE, DELAYED RELEASE ORAL at 08:15

## 2023-11-07 RX ADMIN — BUPROPION HYDROCHLORIDE 300 MG: 300 TABLET, FILM COATED, EXTENDED RELEASE ORAL at 08:14

## 2023-11-07 RX ADMIN — Medication 125 MCG: at 08:14

## 2023-11-07 ASSESSMENT — ENCOUNTER SYMPTOMS
DYSPHORIC MOOD: 1
NERVOUS/ANXIOUS: 1
SLEEP DISTURBANCE: 1
DECREASED CONCENTRATION: 1

## 2023-11-07 ASSESSMENT — COGNITIVE AND FUNCTIONAL STATUS - GENERAL
DAILY ACTIVITIY SCORE: 23
HELP NEEDED FOR BATHING: A LITTLE

## 2023-11-07 ASSESSMENT — PAIN - FUNCTIONAL ASSESSMENT
PAIN_FUNCTIONAL_ASSESSMENT: 0-10
PAIN_FUNCTIONAL_ASSESSMENT: FLACC (FACE, LEGS, ACTIVITY, CRY, CONSOLABILITY)
PAIN_FUNCTIONAL_ASSESSMENT: 0-10
PAIN_FUNCTIONAL_ASSESSMENT: 0-10

## 2023-11-07 ASSESSMENT — PAIN SCALES - GENERAL
PAINLEVEL_OUTOF10: 0 - NO PAIN
PAINLEVEL_OUTOF10: 8
PAINLEVEL_OUTOF10: 8
PAINLEVEL_OUTOF10: 4
PAINLEVEL_OUTOF10: 0 - NO PAIN

## 2023-11-07 ASSESSMENT — ACTIVITIES OF DAILY LIVING (ADL): BATHING_COMMENTS: PT DECLINED

## 2023-11-07 NOTE — NURSING NOTE
SARAN NOTE     Problem:  depression     Behavior:  Pt performed her own self care and worked with OT.  She did  not go to am group.  She did sit in the hallway.    Group Participation: Pt sat in hallway for am and pm group  Appetite/Meals: 100%       Interventions:  Encourage participation in group, 1:1 interaction with active listening, give scheduled medications, every 15 minute checks    Response:  Pt sat out in the hallway and smiling occasionally this afternoon     Plan:  Encourage participation in group, 1:1 interaction with active listening, give scheduled medications, every 15 minute checks

## 2023-11-07 NOTE — GROUP NOTE
Group Topic: Other   Group Date: 11/7/2023  Start Time: 1100  End Time: 1130  Facilitators: BRENDA Fernandes   Department: [facility] REHABTH VIRTUAL    Number of Participants: 4   Group Focus: reminiscence  Treatment Modality: Other: Recreation Therapy  Interventions utilized were reminiscence  Purpose: feelings and other: increase socialization, increase attention, stimulate memory    Name: Azul Roberts YOB: 1960   MR: [id number]      Facilitator: Recreational Therapist  Level of Participation: did not attend  Quality of Participation:  did not attend  Interactions with others:  did not attend  Mood/Affect:  did not attend  Triggers (if applicable): n/a  Cognition:  did not attend  Progress: None  Comments: pt problem is depressed mood.  Pt declined invitation to join group at this time.  Does sit in the hallway and work on a [employer].    Plan: continue with services

## 2023-11-07 NOTE — PROGRESS NOTES
" REHAB Therapy Assessment & Treatment    CTRS in to visit with pt on this date.  Pt displays irritable mood on approach.  Grumpy and not very verbally responsive.  Did require encouragement to engage and eventually pt became less irritable.  Expresses desire to \"get out of here soon\"\" but does understand complexities of discharge plan and is \"willing to wait till I get a good, safe place to go\".  Agrees to get out of bed and wheels around unit while engaging with CTRS.  Pt also agrees to work on a word search in the hallway after wheeling around the unit.  Pt is able to focus and complete task while continuing to engage with CTRS.  Requests to go back to her room after visit, smiles and makes good eye contact as she wheels back to her room.  Will continue to encourage pt to attend groups of choice daily.       "

## 2023-11-07 NOTE — PROGRESS NOTES
Physical Therapy       11/07/23 [id number]   PT  Visit   PT Received On 11/07/23   Response to Previous Treatment Patient reporting fatigue but able to participate.;Other (Comment)  (Pt c/o severe left hip pain and needs encouragement to participate with PT.)   General   Reason for Referral impaired mobility; recent fall   Referred By Dr. Cullen   Past Medical History Relevant to Rehab severe depression, colostomy, bilateral hip pain, opiod abuse   General Comment Conferred with NSG.  Pt agreeable to skilled therapeutic intervention. Initially hesitant however coopoerative.   Precautions   Medical Precautions Fall precautions   Pain Assessment   Pain Assessment 0-10   Pain Score 8   Pain Type Chronic pain   Pain Location Hip   Pain Orientation Left   Pain Frequency Constant/continuous   Therapeutic Exercise   Therapeutic Exercise Activity 1 sit/stands 1x10 reps, supervision   Therapeutic Activity   Therapeutic Activity 1 side stepping using desk top for support, 6 ft both directions, CGA/supervision   Ambulation/Gait Training 1   Surface 1 Level tile   Device 1 Rolling walker   Gait Support Devices Gait belt   Assistance 1 Contact guard;Close supervision   Quality of Gait 1 Inconsistent stride length   Comments/Distance (ft) 1 Pt amb 60 ft x 2 with turn, antalgic gait favoring LLE, pt amb on toes of L foot due to LLE leg length disrepancy   Transfer 1   Technique 1 Stand to sit;Sit to stand   Transfer Level of Assistance 1 Distant supervision   Trials/Comments 1 Pt indicated pain with all transfers   Wheelchair Activities   Propulsion Type 1 Manual   Level 1 Level tile   Method 1 Right lower extremity;Left lower extremity   Level of Assistance 1 Distant supervision   Description/Details 1 Pt observed mobilizing in unit without difficulty   PT Assessment   PT Assessment Results Decreased strength;Decreased endurance;Impaired balance;Decreased mobility;Decreased coordination;Decreased safety awareness;Pain   Rehab  Prognosis Good   Evaluation/Treatment Tolerance Patient limited by pain   Barriers to Participation Attitude of self   PT Plan   Inpatient/Swing Bed or Outpatient Inpatient   PT Plan   Treatment/Interventions Bed mobility;Transfer training;Gait training;Therapeutic exercise;Therapeutic activity   PT Plan Skilled PT   PT Frequency 3 times per week   PT Discharge Recommendations Low intensity level of continued care   Equipment Recommended upon Discharge Wheeled walker   PT Recommended Transfer Status Independent

## 2023-11-07 NOTE — CARE PLAN
Problem: Risk for falls  Goal: I will remain free from falls  Outcome: Met   The patient's goals for the shift include go to one group    The clinical goals for the shift include encourage participation in milieu    Over the shift, the patient did not make progress toward the following goals.

## 2023-11-07 NOTE — GROUP NOTE
Group Topic: Other   Group Date: 11/7/2023  Start Time: 1000  End Time: 1050  Facilitators: Alexei Azul LCSW   Department: [facility]     Number of Participants: 5   Group Focus: The importance of life and achieving change we want.  Treatment Modality: Cognitive Behavioral Therapy  Interventions utilized were exploration  Purpose: feelings, irrational fears, communication skills, insight or knowledge, self-worth, and self-care    Name: Azul Roberts YOB: 1960   MR: [id number]      Facilitator:   Level of Participation: did not attend  Quality of Participation:  n/a  Interactions with others:  n/a  Mood/Affect:  n/a  Triggers (if applicable): n/a  Cognition:  n/a  Progress: Other  Comments: pt did not attend despite encouragement.  Plan: patient will be encouraged to attend next therapy group

## 2023-11-07 NOTE — GROUP NOTE
"Group Topic: Other   Group Date: 11/7/2023  Start Time: 1530  End Time: 1615  Facilitators: RANDALL FernandesS   Department: [facility]    Number of Participants: 4   Group Focus: [facility]   Treatment Modality: Other: Recreation Therapy  Interventions utilized were mental fitness and reminiscence  Purpose: other: increase memory, increase stimulation, elevate mood, enhance self esteem, increase alertness,     Name: Azul Roberts YOB: 1960   MR: [id number]      Facilitator: Recreational Therapist  Level of Participation: did not attend  Quality of Participation:  did not attend  Interactions with others:  did not attend  Mood/Affect:  did not attend  Triggers (if applicable): n/a  Cognition:  did not attend  Progress: None  Comments: pt problem is depressed mood.  Pt delines invitation to join group at this time.  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  Pt declines offers for independent leisure activities at this time.   Plan: continue with services      "

## 2023-11-07 NOTE — CARE PLAN
Problem: Bathing  Goal: LTG - Patient will utilize adaptive techniques to bathe body  Outcome: Not Progressing     Problem: Bathing  Goal: LTG - Patient will utilize adaptive techniques to bathe body  Outcome: Not Progressing     Problem: Bathing  Goal: LTG - Patient will utilize adaptive techniques to bathe body  Outcome: Not Progressing     Problem: Balance  Goal: LTG - Patient will maintain standing and sitting balance to allow for completion of daily activities  Outcome: Progressing     Problem: Mobility  Goal: LTG - Patient will ambulate household distance  Outcome: Progressing     Problem: Safety  Goal: LTG - Patient will demonstrate safety requirements appropriate to situation/environment  Outcome: Progressing     Problem: Dressings Lower Extremities  Goal: LTG - Patient will utilize adaptive techniques/equipment to dress lower body  Outcome: Met     Problem: Grooming  Goal: LTG - Patient will demonstrate use of appropriate Intervention for safe grooming habits  Outcome: Met     Problem: Bathing  Goal: LTG - Patient will utilize adaptive techniques to bathe body  Outcome: Not Progressing

## 2023-11-07 NOTE — PROGRESS NOTES
"LSW met with pt who had limited engagement. Pt responds with \"I don't know\" responses. LSW did encourage pt to get out of bed and to come out to the nurses station for a snack. LSW encouraged pt to continue to be out of her room as much as possible. Pt has been ambulating herself around in wheelchair as well as has been taking care of her colostomy, which is much improvement.       Nikki Andrews, LSW      "

## 2023-11-07 NOTE — PROGRESS NOTES
"Azul Roberts is a 63 y.o. female on day 59 of admission presenting with Severe recurrent major depression without psychotic features (CMS/HCC).        Subjective   Case discussed with treatment team and chart reviewed.     More dysphoric and pessimistic today.  Endorsing ongoing chronic daily intrusive suicidal thoughts and considering plans to overdose and notes she does not have access to this plan in the hospital.  She states this is her baseline and has been this way for an extended period of time.  \"It's not going to get better.\"  She notes she feels she is working as hard as she can to improve and does not feel improved yet enough to be able to go home independently.  However, she does note that she would like to be able to go home.  Discussed with patient she is not yet showing the improvement that would support this.  She did not participate in group today however was seen in her wheelchair sitting outside of the group room during group time.  She continues not to use the walker and voices concern that she feels the unit is too small and making her feel claustrophobic.  She declines offers to spend time outside in the courtyard.  We encourage her to continue being as active as she can so as to not to decondition.  She is rated as low intensity currently from physical therapy.  She continues to endorse feeling overwhelmed and hopeless.  She provides very limited responses verbally often just a few words at most.  She does not offer open-ended sentence structure for conversation.  Much of the encounter is spent silently with the patient considering her responses before finally offering just a few words.    Objective     Last Recorded Vitals  Blood pressure 133/77, pulse 77, temperature 36.3 °C (97.3 °F), temperature source Oral, resp. rate 17, height 1.626 m (5' 4\"), weight 78.9 kg (174 lb), SpO2 97 %.    Review of Systems   Psychiatric/Behavioral:  Positive for decreased concentration, dysphoric mood and " "sleep disturbance. The patient is nervous/anxious.      Psychiatric ROS - Adult  Anxiety: General Anxiety Disorder (KAYLEEN)KAYLEEN Behaviors: difficult to control worry, difficulty concentrating, irritability, restlessness, and sleep disturbance  Depression: anhedonia, appetite increased, concentration, energy, helpless, hopeless, interest, persistent thoughts of death, psychomotor agitation, sleep decreased , suicidal thoughts, and withdrawn  Delirium: negative  Psychosis: negative  Ioana: negative  Safety Issues: passive death wish and suicidal ideation  Psychiatric ROS Comment: as noted    Physical Exam  Abdominal:      Comments: Presence of colostomy bag   Psychiatric:         Attention and Perception: Attention and perception normal.         Mood and Affect: Mood is anxious and depressed. Affect is flat and inappropriate.         Behavior: Behavior is slowed.     Mental Status Examination  General Appearance:  less disheveled with improved eye contact, not malodorous on approach. Sitting in wheelchair outside of unit group room  Gait/Station: using wheelchair to move about the milieu  Speech: brief, conversational volume  Mood: \"not good\"  Affect: Dysphoric, constricted ,  Thought Process: remains impoverished but less so  Thought Associations: No loosening of associations  Thought Content: depressed, hopeless, persists as helpless, endorsing suicidal ideations today  Perception: No perceptual abnormalities noted  Level of Consciousness: Alert  Orientation: Alert  Attention and Concentration: Mild impairment in attention  Recent Memory: Intact as evidenced by ability to recall details from the past 24 hours   Executive function: Intact  Language: Naming intact  Fund of knowledge: Good  Insight: Fair, as evidenced by increasing participation in discussion about her scenario and symptoms and their improvement  Judgment: Fair, as evidenced by ability to reason through medical decision making, compliance with treatment " recommendations, and improving      Psychiatric Risk Assessment  Violence Risk Assessment: major mental illness and substance abuse  Acute Risk of Harm to Others is Considered: moderate   Suicide Risk Assessment: , chronic medical illness, chronic pain, current psychiatric illness, feelings of hopelessness, global insomnia, history of trauma or abuse, life crisis (shame/despair), prior suicide attempt, severe anxiety, substance abuse, and suicidal ideations  Protective Factors against Suicide: adherence to  treatment and marriage/partnership  Acute Risk of Harm to Self is Considered: moderate    Relevant Results  Scheduled medications  buPROPion XL, 300 mg, oral, Daily  celecoxib, 200 mg, oral, Daily  cholecalciferol, 125 mcg, oral, Daily  DULoxetine, 90 mg, oral, Daily  lidocaine, 1 patch, transdermal, Daily  QUEtiapine, 350 mg, oral, Nightly      Continuous medications     PRN medications  PRN medications: acetaminophen, alum-mag hydroxide-simeth, diphenhydrAMINE, hydrOXYzine pamoate, melatonin, OLANZapine, OLANZapine zydis, traZODone         Assessment/Plan   Principal Problem:    Severe recurrent major depression without psychotic features (CMS/HCC)  Active Problems:    Colostomy present (CMS/HCC)    Opioid abuse (CMS/HCC)    Bilateral hip pain    Biological -   Cymbalta 90 mg daily for depression, ideally titrate to as tolerated and beneficial  Wellbutrin xl 300 mg for adjunctive depression treatment  seroquel 350 mg at bedtime  Trazodone 150 mg PRN and melatonin at bedtime for sleep  No adverse side effects of medications noted or reported.  ECG (10/4/23): Normal sinus rhythm. Heart  rate 72 bpm. Qtc 442    Declined ECT consideration during discussion on 11/4, 11/7.   ECT is not available at this facility and this would include transitioning to another psychiatric hospital for this level of service or pursuing as an outpatient on discharge.    Discussion with patient regarding risk benefits and  alternatives and side effects with opportunity to ask questions and questions answered.  Patient given consent to the plan as noted    2. Psychological -     Patient is encouraged to participate with the therapeutic milieu and program and group therapy    3. Sociological -      Patient encouraged to cooperate with social work staff on issues relevant to discharge planning  Pending [place] medicaid and then transition to convalescence nursing care once this becomes available to patient    25 minutes spent coordinating care for patient on this day    Parts of this chart have been completed using voice recognition software.  Please excuse any errors of transcription.  Despite the medical decision making time stamp, my medical decision making has taken place during the patient's entire visit.  Thought process and reason for plan has been formulated from the time that I saw the patient until the time of disposition and is not specific to one specific moment during their visit and furthermore the medical decision making encompasses the entire chart and not only that represented in this note.      Eddie Cullen, DO

## 2023-11-07 NOTE — CARE PLAN
Problem: Balance  Goal: LTG - Patient will maintain standing and sitting balance to allow for completion of daily activities  Outcome: Met     Problem: Bathing  Goal: LTG - Patient will utilize adaptive techniques to bathe body  Outcome: Met     Problem: Mobility  Goal: LTG - Patient will ambulate household distance  Outcome: Met     Problem: Safety  Goal: LTG - Patient will demonstrate safety requirements appropriate to situation/environment  Outcome: Met

## 2023-11-07 NOTE — PROGRESS NOTES
Occupational Therapy    OT Treatment    Patient Name: Azul Roberts  MRN: [id number]  Today's Date: 11/7/2023  Time Calculation  Start Time: 1510  Stop Time: 1523  Time Calculation (min): 13 min         Assessment:  Medical Staff Made Aware: Yes (Discussion with NSG.)  End of Session Communication: Bedside nurse (OTR/L)  Medical Staff Made Aware: Yes (Discussion with NSG.)  Barriers to Participation: Attitude of self, Coping skills  Plan:  Treatment Interventions: ADL retraining, Functional transfer training, Endurance training, UE strengthening/ROM, Patient/family training, Compensatory technique education  OT Frequency: 3 times per week  OT Discharge Recommendations: Low intensity level of continued care  Equipment Recommended upon Discharge: Wheeled walker  OT Recommended Transfer Status: Stand by assist  OT - OK to Discharge: Yes  Treatment Interventions: ADL retraining, Functional transfer training, Endurance training, UE strengthening/ROM, Patient/family training, Compensatory technique education    Subjective   General:  OT Received On: 11/07/23  Reason for Referral: impaired mobility; recent fall  Referred By: Dr. Cullen  Past Medical History Relevant to Rehab: severe depression, colostomy, bilateral hip pain, opiod abuse  Missed Visit: No  Missed Visit Reason: Patient refused  Family/Caregiver Present: No  Prior to Session Communication: Bedside nurse (OTR/L)  Patient Position Received: Bed, 0 rail up  Preferred Learning Style: verbal  General Comment: Conferred with NSG.  Pt agreeable to skilled therapeutic intervention. Initially hesitant however coopoerative.  Precautions:     Vital Signs:     Pain:  Pain Assessment  Pain Assessment: 0-10  Pain Score: 8  Pain Type: Chronic pain  Pain Location: Hip  Pain Orientation: Left  Pain Frequency: Constant/continuous  Pain Onset: Ongoing  Pain Interventions: Relaxation technique, Shower, Therapeutic touch, Other (Comment) (Discussion with NSG.)  Response to  Interventions: Pt declining pain intervention    Objective        Grooming  Grooming Level of Assistance: Modified independent  Grooming Where Assessed: Standing sinkside  Grooming Comments: Pt in stance washing hands    UE Bathing  UE Bathing Level of Assistance: Setup  UE Bathing Where Assessed: Shower (seated shower chair)  UE Bathing Comments: Pt declined    LE Bathing  LE Bathing Level of Assistance: Minimum assistance  LE Bathing Where Assessed: Shower (seated shower chair)  LE Bathing Comments: Pt declined    UE Dressing  UE Dressing Level of Assistance: Modified independent  UE Dressing Where Assessed: Edge of bed  UE Dressing Comments: Pt  declined    LE Dressing  LE Dressing: Yes  LE Dressing Adaptive Equipment: Sock aide  Pants Level of Assistance: Modified independent  Sock Level of Assistance: Modified independent  LE Dressing Where Assessed: Edge of bed  LE Dressing Comments: Pt doff/don socks pants threading over feet declined to don to waist    Toileting  Toileting Level of Assistance: Modified independent  Where Assessed: Toilet  Toileting Comments: Pt adjusting clothing prior/after/no hygiene  Functional Standing Tolerance:  Time: 2 minutes  Activity: Functional mobility RW level  Functional Standing Tolerance Comments: Dist S  Bed Mobility/Transfers: Bed Mobility  Bed Mobility: Yes  Bed Mobility 1  Bed Mobility 1: Supine to sitting  Level of Assistance 1: Independent  Bed Mobility Comments 1: bed to neutral  Bed Mobility 2  Bed Mobility  2: Supine to sitting  Level of Assistance 2: Independent  Bed Mobility Comments 2: bed to neutral    Transfers  Transfer: Yes  Transfer 1  Transfer From 1: Bed to, Stand to  Transfer to 1:  (rolling walker)  Technique 1: Sit to stand  Transfer Device 1: Walker  Transfer Level of Assistance 1: Distant supervision  Transfers 2  Transfer From 2: Stand to  Transfer to 2: Sit  Technique 2: Stand to sit  Transfer Device 2: Walker  Transfer Level of Assistance 2: Distant  supervision  Trials/Comments 2: use RW cues hand placement    Toilet Transfers  Toilet Transfer From: Rolling walker  Toilet Transfer Type: To and from  Toilet Transfer to: Standard toilet  Toilet Transfer Technique: Ambulating  Toilet Transfers: Supervision  Toilet Transfers Comments: grab bar use     Therapy/Activity: Therapeutic Exercise  Therapeutic Exercise Performed: Yes  Therapeutic Exercise Activity 1: PRE 1# resistance in stance shoulder flexion/ extension/ elbow flexioin, horuzontal abduction, forearm pronation/supination.  15 rteps x 1 set cues pacing and joint alignment.    Therapeutic Activity  Therapeutic Activity Performed: Yes  Therapeutic Activity 1: Functional mobility RW 15' x 2 to include turns and backing Dist S.        Outcome Measures:[facility] Daily Activity  Putting on and taking off regular lower body clothing: None  Bathing (including washing, rinsing, drying): A little  Putting on and taking off regular upper body clothing: None  Toileting, which includes using toilet, bedpan or urinal: None  Taking care of personal grooming such as brushing teeth: None  Eating Meals: None  Daily Activity - Total Score: 23        Education Documentation  Body Mechanics, taught by ALLA Lopez at 11/7/2023  3:33 PM.  Learner: Patient  Readiness: Nonacceptance  Method: Demonstration  Response: Needs Reinforcement    Precautions, taught by ALLA Lopez at 11/7/2023  3:33 PM.  Learner: Patient  Readiness: Nonacceptance  Method: Demonstration  Response: Needs Reinforcement    ADL Training, taught by ALLA Lopez at 11/7/2023  3:33 PM.  Learner: Patient  Readiness: Nonacceptance  Method: Demonstration  Response: Needs Reinforcement    Education Comments  No comments found.        OP EDUCATION:  Education  Individual(s) Educated: Patient  Education Provided: Fall precautons, Risk and benefits of OT discussed with patient or other, POC discussed and agreed upon  Risk and Benefits Discussed with  Patient/Caregiver/Other: yes  Patient/Caregiver Demonstrated Understanding: yes  Plan of Care Discussed and Agreed Upon: yes  Patient Response to Education: Patient/Caregiver Verbalized Understanding of Information    Goals:  Encounter Problems       Encounter Problems (Active)       Balance       STG - Maintains dynamic standing balance with upper extremity support (Progressing)       Start:  10/27/23    Expected End:  11/10/23       INTERVENTIONS:  1. Practice standing with minimal support.  2. Educate patient about standing tolerance.  3. Educate patient about independence with gait, transfers, and ADL's.  4. Educate patient about use of assistive device.  5. Educate patient about self-directed care.            Balance       LTG - Patient will maintain standing and sitting balance to allow for completion of daily activities (Progressing)       Start:  10/16/23    Expected End:  11/13/23               Bathing       LTG - Patient will utilize adaptive techniques to bathe body (Not Progressing)       Start:  10/16/23    Expected End:  11/13/23               Mobility       STG - Patient will ambulate 300 ft with FWW , + turns, distant supervision of 1. (Progressing)       Start:  10/27/23    Expected End:  11/10/23               Mobility       LTG - Patient will ambulate household distance (Progressing)       Start:  10/16/23    Expected End:  11/13/23               Safety       LTG - Patient will demonstrate safety requirements appropriate to situation/environment (Progressing)       Start:  10/16/23    Expected End:  11/13/23               Transfers       STG - Patient to transfer to and from sit to supine mod independent (Progressing)       Start:  10/27/23    Expected End:  11/10/23            STG - Patient will transfer sit to and from stand mod independent (Progressing)       Start:  10/27/23    Expected End:  11/10/23                  Encounter Problems (Resolved)       Dressings Lower Extremities       LTG -  Patient will utilize adaptive techniques/equipment to dress lower body (Met)       Start:  10/16/23    Expected End:  11/13/23    Resolved:  11/07/23            Grooming       LTG - Patient will demonstrate use of appropriate Intervention for safe grooming habits (Met)       Start:  10/16/23    Expected End:  11/13/23    Resolved:  11/07/23

## 2023-11-07 NOTE — NURSING NOTE
BIRP NOTE     Problem:  Depression      Behavior:  Reclusive to room  Group Participation: no HS group  Appetite/Meals: pt eats well       Interventions:  Encouraged to come out of room and watch TV/socialize with peers    Response:  Refused    Plan:  Monitor for safety

## 2023-11-08 ENCOUNTER — APPOINTMENT (OUTPATIENT)
Dept: RADIOLOGY | Facility: HOSPITAL | Age: 63
End: 2023-11-08
Payer: MEDICAID

## 2023-11-08 ENCOUNTER — HOSPITAL ENCOUNTER (EMERGENCY)
Facility: HOSPITAL | Age: 63
Discharge: HOME | End: 2023-11-08
Attending: EMERGENCY MEDICINE
Payer: MEDICAID

## 2023-11-08 VITALS
RESPIRATION RATE: 15 BRPM | HEIGHT: 69 IN | WEIGHT: 168.21 LBS | OXYGEN SATURATION: 96 % | DIASTOLIC BLOOD PRESSURE: 74 MMHG | HEART RATE: 79 BPM | SYSTOLIC BLOOD PRESSURE: 106 MMHG | BODY MASS INDEX: 24.91 KG/M2 | TEMPERATURE: 98.1 F

## 2023-11-08 DIAGNOSIS — S00.83XA CONTUSION OF OTHER PART OF HEAD, INITIAL ENCOUNTER: Primary | ICD-10-CM

## 2023-11-08 DIAGNOSIS — E86.1 HYPOTENSION DUE TO HYPOVOLEMIA: ICD-10-CM

## 2023-11-08 DIAGNOSIS — N39.0 URINARY TRACT INFECTION WITHOUT HEMATURIA, SITE UNSPECIFIED: ICD-10-CM

## 2023-11-08 DIAGNOSIS — Z87.39 HISTORY OF CHRONIC ARTHRITIS: ICD-10-CM

## 2023-11-08 DIAGNOSIS — S70.02XA CONTUSION OF LEFT HIP, INITIAL ENCOUNTER: ICD-10-CM

## 2023-11-08 DIAGNOSIS — I95.89 HYPOTENSION DUE TO HYPOVOLEMIA: ICD-10-CM
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
## 2023-11-08 LAB
ANION GAP SERPL CALC-SCNC: 10 MMOL/L
APPEARANCE UR: CLEAR
BACTERIA #/AREA URNS AUTO: ABNORMAL /HPF
BILIRUB UR STRIP.AUTO-MCNC: NEGATIVE MG/DL
BUN SERPL-MCNC: 21 MG/DL (ref 8–25)
CALCIUM SERPL-MCNC: 9.9 MG/DL (ref 8.5–10.4)
CHLORIDE SERPL-SCNC: 104 MMOL/L (ref 97–107)
CO2 SERPL-SCNC: 25 MMOL/L (ref 24–31)
COLOR UR: ABNORMAL
CREAT SERPL-MCNC: 0.8 MG/DL (ref 0.4–1.6)
GFR SERPL CREATININE-BSD FRML MDRD: 84 ML/MIN/1.73M*2
GLUCOSE BLD MANUAL STRIP-MCNC: 99 MG/DL (ref 74–99)
GLUCOSE SERPL-MCNC: 99 MG/DL (ref 65–99)
GLUCOSE UR STRIP.AUTO-MCNC: NORMAL MG/DL
HYALINE CASTS #/AREA URNS AUTO: ABNORMAL /LPF
KETONES UR STRIP.AUTO-MCNC: NEGATIVE MG/DL
LEUKOCYTE ESTERASE UR QL STRIP.AUTO: ABNORMAL
MAGNESIUM SERPL-MCNC: 1.9 MG/DL (ref 1.6–3.1)
NITRITE UR QL STRIP.AUTO: NEGATIVE
PH UR STRIP.AUTO: 5 [PH]
POTASSIUM SERPL-SCNC: 4.4 MMOL/L (ref 3.4–5.1)
PROT UR STRIP.AUTO-MCNC: NEGATIVE MG/DL
RBC # UR STRIP.AUTO: NEGATIVE /UL
RBC #/AREA URNS AUTO: ABNORMAL /HPF
SODIUM SERPL-SCNC: 139 MMOL/L (ref 133–145)
SP GR UR STRIP.AUTO: 1.01
SQUAMOUS #/AREA URNS AUTO: ABNORMAL /HPF
TROPONIN T SERPL-MCNC: 19 NG/L
TROPONIN T SERPL-MCNC: 19 NG/L
UROBILINOGEN UR STRIP.AUTO-MCNC: NORMAL MG/DL
WBC #/AREA URNS AUTO: ABNORMAL /HPF

## 2023-11-08 PROCEDURE — 2500000004 HC RX 250 GENERAL PHARMACY W/ HCPCS (ALT 636 FOR OP/ED): Performed by: EMERGENCY MEDICINE

## 2023-11-08 PROCEDURE — 87086 URINE CULTURE/COLONY COUNT: CPT | Mod: TRILAB | Performed by: EMERGENCY MEDICINE

## 2023-11-08 PROCEDURE — 96361 HYDRATE IV INFUSION ADD-ON: CPT

## 2023-11-08 PROCEDURE — 2500000001 HC RX 250 WO HCPCS SELF ADMINISTERED DRUGS (ALT 637 FOR MEDICARE OP): Performed by: PSYCHIATRY & NEUROLOGY

## 2023-11-08 PROCEDURE — 82947 ASSAY GLUCOSE BLOOD QUANT: CPT

## 2023-11-08 PROCEDURE — 70450 CT HEAD/BRAIN W/O DYE: CPT

## 2023-11-08 PROCEDURE — 73502 X-RAY EXAM HIP UNI 2-3 VIEWS: CPT | Mod: LT,FY

## 2023-11-08 PROCEDURE — 36415 COLL VENOUS BLD VENIPUNCTURE: CPT | Performed by: EMERGENCY MEDICINE

## 2023-11-08 PROCEDURE — 81001 URINALYSIS AUTO W/SCOPE: CPT | Performed by: EMERGENCY MEDICINE

## 2023-11-08 PROCEDURE — 2500000001 HC RX 250 WO HCPCS SELF ADMINISTERED DRUGS (ALT 637 FOR MEDICARE OP): Performed by: EMERGENCY MEDICINE

## 2023-11-08 PROCEDURE — 84484 ASSAY OF TROPONIN QUANT: CPT | Mod: 59 | Performed by: EMERGENCY MEDICINE

## 2023-11-08 PROCEDURE — 71045 X-RAY EXAM CHEST 1 VIEW: CPT | Mod: FY

## 2023-11-08 PROCEDURE — 99232 SBSQ HOSP IP/OBS MODERATE 35: CPT | Performed by: PSYCHIATRY & NEUROLOGY

## 2023-11-08 PROCEDURE — 84484 ASSAY OF TROPONIN QUANT: CPT | Performed by: EMERGENCY MEDICINE

## 2023-11-08 PROCEDURE — 2500000004 HC RX 250 GENERAL PHARMACY W/ HCPCS (ALT 636 FOR OP/ED): Performed by: PSYCHIATRY & NEUROLOGY

## 2023-11-08 PROCEDURE — 99285 EMERGENCY DEPT VISIT HI MDM: CPT | Mod: 25 | Performed by: EMERGENCY MEDICINE

## 2023-11-08 PROCEDURE — 2500000005 HC RX 250 GENERAL PHARMACY W/O HCPCS: Performed by: EMERGENCY MEDICINE

## 2023-11-08 PROCEDURE — 96374 THER/PROPH/DIAG INJ IV PUSH: CPT

## 2023-11-08 PROCEDURE — 1140000001 HC PRIVATE PSYCH ROOM DAILY

## 2023-11-08 PROCEDURE — 83735 ASSAY OF MAGNESIUM: CPT | Performed by: EMERGENCY MEDICINE

## 2023-11-08 PROCEDURE — 80048 BASIC METABOLIC PNL TOTAL CA: CPT | Performed by: EMERGENCY MEDICINE

## 2023-11-08 PROCEDURE — 72125 CT NECK SPINE W/O DYE: CPT

## 2023-11-08 PROCEDURE — 2500000002 HC RX 250 W HCPCS SELF ADMINISTERED DRUGS (ALT 637 FOR MEDICARE OP, ALT 636 FOR OP/ED): Performed by: PSYCHIATRY & NEUROLOGY

## 2023-11-08 RX ORDER — LIDOCAINE 560 MG/1
1 PATCH PERCUTANEOUS; TOPICAL; TRANSDERMAL ONCE
Status: DISCONTINUED | OUTPATIENT
Start: 2023-11-08 | End: 2023-11-08 | Stop reason: HOSPADM

## 2023-11-08 RX ORDER — KETOROLAC TROMETHAMINE 30 MG/ML
15 INJECTION, SOLUTION INTRAMUSCULAR; INTRAVENOUS ONCE
Status: COMPLETED | OUTPATIENT
Start: 2023-11-08 | End: 2023-11-08

## 2023-11-08 RX ORDER — CEPHALEXIN 500 MG/1
500 CAPSULE ORAL 2 TIMES DAILY
Qty: 10 CAPSULE | Refills: 0 | Status: SHIPPED | OUTPATIENT
Start: 2023-11-08 | End: 2023-11-13

## 2023-11-08 RX ORDER — ACETAMINOPHEN 325 MG/1
650 TABLET ORAL ONCE
Status: COMPLETED | OUTPATIENT
Start: 2023-11-08 | End: 2023-11-08

## 2023-11-08 RX ORDER — CEPHALEXIN 500 MG/1
500 CAPSULE ORAL ONCE
Status: COMPLETED | OUTPATIENT
Start: 2023-11-08 | End: 2023-11-08

## 2023-11-08 RX ADMIN — LIDOCAINE 1 PATCH: 4 PATCH TOPICAL at 02:28

## 2023-11-08 RX ADMIN — BUPROPION HYDROCHLORIDE 300 MG: 300 TABLET, FILM COATED, EXTENDED RELEASE ORAL at 08:55

## 2023-11-08 RX ADMIN — DULOXETINE HYDROCHLORIDE 90 MG: 60 CAPSULE, DELAYED RELEASE ORAL at 08:56

## 2023-11-08 RX ADMIN — CELECOXIB 200 MG: 200 CAPSULE ORAL at 15:31

## 2023-11-08 RX ADMIN — KETOROLAC TROMETHAMINE 15 MG: 30 INJECTION, SOLUTION INTRAMUSCULAR at 02:27

## 2023-11-08 RX ADMIN — SODIUM CHLORIDE 1000 ML: 900 INJECTION, SOLUTION INTRAVENOUS at 02:20

## 2023-11-08 RX ADMIN — CEPHALEXIN 500 MG: 500 CAPSULE ORAL at 06:21

## 2023-11-08 RX ADMIN — Medication 125 MCG: at 08:56

## 2023-11-08 RX ADMIN — ACETAMINOPHEN 650 MG: 325 TABLET ORAL at 02:26

## 2023-11-08 RX ADMIN — QUETIAPINE FUMARATE 350 MG: 300 TABLET ORAL at 20:34

## 2023-11-08 ASSESSMENT — ENCOUNTER SYMPTOMS
NERVOUS/ANXIOUS: 1
DYSPHORIC MOOD: 1
DECREASED CONCENTRATION: 1
SLEEP DISTURBANCE: 1

## 2023-11-08 ASSESSMENT — PAIN - FUNCTIONAL ASSESSMENT
PAIN_FUNCTIONAL_ASSESSMENT: 0-10

## 2023-11-08 ASSESSMENT — PAIN DESCRIPTION - ORIENTATION: ORIENTATION: LEFT

## 2023-11-08 ASSESSMENT — PAIN DESCRIPTION - LOCATION: LOCATION: HIP

## 2023-11-08 ASSESSMENT — PAIN DESCRIPTION - PAIN TYPE: TYPE: ACUTE PAIN

## 2023-11-08 ASSESSMENT — PAIN SCALES - GENERAL
PAINLEVEL_OUTOF10: 8
PAINLEVEL_OUTOF10: 0 - NO PAIN
PAINLEVEL_OUTOF10: 5 - MODERATE PAIN

## 2023-11-08 ASSESSMENT — COLUMBIA-SUICIDE SEVERITY RATING SCALE - C-SSRS
6. HAVE YOU EVER DONE ANYTHING, STARTED TO DO ANYTHING, OR PREPARED TO DO ANYTHING TO END YOUR LIFE?: NO
1. SINCE LAST CONTACT, HAVE YOU WISHED YOU WERE DEAD OR WISHED YOU COULD GO TO SLEEP AND NOT WAKE UP?: NO
6. HAVE YOU EVER DONE ANYTHING, STARTED TO DO ANYTHING, OR PREPARED TO DO ANYTHING TO END YOUR LIFE?: NO
2. HAVE YOU ACTUALLY HAD ANY THOUGHTS OF KILLING YOURSELF?: NO
1. IN THE PAST MONTH, HAVE YOU WISHED YOU WERE DEAD OR WISHED YOU COULD GO TO SLEEP AND NOT WAKE UP?: NO
2. HAVE YOU ACTUALLY HAD ANY THOUGHTS OF KILLING YOURSELF?: NO

## 2023-11-08 NOTE — ED PROVIDER NOTES
HPI   Chief Complaint   Patient presents with    Fall     Patient had unwitnessed fall at [facility]. She states she doesn't remember how she landed. Patient is complaining of pain in her left hip and the left side of her head.       63-year-old female with a history of bipolar disorder, arthritis and polysubstance abuse comes to the emergency department by EMS from [facility] with a chief complaint of fall.  The patient had ambulated to the bathroom and when she sat down to urinate she states she just fell forward and hit her head without loss of consciousness.  She states that she has mild pain at the area where she struck her head and has a contusion as well as left hip pain.  She states that she has chronic arthritis in that she does not get up out of the wheelchair on her own.  When staff came to find her, they took her blood pressure and it was low and they called 911.  EMS notes that her blood pressure was 100 systolic.  Patient denies any dark stools, diarrhea, nausea, vomiting.  She has taken her medications as administered by the facility.  Patient is a poor historian and when asked about her colostomy she states that she had an infection in her rear end that required them to do the procedure.      History provided by:  Patient and EMS personnel  History limited by:  Psychiatric disorder   used: No                        No data recorded                Patient History   No past medical history on file.  No past surgical history on file.  No family history on file.  Social History     Tobacco Use    Smoking status: Not on file    Smokeless tobacco: Not on file   Substance Use Topics    Alcohol use: Not on file    Drug use: Not on file       Physical Exam   ED Triage Vitals [11/08/23 0210]   Temp Heart Rate Resp BP   36.7 °C (98.1 °F) 79 16 96/74      SpO2 Temp Source Heart Rate Source Patient Position   98 % Oral Monitor Lying      BP Location FiO2 (%)     Left arm --       Physical  Exam  Vitals and nursing note reviewed.   Constitutional:       General: She is not in acute distress.     Appearance: Normal appearance. She is well-developed and normal weight. She is not ill-appearing or toxic-appearing.   HENT:      Head: Normocephalic.      Comments: Left frontal contusion without abrasion or laceration     Nose: Nose normal.      Mouth/Throat:      Mouth: Mucous membranes are dry.   Eyes:      General: No scleral icterus.     Extraocular Movements: Extraocular movements intact.      Conjunctiva/sclera: Conjunctivae normal.   Cardiovascular:      Rate and Rhythm: Normal rate and regular rhythm.      Heart sounds: No murmur heard.  Pulmonary:      Effort: Pulmonary effort is normal. No respiratory distress.      Breath sounds: Normal breath sounds.   Abdominal:      Palpations: Abdomen is soft.      Tenderness: There is no abdominal tenderness. There is no guarding or rebound.      Comments: Colostomy in place with brown stool   Musculoskeletal:         General: Tenderness present. No swelling.      Cervical back: Normal range of motion and neck supple.      Right lower leg: No edema.      Left lower leg: No edema.      Comments: Left hip with tenderness to palpation without gross deformity, brisk capillary refill, no sensory deficits   Skin:     General: Skin is warm and dry.      Capillary Refill: Capillary refill takes less than 2 seconds.   Neurological:      General: No focal deficit present.      Mental Status: She is alert.   Psychiatric:         Mood and Affect: Mood normal.         Behavior: Behavior normal.         ED Course & MDM   ED Course as of 11/08/23 0536   Wed Nov 08, 2023   0233 ECG performed on November 8, 2023 at 2:23 AM and interpreted at 224 showing NSR, NAD, intervals within normal limits, no STEMI. No previous available for comparison [EG]   0316 BMP reviewed and no electrolyte disorder.  Minimally elevated troponin at 19, no development of chest pain, shortness of  breath, palpitations or chest pain equivalents. [EG]      ED Course User Index  [EG] Melia Childress MD         Diagnoses as of 11/08/23 0536   Contusion of other part of head, initial encounter   Fall, initial encounter   Hypotension due to hypovolemia   Contusion of left hip, initial encounter   Urinary tract infection without hematuria, site unspecified   History of chronic arthritis       Medical Decision Making    HPI:  As Above  PMHx/PSHx/Meds/Allergies/SH/FH as per nursing documentation and reviewed.  Review of systems: Total of 10 systems reviewed and otherwise negative except as noted elsewhere    DDX: As described in MDM    If performed, radiology listed above interpreted by me and confirmed by the Radiologist.  Medications administered during this visit (name and route): see MAR  Social determinants of health considered for this visit: Currently resides at [facility]  If performed, EKG interpreted by me and detailed above    [facility] Summary/considerations:  63-year-old female presenting after a fall while in the bathroom that was unwitnessed at [facility].  Patient has sustained a significant contusion to her left forehead/scalp.  There is no evidence of skull fracture on CT or acute intracranial pathology.  Patient was also complaining of left hip pain.  X-rays performed today show significant osteoarthritis of the left hip without acute fracture or dislocation.  As this was an unwitnessed fall occurring at a psychiatric facility and no specific description of mechanical fall, a work-up with ECG and labs was performed.  CBC and CMP are unremarkable.  Urinalysis is showing evidence of urinary tract infection.  Patient has a minimally elevated troponin that is less than 2 times the upper limit and the patient has not had any chest pain.  ECG is not consistent with arrhythmia or evidence of acute ischemia.  Patient will be discharged home with cephalexin with her first dose given in the emergency  department and will return to her [facility] facility.  After 1 L of normal saline, the patient had a blood pressure of 106/74 and her hypotension was likely hypovolemia.  The patient also takes multiple sedating medications specifically at night and her hypotensive episode may be secondary to drug side effects, however with significant improvement with 1 L of normal saline hypovolemia is more likely.  Patient should follow-up with her [facility] physician for medication review and diet review with appropriate hydration.    Prescriptions provided include: Oral cephalexin,    The patient was seen and triaged by our nursing/medic staff, their vitals were taken and the staff notes were reviewed.  If the patient arrived by an EMS squad or an outside agency, we discussed the case with transporting EMS medic, police, or other historians. My initial assessment was attention to their airway, breathing, and circulatory status.  We addressed any immediate or life threatening findings and completed a medical history and a physical exam if the patient or those legally responsible were in agreement with this.   Prior to the patient being discharged, I or my PA/NP or the nursing staff discussed the differential, results and discharge plan with the patient and/or family/friend/caregiver if present.  I emphasized the importance of follow-up in 2-3 days unless otherwise specified.  I explained reasons for the patient to return to the Emergency Department. Additional verbal discharge instructions were also given and discussed with the patient to supplement those generated by the EMR. We also discussed medications that were prescribed (if any) including common side effects and interactions. The patient was advised to abstain from driving, operating heavy machinery or making significant decisions while taking medications such as antihistamines, benzodiazepines, opiates and muscle relaxers. All questions were addressed.  They understand  return precautions and discharge instructions. The patient and/or family/friend/caregiver expressed understanding.  **Disclaimer:  This note was dictated by speech recognition technology.  Minor errors in transcription may be present.  Please contact for clarification or corrections.    In the case the patient eloped or refused treatment/admission, we offered to the best of our ability to provide care to the patient at the time of this encounter.        Procedure  Procedures     Melia Childress MD  11/08/23 0513

## 2023-11-08 NOTE — NURSING NOTE
0645 patient returned to unit from [facility] ED on stretcher via [employer] transportation, patient baseline mentation, return vitals stable, no report of pain or discomfort at this time, both physicians and unit manager informed.

## 2023-11-08 NOTE — GROUP NOTE
"Group Topic: Other   Group Date: 11/8/2023  Start Time: 1000  End Time: 1020  Facilitators: BRENDA Fernandes   Department: [facility] V[facility]    Number of Participants: 1   Group Focus: check in, communication, daily focus, depression, and feeling awareness/expression  Treatment Modality: Other: Recreation therapy  Interventions utilized were exploration, mental fitness, and reminiscence  Purpose: feelings and self-care    Name: Azul Robrets YOB: 1960   MR: [id number]      Facilitator: Recreational Therapist  Level of Participation: moderate  Quality of Participation: defensive and drowsy  Interactions with others:  pt declined invitation to play games, declined other independent leisure activities offered.  Sleepy during interaction secondary to being in the ED most of the night.  Pt expresses feeling \"down, cause I want to get out of here\".  Does smile at times during interaction.  Eye contact moderate.    Mood/Affect: blunted  Triggers (if applicable): n/a  Cognition: coherent/clear  Progress: Moderate  Comments: pt problem is depressed mood.    Plan: continue with services      "

## 2023-11-08 NOTE — NURSING NOTE
0130 this nurse responding to unusual sound. Upon entering 412 patient observed lying on bedroom floor lying on her left side with her back parallel to her bed and wheelchair adjacent to the patient, sheets on the floor twined around her legs and feet stretched out straight. Bed alarm softly sounding, yellow slip socks on both feet, bed in lowest position, rounds had been completed 5 minutes prior to incident and patient appeared to be sleeping soundly. Patient verbalized she slipped attempting to self transfer to go to the bathroom, and states she hit her head. 0134 Vital signs assessed after patient assisted back into the bed; BP 69/42, , Resp 16,Temp 36.8 (t), pulse ox 93 % RA, BS 99. 0139 Medical MD informed and order obtained to send patient to [facility] ED for evaluation, patient informed, 0141 transportation called, 0143 this nurse then called report to Ed charge nurse, patient experienced no loss of consciousness, 0155 patient alert and verbal with noted baseline mentation at time of transfer, unit manager and psych Md informed of transfer.

## 2023-11-08 NOTE — CARE PLAN
The patient's goals for the shift include get rest    The clinical goals for the shift include no falls, maintain pt safety    Over the shift, the patient did not make progress toward the following goals. Barriers to progression include isolating to room, not managing pain. Recommendations to address these barriers include encourage pt to come out of room into gonzalez or group, educate pt on different pain interventions.

## 2023-11-08 NOTE — GROUP NOTE
Group Topic: Other   Group Date: 11/8/2023  Start Time: 1100  End Time: 1150  Facilitators: BRENDA Fernandes   Department: [facility] RE[facility]IRT[facility]    Number of Participants: 4   Group Focus: reminiscence  Treatment Modality: Other: Recreation Therapy  Interventions utilized were reminiscence  Purpose: feelings and other: increase stimulation, elevate mood, stimulate memory, express feelings    Name: Azul Roberts YOB: 1960   MR: [id number]      Facilitator: Recreational Therapist  Level of Participation: active  Quality of Participation: appropriate/pleasant, attentive, and cooperative  Interactions with others: appropriate  Mood/Affect: appropriate  Triggers (if applicable): n/a  Cognition: coherent/clear  Progress: Moderate  Comments: pt problem is delusional thought.  Plan: continue with services

## 2023-11-08 NOTE — PROGRESS NOTES
"Azul Roberts is a 63 y.o. female on day 60 of admission presenting with Severe recurrent major depression without psychotic features (CMS/[facility]).        Subjective   Case discussed with treatment team and chart reviewed.     Reported that patient fell overnight while trying to get to the bathroom.  Patient was sent to the emergency room for workup and returned to the unit without any findings resulting from the fall.  Reports has somewhat of a headache this afternoon but otherwise no change.  Mood remains poor.  Endorsing feeling hopeless and unable to care for herself.    Discussed with patient potential orthostatics related to evening medications and gave patient a technique for getting out of bed at night to use the bathroom that could be safer.  She is encouraged to first set up and then count 3 to 4 breaths after which she could stand up while holding onto a fixed object and continuing to count 3 of 4 breaths and after that she could then move to the bathroom cautiously/slowly.  Patient is also encouraged to use the call light and notify staff prior to getting out of bed in the middle of the night for safety purposes.  She reports understanding.  No other questions or concerns from patient today.    Objective     Last Recorded Vitals  Blood pressure 111/63, pulse 79, temperature 36.7 °C (98.1 °F), temperature source Tympanic, resp. rate 18, height 1.626 m (5' 4\"), weight 78.9 kg (174 lb), SpO2 97 %.    Review of Systems   Psychiatric/Behavioral:  Positive for decreased concentration, dysphoric mood and sleep disturbance. The patient is nervous/anxious.      Psychiatric ROS - Adult  Anxiety: General Anxiety Disorder (KAYLEEN)KAYLEEN Behaviors: difficult to control worry, difficulty concentrating, irritability, restlessness, and sleep disturbance  Depression: anhedonia, appetite increased, concentration, energy, helpless, hopeless, interest, persistent thoughts of death, psychomotor agitation, sleep decreased , suicidal " "thoughts, and withdrawn  Delirium: negative  Psychosis: negative  Ioana: negative  Safety Issues: passive death wish and suicidal ideation  Psychiatric ROS Comment: as noted    Physical Exam  Abdominal:      Comments: Presence of colostomy bag   Psychiatric:         Attention and Perception: Attention and perception normal.         Mood and Affect: Mood is anxious and depressed. Affect is flat and inappropriate.         Behavior: Behavior is slowed.     Mental Status Examination  General Appearance:  less disheveled with improved eye contact, not malodorous on approach. Sitting in wheelchair outside of unit group room  Gait/Station: using wheelchair to move about the milieu  Speech: brief, conversational volume  Mood: \"I have a headache\"  Affect: Dysphoric, constricted ,  Thought Process: remains impoverished but less so  Thought Associations: No loosening of associations  Thought Content: depressed, hopeless, persists as helpless, endorsing suicidal ideations today  Perception: No perceptual abnormalities noted  Level of Consciousness: Alert  Orientation: Alert  Attention and Concentration: Mild impairment in attention  Recent Memory: Intact as evidenced by ability to recall details from the past 24 hours   Executive function: Intact  Language: Naming intact  Fund of knowledge: Good  Insight: Fair, as evidenced by increasing participation in discussion about her scenario and symptoms and their improvement  Judgment: Fair, as evidenced by ability to reason through medical decision making, compliance with treatment recommendations, and improving      Psychiatric Risk Assessment  Violence Risk Assessment: major mental illness and substance abuse  Acute Risk of Harm to Others is Considered: moderate   Suicide Risk Assessment: , chronic medical illness, chronic pain, current psychiatric illness, feelings of hopelessness, global insomnia, history of trauma or abuse, life crisis (shame/despair), prior suicide " attempt, severe anxiety, substance abuse, and suicidal ideations  Protective Factors against Suicide: adherence to  treatment and marriage/partnership  Acute Risk of Harm to Self is Considered: moderate    Relevant Results  Scheduled medications  buPROPion XL, 300 mg, oral, Daily  celecoxib, 200 mg, oral, Daily  cholecalciferol, 125 mcg, oral, Daily  DULoxetine, 90 mg, oral, Daily  lidocaine, 1 patch, transdermal, Daily  QUEtiapine, 350 mg, oral, Nightly      Continuous medications     PRN medications  PRN medications: acetaminophen, alum-mag hydroxide-simeth, diphenhydrAMINE, hydrOXYzine pamoate, melatonin, OLANZapine, OLANZapine zydis, traZODone         Assessment/Plan   Principal Problem:    Severe recurrent major depression without psychotic features (CMS/HCC)  Active Problems:    Colostomy present (CMS/HCC)    Opioid abuse (CMS/HCC)    Bilateral hip pain    Biological -     Orthostatics - patient encouraged to move out of bed and change positions more slowly to help prevent falling. we will measure orthostatics as well both well before evening meds and then after evening med administration to help determine impact of evening meds.    Cymbalta 90 mg daily for depression, ideally titrate to as tolerated and beneficial  Wellbutrin xl 300 mg for adjunctive depression treatment  seroquel 350 mg at bedtime  Trazodone 150 mg PRN and melatonin at bedtime for sleep  No adverse side effects of medications noted or reported.  ECG (10/4/23): Normal sinus rhythm. Heart  rate 72 bpm. Qtc 442    Declined ECT consideration during discussion on 11/4, 11/7.   ECT is not available at this facility and this would include transitioning to another psychiatric hospital for this level of service or pursuing as an outpatient on discharge.    Discussion with patient regarding risk benefits and alternatives and side effects with opportunity to ask questions and questions answered.  Patient given consent to the plan as noted    2.  Psychological -     Patient is encouraged to participate with the therapeutic milieu and program and group therapy    3. Sociological -      Patient encouraged to cooperate with social work staff on issues relevant to discharge planning  Pending [place] medicaid and then transition to convalescence nursing care once this becomes available to patient    25 minutes spent coordinating care for patient on this day    Parts of this chart have been completed using voice recognition software.  Please excuse any errors of transcription.  Despite the medical decision making time stamp, my medical decision making has taken place during the patient's entire visit.  Thought process and reason for plan has been formulated from the time that I saw the patient until the time of disposition and is not specific to one specific moment during their visit and furthermore the medical decision making encompasses the entire chart and not only that represented in this note.      Eddie Cullen, DO

## 2023-11-08 NOTE — PROGRESS NOTES
LSW provided support and encouragement for pt to attend afternoon group. LSW observed pt attend group from the doorway. Pt was engaged and answered several trivia questions appropriately. Pt laughed and interacted during activity. LSW provided support and encouragement to pt after the group, congratulating her on getting a lot of answers correct. Pt is pleasant and cooperative. Pt presents with much improvement with participation and decreasing isolation in her room.       Nikki Andrews, DRAGAN

## 2023-11-08 NOTE — GROUP NOTE
Group Topic: Other   Group Date: 11/8/2023  Start Time: 1510  End Time: 1600  Facilitators: BRENDA Fernandes   Department: [facility] REHABTH VIRTUAL    Number of Participants: 5   Group Focus: other [employer] Game  Treatment Modality: Other: Recreation therapy  Interventions utilized were mental fitness  Purpose: other: fun, enhance self esteem, increase stimulation, increase alertness, express feelings, elevate mood, stimulate memory, increase socialization    Name: Azul Roberts YOB: 1960   MR: [id number]      Facilitator: Recreational Therapist  Level of Participation: active  Quality of Participation: appropriate/pleasant, attentive, cooperative, and engaged  Interactions with others: appropriate and gave feedback  Mood/Affect: appropriate, bright, brightens with interaction, and positive  Triggers (if applicable): n/a  Cognition: coherent/clear  Progress: Significant  Comments: pt problem is depressed mood.   Plan: continue with services

## 2023-11-08 NOTE — PROGRESS NOTES
Occupational Therapy       11/08/23 1030   General   Missed Visit Yes   Missed Visit Reason Patient refused  (Pt reports a headache in am; and refuses during both am and pm attempts made.  Rec therapist present for pm refusal.  Pt also refusing group tx session held on the geropsych unit.)   Family/Caregiver Present No   Inpatient/Swing Bed or Outpatient   Inpatient/Swing Bed or Outpatient Inpatient

## 2023-11-09 LAB — BACTERIA UR CULT: NO GROWTH

## 2023-11-09 PROCEDURE — 2500000001 HC RX 250 WO HCPCS SELF ADMINISTERED DRUGS (ALT 637 FOR MEDICARE OP): Performed by: PSYCHIATRY & NEUROLOGY

## 2023-11-09 PROCEDURE — 99231 SBSQ HOSP IP/OBS SF/LOW 25: CPT | Performed by: PSYCHIATRY & NEUROLOGY

## 2023-11-09 PROCEDURE — 2500000002 HC RX 250 W HCPCS SELF ADMINISTERED DRUGS (ALT 637 FOR MEDICARE OP, ALT 636 FOR OP/ED): Performed by: PSYCHIATRY & NEUROLOGY

## 2023-11-09 PROCEDURE — 1140000001 HC PRIVATE PSYCH ROOM DAILY

## 2023-11-09 PROCEDURE — 99232 SBSQ HOSP IP/OBS MODERATE 35: CPT | Performed by: INTERNAL MEDICINE

## 2023-11-09 PROCEDURE — 2500000004 HC RX 250 GENERAL PHARMACY W/ HCPCS (ALT 636 FOR OP/ED): Performed by: PSYCHIATRY & NEUROLOGY

## 2023-11-09 RX ADMIN — BUPROPION HYDROCHLORIDE 300 MG: 300 TABLET, FILM COATED, EXTENDED RELEASE ORAL at 08:25

## 2023-11-09 RX ADMIN — Medication 5 MG: at 20:34

## 2023-11-09 RX ADMIN — DULOXETINE HYDROCHLORIDE 90 MG: 60 CAPSULE, DELAYED RELEASE ORAL at 08:25

## 2023-11-09 RX ADMIN — Medication 125 MCG: at 08:25

## 2023-11-09 RX ADMIN — QUETIAPINE FUMARATE 350 MG: 300 TABLET ORAL at 20:26

## 2023-11-09 RX ADMIN — TRAZODONE HYDROCHLORIDE 150 MG: 50 TABLET ORAL at 20:34

## 2023-11-09 RX ADMIN — CELECOXIB 200 MG: 200 CAPSULE ORAL at 16:51

## 2023-11-09 ASSESSMENT — COLUMBIA-SUICIDE SEVERITY RATING SCALE - C-SSRS
1. SINCE LAST CONTACT, HAVE YOU WISHED YOU WERE DEAD OR WISHED YOU COULD GO TO SLEEP AND NOT WAKE UP?: NO
6. HAVE YOU EVER DONE ANYTHING, STARTED TO DO ANYTHING, OR PREPARED TO DO ANYTHING TO END YOUR LIFE?: NO
2. HAVE YOU ACTUALLY HAD ANY THOUGHTS OF KILLING YOURSELF?: NO
2. HAVE YOU ACTUALLY HAD ANY THOUGHTS OF KILLING YOURSELF?: NO
1. SINCE LAST CONTACT, HAVE YOU WISHED YOU WERE DEAD OR WISHED YOU COULD GO TO SLEEP AND NOT WAKE UP?: NO
6. HAVE YOU EVER DONE ANYTHING, STARTED TO DO ANYTHING, OR PREPARED TO DO ANYTHING TO END YOUR LIFE?: NO

## 2023-11-09 ASSESSMENT — ENCOUNTER SYMPTOMS
SLEEP DISTURBANCE: 1
DECREASED CONCENTRATION: 1
NERVOUS/ANXIOUS: 1
DYSPHORIC MOOD: 1

## 2023-11-09 ASSESSMENT — PAIN SCALES - GENERAL
PAINLEVEL_OUTOF10: 1
PAINLEVEL_OUTOF10: 0 - NO PAIN

## 2023-11-09 NOTE — NURSING NOTE
BIRP NOTE     Problem:  depression     Behavior:  Pt pleasant and cooperative with staff and care. Pt sat outside group room this shift while engaging in group therapy.   Group Participation: yes  Appetite/Meals: good       Interventions:  Administered scheduled medications    Response:  Pt continues as above     Plan:  Maintain pt safety

## 2023-11-09 NOTE — NURSING NOTE
SARAN NOTE     Problem:  depression     Behavior:  Pt had a great day today. Pt up out of her room majority of shift, engaging in group therapy, going down the gonzalez to rec therapy office to just talk with her. Laughing often. Pt still moans out in pain but continues to refuse any type of interventions.  Group Participation: YES  Appetite/Meals: good       Interventions:  Administered scheduled medications    Response:  Pt laying in bed after dinner.     Plan:  Maintain pt safety

## 2023-11-09 NOTE — GROUP NOTE
Group Topic: Self Esteem   Group Date: 11/9/2023  Start Time: 1000  End Time: 1040  Facilitators: DRAGAN Rey   Department: [facility]    Number of Participants: 3   Group Focus: self-esteem/Positive Affirmations  Treatment Modality: Other: Social Work   Interventions utilized were mental fitness  Purpose: coping skills, feelings, self-worth, and self-care    Name: Azul Roberts YOB: 1960   MR: [id number]      Facilitator:   Level of Participation: active  Quality of Participation: appropriate/pleasant and engaged  Interactions with others: appropriate  Mood/Affect: appropriate  Triggers (if applicable): n/a  Cognition: coherent/clear  Progress: Moderate  Comments: Pt problem is depressed mood.   Plan: continue with services

## 2023-11-09 NOTE — PROGRESS NOTES
[facility]: [facility]  PROGRESS NOTE      Azul Roberts is a 63 y.o. female that is being seen  today for follow up at [facility]  Subjective   Patient is being seen for follow-up at [facility] unit.  Patient had a fall and was sent to the ER.  Patient was evaluated in the ER and CT scan of the brain was negative for any bleed.  Patient had a UA done which was positive for leukoesterase.  Culture is pending.  Patient has been at her baseline.  Colostomy has been working well.  Pain has been fairly controlled.    ROS  Negative for fever or chills  Negative for sore throat, ear pain, nasal discharge  Negative for cough, shortness of breath or wheezing  Negative for chest pain, palpitations, swelling of legs  Negative for abdominal pain, constipation, diarrhea, blood in the stools,has colostomy  Negative for urinary complaints  Negative for headache, dizziness, weakness or numbness  Negative for joint pain  Positive for depression or anxiety  All other systems reviewed and were negative  Vitals:    11/06/23 0645   BP: 121/79   Pulse: 76   Resp: 17   Temp: 36.8 °C (98.2 °F)   SpO2: 98%      Body mass index is 29.87 kg/m².  Physical Exam  Constitutional: Patient does not appear to be in any acute distress  Head and Face: NCAT  Eyes: Normal external exam, EOMI  ENT: Normal external inspection of ears and nose. Oropharynx normal.  Cardiovascular: RRR, S1/S2, no murmurs, rubs, or gallops, radial pulses +2, no edema of extremities  Pulmonary: CTAB, no respiratory distress.  Abdomen: +BS, soft, non-tender, nondistended, no guarding or rebound, no masses noted.colostomy present   MSK: hip joint pain   Skin- No lesions, contusions, or erythema.  Peripheral puslses palpable bilaterally 2+  Neuro: AAO X3, Cranial nerves 2-12 grossly intact,DTR 2+ in all 4 limbs       Diagnostic Results   Lab Results   Component Value Date    GLUCOSE 113 (H) 09/05/2023    CALCIUM 10.6 (H) 09/05/2023     09/05/2023     "K 3.7 09/05/2023    CO2 24 09/05/2023     09/05/2023    BUN 12 09/05/2023    CREATININE 0.72 09/05/2023     Lab Results   Component Value Date    ALT 12 09/05/2023    AST 14 09/05/2023    ALKPHOS 103 09/05/2023    BILITOT 0.4 09/05/2023     Lab Results   Component Value Date    WBC 13.2 (H) 09/05/2023    HGB 13.9 09/05/2023    HCT 43.0 09/05/2023    MCV 81 09/05/2023     09/05/2023     No results found for: \"CHOL\"  No results found for: \"HDL\"  No results found for: \"LDLCALC\"  No results found for: \"TRIG\"  No results found for: \"HGBA1C\"  Other labs not included in the list above were reviewed either before or during this encounter.    History    No past medical history on file.  No past surgical history on file.  No family history on file.  No Known Allergies  No current facility-administered medications on file prior to encounter.     Current Outpatient Medications on File Prior to Encounter   Medication Sig Dispense Refill    acetaminophen (Tylenol) 325 mg tablet Take 2 tablets (650 mg) by mouth 2 times a day.      hydrOXYzine pamoate (Vistaril) 25 mg capsule Take 1 capsule (25 mg) by mouth 2 times a day.      magnesium oxide (Mag-Ox) 400 mg tablet Take 1 tablet (400 mg) by mouth 2 times a day.      melatonin 5 mg tablet Take 1 tablet (5 mg) by mouth once daily at bedtime.      risperiDONE (RisperDAL) 2 mg tablet Take 1 tablet (2 mg) by mouth once daily at bedtime.      traZODone (Desyrel) 150 mg tablet Take 1 tablet (150 mg) by mouth once daily at bedtime.     Scheduled medications  buPROPion XL, 300 mg, oral, Daily  celecoxib, 200 mg, oral, Daily  cholecalciferol, 125 mcg, oral, Daily  citalopram, 30 mg, oral, Daily  lidocaine, 1 patch, transdermal, Daily  melatonin, 5 mg, oral, Nightly  QUEtiapine, 350 mg, oral, Nightly  traZODone, 150 mg, oral, Nightly      Continuous medications     PRN medications  PRN medications: acetaminophen, alum-mag hydroxide-simeth, diphenhydrAMINE, hydrOXYzine pamoate, " OLANZapine, OLANZapine zydis     There is no immunization history on file for this patient.  Patient's medical history was reviewed and updated either before or during this encounter.  ASSESSMENT / PLAN:  Active Hospital Problems    Bilateral hip pain      *Severe recurrent major depression without psychotic features (CMS/HCC)      Colostomy present (CMS/HCC)      Opioid abuse (CMS/HCC)  S/p fall.  Hip x-rays negative CT scan of the brain is negative     Pt. Has been stable.clostomy is working well.        Jay Knox MD

## 2023-11-09 NOTE — NURSING NOTE
SARAN NOTE     Problem:  Depression     Behavior:  Pt observed lying in her bed at the start of shift. She is pleasant and calm on approach. She maintains a flat affect but will show a smile here and there. Pt is compliant with her medications. Orthostatic vital signs obtained 45 mins later. Pt declined HS snack.   Group Participation: n/a  Appetite/Meals: declined         Interventions:  Hs medications given whole with water,   Assessment completed    Response:  Pt remained in her bed throughout the remainder of the night, no s/s of distress noted. Bed alarm set for safety.      Plan:  Continue to monitor and assist. Maintain q15 minutes rounds for safety.

## 2023-11-09 NOTE — GROUP NOTE
Group Topic: Other   Group Date: 11/9/2023  Start Time: 1500  End Time: 1600  Facilitators: BRENDA Fernandes   Department: [facility]    Number of Participants: 4   Group Focus: other Devan Game  Treatment Modality: Other: Recreation therapy  Interventions utilized were group exercise and mental fitness  Purpose: other: increase attention, increase socialization, elevate mood, , stimulate memory, fun    Name: Azul Roberts YOB: 1960   MR: [id number]      Facilitator: Recreational Therapist  Level of Participation: active  Quality of Participation: appropriate/pleasant, attentive, cooperative, and engaged  Interactions with others: appropriate and gave feedback  Mood/Affect: appropriate  Triggers (if applicable): n/a  Cognition: coherent/clear  Progress: Significant  Comments: pt problem is depressed mood.  Plan: continue with services

## 2023-11-09 NOTE — PROGRESS NOTES
Pt is frustrated with how things are and that she is in the wheelchair. SARAHW provided support and encouragement to pt. SARAHW has commended pt on the fact that she is coming out of her room more, participating in group and ambulating on the unit. SARAHW told pt that she has made some great progress. SARAHW encouraged pt to keep up the good work and to not give up.       Nikki Andrews, DRAGAN

## 2023-11-09 NOTE — GROUP NOTE
Group Topic: Reminiscence   Group Date: 11/9/2023  Start Time: 1045  End Time: 1130  Facilitators: BRENDA Fernandes   Department: [facility]    Number of Participants: 2   Group Focus: reminiscence  Treatment Modality: Other: Recreation therapy  Interventions utilized were reminiscence  Purpose: feelings    Name: Azul Roberts YOB: 1960   MR: [id number]      Facilitator: Recreational Therapist  Level of Participation: active  Quality of Participation: appropriate/pleasant, attentive, cooperative, and engaged  Interactions with others: appropriate  Mood/Affect: appropriate  Triggers (if applicable): n/a  Cognition: coherent/clear  Progress: Significant  Comments: pt problem is depressed mood.  Plan: continue with services

## 2023-11-09 NOTE — PROGRESS NOTES
"Azul Roberts is a 63 y.o. female on day 61 of admission presenting with Severe recurrent major depression without psychotic features (CMS/HCC).        Subjective   Case discussed with treatment team and chart reviewed.     Observed in the group room today eating snack and learning how to play Devan.  No acute concerns.  Limited change otherwise.  Continuing to work on coordinating appropriate disposition for patient in an environment where she can continue to consolidate and reconcile additional gains.  Continued endorsement of feeling depressed but notes her mood today is \"okay.\"  Limited evidence of sequelae from falling.      Objective     Vitals:    11/08/23 2115 11/08/23 2117 11/08/23 2119 11/09/23 0500   BP: 148/86 113/75 88/52 110/74   BP Location: Right arm Right arm Right arm Left arm   Patient Position: Lying Sitting Standing Lying   Pulse: 78 79 84 82   Resp:    17   Temp:    36.9 °C (98.4 °F)   TempSrc:    Oral   SpO2:    98%   Weight:       Height:            Review of Systems   Psychiatric/Behavioral:  Positive for decreased concentration, dysphoric mood and sleep disturbance. The patient is nervous/anxious.      Psychiatric ROS - Adult  Anxiety: General Anxiety Disorder (KAYLEEN)KAYLEEN Behaviors: difficult to control worry, difficulty concentrating, irritability, restlessness, and sleep disturbance  Depression: anhedonia, appetite increased, concentration, energy, helpless, hopeless, interest, persistent thoughts of death, psychomotor agitation, sleep decreased , suicidal thoughts, and withdrawn  Delirium: negative  Psychosis: negative  Ioana: negative  Safety Issues: passive death wish and suicidal ideation  Psychiatric ROS Comment: as noted    Physical Exam  Abdominal:      Comments: Presence of colostomy bag   Psychiatric:         Attention and Perception: Attention and perception normal.         Mood and Affect: Mood is anxious and depressed. Affect is flat and inappropriate.         Behavior: Behavior " "is slowed.     Mental Status Examination  General Appearance:  less disheveled with improved eye contact, not malodorous on approach. Sitting in wheelchair outside of unit group room  Gait/Station: using wheelchair to move about the milieu  Speech: brief, conversational volume  Mood: \"ok\"  Affect: Dysphoric, constricted ,  Thought Process: remains impoverished but less so  Thought Associations: No loosening of associations  Thought Content: depressed, hopeless, persists as helpless, endorsing suicidal ideations today  Perception: No perceptual abnormalities noted  Level of Consciousness: Alert  Orientation: Alert  Attention and Concentration: Mild impairment in attention  Recent Memory: Intact as evidenced by ability to recall details from the past 24 hours   Executive function: Intact  Language: Naming intact  Fund of knowledge: Good  Insight: Fair, as evidenced by increasing participation in discussion about her scenario and symptoms and their improvement  Judgment: Fair, as evidenced by ability to reason through medical decision making, compliance with treatment recommendations, and improving      Psychiatric Risk Assessment  Violence Risk Assessment: major mental illness and substance abuse  Acute Risk of Harm to Others is Considered: moderate   Suicide Risk Assessment: , chronic medical illness, chronic pain, current psychiatric illness, feelings of hopelessness, global insomnia, history of trauma or abuse, life crisis (shame/despair), prior suicide attempt, severe anxiety, substance abuse, and suicidal ideations  Protective Factors against Suicide: adherence to  treatment and marriage/partnership  Acute Risk of Harm to Self is Considered: moderate    Relevant Results  Scheduled medications  buPROPion XL, 300 mg, oral, Daily  celecoxib, 200 mg, oral, Daily  cholecalciferol, 125 mcg, oral, Daily  DULoxetine, 90 mg, oral, Daily  lidocaine, 1 patch, transdermal, Daily  QUEtiapine, 350 mg, oral, " Nightly      Continuous medications     PRN medications  PRN medications: acetaminophen, alum-mag hydroxide-simeth, diphenhydrAMINE, hydrOXYzine pamoate, melatonin, OLANZapine, OLANZapine zydis, traZODone         Assessment/Plan   Principal Problem:    Severe recurrent major depression without psychotic features (CMS/HCC)  Active Problems:    Colostomy present (CMS/HCC)    Opioid abuse (CMS/HCC)    Bilateral hip pain    Biological -     Orthostatics - confirmed on measurement.  Ongoing efforts to communicate with patient about taking her time when changing position to help prevent falls.    Cymbalta 90 mg daily for depression, ideally titrate to as tolerated and beneficial  Wellbutrin xl 300 mg for adjunctive depression treatment  seroquel 350 mg at bedtime  Trazodone 150 mg PRN and melatonin at bedtime for sleep  No adverse side effects of medications noted or reported.  ECG (10/4/23): Normal sinus rhythm. Heart  rate 72 bpm. Qtc 442    Declined ECT consideration during discussion on 11/4, 11/7.   ECT is not available at this facility and this would include transitioning to another psychiatric hospital for this level of service or pursuing as an outpatient on discharge.    Discussion with patient regarding risk benefits and alternatives and side effects with opportunity to ask questions and questions answered.  Patient given consent to the plan as noted    2. Psychological -     Patient is encouraged to participate with the therapeutic milieu and program and group therapy    3. Sociological -      Patient encouraged to cooperate with social work staff on issues relevant to discharge planning  Pending [place] medicaid and then transition to convalescence nursing care once this becomes available to patient    15 minutes spent coordinating care for patient on this day    Parts of this chart have been completed using voice recognition software.  Please excuse any errors of transcription.  Despite the medical decision  making time stamp, my medical decision making has taken place during the patient's entire visit.  Thought process and reason for plan has been formulated from the time that I saw the patient until the time of disposition and is not specific to one specific moment during their visit and furthermore the medical decision making encompasses the entire chart and not only that represented in this note.      Edide Cullen, DO

## 2023-11-10 PROCEDURE — 2500000001 HC RX 250 WO HCPCS SELF ADMINISTERED DRUGS (ALT 637 FOR MEDICARE OP): Performed by: PSYCHIATRY & NEUROLOGY

## 2023-11-10 PROCEDURE — 2500000002 HC RX 250 W HCPCS SELF ADMINISTERED DRUGS (ALT 637 FOR MEDICARE OP, ALT 636 FOR OP/ED): Performed by: PSYCHIATRY & NEUROLOGY

## 2023-11-10 PROCEDURE — 99232 SBSQ HOSP IP/OBS MODERATE 35: CPT | Performed by: INTERNAL MEDICINE

## 2023-11-10 PROCEDURE — 2500000004 HC RX 250 GENERAL PHARMACY W/ HCPCS (ALT 636 FOR OP/ED): Performed by: PSYCHIATRY & NEUROLOGY

## 2023-11-10 PROCEDURE — 99232 SBSQ HOSP IP/OBS MODERATE 35: CPT | Performed by: PSYCHIATRY & NEUROLOGY

## 2023-11-10 PROCEDURE — 1140000001 HC PRIVATE PSYCH ROOM DAILY

## 2023-11-10 RX ADMIN — TRAZODONE HYDROCHLORIDE 150 MG: 50 TABLET ORAL at 20:38

## 2023-11-10 RX ADMIN — CELECOXIB 200 MG: 200 CAPSULE ORAL at 15:18

## 2023-11-10 RX ADMIN — QUETIAPINE FUMARATE 350 MG: 300 TABLET ORAL at 20:18

## 2023-11-10 RX ADMIN — Medication 125 MCG: at 08:09

## 2023-11-10 RX ADMIN — BUPROPION HYDROCHLORIDE 300 MG: 300 TABLET, FILM COATED, EXTENDED RELEASE ORAL at 08:08

## 2023-11-10 RX ADMIN — DULOXETINE HYDROCHLORIDE 90 MG: 60 CAPSULE, DELAYED RELEASE ORAL at 08:09

## 2023-11-10 ASSESSMENT — COLUMBIA-SUICIDE SEVERITY RATING SCALE - C-SSRS
1. SINCE LAST CONTACT, HAVE YOU WISHED YOU WERE DEAD OR WISHED YOU COULD GO TO SLEEP AND NOT WAKE UP?: NO
1. SINCE LAST CONTACT, HAVE YOU WISHED YOU WERE DEAD OR WISHED YOU COULD GO TO SLEEP AND NOT WAKE UP?: NO
2. HAVE YOU ACTUALLY HAD ANY THOUGHTS OF KILLING YOURSELF?: NO
6. HAVE YOU EVER DONE ANYTHING, STARTED TO DO ANYTHING, OR PREPARED TO DO ANYTHING TO END YOUR LIFE?: NO
2. HAVE YOU ACTUALLY HAD ANY THOUGHTS OF KILLING YOURSELF?: NO
2. HAVE YOU ACTUALLY HAD ANY THOUGHTS OF KILLING YOURSELF?: NO
1. SINCE LAST CONTACT, HAVE YOU WISHED YOU WERE DEAD OR WISHED YOU COULD GO TO SLEEP AND NOT WAKE UP?: NO

## 2023-11-10 ASSESSMENT — PAIN SCALES - GENERAL
PAINLEVEL_OUTOF10: 0 - NO PAIN
PAINLEVEL_OUTOF10: 0 - NO PAIN

## 2023-11-10 ASSESSMENT — ENCOUNTER SYMPTOMS
DECREASED CONCENTRATION: 1
NERVOUS/ANXIOUS: 1
SLEEP DISTURBANCE: 1
DYSPHORIC MOOD: 1

## 2023-11-10 NOTE — CARE PLAN
The patient's goals for the shift include to get rest and safety.    The clinical goals for the shift include promote rest and safety.    Over the shift, the patient did make progress toward the following goals. Barriers to progression include using the wheelchair. Recommendations to address these barriers include using a wheelchair for safety.

## 2023-11-10 NOTE — PROGRESS NOTES
[facility]: [facility]  PROGRESS NOTE      Azul Roberts is a 63 y.o. female that is being seen  today for follow up at [facility]  Subjective   Patient is being seen for follow-up at [facility] unit.  Patient had a fall and was sent to the ER.  Patient was evaluated in the ER and CT scan of the brain was negative for any bleed.  Patient had a UA done which was positive for leukoesterase.  Culture is pending.  Patient has been at her baseline.  Colostomy has been working well.  Pain has been fairly controlled.    ROS  Negative for fever or chills  Negative for sore throat, ear pain, nasal discharge  Negative for cough, shortness of breath or wheezing  Negative for chest pain, palpitations, swelling of legs  Negative for abdominal pain, constipation, diarrhea, blood in the stools,has colostomy  Negative for urinary complaints  Negative for headache, dizziness, weakness or numbness  Negative for joint pain  Positive for depression or anxiety  All other systems reviewed and were negative  Vitals:    11/06/23 0645   BP: 121/79   Pulse: 76   Resp: 17   Temp: 36.8 °C (98.2 °F)   SpO2: 98%      Body mass index is 29.87 kg/m².  Physical Exam  Constitutional: Patient does not appear to be in any acute distress  Head and Face: NCAT  Eyes: Normal external exam, EOMI  ENT: Normal external inspection of ears and nose. Oropharynx normal.  Cardiovascular: RRR, S1/S2, no murmurs, rubs, or gallops, radial pulses +2, no edema of extremities  Pulmonary: CTAB, no respiratory distress.  Abdomen: +BS, soft, non-tender, nondistended, no guarding or rebound, no masses noted.colostomy present   MSK: hip joint pain   Skin- No lesions, contusions, or erythema.  Peripheral puslses palpable bilaterally 2+  Neuro: AAO X3, Cranial nerves 2-12 grossly intact,DTR 2+ in all 4 limbs       Diagnostic Results   Lab Results   Component Value Date    GLUCOSE 113 (H) 09/05/2023    CALCIUM 10.6 (H) 09/05/2023     09/05/2023     "K 3.7 09/05/2023    CO2 24 09/05/2023     09/05/2023    BUN 12 09/05/2023    CREATININE 0.72 09/05/2023     Lab Results   Component Value Date    ALT 12 09/05/2023    AST 14 09/05/2023    ALKPHOS 103 09/05/2023    BILITOT 0.4 09/05/2023     Lab Results   Component Value Date    WBC 13.2 (H) 09/05/2023    HGB 13.9 09/05/2023    HCT 43.0 09/05/2023    MCV 81 09/05/2023     09/05/2023     No results found for: \"CHOL\"  No results found for: \"HDL\"  No results found for: \"LDLCALC\"  No results found for: \"TRIG\"  No results found for: \"HGBA1C\"  Other labs not included in the list above were reviewed either before or during this encounter.    History    No past medical history on file.  No past surgical history on file.  No family history on file.  No Known Allergies  No current facility-administered medications on file prior to encounter.     Current Outpatient Medications on File Prior to Encounter   Medication Sig Dispense Refill    acetaminophen (Tylenol) 325 mg tablet Take 2 tablets (650 mg) by mouth 2 times a day.      hydrOXYzine pamoate (Vistaril) 25 mg capsule Take 1 capsule (25 mg) by mouth 2 times a day.      magnesium oxide (Mag-Ox) 400 mg tablet Take 1 tablet (400 mg) by mouth 2 times a day.      melatonin 5 mg tablet Take 1 tablet (5 mg) by mouth once daily at bedtime.      risperiDONE (RisperDAL) 2 mg tablet Take 1 tablet (2 mg) by mouth once daily at bedtime.      traZODone (Desyrel) 150 mg tablet Take 1 tablet (150 mg) by mouth once daily at bedtime.     Scheduled medications  buPROPion XL, 300 mg, oral, Daily  celecoxib, 200 mg, oral, Daily  cholecalciferol, 125 mcg, oral, Daily  citalopram, 30 mg, oral, Daily  lidocaine, 1 patch, transdermal, Daily  melatonin, 5 mg, oral, Nightly  QUEtiapine, 350 mg, oral, Nightly  traZODone, 150 mg, oral, Nightly      Continuous medications     PRN medications  PRN medications: acetaminophen, alum-mag hydroxide-simeth, diphenhydrAMINE, hydrOXYzine pamoate, " OLANZapine, OLANZapine zydis     There is no immunization history on file for this patient.  Patient's medical history was reviewed and updated either before or during this encounter.  ASSESSMENT / PLAN:  Active Hospital Problems    Bilateral hip pain      *Severe recurrent major depression without psychotic features (CMS/HCC)      Colostomy present (CMS/HCC)      Opioid abuse (CMS/HCC)  S/p fall.  Hip x-rays negative CT scan of the brain is negative     Pt. Has been stable.clostomy is working well.        Jay Knox MD

## 2023-11-10 NOTE — GROUP NOTE
Group Topic: Other   Group Date: 11/10/2023  Start Time: 1030  End Time: 1140  Facilitators: BRENDA Fernandes   Department: [facility]    Number of Participants: 4   Group Focus: other Nov is...  Treatment Modality: Other: Recreation therapy  Interventions utilized were mental fitness, orientation, reminiscence, and story telling  Purpose: other: express feelings, elevate mood,  stimulate memory, increase socialziation    Name: Azul Roberts YOB: 1960   MR: [id number]      Facilitator: Recreational Therapist  Level of Participation: active  Quality of Participation: appropriate/pleasant, attentive, and cooperative  Interactions with others: appropriate and gave feedback  Mood/Affect: appropriate  Triggers (if applicable): n/a  Cognition: coherent/clear  Progress: Significant  Comments: pt problem is depressed mood.   Plan: continue with services

## 2023-11-10 NOTE — PROGRESS NOTES
Azul Roberts is a 63 y.o. female on day 62 of admission presenting with Severe recurrent major depression without psychotic features (CMS/HCC).        Subjective   Case discussed with treatment team and chart reviewed.     Observed as more social with peers, in the group room, and having stayed out of her room for most of yesterday.  Does not endorse gains when asked.      Nursing report patient as pleasant, cooperative, denying suicidal thoughts.  Patient has recently endorsed daily ongoing intrusive thoughts however with ideations to overdose interfered with by ongoing hospitalization due to lack of access to stockpiled medications.  Varying reports concerning for dissimulation.    Reviewed with nursing need for mouth checks to help maintain absence of cheeked, spit out, and stock piled meds.    Nutrition document 6# weight loss in past month.        Objective     Vitals:    11/08/23 2119 11/09/23 0500 11/09/23 1708 11/10/23 0617   BP: 88/52 110/74 128/81 (!) 141/98   BP Location: Right arm Left arm Left arm Right arm   Patient Position: Standing Lying Standing Lying   Pulse: 84 82 110 77   Resp:  17 18 17   Temp:  36.9 °C (98.4 °F) 37.1 °C (98.8 °F) 36.3 °C (97.3 °F)   TempSrc:  Oral Temporal Temporal   SpO2:  98% 100% 99%   Weight:       Height:            Review of Systems   Psychiatric/Behavioral:  Positive for decreased concentration, dysphoric mood and sleep disturbance. The patient is nervous/anxious.      Psychiatric ROS - Adult  Anxiety: General Anxiety Disorder (KAYLEEN)KAYLEEN Behaviors: difficult to control worry, difficulty concentrating, irritability, restlessness, and sleep disturbance  Depression: anhedonia, appetite increased, concentration, energy, helpless, hopeless, interest, persistent thoughts of death, psychomotor agitation, sleep decreased , suicidal thoughts, and withdrawn  Delirium: negative  Psychosis: negative  Ioana: negative  Safety Issues: passive death wish and suicidal  "ideation  Psychiatric ROS Comment: as noted    Physical Exam  Abdominal:      Comments: Presence of colostomy bag   Psychiatric:         Attention and Perception: Attention and perception normal.         Mood and Affect: Mood is anxious and depressed. Affect is flat and inappropriate.         Behavior: Behavior is slowed.     Mental Status Examination  General Appearance:  less disheveled with improved eye contact, not malodorous on approach. Sitting in wheelchair within group room  Gait/Station: using wheelchair to move about the milieu  Speech: brief, conversational volume  Mood: \"okay\"  Affect: Dysphoric, constricted ,  Thought Process: remains impoverished but less so  Thought Associations: No loosening of associations  Thought Content: depressed, hopeless, persists as helpless, endorsing suicidal ideations today  Perception: No perceptual abnormalities noted  Level of Consciousness: Alert  Orientation: Alert  Attention and Concentration: Mild impairment in attention  Recent Memory: Intact as evidenced by ability to recall details from the past 24 hours   Executive function: Intact  Language: Naming intact  Fund of knowledge: Good  Insight: Fair, as evidenced by increasing participation in discussion about her scenario and symptoms and their improvement  Judgment: Fair, as evidenced by ability to reason through medical decision making, compliance with treatment recommendations, and improving      Psychiatric Risk Assessment  Violence Risk Assessment: major mental illness and substance abuse  Acute Risk of Harm to Others is Considered: moderate   Suicide Risk Assessment: , chronic medical illness, chronic pain, current psychiatric illness, feelings of hopelessness, global insomnia, history of trauma or abuse, life crisis (shame/despair), prior suicide attempt, severe anxiety, substance abuse, and suicidal ideations  Protective Factors against Suicide: adherence to  treatment and " marriage/partnership  Acute Risk of Harm to Self is Considered: moderate    Relevant Results  Scheduled medications  buPROPion XL, 300 mg, oral, Daily  celecoxib, 200 mg, oral, Daily  cholecalciferol, 125 mcg, oral, Daily  DULoxetine, 90 mg, oral, Daily  lidocaine, 1 patch, transdermal, Daily  QUEtiapine, 350 mg, oral, Nightly      Continuous medications     PRN medications  PRN medications: acetaminophen, alum-mag hydroxide-simeth, diphenhydrAMINE, hydrOXYzine pamoate, melatonin, OLANZapine, OLANZapine zydis, traZODone         Assessment/Plan   Principal Problem:    Severe recurrent major depression without psychotic features (CMS/HCC)  Active Problems:    Colostomy present (CMS/HCC)    Opioid abuse (CMS/HCC)    Bilateral hip pain    Biological -     Orthostatics - confirmed on measurement.  Ongoing efforts to communicate with patient about taking her time when changing position to help prevent falls.    Mouth checks.    Cymbalta 90 mg daily for depression, ideally titrate to as tolerated and beneficial  Wellbutrin xl 300 mg for adjunctive depression treatment  seroquel 350 mg at bedtime  Trazodone 150 mg PRN and melatonin at bedtime for sleep  No adverse side effects of medications noted or reported.  ECG (10/4/23): Normal sinus rhythm. Heart  rate 72 bpm. Qtc 442    Declined ECT consideration during discussion on 11/4, 11/7.   ECT is not available at this facility and this would include transitioning to another psychiatric hospital for this level of service or pursuing as an outpatient on discharge.    Discussion with patient regarding risk benefits and alternatives and side effects with opportunity to ask questions and questions answered.  Patient given consent to the plan as noted    2. Psychological -     Patient is encouraged to participate with the therapeutic milieu and program and group therapy    3. Sociological -      Patient encouraged to cooperate with social work staff on issues relevant to discharge  planning  Pending [place] medicaid and then transition to convalescence nursing care once this becomes available to patient    20 minutes spent coordinating care for patient on this day    Parts of this chart have been completed using voice recognition software.  Please excuse any errors of transcription.  Despite the medical decision making time stamp, my medical decision making has taken place during the patient's entire visit.  Thought process and reason for plan has been formulated from the time that I saw the patient until the time of disposition and is not specific to one specific moment during their visit and furthermore the medical decision making encompasses the entire chart and not only that represented in this note.      Eddie Cullen, DO

## 2023-11-10 NOTE — PROGRESS NOTES
REHAB Therapy Assessment & Treatment    Patient Name: Azul Roberts  MRN: [id number]  Today's Date: 11/10/2023      Recreation note : pt is alert and oriented x 2-3 with some confusion and poor insight /judgement.  Has been attending more groups this week with a much brighter mood.  Pt is smiling more and engaging more with all staff.  Pt is eating well and sleeping well.   Will continue to encourage pt to attend groups of choice daily.

## 2023-11-10 NOTE — GROUP NOTE
Group Topic: Other   Group Date: 11/10/2023  Start Time: 1510  End Time: 1545  Facilitators: BRENDA Fernandes   Department: [facility] REHABTH VIRTUAL    Number of Participants: 5   Group Focus: other [facility] What year is it?  Treatment Modality: Other: Recreation therapy  Interventions utilized were mental fitness, orientation, reminiscence, and story telling  Purpose: other: increase attention, elevate mood, increase socialization    Name: Azul Roberts YOB: 1960   MR: [id number]      Facilitator: Recreational Therapist  Level of Participation: active  Quality of Participation: appropriate/pleasant, attentive, cooperative, and engaged  Interactions with others: appropriate and gave feedback  Mood/Affect: appropriate  Triggers (if applicable): n/a  Cognition: coherent/clear  Progress: Significant  Comments: pt problem is depressed mood.  Plan: continue with services

## 2023-11-10 NOTE — PROGRESS NOTES
Nutrition Follow up Note    Pt continues to take po well, 100% every meal, however, documented wt's indicate a 6# wt loss since the end of September, which averages to 1#/week. Unsure if wt's are accurate. Will continue to monitor, and reorder supplement if wt loss continues    Lab Results   Component Value Date    WBC 8.8 09/11/2023    HGB 11.9 (L) 09/11/2023    HCT 36.3 09/11/2023     09/11/2023    CHOL 184 09/11/2023    TRIG 99 09/11/2023    HDL 45 (L) 09/11/2023    ALT 15 09/11/2023    AST 15 09/11/2023     11/08/2023    K 4.4 11/08/2023     11/08/2023    CREATININE 0.80 11/08/2023    BUN 21 11/08/2023    CO2 25 11/08/2023    TSH 1.64 09/11/2023    HGBA1C 5.8 09/11/2023       Current Facility-Administered Medications:     acetaminophen (Tylenol) tablet 650 mg, 650 mg, oral, q6h PRN, Eddie Cullen DO    alum-mag hydroxide-simeth (Mylanta) 200-200-20 mg/5 mL oral suspension 30 mL, 30 mL, oral, q6h PRN, Eddie Cullen DO    buPROPion XL (Wellbutrin XL) 24 hr tablet 300 mg, 300 mg, oral, Daily, Eddie Cullen DO, 300 mg at 11/10/23 0808    celecoxib (CeleBREX) capsule 200 mg, 200 mg, oral, Daily, Eddie Cullen DO, 200 mg at 11/09/23 1651    cholecalciferol (Vitamin D-3) capsule 125 mcg, 125 mcg, oral, Daily, Eddie Cullen DO, 125 mcg at 11/10/23 0809    diphenhydrAMINE (Sominex) tablet 25 mg, 25 mg, oral, q8h PRN, Eddie Cullen DO    DULoxetine (Cymbalta) DR capsule 90 mg, 90 mg, oral, Daily, Eddie Cullen DO, 90 mg at 11/10/23 0809    hydrOXYzine pamoate (Vistaril) capsule 25 mg, 25 mg, oral, q8h PRN, Eddie Cullen DO    lidocaine 4 % patch 1 patch, 1 patch, transdermal, Daily, Eddie Cullen DO    melatonin tablet 5 mg, 5 mg, oral, Nightly PRN, Eddie Cullen DO, 5 mg at 11/09/23 2034    OLANZapine (ZyPREXA) injection 5 mg, 5 mg, intramuscular, q8h PRN, Eddie Cullen DO    OLANZapine zydis (ZyPREXA) disintegrating tablet 5 mg, 5 mg, oral, q8h  "PRN, Eddie Cullen, DO    QUEtiapine (SEROquel) tablet 350 mg, 350 mg, oral, Nightly, Eddie NOAH Cullen, DO, 350 mg at 11/09/23 2026    traZODone (Desyrel) tablet 150 mg, 150 mg, oral, Nightly PRN, Eddie NOAH Zadorotheaitz, DO, 150 mg at 11/09/23 2034    Dietary Orders (From admission, onward)       Start     Ordered    09/28/23 1750  Adult diet Regular  Diet effective now        Question:  Diet type  Answer:  Regular    09/28/23 1800                   Food and Nutrient History  Energy Intake: Good > 75 %  Food and Nutrient History: good po intake documented    Anthropometrics:  Ht: 162.6 cm (5' 4\"), Wt: 78.9 kg (174 lb), BMI: 29.85     Weight Change  Weight History / % Weight Change: 3% wt loss in 6 weeks( about a pound/week   IBW/kg (Dietitian Calculated): 54.55 kg   Adjusted Body Weight (kg): 60.91 kg  Estimated Energy Needs  Total Energy Estimated Needs (kCal): 1517 kCal  Method for Estimating Needs: 25 kcals/kg ABW    Estimated Protein Needs  Method for Estimating Needs: 0.8-1 g/kg ABW    Estimated Fluid Needs  Total Fluid Estimated Needs (mL): 1517 mL  Method for Estimating Needs: 1 ml/kcal ABW   Nutrition Diagnosis:  Malnutrition Diagnosis  Patient has Malnutrition Diagnosis: No    Patient has Nutrition Diagnosis: Yes  Nutrition Diagnosis 1: Unintended weight loss  Diagnosis Status (1): New  Related to (1): unknown  As Evidenced by (1): weight loss     Nutrition Interventions/Recommendations:  Food and/or Nutrient Delivery Interventions  Interventions: Meals and snacks    Goal: as ordered     Education Documentation  No documentation found.    Nutrition Monitoring/Evaluation:  Food and Nutrient Related History       Follow Up  Time Spent (min): 20 minutes  Last Date of Nutrition Visit: 11/10/23  Nutrition Follow-Up Needed?: 5-7 days  Follow up Comment: 11/17/23    "

## 2023-11-10 NOTE — PROGRESS NOTES
"LSW met with pt and provided support and encouragement. LSW and pt discussed how much progress she has made over the past month. However pt does not see it. LSW instructed pt how the accomplishments she has made and continues to make. SARAHW discussed with pt that may not be able to happen as quickly as she would like but \"slow and steady wins the race\". Pt presents with improved mood and participation.     Nikki Andrews, DRAGAN      "

## 2023-11-10 NOTE — NURSING NOTE
SARAN NOTE     Problem:  depression     Behavior:  Pt was outside her room for snack and socially engaged with other Pt, pleasant and cooperative, addressed her needs, requested PRN medication for sleep, denied SI  Group Participation: n/a  Appetite/Meals: HS snack provided       Interventions:  1:1 intervention provided, scheduled and PRN medication administered at 2034: Melatonin 5mg, Trazodone 150mg for sleep.  Pt's self care routine reviewed, fall risk addressed, bed and chair alarm utilized    Response:  Pt is compliant with her medication, verbalized understanding of fall risk precautions and need for alarms usage, agreeable. Pt reported that she's been taking care of her colostomy on the regular basis.     Plan:  Continue monitoring, provide positive reinforcement

## 2023-11-11 PROCEDURE — 1140000001 HC PRIVATE PSYCH ROOM DAILY

## 2023-11-11 PROCEDURE — 2500000001 HC RX 250 WO HCPCS SELF ADMINISTERED DRUGS (ALT 637 FOR MEDICARE OP): Performed by: PSYCHIATRY & NEUROLOGY

## 2023-11-11 PROCEDURE — 97110 THERAPEUTIC EXERCISES: CPT | Mod: GO,CO

## 2023-11-11 PROCEDURE — 99232 SBSQ HOSP IP/OBS MODERATE 35: CPT | Performed by: STUDENT IN AN ORGANIZED HEALTH CARE EDUCATION/TRAINING PROGRAM

## 2023-11-11 PROCEDURE — 2500000004 HC RX 250 GENERAL PHARMACY W/ HCPCS (ALT 636 FOR OP/ED): Performed by: PSYCHIATRY & NEUROLOGY

## 2023-11-11 PROCEDURE — 2500000002 HC RX 250 W HCPCS SELF ADMINISTERED DRUGS (ALT 637 FOR MEDICARE OP, ALT 636 FOR OP/ED): Performed by: PSYCHIATRY & NEUROLOGY

## 2023-11-11 RX ORDER — DULOXETIN HYDROCHLORIDE 60 MG/1
120 CAPSULE, DELAYED RELEASE ORAL DAILY
Status: DISCONTINUED | OUTPATIENT
Start: 2023-11-12 | End: 2023-12-08 | Stop reason: HOSPADM

## 2023-11-11 RX ADMIN — TRAZODONE HYDROCHLORIDE 150 MG: 50 TABLET ORAL at 20:45

## 2023-11-11 RX ADMIN — QUETIAPINE FUMARATE 350 MG: 300 TABLET ORAL at 20:45

## 2023-11-11 RX ADMIN — BUPROPION HYDROCHLORIDE 300 MG: 300 TABLET, FILM COATED, EXTENDED RELEASE ORAL at 08:18

## 2023-11-11 RX ADMIN — CELECOXIB 200 MG: 200 CAPSULE ORAL at 15:25

## 2023-11-11 RX ADMIN — Medication 125 MCG: at 08:18

## 2023-11-11 RX ADMIN — DULOXETINE HYDROCHLORIDE 90 MG: 60 CAPSULE, DELAYED RELEASE ORAL at 08:18

## 2023-11-11 ASSESSMENT — PAIN - FUNCTIONAL ASSESSMENT
PAIN_FUNCTIONAL_ASSESSMENT: 0-10

## 2023-11-11 ASSESSMENT — PAIN SCALES - GENERAL
PAINLEVEL_OUTOF10: 0 - NO PAIN
PAINLEVEL_OUTOF10: 0 - NO PAIN
PAINLEVEL_OUTOF10: 1

## 2023-11-11 ASSESSMENT — COGNITIVE AND FUNCTIONAL STATUS - GENERAL
HELP NEEDED FOR BATHING: A LITTLE
DAILY ACTIVITIY SCORE: 23

## 2023-11-11 ASSESSMENT — ACTIVITIES OF DAILY LIVING (ADL): HOME_MANAGEMENT_TIME_ENTRY: 16

## 2023-11-11 ASSESSMENT — ENCOUNTER SYMPTOMS
DYSPHORIC MOOD: 1
DECREASED CONCENTRATION: 1
SLEEP DISTURBANCE: 1
NERVOUS/ANXIOUS: 1

## 2023-11-11 NOTE — PROGRESS NOTES
Physical Therapy                 Therapy Communication Note    Patient Name: Azul Roberts  MRN: [id number]  Today's Date: 11/11/2023     Discipline: Physical Therapy    Missed Visit Reason: Missed Visit Reason: Patient refused (Attempted treatment session, however, patient is visiting with  at this time. Due to limited visiting hours patient requests PT return later.)    Missed Time: Attempt    Comment:

## 2023-11-11 NOTE — PROGRESS NOTES
11/11/23 1133 to 11 49   OT Last Visit   OT Received On 11/11/23   General   Reason for Referral impaired mobility; recent fall   Referred By Dr. Cullen   Past Medical History Relevant to Rehab severe depression, colostomy, bilateral hip pain, opiod abuse   General Comment Patient in bed aggreeable to therapy.   Pain Assessment   Pain Assessment 0-10   Pain Score 0 - No pain   Bed Mobility   Bed Mobility Yes   Bed Mobility 1   Bed Mobility 1 Supine to sitting   Level of Assistance 1 Independent   Transfers   Transfer Yes   Transfer 1   Transfer From 1 Bed to   Transfer to 1 Sit;Chair with arms   Technique 1 Sit to stand   Transfer Device 1 Walker   Transfer Level of Assistance 1 Distant supervision   Therapeutic Exercise   Therapeutic Exercise Performed Yes   Therapeutic Exercise Activity 1 PRE 1# resistance in stance shoulder flexion/ extension/ elbow flexioin, horuzontal abduction, forearm pronation/supination. 15 rteps x 1 set cues pacing and joint alignment.   Therapeutic Exercise Activity 2 Pre's 1lbs 15 reps 6 sets bilateral upper extremity.   Inpatient/Swing Bed or Outpatient   Inpatient/Swing Bed or Outpatient Inpatient   Inpatient Plan   Treatment Interventions ADL retraining

## 2023-11-11 NOTE — GROUP NOTE
Group Topic: Excercise/Physical    Group Date: 11/11/2023  Start Time: 1015  End Time: 1045  Facilitators: BRENDA Howard   Department: [facility] REHABTH VIRTUAL    Number of Participants: 4   Group Focus: mindfulness, other physical exercise, and relaxation  Treatment Modality: Other: Recreation Therapy  Interventions utilized were group exercise and other mindfulness, breathing techniques  Purpose: other: improve balance, increase stimulation, decrease stress,     Name: Azul Roberts YOB: 1960   MR: [id number]      Facilitator: Recreational Therapist  Level of Participation: active  Quality of Participation: appropriate/pleasant, cooperative, and engaged  Interactions with others: appropriate  Mood/Affect: appropriate and positive  Triggers (if applicable): n/a  Cognition: coherent/clear  Progress: Moderate  Comments: pt problem is depressed mood  Plan: continue with services

## 2023-11-11 NOTE — GROUP NOTE
Group Topic: Social Skills   Group Date: 11/11/2023  Start Time: 1045  End Time: 1130  Facilitators: BRENDA Howard   Department: [facility] REHABTH [facility]IRTUAL    Number of Participants: 4   Group Focus: communication, reminiscence, and social skills  Treatment Modality: Other: Recreation Therapy  Interventions utilized were leisure development, mental fitness, and reminiscence  Purpose: other: Increase attention, increase stimulation, increase socialization, increase mood, increase eye-hand coordination.     Name: Azul Roberts YOB: 1960   MR: [id number]      Facilitator: Recreational Therapist  Level of Participation: active  Quality of Participation: appropriate/pleasant, attentive, cooperative, and engaged  Interactions with others: appropriate  Mood/Affect: appropriate, bright, and positive  Triggers (if applicable): n/a  Cognition: coherent/clear  Progress: Significant  Comments: pt problem is depressed mood   Plan: continue with services

## 2023-11-11 NOTE — NURSING NOTE
SARAN NOTE     Problem:  Depression     Behavior:  Pt is pleasant and calm. Sitting in her w/c in the group room watching television with peers. She still carries a flat affect but will smile at staff at times. She is compliant with her medication. Pt requesting something for sleep.  Group Participation: n/a  Appetite/Meals: Snack provided       Interventions:  Hs medications given  Asessment completed  2038- PRN Trazodone given for sleep    Response:  2135-Pt is now resting in her bed, she is calm and quiet. No s/s of distress noted      Plan:  Continue to monitor and assist. Maintain q15 minutes rounds for safety.

## 2023-11-11 NOTE — NURSING NOTE
SARAN NOTE     Problem:  Depression     Behavior:  Pt was up out of bed early this morning, she had brushed her own hair and put on new clothes.  She participated in group today.  She is more talkative and smiling.  Group Participation: Pt went to group  Appetite/Meals: 100%       Interventions:  1:1 interaction, encourage participation in milieu, give scheduled medications, every 15 minutes checks.    Response:  Pt continues to care for her self and remain out of bed and participating in milieu     Plan:  1:1 interaction, encourage participation in milieu, give scheduled medications, every 15 minutes checks

## 2023-11-11 NOTE — GROUP NOTE
Group Topic: Other   Group Date: 11/11/2023  Start Time: 1520  End Time: 1600  Facilitators: BRENDA Howard   Department: [facility] REHAB[facility]IRTUAL    Number of Participants: 3   Group Focus: leisure skills, relaxation, and social skills  Treatment Modality: Other: Recreation Therapy  Interventions utilized were : cognitive games, active listening  Purpose: other: increase socialization , increase stimulation, fun/entertainment, increase leisure  and social skills , increase self confidence      Name: Azul Roberts YOB: 1960   MR: [id number]      Facilitator: Recreational Therapist  Level of Participation: active  Quality of Participation: appropriate/pleasant, attentive, and engaged  Interactions with others: appropriate  Mood/Affect: appropriate, bright, and positive  Triggers (if applicable): n/a  Cognition: coherent/clear  Progress: Moderate  Comments: pt problem is depressed mood.   Plan: continue with services

## 2023-11-11 NOTE — PROGRESS NOTES
Azul Roberts is a 63 y.o. female on day 63 of admission presenting with Severe recurrent major depression without psychotic features (CMS/HCC).        Subjective   Case discussed with nursing staff and chart reviewed.     Participated in group this morning. States that her mood has not changed at all, and is amenable to being aggressive with medications. States she is sleeping okay.    SI not as intrusive overall, but still feeling hopeless.      Objective     Vitals:    11/09/23 1708 11/10/23 0617 11/10/23 1700 11/11/23 0500   BP: 128/81 (!) 141/98 (!) 151/94 (!) 118/49   BP Location: Left arm Right arm Right arm Right arm   Patient Position: Standing Lying Sitting Lying   Pulse: 110 77 85 66   Resp: 18 17 16 19   Temp: 37.1 °C (98.8 °F) 36.3 °C (97.3 °F) 36.3 °C (97.3 °F) 36.7 °C (98.1 °F)   TempSrc: Temporal Temporal Temporal Temporal   SpO2: 100% 99% 99% 96%   Weight:       Height:            Review of Systems   Psychiatric/Behavioral:  Positive for decreased concentration, dysphoric mood and sleep disturbance. The patient is nervous/anxious.      Psychiatric ROS - Adult  Anxiety: General Anxiety Disorder (KAYLEEN)KAYLEEN Behaviors: difficult to control worry, difficulty concentrating, irritability, restlessness, and sleep disturbance  Depression: anhedonia, appetite increased, concentration, energy, helpless, hopeless, interest, persistent thoughts of death, psychomotor agitation, sleep decreased , suicidal thoughts, and withdrawn  Delirium: negative  Psychosis: negative  Ioana: negative  Safety Issues: passive death wish and suicidal ideation  Psychiatric ROS Comment: as noted    Physical Exam  Abdominal:      Comments: Presence of colostomy bag   Psychiatric:         Attention and Perception: Attention and perception normal.         Mood and Affect: Mood is anxious and depressed. Affect is flat and inappropriate.         Behavior: Behavior is slowed.     Mental Status Examination  General Appearance:  less  "disheveled with improved eye contact, not malodorous on approach. Lying in bed  Gait/Station: using wheelchair to move about the milieu  Speech: brief, conversational volume  Mood: \"the same\"  Affect: Dysphoric, constricted ,  Thought Process: remains impoverished but less so  Thought Associations: No loosening of associations  Thought Content: depressed, hopeless, persists as helpless, endorsing suicidal ideations today  Perception: No perceptual abnormalities noted  Level of Consciousness: Alert  Orientation: Alert  Attention and Concentration: Mild impairment in attention  Recent Memory: Intact as evidenced by ability to recall details from the past 24 hours   Executive function: Intact  Language: Naming intact  Fund of knowledge: Good  Insight: Fair, as evidenced by increasing participation in discussion about her scenario and symptoms and their improvement  Judgment: Fair, as evidenced by ability to reason through medical decision making, compliance with treatment recommendations, and improving      Psychiatric Risk Assessment  Violence Risk Assessment: major mental illness and substance abuse  Acute Risk of Harm to Others is Considered: moderate   Suicide Risk Assessment: , chronic medical illness, chronic pain, current psychiatric illness, feelings of hopelessness, global insomnia, history of trauma or abuse, life crisis (shame/despair), prior suicide attempt, severe anxiety, substance abuse, and suicidal ideations  Protective Factors against Suicide: adherence to  treatment and marriage/partnership  Acute Risk of Harm to Self is Considered: moderate    Relevant Results  Scheduled medications  buPROPion XL, 300 mg, oral, Daily  celecoxib, 200 mg, oral, Daily  cholecalciferol, 125 mcg, oral, Daily  DULoxetine, 90 mg, oral, Daily  lidocaine, 1 patch, transdermal, Daily  QUEtiapine, 350 mg, oral, Nightly      Continuous medications     PRN medications  PRN medications: acetaminophen, alum-mag " hydroxide-simeth, diphenhydrAMINE, hydrOXYzine pamoate, melatonin, OLANZapine, OLANZapine zydis, traZODone         Assessment/Plan   Principal Problem:    Severe recurrent major depression without psychotic features (CMS/HCC)  Active Problems:    Colostomy present (CMS/HCC)    Opioid abuse (CMS/HCC)    Bilateral hip pain    Biological -     Orthostatics - confirmed on measurement.  Ongoing efforts to communicate with patient about taking her time when changing position to help prevent falls.    Mouth checks.    Increase Cymbalta to 120 mg daily for depression, ideally titrate to as tolerated and beneficial  Wellbutrin xl 300 mg for adjunctive depression treatment  seroquel 350 mg at bedtime  Trazodone 150 mg PRN and melatonin at bedtime for sleep  No adverse side effects of medications noted or reported.  ECG (10/4/23): Normal sinus rhythm. Heart  rate 72 bpm. Qtc 442    Declined ECT consideration during discussion on 11/4, 11/7.   ECT is not available at this facility and this would include transitioning to another psychiatric hospital for this level of service or pursuing as an outpatient on discharge.    Discussion with patient regarding risk benefits and alternatives and side effects with opportunity to ask questions and questions answered.  Patient given consent to the plan as noted    2. Psychological -     Patient is encouraged to participate with the therapeutic milieu and program and group therapy    3. Sociological -      Patient encouraged to cooperate with social work staff on issues relevant to discharge planning  Pending [place] medicaid and then transition to convalescence nursing care once this becomes available to patient    20 minutes spent coordinating care for patient on this day    Parts of this chart have been completed using voice recognition software.  Please excuse any errors of transcription.  Despite the medical decision making time stamp, my medical decision making has taken place during the  patient's entire visit.  Thought process and reason for plan has been formulated from the time that I saw the patient until the time of disposition and is not specific to one specific moment during their visit and furthermore the medical decision making encompasses the entire chart and not only that represented in this note.      Massimo Barroso MD

## 2023-11-12 PROCEDURE — 99232 SBSQ HOSP IP/OBS MODERATE 35: CPT | Performed by: INTERNAL MEDICINE

## 2023-11-12 PROCEDURE — 2500000004 HC RX 250 GENERAL PHARMACY W/ HCPCS (ALT 636 FOR OP/ED): Performed by: PSYCHIATRY & NEUROLOGY

## 2023-11-12 PROCEDURE — 2500000001 HC RX 250 WO HCPCS SELF ADMINISTERED DRUGS (ALT 637 FOR MEDICARE OP): Performed by: PSYCHIATRY & NEUROLOGY

## 2023-11-12 PROCEDURE — 99232 SBSQ HOSP IP/OBS MODERATE 35: CPT | Performed by: STUDENT IN AN ORGANIZED HEALTH CARE EDUCATION/TRAINING PROGRAM

## 2023-11-12 PROCEDURE — 1140000001 HC PRIVATE PSYCH ROOM DAILY

## 2023-11-12 PROCEDURE — 2500000002 HC RX 250 W HCPCS SELF ADMINISTERED DRUGS (ALT 637 FOR MEDICARE OP, ALT 636 FOR OP/ED): Performed by: STUDENT IN AN ORGANIZED HEALTH CARE EDUCATION/TRAINING PROGRAM

## 2023-11-12 RX ADMIN — BUPROPION HYDROCHLORIDE 300 MG: 300 TABLET, FILM COATED, EXTENDED RELEASE ORAL at 08:31

## 2023-11-12 RX ADMIN — Medication 125 MCG: at 08:31

## 2023-11-12 RX ADMIN — TRAZODONE HYDROCHLORIDE 150 MG: 50 TABLET ORAL at 20:36

## 2023-11-12 RX ADMIN — DULOXETINE HYDROCHLORIDE 120 MG: 60 CAPSULE, DELAYED RELEASE ORAL at 08:31

## 2023-11-12 RX ADMIN — CELECOXIB 200 MG: 200 CAPSULE ORAL at 15:52

## 2023-11-12 RX ADMIN — QUETIAPINE FUMARATE 350 MG: 300 TABLET ORAL at 20:36

## 2023-11-12 ASSESSMENT — COLUMBIA-SUICIDE SEVERITY RATING SCALE - C-SSRS
6. HAVE YOU EVER DONE ANYTHING, STARTED TO DO ANYTHING, OR PREPARED TO DO ANYTHING TO END YOUR LIFE?: NO
6. HAVE YOU EVER DONE ANYTHING, STARTED TO DO ANYTHING, OR PREPARED TO DO ANYTHING TO END YOUR LIFE?: NO
2. HAVE YOU ACTUALLY HAD ANY THOUGHTS OF KILLING YOURSELF?: NO
1. SINCE LAST CONTACT, HAVE YOU WISHED YOU WERE DEAD OR WISHED YOU COULD GO TO SLEEP AND NOT WAKE UP?: NO
1. SINCE LAST CONTACT, HAVE YOU WISHED YOU WERE DEAD OR WISHED YOU COULD GO TO SLEEP AND NOT WAKE UP?: NO
6. HAVE YOU EVER DONE ANYTHING, STARTED TO DO ANYTHING, OR PREPARED TO DO ANYTHING TO END YOUR LIFE?: NO
1. SINCE LAST CONTACT, HAVE YOU WISHED YOU WERE DEAD OR WISHED YOU COULD GO TO SLEEP AND NOT WAKE UP?: NO

## 2023-11-12 ASSESSMENT — ENCOUNTER SYMPTOMS
DECREASED CONCENTRATION: 1
NERVOUS/ANXIOUS: 1
SLEEP DISTURBANCE: 1
DYSPHORIC MOOD: 1

## 2023-11-12 ASSESSMENT — PAIN - FUNCTIONAL ASSESSMENT
PAIN_FUNCTIONAL_ASSESSMENT: 0-10
PAIN_FUNCTIONAL_ASSESSMENT: 0-10

## 2023-11-12 ASSESSMENT — PAIN SCALES - GENERAL
PAINLEVEL_OUTOF10: 2
PAINLEVEL_OUTOF10: 0 - NO PAIN

## 2023-11-12 NOTE — PROGRESS NOTES
[facility]: [facility]  PROGRESS NOTE      Azul Roberts is a 63 y.o. female that is being seen  today for follow up at [facility]  Subjective   Patient is being seen for follow-up at [facility] unit.  Patient had a fall and was sent to the ER.  Patient was evaluated in the ER and CT scan of the brain was negative for any bleed.  Patient had a UA done which was positive for leukoesterase.  Culture is pending.  Patient has been at her baseline.  Colostomy has been working well.  Pain has been fairly controlled.    ROS  Negative for fever or chills  Negative for sore throat, ear pain, nasal discharge  Negative for cough, shortness of breath or wheezing  Negative for chest pain, palpitations, swelling of legs  Negative for abdominal pain, constipation, diarrhea, blood in the stools,has colostomy  Negative for urinary complaints  Negative for headache, dizziness, weakness or numbness  Negative for joint pain  Positive for depression or anxiety  All other systems reviewed and were negative  Vitals:    11/12/23 0639   BP: 117/56   Pulse: 82   Resp: 16   Temp: 36.6 °C (97.9 °F)   SpO2: 99%      Body mass index is 29.87 kg/m².  Physical Exam  Constitutional: Patient does not appear to be in any acute distress  Head and Face: NCAT  Eyes: Normal external exam, EOMI  ENT: Normal external inspection of ears and nose. Oropharynx normal.  Cardiovascular: RRR, S1/S2, no murmurs, rubs, or gallops, radial pulses +2, no edema of extremities  Pulmonary: CTAB, no respiratory distress.  Abdomen: +BS, soft, non-tender, nondistended, no guarding or rebound, no masses noted.colostomy present   MSK: hip joint pain   Skin- No lesions, contusions, or erythema.  Peripheral puslses palpable bilaterally 2+  Neuro: AAO X3, Cranial nerves 2-12 grossly intact,DTR 2+ in all 4 limbs       Diagnostic Results   Lab Results   Component Value Date    GLUCOSE 113 (H) 09/05/2023    CALCIUM 10.6 (H) 09/05/2023     09/05/2023     "K 3.7 09/05/2023    CO2 24 09/05/2023     09/05/2023    BUN 12 09/05/2023    CREATININE 0.72 09/05/2023     Lab Results   Component Value Date    ALT 12 09/05/2023    AST 14 09/05/2023    ALKPHOS 103 09/05/2023    BILITOT 0.4 09/05/2023     Lab Results   Component Value Date    WBC 13.2 (H) 09/05/2023    HGB 13.9 09/05/2023    HCT 43.0 09/05/2023    MCV 81 09/05/2023     09/05/2023     No results found for: \"CHOL\"  No results found for: \"HDL\"  No results found for: \"LDLCALC\"  No results found for: \"TRIG\"  No results found for: \"HGBA1C\"  Other labs not included in the list above were reviewed either before or during this encounter.    History    No past medical history on file.  No past surgical history on file.  No family history on file.  No Known Allergies  No current facility-administered medications on file prior to encounter.     Current Outpatient Medications on File Prior to Encounter   Medication Sig Dispense Refill    acetaminophen (Tylenol) 325 mg tablet Take 2 tablets (650 mg) by mouth 2 times a day.      hydrOXYzine pamoate (Vistaril) 25 mg capsule Take 1 capsule (25 mg) by mouth 2 times a day.      magnesium oxide (Mag-Ox) 400 mg tablet Take 1 tablet (400 mg) by mouth 2 times a day.      melatonin 5 mg tablet Take 1 tablet (5 mg) by mouth once daily at bedtime.      risperiDONE (RisperDAL) 2 mg tablet Take 1 tablet (2 mg) by mouth once daily at bedtime.      traZODone (Desyrel) 150 mg tablet Take 1 tablet (150 mg) by mouth once daily at bedtime.     Scheduled medications  buPROPion XL, 300 mg, oral, Daily  celecoxib, 200 mg, oral, Daily  cholecalciferol, 125 mcg, oral, Daily  citalopram, 30 mg, oral, Daily  lidocaine, 1 patch, transdermal, Daily  melatonin, 5 mg, oral, Nightly  QUEtiapine, 350 mg, oral, Nightly  traZODone, 150 mg, oral, Nightly      Continuous medications     PRN medications  PRN medications: acetaminophen, alum-mag hydroxide-simeth, diphenhydrAMINE, hydrOXYzine pamoate, " OLANZapine, OLANZapine zydis     There is no immunization history on file for this patient.  Patient's medical history was reviewed and updated either before or during this encounter.  ASSESSMENT / PLAN:  Active Hospital Problems    Bilateral hip pain      *Severe recurrent major depression without psychotic features (CMS/HCC)      Colostomy present (CMS/HCC)      Opioid abuse (CMS/HCC)  S/p fall.  Hip x-rays negative CT scan of the brain is negative     Pt. Has been stable.clostomy is working well.        Jay Knox MD

## 2023-11-12 NOTE — PROGRESS NOTES
Azul Roberts is a 63 y.o. female on day 64 of admission presenting with Severe recurrent major depression without psychotic features (CMS/HCC).        Subjective   Case discussed with nursing staff and chart reviewed.     Reports feeling depressed with no change, but did not notice any adverse effects from duloxetine increase. Re-visited idea of ECT and other options for escalation of therapy, but she still declines ECT.    Passive SI. Denies HI, AVH.      Objective     Vitals:    11/10/23 1700 11/11/23 0500 11/11/23 1700 11/12/23 0639   BP: (!) 151/94 (!) 118/49 (!) 134/91 117/56   BP Location: Right arm Right arm Left arm Right arm   Patient Position: Sitting Lying Sitting Lying   Pulse: 85 66 85 82   Resp: 16 19 17 16   Temp: 36.3 °C (97.3 °F) 36.7 °C (98.1 °F) 37 °C (98.6 °F) 36.6 °C (97.9 °F)   TempSrc: Temporal Temporal Temporal Oral   SpO2: 99% 96% 100% 99%   Weight:       Height:            Review of Systems   Psychiatric/Behavioral:  Positive for decreased concentration, dysphoric mood and sleep disturbance. The patient is nervous/anxious.      Psychiatric ROS - Adult  Anxiety: General Anxiety Disorder (KAYLEEN)KAYLEEN Behaviors: difficult to control worry, difficulty concentrating, irritability, restlessness, and sleep disturbance  Depression: anhedonia, appetite increased, concentration, energy, helpless, hopeless, interest, persistent thoughts of death, psychomotor agitation, sleep decreased , suicidal thoughts, and withdrawn  Delirium: negative  Psychosis: negative  Ioana: negative  Safety Issues: passive death wish and suicidal ideation  Psychiatric ROS Comment: as noted    Physical Exam  Abdominal:      Comments: Presence of colostomy bag   Psychiatric:         Attention and Perception: Attention and perception normal.         Mood and Affect: Mood is anxious and depressed. Affect is flat and inappropriate.         Behavior: Behavior is slowed.     Mental Status Examination  General Appearance:  less  "disheveled with improved eye contact, not malodorous on approach. Lying in bed  Gait/Station: using wheelchair to move about the milieu  Speech: brief, conversational volume  Mood: \"depressed\"  Affect: Dysphoric, constricted ,  Thought Process: remains impoverished but less so  Thought Associations: No loosening of associations  Thought Content: depressed, hopeless, persists as helpless, reports passive suicidal ideations today  Perception: No perceptual abnormalities noted  Level of Consciousness: Alert  Orientation: Alert  Attention and Concentration: Mild impairment in attention  Recent Memory: Intact as evidenced by ability to recall details from the past 24 hours   Executive function: Intact  Language: Naming intact  Fund of knowledge: Good  Insight: Fair, as evidenced by increasing participation in discussion about her scenario and symptoms and their improvement  Judgment: Fair, as evidenced by ability to reason through medical decision making, compliance with treatment recommendations, and improving      Psychiatric Risk Assessment  Violence Risk Assessment: major mental illness and substance abuse  Acute Risk of Harm to Others is Considered: moderate   Suicide Risk Assessment: , chronic medical illness, chronic pain, current psychiatric illness, feelings of hopelessness, global insomnia, history of trauma or abuse, life crisis (shame/despair), prior suicide attempt, severe anxiety, substance abuse, and suicidal ideations  Protective Factors against Suicide: adherence to  treatment and marriage/partnership  Acute Risk of Harm to Self is Considered: moderate    Relevant Results  Scheduled medications  buPROPion XL, 300 mg, oral, Daily  celecoxib, 200 mg, oral, Daily  cholecalciferol, 125 mcg, oral, Daily  DULoxetine, 120 mg, oral, Daily  lidocaine, 1 patch, transdermal, Daily  QUEtiapine, 350 mg, oral, Nightly      Continuous medications     PRN medications  PRN medications: acetaminophen, alum-mag " hydroxide-simeth, diphenhydrAMINE, hydrOXYzine pamoate, melatonin, OLANZapine, OLANZapine zydis, traZODone         Assessment/Plan   Principal Problem:    Severe recurrent major depression without psychotic features (CMS/HCC)  Active Problems:    Colostomy present (CMS/HCC)    Opioid abuse (CMS/HCC)    Bilateral hip pain    Biological -     Orthostatics - confirmed on measurement.  Ongoing efforts to communicate with patient about taking her time when changing position to help prevent falls.    Mouth checks.    Continue increased Cymbalta to 120 mg daily for depression, ideally titrate to as tolerated and beneficial  Wellbutrin xl 300 mg for adjunctive depression treatment  seroquel 350 mg at bedtime  Trazodone 150 mg PRN and melatonin at bedtime for sleep  No adverse side effects of medications noted or reported.  ECG (10/4/23): Normal sinus rhythm. Heart  rate 72 bpm. Qtc 442    Declined ECT consideration during discussion on 11/4, 11/7.   ECT is not available at this facility and this would include transitioning to another psychiatric hospital for this level of service or pursuing as an outpatient on discharge.    Discussion with patient regarding risk benefits and alternatives and side effects with opportunity to ask questions and questions answered.  Patient given consent to the plan as noted    2. Psychological -     Patient is encouraged to participate with the therapeutic milieu and program and group therapy    3. Sociological -      Patient encouraged to cooperate with social work staff on issues relevant to discharge planning  Pending [place] medicaid and then transition to convalescence nursing care once this becomes available to patient    20 minutes spent coordinating care for patient on this day    Parts of this chart have been completed using voice recognition software.  Please excuse any errors of transcription.  Despite the medical decision making time stamp, my medical decision making has taken place  during the patient's entire visit.  Thought process and reason for plan has been formulated from the time that I saw the patient until the time of disposition and is not specific to one specific moment during their visit and furthermore the medical decision making encompasses the entire chart and not only that represented in this note.      Massimo Barroso MD

## 2023-11-12 NOTE — GROUP NOTE
Group Topic: Other   Group Date: 11/12/2023  Start Time: 1040  End Time: 1115  Facilitators: BRENDA Howard   Department: [facility]    Number of Participants: 4   Group Focus: other story telling, increase memory, and social skills  Treatment Modality: Other: Recreation Therapy  Interventions utilized were group exercise, mental fitness, orientation, and story telling, creativity  Purpose: other: increase socialization, increase stimulation, increase creativity    Name: Azul Roberts YOB: 1960   MR: [id number]      Facilitator: Recreational Therapist  Level of Participation: active  Quality of Participation: appropriate/pleasant, attentive, cooperative, and engaged  Interactions with others: appropriate  Mood/Affect: appropriate and bright  Triggers (if applicable): n/a  Cognition: coherent/clear  Progress: Significant  Comments: pt problem is delusional thought. Pt engages easily with staff and peers. Laughs and smiles through group session.   Plan: continue with services

## 2023-11-12 NOTE — NURSING NOTE
SARAN NOTE     Problem:  depression     Behavior:  Pt combed her own hair this morning, she is doing her own self care and emptying and burping her colostomy.  She went to group and participated this morning.  She has been interacting more with staff and spoke with this RN about how she likes her hair more when its braided.    Group Participation: Pt went to group and participated  Appetite/Meals: 100%       Interventions:  Encouraged patient to go to group, every 15 minute checks, pt given scheduled medications    Response:  Pt up most the afternoon and went to afternoon group, she is smiling and talkative and performing her own self care.       Plan:  Encouraged patient to go to group, every 15 minute checks, pt given scheduled medications

## 2023-11-12 NOTE — GROUP NOTE
Group Topic: Excercise/Physical    Group Date: 11/12/2023  Start Time: 1020  End Time: 1040  Facilitators: BRENDA Howard   Department: [facility]    Number of Participants: 4   Group Focus: mindfulness, physical exercise, relaxation, self care  Treatment Modality: Other: Recreation Therapy  Interventions utilized were group exercise and other mindfulness, breathing techniques  Purpose: other: improve balance, increase stimulation, increase mood, self care    Name: Azul Roberts YOB: 1960   MR: [id number]      Facilitator: Recreational Therapist  Level of Participation: active  Quality of Participation: appropriate/pleasant, attentive, cooperative, and engaged  Interactions with others: appropriate  Mood/Affect: appropriate  Triggers (if applicable): n/a  Cognition: coherent/clear  Progress: Significant  Comments: pt problem is depressed mood.  Plan: continue with services

## 2023-11-12 NOTE — NURSING NOTE
"SARAN NOTE     Problem:  Depression     Behavior:  Pt is sitting in the hallway upon arrival to the unit at shift change. Pt smiles and asked this RN how she is. Pt is calm, she is interacting and social with peers. She is compliant with medications- requesting PRN Trazodone tonight for sleep. Pt tells this nurse \"I have been ready to get home for a while now.\" Pt questioning this RN what her discharge plans are.   Hs snack provided  Group Participation: n/a  Appetite/Meals: Hs snack    Interventions:  1:1 time spent, hs medications given   Assessment completed  2045- PRN Trazodone given per pt request    Response:  **Reassessment**  2145- Pt is lying in her bed now, she is in and out of sleep. No s/s of distress noted.     Plan:  Continue to monitor and assist. Maintain q15 minutes rounds for safety.    "

## 2023-11-12 NOTE — GROUP NOTE
Group Topic: Other   Group Date: 11/12/2023  Start Time: 1115  End Time: 1135  Facilitators: BRENDA Howard   Department: [facility]    Number of Participants: 3   Group Focus: other memory , reminiscence, and social skills  Treatment Modality: Other: Recreation Therapy  Interventions utilized were group exercise and reminiscence  Purpose: other: increase memory, increase socialization, increase stimulation, fun    Name: Azul Roberts YOB: 1960   MR: [id number]      Facilitator: Recreational Therapist  Level of Participation: active  Quality of Participation: appropriate/pleasant, attentive, cooperative, and engaged  Interactions with others: appropriate  Mood/Affect: appropriate, bright, and positive  Triggers (if applicable): n/a  Cognition: coherent/clear  Progress: Significant  Comments: pt problem is delusional thought  Plan: continue with services

## 2023-11-13 PROCEDURE — 2500000002 HC RX 250 W HCPCS SELF ADMINISTERED DRUGS (ALT 637 FOR MEDICARE OP, ALT 636 FOR OP/ED): Performed by: STUDENT IN AN ORGANIZED HEALTH CARE EDUCATION/TRAINING PROGRAM

## 2023-11-13 PROCEDURE — 2500000001 HC RX 250 WO HCPCS SELF ADMINISTERED DRUGS (ALT 637 FOR MEDICARE OP): Performed by: PSYCHIATRY & NEUROLOGY

## 2023-11-13 PROCEDURE — 97110 THERAPEUTIC EXERCISES: CPT | Mod: GO,CO

## 2023-11-13 PROCEDURE — 1140000001 HC PRIVATE PSYCH ROOM DAILY

## 2023-11-13 PROCEDURE — 99232 SBSQ HOSP IP/OBS MODERATE 35: CPT | Performed by: INTERNAL MEDICINE

## 2023-11-13 PROCEDURE — 2500000004 HC RX 250 GENERAL PHARMACY W/ HCPCS (ALT 636 FOR OP/ED): Performed by: PSYCHIATRY & NEUROLOGY

## 2023-11-13 PROCEDURE — 99232 SBSQ HOSP IP/OBS MODERATE 35: CPT | Performed by: PSYCHIATRY & NEUROLOGY

## 2023-11-13 PROCEDURE — 97535 SELF CARE MNGMENT TRAINING: CPT | Mod: GO,CO

## 2023-11-13 RX ADMIN — DULOXETINE HYDROCHLORIDE 120 MG: 60 CAPSULE, DELAYED RELEASE ORAL at 08:16

## 2023-11-13 RX ADMIN — BUPROPION HYDROCHLORIDE 300 MG: 300 TABLET, FILM COATED, EXTENDED RELEASE ORAL at 08:16

## 2023-11-13 RX ADMIN — CELECOXIB 200 MG: 200 CAPSULE ORAL at 15:01

## 2023-11-13 RX ADMIN — Medication 5 MG: at 20:25

## 2023-11-13 RX ADMIN — TRAZODONE HYDROCHLORIDE 150 MG: 50 TABLET ORAL at 20:25

## 2023-11-13 RX ADMIN — QUETIAPINE FUMARATE 350 MG: 300 TABLET ORAL at 20:25

## 2023-11-13 RX ADMIN — Medication 125 MCG: at 08:16

## 2023-11-13 ASSESSMENT — COLUMBIA-SUICIDE SEVERITY RATING SCALE - C-SSRS
2. HAVE YOU ACTUALLY HAD ANY THOUGHTS OF KILLING YOURSELF?: NO
6. HAVE YOU EVER DONE ANYTHING, STARTED TO DO ANYTHING, OR PREPARED TO DO ANYTHING TO END YOUR LIFE?: NO
1. SINCE LAST CONTACT, HAVE YOU WISHED YOU WERE DEAD OR WISHED YOU COULD GO TO SLEEP AND NOT WAKE UP?: NO
2. HAVE YOU ACTUALLY HAD ANY THOUGHTS OF KILLING YOURSELF?: NO
6. HAVE YOU EVER DONE ANYTHING, STARTED TO DO ANYTHING, OR PREPARED TO DO ANYTHING TO END YOUR LIFE?: NO
1. SINCE LAST CONTACT, HAVE YOU WISHED YOU WERE DEAD OR WISHED YOU COULD GO TO SLEEP AND NOT WAKE UP?: NO

## 2023-11-13 ASSESSMENT — ACTIVITIES OF DAILY LIVING (ADL): HOME_MANAGEMENT_TIME_ENTRY: 15

## 2023-11-13 ASSESSMENT — ENCOUNTER SYMPTOMS
NERVOUS/ANXIOUS: 1
DECREASED CONCENTRATION: 1
DYSPHORIC MOOD: 1
SLEEP DISTURBANCE: 1

## 2023-11-13 ASSESSMENT — COGNITIVE AND FUNCTIONAL STATUS - GENERAL
DAILY ACTIVITIY SCORE: 23
HELP NEEDED FOR BATHING: A LITTLE

## 2023-11-13 ASSESSMENT — PAIN SCALES - GENERAL
PAINLEVEL_OUTOF10: 3
PAINLEVEL_OUTOF10: 4

## 2023-11-13 ASSESSMENT — PAIN - FUNCTIONAL ASSESSMENT
PAIN_FUNCTIONAL_ASSESSMENT: 0-10
PAIN_FUNCTIONAL_ASSESSMENT: 0-10

## 2023-11-13 NOTE — NURSING NOTE
BIRP NOTE     Problem:  anxiety     Behavior:  No behaviors noted  Group Participation: yes  Appetite/Meals: 100x3       Interventions:  Advised to seek staff with any issues    Response:  Pt states she understands to seek staff with any concerns     Plan:  Will continue to monitor

## 2023-11-13 NOTE — PROGRESS NOTES
Occupational Therapy    OT Treatment    Patient Name: Azul Roberts  MRN: [id number]  Today's Date: 11/13/2023  Time Calculation  Start Time: 1135  Stop Time: 1200  Time Calculation (min): 25 min          Plan:  Treatment Interventions: ADL retraining  Treatment Interventions: ADL retraining    Subjective   General:  OT Received On: 11/13/23         Pain:  Pain Assessment  Pain Assessment: 0-10  Pain Score: 4  Pain Type: Acute pain  Pain Location: Hip  Pain Orientation: Left  Pain Radiating Towards: back    Objective    Cognition:     Coordination:     Activities of Daily Living: Grooming  Grooming Level of Assistance: Modified independent  Grooming Where Assessed: Standing sinkside  Grooming Comments: standing at sink to complete hand hygiene    Toileting  Toileting Level of Assistance: Modified independent  Where Assessed: Toilet  Toileting Comments: patient able to complete clothein  Functional Standing Tolerance:     Bed Mobility/Transfers: Bed Mobility  Bed Mobility: Yes  Bed Mobility 1  Bed Mobility 1: Supine to sitting, Sitting to supine  Level of Assistance 1: Independent  Bed Mobility Comments 1: bed flat    Transfers  Transfer: Yes  Transfer 1  Transfer From 1: Bed to  Transfer to 1: Sit, Chair with arms  Technique 1: Sit to stand  Transfer Device 1: Walker  Transfer Level of Assistance 1: Distant supervision    Toilet Transfers  Toilet Transfer From: Rolling walker  Toilet Transfer Type: To and from  Toilet Transfer to: Standard toilet  Toilet Transfer Technique: Ambulating  Toilet Transfers: Supervision  Toilet Transfers Comments: Poor safety awareness  Modalities:        Therapy/Activity: Therapeutic Exercise  Therapeutic Exercise Performed: Yes  Therapeutic Exercise Activity 1: Pre's 1 lbs 15 reps 6 sets completed at edge of bed with visual and verbal demonstration. Patient able to complete iwith set up with cues to slow reps down.  Therapeutic Exercise Activity 2: Pre's 1lbs 15 reps 6 sets bilateral  upper extremity.  Other Activity:  Education Documentation  No documentation found.  Education Comments  No comments found.        OP EDUCATION:  Education  Individual(s) Educated: Patient  Education Provided: Fall precautons, Risk and benefits of OT discussed with patient or other, POC discussed and agreed upon  Risk and Benefits Discussed with Patient/Caregiver/Other: yes  Patient/Caregiver Demonstrated Understanding: yes  Plan of Care Discussed and Agreed Upon: yes  Patient Response to Education: Patient/Caregiver Verbalized Understanding of Information    Goals:  Encounter Problems       Encounter Problems (Active)       Balance       STG - Maintains dynamic standing balance with upper extremity support (Progressing)       Start:  10/27/23    Expected End:  11/10/23       INTERVENTIONS:  1. Practice standing with minimal support.  2. Educate patient about standing tolerance.  3. Educate patient about independence with gait, transfers, and ADL's.  4. Educate patient about use of assistive device.  5. Educate patient about self-directed care.            Instrumental Activities of Daily Living       STG - Patient will maintain balance while making a bed (Progressing)       Start:  11/07/23    Expected End:  11/15/23            STG - Patient will use bilateral upper extremities to wash and dry dishes (Progressing)       Start:  11/07/23    Expected End:  11/15/23               Mobility       STG - Patient will ambulate 300 ft with FWW , + turns, distant supervision of 1. (Progressing)       Start:  10/27/23    Expected End:  11/10/23               Transfers       STG - Patient to transfer to and from sit to supine mod independent (Progressing)       Start:  10/27/23    Expected End:  11/10/23            STG - Patient will transfer sit to and from stand mod independent (Progressing)       Start:  10/27/23    Expected End:  11/10/23                  Encounter Problems (Resolved)       Balance       LTG - Patient will  maintain standing and sitting balance to allow for completion of daily activities (Met)       Start:  10/16/23    Expected End:  11/13/23    Resolved:  11/07/23            Bathing       LTG - Patient will utilize adaptive techniques to bathe body (Met)       Start:  10/16/23    Expected End:  11/13/23    Resolved:  11/07/23            Dressings Lower Extremities       LTG - Patient will utilize adaptive techniques/equipment to dress lower body (Met)       Start:  10/16/23    Expected End:  11/13/23    Resolved:  11/07/23            Grooming       LTG - Patient will demonstrate use of appropriate Intervention for safe grooming habits (Met)       Start:  10/16/23    Expected End:  11/13/23    Resolved:  11/07/23            Mobility       LTG - Patient will ambulate household distance (Met)       Start:  10/16/23    Expected End:  11/13/23    Resolved:  11/07/23            Safety       LTG - Patient will demonstrate safety requirements appropriate to situation/environment (Met)       Start:  10/16/23    Expected End:  11/13/23    Resolved:  11/07/23

## 2023-11-13 NOTE — PROGRESS NOTES
"Azul Roberts is a 63 y.o. female on day 65 of admission presenting with Severe recurrent major depression without psychotic features (CMS/[facility]).        Subjective   Case discussed with treatment team and chart reviewed including weekend provider notes.     Attended morning music group but then declined recreation therapy group after and resumed in bed.  On approach, she reports her mood is \"alright.\"  Remains with limited spontaneous interaction.  Answers questions with 1 or 2 word responses.  Tolerating the medication increase from the weekend provider.  No other new or acute changes.  Social work did call patient's  who reports he has not yet received documentation from [place] Medicaid.  This serving as an obstacle from coordinating discharge locally.  Patient will remain hospitalized until either able to function independently due to improvement in her mental health symptoms or is able to go to a nursing convalescence.  Has been requesting as needed medications at night including trazodone to further help with sleep.  She is tolerating these well without resulting side effects the following day.  We will continue to monitor QTc as such.      Objective     Vitals:    11/11/23 1700 11/12/23 0639 11/12/23 1700 11/13/23 0616   BP: (!) 134/91 117/56 126/79 124/68   BP Location: Left arm Right arm Left arm Left arm   Patient Position: Sitting Lying Sitting Lying   Pulse: 85 82 93 86   Resp: 17 16 16 17   Temp: 37 °C (98.6 °F) 36.6 °C (97.9 °F) 36.2 °C (97.2 °F) 36.8 °C (98.2 °F)   TempSrc: Temporal Oral Temporal Oral   SpO2: 100% 99% 99% 99%   Weight:       Height:            Review of Systems   Psychiatric/Behavioral:  Positive for decreased concentration, dysphoric mood and sleep disturbance. The patient is nervous/anxious.      Psychiatric ROS - Adult  Anxiety: General Anxiety Disorder (KAYLEEN)KAYLEEN Behaviors: difficult to control worry, difficulty concentrating, irritability, restlessness, and sleep " "disturbance  Depression: anhedonia, appetite increased, concentration, energy, helpless, hopeless, interest, persistent thoughts of death, psychomotor agitation, sleep decreased , suicidal thoughts, and withdrawn  Delirium: negative  Psychosis: negative  Ioana: negative  Safety Issues: passive death wish and suicidal ideation  Psychiatric ROS Comment: as noted    Physical Exam  Abdominal:      Comments: Presence of colostomy bag   Psychiatric:         Attention and Perception: Attention and perception normal.         Mood and Affect: Mood is anxious and depressed. Affect is flat and inappropriate.         Behavior: Behavior is slowed.     Mental Status Examination  General Appearance:  less disheveled with improved eye contact, not malodorous on approach. Sitting in wheelchair within group room  Gait/Station: using wheelchair to move about the milieu  Speech: brief, conversational volume  Mood: \"alright\"  Affect: Dysphoric, constricted ,  Thought Process: remains impoverished but less so  Thought Associations: No loosening of associations  Thought Content: depressed, hopeless, persists as helpless, endorsing suicidal ideations today  Perception: No perceptual abnormalities noted  Level of Consciousness: Alert  Orientation: Alert  Attention and Concentration: Mild impairment in attention  Recent Memory: Intact as evidenced by ability to recall details from the past 24 hours   Executive function: Intact  Language: Naming intact  Fund of knowledge: Good  Insight: Fair, as evidenced by increasing participation in discussion about her scenario and symptoms and their improvement  Judgment: Fair, as evidenced by ability to reason through medical decision making, compliance with treatment recommendations, and improving      Psychiatric Risk Assessment  Violence Risk Assessment: major mental illness and substance abuse  Acute Risk of Harm to Others is Considered: moderate   Suicide Risk Assessment: , chronic medical " illness, chronic pain, current psychiatric illness, feelings of hopelessness, global insomnia, history of trauma or abuse, life crisis (shame/despair), prior suicide attempt, severe anxiety, substance abuse, and suicidal ideations  Protective Factors against Suicide: adherence to  treatment and marriage/partnership  Acute Risk of Harm to Self is Considered: moderate    Relevant Results  Scheduled medications  buPROPion XL, 300 mg, oral, Daily  celecoxib, 200 mg, oral, Daily  cholecalciferol, 125 mcg, oral, Daily  DULoxetine, 120 mg, oral, Daily  lidocaine, 1 patch, transdermal, Daily  QUEtiapine, 350 mg, oral, Nightly      Continuous medications     PRN medications  PRN medications: acetaminophen, alum-mag hydroxide-simeth, diphenhydrAMINE, hydrOXYzine pamoate, melatonin, OLANZapine, OLANZapine zydis, traZODone         Assessment/Plan   Principal Problem:    Severe recurrent major depression without psychotic features (CMS/HCC)  Active Problems:    Colostomy present (CMS/HCC)    Opioid abuse (CMS/HCC)    Bilateral hip pain    Biological -     Orthostatics - confirmed on measurement.  Ongoing efforts to communicate with patient about taking her time when changing position to help prevent falls.    Mouth checks.    Cymbalta 120 mg daily for depression, this is max dose  Wellbutrin xl 300 mg for adjunctive depression treatment  seroquel 350 mg at bedtime  Trazodone 150 mg PRN and melatonin at bedtime for sleep  No adverse side effects of medications noted or reported.  ECG (10/4/23): Normal sinus rhythm. Heart  rate 72 bpm. Qtc 442 - ordered again for today    Declined ECT consideration during discussion on 11/4, 11/7.   ECT is not available at this facility and this would include transitioning to another psychiatric hospital for this level of service or pursuing as an outpatient on discharge.    Discussion with patient regarding risk benefits and alternatives and side effects with opportunity to ask questions and  questions answered.  Patient given consent to the plan as noted    2. Psychological -     Patient is encouraged to participate with the therapeutic milieu and program and group therapy    3. Sociological -      Patient encouraged to cooperate with social work staff on issues relevant to discharge planning  Pending [place] medicaid and then transition to convalescence nursing care once this becomes available to patient    25 minutes spent coordinating care for patient on this day    Parts of this chart have been completed using voice recognition software.  Please excuse any errors of transcription.  Despite the medical decision making time stamp, my medical decision making has taken place during the patient's entire visit.  Thought process and reason for plan has been formulated from the time that I saw the patient until the time of disposition and is not specific to one specific moment during their visit and furthermore the medical decision making encompasses the entire chart and not only that represented in this note.      Eddie Cullen, DO

## 2023-11-13 NOTE — PROGRESS NOTES
LSW contacted pt's , Virgil, to inquire about paperwork from [place] Medicaid. Virgil states that he has not yet received it but will contact LSW as soon as it arrives. LSW and Virgil discussed pt's disposition and progress made. LSW discussed that the plan still would be for pt to discharge to a facility for more stabilization. Virgil is in agreement with this plan with the goal for pt to continue to progress and to eventually return home. Virgil was concerned as to why he was not contacted when pt fell and was sent to the ED. LSW will forward his concern to unit manager for follow up.     Nikki Andrews, DRAGAN

## 2023-11-13 NOTE — GROUP NOTE
Group Topic: Other   Group Date: 11/13/2023  Start Time: 1515  End Time: 1600  Facilitators: BRENDA Fernandes   Department: [facility]    Number of Participants: 6   Group Focus: other Let's talk  Treatment Modality: Other: Recreation Therapy  Interventions utilized were exploration, mental fitness, reminiscence, and story telling  Purpose: other: increase attention, enhance self esteem, increase stimulation, elevate mood, stimulate memory, increase socialization    Name: Auzl Roberts YOB: 1960   MR: [id number]      Facilitator: Recreational Therapist  Level of Participation: active  Quality of Participation: appropriate/pleasant, attentive, cooperative, and engaged  Interactions with others: appropriate and gave feedback  Mood/Affect: appropriate  Triggers (if applicable): n/a  Cognition: coherent/clear  Progress: Significant  Comments: pt problem is depressed mood.  Plan: continue with services

## 2023-11-13 NOTE — GROUP NOTE
Group Topic: Other   Group Date: 11/13/2023  Start Time: 1100  End Time: 1135  Facilitators: BRENDA Fernandes   Department: [facility]    Number of Participants: 4   Group Focus: reminiscence  Treatment Modality: Other: Recreation therapy  Interventions utilized were exploration and reminiscence  Purpose: feelings and other: increase attention, enhance self esteem, increase stimulation, elevate mood, stimulate memory, increase socialzation    Name: Azul Roberts YOB: 1960   MR: [id number]      Facilitator: Recreational Therapist  Level of Participation: did not attend  Quality of Participation:  did not attend  Interactions with others:  did not attend  Mood/Affect:  did not attend  Triggers (if applicable): n/a  Cognition:  did not attend  Progress: None  Comments: pt problem is depressed mood.  Pt requested to rest after MT, requested to rest in her room.  Declined offers for independent leisure activities at this time.    Plan: continue with services

## 2023-11-13 NOTE — GROUP NOTE
Group Topic: Music Therapy   Group Date: 11/13/2023  Start Time: 1000  End Time: 1100  Facilitators: Chanda Benton   Department: [facility]    Number of Participants: 5   Group Focus: music therapy  Treatment Modality: Music Therapy  Interventions utilized were other active music making, music-guided relaxation,   Purpose: communication skills and self-care    Name: Azul Roberts YOB: 1960   MR: [id number]      Facilitator: Music Therapist  Level of Participation: active  Quality of Participation: attentive, cooperative, engaged, and supportive  Interactions with others: appropriate, gave feedback, asked thoughtful questions, and offered helpful suggestions  Mood/Affect: appropriate and positive  Triggers (if applicable): n/a  Cognition: coherent/clear, goal directed, and logical  Progress: Significant  Comments: Great participation and insight into materials and music presented.  Plan: continue with services

## 2023-11-13 NOTE — NURSING NOTE
"SARAN NOTE     Problem:  Depression     Behavior:  Pt is up in her wheelchair sitting in the group room watching television. She is pleasant on approach. Smiling and holding conversation. Pt states that she is in some pain in her back and hips and \"tylenol doesn't work for me.\" Pt is compliant with her HS medications. Hs snack provided. Pt asking for Trazodone for sleep.    Group Participation: n/a  Appetite/Meals: HS snack       Interventions:  HS medications given, assessment completed,  Pt is taking care of colostomy bag appropriately.   2036- PRN Trazodone given for sleep per patient request.    Response:  **Reassessment**  2135- After her snack, pt took herself to her room and got into bed for the night, no s/s of distress noted. Pt is resting with her eyes closed-- PRN Trazodone effective.     Plan:  Continue to monitor and assist. Maintain q15 minutes rounds for safety.    "

## 2023-11-13 NOTE — GROUP NOTE
"Group Topic: Other   Group Date: 11/13/2023  Start Time: 0930  End Time: 1000  Facilitators: BRENDA Fernandes   Department: [facility]    Number of Participants: 4   Group Focus: other What's up?  Treatment Modality: Other: Recreation therapy  Interventions utilized were exploration, mental fitness, reminiscence, and story telling  Purpose: feelings and other: increase attention, enhance self esteem, increase stimulation, elevate mood, stimulate memory, increase socialization    Name: Azul Roberts YOB: 1960   MR: [id number]      Facilitator: Recreational Therapist  Level of Participation: did not attend  Quality of Participation:  did not attend  Interactions with others:  did not attend  Mood/Affect:  did not attend  Triggers (if applicable): n/a  Cognition:  did not attend  Progress: None  Comments: pt problem is depressed mood.  Pt requested to rest after MT, requested to \"go back to her room\".  Pt declined offers fro independent leisure activities at this time.   Plan: continue with services      "

## 2023-11-13 NOTE — GROUP NOTE
Group Topic: Other   Group Date: 11/13/2023  Start Time: 0930  End Time: 1000  Facilitators: BRENDA Fernandes   Department: [facility]    Number of Participants: 4   Group Focus: other What's up?  Treatment Modality: Other: Recreation therapy  Interventions utilized were exploration and mental fitness  Purpose: feelings and other: increase attention, enhance self esteem, increase stimulation, elevate mood, stimulate memory, increase socialization.    Name: Azul Roberts YOB: 1960   MR: [id number]      Facilitator: Recreational Therapist  Level of Participation: active  Quality of Participation: appropriate/pleasant, attention seeking, cooperative, and engaged  Interactions with others: appropriate and gave feedback  Mood/Affect: appropriate  Triggers (if applicable): n/a  Cognition: coherent/clear  Progress: Moderate  Comments: pt problem is depressed mood.  Plan: continue with services

## 2023-11-13 NOTE — PROGRESS NOTES
[facility]: [facility]  PROGRESS NOTE      Azul Roberts is a 63 y.o. female that is being seen  today for follow up at [facility]  Subjective   Patient is being seen for follow-up at [facility] unit.  Patient had a fall and was sent to the ER.  Patient was evaluated in the ER and CT scan of the brain was negative for any bleed.  Patient had a UA done which was positive for leukoesterase.  Culture is pending.  Patient has been at her baseline.  Colostomy has been working well.  Pain has been fairly controlled.    ROS  Negative for fever or chills  Negative for sore throat, ear pain, nasal discharge  Negative for cough, shortness of breath or wheezing  Negative for chest pain, palpitations, swelling of legs  Negative for abdominal pain, constipation, diarrhea, blood in the stools,has colostomy  Negative for urinary complaints  Negative for headache, dizziness, weakness or numbness  Negative for joint pain  Positive for depression or anxiety  All other systems reviewed and were negative  Vitals:    11/13/23 0616   BP: 124/68   Pulse: 86   Resp: 17   Temp: 36.8 °C (98.2 °F)   SpO2: 99%      Body mass index is 29.87 kg/m².  Physical Exam  Constitutional: Patient does not appear to be in any acute distress  Head and Face: NCAT  Eyes: Normal external exam, EOMI  ENT: Normal external inspection of ears and nose. Oropharynx normal.  Cardiovascular: RRR, S1/S2, no murmurs, rubs, or gallops, radial pulses +2, no edema of extremities  Pulmonary: CTAB, no respiratory distress.  Abdomen: +BS, soft, non-tender, nondistended, no guarding or rebound, no masses noted.colostomy present   MSK: hip joint pain   Skin- No lesions, contusions, or erythema.  Peripheral puslses palpable bilaterally 2+  Neuro: AAO X3, Cranial nerves 2-12 grossly intact,DTR 2+ in all 4 limbs       Diagnostic Results   Lab Results   Component Value Date    GLUCOSE 113 (H) 09/05/2023    CALCIUM 10.6 (H) 09/05/2023     09/05/2023     "K 3.7 09/05/2023    CO2 24 09/05/2023     09/05/2023    BUN 12 09/05/2023    CREATININE 0.72 09/05/2023     Lab Results   Component Value Date    ALT 12 09/05/2023    AST 14 09/05/2023    ALKPHOS 103 09/05/2023    BILITOT 0.4 09/05/2023     Lab Results   Component Value Date    WBC 13.2 (H) 09/05/2023    HGB 13.9 09/05/2023    HCT 43.0 09/05/2023    MCV 81 09/05/2023     09/05/2023     No results found for: \"CHOL\"  No results found for: \"HDL\"  No results found for: \"LDLCALC\"  No results found for: \"TRIG\"  No results found for: \"HGBA1C\"  Other labs not included in the list above were reviewed either before or during this encounter.    History    No past medical history on file.  No past surgical history on file.  No family history on file.  No Known Allergies  No current facility-administered medications on file prior to encounter.     Current Outpatient Medications on File Prior to Encounter   Medication Sig Dispense Refill    acetaminophen (Tylenol) 325 mg tablet Take 2 tablets (650 mg) by mouth 2 times a day.      hydrOXYzine pamoate (Vistaril) 25 mg capsule Take 1 capsule (25 mg) by mouth 2 times a day.      magnesium oxide (Mag-Ox) 400 mg tablet Take 1 tablet (400 mg) by mouth 2 times a day.      melatonin 5 mg tablet Take 1 tablet (5 mg) by mouth once daily at bedtime.      risperiDONE (RisperDAL) 2 mg tablet Take 1 tablet (2 mg) by mouth once daily at bedtime.      traZODone (Desyrel) 150 mg tablet Take 1 tablet (150 mg) by mouth once daily at bedtime.     Scheduled medications  buPROPion XL, 300 mg, oral, Daily  celecoxib, 200 mg, oral, Daily  cholecalciferol, 125 mcg, oral, Daily  citalopram, 30 mg, oral, Daily  lidocaine, 1 patch, transdermal, Daily  melatonin, 5 mg, oral, Nightly  QUEtiapine, 350 mg, oral, Nightly  traZODone, 150 mg, oral, Nightly      Continuous medications     PRN medications  PRN medications: acetaminophen, alum-mag hydroxide-simeth, diphenhydrAMINE, hydrOXYzine pamoate, " OLANZapine, OLANZapine zydis     There is no immunization history on file for this patient.  Patient's medical history was reviewed and updated either before or during this encounter.  ASSESSMENT / PLAN:  Active Hospital Problems    Bilateral hip pain      *Severe recurrent major depression without psychotic features (CMS/HCC)      Colostomy present (CMS/HCC)      Opioid abuse (CMS/HCC)  S/p fall.  Hip x-rays negative CT scan of the brain is negative     Pt. Has been stable.clostomy is working well.        Jay Knox MD

## 2023-11-14 ENCOUNTER — APPOINTMENT (OUTPATIENT)
Dept: CARDIOLOGY | Facility: HOSPITAL | Age: 63
End: 2023-11-14
Payer: MEDICAID

## 2023-11-14 LAB
ATRIAL RATE: 74 BPM
ATRIAL RATE: 89 BPM
P AXIS: 57 DEGREES
P AXIS: 67 DEGREES
P OFFSET: 182 MS
P OFFSET: 184 MS
P ONSET: 119 MS
P ONSET: 122 MS
PR INTERVAL: 198 MS
PR INTERVAL: 198 MS
Q ONSET: 218 MS
Q ONSET: 221 MS
QRS COUNT: 12 BEATS
QRS COUNT: 14 BEATS
QRS DURATION: 90 MS
QRS DURATION: 92 MS
QT INTERVAL: 372 MS
QT INTERVAL: 398 MS
QTC CALCULATION(BAZETT): 441 MS
QTC CALCULATION(BAZETT): 452 MS
QTC FREDERICIA: 424 MS
QTC FREDERICIA: 427 MS
R AXIS: 43 DEGREES
R AXIS: 52 DEGREES
T AXIS: 52 DEGREES
T AXIS: 64 DEGREES
T OFFSET: 404 MS
T OFFSET: 420 MS
VENTRICULAR RATE: 74 BPM
VENTRICULAR RATE: 89 BPM

## 2023-11-14 PROCEDURE — 93005 ELECTROCARDIOGRAM TRACING: CPT

## 2023-11-14 PROCEDURE — 1140000001 HC PRIVATE PSYCH ROOM DAILY

## 2023-11-14 PROCEDURE — 2500000001 HC RX 250 WO HCPCS SELF ADMINISTERED DRUGS (ALT 637 FOR MEDICARE OP): Performed by: PSYCHIATRY & NEUROLOGY

## 2023-11-14 PROCEDURE — 2500000002 HC RX 250 W HCPCS SELF ADMINISTERED DRUGS (ALT 637 FOR MEDICARE OP, ALT 636 FOR OP/ED): Performed by: STUDENT IN AN ORGANIZED HEALTH CARE EDUCATION/TRAINING PROGRAM

## 2023-11-14 PROCEDURE — 99232 SBSQ HOSP IP/OBS MODERATE 35: CPT | Performed by: PSYCHIATRY & NEUROLOGY

## 2023-11-14 PROCEDURE — 2500000004 HC RX 250 GENERAL PHARMACY W/ HCPCS (ALT 636 FOR OP/ED): Performed by: PSYCHIATRY & NEUROLOGY

## 2023-11-14 PROCEDURE — 97110 THERAPEUTIC EXERCISES: CPT | Mod: GP

## 2023-11-14 RX ORDER — ACETAMINOPHEN 500 MG
5 TABLET ORAL
Status: DISCONTINUED | OUTPATIENT
Start: 2023-11-14 | End: 2023-12-08 | Stop reason: HOSPADM

## 2023-11-14 RX ADMIN — Medication 125 MCG: at 08:29

## 2023-11-14 RX ADMIN — Medication 5 MG: at 18:21

## 2023-11-14 RX ADMIN — DULOXETINE HYDROCHLORIDE 120 MG: 60 CAPSULE, DELAYED RELEASE ORAL at 08:29

## 2023-11-14 RX ADMIN — CELECOXIB 200 MG: 200 CAPSULE ORAL at 16:37

## 2023-11-14 RX ADMIN — TRAZODONE HYDROCHLORIDE 150 MG: 50 TABLET ORAL at 21:02

## 2023-11-14 RX ADMIN — BUPROPION HYDROCHLORIDE 300 MG: 300 TABLET, FILM COATED, EXTENDED RELEASE ORAL at 08:29

## 2023-11-14 RX ADMIN — QUETIAPINE FUMARATE 350 MG: 300 TABLET ORAL at 21:02

## 2023-11-14 ASSESSMENT — PAIN SCALES - GENERAL
PAINLEVEL_OUTOF10: 5 - MODERATE PAIN
PAINLEVEL_OUTOF10: 0 - NO PAIN
PAINLEVEL_OUTOF10: 0 - NO PAIN

## 2023-11-14 ASSESSMENT — COGNITIVE AND FUNCTIONAL STATUS - GENERAL
CLIMB 3 TO 5 STEPS WITH RAILING: A LOT
WALKING IN HOSPITAL ROOM: A LITTLE
MOVING TO AND FROM BED TO CHAIR: A LITTLE
STANDING UP FROM CHAIR USING ARMS: A LITTLE
MOBILITY SCORE: 19

## 2023-11-14 ASSESSMENT — COLUMBIA-SUICIDE SEVERITY RATING SCALE - C-SSRS
6. HAVE YOU EVER DONE ANYTHING, STARTED TO DO ANYTHING, OR PREPARED TO DO ANYTHING TO END YOUR LIFE?: NO
2. HAVE YOU ACTUALLY HAD ANY THOUGHTS OF KILLING YOURSELF?: NO
6. HAVE YOU EVER DONE ANYTHING, STARTED TO DO ANYTHING, OR PREPARED TO DO ANYTHING TO END YOUR LIFE?: NO
1. SINCE LAST CONTACT, HAVE YOU WISHED YOU WERE DEAD OR WISHED YOU COULD GO TO SLEEP AND NOT WAKE UP?: NO
2. HAVE YOU ACTUALLY HAD ANY THOUGHTS OF KILLING YOURSELF?: NO
1. SINCE LAST CONTACT, HAVE YOU WISHED YOU WERE DEAD OR WISHED YOU COULD GO TO SLEEP AND NOT WAKE UP?: NO

## 2023-11-14 ASSESSMENT — PAIN - FUNCTIONAL ASSESSMENT
PAIN_FUNCTIONAL_ASSESSMENT: 0-10
PAIN_FUNCTIONAL_ASSESSMENT: 0-10

## 2023-11-14 ASSESSMENT — ENCOUNTER SYMPTOMS
NERVOUS/ANXIOUS: 1
DYSPHORIC MOOD: 1
SLEEP DISTURBANCE: 1
DECREASED CONCENTRATION: 1

## 2023-11-14 NOTE — NURSING NOTE
"SARAN NOTE     Problem:  depression     Behavior:  Pt came out of her room for a snack, good mood, smiling, makes eye contact,  was socially engaged with another Pt, pleasant and cooperative. Pt said that she is \"doing better, but unable to feel it yet\". Denied SI, taking care of her colostomy bag independently.  Group Participation: n/a  Appetite/Meals: HS snack provided       Interventions:  1:1 intervention provided, scheduled and PRN medication administered, self care issues addressed and reviewed    Response:  Pt is compliant with her medication     Plan:  Continue monitoring  "

## 2023-11-14 NOTE — GROUP NOTE
Group Topic: Self-Care/Wellness   Group Date: 11/14/2023  Start Time: 1515  End Time: 1600  Facilitators: BRENDA Fernandes   Department: [facility]    Number of Participants: 7   Group Focus: acceptance, coping skills, problem solving, and safety plan  Treatment Modality: Other: Recreation therapy  Interventions utilized were exploration, mental fitness, patient education, and problem solving  Purpose: coping skills, feelings, insight or knowledge, and self-care    Name: Azul Roberts YOB: 1960   MR: [id number]      Facilitator: Recreational Therapist  Level of Participation: active  Quality of Participation: appropriate/pleasant, attentive, cooperative, and engaged  Interactions with others: appropriate and gave feedback  Mood/Affect: appropriate and brightens with interaction  Triggers (if applicable): n/a  Cognition: coherent/clear and logical  Progress: Significant  Comments: pt problem is depressed mood.  Plan: continue with services

## 2023-11-14 NOTE — PROGRESS NOTES
"Azul Roberts is a 63 y.o. female on day 66 of admission presenting with Severe recurrent major depression without psychotic features (CMS/HCC).        Subjective   Case discussed with treatment team and chart reviewed.    Azul has been out of her room nearly the entire day.  She is engaging and spontaneous in speech.  She tries to make jokes during discussion as well.  She continues to report that her mood is \"alright.\"  She appears to be graveling sarcastically about her condition.  To some staff she describes that she is not any better however on observation she is clearly functioning and feeling better than when she first arrived at the facility.  We remain at the mercy of [place] Medicaid snail male documentation to coordinate patient's discharge from this facility and social work remains in close contact with  where these documents are due to be mailed.  Patient continues to report poor sleep despite multiple medications being given to assist with sleep.  She is observed nightly sleeping by staff on observation however.    She is currently low-intensity level of care for physical therapy being seen 3 times a week for skilled services.    Overnight nursing reporting that patient endorses a good mood and is smiling and making appropriate eye contact while socially engaging another patient.  She is described as pleasant and cooperative and is quoted as saying \"doing better but unable to feel that yet\" patient also denied suicidal thoughts at this nursing staff member.    Objective     Vitals:    11/13/23 0616 11/13/23 1631 11/14/23 0541 11/14/23 1644   BP: 124/68 99/66 126/69 96/67   BP Location: Left arm Right arm Left arm Left arm   Patient Position: Lying Sitting Lying Standing   Pulse: 86 95 72 72   Resp: 17 16 18 18   Temp: 36.8 °C (98.2 °F) 36.8 °C (98.2 °F) 36.5 °C (97.7 °F) 36.5 °C (97.7 °F)   TempSrc: Oral Temporal Temporal Temporal   SpO2: 99% 99% 95% 99%   Weight:       Height:        " "    Review of Systems   Psychiatric/Behavioral:  Positive for decreased concentration, dysphoric mood and sleep disturbance. The patient is nervous/anxious.      Psychiatric ROS - Adult  Anxiety: General Anxiety Disorder (KAYLEEN)KAYLEEN Behaviors: difficult to control worry, difficulty concentrating, irritability, restlessness, and sleep disturbance  Depression: anhedonia, appetite increased, concentration, energy, helpless, hopeless, interest, persistent thoughts of death, psychomotor agitation, sleep decreased , suicidal thoughts, and withdrawn  Delirium: negative  Psychosis: negative  Ioana: negative  Safety Issues: passive death wish and suicidal ideation  Psychiatric ROS Comment: as noted    Physical Exam  Abdominal:      Comments: Presence of colostomy bag   Psychiatric:         Attention and Perception: Attention and perception normal.         Mood and Affect: Mood is anxious and depressed. Affect is flat and inappropriate.         Behavior: Behavior is slowed.     Mental Status Examination  General Appearance:  less disheveled with improved eye contact, not malodorous on approach. Sitting in wheelchair within group room  Gait/Station: using wheelchair to move about the milieu  Speech: brief, conversational volume  Mood: \"alright again\"  Affect: Dysphoric, constricted ,  Thought Process: remains impoverished but less so  Thought Associations: No loosening of associations  Thought Content: depressed, hopeless, persists as helpless, endorsing suicidal ideations today  Perception: No perceptual abnormalities noted  Level of Consciousness: Alert  Orientation: Alert  Attention and Concentration: Mild impairment in attention  Recent Memory: Intact as evidenced by ability to recall details from the past 24 hours   Executive function: Intact  Language: Naming intact  Fund of knowledge: Good  Insight: Fair, as evidenced by increasing participation in discussion about her scenario and symptoms and their improvement  Judgment: " Fair, as evidenced by ability to reason through medical decision making, compliance with treatment recommendations, and improving      Psychiatric Risk Assessment  Violence Risk Assessment: major mental illness and substance abuse  Acute Risk of Harm to Others is Considered: moderate   Suicide Risk Assessment: , chronic medical illness, chronic pain, current psychiatric illness, feelings of hopelessness, global insomnia, history of trauma or abuse, life crisis (shame/despair), prior suicide attempt, severe anxiety, substance abuse, and suicidal ideations  Protective Factors against Suicide: adherence to  treatment and marriage/partnership  Acute Risk of Harm to Self is Considered: moderate    Relevant Results  Scheduled medications  buPROPion XL, 300 mg, oral, Daily  celecoxib, 200 mg, oral, Daily  cholecalciferol, 125 mcg, oral, Daily  DULoxetine, 120 mg, oral, Daily  lidocaine, 1 patch, transdermal, Daily  melatonin, 5 mg, oral, Daily  QUEtiapine, 350 mg, oral, Nightly      Continuous medications     PRN medications  PRN medications: acetaminophen, alum-mag hydroxide-simeth, diphenhydrAMINE, hydrOXYzine pamoate, OLANZapine, OLANZapine zydis, traZODone         Assessment/Plan   Principal Problem:    Severe recurrent major depression without psychotic features (CMS/HCC)  Active Problems:    Colostomy present (CMS/HCC)    Opioid abuse (CMS/HCC)    Bilateral hip pain    Biological -     Orthostatics - confirmed on measurement.  Ongoing efforts to communicate with patient about taking her time when changing position to help prevent falls.    Mouth checks.    Cymbalta 120 mg daily for depression, this is max dose  Wellbutrin xl 300 mg for adjunctive depression treatment  seroquel 350 mg at bedtime  Trazodone 150 mg PRN and melatonin at bedtime for sleep  No adverse side effects of medications noted or reported.  ECG (10/4/23): Normal sinus rhythm. Heart  rate 72 bpm. Qtc 442 - ordered again for  today    Declined ECT consideration during discussion on 11/4, 11/7.   ECT is not available at this facility and this would include transitioning to another psychiatric hospital for this level of service or pursuing as an outpatient on discharge.    Discussion with patient regarding risk benefits and alternatives and side effects with opportunity to ask questions and questions answered.  Patient given consent to the plan as noted    2. Psychological -     Patient is encouraged to participate with the therapeutic milieu and program and group therapy    3. Sociological -      Patient encouraged to cooperate with social work staff on issues relevant to discharge planning  Pending [place] medicaid and then transition to convalescence nursing care once this becomes available to patient    25 minutes spent coordinating care for patient on this day    Parts of this chart have been completed using voice recognition software.  Please excuse any errors of transcription.  Despite the medical decision making time stamp, my medical decision making has taken place during the patient's entire visit.  Thought process and reason for plan has been formulated from the time that I saw the patient until the time of disposition and is not specific to one specific moment during their visit and furthermore the medical decision making encompasses the entire chart and not only that represented in this note.      Eddie Cullen, DO

## 2023-11-14 NOTE — PROGRESS NOTES
Physical Therapy    Physical Therapy Treatment 11:    Patient Name: Azul Roberts  MRN: [id number]  Today's Date: 11/14/2023          Assessment/Plan   PT Assessment  PT Assessment Results: Decreased strength, Decreased endurance, Impaired balance, Decreased mobility, Decreased coordination, Decreased safety awareness, Pain  Rehab Prognosis: Good  Evaluation/Treatment Tolerance: Patient limited by pain  Medical Staff Made Aware: Yes  Strengths: Support of Caregivers  Barriers to Participation: Attitude of self  End of Session Communication:  (PCA---nurse in a meeting)  Assessment Comment: Patient with self limiting behaviors that impact rehab and therapy participation.  End of Session Patient Position: Up in chair (in group room)  PT Plan  Inpatient/Swing Bed or Outpatient: Inpatient  PT Plan  Treatment/Interventions: Therapeutic exercise  PT Plan: Skilled PT  PT Frequency: 3 times per week  PT Discharge Recommendations: Low intensity level of continued care  Equipment Recommended upon Discharge: Wheeled walker  PT Recommended Transfer Status: Stand by assist  PT - OK to Discharge: Yes      General Visit Information:   PT  Visit  PT Received On: 11/14/23  Response to Previous Treatment: Patient reporting fatigue but able to participate., Other (Comment)      Subjective   Precautions:  Precautions  Medical Precautions: Fall precautions  Precautions Comment:  (+ colostomy)  Vital Signs:       Objective   Pain:  Pain Assessment  Pain Score: 5 - Moderate pain  Pain Type: Chronic pain  Pain Location: Hip  Pain Frequency: Constant/continuous  Response to Interventions: Repositioning  Cognition:  Cognition  Orientation Level: Oriented X4  Postural Control:           Activity tolerance:   Limited due to reports of L hip pain     Treatments:  Therapeutic Exercise  Therapeutic Exercise Performed: Yes  Therapeutic Exercise Activity 1: hip flex 2x15 with pain L LE  Therapeutic Exercise Activity 2: LAQ 2x15, AAROM L hip  due to pain  Therapeutic Exercise Activity 3: hip adduction against ball 2x15  Therapeutic Exercise Activity 4: hip abduction against manual resistance  Therapeutic Exercise Activity 5:  (Attempted standing toe raises but patient not able to complete this date due to pain in L hip)  Therapeutic Exercise Activity 6: hamstring curls 2x15 green theraband    Outcome Measures:  [facility] Basic Mobility  Turning from your back to your side while in a flat bed without using bedrails: None  Moving from lying on your back to sitting on the side of a flat bed without using bedrails: None  Moving to and from bed to chair (including a wheelchair): A little  Standing up from a chair using your arms (e.g. wheelchair or bedside chair): A little  To walk in hospital room: A little  Climbing 3-5 steps with railing: A lot  Basic Mobility - Total Score: 19    IP EDUCATION:  Education  Individual(s) Educated: Patient  Education Comment: Reviewed importance and benefits of particapting in therapy.    Encounter Problems       Encounter Problems (Active)       Balance       STG - Maintains dynamic standing balance with upper extremity support (Progressing)       Start:  10/27/23    Expected End:  11/10/23       INTERVENTIONS:  1. Practice standing with minimal support.  2. Educate patient about standing tolerance.  3. Educate patient about independence with gait, transfers, and ADL's.  4. Educate patient about use of assistive device.  5. Educate patient about self-directed care.            Mobility       STG - Patient will ambulate 300 ft with FWW , + turns, distant supervision of 1. (Progressing)       Start:  10/27/23    Expected End:  11/10/23               Pain       pt will demonstrate < 4/10 left hip pain during functional mobility/therapy session (Not Progressing)       Start:  10/27/23    Expected End:  11/10/23               Transfers       STG - Patient to transfer to and from sit to supine mod independent (Progressing)        Start:  10/27/23    Expected End:  11/10/23            STG - Patient will transfer sit to and from stand mod independent (Progressing)       Start:  10/27/23    Expected End:  11/10/23                  Encounter Problems (Resolved)       Balance       LTG - Patient will maintain standing and sitting balance to allow for completion of daily activities (Met)       Start:  10/16/23    Expected End:  11/13/23    Resolved:  11/07/23            Mobility       LTG - Patient will ambulate household distance (Met)       Start:  10/16/23    Expected End:  11/13/23    Resolved:  11/07/23            Safety       LTG - Patient will demonstrate safety requirements appropriate to situation/environment (Met)       Start:  10/16/23    Expected End:  11/13/23    Resolved:  11/07/23

## 2023-11-14 NOTE — NURSING NOTE
Pt requested medication for sleep: PRN Trazodone 150mg and PRN Melatonin 5mg administered at 2025.  Reassessment of PRN medication: 2120 Pt is resting in bed, no signs of distress noted

## 2023-11-14 NOTE — NURSING NOTE
SARAN NOTE     Problem:  depression     Behavior:  Pt pleasant and cooperative with staff and care. Pt out of her room this shift, attending group therapy in the group room, smiling and joking often, not great eye contact but makes needs known.  Group Participation: YES  Appetite/Meals: good       Interventions:  Administered scheduled medications    Response:  Pt laying in bed at end of shift     Plan:  Maintain pt safety

## 2023-11-14 NOTE — CARE PLAN
The patient's goals for the shift include go home    The clinical goals for the shift include no falls and  maintain safety    Over the shift, the patient did not make progress toward the following goals. Barriers to progression include placement. Recommendations to address these barriers include social work to work with spouse.

## 2023-11-14 NOTE — GROUP NOTE
Group Topic: Cognitive Focus   Group Date: 11/14/2023  Start Time: 1000  End Time: 1040  Facilitators: Alexei Azul LCSW   Department: [facility]    Number of Participants: 4   Group Focus: other Gratitude  Treatment Modality: Cognitive Behavioral Therapy  Interventions utilized were group exercise and patient education  Purpose: feelings, communication skills, and self-worth    Name: Azul Roberts YOB: 1960   MR: [id number]      Facilitator: appropriate/pleasant  Level of Participation: active  Quality of Participation: appropriate/pleasant, attentive, and cooperative  Interactions with others: appropriate  Mood/Affect: positive  Triggers (if applicable): N/a  Cognition: coherent/clear and insightful  Progress: Moderate  Comments: Azul shared positive thoughts on today's topic and shared she is grateful for her . She shared her  is supportive and understanding of her current treatment.   Plan: continue with services

## 2023-11-14 NOTE — GROUP NOTE
Group Topic: Self-Care/Wellness   Group Date: 11/14/2023  Start Time: 1045  End Time: 1130  Facilitators: BRENDA Fernandes   Department: [facility]    Number of Participants: 6   Group Focus: anxiety, coping skills, problem solving, relaxation, and self-awareness  Treatment Modality: Other: Recreation therapy  Interventions utilized were exploration, mental fitness, patient education, and problem solving  Purpose: coping skills, feelings, insight or knowledge, self-care, and other: increase socialization, elevate mood,  enhance self esteem    Name: Azul Roberts YOB: 1960   MR: [id number]      Facilitator: Recreational Therapist  Level of Participation: active  Quality of Participation: appropriate/pleasant, attentive, cooperative, and engaged  Interactions with others: appropriate and gave feedback  Mood/Affect: appropriate  Triggers (if applicable): n/a  Cognition: coherent/clear  Progress: Moderate  Comments: pt problem is depressed mood.  Plan: continue with services

## 2023-11-15 PROCEDURE — 1140000001 HC PRIVATE PSYCH ROOM DAILY

## 2023-11-15 PROCEDURE — 2500000002 HC RX 250 W HCPCS SELF ADMINISTERED DRUGS (ALT 637 FOR MEDICARE OP, ALT 636 FOR OP/ED): Performed by: STUDENT IN AN ORGANIZED HEALTH CARE EDUCATION/TRAINING PROGRAM

## 2023-11-15 PROCEDURE — 2500000001 HC RX 250 WO HCPCS SELF ADMINISTERED DRUGS (ALT 637 FOR MEDICARE OP): Performed by: PSYCHIATRY & NEUROLOGY

## 2023-11-15 PROCEDURE — 99231 SBSQ HOSP IP/OBS SF/LOW 25: CPT | Performed by: PSYCHIATRY & NEUROLOGY

## 2023-11-15 PROCEDURE — 99232 SBSQ HOSP IP/OBS MODERATE 35: CPT | Performed by: INTERNAL MEDICINE

## 2023-11-15 PROCEDURE — 2500000004 HC RX 250 GENERAL PHARMACY W/ HCPCS (ALT 636 FOR OP/ED): Performed by: PSYCHIATRY & NEUROLOGY

## 2023-11-15 RX ADMIN — Medication 125 MCG: at 08:17

## 2023-11-15 RX ADMIN — Medication 5 MG: at 19:30

## 2023-11-15 RX ADMIN — DULOXETINE HYDROCHLORIDE 120 MG: 60 CAPSULE, DELAYED RELEASE ORAL at 08:17

## 2023-11-15 RX ADMIN — CELECOXIB 200 MG: 200 CAPSULE ORAL at 15:41

## 2023-11-15 RX ADMIN — TRAZODONE HYDROCHLORIDE 150 MG: 50 TABLET ORAL at 21:05

## 2023-11-15 RX ADMIN — BUPROPION HYDROCHLORIDE 300 MG: 300 TABLET, FILM COATED, EXTENDED RELEASE ORAL at 08:17

## 2023-11-15 RX ADMIN — QUETIAPINE FUMARATE 350 MG: 300 TABLET ORAL at 21:04

## 2023-11-15 ASSESSMENT — ENCOUNTER SYMPTOMS
NERVOUS/ANXIOUS: 1
DECREASED CONCENTRATION: 1
SLEEP DISTURBANCE: 1
DYSPHORIC MOOD: 1

## 2023-11-15 ASSESSMENT — PAIN - FUNCTIONAL ASSESSMENT
PAIN_FUNCTIONAL_ASSESSMENT: 0-10
PAIN_FUNCTIONAL_ASSESSMENT: 0-10

## 2023-11-15 ASSESSMENT — PAIN SCALES - GENERAL
PAINLEVEL_OUTOF10: 0 - NO PAIN
PAINLEVEL_OUTOF10: 0 - NO PAIN

## 2023-11-15 NOTE — PROGRESS NOTES
Azul Roberts is a 63 y.o. female on day 67 of admission presenting with Severe recurrent major depression without psychotic features (CMS/HCC).    Level 1 note    Subjective   Case discussed with treatment team and chart reviewed.    Minimal change.  Sleep reported to be the same with modification of melatonin.  Maintaining some gains, participating in groups more as has been over the past several days.  Optimal discharge coordination remains focus and social work remains involved in working to facilitate this.    Objective     Vitals:    11/13/23 1631 11/14/23 0541 11/14/23 1644 11/15/23 0500   BP: 99/66 126/69 96/67 131/80   BP Location: Right arm Left arm Left arm Right arm   Patient Position: Sitting Lying Standing Lying   Pulse: 95 72 72 75   Resp: 16 18 18 18   Temp: 36.8 °C (98.2 °F) 36.5 °C (97.7 °F) 36.5 °C (97.7 °F) 36.6 °C (97.9 °F)   TempSrc: Temporal Temporal Temporal Temporal   SpO2: 99% 95% 99% 100%   Weight:       Height:            Review of Systems   Psychiatric/Behavioral:  Positive for decreased concentration, dysphoric mood and sleep disturbance. The patient is nervous/anxious.      Psychiatric ROS - Adult  Anxiety: General Anxiety Disorder (KAYLEEN)KAYLEEN Behaviors: difficult to control worry, difficulty concentrating, irritability, restlessness, and sleep disturbance  Depression: anhedonia, appetite increased, concentration, energy, helpless, hopeless, interest, persistent thoughts of death, psychomotor agitation, sleep decreased , suicidal thoughts, and withdrawn  Delirium: negative  Psychosis: negative  Ioana: negative  Safety Issues: passive death wish and suicidal ideation  Psychiatric ROS Comment: as noted    Physical Exam  Abdominal:      Comments: Presence of colostomy bag   Psychiatric:         Attention and Perception: Attention and perception normal.         Mood and Affect: Mood is anxious and depressed. Affect is flat and inappropriate.         Behavior: Behavior is slowed.     Mental  "Status Examination  General Appearance:  less disheveled with improved eye contact, not malodorous on approach. Sitting in wheelchair near nursing station  Gait/Station: using wheelchair to move about the milieu  Speech: brief, conversational volume  Mood: \"ok, I guess\"  Affect: Dysphoric, constricted ,  Thought Process: remains impoverished but less so  Thought Associations: No loosening of associations  Thought Content: depressed, hopeless, persists as helpless, endorsing suicidal ideations today  Perception: No perceptual abnormalities noted  Level of Consciousness: Alert  Orientation: Alert  Attention and Concentration: Mild impairment in attention  Recent Memory: Intact as evidenced by ability to recall details from the past 24 hours   Executive function: Intact  Language: Naming intact  Fund of knowledge: Good  Insight: Fair, as evidenced by increasing participation in discussion about her scenario and symptoms and their improvement  Judgment: Fair, as evidenced by ability to reason through medical decision making, compliance with treatment recommendations, and improving      Psychiatric Risk Assessment  Violence Risk Assessment: major mental illness and substance abuse  Acute Risk of Harm to Others is Considered: moderate   Suicide Risk Assessment: , chronic medical illness, chronic pain, current psychiatric illness, feelings of hopelessness, global insomnia, history of trauma or abuse, life crisis (shame/despair), prior suicide attempt, severe anxiety, substance abuse, and suicidal ideations  Protective Factors against Suicide: adherence to  treatment and marriage/partnership  Acute Risk of Harm to Self is Considered: moderate    Relevant Results  Scheduled medications  buPROPion XL, 300 mg, oral, Daily  celecoxib, 200 mg, oral, Daily  cholecalciferol, 125 mcg, oral, Daily  DULoxetine, 120 mg, oral, Daily  lidocaine, 1 patch, transdermal, Daily  melatonin, 5 mg, oral, Daily  QUEtiapine, 350 mg, " oral, Nightly      Continuous medications     PRN medications  PRN medications: acetaminophen, alum-mag hydroxide-simeth, diphenhydrAMINE, hydrOXYzine pamoate, OLANZapine, OLANZapine zydis, traZODone         Assessment/Plan   Principal Problem:    Severe recurrent major depression without psychotic features (CMS/HCC)  Active Problems:    Colostomy present (CMS/HCC)    Opioid abuse (CMS/HCC)    Bilateral hip pain    Biological -     Orthostatics - confirmed on measurement.  Ongoing efforts to communicate with patient about taking her time when changing position to help prevent falls.    Mouth checks.    Cymbalta 120 mg daily for depression, this is max dose  Wellbutrin xl 300 mg for adjunctive depression treatment  seroquel 350 mg at bedtime  Trazodone 150 mg PRN and melatonin at bedtime for sleep  No adverse side effects of medications noted or reported.  ECG (10/4/23): Normal sinus rhythm. Heart  rate 72 bpm. Qtc 442  11/13/23 - qtc 441    Declined ECT consideration during discussion on 11/4, 11/7.   ECT is not available at this facility and this would include transitioning to another psychiatric hospital for this level of service or pursuing as an outpatient on discharge.    Discussion with patient regarding risk benefits and alternatives and side effects with opportunity to ask questions and questions answered.  Patient given consent to the plan as noted    2. Psychological -     Patient is encouraged to participate with the therapeutic milieu and program and group therapy    3. Sociological -      Patient encouraged to cooperate with social work staff on issues relevant to discharge planning  Pending [place] medicaid and then transition to convalescence nursing care once this becomes available to patient    15 minutes spent coordinating care for patient on this day    Parts of this chart have been completed using voice recognition software.  Please excuse any errors of transcription.  Despite the medical decision  making time stamp, my medical decision making has taken place during the patient's entire visit.  Thought process and reason for plan has been formulated from the time that I saw the patient until the time of disposition and is not specific to one specific moment during their visit and furthermore the medical decision making encompasses the entire chart and not only that represented in this note.      Eddie uCllen, DO

## 2023-11-15 NOTE — NURSING NOTE
"SARAN NOTE     Problem:  depression     Behavior:  Pt isolated herself to her room, did not come out for a snack, reported mood as \"ok\", denied pain and SI. Pt requested Trazodone 150mg for sleep.  Group Participation: n/a   Appetite/Meals: refused hs snack       Interventions:  1:1 intervention provided, scheduled medication administered  2102 PRN Trazodone 150mg is given for sleep    Response:  Pt answers questions short, usually with words ok, yes and no. Pt is compliant with her medication  2200 PRN Trazodone 150mg reassessment Pt is resting in bed, turning frequently, not asleep at this time     Plan:  Continue monitoring, adhere to care plan  "

## 2023-11-15 NOTE — GROUP NOTE
Group Topic: Other   Group Date: 11/15/2023  Start Time: 1515  End Time: 1600  Facilitators: BRENDA Fernandes   Department: [facility] REHABTH VIRTUAL    Number of Participants: 3   Group Focus: other [facility]  Treatment Modality: Other: Recreation therapy  Interventions utilized were mental fitness and reminiscence  Purpose: other: increase attention, increase alertness, elevate mood, stimulate memory, increase socialization    Name: Azul Roberts YOB: 1960   MR: [id number]      Facilitator: Recreational Therapist  Level of Participation: active  Quality of Participation: appropriate/pleasant, attentive, cooperative, and engaged  Interactions with others: appropriate and gave feedback  Mood/Affect: appropriate and brightens with interaction  Triggers (if applicable): n/a  Cognition: coherent/clear  Progress: Significant  Comments: pt problem is depressed mood.  Pt is pleasant, cooperative and calm.  Singing along with jingles that are being played.  Smiling and laughing often during group.  Plan: continue with services

## 2023-11-15 NOTE — PROGRESS NOTES
[facility]: [facility]  PROGRESS NOTE      Azul Roberts is a 63 y.o. female that is being seen  today for follow up at [facility]  Subjective   Patient is being seen for follow-up at [facility] unit.  Patient had a fall and was sent to the ER.  Patient was evaluated in the ER and CT scan of the brain was negative for any bleed.  Patient had a UA done which was positive for leukoesterase.  Culture is pending.  Patient has been at her baseline.  Colostomy has been working well.  Pain has been fairly controlled.    ROS  Negative for fever or chills  Negative for sore throat, ear pain, nasal discharge  Negative for cough, shortness of breath or wheezing  Negative for chest pain, palpitations, swelling of legs  Negative for abdominal pain, constipation, diarrhea, blood in the stools,has colostomy  Negative for urinary complaints  Negative for headache, dizziness, weakness or numbness  Negative for joint pain  Positive for depression or anxiety  All other systems reviewed and were negative  Vitals:    11/15/23 0500   BP: 131/80   Pulse: 75   Resp: 18   Temp: 36.6 °C (97.9 °F)   SpO2: 100%      Body mass index is 29.87 kg/m².  Physical Exam  Constitutional: Patient does not appear to be in any acute distress  Head and Face: NCAT  Eyes: Normal external exam, EOMI  ENT: Normal external inspection of ears and nose. Oropharynx normal.  Cardiovascular: RRR, S1/S2, no murmurs, rubs, or gallops, radial pulses +2, no edema of extremities  Pulmonary: CTAB, no respiratory distress.  Abdomen: +BS, soft, non-tender, nondistended, no guarding or rebound, no masses noted.colostomy present   MSK: hip joint pain   Skin- No lesions, contusions, or erythema.  Peripheral puslses palpable bilaterally 2+  Neuro: AAO X3, Cranial nerves 2-12 grossly intact,DTR 2+ in all 4 limbs       Diagnostic Results   Lab Results   Component Value Date    GLUCOSE 113 (H) 09/05/2023    CALCIUM 10.6 (H) 09/05/2023     09/05/2023  "   K 3.7 09/05/2023    CO2 24 09/05/2023     09/05/2023    BUN 12 09/05/2023    CREATININE 0.72 09/05/2023     Lab Results   Component Value Date    ALT 12 09/05/2023    AST 14 09/05/2023    ALKPHOS 103 09/05/2023    BILITOT 0.4 09/05/2023     Lab Results   Component Value Date    WBC 13.2 (H) 09/05/2023    HGB 13.9 09/05/2023    HCT 43.0 09/05/2023    MCV 81 09/05/2023     09/05/2023     No results found for: \"CHOL\"  No results found for: \"HDL\"  No results found for: \"LDLCALC\"  No results found for: \"TRIG\"  No results found for: \"HGBA1C\"  Other labs not included in the list above were reviewed either before or during this encounter.    History    No past medical history on file.  No past surgical history on file.  No family history on file.  No Known Allergies  No current facility-administered medications on file prior to encounter.     Current Outpatient Medications on File Prior to Encounter   Medication Sig Dispense Refill    acetaminophen (Tylenol) 325 mg tablet Take 2 tablets (650 mg) by mouth 2 times a day.      hydrOXYzine pamoate (Vistaril) 25 mg capsule Take 1 capsule (25 mg) by mouth 2 times a day.      magnesium oxide (Mag-Ox) 400 mg tablet Take 1 tablet (400 mg) by mouth 2 times a day.      melatonin 5 mg tablet Take 1 tablet (5 mg) by mouth once daily at bedtime.      risperiDONE (RisperDAL) 2 mg tablet Take 1 tablet (2 mg) by mouth once daily at bedtime.      traZODone (Desyrel) 150 mg tablet Take 1 tablet (150 mg) by mouth once daily at bedtime.     Scheduled medications  buPROPion XL, 300 mg, oral, Daily  celecoxib, 200 mg, oral, Daily  cholecalciferol, 125 mcg, oral, Daily  citalopram, 30 mg, oral, Daily  lidocaine, 1 patch, transdermal, Daily  melatonin, 5 mg, oral, Nightly  QUEtiapine, 350 mg, oral, Nightly  traZODone, 150 mg, oral, Nightly      Continuous medications     PRN medications  PRN medications: acetaminophen, alum-mag hydroxide-simeth, diphenhydrAMINE, hydrOXYzine pamoate, " OLANZapine, OLANZapine zydis     There is no immunization history on file for this patient.  Patient's medical history was reviewed and updated either before or during this encounter.  ASSESSMENT / PLAN:  Active Hospital Problems    Bilateral hip pain      *Severe recurrent major depression without psychotic features (CMS/HCC)      Colostomy present (CMS/HCC)      Opioid abuse (CMS/HCC)  S/p fall.  Hip x-rays negative CT scan of the brain is negative     Pt. Has been stable.clostomy is working well.        Jay Knox MD

## 2023-11-15 NOTE — CARE PLAN
The patient's goals for the shift include go home    The clinical goals for the shift include no falls, maintain safety    Over the shift, the patient did not make progress toward the following goals. Barriers to progression include placement. Recommendations to address these barriers include waiting on spouse to receive paperwork, working with social work.

## 2023-11-15 NOTE — PROGRESS NOTES
"LSW met with pt who was sitting in the hallway. Pt presented with improved mood and affect. LSW joked with pt and she responded with laughter. when asked how she is doing she responded \"ok\" which is improvement from previous statements of being terrible. SW provided support and encouragement to pt.     Nikki Andrews, DRAGAN      "

## 2023-11-15 NOTE — NURSING NOTE
BIRP NOTE     Problem:  depression     Behavior:  Pt pleasant and smiling, attending group therapy, engaging with staff and peers.  Group Participation: yes  Appetite/Meals: good       Interventions:  Administered scheduled medications    Response:  Pt continues as above     Plan:  Maintain pt safety

## 2023-11-15 NOTE — GROUP NOTE
"Group Topic: Self-Care/Wellness   Group Date: 11/15/2023  Start Time: 1100  End Time: 1145  Facilitators: BRENDA Fernandes   Department: [facility] REHABTH VIRTUAL    Number of Participants: 5   Group Focus: other Positive steps to wellbeing  Treatment Modality: Other: Recreation therapy  Interventions utilized were exploration, mental fitness, patient education, and problem solving  Purpose: coping skills, feelings, insight or knowledge, self-worth, and self-care    Name: Azul Roberts YOB: 1960   MR: [id number]      Facilitator: Recreational Therapist  Level of Participation: did not attend  Quality of Participation:  did not attend  Interactions with others:  did not attend  Mood/Affect:  did not attend  Triggers (if applicable): n/a  Cognition:  did not attend  Progress: None  Comments: pt problem is depressed mood.  Pt is resting in her room, reports \"not feeling well\".  RN aware.  Handout left at bedside.  Plan: continue with services      "

## 2023-11-16 PROCEDURE — 2500000002 HC RX 250 W HCPCS SELF ADMINISTERED DRUGS (ALT 637 FOR MEDICARE OP, ALT 636 FOR OP/ED): Performed by: STUDENT IN AN ORGANIZED HEALTH CARE EDUCATION/TRAINING PROGRAM

## 2023-11-16 PROCEDURE — 1140000001 HC PRIVATE PSYCH ROOM DAILY

## 2023-11-16 PROCEDURE — 97535 SELF CARE MNGMENT TRAINING: CPT | Mod: GO,CO

## 2023-11-16 PROCEDURE — 99232 SBSQ HOSP IP/OBS MODERATE 35: CPT | Performed by: INTERNAL MEDICINE

## 2023-11-16 PROCEDURE — 2500000001 HC RX 250 WO HCPCS SELF ADMINISTERED DRUGS (ALT 637 FOR MEDICARE OP): Performed by: PSYCHIATRY & NEUROLOGY

## 2023-11-16 PROCEDURE — 97530 THERAPEUTIC ACTIVITIES: CPT | Mod: GO,CO

## 2023-11-16 PROCEDURE — 2500000004 HC RX 250 GENERAL PHARMACY W/ HCPCS (ALT 636 FOR OP/ED): Performed by: PSYCHIATRY & NEUROLOGY

## 2023-11-16 PROCEDURE — 99232 SBSQ HOSP IP/OBS MODERATE 35: CPT | Performed by: PSYCHIATRY & NEUROLOGY

## 2023-11-16 RX ADMIN — BUPROPION HYDROCHLORIDE 300 MG: 300 TABLET, FILM COATED, EXTENDED RELEASE ORAL at 08:54

## 2023-11-16 RX ADMIN — Medication 125 MCG: at 08:54

## 2023-11-16 RX ADMIN — CELECOXIB 200 MG: 200 CAPSULE ORAL at 15:52

## 2023-11-16 RX ADMIN — DULOXETINE HYDROCHLORIDE 120 MG: 60 CAPSULE, DELAYED RELEASE ORAL at 08:54

## 2023-11-16 RX ADMIN — Medication 5 MG: at 17:43

## 2023-11-16 RX ADMIN — QUETIAPINE FUMARATE 350 MG: 300 TABLET ORAL at 20:44

## 2023-11-16 ASSESSMENT — PAIN - FUNCTIONAL ASSESSMENT
PAIN_FUNCTIONAL_ASSESSMENT: 0-10
PAIN_FUNCTIONAL_ASSESSMENT: 0-10

## 2023-11-16 ASSESSMENT — COLUMBIA-SUICIDE SEVERITY RATING SCALE - C-SSRS
6. HAVE YOU EVER DONE ANYTHING, STARTED TO DO ANYTHING, OR PREPARED TO DO ANYTHING TO END YOUR LIFE?: NO
1. SINCE LAST CONTACT, HAVE YOU WISHED YOU WERE DEAD OR WISHED YOU COULD GO TO SLEEP AND NOT WAKE UP?: NO
1. SINCE LAST CONTACT, HAVE YOU WISHED YOU WERE DEAD OR WISHED YOU COULD GO TO SLEEP AND NOT WAKE UP?: NO
2. HAVE YOU ACTUALLY HAD ANY THOUGHTS OF KILLING YOURSELF?: NO
2. HAVE YOU ACTUALLY HAD ANY THOUGHTS OF KILLING YOURSELF?: NO
1. SINCE LAST CONTACT, HAVE YOU WISHED YOU WERE DEAD OR WISHED YOU COULD GO TO SLEEP AND NOT WAKE UP?: NO
6. HAVE YOU EVER DONE ANYTHING, STARTED TO DO ANYTHING, OR PREPARED TO DO ANYTHING TO END YOUR LIFE?: NO
2. HAVE YOU ACTUALLY HAD ANY THOUGHTS OF KILLING YOURSELF?: NO
6. HAVE YOU EVER DONE ANYTHING, STARTED TO DO ANYTHING, OR PREPARED TO DO ANYTHING TO END YOUR LIFE?: NO

## 2023-11-16 ASSESSMENT — COGNITIVE AND FUNCTIONAL STATUS - GENERAL
DAILY ACTIVITIY SCORE: 23
HELP NEEDED FOR BATHING: A LITTLE

## 2023-11-16 ASSESSMENT — ACTIVITIES OF DAILY LIVING (ADL): HOME_MANAGEMENT_TIME_ENTRY: 15

## 2023-11-16 ASSESSMENT — PAIN SCALES - GENERAL
PAINLEVEL_OUTOF10: 8
PAINLEVEL_OUTOF10: 0 - NO PAIN

## 2023-11-16 ASSESSMENT — ENCOUNTER SYMPTOMS
DECREASED CONCENTRATION: 1
NERVOUS/ANXIOUS: 1
DYSPHORIC MOOD: 1
SLEEP DISTURBANCE: 1

## 2023-11-16 NOTE — GROUP NOTE
Group Topic: Stress Reduction/Relaxation   Group Date: 11/16/2023  Start Time: 1515  End Time: 1600  Facilitators: BRENDA Howard   Department: [facility] REHABTH VIRTUAL    Number of Participants: 7   Group Focus: feeling awareness/expression, relaxation, and social skills  Treatment Modality: Other: Recreation Therapy  Interventions utilized were group exercise and other self expression   Purpose: feelings, self-care, and other: decrease stress, increase socialization, relaxation,  increase attention     Name: Azul Roberts YOB: 1960   MR: [id number]      Facilitator: Recreational Therapist  Level of Participation: active  Quality of Participation: appropriate/pleasant, attentive, cooperative, and engaged  Interactions with others: appropriate  Mood/Affect: appropriate, bright, and positive  Triggers (if applicable): n/a  Cognition: coherent/clear  Progress: Significant  Comments: pt problem is depressed mood.  Plan: continue with services

## 2023-11-16 NOTE — PROGRESS NOTES
[facility]: [facility]  PROGRESS NOTE      Azul Roberts is a 63 y.o. female that is being seen  today for follow up at [facility]  Subjective   Patient is being seen for follow-up at [facility] unit.  Patient had a fall and was sent to the ER.  Patient was evaluated in the ER and CT scan of the brain was negative for any bleed.  Patient had a UA done which was positive for leukoesterase.  Culture is pending.  Patient has been at her baseline.  Colostomy has been working well.  Pain has been fairly controlled.    ROS  Negative for fever or chills  Negative for sore throat, ear pain, nasal discharge  Negative for cough, shortness of breath or wheezing  Negative for chest pain, palpitations, swelling of legs  Negative for abdominal pain, constipation, diarrhea, blood in the stools,has colostomy  Negative for urinary complaints  Negative for headache, dizziness, weakness or numbness  Negative for joint pain  Positive for depression or anxiety  All other systems reviewed and were negative  Vitals:    11/16/23 0705   BP: 98/63   Pulse: 79   Resp: 16   Temp: 36.6 °C (97.9 °F)   SpO2: 99%      Body mass index is 29.87 kg/m².  Physical Exam  Constitutional: Patient does not appear to be in any acute distress  Head and Face: NCAT  Eyes: Normal external exam, EOMI  ENT: Normal external inspection of ears and nose. Oropharynx normal.  Cardiovascular: RRR, S1/S2, no murmurs, rubs, or gallops, radial pulses +2, no edema of extremities  Pulmonary: CTAB, no respiratory distress.  Abdomen: +BS, soft, non-tender, nondistended, no guarding or rebound, no masses noted.colostomy present   MSK: hip joint pain   Skin- No lesions, contusions, or erythema.  Peripheral puslses palpable bilaterally 2+  Neuro: AAO X3, Cranial nerves 2-12 grossly intact,DTR 2+ in all 4 limbs       Diagnostic Results   Lab Results   Component Value Date    GLUCOSE 113 (H) 09/05/2023    CALCIUM 10.6 (H) 09/05/2023     09/05/2023     "K 3.7 09/05/2023    CO2 24 09/05/2023     09/05/2023    BUN 12 09/05/2023    CREATININE 0.72 09/05/2023     Lab Results   Component Value Date    ALT 12 09/05/2023    AST 14 09/05/2023    ALKPHOS 103 09/05/2023    BILITOT 0.4 09/05/2023     Lab Results   Component Value Date    WBC 13.2 (H) 09/05/2023    HGB 13.9 09/05/2023    HCT 43.0 09/05/2023    MCV 81 09/05/2023     09/05/2023     No results found for: \"CHOL\"  No results found for: \"HDL\"  No results found for: \"LDLCALC\"  No results found for: \"TRIG\"  No results found for: \"HGBA1C\"  Other labs not included in the list above were reviewed either before or during this encounter.    History    No past medical history on file.  No past surgical history on file.  No family history on file.  No Known Allergies  No current facility-administered medications on file prior to encounter.     Current Outpatient Medications on File Prior to Encounter   Medication Sig Dispense Refill    acetaminophen (Tylenol) 325 mg tablet Take 2 tablets (650 mg) by mouth 2 times a day.      hydrOXYzine pamoate (Vistaril) 25 mg capsule Take 1 capsule (25 mg) by mouth 2 times a day.      magnesium oxide (Mag-Ox) 400 mg tablet Take 1 tablet (400 mg) by mouth 2 times a day.      melatonin 5 mg tablet Take 1 tablet (5 mg) by mouth once daily at bedtime.      risperiDONE (RisperDAL) 2 mg tablet Take 1 tablet (2 mg) by mouth once daily at bedtime.      traZODone (Desyrel) 150 mg tablet Take 1 tablet (150 mg) by mouth once daily at bedtime.     Scheduled medications  buPROPion XL, 300 mg, oral, Daily  celecoxib, 200 mg, oral, Daily  cholecalciferol, 125 mcg, oral, Daily  citalopram, 30 mg, oral, Daily  lidocaine, 1 patch, transdermal, Daily  melatonin, 5 mg, oral, Nightly  QUEtiapine, 350 mg, oral, Nightly  traZODone, 150 mg, oral, Nightly      Continuous medications     PRN medications  PRN medications: acetaminophen, alum-mag hydroxide-simeth, diphenhydrAMINE, hydrOXYzine pamoate, " OLANZapine, OLANZapine zydis     There is no immunization history on file for this patient.  Patient's medical history was reviewed and updated either before or during this encounter.  ASSESSMENT / PLAN:  Active Hospital Problems    Bilateral hip pain      *Severe recurrent major depression without psychotic features (CMS/HCC)      Colostomy present (CMS/HCC)      Opioid abuse (CMS/HCC)  S/p fall.  Hip x-rays negative CT scan of the brain is negative     Pt. Has been stable.clostomy is working well.        Jay Knox MD

## 2023-11-16 NOTE — NURSING NOTE
SARAN NOTE     Problem:  Depression     Behavior:  Pt is observed sitting in the hallways, eating her snack and interacting with staff. She is pleasant, calm and smiling. Pt is cooperative and compliant with medications. Taking care of her own colostomy tonight.   Group Participation: n/a  Appetite/Meals: HS snack provided    Interventions:  HS medications given whole with water  Assessment completed  2105- PRN Trazodone admin per patient request    Response:  **Reassessment**  2200- Pt is currently resting in bed, she is calm, resting quietly with her eyes closed. No s/s of distress noted.      Plan:  Continue to monitor and assist. Maintain q15 minutes rounds for safety.

## 2023-11-16 NOTE — PROGRESS NOTES
Physical Therapy                 Therapy Communication Note    Patient Name: Azul Roberts  MRN: [id number]  Today's Date: 11/16/2023     Discipline: Physical Therapy    Missed Visit Reason: Missed Visit Reason: Other (Comment) (Patient is active in treatment with OT.)    Missed Time: Attempt    Comment:

## 2023-11-16 NOTE — PROGRESS NOTES
"Azul Roberts is a 63 y.o. female on day 68 of admission presenting with Severe recurrent major depression without psychotic features (CMS/[facility]).      Subjective   Case discussed with treatment team and chart reviewed.    Showing some additional gains today.  Participating in group outside.  More optimistic.  Received information that she thought was bad news but then said in response, \"well there is no reason to cry about it.\"  We learned that patient's [place] Medicaid will not terminate until December 1 after which time the 7 to 10-day waiting before which the letter will be received begins.  Social work is trying to find a local facility to inherit the remainder of the process such that patient could discharge sooner.  We have considered community discharge instead and are concerned if patient was to discharge at this time, local facilities would not provide her outpatient services with out-of-state Medicaid and patient would then be without services for approximately a month relying on the process of transition to [place] medicaid from [place] to proceed without flaws which to date it has not.  We are also remaining concerned that patient would not be able to exist in her own home given her physical frailty, length of time she has been wheelchair bound in the facility, inability to demonstrate that she can go up and down stairs which are required to enter and exit her home.  To this extent we continue to wait for nursing home placement for patient.  Her mood is better, she does endorse this, however there remain several obstacles preventing discharge at this time and continue to admission for her safety and ongoing stabilization.      Nursing notes:  Pt is observed sitting in the hallways, eating her snack and interacting with staff. She is pleasant, calm and smiling. Pt is cooperative and compliant with medications. Taking care of her own colostomy tonight.      Objective     Vitals:    11/15/23 " "0500 11/15/23 1700 11/16/23 0705 11/16/23 1641   BP: 131/80 94/51 98/63 146/86   BP Location: Right arm Right arm Right arm Right arm   Patient Position: Lying Sitting Lying Sitting   Pulse: 75 91 79 86   Resp: 18 18 16 16   Temp: 36.6 °C (97.9 °F) 36.9 °C (98.4 °F) 36.6 °C (97.9 °F) 36.4 °C (97.5 °F)   TempSrc: Temporal Temporal Temporal Oral   SpO2: 100% 98% 99% 98%   Weight:       Height:            Review of Systems   Psychiatric/Behavioral:  Positive for decreased concentration, dysphoric mood and sleep disturbance. The patient is nervous/anxious.      Psychiatric ROS - Adult  Anxiety: General Anxiety Disorder (KAYLEEN)KAYLEEN Behaviors: difficult to control worry, difficulty concentrating, irritability, restlessness, and sleep disturbance  Depression: anhedonia, appetite increased, concentration, energy, helpless, hopeless, interest, persistent thoughts of death, psychomotor agitation, sleep decreased , suicidal thoughts, and withdrawn  Delirium: negative  Psychosis: negative  Ioana: negative  Safety Issues: passive death wish and suicidal ideation  Psychiatric ROS Comment: as noted    Physical Exam  Abdominal:      Comments: Presence of colostomy bag   Psychiatric:         Attention and Perception: Attention and perception normal.         Mood and Affect: Mood is anxious and depressed. Affect is flat and inappropriate.         Behavior: Behavior is slowed.     Mental Status Examination  General Appearance:  less disheveled with improved eye contact, not malodorous on approach. Sitting in wheelchair near nursing station, wearing personal garments  Gait/Station: using wheelchair to move about the milieu under her own propulsion  Speech: brief, conversational volume  Mood: \"ok\"  Affect:  more optimistic, improved relatedness ,  Thought Process: less impoverished  Thought Associations: No loosening of associations  Thought Content: improving, linear, logical, more spontaneous and overall increase in content  Perception: " No perceptual abnormalities noted  Level of Consciousness: Alert  Orientation: Alert  Attention and Concentration: Mild impairment in attention  Recent Memory: Intact as evidenced by ability to recall details from the past 24 hours   Executive function: Intact  Language: Naming intact  Fund of knowledge: Good  Insight: Fair, as evidenced by increasing participation in discussion about her scenario and symptoms and their improvement  Judgment: Fair, as evidenced by ability to reason through medical decision making, compliance with treatment recommendations, and improving      Psychiatric Risk Assessment  Violence Risk Assessment: major mental illness and substance abuse  Acute Risk of Harm to Others is Considered: moderate   Suicide Risk Assessment: , chronic medical illness, chronic pain, current psychiatric illness, feelings of hopelessness, global insomnia, history of trauma or abuse, life crisis (shame/despair), prior suicide attempt, severe anxiety, substance abuse, and suicidal ideations  Protective Factors against Suicide: adherence to  treatment and marriage/partnership  Acute Risk of Harm to Self is Considered: moderate    Relevant Results  Scheduled medications  buPROPion XL, 300 mg, oral, Daily  celecoxib, 200 mg, oral, Daily  cholecalciferol, 125 mcg, oral, Daily  DULoxetine, 120 mg, oral, Daily  lidocaine, 1 patch, transdermal, Daily  melatonin, 5 mg, oral, Daily  QUEtiapine, 350 mg, oral, Nightly      Continuous medications     PRN medications  PRN medications: acetaminophen, alum-mag hydroxide-simeth, diphenhydrAMINE, hydrOXYzine pamoate, OLANZapine, OLANZapine zydis, traZODone         Assessment/Plan   Principal Problem:    Severe recurrent major depression without psychotic features (CMS/HCC)  Active Problems:    Colostomy present (CMS/HCC)    Opioid abuse (CMS/HCC)    Bilateral hip pain    Biological -     Orthostatics - confirmed on measurement.  Ongoing efforts to communicate with patient  about taking her time when changing position to help prevent falls.    Mouth checks.    Cymbalta 120 mg daily for depression, this is max dose  Wellbutrin xl 300 mg for adjunctive depression treatment  seroquel 350 mg at bedtime  Trazodone 150 mg PRN and melatonin at bedtime for sleep  No adverse side effects of medications noted or reported.  ECG (10/4/23): Normal sinus rhythm. Heart  rate 72 bpm. Qtc 442  11/13/23 - qtc 441    Declined ECT consideration during discussion on 11/4, 11/7.   ECT is not available at this facility and this would include transitioning to another psychiatric hospital for this level of service or pursuing as an outpatient on discharge.    Discussion with patient regarding risk benefits and alternatives and side effects with opportunity to ask questions and questions answered.  Patient given consent to the plan as noted    2. Psychological -     Patient is encouraged to participate with the therapeutic milieu and program and group therapy    3. Sociological -      Patient encouraged to cooperate with social work staff on issues relevant to discharge planning  Pending [place] medicaid and then transition to convalescence nursing care once this becomes available to patient    25 minutes spent coordinating care for patient on this day    Parts of this chart have been completed using voice recognition software.  Please excuse any errors of transcription.  Despite the medical decision making time stamp, my medical decision making has taken place during the patient's entire visit.  Thought process and reason for plan has been formulated from the time that I saw the patient until the time of disposition and is not specific to one specific moment during their visit and furthermore the medical decision making encompasses the entire chart and not only that represented in this note.      Eddie Cullen, DO

## 2023-11-16 NOTE — GROUP NOTE
Group Topic: Excercise/Physical    Group Date: 11/16/2023  Start Time: 1100  End Time: 1120  Facilitators: BRENDA Howard   Department: [facility] REHABTH VIRTUAL    Number of Participants: 6   Group Focus: relaxation  Treatment Modality: Other: Recreation Therapy  Interventions utilized were group exercise  Purpose: self-care and other: improve strength, increase stimulation,  reduce stress, improve self awareness    Name: Azul Roberts YOB: 1960   MR: [id number]      Facilitator: Recreational Therapist  Level of Participation: active  Quality of Participation: appropriate/pleasant, cooperative, and engaged  Interactions with others: appropriate  Mood/Affect: appropriate  Triggers (if applicable): n/a  Cognition: coherent/clear  Progress: Moderate  Comments: pt problem is depressed mood.  Plan: continue with services

## 2023-11-16 NOTE — GROUP NOTE
Group Topic: Other   Group Date: 11/16/2023  Start Time: 1120  End Time: 1145  Facilitators: BRENDA Howard   Department: [facility] REHABTH VIRTUAL    Number of Participants: 6   Group Focus: other mental fitness  Treatment Modality: Other: Recreation Therapy  Interventions utilized were mental fitness  Purpose: other: increase stimulation, increase cognition, increase socialization, increase attention    Name: Azul Roberts YOB: 1960   MR: [id number]      Facilitator: Recreational Therapist  Level of Participation: active  Quality of Participation: appropriate/pleasant, attentive, cooperative, and engaged  Interactions with others: appropriate  Mood/Affect: appropriate and bright  Triggers (if applicable): n/a  Cognition: coherent/clear  Progress: Significant  Comments: pt problem is depressed mood.  Plan: continue with services

## 2023-11-16 NOTE — NURSING NOTE
SARAN NOTE     Problem:  depression     Behavior:  Pt is brushing her hair and emptying her own colostomy, she is expressing her needs.  Still blunted.  She is going to group and taking scheduled medications.    Group Participation: pt sat in hallway for am group  Appetite/Meals: 100%       Interventions:  Pt encouraged to attend group, every 15 minute checks, given scheduled medications    Response:  Pt went to afternoon group outside and participated.  She sat up most the afternoon outside of her room.  She did complain that the new room change mixed her up a bit because the bathroom is now on her left side.     Plan:  t encouraged to attend group, every 15 minute checks, given scheduled medications

## 2023-11-17 PROCEDURE — 99231 SBSQ HOSP IP/OBS SF/LOW 25: CPT | Performed by: PSYCHIATRY & NEUROLOGY

## 2023-11-17 PROCEDURE — 1140000001 HC PRIVATE PSYCH ROOM DAILY

## 2023-11-17 PROCEDURE — 2500000001 HC RX 250 WO HCPCS SELF ADMINISTERED DRUGS (ALT 637 FOR MEDICARE OP): Performed by: PSYCHIATRY & NEUROLOGY

## 2023-11-17 PROCEDURE — 2500000004 HC RX 250 GENERAL PHARMACY W/ HCPCS (ALT 636 FOR OP/ED): Performed by: PSYCHIATRY & NEUROLOGY

## 2023-11-17 PROCEDURE — 2500000002 HC RX 250 W HCPCS SELF ADMINISTERED DRUGS (ALT 637 FOR MEDICARE OP, ALT 636 FOR OP/ED): Performed by: STUDENT IN AN ORGANIZED HEALTH CARE EDUCATION/TRAINING PROGRAM

## 2023-11-17 PROCEDURE — 99232 SBSQ HOSP IP/OBS MODERATE 35: CPT | Performed by: INTERNAL MEDICINE

## 2023-11-17 RX ADMIN — DULOXETINE HYDROCHLORIDE 120 MG: 60 CAPSULE, DELAYED RELEASE ORAL at 08:14

## 2023-11-17 RX ADMIN — QUETIAPINE FUMARATE 350 MG: 300 TABLET ORAL at 21:22

## 2023-11-17 RX ADMIN — TRAZODONE HYDROCHLORIDE 150 MG: 50 TABLET ORAL at 21:22

## 2023-11-17 RX ADMIN — CELECOXIB 200 MG: 200 CAPSULE ORAL at 15:49

## 2023-11-17 RX ADMIN — ACETAMINOPHEN 650 MG: 325 TABLET ORAL at 10:06

## 2023-11-17 RX ADMIN — BUPROPION HYDROCHLORIDE 300 MG: 300 TABLET, FILM COATED, EXTENDED RELEASE ORAL at 08:14

## 2023-11-17 RX ADMIN — Medication 5 MG: at 17:47

## 2023-11-17 RX ADMIN — Medication 125 MCG: at 08:14

## 2023-11-17 ASSESSMENT — COLUMBIA-SUICIDE SEVERITY RATING SCALE - C-SSRS
2. HAVE YOU ACTUALLY HAD ANY THOUGHTS OF KILLING YOURSELF?: NO
1. SINCE LAST CONTACT, HAVE YOU WISHED YOU WERE DEAD OR WISHED YOU COULD GO TO SLEEP AND NOT WAKE UP?: NO
6. HAVE YOU EVER DONE ANYTHING, STARTED TO DO ANYTHING, OR PREPARED TO DO ANYTHING TO END YOUR LIFE?: NO
2. HAVE YOU ACTUALLY HAD ANY THOUGHTS OF KILLING YOURSELF?: NO
6. HAVE YOU EVER DONE ANYTHING, STARTED TO DO ANYTHING, OR PREPARED TO DO ANYTHING TO END YOUR LIFE?: NO
1. SINCE LAST CONTACT, HAVE YOU WISHED YOU WERE DEAD OR WISHED YOU COULD GO TO SLEEP AND NOT WAKE UP?: NO

## 2023-11-17 ASSESSMENT — PAIN - FUNCTIONAL ASSESSMENT
PAIN_FUNCTIONAL_ASSESSMENT: 0-10
PAIN_FUNCTIONAL_ASSESSMENT: FLACC (FACE, LEGS, ACTIVITY, CRY, CONSOLABILITY)
PAIN_FUNCTIONAL_ASSESSMENT: FLACC (FACE, LEGS, ACTIVITY, CRY, CONSOLABILITY)
PAIN_FUNCTIONAL_ASSESSMENT: 0-10

## 2023-11-17 ASSESSMENT — PAIN SCALES - GENERAL
PAINLEVEL_OUTOF10: 0 - NO PAIN
PAINLEVEL_OUTOF10: 2

## 2023-11-17 ASSESSMENT — PAIN DESCRIPTION - DESCRIPTORS
DESCRIPTORS: ACHING
DESCRIPTORS: ACHING

## 2023-11-17 ASSESSMENT — ENCOUNTER SYMPTOMS
DECREASED CONCENTRATION: 1
SLEEP DISTURBANCE: 1
NERVOUS/ANXIOUS: 1
DYSPHORIC MOOD: 1

## 2023-11-17 NOTE — NURSING NOTE
SARAN NOTE     Problem:  Depression     Behavior:  Patient is in patient's room laying in bed sleeping. Upon being woken up by this nurse, patient is pleasant and calm. Patient's affect is blunted. Patient is a little talkative. Patient maintains brief eye contact.    Group Participation: N/A  Appetite/Meals: N/A       Interventions:  This nurse completed a shift assessment and administered patient's scheduled night time medications.    Response:  Patient remained pleasant and calm and was cooperative throughout this shift assessment and medication administration.     Plan:  Continue to monitor for depression. Continue to monitor for patient safety.

## 2023-11-17 NOTE — GROUP NOTE
Group Topic: Leisure Skills   Group Date: 11/17/2023  Start Time: 1515  End Time: 1600  Facilitators: BRENDA Howard   Department: [facility]IRTUAL    Number of Participants: 6   Group Focus: concentration, leisure skills, and social skills  Treatment Modality: Other: Recreation Therapy  Interventions utilized were leisure development and mental fitness  Purpose: other: increase stimulation, increase socialization, increase mood, fun, increase attention span    Name: Azul Roberts YOB: 1960   MR: [id number]      Facilitator: Recreational Therapist  Level of Participation: active  Quality of Participation: appropriate/pleasant, attentive, cooperative, and engaged  Interactions with others: appropriate  Mood/Affect: appropriate and bright  Triggers (if applicable): n/a  Cognition: coherent/clear  Progress: Significant  Comments: pt problem is depressed mood.   Plan: continue with services

## 2023-11-17 NOTE — NURSING NOTE
SARAN NOTE     Problem:  Depression    Behavior:  Pt has been performing self care and fixing her own hair, emptying her own colostomy, going to group and participating in group.  Group Participation: Pt is going to group and participating  Appetite/Meals: 100%       Interventions:  Pt given scheduled medications, every 15 minute checks, encouraged to go to group, 1:1 interaction with active listening.    Response:  Pt went to group and has been smiling and participating.     Plan:  Pt given scheduled medications, every 15 minute checks, encouraged to go to group, 1:1 interaction with active listening.

## 2023-11-17 NOTE — PROGRESS NOTES
[facility]: [facility]  PROGRESS NOTE      Azul Roberts is a 63 y.o. female that is being seen  today for follow up at [facility]  Subjective   Patient is being seen for follow-up at [facility] unit.  Patient had a fall and was sent to the ER.  Patient was evaluated in the ER and CT scan of the brain was negative for any bleed.  Patient had a UA done which was positive for leukoesterase.  Culture is pending.  Patient has been at her baseline.  Colostomy has been working well.  Pain has been fairly controlled.    ROS  Negative for fever or chills  Negative for sore throat, ear pain, nasal discharge  Negative for cough, shortness of breath or wheezing  Negative for chest pain, palpitations, swelling of legs  Negative for abdominal pain, constipation, diarrhea, blood in the stools,has colostomy  Negative for urinary complaints  Negative for headache, dizziness, weakness or numbness  Negative for joint pain  Positive for depression or anxiety  All other systems reviewed and were negative  Vitals:    11/17/23 0500   BP: 125/77   Pulse: 73   Resp: 16   Temp: 36.4 °C (97.5 °F)   SpO2: 97%      Body mass index is 29.87 kg/m².  Physical Exam  Constitutional: Patient does not appear to be in any acute distress  Head and Face: NCAT  Eyes: Normal external exam, EOMI  ENT: Normal external inspection of ears and nose. Oropharynx normal.  Cardiovascular: RRR, S1/S2, no murmurs, rubs, or gallops, radial pulses +2, no edema of extremities  Pulmonary: CTAB, no respiratory distress.  Abdomen: +BS, soft, non-tender, nondistended, no guarding or rebound, no masses noted.colostomy present   MSK: hip joint pain   Skin- No lesions, contusions, or erythema.  Peripheral puslses palpable bilaterally 2+  Neuro: AAO X3, Cranial nerves 2-12 grossly intact,DTR 2+ in all 4 limbs       Diagnostic Results   Lab Results   Component Value Date    GLUCOSE 113 (H) 09/05/2023    CALCIUM 10.6 (H) 09/05/2023     09/05/2023     "K 3.7 09/05/2023    CO2 24 09/05/2023     09/05/2023    BUN 12 09/05/2023    CREATININE 0.72 09/05/2023     Lab Results   Component Value Date    ALT 12 09/05/2023    AST 14 09/05/2023    ALKPHOS 103 09/05/2023    BILITOT 0.4 09/05/2023     Lab Results   Component Value Date    WBC 13.2 (H) 09/05/2023    HGB 13.9 09/05/2023    HCT 43.0 09/05/2023    MCV 81 09/05/2023     09/05/2023     No results found for: \"CHOL\"  No results found for: \"HDL\"  No results found for: \"LDLCALC\"  No results found for: \"TRIG\"  No results found for: \"HGBA1C\"  Other labs not included in the list above were reviewed either before or during this encounter.    History    No past medical history on file.  No past surgical history on file.  No family history on file.  No Known Allergies  No current facility-administered medications on file prior to encounter.     Current Outpatient Medications on File Prior to Encounter   Medication Sig Dispense Refill    acetaminophen (Tylenol) 325 mg tablet Take 2 tablets (650 mg) by mouth 2 times a day.      hydrOXYzine pamoate (Vistaril) 25 mg capsule Take 1 capsule (25 mg) by mouth 2 times a day.      magnesium oxide (Mag-Ox) 400 mg tablet Take 1 tablet (400 mg) by mouth 2 times a day.      melatonin 5 mg tablet Take 1 tablet (5 mg) by mouth once daily at bedtime.      risperiDONE (RisperDAL) 2 mg tablet Take 1 tablet (2 mg) by mouth once daily at bedtime.      traZODone (Desyrel) 150 mg tablet Take 1 tablet (150 mg) by mouth once daily at bedtime.     Scheduled medications  buPROPion XL, 300 mg, oral, Daily  celecoxib, 200 mg, oral, Daily  cholecalciferol, 125 mcg, oral, Daily  citalopram, 30 mg, oral, Daily  lidocaine, 1 patch, transdermal, Daily  melatonin, 5 mg, oral, Nightly  QUEtiapine, 350 mg, oral, Nightly  traZODone, 150 mg, oral, Nightly      Continuous medications     PRN medications  PRN medications: acetaminophen, alum-mag hydroxide-simeth, diphenhydrAMINE, hydrOXYzine pamoate, " OLANZapine, OLANZapine zydis     There is no immunization history on file for this patient.  Patient's medical history was reviewed and updated either before or during this encounter.  ASSESSMENT / PLAN:  Active Hospital Problems    Bilateral hip pain      *Severe recurrent major depression without psychotic features (CMS/HCC)      Colostomy present (CMS/HCC)      Opioid abuse (CMS/HCC)  S/p fall.  Hip x-rays negative CT scan of the brain is negative     Pt. Has been stable.clostomy is working well.        Jay Knox MD

## 2023-11-17 NOTE — PROGRESS NOTES
REHAB Therapy Assessment & Treatment    Patient Name: Azul Roberts  MRN: [id number]  Today's Date: 11/17/2023    Recreation Therapy note: Pt is alert and oriented x3 with some poor insight/judgement. Pt has been making progress towards attending groups, socialization and displays brighter mood/affect. Pt engages easily with staff and peers. Laughs and smiles often. Pt continues to be eating and sleeping well. Will continue to encourage pt to attend daily groups of choice,.

## 2023-11-17 NOTE — GROUP NOTE
Group Topic: Social Skills   Group Date: 11/17/2023  Start Time: 1020  End Time: 1120  Facilitators: BRENDA Howard   Department: [facility] REHABTH VIRTUAL    Number of Participants: 7   Group Focus: communication, reminiscence, and social skills  Treatment Modality: Other: Recreation Therapy  Interventions utilized were leisure development, mental fitness, and reminiscence  Purpose: other: increase attention span, increase stimulation,. Increase socialization, increase mood, increase eye-hand coordination    Name: Azul Roberts YOB: 1960   MR: [id number]      Facilitator: Recreational Therapist  Level of Participation: active  Quality of Participation: appropriate/pleasant, attentive, cooperative, and engaged  Interactions with others: appropriate  Mood/Affect: appropriate and bright  Triggers (if applicable): n/a  Cognition: coherent/clear  Progress: Significant  Comments: pt problem is depressed mood. Engages easily with staff and peers, Smiles and laughs often.  Plan: continue with services

## 2023-11-17 NOTE — GROUP NOTE
Group Topic: Excercise/Physical    Group Date: 11/17/2023  Start Time: 1000  End Time: 1020  Facilitators: RANDALL HowardS   Department: [facility] REHABTH VIRTUAL    Number of Participants: 7   Group Focus: concentration, mindfulness, relaxation, and self-awareness  Treatment Modality: Other: Recreation Therapy  Interventions utilized were group exercise, mental fitness, and other breathing techniques  Purpose: self-care and other: increase strength, increase stimulation, decrease stress, increase mood    Name: Azul Roberts YOB: 1960   MR: [id number]      Facilitator: Recreational Therapist  Level of Participation: active  Quality of Participation: appropriate/pleasant, attentive, cooperative, and engaged  Interactions with others: appropriate  Mood/Affect: appropriate  Triggers (if applicable): n/a  Cognition: coherent/clear  Progress: Significant  Comments: pt problem is depressed mood. Pt progressing with group participation requiring less encouragement to attend and engages easily with staff and peers without prompting from CTRS.   Plan: continue with services

## 2023-11-17 NOTE — PROGRESS NOTES
Azul Roberts is a 63 y.o. female on day 69 of admission presenting with Severe recurrent major depression without psychotic features (CMS/HCC).      Subjective   Case discussed with treatment team and chart reviewed.    Minimal day to day change.  Cooperative more, trying to attend group.  Staying out of bed, and going to meals.  Awaiting community resources as has been noted.    Described as going to group, smiling, and participating.      Objective     Vitals:    11/16/23 0705 11/16/23 1641 11/17/23 0500 11/17/23 1700   BP: 98/63 146/86 125/77 102/70   BP Location: Right arm Right arm Left arm Right arm   Patient Position: Lying Sitting Lying Sitting   Pulse: 79 86 73 103   Resp: 16 16 16 16   Temp: 36.6 °C (97.9 °F) 36.4 °C (97.5 °F) 36.4 °C (97.5 °F) 36.9 °C (98.4 °F)   TempSrc: Temporal Oral Oral Temporal   SpO2: 99% 98% 97% 99%   Weight:       Height:            Review of Systems   Psychiatric/Behavioral:  Positive for decreased concentration, dysphoric mood and sleep disturbance. The patient is nervous/anxious.      Psychiatric ROS - Adult  Anxiety: General Anxiety Disorder (KAYLEEN)KAYLEEN Behaviors: difficult to control worry, difficulty concentrating, irritability, restlessness, and sleep disturbance  Depression: anhedonia, appetite increased, concentration, energy, helpless, hopeless, interest, persistent thoughts of death, psychomotor agitation, sleep decreased , suicidal thoughts, and withdrawn  Delirium: negative  Psychosis: negative  Ioana: negative  Safety Issues: passive death wish and suicidal ideation  Psychiatric ROS Comment: as noted    Physical Exam  Abdominal:      Comments: Presence of colostomy bag   Psychiatric:         Attention and Perception: Attention and perception normal.         Mood and Affect: Mood is anxious and depressed. Affect is flat and inappropriate.         Behavior: Behavior is slowed.     Mental Status Examination  General Appearance:  less disheveled with improved eye  "contact, not malodorous on approach. Sitting in wheelchair near nursing station, wearing personal garments  Gait/Station: using wheelchair to move about the milieu under her own propulsion  Speech: brief, conversational volume  Mood: \"ok\"  Affect:  more optimistic, improved relatedness ,  Thought Process: less impoverished  Thought Associations: No loosening of associations  Thought Content: improving, linear, logical, more spontaneous and overall increase in content  Perception: No perceptual abnormalities noted  Level of Consciousness: Alert  Orientation: Alert  Attention and Concentration: Mild impairment in attention  Recent Memory: Intact as evidenced by ability to recall details from the past 24 hours   Executive function: Intact  Language: Naming intact  Fund of knowledge: Good  Insight: Fair, as evidenced by increasing participation in discussion about her scenario and symptoms and their improvement  Judgment: Fair, as evidenced by ability to reason through medical decision making, compliance with treatment recommendations, and improving      Psychiatric Risk Assessment  Violence Risk Assessment: major mental illness and substance abuse  Acute Risk of Harm to Others is Considered: moderate   Suicide Risk Assessment: , chronic medical illness, chronic pain, current psychiatric illness, feelings of hopelessness, global insomnia, history of trauma or abuse, life crisis (shame/despair), prior suicide attempt, severe anxiety, substance abuse, and suicidal ideations  Protective Factors against Suicide: adherence to  treatment and marriage/partnership  Acute Risk of Harm to Self is Considered: moderate    Relevant Results  Scheduled medications  buPROPion XL, 300 mg, oral, Daily  celecoxib, 200 mg, oral, Daily  cholecalciferol, 125 mcg, oral, Daily  DULoxetine, 120 mg, oral, Daily  lidocaine, 1 patch, transdermal, Daily  melatonin, 5 mg, oral, Daily  QUEtiapine, 350 mg, oral, Nightly      Continuous " medications     PRN medications  PRN medications: acetaminophen, alum-mag hydroxide-simeth, diphenhydrAMINE, hydrOXYzine pamoate, OLANZapine, OLANZapine zydis, traZODone         Assessment/Plan   Principal Problem:    Severe recurrent major depression without psychotic features (CMS/HCC)  Active Problems:    Colostomy present (CMS/HCC)    Opioid abuse (CMS/HCC)    Bilateral hip pain    Biological -     Orthostatics - confirmed on measurement.  Ongoing efforts to communicate with patient about taking her time when changing position to help prevent falls.    Mouth checks.    Cymbalta 120 mg daily for depression, this is max dose  Wellbutrin xl 300 mg for adjunctive depression treatment  seroquel 350 mg at bedtime  Trazodone 150 mg PRN and melatonin at bedtime for sleep  No adverse side effects of medications noted or reported.  ECG (10/4/23): Normal sinus rhythm. Heart  rate 72 bpm. Qtc 442  11/13/23 - qtc 441    Declined ECT consideration during discussion on 11/4, 11/7.   ECT is not available at this facility and this would include transitioning to another psychiatric hospital for this level of service or pursuing as an outpatient on discharge.    Discussion with patient regarding risk benefits and alternatives and side effects with opportunity to ask questions and questions answered.  Patient given consent to the plan as noted    2. Psychological -     Patient is encouraged to participate with the therapeutic milieu and program and group therapy    3. Sociological -      Patient encouraged to cooperate with social work staff on issues relevant to discharge planning  Pending [place] medicaid and then transition to convalescence nursing care once this becomes available to patient    15 minutes spent coordinating care for patient on this day    Parts of this chart have been completed using voice recognition software.  Please excuse any errors of transcription.  Despite the medical decision making time stamp, my medical  decision making has taken place during the patient's entire visit.  Thought process and reason for plan has been formulated from the time that I saw the patient until the time of disposition and is not specific to one specific moment during their visit and furthermore the medical decision making encompasses the entire chart and not only that represented in this note.      Eddie Cullen, DO

## 2023-11-17 NOTE — PROGRESS NOTES
Nutrition Follow up Note    Good po intake documented. No updated wt since 11/8/23.     Lab Results   Component Value Date    WBC 8.8 09/11/2023    HGB 11.9 (L) 09/11/2023    HCT 36.3 09/11/2023     09/11/2023    CHOL 184 09/11/2023    TRIG 99 09/11/2023    HDL 45 (L) 09/11/2023    ALT 15 09/11/2023    AST 15 09/11/2023     11/08/2023    K 4.4 11/08/2023     11/08/2023    CREATININE 0.80 11/08/2023    BUN 21 11/08/2023    CO2 25 11/08/2023    TSH 1.64 09/11/2023    HGBA1C 5.8 09/11/2023       Current Facility-Administered Medications:     acetaminophen (Tylenol) tablet 650 mg, 650 mg, oral, q6h PRN, Eddie Cullen DO    alum-mag hydroxide-simeth (Mylanta) 200-200-20 mg/5 mL oral suspension 30 mL, 30 mL, oral, q6h PRN, Eddie Cullen DO    buPROPion XL (Wellbutrin XL) 24 hr tablet 300 mg, 300 mg, oral, Daily, Eddie Cullen DO, 300 mg at 11/17/23 0814    celecoxib (CeleBREX) capsule 200 mg, 200 mg, oral, Daily, Eddie Cullen DO, 200 mg at 11/16/23 1552    cholecalciferol (Vitamin D-3) capsule 125 mcg, 125 mcg, oral, Daily, Eddie Cullen DO, 125 mcg at 11/17/23 0814    diphenhydrAMINE (Sominex) tablet 25 mg, 25 mg, oral, q8h PRN, Eddie Cullen DO    DULoxetine (Cymbalta) DR capsule 120 mg, 120 mg, oral, Daily, Massimo Barroso MD, 120 mg at 11/17/23 0814    hydrOXYzine pamoate (Vistaril) capsule 25 mg, 25 mg, oral, q8h PRN, Eddie Cullen DO    lidocaine 4 % patch 1 patch, 1 patch, transdermal, Daily, Eddie Cullen DO    melatonin tablet 5 mg, 5 mg, oral, Daily, Eddie Cullen DO, 5 mg at 11/16/23 1743    OLANZapine (ZyPREXA) injection 5 mg, 5 mg, intramuscular, q8h PRN, Eddie Cullen DO    OLANZapine zydis (ZyPREXA) disintegrating tablet 5 mg, 5 mg, oral, q8h PRN, Eddie Cullen DO    QUEtiapine (SEROquel) tablet 350 mg, 350 mg, oral, Nightly, Eddie Cullen DO, 350 mg at 11/16/23 2044    traZODone (Desyrel) tablet 150 mg, 150 mg, oral, Nightly PRN,  "Eddie Cullen , 150 mg at 11/15/23 2105    Dietary Orders (From admission, onward)       Start     Ordered    09/28/23 1750  Adult diet Regular  Diet effective now        Question:  Diet type  Answer:  Regular    09/28/23 1800                   Food and Nutrient History  Energy Intake: Good > 75 %    Anthropometrics:  Ht: 162.6 cm (5' 4\"), Wt: 78.9 kg (174 lb), BMI: 29.85    Weight Change  Weight History / % Weight Change: no updated wt    IBW/kg (Dietitian Calculated): 54.55 kg  Adjusted Body Weight (kg): 60.91 kg    Nutrition Prescription  Estimated Energy Needs  Total Energy Estimated Needs (kCal): 1517 kCal  Method for Estimating Needs: 25 kcals/kg ABW    Estimated Protein Needs  Total Protein Estimated Needs (g):  (49-61)  Method for Estimating Needs: 0.8-1 g/kg ABW    Estimated Fluid Needs  Total Fluid Estimated Needs (mL): 1517 mL  Method for Estimating Needs: 1 ml/kcal ABW    Nutrition Focused Physical Findings: deferred   Physical Findings (Nutrition Deficiency/Toxicity)  Skin:  (colostomy)    Nutrition Diagnosis:  Malnutrition Diagnosis  Patient has Malnutrition Diagnosis: No    Patient has Nutrition Diagnosis: Yes  Nutrition Diagnosis 1: Unintended weight loss  Diagnosis Status (1): Ongoing  Related to (1): unknown etiology  As Evidenced by (1): non-significant wt loss    Nutrition Interventions/Recommendations:  Food and/or Nutrient Delivery Interventions  Interventions: Meals and snacks    Meals and Snacks: General healthful diet  Goal: provide as ordered    Education Documentation  No documentation found.      Nutrition Monitoring/Evaluation:  Food and Nutrient Related History  Energy Intake: Estimated energy intake  Criteria: pt to tolerate >/= 50% meals    RD Recommendations: None at this time.      Follow Up  Time Spent (min): 10 minutes  Last Date of Nutrition Visit: 11/17/23  Nutrition Follow-Up Needed?: 7-10 days  Follow up Comment: 11/27/23    "

## 2023-11-18 PROCEDURE — 97542 WHEELCHAIR MNGMENT TRAINING: CPT | Mod: GP

## 2023-11-18 PROCEDURE — 2500000001 HC RX 250 WO HCPCS SELF ADMINISTERED DRUGS (ALT 637 FOR MEDICARE OP): Performed by: PSYCHIATRY & NEUROLOGY

## 2023-11-18 PROCEDURE — 1140000001 HC PRIVATE PSYCH ROOM DAILY

## 2023-11-18 PROCEDURE — 2500000002 HC RX 250 W HCPCS SELF ADMINISTERED DRUGS (ALT 637 FOR MEDICARE OP, ALT 636 FOR OP/ED): Performed by: STUDENT IN AN ORGANIZED HEALTH CARE EDUCATION/TRAINING PROGRAM

## 2023-11-18 PROCEDURE — 99231 SBSQ HOSP IP/OBS SF/LOW 25: CPT | Performed by: PSYCHIATRY & NEUROLOGY

## 2023-11-18 PROCEDURE — 2500000004 HC RX 250 GENERAL PHARMACY W/ HCPCS (ALT 636 FOR OP/ED): Performed by: PSYCHIATRY & NEUROLOGY

## 2023-11-18 PROCEDURE — 97530 THERAPEUTIC ACTIVITIES: CPT | Mod: GP

## 2023-11-18 RX ADMIN — QUETIAPINE FUMARATE 350 MG: 300 TABLET ORAL at 20:41

## 2023-11-18 RX ADMIN — DULOXETINE HYDROCHLORIDE 120 MG: 60 CAPSULE, DELAYED RELEASE ORAL at 08:18

## 2023-11-18 RX ADMIN — Medication 125 MCG: at 08:18

## 2023-11-18 RX ADMIN — TRAZODONE HYDROCHLORIDE 150 MG: 50 TABLET ORAL at 20:41

## 2023-11-18 RX ADMIN — CELECOXIB 200 MG: 200 CAPSULE ORAL at 16:02

## 2023-11-18 RX ADMIN — BUPROPION HYDROCHLORIDE 300 MG: 300 TABLET, FILM COATED, EXTENDED RELEASE ORAL at 08:18

## 2023-11-18 RX ADMIN — Medication 5 MG: at 18:34

## 2023-11-18 RX ADMIN — ACETAMINOPHEN 650 MG: 325 TABLET ORAL at 09:30

## 2023-11-18 ASSESSMENT — COGNITIVE AND FUNCTIONAL STATUS - GENERAL
MOVING TO AND FROM BED TO CHAIR: A LITTLE
MOBILITY SCORE: 17
WALKING IN HOSPITAL ROOM: A LOT
STANDING UP FROM CHAIR USING ARMS: A LITTLE
CLIMB 3 TO 5 STEPS WITH RAILING: TOTAL

## 2023-11-18 ASSESSMENT — COLUMBIA-SUICIDE SEVERITY RATING SCALE - C-SSRS
6. HAVE YOU EVER DONE ANYTHING, STARTED TO DO ANYTHING, OR PREPARED TO DO ANYTHING TO END YOUR LIFE?: NO
1. SINCE LAST CONTACT, HAVE YOU WISHED YOU WERE DEAD OR WISHED YOU COULD GO TO SLEEP AND NOT WAKE UP?: NO
2. HAVE YOU ACTUALLY HAD ANY THOUGHTS OF KILLING YOURSELF?: NO
1. SINCE LAST CONTACT, HAVE YOU WISHED YOU WERE DEAD OR WISHED YOU COULD GO TO SLEEP AND NOT WAKE UP?: NO
6. HAVE YOU EVER DONE ANYTHING, STARTED TO DO ANYTHING, OR PREPARED TO DO ANYTHING TO END YOUR LIFE?: NO
2. HAVE YOU ACTUALLY HAD ANY THOUGHTS OF KILLING YOURSELF?: NO

## 2023-11-18 ASSESSMENT — PAIN DESCRIPTION - DESCRIPTORS: DESCRIPTORS: RADIATING

## 2023-11-18 ASSESSMENT — ENCOUNTER SYMPTOMS
DYSPHORIC MOOD: 1
DECREASED CONCENTRATION: 1
NERVOUS/ANXIOUS: 1
SLEEP DISTURBANCE: 1

## 2023-11-18 ASSESSMENT — PAIN SCALES - GENERAL
PAINLEVEL_OUTOF10: 0 - NO PAIN
PAINLEVEL_OUTOF10: 8
PAINLEVEL_OUTOF10: 8
PAINLEVEL_OUTOF10: 5 - MODERATE PAIN

## 2023-11-18 NOTE — NURSING NOTE
SARAN NOTE     Problem:  depression     Behavior:  Pt pleasant and cooperative with staff and care. Pt making needs known and laughing while out of her room.  Group Participation: no  Appetite/Meals: good       Interventions:  Administered scheduled medications    Response:  Pt continues as above     Plan:  Maintain pt safety

## 2023-11-18 NOTE — GROUP NOTE
Group Topic: Problem Solving   Group Date: 11/18/2023  Start Time: 1015  End Time:   1145  Facilitators: RANDALL MaderaS   Department: [facility]    Number of Participants: 5   Group Focus: concentration, leisure skills, and problem solving  Treatment Modality: Other: Recreation Therapy   Interventions utilized were leisure development and problem solving  Purpose: other: Increase stimulation, leisure skills . Increase mood and attention span . Pt will focus on the now .    Name: Azul Roberts YOB: 1960   MR: [id number]      Facilitator: Recreational Therapist  Level of Participation: did not attend  Quality of Participation:  Pt refused group and stayed in her room .  Interactions with others:  Did not attend.  Mood/Affect:  N/A  Cognition:  Patient did not attend group.  Progress: Moderate  Comments: Patients problem is depression.   Plan: continue with services

## 2023-11-18 NOTE — NURSING NOTE
"SARAN NOTE     Problem:  Depression     Behavior:  Pt is lying in bed on approach. She states that her day was \"fine.\" Pt is pleasant, calm and cooperative. Compliant with medications. Pt requesting Trazodone for sleep. Pt is visibly in pain when she moved but declines taking anything offered, \"That doesn't work for me.\"  Group Participation: n/a  Appetite/Meals: hs snack provided       Interventions:  Hs medications given whole with water  Assessment completed  2122- PRN Trazodone given per patient request for sleep    Response:  **Reassessment**  2220- Pt continues to rest in bed, she is still awake for now but states that she is can feel that she is getting tired. No s/s of distress noted.      Plan:  Continue to monitor and assist. Maintain q15 minutes rounds for safety.    "

## 2023-11-18 NOTE — PROGRESS NOTES
Physical Therapy Treatment Note        11/18/23 2808-275    PT  Visit   PT Received On 11/18/23   Response to Previous Treatment   (Pt reluctantly agreeing to participate)   General   Reason for Referral impaired mobility; recent fall   Referred By Dr. Cullen   Past Medical History Relevant to Rehab severe depression, colostomy, bilateral hip pain, opiod abuse   Pain Assessment   Pain Assessment 0-10  (Discussed with Nurse more pain since last visit.  Pt not ambulating as per PLOF.  Reviewed xrays from 11/8.  Nursing reports pt refuses meds. Claims they dont work.  Wants Tordal Shot.  Tylenol and lidocaine patch current order.)   Pain Score 8   Pain Type Acute pain  (Encoraged pt to take Tylenol and keep mobilizing with staff.)   Pain Location Hip   Pain Orientation Left   Pain Radiating Towards Hip to knee worse with loading during ambulation   Pain Descriptors Radiating   Pain Frequency Constant/continuous   Pain Onset Ongoing   Therapeutic Exercise   Therapeutic Exercise Performed Yes   Therapeutic Exercise Activity 1 Seated EOB 2# BLE LAQ  (1x15)   Therapeutic Exercise Activity 2 Seated EOB 2# R AA LLE LE Hip Flexion   Therapeutic Exercise Activity 3 Seated EOB 2# BLE Hip Abd Green tband  (1x15)   Therapeutic Exercise Activity 4 Glut sets 2x15   Therapeutic Exercise Activity 5 Bed Push ups   Bed Mobility 1   Bed Mobility 1 Supine to sitting;Sitting to supine   Level of Assistance 1 Independent   Bed Mobility Comments 1   (bed falt)   Ambulation/Gait Training   Ambulation/Gait Training Performed Yes   Ambulation/Gait Training 1   Surface 1 Level tile   Device 1   (wheelchair)   Gait Support Devices Gait belt   Assistance 1 Contact guard   Quality of Gait 1 Inconsistent stride length   Comments/Distance (ft) 1   (Antalgic and very effortful with heavy UE support step to gait pattern.  Ambulation 8 feet behind wheelchair flexed at hips and leaning over with hands on arm rests.  Leg length with left LE shorter. Pt  refused second attempt)   Transfer 1   Transfer From 1 Bed to   Transfer to 1 Stand   Technique 1 Sit to stand;Stand to sit   Transfer Device 1   (used wheelchair)   Transfer Level of Assistance 1 Contact guard   Trials/Comments 1 8/10 pain while standing   Transfers 2   Transfer From 2 Stand to   Transfer to 2 Sit   Technique 2 Sit to stand   Transfer Level of Assistance 2 Contact guard   PT Assessment   PT Assessment Results Decreased strength;Decreased endurance;Impaired balance;Decreased mobility;Impaired judgement;Decreased safety awareness;Pain   Rehab Prognosis Good   Barriers to Participation Comorbidities  (Nsg reports falls from orthostatic BPs at night)   End of Session Patient Position Bed, 0 rail up   Outpatient Education   Individual(s) Educated Patient   Education Comment   (Discussed importance of taking pain meds and increasing ambulation, standing activities to prevent further decline. Discussed with nsg also.)   PT Plan   Inpatient/Swing Bed or Outpatient Inpatient   PT Plan   Treatment/Interventions Bed mobility;Transfer training;Gait training;Strengthening;Range of motion;Therapeutic exercise   PT Plan Skilled PT   PT Frequency 3 times per week      11/18/23    [facility] Basic Mobility   Turning from your back to your side while in a flat bed without using bedrails 4   Moving from lying on your back to sitting on the side of a flat bed without using bedrails 4   Moving to and from bed to chair (including a wheelchair) 3   Standing up from a chair using your arms (e.g. wheelchair or bedside chair) 3   To walk in hospital room 2   Climbing 3-5 steps with railing 1   Basic Mobility - Total Score 17

## 2023-11-18 NOTE — PROGRESS NOTES
11/18/23 1030   General   Reason for Referral impaired mobility; recent fall   Referred By Dr. Cullen   Past Medical History Relevant to Rehab severe depression, colostomy, bilateral hip pain, opiod abuse   Missed Visit Yes   Missed Visit Reason Patient refused due to arthritic joint pain   General Comment Notified nursing of pts refusal

## 2023-11-18 NOTE — PROGRESS NOTES
Azul Roberts is a 63 y.o. female on day 70 of admission presenting with Severe recurrent major depression without psychotic features (CMS/HCC).      Subjective   Case discussed with treatment team and chart reviewed.    Minimal day to day change.  Cooperative more, trying to attend group but did not attend today.  Is considering going tomorrow.  In bed more today, but out of bed for meals.  Awaiting community resources as has been noted.    Nursing notes:  Pt pleasant and cooperative with staff and care. Pt making needs known and laughing while out of her room.     Objective     Vitals:    11/17/23 0500 11/17/23 1700 11/18/23 0556 11/18/23 1700   BP: 125/77 102/70 112/72 107/79   BP Location: Left arm Right arm  Right arm   Patient Position: Lying Sitting Lying Sitting   Pulse: 73 103 75 87   Resp: 16 16 16 18   Temp: 36.4 °C (97.5 °F) 36.9 °C (98.4 °F) 36.3 °C (97.3 °F) 36.5 °C (97.7 °F)   TempSrc: Oral Temporal Axillary Temporal   SpO2: 97% 99%  100%   Weight:       Height:            Review of Systems   Psychiatric/Behavioral:  Positive for decreased concentration, dysphoric mood and sleep disturbance. The patient is nervous/anxious.      Psychiatric ROS - Adult  Anxiety: General Anxiety Disorder (KAYLEEN)KAYLEEN Behaviors: difficult to control worry, difficulty concentrating, irritability, restlessness, and sleep disturbance  Depression: anhedonia, appetite increased, concentration, energy, helpless, hopeless, interest, persistent thoughts of death, psychomotor agitation, sleep decreased , suicidal thoughts, and withdrawn  Delirium: negative  Psychosis: negative  Ioana: negative  Safety Issues: passive death wish and suicidal ideation  Psychiatric ROS Comment: as noted    Physical Exam  Abdominal:      Comments: Presence of colostomy bag   Psychiatric:         Attention and Perception: Attention and perception normal.         Mood and Affect: Mood is anxious and depressed. Affect is flat and inappropriate.          "Behavior: Behavior is slowed.     Mental Status Examination  General Appearance:  less disheveled with improved eye contact, not malodorous on approach. Sitting in wheelchair near nursing station, wearing personal garments  Gait/Station: using wheelchair to move about the milieu under her own propulsion  Speech: brief, conversational volume  Mood: \"ok\"  Affect:  more optimistic, improved relatedness ,  Thought Process: less impoverished  Thought Associations: No loosening of associations  Thought Content: improving, linear, logical, more spontaneous and overall increase in content  Perception: No perceptual abnormalities noted  Level of Consciousness: Alert  Orientation: Alert  Attention and Concentration: Mild impairment in attention  Recent Memory: Intact as evidenced by ability to recall details from the past 24 hours   Executive function: Intact  Language: Naming intact  Fund of knowledge: Good  Insight: Fair, as evidenced by increasing participation in discussion about her scenario and symptoms and their improvement  Judgment: Fair, as evidenced by ability to reason through medical decision making, compliance with treatment recommendations, and improving      Psychiatric Risk Assessment  Violence Risk Assessment: major mental illness and substance abuse  Acute Risk of Harm to Others is Considered: moderate   Suicide Risk Assessment: , chronic medical illness, chronic pain, current psychiatric illness, feelings of hopelessness, global insomnia, history of trauma or abuse, life crisis (shame/despair), prior suicide attempt, severe anxiety, substance abuse, and suicidal ideations  Protective Factors against Suicide: adherence to  treatment and marriage/partnership  Acute Risk of Harm to Self is Considered: moderate    Relevant Results  Scheduled medications  buPROPion XL, 300 mg, oral, Daily  celecoxib, 200 mg, oral, Daily  cholecalciferol, 125 mcg, oral, Daily  DULoxetine, 120 mg, oral, Daily  lidocaine, " 1 patch, transdermal, Daily  melatonin, 5 mg, oral, Daily  QUEtiapine, 350 mg, oral, Nightly      Continuous medications     PRN medications  PRN medications: acetaminophen, alum-mag hydroxide-simeth, diphenhydrAMINE, hydrOXYzine pamoate, OLANZapine, OLANZapine zydis, traZODone         Assessment/Plan   Principal Problem:    Severe recurrent major depression without psychotic features (CMS/HCC)  Active Problems:    Colostomy present (CMS/HCC)    Opioid abuse (CMS/HCC)    Bilateral hip pain    Biological -     Orthostatics - confirmed on measurement.  Ongoing efforts to communicate with patient about taking her time when changing position to help prevent falls.    Mouth checks.    Cymbalta 120 mg daily for depression, this is max dose  Wellbutrin xl 300 mg for adjunctive depression treatment  seroquel 350 mg at bedtime  Trazodone 150 mg PRN and melatonin at bedtime for sleep  No adverse side effects of medications noted or reported.  ECG (10/4/23): Normal sinus rhythm. Heart  rate 72 bpm. Qtc 442  11/13/23 - qtc 441    Declined ECT consideration during discussion on 11/4, 11/7.   ECT is not available at this facility and this would include transitioning to another psychiatric hospital for this level of service or pursuing as an outpatient on discharge.    Discussion with patient regarding risk benefits and alternatives and side effects with opportunity to ask questions and questions answered.  Patient given consent to the plan as noted    2. Psychological -     Patient is encouraged to participate with the therapeutic milieu and program and group therapy    3. Sociological -      Patient encouraged to cooperate with social work staff on issues relevant to discharge planning  Pending [place] medicaid and then transition to convalescence nursing care once this becomes available to patient    15 minutes spent coordinating care for patient on this day    Parts of this chart have been completed using voice recognition  software.  Please excuse any errors of transcription.  Despite the medical decision making time stamp, my medical decision making has taken place during the patient's entire visit.  Thought process and reason for plan has been formulated from the time that I saw the patient until the time of disposition and is not specific to one specific moment during their visit and furthermore the medical decision making encompasses the entire chart and not only that represented in this note.      Eddie Cullen, DO

## 2023-11-18 NOTE — CARE PLAN
The patient's goals for the shift include rest    The clinical goals for the shift include no falls    Over the shift, the patient did not make progress toward the following goals. Barriers to progression include pt need for independence. Recommendations to address these barriers include educate pt on importance of falls.

## 2023-11-19 PROCEDURE — 1140000001 HC PRIVATE PSYCH ROOM DAILY

## 2023-11-19 PROCEDURE — 99231 SBSQ HOSP IP/OBS SF/LOW 25: CPT | Performed by: PSYCHIATRY & NEUROLOGY

## 2023-11-19 PROCEDURE — 2500000001 HC RX 250 WO HCPCS SELF ADMINISTERED DRUGS (ALT 637 FOR MEDICARE OP): Performed by: PSYCHIATRY & NEUROLOGY

## 2023-11-19 PROCEDURE — 2500000002 HC RX 250 W HCPCS SELF ADMINISTERED DRUGS (ALT 637 FOR MEDICARE OP, ALT 636 FOR OP/ED): Performed by: STUDENT IN AN ORGANIZED HEALTH CARE EDUCATION/TRAINING PROGRAM

## 2023-11-19 PROCEDURE — 2500000004 HC RX 250 GENERAL PHARMACY W/ HCPCS (ALT 636 FOR OP/ED): Performed by: PSYCHIATRY & NEUROLOGY

## 2023-11-19 RX ADMIN — Medication 5 MG: at 18:17

## 2023-11-19 RX ADMIN — QUETIAPINE FUMARATE 350 MG: 300 TABLET ORAL at 20:46

## 2023-11-19 RX ADMIN — TRAZODONE HYDROCHLORIDE 150 MG: 50 TABLET ORAL at 20:46

## 2023-11-19 RX ADMIN — BUPROPION HYDROCHLORIDE 300 MG: 300 TABLET, FILM COATED, EXTENDED RELEASE ORAL at 08:14

## 2023-11-19 RX ADMIN — CELECOXIB 200 MG: 200 CAPSULE ORAL at 15:49

## 2023-11-19 RX ADMIN — DULOXETINE HYDROCHLORIDE 120 MG: 60 CAPSULE, DELAYED RELEASE ORAL at 08:14

## 2023-11-19 RX ADMIN — ACETAMINOPHEN 650 MG: 325 TABLET ORAL at 15:47

## 2023-11-19 RX ADMIN — Medication 125 MCG: at 08:14

## 2023-11-19 ASSESSMENT — COLUMBIA-SUICIDE SEVERITY RATING SCALE - C-SSRS
1. SINCE LAST CONTACT, HAVE YOU WISHED YOU WERE DEAD OR WISHED YOU COULD GO TO SLEEP AND NOT WAKE UP?: NO
2. HAVE YOU ACTUALLY HAD ANY THOUGHTS OF KILLING YOURSELF?: NO
6. HAVE YOU EVER DONE ANYTHING, STARTED TO DO ANYTHING, OR PREPARED TO DO ANYTHING TO END YOUR LIFE?: NO
6. HAVE YOU EVER DONE ANYTHING, STARTED TO DO ANYTHING, OR PREPARED TO DO ANYTHING TO END YOUR LIFE?: NO
2. HAVE YOU ACTUALLY HAD ANY THOUGHTS OF KILLING YOURSELF?: NO
1. SINCE LAST CONTACT, HAVE YOU WISHED YOU WERE DEAD OR WISHED YOU COULD GO TO SLEEP AND NOT WAKE UP?: NO

## 2023-11-19 ASSESSMENT — PAIN SCALES - GENERAL
PAINLEVEL_OUTOF10: 1
PAINLEVEL_OUTOF10: 2
PAINLEVEL_OUTOF10: 2
PAINLEVEL_OUTOF10: 5 - MODERATE PAIN

## 2023-11-19 ASSESSMENT — ENCOUNTER SYMPTOMS
SLEEP DISTURBANCE: 1
NERVOUS/ANXIOUS: 1
DECREASED CONCENTRATION: 1
DYSPHORIC MOOD: 1

## 2023-11-19 ASSESSMENT — PAIN DESCRIPTION - ORIENTATION: ORIENTATION: LEFT

## 2023-11-19 ASSESSMENT — PAIN DESCRIPTION - LOCATION: LOCATION: HIP

## 2023-11-19 NOTE — PROGRESS NOTES
"Azul Roberts is a 63 y.o. female on day 71 of admission presenting with Severe recurrent major depression without psychotic features (CMS/HCC).      Subjective   Case discussed with treatment team and chart reviewed.    More optimistic today.  Reports her mood is good and when asked to clarify further she reports \"because I am alive.\"  Patient smiling while saying this.  Continues to sit in wheelchair in the hallway staying out of room more.  Goal-directed activities remain limited but patient is certainly more optimistic as noted.  She does still report that she is depressed and curious about the future but feeling less hopeless as we have continued to hospitalize her to complete the treatment plan.  She notes that nobody else has spent this much time trying to help her.  She denies thoughts of harming others and she persists without features of jensen.  She has not demonstrated overt psychotic features in an extended period of time.    Nursing notes:  Pt pleasant and cooperative with staff and care. Pt out of her room attending group therapy, engaging in conversation with staff about thanksgiving meals. Smiling often.     Objective     Vitals:    11/17/23 1700 11/18/23 0556 11/18/23 1700 11/19/23 0633   BP: 102/70 112/72 107/79 110/74   BP Location: Right arm  Right arm Left arm   Patient Position: Sitting Lying Sitting Lying   Pulse: 103 75 87 82   Resp: 16 16 18 17   Temp: 36.9 °C (98.4 °F) 36.3 °C (97.3 °F) 36.5 °C (97.7 °F) 36.4 °C (97.5 °F)   TempSrc: Temporal Axillary Temporal Oral   SpO2: 99%  100% 99%   Weight:       Height:            Review of Systems   Psychiatric/Behavioral:  Positive for decreased concentration, dysphoric mood and sleep disturbance. The patient is nervous/anxious.      Psychiatric ROS - Adult  Anxiety: General Anxiety Disorder (KAYLEEN)KAYLEEN Behaviors: difficult to control worry, difficulty concentrating, irritability, restlessness, and sleep disturbance  Depression: anhedonia, appetite " "increased, concentration, energy, helpless, hopeless, interest, persistent thoughts of death, psychomotor agitation, sleep decreased , suicidal thoughts, and withdrawn  Delirium: negative  Psychosis: negative  Ioana: negative  Safety Issues: passive death wish and suicidal ideation  Psychiatric ROS Comment: as noted    Physical Exam  Abdominal:      Comments: Presence of colostomy bag   Psychiatric:         Attention and Perception: Attention and perception normal.         Mood and Affect: Mood is anxious and depressed. Affect is flat and inappropriate.         Behavior: Behavior is slowed.     Mental Status Examination  General Appearance:  less disheveled with improved eye contact, not malodorous on approach. Sitting in wheelchair near nursing station, wearing personal garments  Gait/Station: using wheelchair to move about the milieu under her own propulsion  Speech: brief, conversational volume  Mood: \"ok because I am alive\"  Affect:  more optimistic, improved relatedness ,  Thought Process: less impoverished  Thought Associations: No loosening of associations  Thought Content: improving, linear, logical, more spontaneous and overall increase in content  Perception: No perceptual abnormalities noted  Level of Consciousness: Alert  Orientation: Alert  Attention and Concentration: Mild impairment in attention  Recent Memory: Intact as evidenced by ability to recall details from the past 24 hours   Executive function: Intact  Language: Naming intact  Fund of knowledge: Good  Insight: Fair, as evidenced by increasing participation in discussion about her scenario and symptoms and their improvement  Judgment: Fair, as evidenced by ability to reason through medical decision making, compliance with treatment recommendations, and improving      Psychiatric Risk Assessment  Violence Risk Assessment: major mental illness and substance abuse  Acute Risk of Harm to Others is Considered: moderate   Suicide Risk Assessment: " , chronic medical illness, chronic pain, current psychiatric illness, feelings of hopelessness, global insomnia, history of trauma or abuse, life crisis (shame/despair), prior suicide attempt, severe anxiety, substance abuse, and suicidal ideations  Protective Factors against Suicide: adherence to  treatment and marriage/partnership  Acute Risk of Harm to Self is Considered: moderate    Relevant Results  Scheduled medications  buPROPion XL, 300 mg, oral, Daily  celecoxib, 200 mg, oral, Daily  cholecalciferol, 125 mcg, oral, Daily  DULoxetine, 120 mg, oral, Daily  lidocaine, 1 patch, transdermal, Daily  melatonin, 5 mg, oral, Daily  QUEtiapine, 350 mg, oral, Nightly      Continuous medications     PRN medications  PRN medications: acetaminophen, alum-mag hydroxide-simeth, diphenhydrAMINE, hydrOXYzine pamoate, OLANZapine, OLANZapine zydis, traZODone         Assessment/Plan   Principal Problem:    Severe recurrent major depression without psychotic features (CMS/HCC)  Active Problems:    Colostomy present (CMS/HCC)    Opioid abuse (CMS/HCC)    Bilateral hip pain    Biological -     Orthostatics - confirmed on measurement.  Ongoing efforts to communicate with patient about taking her time when changing position to help prevent falls.    Mouth checks.    Cymbalta 120 mg daily for depression, this is max dose  Wellbutrin xl 300 mg for adjunctive depression treatment  seroquel 350 mg at bedtime  Trazodone 150 mg PRN and melatonin at bedtime for sleep  No adverse side effects of medications noted or reported.  ECG (10/4/23): Normal sinus rhythm. Heart  rate 72 bpm. Qtc 442  11/13/23 - qtc 441    Declined ECT consideration during discussion on 11/4, 11/7.   ECT is not available at this facility and this would include transitioning to another psychiatric hospital for this level of service or pursuing as an outpatient on discharge.    Discussion with patient regarding risk benefits and alternatives and side effects  with opportunity to ask questions and questions answered.  Patient given consent to the plan as noted    2. Psychological -     Patient is encouraged to participate with the therapeutic milieu and program and group therapy    3. Sociological -      Patient encouraged to cooperate with social work staff on issues relevant to discharge planning  Pending [place] medicaid and then transition to convalescence nursing care once this becomes available to patient    15 minutes spent coordinating care for patient on this day    Parts of this chart have been completed using voice recognition software.  Please excuse any errors of transcription.  Despite the medical decision making time stamp, my medical decision making has taken place during the patient's entire visit.  Thought process and reason for plan has been formulated from the time that I saw the patient until the time of disposition and is not specific to one specific moment during their visit and furthermore the medical decision making encompasses the entire chart and not only that represented in this note.      Eddie Cullen, DO

## 2023-11-19 NOTE — GROUP NOTE
Group Topic: Journaling   Group Date: 11/19/2023  Start Time: 1015  End Time: 1100  Facilitators: BRENDA Madera   Department: [facility]    Number of Participants: 7   Group Focus: feeling awareness/expression  Treatment Modality: Other: Recreation Therapy   Interventions utilized were exploration and problem solving  Purpose: coping skills, feelings, insight or knowledge, self-worth, and self-care  Patient will identify strengths, weaknesses, emotions, values, motivators and personal growth through journaling and self-awareness.     Name: Azul Roberts YOB: 1960   MR: [id number]      Facilitator: Recreational Therapist  Level of Participation: moderate  Quality of Participation: appropriate/pleasant, attentive, and engaged  Interactions with others: appropriate and gave feedback  Mood/Affect: blunted and brightens with interaction  Triggers (if applicable): N/A  Cognition: coherent/clear  Progress: Moderate  Comments: pt problem is depressed mood.  Patients mood improved today , she ws able to express her thoughts and reported she is feels better and not as tired during the day.   Plan: continue with services

## 2023-11-19 NOTE — CARE PLAN
The patient's goals for the shift include rest    The clinical goals for the shift include no falls, maintain safety, no pain    Over the shift, the patient did not make progress toward the following goals. Barriers to progression include not accepting any type of pain interventions offered. Recommendations to address these barriers include encourage pt to utilize some pain interventions.

## 2023-11-19 NOTE — NURSING NOTE
SARAN NOTE     Problem:  depression     Behavior:  Pt pleasant and cooperative with staff and care. Pt out of her room attending group therapy, engaging in conversation with staff about thanksgiving meals. Smiling often.  Group Participation: YES  Appetite/Meals: great       Interventions:  Administered scheduled medications    Response:  Pt continues as above. Pt laying in bed at this time after lunch, but comes out when asked or when she wants to.     Plan:  Maintain pt safety

## 2023-11-19 NOTE — GROUP NOTE
Group Topic: Excercise/Physical    Group Date: 11/19/2023  Start Time: 1100  End Time: 1130  Facilitators: BRENDA Madera   Department: [facility]    Number of Participants: 7   Group Focus: other Exercise  Treatment Modality: Other: Recreation Therapy   Interventions utilized were group exercise  Purpose: other: increase physical activity.   Goal: to increase physical activity  Objectives:  1.Pt.  Will participate in physical activity for at least 20 minutes.   2.Pt. will demonstrate appropriate frustration tolerance with no more than 3 verbal cues.  3.Pt. will engage in group discussion meaningfully and appropriately.   Additional comments: Pt. attained the above objectives.    Name: Azul Roberts YOB: 1960   MR: [id number]      Facilitator: attentive  Level of Participation: active  Quality of Participation: attentive  Interactions with others: appropriate  Mood/Affect: appropriate  Triggers (if applicable): N/A  Cognition: goal directed  Progress: Moderate  Comments: : pt problem is depressed mood.   Plan: continue with services   Pt. attained the above objectives.

## 2023-11-19 NOTE — NURSING NOTE
"SARAN NOTE     Problem:  Depression     Behavior:  Pt is observed sitting in her w/c in the middle of the hallway. She is somewhat social with peers. Overheard pt talking with a couple peers and how \"all they have for me is Tylenol. Tylenol 650mg. That doesn't work for me.\" Pt does appear to be in pain, she is grimacing and making noises when she moved about in her chair. However, pt declines pain medication. Pt is pleasant about it, she is cooperative with care. Compliant with her Hs medications. Shortly after Hs snack, pt took herself to bed.   Group Participation: n/a  Appetite/Meals: Hs snack provided       Interventions:  HS medications given   Assessment completed  2041- PRN Trazodone given for sleep per patient's request      Response:  **Reassessment**  2140- Pt is resting in bed, she is calm and resting still. No s/s of distress noted.      Plan:  Continue to monitor and assist. Maintain q15 minutes rounds for safety.    "

## 2023-11-20 PROCEDURE — 99232 SBSQ HOSP IP/OBS MODERATE 35: CPT | Performed by: INTERNAL MEDICINE

## 2023-11-20 PROCEDURE — 1140000001 HC PRIVATE PSYCH ROOM DAILY

## 2023-11-20 PROCEDURE — 2500000001 HC RX 250 WO HCPCS SELF ADMINISTERED DRUGS (ALT 637 FOR MEDICARE OP): Performed by: PSYCHIATRY & NEUROLOGY

## 2023-11-20 PROCEDURE — 97110 THERAPEUTIC EXERCISES: CPT | Mod: GP

## 2023-11-20 PROCEDURE — 97116 GAIT TRAINING THERAPY: CPT | Mod: GP

## 2023-11-20 PROCEDURE — 99232 SBSQ HOSP IP/OBS MODERATE 35: CPT | Performed by: PSYCHIATRY & NEUROLOGY

## 2023-11-20 PROCEDURE — 97110 THERAPEUTIC EXERCISES: CPT | Mod: GO,CO

## 2023-11-20 PROCEDURE — 2500000002 HC RX 250 W HCPCS SELF ADMINISTERED DRUGS (ALT 637 FOR MEDICARE OP, ALT 636 FOR OP/ED): Performed by: STUDENT IN AN ORGANIZED HEALTH CARE EDUCATION/TRAINING PROGRAM

## 2023-11-20 PROCEDURE — 2500000004 HC RX 250 GENERAL PHARMACY W/ HCPCS (ALT 636 FOR OP/ED): Performed by: PSYCHIATRY & NEUROLOGY

## 2023-11-20 RX ADMIN — DULOXETINE HYDROCHLORIDE 120 MG: 60 CAPSULE, DELAYED RELEASE ORAL at 08:11

## 2023-11-20 RX ADMIN — BUPROPION HYDROCHLORIDE 300 MG: 300 TABLET, FILM COATED, EXTENDED RELEASE ORAL at 08:11

## 2023-11-20 RX ADMIN — Medication 125 MCG: at 08:11

## 2023-11-20 RX ADMIN — QUETIAPINE FUMARATE 350 MG: 300 TABLET ORAL at 20:10

## 2023-11-20 RX ADMIN — CELECOXIB 200 MG: 200 CAPSULE ORAL at 16:26

## 2023-11-20 RX ADMIN — Medication 5 MG: at 17:49

## 2023-11-20 ASSESSMENT — COGNITIVE AND FUNCTIONAL STATUS - GENERAL
DRESSING REGULAR UPPER BODY CLOTHING: A LOT
DAILY ACTIVITIY SCORE: 13
DRESSING REGULAR LOWER BODY CLOTHING: A LOT
HELP NEEDED FOR BATHING: A LOT
EATING MEALS: A LITTLE
TOILETING: A LOT
PERSONAL GROOMING: A LOT

## 2023-11-20 ASSESSMENT — COLUMBIA-SUICIDE SEVERITY RATING SCALE - C-SSRS
1. SINCE LAST CONTACT, HAVE YOU WISHED YOU WERE DEAD OR WISHED YOU COULD GO TO SLEEP AND NOT WAKE UP?: NO
6. HAVE YOU EVER DONE ANYTHING, STARTED TO DO ANYTHING, OR PREPARED TO DO ANYTHING TO END YOUR LIFE?: NO
6. HAVE YOU EVER DONE ANYTHING, STARTED TO DO ANYTHING, OR PREPARED TO DO ANYTHING TO END YOUR LIFE?: NO
1. SINCE LAST CONTACT, HAVE YOU WISHED YOU WERE DEAD OR WISHED YOU COULD GO TO SLEEP AND NOT WAKE UP?: NO
2. HAVE YOU ACTUALLY HAD ANY THOUGHTS OF KILLING YOURSELF?: NO
2. HAVE YOU ACTUALLY HAD ANY THOUGHTS OF KILLING YOURSELF?: NO

## 2023-11-20 ASSESSMENT — ENCOUNTER SYMPTOMS
DYSPHORIC MOOD: 1
DECREASED CONCENTRATION: 1
NERVOUS/ANXIOUS: 1
SLEEP DISTURBANCE: 1

## 2023-11-20 ASSESSMENT — PAIN SCALES - GENERAL: PAINLEVEL_OUTOF10: 8

## 2023-11-20 ASSESSMENT — PAIN - FUNCTIONAL ASSESSMENT: PAIN_FUNCTIONAL_ASSESSMENT: 0-10

## 2023-11-20 NOTE — PROGRESS NOTES
[facility]: [facility]  PROGRESS NOTE      Azul Roberts is a 63 y.o. female that is being seen  today for follow up at [facility]  Subjective   Patient is being seen for follow-up at [facility] unit.  Patient had a fall and was sent to the ER.  Patient was evaluated in the ER and CT scan of the brain was negative for any bleed.  Patient had a UA done which was positive for leukoesterase.  Culture is pending.  Patient has been at her baseline.  Colostomy has been working well.  Pain has been fairly controlled.    ROS  Negative for fever or chills  Negative for sore throat, ear pain, nasal discharge  Negative for cough, shortness of breath or wheezing  Negative for chest pain, palpitations, swelling of legs  Negative for abdominal pain, constipation, diarrhea, blood in the stools,has colostomy  Negative for urinary complaints  Negative for headache, dizziness, weakness or numbness  Negative for joint pain  Positive for depression or anxiety  All other systems reviewed and were negative  Vitals:    11/20/23 0500   BP: 123/81   Pulse: 72   Resp: 18   Temp: 36.4 °C (97.5 °F)   SpO2: 97%      Body mass index is 29.87 kg/m².  Physical Exam  Constitutional: Patient does not appear to be in any acute distress  Head and Face: NCAT  Eyes: Normal external exam, EOMI  ENT: Normal external inspection of ears and nose. Oropharynx normal.  Cardiovascular: RRR, S1/S2, no murmurs, rubs, or gallops, radial pulses +2, no edema of extremities  Pulmonary: CTAB, no respiratory distress.  Abdomen: +BS, soft, non-tender, nondistended, no guarding or rebound, no masses noted.colostomy present   MSK: hip joint pain   Skin- No lesions, contusions, or erythema.  Peripheral puslses palpable bilaterally 2+  Neuro: AAO X3, Cranial nerves 2-12 grossly intact,DTR 2+ in all 4 limbs       Diagnostic Results   Lab Results   Component Value Date    GLUCOSE 113 (H) 09/05/2023    CALCIUM 10.6 (H) 09/05/2023     09/05/2023     "K 3.7 09/05/2023    CO2 24 09/05/2023     09/05/2023    BUN 12 09/05/2023    CREATININE 0.72 09/05/2023     Lab Results   Component Value Date    ALT 12 09/05/2023    AST 14 09/05/2023    ALKPHOS 103 09/05/2023    BILITOT 0.4 09/05/2023     Lab Results   Component Value Date    WBC 13.2 (H) 09/05/2023    HGB 13.9 09/05/2023    HCT 43.0 09/05/2023    MCV 81 09/05/2023     09/05/2023     No results found for: \"CHOL\"  No results found for: \"HDL\"  No results found for: \"LDLCALC\"  No results found for: \"TRIG\"  No results found for: \"HGBA1C\"  Other labs not included in the list above were reviewed either before or during this encounter.    History    No past medical history on file.  No past surgical history on file.  No family history on file.  No Known Allergies  No current facility-administered medications on file prior to encounter.     Current Outpatient Medications on File Prior to Encounter   Medication Sig Dispense Refill    acetaminophen (Tylenol) 325 mg tablet Take 2 tablets (650 mg) by mouth 2 times a day.      hydrOXYzine pamoate (Vistaril) 25 mg capsule Take 1 capsule (25 mg) by mouth 2 times a day.      magnesium oxide (Mag-Ox) 400 mg tablet Take 1 tablet (400 mg) by mouth 2 times a day.      melatonin 5 mg tablet Take 1 tablet (5 mg) by mouth once daily at bedtime.      risperiDONE (RisperDAL) 2 mg tablet Take 1 tablet (2 mg) by mouth once daily at bedtime.      traZODone (Desyrel) 150 mg tablet Take 1 tablet (150 mg) by mouth once daily at bedtime.     Scheduled medications  buPROPion XL, 300 mg, oral, Daily  celecoxib, 200 mg, oral, Daily  cholecalciferol, 125 mcg, oral, Daily  citalopram, 30 mg, oral, Daily  lidocaine, 1 patch, transdermal, Daily  melatonin, 5 mg, oral, Nightly  QUEtiapine, 350 mg, oral, Nightly  traZODone, 150 mg, oral, Nightly      Continuous medications     PRN medications  PRN medications: acetaminophen, alum-mag hydroxide-simeth, diphenhydrAMINE, hydrOXYzine pamoate, " OLANZapine, OLANZapine zydis     There is no immunization history on file for this patient.  Patient's medical history was reviewed and updated either before or during this encounter.  ASSESSMENT / PLAN:  Active Hospital Problems    Bilateral hip pain      *Severe recurrent major depression without psychotic features (CMS/HCC)      Colostomy present (CMS/HCC)      Opioid abuse (CMS/HCC)  S/p fall.  Hip x-rays negative CT scan of the brain is negative     Pt. Has been stable.clostomy is working well.        Jay Knox MD

## 2023-11-20 NOTE — PROGRESS NOTES
DRAGAN received a call back from Alma Rosa in Admissions from [facility]. DRAGAN explained pt's situation including disposition and Medicaid. Alma Rosa requested clinical information to review. SARAHW will wait for response.     DRAGAN Rey

## 2023-11-20 NOTE — NURSING NOTE
SARAN NOTE     Plan  Depression     Behavior:   Patient lying in bed  Group Participation: no  Appetite/Meals: good       Interventions:     1:1 interaction with patient in her room. Patient had just finished with PT and was tired. At this time she is refusing group  Response:  Patient friendly and cooperative during assessment, but related she was tired from the PT.     Plan:   Continue to monitor for increased depression and maintain safety, while encouraging patient to attend group.

## 2023-11-20 NOTE — PROGRESS NOTES
SARAHW left another message with [facility] for possible admission through their  Medicaid Pending Financial Assistance Program.       Nikki Andrews, DRAGAN

## 2023-11-20 NOTE — GROUP NOTE
Group Topic: Team Building   Group Date: 11/20/2023  Start Time: 1500  End Time: 1600  Facilitators: BRENDA Howard   Department: [facility] REHABTH VIRTUAL    Number of Participants: 8   Group Focus: communication, concentration, other increase focus, and social skills  Treatment Modality: Other: Recreation Therapy  Interventions utilized were mental fitness  Purpose: communication skills and other: increase socialization, increase attention span    Name: Azul Roberts YOB: 1960   MR: [id number]      Facilitator: Recreational Therapist  Level of Participation: active  Quality of Participation: appropriate/pleasant, cooperative, and engaged  Interactions with others: appropriate  Mood/Affect: appropriate and bright  Triggers (if applicable): n\a  Cognition: coherent/clear  Progress: Moderate  Comments: pt problem is depressed mood. Engages easily with staff and peers.   Plan: continue with services

## 2023-11-20 NOTE — GROUP NOTE
Group Topic: Reflection   Group Date: 11/20/2023  Start Time: 1100  End Time: 1200  Facilitators: BRENDA Howard   Department: [facility] REHABTH VIRTUAL    Number of Participants: 8   Group Focus: feeling awareness/expression and self-awareness  Treatment Modality: Other: Recreation Therapy  Interventions utilized were exploration and other Self-disclosure,   Purpose: feelings, communication skills, and other: increase communication, increase socialization, increase stimulation    Name: Azul Roberts YOB: 1960   MR: [id number]      Facilitator: Recreational Therapist  Level of Participation: did not attend  Quality of Participation:  n/a  Interactions with others:  n/a  Mood/Affect:  n/a  Triggers (if applicable): n/a  Cognition:  n/a  Progress: None  Comments: pt problem is depressed mood. Pt declines invitation to group after working with and walking with Physical therapy. Resting in room at this time  Plan: continue with services

## 2023-11-20 NOTE — PROGRESS NOTES
"LSW received response back from clinical information sent that pt is unable to be accepted at [facility] due to clinical and financial reasons. LSW was informed that based on the clinical information pt is a \"risk to self and others\". LSW attempted to get further clarification on what information that they were reviewing, as pt has not been a risk to herself or anyone else. LSW will wait for further response. However at this time pt is unable to be accepted to [place] for further ongoing care. LSW to continue to follow and assist with discharge planning.     Nikki Andrews, LSW      "

## 2023-11-20 NOTE — NURSING NOTE
"SARAN NOTE     Problem:  Depression     Behavior:  Pt is sitting in her wheelchair in the hallway when this RN arrived to the unit for this shift. She smiled and said \"hi\" to this RN. Pt is somewhat social with peers. She is compliant with her medications. Requesting Trazodone for sleep.   Group Participation: n/a  Appetite/Meals: hs snack provided    Interventions:  Hs medications given whole with water  Assessment completed  2046- PRN Trazodone given for sleep per patient request    Response:  **Reassessment**  2142- Pt is now resting in her bed, she is calm and pleasant. No s/s of distress noted.      Plan:  Continue to monitor and assist. Maintain q15 minutes rounds for safety.    "

## 2023-11-20 NOTE — PROGRESS NOTES
11/20/23 2957-0490   General   Reason for Referral impaired mobility; recent fall   Referred By Dr. Cullen   Past Medical History Relevant to Rehab severe depression, colostomy, bilateral hip pain, opiod abuse   Therapeutic Exercise   Therapeutic Exercise Activity 1 Seated B/L Shoulder flexion w/ #2 Lb WT. 10 reps x2 sets   Therapeutic Exercise Activity 2 Seated B/L elbow flexion w/ #2 Lb WT. 10 reps x2 sets   Therapeutic Exercise Activity 3 Seated B/L wrist flexion/extension w/ #2 Lb WT. 10 reps x2 sets   Therapeutic Exercise Activity 4 Seated B/L Shoulder adduction w/ #2 Lb WT. 10 reps x2 sets   IP OT Assessment   End of Session Communication Bedside nurse  (Pt declined all ADL's but was willing to participate and perform UE exercises.)      11/20/23 1130   [facility] Daily Activity   Putting on and taking off regular lower body clothing 2   Bathing (including washing, rinsing, drying) 2   Putting on and taking off regular upper body clothing 2   Toileting, which includes using toilet, bedpan or urinal 2   Taking care of personal grooming such as brushing teeth 2   Eating Meals 3   Daily Activity - Total Score 13

## 2023-11-20 NOTE — PROGRESS NOTES
"Azul Roberts is a 63 y.o. female on day 72 of admission presenting with Severe recurrent major depression without psychotic features (CMS/[facility]).      Subjective   Case discussed with treatment team and chart reviewed.    Continues to smile during interactions and present as jokingly sarcastic.  No acute concerns today.  Patient is more malodorous and nursing work with patient to assess for the colostomy bag fitting.  Patient with no other reported concerns today.  We did learn however from social work the patient was denied by [facility].  Patient participated in physical therapy today to an extent not yet seen by this provider.  She was seen walking down the hallway with their assistance and also lifting weights with them.  She reports this caused significant pain and discomfort in multiple areas of her musculoskeletal system and she finds this untenable.  She is pessimistic about continued physical therapy services however patient is not able to function independently in a home environment without assistive support which is not currently available to her because of this.  She notes she will not engage physically to the extent that it results in pain.    Social work notes:  LSW received response back from clinical information sent that pt is unable to be accepted at [facility] due to clinical and financial reasons. LSW was informed that based on the clinical information pt is a \"risk to self and others\". LSW attempted to get further clarification on what information that they were reviewing, as pt has not been a risk to herself or anyone else. LSW will wait for further response. However at this time pt is unable to be accepted to [place] for further ongoing care. LSW to continue to follow and assist with discharge planning.        Nursing notes:  Pt pleasant and cooperative with staff and care. Pt out of her room attending group therapy, engaging in conversation with staff about thanksgiving " "meals. Smiling often.     Objective     Vitals:    11/19/23 0633 11/19/23 1700 11/20/23 0500 11/20/23 1600   BP: 110/74 102/80 123/81 109/72   BP Location: Left arm Right arm Right arm Left arm   Patient Position: Lying Sitting Lying Sitting   Pulse: 82 92 72 89   Resp: 17 19 18 18   Temp: 36.4 °C (97.5 °F) 37 °C (98.6 °F) 36.4 °C (97.5 °F) 37.4 °C (99.3 °F)   TempSrc: Oral Temporal Temporal Temporal   SpO2: 99% 93% 97% 100%   Weight:       Height:            Review of Systems   Psychiatric/Behavioral:  Positive for decreased concentration, dysphoric mood and sleep disturbance. The patient is nervous/anxious.      Psychiatric ROS - Adult  Anxiety: General Anxiety Disorder (KAYLEEN)KAYLEEN Behaviors: difficult to control worry, difficulty concentrating, irritability, restlessness, and sleep disturbance  Depression: anhedonia, appetite increased, concentration, energy, helpless, hopeless, interest, persistent thoughts of death, psychomotor agitation, sleep decreased , suicidal thoughts, and withdrawn  Delirium: negative  Psychosis: negative  Ioana: negative  Safety Issues: passive death wish and suicidal ideation  Psychiatric ROS Comment: as noted    Physical Exam  Abdominal:      Comments: Presence of colostomy bag   Psychiatric:         Attention and Perception: Attention and perception normal.         Mood and Affect: Mood is anxious and depressed. Affect is flat and inappropriate.         Behavior: Behavior is slowed.     Mental Status Examination  General Appearance:  less disheveled with improved eye contact, malodorous on approach. Sitting in wheelchair near nursing station, wearing personal garments  Gait/Station: using wheelchair to move about the milieu under her own propulsion  Speech: brief, conversational volume  Mood: \"ok\"  Affect:  more optimistic, improved relatedness ,  Thought Process: less impoverished  Thought Associations: No loosening of associations  Thought Content: improving, linear, logical, more " spontaneous and overall increase in content  Perception: No perceptual abnormalities noted  Level of Consciousness: Alert  Orientation: Alert  Attention and Concentration: Mild impairment in attention  Recent Memory: Intact as evidenced by ability to recall details from the past 24 hours   Executive function: Intact  Language: Naming intact  Fund of knowledge: Good  Insight: Fair, as evidenced by increasing participation in discussion about her scenario and symptoms and their improvement  Judgment: Fair, as evidenced by ability to reason through medical decision making, compliance with treatment recommendations, and improving      Psychiatric Risk Assessment  Violence Risk Assessment: major mental illness and substance abuse  Acute Risk of Harm to Others is Considered: moderate   Suicide Risk Assessment: , chronic medical illness, chronic pain, current psychiatric illness, feelings of hopelessness, global insomnia, history of trauma or abuse, life crisis (shame/despair), prior suicide attempt, severe anxiety, substance abuse, and suicidal ideations  Protective Factors against Suicide: adherence to  treatment and marriage/partnership  Acute Risk of Harm to Self is Considered: moderate    Relevant Results  Scheduled medications  buPROPion XL, 300 mg, oral, Daily  celecoxib, 200 mg, oral, Daily  cholecalciferol, 125 mcg, oral, Daily  DULoxetine, 120 mg, oral, Daily  lidocaine, 1 patch, transdermal, Daily  melatonin, 5 mg, oral, Daily  QUEtiapine, 350 mg, oral, Nightly      Continuous medications     PRN medications  PRN medications: acetaminophen, alum-mag hydroxide-simeth, diphenhydrAMINE, hydrOXYzine pamoate, OLANZapine, OLANZapine zydis, traZODone         Assessment/Plan   Principal Problem:    Severe recurrent major depression without psychotic features (CMS/HCC)  Active Problems:    Colostomy present (CMS/HCC)    Opioid abuse (CMS/HCC)    Bilateral hip pain    Biological -     Orthostatics - confirmed on  measurement.  Ongoing efforts to communicate with patient about taking her time when changing position to help prevent falls.    Mouth checks.    Cymbalta 120 mg daily for depression, this is max dose  Wellbutrin xl 300 mg for adjunctive depression treatment  seroquel 350 mg at bedtime  Trazodone 150 mg PRN and melatonin at bedtime for sleep  No adverse side effects of medications noted or reported.  ECG (10/4/23): Normal sinus rhythm. Heart  rate 72 bpm. Qtc 442  11/13/23 - qtc 441    Declined ECT consideration during discussion on 11/4, 11/7.   ECT is not available at this facility and this would include transitioning to another psychiatric hospital for this level of service or pursuing as an outpatient on discharge.    Discussion with patient regarding risk benefits and alternatives and side effects with opportunity to ask questions and questions answered.  Patient given consent to the plan as noted    2. Psychological -     Patient is encouraged to participate with the therapeutic milieu and program and group therapy    3. Sociological -      Patient encouraged to cooperate with social work staff on issues relevant to discharge planning  Pending [place] medicaid and then transition to convalescence nursing care once this becomes available to patient    25 minutes spent coordinating care for patient on this day    Parts of this chart have been completed using voice recognition software.  Please excuse any errors of transcription.  Despite the medical decision making time stamp, my medical decision making has taken place during the patient's entire visit.  Thought process and reason for plan has been formulated from the time that I saw the patient until the time of disposition and is not specific to one specific moment during their visit and furthermore the medical decision making encompasses the entire chart and not only that represented in this note.      Eddie Cullen, DO

## 2023-11-20 NOTE — PROGRESS NOTES
"Physical Therapy       11/20/23 1266-2535   PT  Visit   PT Received On 11/20/23   Response to Previous Treatment Patient reporting fatigue but able to participate.;Other (Comment)   General   Reason for Referral impaired mobility; recent fall   Referred By Dr. Cullen   Past Medical History Relevant to Rehab severe depression, colostomy, bilateral hip pain, opiod abuse   Prior to Session Communication Bedside nurse   Patient Position Received Bed, 0 rail up   Pain Assessment   Pain Assessment 0-10   Pain Score 8   Pain Location Hip   Pain Orientation Left   Pain Radiating Towards   (Primarily Hip \"Its severe bone on bone\")   Pain Frequency Constant/continuous   Pain Onset Ongoing   Clinical Progression Not changed   Pain Interventions Medication (See MAR)   Cognition   Overall Cognitive Status Impaired   Orientation Level Other (Comment)  (Gets agitated when coaxed to increase mobility)   Insight   (Self limiting)   Impulsive Mildly   Processing Speed Delayed   General Observation   General Observation   (Left Leg Length Discrepancy tends to stand with Toe touch)   Therapeutic Exercise   Therapeutic Exercise Performed Yes   Therapeutic Exercise Activity 1 Seated LAQ 2x15  (2# BLE)   Therapeutic Exercise Activity 2 Seated Hip Flexion 2x15  (2# BLE)   Therapeutic Exercise Activity 3 Seated Hip Abd 2x15  (green tband)   Therapeutic Exercise Activity 4 seated Hip Add with ball 2x15   Therapeutic Exercise Activity 5 Seated Hamstring Curls with ther band 2x15  (green tband)   Therapeutic Exercise Activity 6 bed push ups 1x10  (painful)   Bed Mobility   Bed Mobility Yes   Bed Mobility 1   Bed Mobility 1 Supine to sitting;Sitting to supine   Level of Assistance 1 Independent   Bed Mobility Comments 1 Moans with pain   Ambulation/Gait Training 1   Surface 1 Level tile   Device 1 Rolling walker   Gait Support Devices Gait belt   Assistance 1 Contact guard   Quality of Gait 1 Inconsistent stride length   Comments/Distance " (ft) 1 Moans with every step.  Slow effortful step to pattern.  Toe touch LLE due to leg length discrepancy.  Improved distance today 75 feet with turns   Transfers   Transfer Yes   Transfer 1   Transfer From 1 Bed to   Transfer to 1 Stand   Technique 1 Sit to stand;Stand to sit   Transfer Level of Assistance 1 Contact guard   Trials/Comments 1 Painful   Activity Tolerance   Endurance Decreased tolerance for upright activites  (Limited by pain)   PT Assessment   PT Assessment Results Decreased strength;Decreased endurance;Impaired balance;Decreased mobility;Impaired judgement;Decreased safety awareness;Pain   Rehab Prognosis Good

## 2023-11-21 PROCEDURE — 2500000004 HC RX 250 GENERAL PHARMACY W/ HCPCS (ALT 636 FOR OP/ED): Performed by: PSYCHIATRY & NEUROLOGY

## 2023-11-21 PROCEDURE — 97116 GAIT TRAINING THERAPY: CPT | Mod: GP

## 2023-11-21 PROCEDURE — 97110 THERAPEUTIC EXERCISES: CPT | Mod: GP

## 2023-11-21 PROCEDURE — 97535 SELF CARE MNGMENT TRAINING: CPT | Mod: GO,CO

## 2023-11-21 PROCEDURE — 1140000001 HC PRIVATE PSYCH ROOM DAILY

## 2023-11-21 PROCEDURE — 2500000002 HC RX 250 W HCPCS SELF ADMINISTERED DRUGS (ALT 637 FOR MEDICARE OP, ALT 636 FOR OP/ED): Performed by: STUDENT IN AN ORGANIZED HEALTH CARE EDUCATION/TRAINING PROGRAM

## 2023-11-21 PROCEDURE — 2500000001 HC RX 250 WO HCPCS SELF ADMINISTERED DRUGS (ALT 637 FOR MEDICARE OP): Performed by: PSYCHIATRY & NEUROLOGY

## 2023-11-21 PROCEDURE — 99232 SBSQ HOSP IP/OBS MODERATE 35: CPT | Performed by: PSYCHIATRY & NEUROLOGY

## 2023-11-21 RX ADMIN — CELECOXIB 200 MG: 200 CAPSULE ORAL at 15:55

## 2023-11-21 RX ADMIN — QUETIAPINE FUMARATE 350 MG: 300 TABLET ORAL at 20:11

## 2023-11-21 RX ADMIN — BUPROPION HYDROCHLORIDE 300 MG: 300 TABLET, FILM COATED, EXTENDED RELEASE ORAL at 08:19

## 2023-11-21 RX ADMIN — Medication 5 MG: at 18:00

## 2023-11-21 RX ADMIN — Medication 125 MCG: at 08:19

## 2023-11-21 RX ADMIN — DULOXETINE HYDROCHLORIDE 120 MG: 60 CAPSULE, DELAYED RELEASE ORAL at 08:19

## 2023-11-21 ASSESSMENT — COGNITIVE AND FUNCTIONAL STATUS - GENERAL
TOILETING: A LITTLE
MOVING TO AND FROM BED TO CHAIR: A LITTLE
STANDING UP FROM CHAIR USING ARMS: A LITTLE
DRESSING REGULAR LOWER BODY CLOTHING: A LITTLE
WALKING IN HOSPITAL ROOM: A LITTLE
PERSONAL GROOMING: A LITTLE
DRESSING REGULAR UPPER BODY CLOTHING: A LITTLE
HELP NEEDED FOR BATHING: A LITTLE
DAILY ACTIVITIY SCORE: 18
EATING MEALS: A LITTLE
CLIMB 3 TO 5 STEPS WITH RAILING: A LOT
MOBILITY SCORE: 19

## 2023-11-21 ASSESSMENT — PAIN SCALES - GENERAL
PAINLEVEL_OUTOF10: 8
PAINLEVEL_OUTOF10: 8

## 2023-11-21 ASSESSMENT — ACTIVITIES OF DAILY LIVING (ADL): HOME_MANAGEMENT_TIME_ENTRY: 13

## 2023-11-21 ASSESSMENT — ENCOUNTER SYMPTOMS
SLEEP DISTURBANCE: 1
DYSPHORIC MOOD: 1
DECREASED CONCENTRATION: 1
NERVOUS/ANXIOUS: 1

## 2023-11-21 ASSESSMENT — PAIN - FUNCTIONAL ASSESSMENT
PAIN_FUNCTIONAL_ASSESSMENT: 0-10
PAIN_FUNCTIONAL_ASSESSMENT: 0-10

## 2023-11-21 NOTE — PROGRESS NOTES
"Azul Roberts is a 63 y.o. female on day 73 of admission presenting with Severe recurrent major depression without psychotic features (CMS/[facility]).      Subjective   Case discussed with treatment team and chart reviewed.    Made aware that was declined by [facility].  Patient was visibly upset by learning this information and question, \"what are we going to do now?\".  Discussed with patient the plan remains that we will seek placement in another nursing facility and continue to wait for [place] Medicaid.  She reports her mood is \"okay.\"  She has improved thus far during the admission and she is participating increasingly so with group attention and attendance as well as participating with therapies physical and occupational.  She remains unable to independently function however.  Her  does come to visit her on weekends however is not able to help caretake for her at home during the day.  Left her own at home, she will decompensate and require readmission.  She is very high risk and for this reason continued hospitalization until appropriate disposition can be coordinated remains indicated.    Nursing notes:  Pt has been out of bed, she went to am group, she is medication compliant but still resistant to showering.    Group Participation: yes at am group  Appetite/Meals: 100%    Physical therapy reports:  Self limiting behaviors, requiring direction to transfer under close supervision, remains independent once in wheelchair, decreased activity tolerance for upright activity    Objective     Vitals:    11/20/23 0500 11/20/23 1600 11/21/23 0500 11/21/23 1608   BP: 123/81 109/72 117/68 102/90   BP Location: Right arm Left arm Left arm    Patient Position: Lying Sitting Lying    Pulse: 72 89 76 102   Resp: 18 18 17 16   Temp: 36.4 °C (97.5 °F) 37.4 °C (99.3 °F) 36.6 °C (97.9 °F) 36.9 °C (98.4 °F)   TempSrc: Temporal Temporal Temporal Oral   SpO2: 97% 100% 97% 100%   Weight:       Height:        " "    Review of Systems   Psychiatric/Behavioral:  Positive for decreased concentration, dysphoric mood and sleep disturbance. The patient is nervous/anxious.      Psychiatric ROS - Adult  Anxiety: General Anxiety Disorder (KAYLEEN)KAYLEEN Behaviors: difficult to control worry, difficulty concentrating, irritability, restlessness, and sleep disturbance  Depression: anhedonia, appetite increased, concentration, energy, helpless, hopeless, interest, persistent thoughts of death, psychomotor agitation, sleep decreased , suicidal thoughts, and withdrawn  Delirium: negative  Psychosis: negative  Ioana: negative  Safety Issues: passive death wish and suicidal ideation  Psychiatric ROS Comment: as noted    Physical Exam  Abdominal:      Comments: Presence of colostomy bag   Psychiatric:         Attention and Perception: Attention and perception normal.         Mood and Affect: Mood is anxious and depressed. Affect is flat and inappropriate.         Behavior: Behavior is slowed.     Mental Status Examination  General Appearance:  less disheveled with improved eye contact, . In bed after dinner, wearing personal garments  Gait/Station: using wheelchair to move about the milieu under her own propulsion  Speech: brief, conversational volume  Mood: \"ok\"  Affect:  more optimistic, improved relatedness ,  Thought Process: less impoverished  Thought Associations: No loosening of associations  Thought Content: improving, linear, logical, more spontaneous and overall increase in content  Perception: No perceptual abnormalities noted  Level of Consciousness: Alert  Orientation: Alert  Attention and Concentration: Mild impairment in attention  Recent Memory: Intact as evidenced by ability to recall details from the past 24 hours   Executive function: Intact  Language: Naming intact  Fund of knowledge: Good  Insight: Fair, as evidenced by increasing participation in discussion about her scenario and symptoms and their improvement  Judgment: " Fair, as evidenced by ability to reason through medical decision making, compliance with treatment recommendations, and improving      Psychiatric Risk Assessment  Violence Risk Assessment: major mental illness and substance abuse  Acute Risk of Harm to Others is Considered: moderate   Suicide Risk Assessment: , chronic medical illness, chronic pain, current psychiatric illness, feelings of hopelessness, global insomnia, history of trauma or abuse, life crisis (shame/despair), prior suicide attempt, severe anxiety, substance abuse, and suicidal ideations  Protective Factors against Suicide: adherence to  treatment and marriage/partnership  Acute Risk of Harm to Self is Considered: moderate    Relevant Results  Scheduled medications  buPROPion XL, 300 mg, oral, Daily  celecoxib, 200 mg, oral, Daily  cholecalciferol, 125 mcg, oral, Daily  DULoxetine, 120 mg, oral, Daily  lidocaine, 1 patch, transdermal, Daily  melatonin, 5 mg, oral, Daily  QUEtiapine, 350 mg, oral, Nightly      Continuous medications     PRN medications  PRN medications: acetaminophen, alum-mag hydroxide-simeth, diphenhydrAMINE, hydrOXYzine pamoate, OLANZapine, OLANZapine zydis, traZODone         Assessment/Plan   Principal Problem:    Severe recurrent major depression without psychotic features (CMS/HCC)  Active Problems:    Colostomy present (CMS/HCC)    Opioid abuse (CMS/HCC)    Bilateral hip pain    Biological -     Orthostatics - confirmed on measurement.  Ongoing efforts to communicate with patient about taking her time when changing position to help prevent falls.    Mouth checks.    Cymbalta 120 mg daily for depression, this is max dose  Wellbutrin xl 300 mg for adjunctive depression treatment  seroquel 350 mg at bedtime  Trazodone 150 mg PRN and melatonin at bedtime for sleep  No adverse side effects of medications noted or reported.  ECG (10/4/23): Normal sinus rhythm. Heart  rate 72 bpm. Qtc 442  11/13/23 - qtc 441    Declined ECT  consideration during discussion on 11/4, 11/7.   ECT is not available at this facility and this would include transitioning to another psychiatric hospital for this level of service or pursuing as an outpatient on discharge.    Discussion with patient regarding risk benefits and alternatives and side effects with opportunity to ask questions and questions answered.  Patient given consent to the plan as noted    2. Psychological -     Patient is encouraged to participate with the therapeutic milieu and program and group therapy    3. Sociological -      Patient encouraged to cooperate with social work staff on issues relevant to discharge planning  Pending [place] medicaid and then transition to convalescence nursing care once this becomes available to patient    20 minutes spent coordinating care for patient on this day    Parts of this chart have been completed using voice recognition software.  Please excuse any errors of transcription.  Despite the medical decision making time stamp, my medical decision making has taken place during the patient's entire visit.  Thought process and reason for plan has been formulated from the time that I saw the patient until the time of disposition and is not specific to one specific moment during their visit and furthermore the medical decision making encompasses the entire chart and not only that represented in this note.      Eddie Cullen, DO

## 2023-11-21 NOTE — PROGRESS NOTES
"Physical Therapy       11/21/23 4225-5175   PT  Visit   PT Received On 11/21/23   General   Reason for Referral impaired mobility; recent fall   Referred By Dr. Cullen   Past Medical History Relevant to Rehab severe depression, colostomy, bilateral hip pain, opiod abuse   Patient Position Received Up in chair;Alarm on   Precautions   Medical Precautions Fall precautions   Precautions Comment colostomy   Pain Assessment   Pain Assessment 0-10   Pain Score 8   Pain Type Chronic pain   Pain Location Hip   Pain Orientation Left   Pain Frequency Constant/continuous   Pain Interventions   (\"bone on bone\" pain reported by patient. States \"Nothing helps it\")   Cognition   Insight   (self limiting behaviors)   Impulsive Mildly   Therapeutic Exercise   Therapeutic Exercise Activity 1 LAQ 2 x 15   Therapeutic Exercise Activity 2 seated hip flex 2 x 15   Therapeutic Exercise Activity 3 hip abd 2 x 15 with green theraband   Therapeutic Exercise Activity 4 hip add/ball squeezes 2 x15   Therapeutic Exercise Activity 5 seated hamstring curls 2 x 15, with green theraband   Ambulation/Gait Training 1   Surface 1 Level tile   Device 1 Rolling walker   Gait Support Devices Gait belt   Assistance 1 Contact guard   Quality of Gait 1 Antalgic;Inconsistent stride length   Comments/Distance (ft) 1 75'. Amb on toes on left LE due to leg length discrepancy and pain.   Transfer 1   Technique 1 Sit to stand;Stand to sit   Transfer Level of Assistance 1 Close supervision   Trials/Comments 1 cues for hand placement as patient tends to want to pull up on walker to stand   Wheelchair Activities   Propulsion Type 1 Manual   Level 1 Level tile   Method 1 Right lower extremity;Left lower extremity   Level of Assistance 1 Independent   Activity Tolerance   Endurance Decreased tolerance for upright activites   PT Assessment   Evaluation/Treatment Tolerance Patient limited by pain   Assessment Comment self limiting behaviors   End of Session Patient " Position Up in chair;Alarm on   Outpatient Education   Individual(s) Educated Patient   Education Comment reviewed importance of therapy and increasing mobility   PT Plan   Inpatient/Swing Bed or Outpatient Inpatient   PT Plan   Treatment/Interventions Transfer training;Gait training;Strengthening;Therapeutic exercise;Wheelchair management   PT Discharge Recommendations Low intensity level of continued care   Equipment Recommended upon Discharge Wheeled walker  (built up right shoe to equalize leg length)   PT Recommended Transfer Status Stand by assist   PT - OK to Discharge Yes      11/21/23 1130   [facility] Basic Mobility   Turning from your back to your side while in a flat bed without using bedrails 4   Moving from lying on your back to sitting on the side of a flat bed without using bedrails 4   Moving to and from bed to chair (including a wheelchair) 3   Standing up from a chair using your arms (e.g. wheelchair or bedside chair) 3   To walk in hospital room 3   Climbing 3-5 steps with railing 2   Basic Mobility - Total Score 19    Problem: Balance  Goal: STG - Maintains dynamic standing balance with upper extremity support  Outcome: Progressing     Problem: Mobility  Goal: STG - Patient will ambulate 300 ft with FWW , + turns, distant supervision of 1.  Outcome: Progressing     Problem: Transfers  Goal: STG - Patient to transfer to and from sit to supine mod independent  Outcome: Progressing  Goal: STG - Patient will transfer sit to and from stand mod independent  Outcome: Progressing     Problem: Pain  Goal: pt will demonstrate < 4/10 left hip pain during functional mobility/therapy session  Outcome: Not Progressing

## 2023-11-21 NOTE — NURSING NOTE
LIORP NOTE     Problem:  Depression     Behavior:  Patient is in sitting in patient’s wheelchair near the nurse station. Patient is pleasant and calm. Patient's affect is blunted. Patient is a little talkative. Patient maintains good eye contact.    Group Participation: N/A  Appetite/Meals: N/A       Interventions:  This nurse completed a shift assessment and administered patient's scheduled night time medications.    Response:  Patient remained pleasant and calm and was cooperative throughout this shift assessment and medication administration.     Plan:  Continue to monitor for depression. Continue to monitor for patient safety.

## 2023-11-21 NOTE — GROUP NOTE
Group Topic: Self-Care/Wellness   Group Date: 11/21/2023  Start Time: 1500  End Time: 1600  Facilitators: BRENDA Fernandes   Department: [facility]    Number of Participants: 9   Group Focus: coping skills, problem solving, self-awareness, and self-esteem  Treatment Modality: Other: Recreation therapy  Interventions utilized were exploration, mental fitness, orientation, patient education, and problem solving  Purpose: feelings, self-care, and other: elevate mood, increase socialization, enhance self esteem    Name: Azul Roberts YOB: 1960   MR: [id number]      Facilitator: Recreational Therapist  Level of Participation: active  Quality of Participation: appropriate/pleasant, attentive, cooperative, and engaged  Interactions with others: appropriate and gave feedback  Mood/Affect: appropriate, brightens with interaction, and positive  Triggers (if applicable): n/a  Cognition: coherent/clear  Progress: Significant  Comments: pt problem is depressed mood.  Pt is very active with participation and shares personal situations with staff and peers.    Plan: continue with services

## 2023-11-21 NOTE — GROUP NOTE
Group Topic: Self-Care/Wellness   Group Date: 11/21/2023  Start Time: 1100  End Time: 1125  Facilitators: BRENDA Fernandes   Department: [facility]    Number of Participants: 8   Group Focus: feeling awareness/expression, self-awareness, and self-esteem  Treatment Modality: Other: Recreation therapy  Interventions utilized were exploration, patient education, and problem solving  Purpose: feelings and other: increase attention span, increase stimulation, elevate mood, increase socialization, education     Name: Azul Roberts YOB: 1960   MR: [id number]      Facilitator: Recreational Therapist  Level of Participation: active  Quality of Participation: appropriate/pleasant, attentive, and cooperative  Interactions with others: appropriate and gave feedback  Mood/Affect: appropriate  Triggers (if applicable): n/a  Cognition: coherent/clear  Progress: Moderate  Comments: pt problem is depressed mood.  Plan: continue with services

## 2023-11-21 NOTE — GROUP NOTE
Group Topic: Team Building   Group Date: 11/21/2023  Start Time: 1000  End Time: 1100  Facilitators: HANY Beverly   Department: [facility]    Number of Participants: 8   Group Focus: communication, self-awareness, and social skills  Treatment Modality: Cognitive Behavioral Therapy and Solution-Focused Therapy  Interventions utilized were patient education and problem solving  Purpose: feelings and communication skills    Name: Azul Roberts YOB: 1960   MR: [id number]      Facilitator:   Level of Participation: active  Quality of Participation: cooperative  Interactions with others: appropriate  Mood/Affect: appropriate  Triggers (if applicable): N/a  Cognition: coherent/clear  Progress: Minimal  Comments: Stephenie was able to share thoughts with group but had difficulties grasping the concept for the handout and required some assistance with it. Stephenie did share her thoughts with groups and engaged in discussion.  Plan: continue with services

## 2023-11-21 NOTE — CARE PLAN
Problem: Balance  Goal: STG - Maintains dynamic standing balance with upper extremity support  Outcome: Progressing     Problem: Mobility  Goal: STG - Patient will ambulate 300 ft with FWW , + turns, distant supervision of 1.  Outcome: Progressing     Problem: Transfers  Goal: STG - Patient to transfer to and from sit to supine mod independent  Outcome: Progressing  Goal: STG - Patient will transfer sit to and from stand mod independent  Outcome: Progressing     Problem: Pain  Goal: pt will demonstrate < 4/10 left hip pain during functional mobility/therapy session  Outcome: Not Progressing

## 2023-11-21 NOTE — NURSING NOTE
SARAN NOTE     Problem:  Depression     Behavior:  Pt has been out of bed, she went to am group, she is medication compliant but still resistant to showering.    Group Participation: yes at am group  Appetite/Meals: 100%       Interventions:  Encourage hygiene and self care, every 15 minute checks, pt given scheduled medications, 1:1 interaction, encourage group participation.    Response:  Pt went to afternoon group and has remained up for most the afternoon.  She did shower this afternoon.     Plan:  Encourage hygiene and self care, every 15 minute checks, pt given scheduled medications, 1:1 interaction, encourage group participation.

## 2023-11-21 NOTE — PROGRESS NOTES
11/21/23 1102 to 1115   OT Last Visit   OT Received On 11/21/23   General   Reason for Referral impaired mobility; recent fall   Referred By Dr. Cullen   Past Medical History Relevant to Rehab severe depression, colostomy, bilateral hip pain, opiod abuse   Patient Position Received Up in chair   Preferred Learning Style verbal   General Comment Patient aggreeable to therapy   Precautions   Medical Precautions Fall precautions   Precautions Comment colostomy   Pain Assessment   Pain Assessment 0-10   Pain Score 8   Pain Type Chronic pain   Toileting   Toileting Level of Assistance Distant supervision   Where Assessed Toilet   Transfers   Transfer Yes   Transfer 1   Transfer From 1 Bed to   Transfer to 1 Stand   Technique 1 Sit to stand;Stand to sit   Transfer Level of Assistance 1 Close supervision   Toilet Transfers   Toilet Transfer From Wheelchair   Toilet Transfer Type To and from   Toilet Transfer to Standard toilet   Toilet Transfer Technique Stand pivot   Toilet Transfers Supervision   Toilet Transfers Comments poor safety awareness   Therapeutic Exercise   Therapeutic Exercise Performed Yes   Therapeutic Exercise Activity 1 pre's 15 reps !lbs seated at edge of bed. completed with set up.   Inpatient/Swing Bed or Outpatient   Inpatient/Swing Bed or Outpatient Inpatient   Inpatient Plan   Treatment Interventions ADL retraining;UE strengthening/ROM   OT - OK to Discharge Yes      11/21/23 1102   [facility] Daily Activity   Putting on and taking off regular lower body clothing 3   Bathing (including washing, rinsing, drying) 3   Putting on and taking off regular upper body clothing 3   Toileting, which includes using toilet, bedpan or urinal 3   Taking care of personal grooming such as brushing teeth 3   Eating Meals 3   Daily Activity - Total Score 18     Problem: Instrumental Activities of Daily Living  Goal: STG - Patient will use bilateral upper extremities to wash and dry dishes  Outcome: Progressing      Problem: Instrumental Activities of Daily Living  Goal: STG - Patient will maintain balance while making a bed  11/21/2023 1234 by ALLA Chand  Outcome: Progressing  11/21/2023 1222 by ALLA Chand  Outcome: Met

## 2023-11-22 PROCEDURE — 99232 SBSQ HOSP IP/OBS MODERATE 35: CPT | Performed by: PSYCHIATRY & NEUROLOGY

## 2023-11-22 PROCEDURE — 99232 SBSQ HOSP IP/OBS MODERATE 35: CPT | Performed by: INTERNAL MEDICINE

## 2023-11-22 PROCEDURE — 97110 THERAPEUTIC EXERCISES: CPT | Mod: GO,CO

## 2023-11-22 PROCEDURE — 1140000001 HC PRIVATE PSYCH ROOM DAILY

## 2023-11-22 PROCEDURE — 2500000001 HC RX 250 WO HCPCS SELF ADMINISTERED DRUGS (ALT 637 FOR MEDICARE OP): Performed by: PSYCHIATRY & NEUROLOGY

## 2023-11-22 PROCEDURE — 2500000004 HC RX 250 GENERAL PHARMACY W/ HCPCS (ALT 636 FOR OP/ED): Performed by: PSYCHIATRY & NEUROLOGY

## 2023-11-22 PROCEDURE — 2500000002 HC RX 250 W HCPCS SELF ADMINISTERED DRUGS (ALT 637 FOR MEDICARE OP, ALT 636 FOR OP/ED): Performed by: STUDENT IN AN ORGANIZED HEALTH CARE EDUCATION/TRAINING PROGRAM

## 2023-11-22 PROCEDURE — 97535 SELF CARE MNGMENT TRAINING: CPT | Mod: GO,CO

## 2023-11-22 RX ADMIN — CELECOXIB 200 MG: 200 CAPSULE ORAL at 15:59

## 2023-11-22 RX ADMIN — BUPROPION HYDROCHLORIDE 300 MG: 300 TABLET, FILM COATED, EXTENDED RELEASE ORAL at 08:40

## 2023-11-22 RX ADMIN — Medication 5 MG: at 18:46

## 2023-11-22 RX ADMIN — QUETIAPINE FUMARATE 350 MG: 300 TABLET ORAL at 20:58

## 2023-11-22 RX ADMIN — Medication 125 MCG: at 08:40

## 2023-11-22 RX ADMIN — TRAZODONE HYDROCHLORIDE 150 MG: 50 TABLET ORAL at 20:58

## 2023-11-22 RX ADMIN — DULOXETINE HYDROCHLORIDE 120 MG: 60 CAPSULE, DELAYED RELEASE ORAL at 08:40

## 2023-11-22 ASSESSMENT — COLUMBIA-SUICIDE SEVERITY RATING SCALE - C-SSRS
2. HAVE YOU ACTUALLY HAD ANY THOUGHTS OF KILLING YOURSELF?: NO
1. SINCE LAST CONTACT, HAVE YOU WISHED YOU WERE DEAD OR WISHED YOU COULD GO TO SLEEP AND NOT WAKE UP?: NO
6. HAVE YOU EVER DONE ANYTHING, STARTED TO DO ANYTHING, OR PREPARED TO DO ANYTHING TO END YOUR LIFE?: NO
1. SINCE LAST CONTACT, HAVE YOU WISHED YOU WERE DEAD OR WISHED YOU COULD GO TO SLEEP AND NOT WAKE UP?: NO
2. HAVE YOU ACTUALLY HAD ANY THOUGHTS OF KILLING YOURSELF?: NO
1. SINCE LAST CONTACT, HAVE YOU WISHED YOU WERE DEAD OR WISHED YOU COULD GO TO SLEEP AND NOT WAKE UP?: NO
6. HAVE YOU EVER DONE ANYTHING, STARTED TO DO ANYTHING, OR PREPARED TO DO ANYTHING TO END YOUR LIFE?: NO
6. HAVE YOU EVER DONE ANYTHING, STARTED TO DO ANYTHING, OR PREPARED TO DO ANYTHING TO END YOUR LIFE?: NO
2. HAVE YOU ACTUALLY HAD ANY THOUGHTS OF KILLING YOURSELF?: NO

## 2023-11-22 ASSESSMENT — COGNITIVE AND FUNCTIONAL STATUS - GENERAL
DAILY ACTIVITIY SCORE: 18
EATING MEALS: A LITTLE
DRESSING REGULAR UPPER BODY CLOTHING: A LITTLE
HELP NEEDED FOR BATHING: A LITTLE
PERSONAL GROOMING: A LITTLE
DRESSING REGULAR LOWER BODY CLOTHING: A LITTLE
TOILETING: A LITTLE

## 2023-11-22 ASSESSMENT — ENCOUNTER SYMPTOMS
DECREASED CONCENTRATION: 1
NERVOUS/ANXIOUS: 1
SLEEP DISTURBANCE: 1
DYSPHORIC MOOD: 1

## 2023-11-22 ASSESSMENT — PAIN SCALES - GENERAL
PAINLEVEL_OUTOF10: 0 - NO PAIN
PAINLEVEL_OUTOF10: 0 - NO PAIN
PAINLEVEL_OUTOF10: 4
PAINLEVEL_OUTOF10: 5 - MODERATE PAIN

## 2023-11-22 ASSESSMENT — PAIN - FUNCTIONAL ASSESSMENT
PAIN_FUNCTIONAL_ASSESSMENT: FLACC (FACE, LEGS, ACTIVITY, CRY, CONSOLABILITY)
PAIN_FUNCTIONAL_ASSESSMENT: 0-10
PAIN_FUNCTIONAL_ASSESSMENT: 0-10
PAIN_FUNCTIONAL_ASSESSMENT: FLACC (FACE, LEGS, ACTIVITY, CRY, CONSOLABILITY)

## 2023-11-22 ASSESSMENT — ACTIVITIES OF DAILY LIVING (ADL): HOME_MANAGEMENT_TIME_ENTRY: 15

## 2023-11-22 NOTE — GROUP NOTE
Group Topic: Music Therapy   Group Date: 11/22/2023  Start Time: 1000  End Time: 1100  Facilitators: Chanda Benton   Department: [facility]    Number of Participants: 11   Group Focus: music therapy  Treatment Modality: Music Therapy  Interventions utilized were support  Purpose: coping skills, feelings, and self-care    Name: Azul Roberts YOB: 1960   MR: [id number]      Facilitator: Music Therapist  Level of Participation: moderate  Quality of Participation: appropriate/pleasant, attentive, cooperative, initiates communication, and passive  Interactions with others: appropriate, gave feedback, and asked thoughtful questions  Mood/Affect: appropriate, brightens with interaction, and positive  Triggers (if applicable): n/a  Cognition: coherent/clear, concrete, goal directed, and insightful  Progress: Moderate  Comments:  Plan: continue with services

## 2023-11-22 NOTE — CARE PLAN
The patient's goals for the shift include to go home    The clinical goals for the shift include no falls    Over the shift, the patient did not make progress toward the following goals. Barriers to progression include limited movement with transfers. Recommendations to address these barriers include encourage pt to call for staff assistance.

## 2023-11-22 NOTE — PROGRESS NOTES
Azul Roberts is a 63 y.o. female on day 74 of admission presenting with Severe recurrent major depression without psychotic features (CMS/HCC).      Subjective   Case discussed with treatment team and chart reviewed.    No acute disturbances today.  Has persisted with much more significant improvement going on 1 to 2 weeks at this time however she may have likely plateaued from improvement at this time.  She is seen out of her room sitting in group while other patients are playing cards but she did not play.  She makes no specific requests and she maintained calm appropriate behavior.  ADLs are fair.  She continues to propel herself around the unit in the wheelchair.  She remains physically frail however and remains a high falls risk.  She is pessimistic about her  coming to visit her for Thanksgiving and does not believe he will do so even if he has the opportunity.    Noted by nursing to not meet the goals on this day.  She is noted in group documentation to participate with little encouragement engaging easily smiling often with appropriate eye contact.    Objective     Vitals:    11/20/23 1600 11/21/23 0500 11/21/23 1608 11/22/23 0634   BP: 109/72 117/68 102/90 (!) 137/91   BP Location: Left arm Left arm  Left arm   Patient Position: Sitting Lying  Lying   Pulse: 89 76 102 71   Resp: 18 17 16 16   Temp: 37.4 °C (99.3 °F) 36.6 °C (97.9 °F) 36.9 °C (98.4 °F) 36.2 °C (97.2 °F)   TempSrc: Temporal Temporal Oral Temporal   SpO2: 100% 97% 100% 99%   Weight:       Height:            Review of Systems   Psychiatric/Behavioral:  Positive for decreased concentration, dysphoric mood and sleep disturbance. The patient is nervous/anxious.      Psychiatric ROS - Adult  Anxiety: General Anxiety Disorder (KAYLEEN)KAYLEEN Behaviors: difficult to control worry, difficulty concentrating, irritability, restlessness, and sleep disturbance  Depression: anhedonia, appetite increased, concentration, energy, helpless, hopeless,  "interest, persistent thoughts of death, psychomotor agitation, sleep decreased , suicidal thoughts, and withdrawn  Delirium: negative  Psychosis: negative  Ioana: negative  Safety Issues: passive death wish and suicidal ideation  Psychiatric ROS Comment: as noted    Physical Exam  Abdominal:      Comments: Presence of colostomy bag   Psychiatric:         Attention and Perception: Attention and perception normal.         Mood and Affect: Mood is anxious and depressed. Affect is flat and inappropriate.         Behavior: Behavior is slowed.     Mental Status Examination  General Appearance:  less disheveled with improved eye contact, . In bed after dinner, wearing personal garments  Gait/Station: using wheelchair to move about the milieu under her own propulsion  Speech: brief, conversational volume  Mood: \"ok\"  Affect:  more optimistic, improved relatedness ,  Thought Process: less impoverished  Thought Associations: No loosening of associations  Thought Content: improving, linear, logical, more spontaneous and overall increase in content  Perception: No perceptual abnormalities noted  Level of Consciousness: Alert  Orientation: Alert  Attention and Concentration: Mild impairment in attention  Recent Memory: Intact as evidenced by ability to recall details from the past 24 hours   Executive function: Intact  Language: Naming intact  Fund of knowledge: Good  Insight: Fair, as evidenced by increasing participation in discussion about her scenario and symptoms and their improvement  Judgment: Fair, as evidenced by ability to reason through medical decision making, compliance with treatment recommendations, and improving      Psychiatric Risk Assessment  Violence Risk Assessment: major mental illness and substance abuse  Acute Risk of Harm to Others is Considered: moderate   Suicide Risk Assessment: , chronic medical illness, chronic pain, current psychiatric illness, feelings of hopelessness, global insomnia, " history of trauma or abuse, life crisis (shame/despair), prior suicide attempt, severe anxiety, substance abuse, and suicidal ideations  Protective Factors against Suicide: adherence to  treatment and marriage/partnership  Acute Risk of Harm to Self is Considered: moderate    Relevant Results  Scheduled medications  buPROPion XL, 300 mg, oral, Daily  celecoxib, 200 mg, oral, Daily  cholecalciferol, 125 mcg, oral, Daily  DULoxetine, 120 mg, oral, Daily  lidocaine, 1 patch, transdermal, Daily  melatonin, 5 mg, oral, Daily  QUEtiapine, 350 mg, oral, Nightly      Continuous medications     PRN medications  PRN medications: acetaminophen, alum-mag hydroxide-simeth, diphenhydrAMINE, hydrOXYzine pamoate, OLANZapine, OLANZapine zydis, traZODone         Assessment/Plan   Principal Problem:    Severe recurrent major depression without psychotic features (CMS/HCC)  Active Problems:    Colostomy present (CMS/HCC)    Opioid abuse (CMS/HCC)    Bilateral hip pain    Biological -     Orthostatics - confirmed on measurement.  Ongoing efforts to communicate with patient about taking her time when changing position to help prevent falls.    Mouth checks.    Cymbalta 120 mg daily for depression, this is max dose  Wellbutrin xl 300 mg for adjunctive depression treatment  seroquel 350 mg at bedtime  Trazodone 150 mg PRN and melatonin at bedtime for sleep  No adverse side effects of medications noted or reported.  ECG (10/4/23): Normal sinus rhythm. Heart  rate 72 bpm. Qtc 442  11/13/23 - qtc 441    Declined ECT consideration during discussion on 11/4, 11/7.   ECT is not available at this facility and this would include transitioning to another psychiatric hospital for this level of service or pursuing as an outpatient on discharge.    Discussion with patient regarding risk benefits and alternatives and side effects with opportunity to ask questions and questions answered.  Patient given consent to the plan as noted    2. Psychological -      Patient is encouraged to participate with the therapeutic milieu and program and group therapy    3. Sociological -      Patient encouraged to cooperate with social work staff on issues relevant to discharge planning  Pending [place] medicaid and then transition to convalescence nursing care once this becomes available to patient    20 minutes spent coordinating care for patient on this day    Parts of this chart have been completed using voice recognition software.  Please excuse any errors of transcription.  Despite the medical decision making time stamp, my medical decision making has taken place during the patient's entire visit.  Thought process and reason for plan has been formulated from the time that I saw the patient until the time of disposition and is not specific to one specific moment during their visit and furthermore the medical decision making encompasses the entire chart and not only that represented in this note.      Eddie Cullen, DO

## 2023-11-22 NOTE — GROUP NOTE
Group Topic: Other   Group Date: 11/22/2023  Start Time: 1500  End Time: 1600  Facilitators: BRENDA Fernandes   Department: [facility]    Number of Participants: 7   Group Focus: concentration and reminiscence  Treatment Modality: Other: Recreation therapy  Interventions utilized were mental fitness and reminiscence  Purpose: other: increase attention span, increase stimulation, increase alertness, elevate mood, stimulate memory, increase socialization    Name: Azul Roberts YOB: 1960   MR: [id number]      Facilitator: Recreational Therapist  Level of Participation: active  Quality of Participation: appropriate/pleasant, attentive, cooperative, and engaged  Interactions with others: appropriate and gave feedback  Mood/Affect: appropriate, brightens with interaction, and positive  Triggers (if applicable): n/a  Cognition: coherent/clear  Progress: Significant  Comments: pt problem is depressed mood.  Pt joins group with little encouragement, engages easily and is very active with participation.  Smiling easily and often, appropriate eye contact during session.  Plan: continue with services

## 2023-11-22 NOTE — PROGRESS NOTES
[facility]: [facility]  PROGRESS NOTE      Azul Roberts is a 63 y.o. female that is being seen  today for follow up at [facility]  Subjective   Patient is being seen for follow-up at [facility] unit.  Patient had a fall and was sent to the ER.  Patient was evaluated in the ER and CT scan of the brain was negative for any bleed.  Patient had a UA done which was positive for leukoesterase.  Culture is pending.  Patient has been at her baseline.  Colostomy has been working well.  Pain has been fairly controlled.    ROS  Negative for fever or chills  Negative for sore throat, ear pain, nasal discharge  Negative for cough, shortness of breath or wheezing  Negative for chest pain, palpitations, swelling of legs  Negative for abdominal pain, constipation, diarrhea, blood in the stools,has colostomy  Negative for urinary complaints  Negative for headache, dizziness, weakness or numbness  Negative for joint pain  Positive for depression or anxiety  All other systems reviewed and were negative  Vitals:    11/22/23 1800   BP: 125/83   Pulse: 89   Resp: 19   Temp: 36.8 °C (98.2 °F)   SpO2: 100%      Body mass index is 29.87 kg/m².  Physical Exam  Constitutional: Patient does not appear to be in any acute distress  Head and Face: NCAT  Eyes: Normal external exam, EOMI  ENT: Normal external inspection of ears and nose. Oropharynx normal.  Cardiovascular: RRR, S1/S2, no murmurs, rubs, or gallops, radial pulses +2, no edema of extremities  Pulmonary: CTAB, no respiratory distress.  Abdomen: +BS, soft, non-tender, nondistended, no guarding or rebound, no masses noted.colostomy present   MSK: hip joint pain   Skin- No lesions, contusions, or erythema.  Peripheral puslses palpable bilaterally 2+  Neuro: AAO X3, Cranial nerves 2-12 grossly intact,DTR 2+ in all 4 limbs       Diagnostic Results   Lab Results   Component Value Date    GLUCOSE 113 (H) 09/05/2023    CALCIUM 10.6 (H) 09/05/2023     09/05/2023  "   K 3.7 09/05/2023    CO2 24 09/05/2023     09/05/2023    BUN 12 09/05/2023    CREATININE 0.72 09/05/2023     Lab Results   Component Value Date    ALT 12 09/05/2023    AST 14 09/05/2023    ALKPHOS 103 09/05/2023    BILITOT 0.4 09/05/2023     Lab Results   Component Value Date    WBC 13.2 (H) 09/05/2023    HGB 13.9 09/05/2023    HCT 43.0 09/05/2023    MCV 81 09/05/2023     09/05/2023     No results found for: \"CHOL\"  No results found for: \"HDL\"  No results found for: \"LDLCALC\"  No results found for: \"TRIG\"  No results found for: \"HGBA1C\"  Other labs not included in the list above were reviewed either before or during this encounter.    History    No past medical history on file.  No past surgical history on file.  No family history on file.  No Known Allergies  No current facility-administered medications on file prior to encounter.     Current Outpatient Medications on File Prior to Encounter   Medication Sig Dispense Refill    acetaminophen (Tylenol) 325 mg tablet Take 2 tablets (650 mg) by mouth 2 times a day.      hydrOXYzine pamoate (Vistaril) 25 mg capsule Take 1 capsule (25 mg) by mouth 2 times a day.      magnesium oxide (Mag-Ox) 400 mg tablet Take 1 tablet (400 mg) by mouth 2 times a day.      melatonin 5 mg tablet Take 1 tablet (5 mg) by mouth once daily at bedtime.      risperiDONE (RisperDAL) 2 mg tablet Take 1 tablet (2 mg) by mouth once daily at bedtime.      traZODone (Desyrel) 150 mg tablet Take 1 tablet (150 mg) by mouth once daily at bedtime.     Scheduled medications  buPROPion XL, 300 mg, oral, Daily  celecoxib, 200 mg, oral, Daily  cholecalciferol, 125 mcg, oral, Daily  citalopram, 30 mg, oral, Daily  lidocaine, 1 patch, transdermal, Daily  melatonin, 5 mg, oral, Nightly  QUEtiapine, 350 mg, oral, Nightly  traZODone, 150 mg, oral, Nightly      Continuous medications     PRN medications  PRN medications: acetaminophen, alum-mag hydroxide-simeth, diphenhydrAMINE, hydrOXYzine pamoate, " OLANZapine, OLANZapine zydis     There is no immunization history on file for this patient.  Patient's medical history was reviewed and updated either before or during this encounter.  ASSESSMENT / PLAN:  Active Hospital Problems    Bilateral hip pain      *Severe recurrent major depression without psychotic features (CMS/HCC)      Colostomy present (CMS/HCC)      Opioid abuse (CMS/HCC)  S/p fall.  Hip x-rays negative CT scan of the brain is negative     Pt. Has been stable.clostomy is working well.        Jay Knox MD

## 2023-11-22 NOTE — PROGRESS NOTES
11/22/23 1433 to 1458   OT Last Visit   OT Received On 11/22/23   General   Reason for Referral impaired mobility; recent fall   Referred By Dr. Cullen   Past Medical History Relevant to Rehab severe depression, colostomy, bilateral hip pain, opiod abuse   Prior to Session Communication Bedside nurse   Patient Position Received Up in chair;Alarm on   Preferred Learning Style verbal   General Comment Patient aggreeable to therapy   Precautions   Medical Precautions Fall precautions   Pain Assessment   Pain Assessment 0-10   Pain Score 4   Pain Type Chronic pain   Pain Location Hip   Pain Orientation Left   Pain Frequency Constant/continuous   Grooming   Grooming Level of Assistance Modified independent   Grooming Where Assessed Sitting sinkside   Grooming Comments hand hygiene combing hair   LE Dressing   LE Dressing Yes   Pants Level of Assistance Modified independent   LE Dressing Where Assessed Edge of bed   LE Dressing Comments donning and doffing pants   Bed Mobility   Bed Mobility Yes   Bed Mobility 1   Bed Mobility 1 Supine to sitting;Sitting to supine   Level of Assistance 1 Independent   Bed Mobility Comments 1 Moaned with pain throughout treament   Transfers   Transfer Yes   Transfer 1   Transfer From 1 Bed to   Transfer to 1 Stand   Technique 1 Sit to stand;Stand to sit   Transfer Level of Assistance 1 Close supervision   Trials/Comments 1 cues for safety   Therapeutic Exercise   Therapeutic Exercise Performed Yes   Therapeutic Exercise Activity 1 Pre' s 15 reps 6 sets 1lbs weights seated at edge of bed. set up. [place] throughout   Inpatient/Swing Bed or Outpatient   Inpatient/Swing Bed or Outpatient Inpatient   Inpatient Plan   Treatment Interventions ADL retraining   OT - OK to Discharge Yes      11/22/23 1433   [facility] Daily Activity   Putting on and taking off regular lower body clothing 3   Bathing (including washing, rinsing, drying) 3   Putting on and taking off regular upper body clothing 3    Toileting, which includes using toilet, bedpan or urinal 3   Taking care of personal grooming such as brushing teeth 3   Eating Meals 3   Daily Activity - Total Score 18   Problem: Instrumental Activities of Daily Living  Goal: STG - Patient will maintain balance while making a bed  Outcome: Progressing  Goal: STG - Patient will use bilateral upper extremities to wash and dry dishes  Outcome: Progressing

## 2023-11-22 NOTE — GROUP NOTE
Group Topic: Self-Care/Wellness   Group Date: 11/22/2023  Start Time: 1100  End Time: 1145  Facilitators: BRENDA Fernandes   Department: [facility]    Number of Participants: 8   Group Focus: coping skills, feeling awareness/expression, mindfulness, problem solving, relaxation, self-awareness, and self-esteem  Treatment Modality: Other: Recreation therapy  Interventions utilized were exploration, mental fitness, patient education, and problem solving  Purpose: coping skills, feelings, insight or knowledge, and self-worth    Name: Azul Roberts YOB: 1960   MR: [id number]      Facilitator: Recreational Therapist  Level of Participation: active  Quality of Participation: appropriate/pleasant, attentive, cooperative, and engaged  Interactions with others: appropriate and gave feedback  Mood/Affect: appropriate and bright  Triggers (if applicable): n/a  Cognition: coherent/clear  Progress: Significant  Comments: pt problem is delusional thought.  Pt is calm and cooperative with behavior and mood.  No delusional thought noted with interaction.  Plan: continue with services

## 2023-11-22 NOTE — NURSING NOTE
BIRP NOTE     Problem:  Depression     Behavior:  Patient is sitting in patient’s wheelchair near the nurse station. Patient is pleasant and calm. Patient's affect is blunted. Patient is a little talkative. Patient maintains good eye contact.    Group Participation: N/A  Appetite/Meals: N/A       Interventions:  This nurse completed a shift assessment and administered patient's scheduled night time medications.    Response:  Patient remained pleasant and calm and was cooperative throughout this shift assessment and medication administration.     Plan:  Continue to monitor for depression. Continue to monitor for patient safety.

## 2023-11-23 PROCEDURE — 1140000001 HC PRIVATE PSYCH ROOM DAILY

## 2023-11-23 PROCEDURE — 2500000001 HC RX 250 WO HCPCS SELF ADMINISTERED DRUGS (ALT 637 FOR MEDICARE OP): Performed by: PSYCHIATRY & NEUROLOGY

## 2023-11-23 PROCEDURE — 94760 N-INVAS EAR/PLS OXIMETRY 1: CPT

## 2023-11-23 PROCEDURE — 2500000002 HC RX 250 W HCPCS SELF ADMINISTERED DRUGS (ALT 637 FOR MEDICARE OP, ALT 636 FOR OP/ED): Performed by: STUDENT IN AN ORGANIZED HEALTH CARE EDUCATION/TRAINING PROGRAM

## 2023-11-23 PROCEDURE — 99231 SBSQ HOSP IP/OBS SF/LOW 25: CPT

## 2023-11-23 PROCEDURE — 2500000004 HC RX 250 GENERAL PHARMACY W/ HCPCS (ALT 636 FOR OP/ED): Performed by: PSYCHIATRY & NEUROLOGY

## 2023-11-23 RX ADMIN — BUPROPION HYDROCHLORIDE 300 MG: 300 TABLET, FILM COATED, EXTENDED RELEASE ORAL at 08:18

## 2023-11-23 RX ADMIN — Medication 5 MG: at 18:36

## 2023-11-23 RX ADMIN — Medication 125 MCG: at 08:18

## 2023-11-23 RX ADMIN — DULOXETINE HYDROCHLORIDE 120 MG: 60 CAPSULE, DELAYED RELEASE ORAL at 08:18

## 2023-11-23 RX ADMIN — QUETIAPINE FUMARATE 350 MG: 300 TABLET ORAL at 20:12

## 2023-11-23 RX ADMIN — CELECOXIB 200 MG: 200 CAPSULE ORAL at 17:05

## 2023-11-23 ASSESSMENT — ENCOUNTER SYMPTOMS
DYSPHORIC MOOD: 1
SLEEP DISTURBANCE: 1
DECREASED CONCENTRATION: 1

## 2023-11-23 ASSESSMENT — PAIN SCALES - GENERAL: PAINLEVEL_OUTOF10: 0 - NO PAIN

## 2023-11-23 ASSESSMENT — PAIN - FUNCTIONAL ASSESSMENT: PAIN_FUNCTIONAL_ASSESSMENT: 0-10

## 2023-11-23 NOTE — PROGRESS NOTES
REHAB Therapy Assessment & Treatment    Patient Name: Azul Roberts  MRN: [id number]  Today's Date: 11/23/2023      Recreation note : pt is alert and oriented x 3 with some poor inisght/judgement.  Attends groups of choice daily and has been more more active with participation.,  Pts affect and eye contact have improved and she enjoys joking with staff and peers.  Pt is pleasant with mood and cooperative with behaviors.  Pt is still waiting for a safe and appropriate discharge and is eating and sleeping well.  Will continue to encourage pt to attend groups of choice daily.

## 2023-11-23 NOTE — GROUP NOTE
Group Topic: Other   Group Date: 11/23/2023  Start Time: 0945  End Time: 1110  Facilitators: BRENDA Fernandes   Department: [facility]    Number of Participants: 7   Group Focus: reminiscence  Treatment Modality: Other: Recreation therapy  Interventions utilized were mental fitness and reminiscence  Purpose: feelings and other: elevate mood, enhance self esteem, increase attention. Increase stimulation, increase socialization    Name: Azul Roberts YOB: 1960   MR: [id number]      Facilitator: Recreational Therapist  Level of Participation: active  Quality of Participation: appropriate/pleasant, attentive, cooperative, and engaged  Interactions with others: appropriate and gave feedback  Mood/Affect: appropriate, brightens with interaction, and positive  Triggers (if applicable): n/a  Cognition:  alert and oriented, appropriate with behaviors and mood.  Calm and cooperative.  Progress: Significant  Comments: pt problem is depressed mood.  Plan: continue with services

## 2023-11-23 NOTE — PROGRESS NOTES
Azul Roberts is a 63 y.o. female on day 75 of admission presenting with Severe recurrent major depression without psychotic features (CMS/HCC).      Subjective   Chart reviewed, pt discussed with nursing staff.     Patient seen attending groups today, sitting out in common area.  She reports not sleeping very well, tossing and turning.  She is pleasant, smiles at times during interaction.  Continues to demonstrate poor ability to care for medical and physical needs.     Objective     Vitals:    11/21/23 1608 11/22/23 0634 11/22/23 1800 11/23/23 0600   BP: 102/90 (!) 137/91 125/83 114/87   BP Location:  Left arm Right arm Left arm   Patient Position:  Lying Sitting Lying   Pulse: 102 71 89 75   Resp: 16 16 19 16   Temp: 36.9 °C (98.4 °F) 36.2 °C (97.2 °F) 36.8 °C (98.2 °F) 36.6 °C (97.9 °F)   TempSrc: Oral Temporal Temporal Oral   SpO2: 100% 99% 100% 99%   Weight:       Height:            Review of Systems   Psychiatric/Behavioral:  Positive for decreased concentration, dysphoric mood and sleep disturbance.      Psychiatric ROS - Adult  Anxiety: General Anxiety Disorder (KAYLEEN)KAYLEEN Behaviors: difficult to control worry, difficulty concentrating, irritability, restlessness, and sleep disturbance  Depression: anhedonia, appetite increased, concentration, energy, helpless, hopeless, interest, persistent thoughts of death, psychomotor agitation, sleep decreased , suicidal thoughts, and withdrawn  Delirium: negative  Psychosis: negative  Ioana: negative  Safety Issues: passive death wish and suicidal ideation  Psychiatric ROS Comment: as noted    Physical Exam  Abdominal:      Comments: Presence of colostomy bag   Psychiatric:         Attention and Perception: Attention and perception normal.         Mood and Affect: Mood is depressed. Affect is flat and inappropriate.         Behavior: Behavior is slowed.     Mental Status Examination  General Appearance:  less disheveled with improved eye contact, . Sitting in common  "area in wheelchair  Gait/Station: using wheelchair to move about the milieu under her own propulsion  Speech: normal volume, brief repsonses  Mood: \"ok\"  Affect:  blunted ,  Thought Process: less impoverished  Thought Associations: No loosening of associations  Thought Content: improving, linear, logical, more spontaneous and overall increase in content  Perception: No perceptual abnormalities noted  Level of Consciousness: Alert  Orientation: Alert  Attention and Concentration: Mild impairment in attention  Recent Memory: Intact as evidenced by ability to recall details from the past 24 hours   Executive function: Intact  Language: Naming intact  Fund of knowledge: Good  Insight: Fair, as evidenced by increasing participation in discussion about her scenario and symptoms and their improvement  Judgment: Fair, as evidenced by ability to reason through medical decision making, compliance with treatment recommendations, and improving      Psychiatric Risk Assessment  Violence Risk Assessment: major mental illness and substance abuse  Acute Risk of Harm to Others is Considered: moderate   Suicide Risk Assessment: , chronic medical illness, chronic pain, current psychiatric illness, feelings of hopelessness, global insomnia, history of trauma or abuse, life crisis (shame/despair), prior suicide attempt, severe anxiety, substance abuse, and suicidal ideations  Protective Factors against Suicide: adherence to  treatment and marriage/partnership  Acute Risk of Harm to Self is Considered: moderate    Relevant Results  Scheduled medications  buPROPion XL, 300 mg, oral, Daily  celecoxib, 200 mg, oral, Daily  cholecalciferol, 125 mcg, oral, Daily  DULoxetine, 120 mg, oral, Daily  lidocaine, 1 patch, transdermal, Daily  melatonin, 5 mg, oral, Daily  QUEtiapine, 350 mg, oral, Nightly      Continuous medications     PRN medications  PRN medications: acetaminophen, alum-mag hydroxide-simeth, diphenhydrAMINE, hydrOXYzine " pamoate, OLANZapine, OLANZapine zydis, traZODone     Assessment/Plan   Principal Problem:    Severe recurrent major depression without psychotic features (CMS/HCC)  Active Problems:    Colostomy present (CMS/HCC)    Opioid abuse (CMS/HCC)    Bilateral hip pain    Biological -     Orthostatics - confirmed on measurement.  Ongoing efforts to communicate with patient about taking her time when changing position to help prevent falls.    Mouth checks.    Cymbalta 120 mg daily for depression, this is max dose  Wellbutrin xl 300 mg for adjunctive depression treatment  seroquel 350 mg at bedtime  Trazodone 150 mg PRN and melatonin at bedtime for sleep  No adverse side effects of medications noted or reported.  ECG (10/4/23): Normal sinus rhythm. Heart  rate 72 bpm. Qtc 442  11/13/23 - qtc 441    Declined ECT consideration during discussion on 11/4, 11/7.   ECT is not available at this facility and this would include transitioning to another psychiatric hospital for this level of service or pursuing as an outpatient on discharge.    Discussion with patient regarding risk benefits and alternatives and side effects with opportunity to ask questions and questions answered.  Patient given consent to the plan as noted    2. Psychological -     Patient is encouraged to participate with the therapeutic milieu and program and group therapy    3. Sociological -      Patient encouraged to cooperate with social work staff on issues relevant to discharge planning  Pending [place] medicaid and then transition to convalescence nursing care once this becomes available to patient    25 minutes spent coordinating care for patient on this day      JANET Parham-CNP

## 2023-11-23 NOTE — GROUP NOTE
Group Topic: Other   Group Date: 11/23/2023  Start Time: 1515  End Time: 1600  Facilitators: BRENDA Fernandes   Department: [facility]    Number of Participants: 8   Group Focus: other [facility]  Treatment Modality: Other: Recreation therapy  Interventions utilized were mental fitness  Purpose: other: increase stimulation,  increase attention, enhance self esteem, elevate mood    Name: Azul Roberts YOB: 1960   MR: [id number]      Facilitator: Recreational Therapist  Level of Participation: did not attend  Quality of Participation:  did not attend  Interactions with others:  did not attend  Mood/Affect:  did not attend  Triggers (if applicable): n/a  Cognition:  did not attend  Progress: None  Comments: pt problem is depressed mood.  Pt is sitting in the hallway, declines invitation to join group at this time.  Is engaging with other unit staff.  Plan: continue with services

## 2023-11-23 NOTE — NURSING NOTE
BIRP NOTE     Problem:  depression     Behavior:  Pt pleasant and cooperative with staff and care, pt smiling and makes needs known. Pt shows no s/s of depression this shift.  Group Participation: yes  Appetite/Meals: good       Interventions:  Administered scheduled medications    Response:  Pt continues as above     Plan:  Maintain pt safety

## 2023-11-23 NOTE — CARE PLAN
The patient's goals for the shift include to go home    The clinical goals for the shift include no falls    Over the shift, the patient did not make progress toward the following goals. Barriers to progression include mobility. Recommendations to address these barriers include encourage working with PT/OT and gaining strength.

## 2023-11-23 NOTE — NURSING NOTE
"SARAN NOTE     Problem:  depression     Behavior:  Pt isolated herself to her room, did not come out for a snack, complained about chronic hip pain but refused any intervention stating \"you don't have anything strong enough\", requested PRN Trazodone 150 mg for sleep. Pt denied SI  Group Participation: n/a  Appetite/Meals: refused snack       Interventions:  1:1 intervention provided, scheduled and PRN medication administered, safety/ fall precautions reviewed, bed alarm initiated, colostomy care routine addressed    Response:  Pt is compliant with her medication, verbalized understanding of fall/safety precautions, asked questions why she has to have a bed alarm on  Pt has been taking care of colostomy bag promptly      Plan:  Adhere to care plan, continue monitoring, provide education as needed  "

## 2023-11-24 PROCEDURE — 99232 SBSQ HOSP IP/OBS MODERATE 35: CPT | Performed by: INTERNAL MEDICINE

## 2023-11-24 PROCEDURE — 2500000004 HC RX 250 GENERAL PHARMACY W/ HCPCS (ALT 636 FOR OP/ED): Performed by: PSYCHIATRY & NEUROLOGY

## 2023-11-24 PROCEDURE — 1140000001 HC PRIVATE PSYCH ROOM DAILY

## 2023-11-24 PROCEDURE — 2500000002 HC RX 250 W HCPCS SELF ADMINISTERED DRUGS (ALT 637 FOR MEDICARE OP, ALT 636 FOR OP/ED): Performed by: STUDENT IN AN ORGANIZED HEALTH CARE EDUCATION/TRAINING PROGRAM

## 2023-11-24 PROCEDURE — 99231 SBSQ HOSP IP/OBS SF/LOW 25: CPT | Performed by: PSYCHIATRY & NEUROLOGY

## 2023-11-24 PROCEDURE — 2500000001 HC RX 250 WO HCPCS SELF ADMINISTERED DRUGS (ALT 637 FOR MEDICARE OP): Performed by: PSYCHIATRY & NEUROLOGY

## 2023-11-24 RX ADMIN — BUPROPION HYDROCHLORIDE 300 MG: 300 TABLET, FILM COATED, EXTENDED RELEASE ORAL at 08:13

## 2023-11-24 RX ADMIN — Medication 125 MCG: at 08:13

## 2023-11-24 RX ADMIN — Medication 5 MG: at 17:46

## 2023-11-24 RX ADMIN — QUETIAPINE FUMARATE 350 MG: 300 TABLET ORAL at 20:44

## 2023-11-24 RX ADMIN — CELECOXIB 200 MG: 200 CAPSULE ORAL at 15:17

## 2023-11-24 RX ADMIN — DULOXETINE HYDROCHLORIDE 120 MG: 60 CAPSULE, DELAYED RELEASE ORAL at 08:13

## 2023-11-24 ASSESSMENT — PAIN SCALES - GENERAL
PAINLEVEL_OUTOF10: 0 - NO PAIN

## 2023-11-24 ASSESSMENT — PAIN - FUNCTIONAL ASSESSMENT
PAIN_FUNCTIONAL_ASSESSMENT: 0-10
PAIN_FUNCTIONAL_ASSESSMENT: 0-10

## 2023-11-24 ASSESSMENT — COLUMBIA-SUICIDE SEVERITY RATING SCALE - C-SSRS
2. HAVE YOU ACTUALLY HAD ANY THOUGHTS OF KILLING YOURSELF?: NO
6. HAVE YOU EVER DONE ANYTHING, STARTED TO DO ANYTHING, OR PREPARED TO DO ANYTHING TO END YOUR LIFE?: NO
1. SINCE LAST CONTACT, HAVE YOU WISHED YOU WERE DEAD OR WISHED YOU COULD GO TO SLEEP AND NOT WAKE UP?: NO
6. HAVE YOU EVER DONE ANYTHING, STARTED TO DO ANYTHING, OR PREPARED TO DO ANYTHING TO END YOUR LIFE?: NO
1. SINCE LAST CONTACT, HAVE YOU WISHED YOU WERE DEAD OR WISHED YOU COULD GO TO SLEEP AND NOT WAKE UP?: NO
2. HAVE YOU ACTUALLY HAD ANY THOUGHTS OF KILLING YOURSELF?: NO

## 2023-11-24 ASSESSMENT — ENCOUNTER SYMPTOMS
DYSPHORIC MOOD: 1
DECREASED CONCENTRATION: 1
SLEEP DISTURBANCE: 1

## 2023-11-24 NOTE — NURSING NOTE
BIRP NOTE     Problem:  depression     Behavior:  Pt denies depression.  Has been out of room the majority of the day, has been in the halls with peers although not social.  Pt does make eye contact and answers all questions appropriately.  Pt voices no needs.  Group Participation: active  Appetite/Meals: 100%       Interventions:  ADL assist provided as needed, encouraged socialization    Response:  Pt remains pleasant, calm.  Requires no redirection.      Plan:  Awaiting safe disposition

## 2023-11-24 NOTE — PROGRESS NOTES
[facility]: [facility]  PROGRESS NOTE      Azul Roberts is a 63 y.o. female that is being seen  today for follow up at [facility]  Subjective   Patient is being seen for follow-up at [facility] unit.  Patient had a fall and was sent to the ER.  Patient was evaluated in the ER and CT scan of the brain was negative for any bleed.  Patient had a UA done which was positive for leukoesterase.  Culture is pending.  Patient has been at her baseline.  Colostomy has been working well.  Pain has been fairly controlled.    ROS  Negative for fever or chills  Negative for sore throat, ear pain, nasal discharge  Negative for cough, shortness of breath or wheezing  Negative for chest pain, palpitations, swelling of legs  Negative for abdominal pain, constipation, diarrhea, blood in the stools,has colostomy  Negative for urinary complaints  Negative for headache, dizziness, weakness or numbness  Negative for joint pain  Positive for depression or anxiety  All other systems reviewed and were negative  Vitals:    11/24/23 0500   BP: 93/61   Pulse: 83   Resp: 18   Temp: 36.6 °C (97.9 °F)   SpO2: 99%      Body mass index is 29.87 kg/m².  Physical Exam  Constitutional: Patient does not appear to be in any acute distress  Head and Face: NCAT  Eyes: Normal external exam, EOMI  ENT: Normal external inspection of ears and nose. Oropharynx normal.  Cardiovascular: RRR, S1/S2, no murmurs, rubs, or gallops, radial pulses +2, no edema of extremities  Pulmonary: CTAB, no respiratory distress.  Abdomen: +BS, soft, non-tender, nondistended, no guarding or rebound, no masses noted.colostomy present   MSK: hip joint pain   Skin- No lesions, contusions, or erythema.  Peripheral puslses palpable bilaterally 2+  Neuro: AAO X3, Cranial nerves 2-12 grossly intact,DTR 2+ in all 4 limbs       Diagnostic Results   Lab Results   Component Value Date    GLUCOSE 113 (H) 09/05/2023    CALCIUM 10.6 (H) 09/05/2023     09/05/2023     "K 3.7 09/05/2023    CO2 24 09/05/2023     09/05/2023    BUN 12 09/05/2023    CREATININE 0.72 09/05/2023     Lab Results   Component Value Date    ALT 12 09/05/2023    AST 14 09/05/2023    ALKPHOS 103 09/05/2023    BILITOT 0.4 09/05/2023     Lab Results   Component Value Date    WBC 13.2 (H) 09/05/2023    HGB 13.9 09/05/2023    HCT 43.0 09/05/2023    MCV 81 09/05/2023     09/05/2023     No results found for: \"CHOL\"  No results found for: \"HDL\"  No results found for: \"LDLCALC\"  No results found for: \"TRIG\"  No results found for: \"HGBA1C\"  Other labs not included in the list above were reviewed either before or during this encounter.    History    No past medical history on file.  No past surgical history on file.  No family history on file.  No Known Allergies  No current facility-administered medications on file prior to encounter.     Current Outpatient Medications on File Prior to Encounter   Medication Sig Dispense Refill    acetaminophen (Tylenol) 325 mg tablet Take 2 tablets (650 mg) by mouth 2 times a day.      hydrOXYzine pamoate (Vistaril) 25 mg capsule Take 1 capsule (25 mg) by mouth 2 times a day.      magnesium oxide (Mag-Ox) 400 mg tablet Take 1 tablet (400 mg) by mouth 2 times a day.      melatonin 5 mg tablet Take 1 tablet (5 mg) by mouth once daily at bedtime.      risperiDONE (RisperDAL) 2 mg tablet Take 1 tablet (2 mg) by mouth once daily at bedtime.      traZODone (Desyrel) 150 mg tablet Take 1 tablet (150 mg) by mouth once daily at bedtime.     Scheduled medications  buPROPion XL, 300 mg, oral, Daily  celecoxib, 200 mg, oral, Daily  cholecalciferol, 125 mcg, oral, Daily  citalopram, 30 mg, oral, Daily  lidocaine, 1 patch, transdermal, Daily  melatonin, 5 mg, oral, Nightly  QUEtiapine, 350 mg, oral, Nightly  traZODone, 150 mg, oral, Nightly      Continuous medications     PRN medications  PRN medications: acetaminophen, alum-mag hydroxide-simeth, diphenhydrAMINE, hydrOXYzine pamoate, " OLANZapine, OLANZapine zydis     There is no immunization history on file for this patient.  Patient's medical history was reviewed and updated either before or during this encounter.  ASSESSMENT / PLAN:  Active Hospital Problems    Bilateral hip pain      *Severe recurrent major depression without psychotic features (CMS/HCC)      Colostomy present (CMS/HCC)      Opioid abuse (CMS/HCC)  S/p fall.  Hip x-rays negative CT scan of the brain is negative     Pt. Has been stable.clostomy is working well.        Jay Knox MD

## 2023-11-24 NOTE — NURSING NOTE
BIRP NOTE     Problem:  Depression     Behavior:  Patient is sitting in patient’s wheelchair near the nurse station. Patient is pleasant and calm. Patient's affect is broad range. Patient is a little talkative. Patient maintains good eye contact.    Group Participation: N/A  Appetite/Meals: N/A       Interventions:  This nurse completed a shift assessment and administered patient's scheduled night time medications.    Response:  Patient remained pleasant and calm and was cooperative throughout this shift assessment and medication administration.     Plan:  Continue to monitor for depression. Continue to monitor for patient safety.

## 2023-11-24 NOTE — PROGRESS NOTES
Azul Roberts is a 63 y.o. female on day 76 of admission presenting with Severe recurrent major depression without psychotic features (CMS/HCC).      Subjective   Case discussed with treatment team and chart reviewed.    Day to day charting is quite consistent.  Minimal change. No acute disturbances today.  Has persisted with much more significant improvement going on 1 to 2 weeks at this time however she may have likely plateaued from improvement at this time.  She is seen out of her room sitting in group while other patients are playing cards but she did not play.  She makes no specific requests and she maintained calm appropriate behavior.  ADLs are fair.  She continues to propel herself around the unit in the wheelchair.  She remains physically frail however and remains a high falls risk.       Objective     Vitals:    11/22/23 1800 11/23/23 0600 11/23/23 1500 11/24/23 0500   BP: 125/83 114/87 89/56 93/61   BP Location: Right arm Left arm Left arm Left arm   Patient Position: Sitting Lying Sitting Sitting   Pulse: 89 75 102 83   Resp: 19 16 18 18   Temp: 36.8 °C (98.2 °F) 36.6 °C (97.9 °F) 36.5 °C (97.7 °F) 36.6 °C (97.9 °F)   TempSrc: Temporal Oral Temporal Temporal   SpO2: 100% 99% 98% 99%   Weight:       Height:            Review of Systems   Psychiatric/Behavioral:  Positive for decreased concentration, dysphoric mood and sleep disturbance.      Psychiatric ROS - Adult  Anxiety: General Anxiety Disorder (KAYLEEN)KAYLEEN Behaviors: difficult to control worry, difficulty concentrating, irritability, restlessness, and sleep disturbance  Depression: anhedonia, appetite increased, concentration, energy, helpless, hopeless, interest, persistent thoughts of death, psychomotor agitation, sleep decreased , suicidal thoughts, and withdrawn  Delirium: negative  Psychosis: negative  Ioana: negative  Safety Issues: passive death wish and suicidal ideation  Psychiatric ROS Comment: as noted    Physical Exam  Abdominal:       "Comments: Presence of colostomy bag   Psychiatric:         Attention and Perception: Attention and perception normal.         Mood and Affect: Mood is depressed. Affect is flat and inappropriate.         Behavior: Behavior is slowed.     Mental Status Examination  General Appearance:  less disheveled with improved eye contact, . Sitting in common area in wheelchair  Gait/Station: using wheelchair to move about the milieu under her own propulsion  Speech: normal volume, brief repsonses  Mood: \"ok\"  Affect:  blunted ,  Thought Process: less impoverished  Thought Associations: No loosening of associations  Thought Content: improving, linear, logical, more spontaneous and overall increase in content  Perception: No perceptual abnormalities noted  Level of Consciousness: Alert  Orientation: Alert  Attention and Concentration: Mild impairment in attention  Recent Memory: Intact as evidenced by ability to recall details from the past 24 hours   Executive function: Intact  Language: Naming intact  Fund of knowledge: Good  Insight: Fair, as evidenced by increasing participation in discussion about her scenario and symptoms and their improvement  Judgment: Fair, as evidenced by ability to reason through medical decision making, compliance with treatment recommendations, and improving      Psychiatric Risk Assessment  Violence Risk Assessment: major mental illness and substance abuse  Acute Risk of Harm to Others is Considered: moderate   Suicide Risk Assessment: , chronic medical illness, chronic pain, current psychiatric illness, feelings of hopelessness, global insomnia, history of trauma or abuse, life crisis (shame/despair), prior suicide attempt, severe anxiety, substance abuse, and suicidal ideations  Protective Factors against Suicide: adherence to  treatment and marriage/partnership  Acute Risk of Harm to Self is Considered: moderate    Relevant Results  Scheduled medications  buPROPion XL, 300 mg, oral, " Daily  celecoxib, 200 mg, oral, Daily  cholecalciferol, 125 mcg, oral, Daily  DULoxetine, 120 mg, oral, Daily  lidocaine, 1 patch, transdermal, Daily  melatonin, 5 mg, oral, Daily  QUEtiapine, 350 mg, oral, Nightly      Continuous medications     PRN medications  PRN medications: acetaminophen, alum-mag hydroxide-simeth, diphenhydrAMINE, hydrOXYzine pamoate, OLANZapine, OLANZapine zydis, traZODone     Assessment/Plan   Principal Problem:    Severe recurrent major depression without psychotic features (CMS/[facility])  Active Problems:    Colostomy present (CMS/[facility])    Opioid abuse (CMS/[facility])    Bilateral hip pain    Biological -     Orthostatics - confirmed on measurement.  Ongoing efforts to communicate with patient about taking her time when changing position to help prevent falls.    Mouth checks.    Cymbalta 120 mg daily for depression, this is max dose  Wellbutrin xl 300 mg for adjunctive depression treatment  seroquel 350 mg at bedtime  Trazodone 150 mg PRN and melatonin at bedtime for sleep  No adverse side effects of medications noted or reported.  ECG (10/4/23): Normal sinus rhythm. Heart  rate 72 bpm. Qtc 442  11/13/23 - qtc 441    Declined ECT consideration during discussion on 11/4, 11/7.   ECT is not available at this facility and this would include transitioning to another psychiatric hospital for this level of service or pursuing as an outpatient on discharge.    Discussion with patient regarding risk benefits and alternatives and side effects with opportunity to ask questions and questions answered.  Patient given consent to the plan as noted    2. Psychological -     Patient is encouraged to participate with the therapeutic milieu and program and group therapy    3. Sociological -      Patient encouraged to cooperate with social work staff on issues relevant to discharge planning  Pending [place] medicaid and then transition to convalescence nursing care once this becomes available to patient    15 minutes  spent coordinating care for patient on this day    Parts of this chart have been completed using voice recognition software.  Please excuse any errors of transcription.  Despite the medical decision making time stamp, my medical decision making has taken place during the patient's entire visit.  Thought process and reason for plan has been formulated from the time that I saw the patient until the time of disposition and is not specific to one specific moment during their visit and furthermore the medical decision making encompasses the entire chart and not only that represented in this note.        Eddie Cullen, DO

## 2023-11-25 PROCEDURE — 2500000002 HC RX 250 W HCPCS SELF ADMINISTERED DRUGS (ALT 637 FOR MEDICARE OP, ALT 636 FOR OP/ED): Performed by: STUDENT IN AN ORGANIZED HEALTH CARE EDUCATION/TRAINING PROGRAM

## 2023-11-25 PROCEDURE — 1140000001 HC PRIVATE PSYCH ROOM DAILY

## 2023-11-25 PROCEDURE — 2500000001 HC RX 250 WO HCPCS SELF ADMINISTERED DRUGS (ALT 637 FOR MEDICARE OP): Performed by: PSYCHIATRY & NEUROLOGY

## 2023-11-25 PROCEDURE — 2500000004 HC RX 250 GENERAL PHARMACY W/ HCPCS (ALT 636 FOR OP/ED): Performed by: PSYCHIATRY & NEUROLOGY

## 2023-11-25 PROCEDURE — 99232 SBSQ HOSP IP/OBS MODERATE 35: CPT | Performed by: STUDENT IN AN ORGANIZED HEALTH CARE EDUCATION/TRAINING PROGRAM

## 2023-11-25 RX ADMIN — DULOXETINE HYDROCHLORIDE 120 MG: 60 CAPSULE, DELAYED RELEASE ORAL at 08:02

## 2023-11-25 RX ADMIN — Medication 125 MCG: at 08:02

## 2023-11-25 RX ADMIN — Medication 5 MG: at 17:38

## 2023-11-25 RX ADMIN — CELECOXIB 200 MG: 200 CAPSULE ORAL at 15:46

## 2023-11-25 RX ADMIN — QUETIAPINE FUMARATE 350 MG: 300 TABLET ORAL at 20:20

## 2023-11-25 RX ADMIN — BUPROPION HYDROCHLORIDE 300 MG: 300 TABLET, FILM COATED, EXTENDED RELEASE ORAL at 08:02

## 2023-11-25 ASSESSMENT — ENCOUNTER SYMPTOMS
DYSPHORIC MOOD: 1
DECREASED CONCENTRATION: 1
SLEEP DISTURBANCE: 1

## 2023-11-25 ASSESSMENT — PAIN SCALES - GENERAL
PAINLEVEL_OUTOF10: 3
PAINLEVEL_OUTOF10: 0 - NO PAIN

## 2023-11-25 NOTE — NURSING NOTE
"BIRP NOTE     Problem:  depression     Behavior:  Pt is out of her room, socially engaged with other Pts, reported \"I feel better\", makes eye contact, cooperative and calm, denied SI  Group Participation: n/a  Appetite/Meals: hs snack provided       Interventions:  1:1 intervention provided, scheduled medication administered, self care routine related to colostomy bag addressed    Response:  Pt is compliant with her medication, empties colostomy bag independently as needed     Plan:  Continue monitoring until d/c  "

## 2023-11-25 NOTE — GROUP NOTE
Group Topic: Open Recreation   Group Date: 11/25/2023  Start Time: 1500  End Time: 1545  Facilitators: RANDALL HowardS   Department: [facility]    Number of Participants: 8   Group Focus: leisure skills, relaxation, and social skills  Treatment Modality: Other: Recreation Therapy  Interventions utilized were leisure development  Purpose: other: increase stimulation, elevate mood, stimulate memory, increase socialization, increase social skills, increase leisure     Name: Azul Roberts YOB: 1960   MR: [id number]      Facilitator: Recreational Therapist  Level of Participation: did not attend  Quality of Participation:  n/a  Interactions with others:  n/a  Mood/Affect:  n/a  Triggers (if applicable): n/a  Cognition:  n/a  Progress: None  Comments: pt problem is delusional thought. Pt went to room after visit with  and laid down. Pt very emotional and visibly tearful prior to  visit as pt thought and expressed to this CTRS and RN she did not think he was coming today.   Plan: continue with services

## 2023-11-25 NOTE — GROUP NOTE
Group Topic: Excercise/Physical    Group Date: 11/25/2023  Start Time: 1000  End Time: 1025  Facilitators: BRENDA Howard   Department: [facility]    Number of Participants: 9   Group Focus: mindfulness, relaxation, and self-awareness  Treatment Modality: Other: Recreation Therapy  Interventions utilized were group exercise  Purpose: self-care and other: reduce stress, relaxation, mindfulness,     Name: Azul Roberts YOB: 1960   MR: [id number]      Facilitator: Recreational Therapist  Level of Participation: active  Quality of Participation: appropriate/pleasant, cooperative, and engaged  Interactions with others: appropriate  Mood/Affect: appropriate  Triggers (if applicable): n/a  Cognition: coherent/clear  Progress: Moderate  Comments: pt problem is depressed mood  Plan: continue with services

## 2023-11-25 NOTE — PROGRESS NOTES
"Azul Roberts is a 63 y.o. female on day 77 of admission presenting with Severe recurrent major depression without psychotic features (CMS/[facility]).      Subjective   Case discussed with nursing staff and chart reviewed.    States that she feels \"the same,\" reporting poor sleep and increased appetite. States she is bored here. Day to day charting is quite consistent.  Minimal change. No acute disturbances today.  Has persisted with much more significant improvement going on 1 to 2 weeks at this time however she may have likely plateaued from improvement at this time.  ADLs are fair.  She continues to propel herself around the unit in the wheelchair.  She remains physically frail however and remains a high falls risk.       Objective     Vitals:    11/23/23 1500 11/24/23 0500 11/24/23 1630 11/25/23 0645   BP: 89/56 93/61 131/86 130/74   BP Location: Left arm Left arm Left arm Left arm   Patient Position: Sitting Sitting Lying Lying   Pulse: 102 83 77 83   Resp: 18 18 18 18   Temp: 36.5 °C (97.7 °F) 36.6 °C (97.9 °F) 36.4 °C (97.5 °F) 36.5 °C (97.7 °F)   TempSrc: Temporal Temporal Temporal Temporal   SpO2: 98% 99% 99% 100%   Weight:       Height:            Review of Systems   Psychiatric/Behavioral:  Positive for decreased concentration, dysphoric mood and sleep disturbance.      Psychiatric ROS - Adult  Anxiety: General Anxiety Disorder (KAYLEEN)KAYLEEN Behaviors: difficult to control worry, difficulty concentrating, irritability, restlessness, and sleep disturbance  Depression: anhedonia, appetite increased, concentration, energy, helpless, hopeless, interest, persistent thoughts of death, psychomotor agitation, sleep decreased , suicidal thoughts, and withdrawn  Delirium: negative  Psychosis: negative  Ioana: negative  Safety Issues: passive death wish and suicidal ideation  Psychiatric ROS Comment: as noted    Physical Exam  Abdominal:      Comments: Presence of colostomy bag   Psychiatric:         Attention and " "Perception: Attention and perception normal.         Mood and Affect: Mood is depressed. Affect is flat and inappropriate.         Behavior: Behavior is slowed.     Mental Status Examination  General Appearance:  less disheveled with improved eye contact, . Sitting in common area in wheelchair  Gait/Station: using wheelchair to move about the milieu under her own propulsion  Speech: normal volume, brief repsonses  Mood: \"the same\"  Affect: Constricted and Flat,  Thought Process: less impoverished; concrete  Thought Associations: No loosening of associations  Thought Content: improving, linear, logical, more spontaneous and overall increase in content  Perception: No perceptual abnormalities noted  Level of Consciousness: Alert  Orientation: Alert  Attention and Concentration: Mild impairment in attention  Recent Memory: Intact as evidenced by ability to recall details from the past 24 hours   Executive function: Intact  Language: Naming intact  Fund of knowledge: Good  Insight: Fair, as evidenced by increasing participation in discussion about her scenario and symptoms and their improvement  Judgment: Fair, as evidenced by ability to reason through medical decision making, compliance with treatment recommendations, and improving      Psychiatric Risk Assessment  Violence Risk Assessment: major mental illness and substance abuse  Acute Risk of Harm to Others is Considered: moderate   Suicide Risk Assessment: , chronic medical illness, chronic pain, current psychiatric illness, feelings of hopelessness, global insomnia, history of trauma or abuse, life crisis (shame/despair), prior suicide attempt, severe anxiety, substance abuse, and suicidal ideations  Protective Factors against Suicide: adherence to  treatment and marriage/partnership  Acute Risk of Harm to Self is Considered: moderate    Relevant Results  Scheduled medications  buPROPion XL, 300 mg, oral, Daily  celecoxib, 200 mg, oral, " Daily  cholecalciferol, 125 mcg, oral, Daily  DULoxetine, 120 mg, oral, Daily  lidocaine, 1 patch, transdermal, Daily  melatonin, 5 mg, oral, Daily  QUEtiapine, 350 mg, oral, Nightly      Continuous medications     PRN medications  PRN medications: acetaminophen, alum-mag hydroxide-simeth, diphenhydrAMINE, hydrOXYzine pamoate, OLANZapine, OLANZapine zydis, traZODone     Assessment/Plan   Principal Problem:    Severe recurrent major depression without psychotic features (CMS/[facility])  Active Problems:    Colostomy present (CMS/HCC)    Opioid abuse (CMS/[facility])    Bilateral hip pain    Biological -     Orthostatics - confirmed on measurement.  Ongoing efforts to communicate with patient about taking her time when changing position to help prevent falls.    Mouth checks.    Cymbalta 120 mg daily for depression, this is max dose  Wellbutrin xl 300 mg for adjunctive depression treatment  seroquel 350 mg at bedtime  Trazodone 150 mg PRN and melatonin at bedtime for sleep  No adverse side effects of medications noted or reported.  ECG (10/4/23): Normal sinus rhythm. Heart  rate 72 bpm. Qtc 442  11/13/23 - qtc 441    Declined ECT consideration during discussion on 11/4, 11/7.   ECT is not available at this facility and this would include transitioning to another psychiatric hospital for this level of service or pursuing as an outpatient on discharge. Will continue to encourage.    Discussion with patient regarding risk benefits and alternatives and side effects with opportunity to ask questions and questions answered.  Patient given consent to the plan as noted    2. Psychological -     Patient is encouraged to participate with the therapeutic milieu and program and group therapy    3. Sociological -      Patient encouraged to cooperate with social work staff on issues relevant to discharge planning  Pending [place] medicaid and then transition to convalescence nursing care once this becomes available to patient    15 minutes spent  coordinating care for patient on this day    Parts of this chart have been completed using voice recognition software.  Please excuse any errors of transcription.  Despite the medical decision making time stamp, my medical decision making has taken place during the patient's entire visit.  Thought process and reason for plan has been formulated from the time that I saw the patient until the time of disposition and is not specific to one specific moment during their visit and furthermore the medical decision making encompasses the entire chart and not only that represented in this note.        Massimo Barroso MD

## 2023-11-25 NOTE — GROUP NOTE
Group Topic: Symptom Management   Group Date: 11/25/2023  Start Time: 1030  End Time: 1120  Facilitators: BRENDA Howard   Department: [facility]    Number of Participants: 9   Group Focus: coping skills and feeling awareness/expression  Treatment Modality: Other: Recreation Therapy  Interventions utilized were exploration, group exercise, and patient education  Purpose: feelings and other: increase stimulation, increase socialization, improve mood, manage stress and symptoms     Name: Azul Roberts YOB: 1960   MR: [id number]      Facilitator: Recreational Therapist  Level of Participation: active  Quality of Participation: appropriate/pleasant, attentive, cooperative, and engaged  Interactions with others: appropriate  Mood/Affect: appropriate, bright, and positive  Triggers (if applicable): n/a  Cognition: coherent/clear and insightful  Progress: Significant  Comments: pt problem is depressed mood. Pt engages easily with staff and peers.   Plan: continue with services

## 2023-11-25 NOTE — NURSING NOTE
SARAN NOTE     Problem:  depression     Behavior:  Pt has been pleasant and cooperative.  She is going to group and brushing her hair.  She interacts with other patients in group and at meals.  Group Participation: 100%  Appetite/Meals: 100%       Interventions:  Pt given scheduled medications, every 15 minute checks, encouraged to go to group    Response:  Pt has remained pleasant and cooperative she continues to interact with other patients.     Plan:  Pt given scheduled medications, every 15 minute checks, encouraged to go to group

## 2023-11-26 PROCEDURE — 2500000001 HC RX 250 WO HCPCS SELF ADMINISTERED DRUGS (ALT 637 FOR MEDICARE OP): Performed by: PSYCHIATRY & NEUROLOGY

## 2023-11-26 PROCEDURE — 2500000002 HC RX 250 W HCPCS SELF ADMINISTERED DRUGS (ALT 637 FOR MEDICARE OP, ALT 636 FOR OP/ED): Performed by: STUDENT IN AN ORGANIZED HEALTH CARE EDUCATION/TRAINING PROGRAM

## 2023-11-26 PROCEDURE — 1140000001 HC PRIVATE PSYCH ROOM DAILY

## 2023-11-26 PROCEDURE — 99232 SBSQ HOSP IP/OBS MODERATE 35: CPT | Performed by: STUDENT IN AN ORGANIZED HEALTH CARE EDUCATION/TRAINING PROGRAM

## 2023-11-26 PROCEDURE — 2500000004 HC RX 250 GENERAL PHARMACY W/ HCPCS (ALT 636 FOR OP/ED): Performed by: PSYCHIATRY & NEUROLOGY

## 2023-11-26 RX ADMIN — DULOXETINE HYDROCHLORIDE 120 MG: 60 CAPSULE, DELAYED RELEASE ORAL at 08:14

## 2023-11-26 RX ADMIN — BUPROPION HYDROCHLORIDE 300 MG: 300 TABLET, FILM COATED, EXTENDED RELEASE ORAL at 08:14

## 2023-11-26 RX ADMIN — Medication 5 MG: at 17:24

## 2023-11-26 RX ADMIN — CELECOXIB 200 MG: 200 CAPSULE ORAL at 15:28

## 2023-11-26 RX ADMIN — TRAZODONE HYDROCHLORIDE 150 MG: 50 TABLET ORAL at 21:09

## 2023-11-26 RX ADMIN — Medication 125 MCG: at 08:14

## 2023-11-26 RX ADMIN — QUETIAPINE FUMARATE 350 MG: 300 TABLET ORAL at 21:10

## 2023-11-26 ASSESSMENT — COLUMBIA-SUICIDE SEVERITY RATING SCALE - C-SSRS
6. HAVE YOU EVER DONE ANYTHING, STARTED TO DO ANYTHING, OR PREPARED TO DO ANYTHING TO END YOUR LIFE?: NO
1. SINCE LAST CONTACT, HAVE YOU WISHED YOU WERE DEAD OR WISHED YOU COULD GO TO SLEEP AND NOT WAKE UP?: NO
1. SINCE LAST CONTACT, HAVE YOU WISHED YOU WERE DEAD OR WISHED YOU COULD GO TO SLEEP AND NOT WAKE UP?: NO
2. HAVE YOU ACTUALLY HAD ANY THOUGHTS OF KILLING YOURSELF?: NO
6. HAVE YOU EVER DONE ANYTHING, STARTED TO DO ANYTHING, OR PREPARED TO DO ANYTHING TO END YOUR LIFE?: NO
2. HAVE YOU ACTUALLY HAD ANY THOUGHTS OF KILLING YOURSELF?: NO

## 2023-11-26 ASSESSMENT — ENCOUNTER SYMPTOMS
DYSPHORIC MOOD: 1
SLEEP DISTURBANCE: 1
DECREASED CONCENTRATION: 1

## 2023-11-26 ASSESSMENT — PAIN SCALES - GENERAL
PAINLEVEL_OUTOF10: 3
PAINLEVEL_OUTOF10: 0 - NO PAIN

## 2023-11-26 NOTE — GROUP NOTE
Group Topic: Art Creative   Group Date: 11/26/2023  Start Time: 1530  End Time: 1630  Facilitators: BRENDA Howard   Department: [facility]    Number of Participants: 8   Group Focus: art therapy, reminiscence, and social skills  Treatment Modality: Other: Recreation Therapy  Interventions utilized were group exercise and other creative arts,   Purpose: other: increase socialization, increase attention, increase stimulation, stimulate memory, decrease stress, enhance self esteem/confidence    Name: Azul Roberts YOB: 1960   MR: [id number]      Facilitator: Recreational Therapist  Level of Participation: active  Quality of Participation: appropriate/pleasant, attentive, cooperative, engaged, and initiates communication  Interactions with others: appropriate, supportive, and asked thoughtful questions  Mood/Affect: appropriate, bright, and positive  Triggers (if applicable): n/a  Cognition: coherent/clear  Progress: Significant  Comments: pt problem is delusional thought. Pt interacts with peers easily and smiles often.   Plan: continue with services

## 2023-11-26 NOTE — GROUP NOTE
Group Topic: Excercise/Physical    Group Date: 11/26/2023  Start Time: 1000  End Time: 1020  Facilitators: BRENDA Howard   Department: [facility]    Number of Participants: 7   Group Focus: mindfulness, relaxation, and self-awareness  Treatment Modality: Other: Recreation Therapy  Interventions utilized were group exercise and other breathing techniques, physical health  Purpose: self-care, elevate mood, improve sleep, decrease stress, increase stimulation/energy     Name: Azul Roberts YOB: 1960   MR: [id number]      Facilitator: Recreational Therapist  Level of Participation: active  Quality of Participation: appropriate/pleasant, cooperative, and engaged  Interactions with others: appropriate  Mood/Affect: appropriate  Triggers (if applicable): n/a  Cognition: coherent/clear  Progress: Moderate  Comments: pt problem is depressed mood.   Plan: continue with services

## 2023-11-26 NOTE — NURSING NOTE
SARAN NOTE     Problem:  Depression     Behavior:  Pt has been pleasant and cooperative with care, she is participating in groups and taking scheduled medications.  She is smiling and taking care of herself.    Group Participation: 100%  Appetite/Meals: 100%       Interventions:  Pt given scheduled medications, every 15 minute checks, encourage participation in milieu.  Response:  Pt has been up most the day and interacting with staff and other patients, even joking at times     Plan:  Pt given scheduled medications, every 15 minute checks, encourage participation in milieu.

## 2023-11-26 NOTE — NURSING NOTE
"SARAN NOTE     Problem:  depression     Behavior:  Pt isolated herself to her room, did not come out for a snack, reported that she's been \"trying to get some sleep\", Pt requested to have her door wide open.  Group Participation: n/a  Appetite/Meals: refused snack       Interventions:  Scheduled medication administered, colostomy care reviewed    Response:  Pt is compliant with her medication, reported that she's been taking care of her colostomy independently     Plan:  Continue monitoring until d/c  "

## 2023-11-26 NOTE — GROUP NOTE
Group Topic: Reflection   Group Date: 11/26/2023  Start Time: 1020  End Time: 1110  Facilitators: BRENDA Howard   Department: [facility] RE[facility]    Number of Participants: 7   Group Focus: feeling awareness/expression, self-awareness, and self-esteem  Treatment Modality: Other: Recreation Therapy  Interventions utilized were exploration  Purpose: self-worth, increase self-esteem/ confidence, increase motivation, express feelings    Name: Azul Roberts YOB: 1960   MR: [id number]      Facilitator: Recreational Therapist  Level of Participation: active  Quality of Participation: appropriate/pleasant, attentive, engaged, initiates communication, and supportive  Interactions with others: appropriate, supportive, and asked thoughtful questions  Mood/Affect: appropriate, bright, and positive  Triggers (if applicable): n\a  Cognition: coherent/clear  Progress: Significant  Comments: pt problem is delusional thought. Pt engages easily with staff and peers and willingly shares about self- identity with group. Pt is positive and with self and others.   Plan: continue with services

## 2023-11-27 PROCEDURE — 99232 SBSQ HOSP IP/OBS MODERATE 35: CPT | Performed by: INTERNAL MEDICINE

## 2023-11-27 PROCEDURE — 2500000001 HC RX 250 WO HCPCS SELF ADMINISTERED DRUGS (ALT 637 FOR MEDICARE OP): Performed by: PSYCHIATRY & NEUROLOGY

## 2023-11-27 PROCEDURE — 99232 SBSQ HOSP IP/OBS MODERATE 35: CPT | Performed by: PSYCHIATRY & NEUROLOGY

## 2023-11-27 PROCEDURE — 2500000004 HC RX 250 GENERAL PHARMACY W/ HCPCS (ALT 636 FOR OP/ED): Performed by: PSYCHIATRY & NEUROLOGY

## 2023-11-27 PROCEDURE — 2500000002 HC RX 250 W HCPCS SELF ADMINISTERED DRUGS (ALT 637 FOR MEDICARE OP, ALT 636 FOR OP/ED): Performed by: STUDENT IN AN ORGANIZED HEALTH CARE EDUCATION/TRAINING PROGRAM

## 2023-11-27 PROCEDURE — 1140000001 HC PRIVATE PSYCH ROOM DAILY

## 2023-11-27 RX ADMIN — QUETIAPINE FUMARATE 350 MG: 300 TABLET ORAL at 21:15

## 2023-11-27 RX ADMIN — Medication 5 MG: at 17:24

## 2023-11-27 RX ADMIN — DULOXETINE HYDROCHLORIDE 120 MG: 60 CAPSULE, DELAYED RELEASE ORAL at 08:04

## 2023-11-27 RX ADMIN — Medication 125 MCG: at 08:04

## 2023-11-27 RX ADMIN — BUPROPION HYDROCHLORIDE 300 MG: 300 TABLET, FILM COATED, EXTENDED RELEASE ORAL at 08:04

## 2023-11-27 RX ADMIN — CELECOXIB 200 MG: 200 CAPSULE ORAL at 15:37

## 2023-11-27 ASSESSMENT — PAIN - FUNCTIONAL ASSESSMENT
PAIN_FUNCTIONAL_ASSESSMENT: 0-10

## 2023-11-27 ASSESSMENT — PAIN SCALES - GENERAL
PAINLEVEL_OUTOF10: 3
PAINLEVEL_OUTOF10: 0 - NO PAIN

## 2023-11-27 ASSESSMENT — COLUMBIA-SUICIDE SEVERITY RATING SCALE - C-SSRS
2. HAVE YOU ACTUALLY HAD ANY THOUGHTS OF KILLING YOURSELF?: NO
2. HAVE YOU ACTUALLY HAD ANY THOUGHTS OF KILLING YOURSELF?: NO
1. SINCE LAST CONTACT, HAVE YOU WISHED YOU WERE DEAD OR WISHED YOU COULD GO TO SLEEP AND NOT WAKE UP?: NO
1. SINCE LAST CONTACT, HAVE YOU WISHED YOU WERE DEAD OR WISHED YOU COULD GO TO SLEEP AND NOT WAKE UP?: NO
6. HAVE YOU EVER DONE ANYTHING, STARTED TO DO ANYTHING, OR PREPARED TO DO ANYTHING TO END YOUR LIFE?: NO
6. HAVE YOU EVER DONE ANYTHING, STARTED TO DO ANYTHING, OR PREPARED TO DO ANYTHING TO END YOUR LIFE?: NO

## 2023-11-27 ASSESSMENT — ENCOUNTER SYMPTOMS
SLEEP DISTURBANCE: 1
DECREASED CONCENTRATION: 1
DYSPHORIC MOOD: 1

## 2023-11-27 NOTE — NURSING NOTE
SARAN NOTE     Problem:  Pt. Has signs of mild depression, and lack of energy.     Behavior:  Pt. has been pleasant and participated in their Tx.  Group Participation: Fair yet partial  Appetite/Meals: 100       Interventions:  Pt. Was offered all scheduled medications & invited to all groups.    Response:  Pt. Is amenable to Tx. Plan, and is compliant w/ any requests made of them.     Plan:  Pt. Will continue to be monitored for changes in mental status & safety as deemed necessary.

## 2023-11-27 NOTE — PROGRESS NOTES
Azul Roberts is a 63 y.o. female on day 79 of admission presenting with Severe recurrent major depression without psychotic features (CMS/HCC).      Subjective   Case discussed with treatment team and chart reviewed.    Day to day charting is quite consistent.  Minimal change. No acute disturbances today.      Social work calling  for additional collateral contact.  Plan remains nursing home with patient unable to be independent at home and without home care services available.  Can move about in a wheelchair once placed into chair, however home has steps and is not wide enough to accommodate a wheelchair.   not home during the day to help care take.  Patient would be confined to the spot she was left in continuously.      Attending groups more:  Pt very pleasant and interactive with others. Significant progress since beginning of treatment.    pt joins group with little encouragement.  Pleasant, cooperative and active with participation.  Offers helpful skills that have been effective for her.  Shows good insight into stress coping skills.       Declined physical therapy today on premise she was engaging with peers.    Objective     Vitals:    11/26/23 0528 11/26/23 1628 11/27/23 0613 11/27/23 1600   BP: 132/72 120/76 102/73 91/73   BP Location: Left arm Right arm Left arm Right arm   Patient Position: Lying Sitting Lying Sitting   Pulse: 82 96 85 92   Resp: 18 18 17 18   Temp: 36.9 °C (98.4 °F) 36.7 °C (98.1 °F) 36.5 °C (97.7 °F) 37 °C (98.6 °F)   TempSrc: Temporal Temporal Temporal Temporal   SpO2: 99% 99% 100% 100%   Weight:       Height:            Review of Systems   Psychiatric/Behavioral:  Positive for decreased concentration, dysphoric mood and sleep disturbance.      Psychiatric ROS - Adult  Anxiety: General Anxiety Disorder (KAYLEEN)KAYLEEN Behaviors: difficult to control worry, difficulty concentrating, irritability, restlessness, and sleep disturbance  Depression: anhedonia, appetite increased,  "concentration, energy, helpless, hopeless, interest, persistent thoughts of death, psychomotor agitation, sleep decreased , suicidal thoughts, and withdrawn  Delirium: negative  Psychosis: negative  Ioana: negative  Safety Issues: passive death wish and suicidal ideation  Psychiatric ROS Comment: as noted    Physical Exam  Abdominal:      Comments: Presence of colostomy bag   Psychiatric:         Attention and Perception: Attention and perception normal.         Mood and Affect: Mood is depressed. Affect is flat and inappropriate.         Behavior: Behavior is slowed.     Mental Status Examination  General Appearance:  less disheveled with improved eye contact, . Sitting in common area in wheelchair  Gait/Station: using wheelchair to move about the milieu under her own propulsion  Speech: normal volume, brief repsonses  Mood: \"ok\"  Affect:  blunted ,  Thought Process: less impoverished  Thought Associations: No loosening of associations  Thought Content: improving, linear, logical, more spontaneous and overall increase in content  Perception: No perceptual abnormalities noted  Level of Consciousness: Alert  Orientation: Alert  Attention and Concentration: Mild impairment in attention  Recent Memory: Intact as evidenced by ability to recall details from the past 24 hours   Executive function: Intact  Language: Naming intact  Fund of knowledge: Good  Insight: Fair, as evidenced by increasing participation in discussion about her scenario and symptoms and their improvement  Judgment: Fair, as evidenced by ability to reason through medical decision making, compliance with treatment recommendations, and improving      Psychiatric Risk Assessment  Violence Risk Assessment: major mental illness and substance abuse  Acute Risk of Harm to Others is Considered: moderate   Suicide Risk Assessment: , chronic medical illness, chronic pain, current psychiatric illness, feelings of hopelessness, global insomnia, history " of trauma or abuse, life crisis (shame/despair), prior suicide attempt, severe anxiety, substance abuse, and suicidal ideations  Protective Factors against Suicide: adherence to  treatment and marriage/partnership  Acute Risk of Harm to Self is Considered: moderate    Relevant Results  Scheduled medications  buPROPion XL, 300 mg, oral, Daily  celecoxib, 200 mg, oral, Daily  cholecalciferol, 125 mcg, oral, Daily  DULoxetine, 120 mg, oral, Daily  lidocaine, 1 patch, transdermal, Daily  melatonin, 5 mg, oral, Daily  QUEtiapine, 350 mg, oral, Nightly      Continuous medications     PRN medications  PRN medications: acetaminophen, alum-mag hydroxide-simeth, diphenhydrAMINE, hydrOXYzine pamoate, OLANZapine, OLANZapine zydis, traZODone     Assessment/Plan   Principal Problem:    Severe recurrent major depression without psychotic features (CMS/HCC)  Active Problems:    Colostomy present (CMS/HCC)    Opioid abuse (CMS/HCC)    Bilateral hip pain    Biological -     Orthostatics - confirmed on measurement.  Ongoing efforts to communicate with patient about taking her time when changing position to help prevent falls.    Mouth checks.    Cymbalta 120 mg daily for depression, this is max dose  Wellbutrin xl 300 mg for adjunctive depression treatment  seroquel 350 mg at bedtime  Trazodone 150 mg PRN and melatonin at bedtime for sleep  No adverse side effects of medications noted or reported.  ECG (10/4/23): Normal sinus rhythm. Heart  rate 72 bpm. Qtc 442  11/13/23 - qtc 441    Declined ECT consideration during discussion on 11/4, 11/7.   ECT is not available at this facility and this would include transitioning to another psychiatric hospital for this level of service or pursuing as an outpatient on discharge.    Discussion with patient regarding risk benefits and alternatives and side effects with opportunity to ask questions and questions answered.  Patient given consent to the plan as noted    2. Psychological -     Patient  is encouraged to participate with the therapeutic milieu and program and group therapy    3. Sociological -      Patient encouraged to cooperate with social work staff on issues relevant to discharge planning  Pending [place] medicaid and then transition to convalescence nursing care once this becomes available to patient    20 minutes spent coordinating care for patient on this day    Parts of this chart have been completed using voice recognition software.  Please excuse any errors of transcription.  Despite the medical decision making time stamp, my medical decision making has taken place during the patient's entire visit.  Thought process and reason for plan has been formulated from the time that I saw the patient until the time of disposition and is not specific to one specific moment during their visit and furthermore the medical decision making encompasses the entire chart and not only that represented in this note.        Eddie Cullen, DO

## 2023-11-27 NOTE — PROGRESS NOTES
"Azul Roberts is a 63 y.o. female on day 78 of admission presenting with Severe recurrent major depression without psychotic features (CMS/HCC).      Subjective   Case discussed with nursing staff and chart reviewed.    When asking about her mood, she sarcatically replied \"I'm here.\" Reports no changes. States appetite is still fair. Still not willing to consider ECT. Day to day charting is quite consistent.  Minimal change. No acute disturbances today.  Has persisted with much more significant improvement going on 1 to 2 weeks at this time however she may have likely plateaued from improvement at this time.  ADLs are fair.  She continues to propel herself around the unit in the wheelchair.  She remains physically frail however and remains a high falls risk.       Objective     Vitals:    11/25/23 0645 11/25/23 1651 11/26/23 0528 11/26/23 1628   BP: 130/74 126/83 132/72 120/76   BP Location: Left arm Right arm Left arm Right arm   Patient Position: Lying Sitting Lying Sitting   Pulse: 83 84 82 96   Resp: 18 17 18 18   Temp: 36.5 °C (97.7 °F) 36.2 °C (97.2 °F) 36.9 °C (98.4 °F) 36.7 °C (98.1 °F)   TempSrc: Temporal Temporal Temporal Temporal   SpO2: 100% 99% 99% 99%   Weight:       Height:            Review of Systems   Psychiatric/Behavioral:  Positive for decreased concentration, dysphoric mood and sleep disturbance.      Psychiatric ROS - Adult  Anxiety: General Anxiety Disorder (KAYLEEN)KAYLEEN Behaviors: difficult to control worry, difficulty concentrating, irritability, restlessness, and sleep disturbance  Depression: anhedonia, appetite increased, concentration, energy, helpless, hopeless, interest, persistent thoughts of death, psychomotor agitation, sleep decreased , suicidal thoughts, and withdrawn  Delirium: negative  Psychosis: negative  Ioana: negative  Safety Issues: passive death wish and suicidal ideation  Psychiatric ROS Comment: as noted    Physical Exam  Abdominal:      Comments: Presence of colostomy " "bag   Psychiatric:         Attention and Perception: Attention and perception normal.         Mood and Affect: Mood is depressed. Affect is flat and inappropriate.         Behavior: Behavior is slowed.     Mental Status Examination  General Appearance:  less disheveled with improved eye contact, . Sitting in common area in wheelchair  Gait/Station: using wheelchair to move about the milieu under her own propulsion  Speech: normal volume, brief repsonses  Mood: \"I'm here\"  Affect: Constricted and Flat,  Thought Process: less impoverished; concrete  Thought Associations: No loosening of associations  Thought Content: improving, linear, logical, more spontaneous and overall increase in content  Perception: No perceptual abnormalities noted  Level of Consciousness: Alert  Orientation: Alert  Attention and Concentration: Mild impairment in attention  Recent Memory: Intact as evidenced by ability to recall details from the past 24 hours   Executive function: Intact  Language: Naming intact  Fund of knowledge: Good  Insight: Fair, as evidenced by increasing participation in discussion about her scenario and symptoms and their improvement  Judgment: Fair, as evidenced by ability to reason through medical decision making, compliance with treatment recommendations, and improving      Psychiatric Risk Assessment  Violence Risk Assessment: major mental illness and substance abuse  Acute Risk of Harm to Others is Considered: moderate   Suicide Risk Assessment: , chronic medical illness, chronic pain, current psychiatric illness, feelings of hopelessness, global insomnia, history of trauma or abuse, life crisis (shame/despair), prior suicide attempt, severe anxiety, substance abuse, and suicidal ideations  Protective Factors against Suicide: adherence to  treatment and marriage/partnership  Acute Risk of Harm to Self is Considered: moderate    Relevant Results  Scheduled medications  buPROPion XL, 300 mg, oral, " Daily  celecoxib, 200 mg, oral, Daily  cholecalciferol, 125 mcg, oral, Daily  DULoxetine, 120 mg, oral, Daily  lidocaine, 1 patch, transdermal, Daily  melatonin, 5 mg, oral, Daily  QUEtiapine, 350 mg, oral, Nightly      Continuous medications     PRN medications  PRN medications: acetaminophen, alum-mag hydroxide-simeth, diphenhydrAMINE, hydrOXYzine pamoate, OLANZapine, OLANZapine zydis, traZODone     Assessment/Plan   Principal Problem:    Severe recurrent major depression without psychotic features (CMS/formerly [facility])  Active Problems:    Colostomy present (CMS/HCC)    Opioid abuse (CMS/formerly [facility])    Bilateral hip pain    Biological -     Orthostatics - confirmed on measurement.  Ongoing efforts to communicate with patient about taking her time when changing position to help prevent falls.    Mouth checks.    Cymbalta 120 mg daily for depression, this is max dose  Wellbutrin xl 300 mg for adjunctive depression treatment  seroquel 350 mg at bedtime  Trazodone 150 mg PRN and melatonin at bedtime for sleep  No adverse side effects of medications noted or reported.  ECG (10/4/23): Normal sinus rhythm. Heart  rate 72 bpm. Qtc 442  11/13/23 - qtc 441    Declined ECT consideration during discussion on 11/4, 11/7.   ECT is not available at this facility and this would include transitioning to another psychiatric hospital for this level of service or pursuing as an outpatient on discharge. Will continue to encourage.    Discussion with patient regarding risk benefits and alternatives and side effects with opportunity to ask questions and questions answered.  Patient given consent to the plan as noted    2. Psychological -     Patient is encouraged to participate with the therapeutic milieu and program and group therapy    3. Sociological -      Patient encouraged to cooperate with social work staff on issues relevant to discharge planning  Pending [place] medicaid and then transition to convalescence nursing care once this becomes available  to patient    15 minutes spent coordinating care for patient on this day    Parts of this chart have been completed using voice recognition software.  Please excuse any errors of transcription.  Despite the medical decision making time stamp, my medical decision making has taken place during the patient's entire visit.  Thought process and reason for plan has been formulated from the time that I saw the patient until the time of disposition and is not specific to one specific moment during their visit and furthermore the medical decision making encompasses the entire chart and not only that represented in this note.        Massimo Barroso MD

## 2023-11-27 NOTE — PROGRESS NOTES
Physical Therapy                 Therapy Communication Note    Patient Name: Azul Roberts  MRN: [id number]  Today's Date: 11/27/2023     Discipline: Physical Therapy    Missed Visit Reason: Missed Visit Reason: Patient refused    Missed Time: Attempt    Comment: Patient playing cards in TV room with other patients and adamantly declines therapy

## 2023-11-27 NOTE — GROUP NOTE
Group Topic: Other   Group Date: 11/27/2023  Start Time: 1500  End Time: 1600  Facilitators: BRENDA Fernandes   Department: [facility]    Number of Participants: 7   Group Focus: other Life Questions  Treatment Modality: Other: Recreation Therapy  Interventions utilized were exploration, reminiscence, and story telling  Purpose: feelings, self-worth, and other: increase stimulation, increase socialization, elevate mood, enhance self esteem    Name: Azul Roberts YOB: 1960   MR: [id number]      Facilitator: Recreational Therapist  Level of Participation: active  Quality of Participation: appropriate/pleasant, attentive, cooperative, and engaged  Interactions with others: appropriate and gave feedback  Mood/Affect: appropriate and positive  Triggers (if applicable): n/a  Cognition: coherent/clear  Progress: Significant  Comments: pt problem is depressed mood.  Pt displays cooperative and appropriate affect, mood and behaviors  Engages easily and is active with participation.  Plan: continue with services

## 2023-11-27 NOTE — CARE PLAN
The patient's goals for the shift include Maybe a group    The clinical goals for the shift include encourage participation in milieu    Over the shift, the patient did make progress toward the following goals. Barriers to progression include minimal effort with physical therapy and exercise. Recommendations to address these barriers include positive encouragement, & education on movements.      Problem: Instrumental Activities of Daily Living  Goal: STG - Patient will maintain balance while moving about  Outcome: Progressing

## 2023-11-27 NOTE — GROUP NOTE
Group Topic: Stress Reduction/Relaxation   Group Date: 11/27/2023  Start Time: 1100  End Time: 1150  Facilitators: BRENDA Fernandes   Department: [facility]    Number of Participants: 8   Group Focus: other Stress Management   Treatment Modality: Other: Recreation Therapy  Interventions utilized were exploration, mental fitness, patient education, and problem solving  Purpose: feelings, insight or knowledge, self-care, and other: elevate mood, increase socialization, stimulate memory    Name: Azul Roberts YOB: 1960   MR: [id number]      Facilitator: Recreational Therapist  Level of Participation: active  Quality of Participation: appropriate/pleasant, attentive, cooperative, and engaged  Interactions with others: appropriate, gave feedback, and offered helpful suggestions  Mood/Affect: appropriate, brightens with interaction, and positive  Triggers (if applicable): n/a  Cognition: coherent/clear and goal directed  Progress: Significant  Comments: pt joins group with little encouragement.  Pleasant, cooperative and active with participation.  Offers helpful skills that have been effective for her.  Shows good insight into stress coping skills.    Plan: continue with services

## 2023-11-27 NOTE — PROGRESS NOTES
[facility]: [facility]  PROGRESS NOTE      Azul Roberts is a 63 y.o. female that is being seen  today for follow up at [facility]  Subjective   Patient is being seen for follow-up at [facility] unit.  Patient had a fall and was sent to the ER.  Patient was evaluated in the ER and CT scan of the brain was negative for any bleed.  Patient had a UA done which was positive for leukoesterase.  Culture is pending.  Patient has been at her baseline.  Colostomy has been working well.  Pain has been fairly controlled.    ROS  Negative for fever or chills  Negative for sore throat, ear pain, nasal discharge  Negative for cough, shortness of breath or wheezing  Negative for chest pain, palpitations, swelling of legs  Negative for abdominal pain, constipation, diarrhea, blood in the stools,has colostomy  Negative for urinary complaints  Negative for headache, dizziness, weakness or numbness  Negative for joint pain  Positive for depression or anxiety  All other systems reviewed and were negative  Vitals:     Vitals:    11/27/23 0613   BP: 102/73   Pulse: 85   Resp: 17   Temp: 36.5 °C (97.7 °F)   SpO2: 100%      Body mass index is 29.87 kg/m².  Physical Exam  Constitutional: Patient does not appear to be in any acute distress  Head and Face: NCAT  Eyes: Normal external exam, EOMI  ENT: Normal external inspection of ears and nose. Oropharynx normal.  Cardiovascular: RRR, S1/S2, no murmurs, rubs, or gallops, radial pulses +2, no edema of extremities  Pulmonary: CTAB, no respiratory distress.  Abdomen: +BS, soft, non-tender, nondistended, no guarding or rebound, no masses noted.colostomy present   MSK: hip joint pain   Skin- No lesions, contusions, or erythema.  Peripheral puslses palpable bilaterally 2+  Neuro: AAO X3, Cranial nerves 2-12 grossly intact,DTR 2+ in all 4 limbs       Diagnostic Results   Lab Results   Component Value Date    GLUCOSE 113 (H) 09/05/2023    CALCIUM 10.6 (H) 09/05/2023      "09/05/2023    K 3.7 09/05/2023    CO2 24 09/05/2023     09/05/2023    BUN 12 09/05/2023    CREATININE 0.72 09/05/2023     Lab Results   Component Value Date    ALT 12 09/05/2023    AST 14 09/05/2023    ALKPHOS 103 09/05/2023    BILITOT 0.4 09/05/2023     Lab Results   Component Value Date    WBC 13.2 (H) 09/05/2023    HGB 13.9 09/05/2023    HCT 43.0 09/05/2023    MCV 81 09/05/2023     09/05/2023     No results found for: \"CHOL\"  No results found for: \"HDL\"  No results found for: \"LDLCALC\"  No results found for: \"TRIG\"  No results found for: \"HGBA1C\"  Other labs not included in the list above were reviewed either before or during this encounter.    History    No past medical history on file.  No past surgical history on file.  No family history on file.  No Known Allergies  No current facility-administered medications on file prior to encounter.     Current Outpatient Medications on File Prior to Encounter   Medication Sig Dispense Refill    acetaminophen (Tylenol) 325 mg tablet Take 2 tablets (650 mg) by mouth 2 times a day.      hydrOXYzine pamoate (Vistaril) 25 mg capsule Take 1 capsule (25 mg) by mouth 2 times a day.      magnesium oxide (Mag-Ox) 400 mg tablet Take 1 tablet (400 mg) by mouth 2 times a day.      melatonin 5 mg tablet Take 1 tablet (5 mg) by mouth once daily at bedtime.      risperiDONE (RisperDAL) 2 mg tablet Take 1 tablet (2 mg) by mouth once daily at bedtime.      traZODone (Desyrel) 150 mg tablet Take 1 tablet (150 mg) by mouth once daily at bedtime.     Scheduled medications  buPROPion XL, 300 mg, oral, Daily  celecoxib, 200 mg, oral, Daily  cholecalciferol, 125 mcg, oral, Daily  citalopram, 30 mg, oral, Daily  lidocaine, 1 patch, transdermal, Daily  melatonin, 5 mg, oral, Nightly  QUEtiapine, 350 mg, oral, Nightly  traZODone, 150 mg, oral, Nightly      Continuous medications     PRN medications  PRN medications: acetaminophen, alum-mag hydroxide-simeth, diphenhydrAMINE, " hydrOXYzine pamoate, OLANZapine, OLANZapine zydis     There is no immunization history on file for this patient.  Patient's medical history was reviewed and updated either before or during this encounter.  ASSESSMENT / PLAN:  Active Hospital Problems    Bilateral hip pain      *Severe recurrent major depression without psychotic features (CMS/HCC)      Colostomy present (CMS/HCC)      Opioid abuse (CMS/HCC)     Pt. Has been stable.clostomy is working well.        Jay Knox MD

## 2023-11-27 NOTE — CARE PLAN
Problem: Fall/Injury  Goal: Not fall by end of shift  Outcome: Met  Goal: Be free from injury by end of the shift  Outcome: Met  Goal: Use assistive devices by end of the shift  Outcome: Met  Goal: Pace activities to prevent fatigue by end of the shift  Outcome: Met   The patient's goals for the shift include to get throught the day    The clinical goals for the shift include encourage participation in milieu

## 2023-11-27 NOTE — PROGRESS NOTES
Nutrition Follow up Note    Pt continues to take po well    Lab Results   Component Value Date    WBC 8.8 09/11/2023    HGB 11.9 (L) 09/11/2023    HCT 36.3 09/11/2023     09/11/2023    CHOL 184 09/11/2023    TRIG 99 09/11/2023    HDL 45 (L) 09/11/2023    ALT 15 09/11/2023    AST 15 09/11/2023     11/08/2023    K 4.4 11/08/2023     11/08/2023    CREATININE 0.80 11/08/2023    BUN 21 11/08/2023    CO2 25 11/08/2023    TSH 1.64 09/11/2023    HGBA1C 5.8 09/11/2023       Current Facility-Administered Medications:     acetaminophen (Tylenol) tablet 650 mg, 650 mg, oral, q6h PRN, Eddie Cullen DO, 650 mg at 11/19/23 1547    alum-mag hydroxide-simeth (Mylanta) 200-200-20 mg/5 mL oral suspension 30 mL, 30 mL, oral, q6h PRN, Eddie Cullen DO    buPROPion XL (Wellbutrin XL) 24 hr tablet 300 mg, 300 mg, oral, Daily, Eddie Cullen DO, 300 mg at 11/27/23 0804    celecoxib (CeleBREX) capsule 200 mg, 200 mg, oral, Daily, Eddie Cullen DO, 200 mg at 11/26/23 1528    cholecalciferol (Vitamin D-3) capsule 125 mcg, 125 mcg, oral, Daily, Eddie Cullen DO, 125 mcg at 11/27/23 0804    diphenhydrAMINE (Sominex) tablet 25 mg, 25 mg, oral, q8h PRN, Eddie Cullen DO    DULoxetine (Cymbalta) DR capsule 120 mg, 120 mg, oral, Daily, Massimo Barroso MD, 120 mg at 11/27/23 0804    hydrOXYzine pamoate (Vistaril) capsule 25 mg, 25 mg, oral, q8h PRN, Eddie Cullen DO    lidocaine 4 % patch 1 patch, 1 patch, transdermal, Daily, Eddie Cullen DO    melatonin tablet 5 mg, 5 mg, oral, Daily, Eddie Cullen DO, 5 mg at 11/26/23 1724    OLANZapine (ZyPREXA) injection 5 mg, 5 mg, intramuscular, q8h PRN, Eddie Cullen DO    OLANZapine zydis (ZyPREXA) disintegrating tablet 5 mg, 5 mg, oral, q8h PRN, Eddie Cullen DO    QUEtiapine (SEROquel) tablet 350 mg, 350 mg, oral, Nightly, Eddie Cullen DO, 350 mg at 11/26/23 2110    traZODone (Desyrel) tablet 150 mg, 150 mg, oral, Nightly PRN,  "Eddie McgarryDO marbin, 150 mg at 11/26/23 2109    Dietary Orders (From admission, onward)       Start     Ordered    09/28/23 1750  Adult diet Regular  Diet effective now        Question:  Diet type  Answer:  Regular    09/28/23 1800                     Food and Nutrient History  Energy Intake: Good > 75 %    Anthropometrics:  Ht: 162.6 cm (5' 4\"), Wt: 78.9 kg (174 lb), BMI: 29.85     Weight Change  Weight History / % Weight Change: no updated wt     IBW/kg (Dietitian Calculated): 54.55 kg     Adjusted Body Weight (kg): 60.91 kg    Nutrition Prescription  Estimated Energy Needs  Total Energy Estimated Needs (kCal): 1517 kCal  Method for Estimating Needs: 25 kcals/kg ABW    Estimated Protein Needs  Total Protein Estimated Needs (g):  (49-61)  Method for Estimating Needs: 0.8-1 g/kg ABW    Estimated Fluid Needs  Total Fluid Estimated Needs (mL): 1517 mL  Method for Estimating Needs: 1 ml/kcal ABW     Nutrition Focused Physical Findings:    Nutrition Diagnosis:  Malnutrition Diagnosis  Patient has Malnutrition Diagnosis: No    Patient has Nutrition Diagnosis: Yes  Nutrition Diagnosis 1: Unintended weight loss  Diagnosis Status (1): Ongoing  Related to (1): unknown etiology      Nutrition Interventions/Recommendations:     Food and/or Nutrient Delivery Interventions      Nutrition Monitoring/Evaluation:  Food and Nutrient Related History      "

## 2023-11-27 NOTE — GROUP NOTE
Group Topic: Music Therapy   Group Date: 11/27/2023  Start Time: 1000  End Time: 1100  Facilitators: Chanda Benton   Department: [facility]    Number of Participants: 10   Group Focus: music therapy  Treatment Modality: Music Therapy  Interventions utilized were Active music engagement, passive music listening, reminiscence  Purpose: communication skills, insight or knowledge, and self-care    Name: Azul Roberts YOB: 1960   MR: [id number]      Facilitator: Music Therapist  Level of Participation: active  Quality of Participation: appropriate/pleasant, attentive, cooperative, engaged, and supportive  Interactions with others: appropriate, gave feedback, supportive, asked thoughtful questions, and offered helpful suggestions  Mood/Affect: bright, brightens with interaction, and positive  Triggers (if applicable):   Cognition: coherent/clear, goal directed, insightful, and logical  Progress: Significant  Comments: Pt very pleasant and interactive with others. Significant progress since beginning of treatment.  Plan: continue with services

## 2023-11-27 NOTE — NURSING NOTE
SARAN NOTE     Problem:  depression     Behavior:  Per report pt is now attending and participating in groups. Pt mainly reclusive to room during night shift. Pt c/o of pain in hips but refused, stating they don't work. Praised pt for taking care of her ADL's and going to groups today.     Group Participation: No HS group  Appetite/Meals: No issues       Interventions:  Encouraged to keep doing ADL's and attending group. Also, to give ordered pain meds a chance or ask the doctor to change them for her pain.    Response:  Pt says she will continue with her own ADL's and going to groups as much as possible. However, she doesn't believe the doctor will change her pain meds.      Plan:  Continue to monitor for safety.

## 2023-11-28 PROCEDURE — 2500000002 HC RX 250 W HCPCS SELF ADMINISTERED DRUGS (ALT 637 FOR MEDICARE OP, ALT 636 FOR OP/ED): Performed by: STUDENT IN AN ORGANIZED HEALTH CARE EDUCATION/TRAINING PROGRAM

## 2023-11-28 PROCEDURE — 1140000001 HC PRIVATE PSYCH ROOM DAILY

## 2023-11-28 PROCEDURE — 2500000004 HC RX 250 GENERAL PHARMACY W/ HCPCS (ALT 636 FOR OP/ED): Performed by: PSYCHIATRY & NEUROLOGY

## 2023-11-28 PROCEDURE — 2500000001 HC RX 250 WO HCPCS SELF ADMINISTERED DRUGS (ALT 637 FOR MEDICARE OP): Performed by: PSYCHIATRY & NEUROLOGY

## 2023-11-28 PROCEDURE — 99231 SBSQ HOSP IP/OBS SF/LOW 25: CPT | Performed by: PSYCHIATRY & NEUROLOGY

## 2023-11-28 RX ADMIN — Medication 5 MG: at 18:27

## 2023-11-28 RX ADMIN — Medication 125 MCG: at 08:22

## 2023-11-28 RX ADMIN — CELECOXIB 200 MG: 200 CAPSULE ORAL at 16:00

## 2023-11-28 RX ADMIN — DULOXETINE HYDROCHLORIDE 120 MG: 60 CAPSULE, DELAYED RELEASE ORAL at 08:22

## 2023-11-28 RX ADMIN — QUETIAPINE FUMARATE 350 MG: 300 TABLET ORAL at 20:37

## 2023-11-28 RX ADMIN — BUPROPION HYDROCHLORIDE 300 MG: 300 TABLET, FILM COATED, EXTENDED RELEASE ORAL at 08:22

## 2023-11-28 ASSESSMENT — ENCOUNTER SYMPTOMS
DECREASED CONCENTRATION: 1
SLEEP DISTURBANCE: 1
DYSPHORIC MOOD: 1

## 2023-11-28 ASSESSMENT — PAIN SCALES - GENERAL
PAINLEVEL_OUTOF10: 0 - NO PAIN
PAINLEVEL_OUTOF10: 0 - NO PAIN

## 2023-11-28 ASSESSMENT — PAIN - FUNCTIONAL ASSESSMENT
PAIN_FUNCTIONAL_ASSESSMENT: FLACC (FACE, LEGS, ACTIVITY, CRY, CONSOLABILITY)
PAIN_FUNCTIONAL_ASSESSMENT: FLACC (FACE, LEGS, ACTIVITY, CRY, CONSOLABILITY)

## 2023-11-28 NOTE — NURSING NOTE
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 Pt is pleasant and cooperative, denied SI    Group Participation: n/a  Appetite/Meals: hs snack provided       Interventions:  Scheduled medication administered, no intervention needed at this time    Response:  Pt is compliant with her medication, cares for colostomy bag independently     Plan:  Adhere to care plan

## 2023-11-28 NOTE — GROUP NOTE
Group Topic: Other   Group Date: 11/28/2023  Start Time: 1500  End Time: 1610  Facilitators: BRENDA Fernandes   Department: [facility]    Number of Participants: 6   Group Focus: other [facility] group  Treatment Modality: Other: Recreation therapy  Interventions utilized were mental fitness and reminiscence  Purpose: other: elevate mood, increase stimulation, fun, increase socialization, enhance self esteem    Name: Azul Roberts YOB: 1960   MR: [id number]      Facilitator: Recreational Therapist  Level of Participation: active  Quality of Participation: appropriate/pleasant, attentive, cooperative, and engaged  Interactions with others: appropriate and gave feedback  Mood/Affect: appropriate  Triggers (if applicable): n/a  Cognition: coherent/clear  Progress: Significant  Comments: pt problem is depressed mood.  Plan: continue with services

## 2023-11-28 NOTE — GROUP NOTE
"Group Topic: Self-Care/Wellness   Group Date: 11/28/2023  Start Time: 1100  End Time: 1140  Facilitators: BRENDA Fernandes   Department: [facility]    Number of Participants: 5   Group Focus: other Depression education group  Treatment Modality: Other: Recreation therapy  Interventions utilized were clarification, exploration, mental fitness, patient education, and problem solving  Purpose: coping skills, feelings, insight or knowledge, and other: elevate mood, increase stimulation, increase socialization    Name: Azul Roberts YOB: 1960   MR: [id number]      Facilitator: Recreational Therapist  Level of Participation: active  Quality of Participation: appropriate/pleasant, attentive, cooperative, engaged, and motivated  Interactions with others: appropriate, gave feedback, and offered helpful suggestions  Mood/Affect: brightens with interaction  Triggers (if applicable): n/a  Cognition: coherent/clear and insightful  Progress: Significant  Comments: pt problem is depressed mood.  Pt is pleasant, cooperative and appropriate.  Very insightful with personal history and history of MI.  Is able to identify triggers and signs and symptoms of MI.  Reports wanting \"to do whatever I can not to feel bad anymore\".  Pt is bright with affect, making great eye contact and smiling often.  Initiates interaction with staff and peers.     Plan: continue with services      "

## 2023-11-28 NOTE — PROGRESS NOTES
Late entry. LSW attempted to contact pt's  yesterday and left a message. LSW provided update on pt's disposition and the set back with Medicaid. LSW encouraged pt's  to call back.       Nikki Andrews, DRAGAN

## 2023-11-28 NOTE — NURSING NOTE
BIRP NOTE     Problem:  depression     Behavior:  Pt pleasant and cooperative with staff and care. Pt out of room this shift, makes needs known and is med compliant.  Group Participation: yes  Appetite/Meals: good       Interventions:  Administered scheduled medications    Response:  Pt continues as above     Plan:  Maintain pt safety

## 2023-11-28 NOTE — PROGRESS NOTES
"Azul Roberts is a 63 y.o. female on day 80 of admission presenting with Severe recurrent major depression without psychotic features (CMS/HCC).      Subjective   Case discussed with treatment team and chart reviewed.    Day to day charting is quite consistent.  Minimal change. No acute disturbances today.  No treatment plan updates today.    Continues to report is \"ok.\"  Often seen relaxing in bed, or in wheelchair near nursing station.    Brief on encounter, no requests, continues to endorse the wait for optimal transition recognizing that home going is ill advised and will subject her to predictable and avoidable decompensation.    Attending groups more:  Is reported as saying in group that she will do whatever she can to feel better.  Noted to be with brighter affect making improved eye contact and smiling.  Additionally, she was documented as encouraging other patients to share their own perspectives while being supportive of other patients.    Objective     Vitals:    11/26/23 1628 11/27/23 0613 11/27/23 1600 11/28/23 0500   BP: 120/76 102/73 91/73 96/52   BP Location: Right arm Left arm Right arm Left arm   Patient Position: Sitting Lying Sitting Lying   Pulse: 96 85 92 71   Resp: 18 17 18 18   Temp: 36.7 °C (98.1 °F) 36.5 °C (97.7 °F) 37 °C (98.6 °F) 36.7 °C (98.1 °F)   TempSrc: Temporal Temporal Temporal Temporal   SpO2: 99% 100% 100% 99%   Weight:       Height:            Review of Systems   Psychiatric/Behavioral:  Positive for decreased concentration, dysphoric mood and sleep disturbance.      Psychiatric ROS - Adult  Anxiety: General Anxiety Disorder (KAYLEEN)KAYLEEN Behaviors: difficult to control worry, difficulty concentrating, irritability, restlessness, and sleep disturbance  Depression: anhedonia, appetite increased, concentration, energy, helpless, hopeless, interest, persistent thoughts of death, psychomotor agitation, sleep decreased , suicidal thoughts, and withdrawn  Delirium: negative  Psychosis: " "negative  Ioana: negative  Safety Issues: passive death wish and suicidal ideation  Psychiatric ROS Comment: as noted    Physical Exam  Abdominal:      Comments: Presence of colostomy bag   Psychiatric:         Attention and Perception: Attention and perception normal.         Mood and Affect: Mood is depressed. Affect is flat and inappropriate.         Behavior: Behavior is slowed.     Mental Status Examination  General Appearance:  less disheveled with improved eye contact, . Sitting in common area in wheelchair  Gait/Station: using wheelchair to move about the milieu under her own propulsion  Speech: normal volume, brief repsonses  Mood: \"ok\"  Affect:  blunted ,  Thought Process: less impoverished  Thought Associations: No loosening of associations  Thought Content: improving, linear, logical, more spontaneous and overall increase in content  Perception: No perceptual abnormalities noted  Level of Consciousness: Alert  Orientation: Alert  Attention and Concentration: Mild impairment in attention  Recent Memory: Intact as evidenced by ability to recall details from the past 24 hours   Executive function: Intact  Language: Naming intact  Fund of knowledge: Good  Insight: Fair, as evidenced by increasing participation in discussion about her scenario and symptoms and their improvement  Judgment: Fair, as evidenced by ability to reason through medical decision making, compliance with treatment recommendations, and improving      Psychiatric Risk Assessment  Violence Risk Assessment: major mental illness and substance abuse  Acute Risk of Harm to Others is Considered: moderate   Suicide Risk Assessment: , chronic medical illness, chronic pain, current psychiatric illness, feelings of hopelessness, global insomnia, history of trauma or abuse, life crisis (shame/despair), prior suicide attempt, severe anxiety, substance abuse, and suicidal ideations  Protective Factors against Suicide: adherence to  treatment " and marriage/partnership  Acute Risk of Harm to Self is Considered: moderate    Relevant Results  Scheduled medications  buPROPion XL, 300 mg, oral, Daily  celecoxib, 200 mg, oral, Daily  cholecalciferol, 125 mcg, oral, Daily  DULoxetine, 120 mg, oral, Daily  lidocaine, 1 patch, transdermal, Daily  melatonin, 5 mg, oral, Daily  QUEtiapine, 350 mg, oral, Nightly      Continuous medications     PRN medications  PRN medications: acetaminophen, alum-mag hydroxide-simeth, diphenhydrAMINE, hydrOXYzine pamoate, OLANZapine, OLANZapine zydis, traZODone     Assessment/Plan   Principal Problem:    Severe recurrent major depression without psychotic features (CMS/HCC)  Active Problems:    Colostomy present (CMS/HCC)    Opioid abuse (CMS/HCC)    Bilateral hip pain    Biological -     Orthostatics - confirmed on measurement.  Ongoing efforts to communicate with patient about taking her time when changing position to help prevent falls.    Mouth checks.    Cymbalta 120 mg daily for depression, this is max dose  Wellbutrin xl 300 mg for adjunctive depression treatment  seroquel 350 mg at bedtime  Trazodone 150 mg PRN and melatonin at bedtime for sleep  No adverse side effects of medications noted or reported.  ECG (10/4/23): Normal sinus rhythm. Heart  rate 72 bpm. Qtc 442  11/13/23 - qtc 441    Declined ECT consideration during discussion on 11/4, 11/7.   ECT is not available at this facility and this would include transitioning to another psychiatric hospital for this level of service or pursuing as an outpatient on discharge.    Discussion with patient regarding risk benefits and alternatives and side effects with opportunity to ask questions and questions answered.  Patient given consent to the plan as noted    2. Psychological -     Patient is encouraged to participate with the therapeutic milieu and program and group therapy    3. Sociological -      Patient encouraged to cooperate with social work staff on issues relevant to  discharge planning  Pending [place] medicaid and then transition to convalescence nursing care once this becomes available to patient    15 minutes spent coordinating care for patient on this day    Parts of this chart have been completed using voice recognition software.  Please excuse any errors of transcription.  Despite the medical decision making time stamp, my medical decision making has taken place during the patient's entire visit.  Thought process and reason for plan has been formulated from the time that I saw the patient until the time of disposition and is not specific to one specific moment during their visit and furthermore the medical decision making encompasses the entire chart and not only that represented in this note.        Eddie Cullen, DO

## 2023-11-28 NOTE — GROUP NOTE
Group Topic: Community   Group Date: 11/28/2023  Start Time: 1000  End Time: 1055  Facilitators: HANY Beverly   Department: [facility]    Number of Participants: 7   Group Focus: other Personal, family and society values  Treatment Modality: Cognitive Behavioral Therapy  Interventions utilized were assignment and exploration  Purpose: feelings, self-worth, and self-care    Name: Azul Roberts YOB: 1960   MR: [id number]      Facilitator:   Level of Participation: active  Quality of Participation: appropriate/pleasant  Interactions with others: appropriate  Mood/Affect: appropriate  Triggers (if applicable): N/a  Cognition: coherent/clear  Progress: Moderate  Comments: Azul shared positive insights regarding today's topic and was encouraging others to share their perspectives while being supportive.  Plan: continue with services

## 2023-11-29 PROCEDURE — 99231 SBSQ HOSP IP/OBS SF/LOW 25: CPT | Performed by: PSYCHIATRY & NEUROLOGY

## 2023-11-29 PROCEDURE — 1140000001 HC PRIVATE PSYCH ROOM DAILY

## 2023-11-29 PROCEDURE — 99232 SBSQ HOSP IP/OBS MODERATE 35: CPT | Performed by: INTERNAL MEDICINE

## 2023-11-29 PROCEDURE — 97110 THERAPEUTIC EXERCISES: CPT | Mod: GO

## 2023-11-29 PROCEDURE — 2500000002 HC RX 250 W HCPCS SELF ADMINISTERED DRUGS (ALT 637 FOR MEDICARE OP, ALT 636 FOR OP/ED): Performed by: STUDENT IN AN ORGANIZED HEALTH CARE EDUCATION/TRAINING PROGRAM

## 2023-11-29 PROCEDURE — 2500000004 HC RX 250 GENERAL PHARMACY W/ HCPCS (ALT 636 FOR OP/ED): Performed by: PSYCHIATRY & NEUROLOGY

## 2023-11-29 PROCEDURE — 2500000001 HC RX 250 WO HCPCS SELF ADMINISTERED DRUGS (ALT 637 FOR MEDICARE OP): Performed by: PSYCHIATRY & NEUROLOGY

## 2023-11-29 RX ADMIN — QUETIAPINE FUMARATE 350 MG: 300 TABLET ORAL at 21:00

## 2023-11-29 RX ADMIN — Medication 5 MG: at 18:21

## 2023-11-29 RX ADMIN — CELECOXIB 200 MG: 200 CAPSULE ORAL at 15:37

## 2023-11-29 RX ADMIN — Medication 125 MCG: at 09:59

## 2023-11-29 RX ADMIN — BUPROPION HYDROCHLORIDE 300 MG: 300 TABLET, FILM COATED, EXTENDED RELEASE ORAL at 09:59

## 2023-11-29 RX ADMIN — DULOXETINE HYDROCHLORIDE 120 MG: 60 CAPSULE, DELAYED RELEASE ORAL at 09:59

## 2023-11-29 ASSESSMENT — ENCOUNTER SYMPTOMS
SLEEP DISTURBANCE: 1
DYSPHORIC MOOD: 1
DECREASED CONCENTRATION: 1

## 2023-11-29 ASSESSMENT — COGNITIVE AND FUNCTIONAL STATUS - GENERAL
DRESSING REGULAR UPPER BODY CLOTHING: A LITTLE
HELP NEEDED FOR BATHING: A LITTLE
PERSONAL GROOMING: A LITTLE
TOILETING: A LITTLE
DAILY ACTIVITIY SCORE: 19
DRESSING REGULAR LOWER BODY CLOTHING: A LITTLE

## 2023-11-29 ASSESSMENT — PAIN - FUNCTIONAL ASSESSMENT
PAIN_FUNCTIONAL_ASSESSMENT: 0-10
PAIN_FUNCTIONAL_ASSESSMENT: 0-10

## 2023-11-29 ASSESSMENT — PAIN SCALES - GENERAL
PAINLEVEL_OUTOF10: 0 - NO PAIN
PAINLEVEL_OUTOF10: 9

## 2023-11-29 ASSESSMENT — COLUMBIA-SUICIDE SEVERITY RATING SCALE - C-SSRS
6. HAVE YOU EVER DONE ANYTHING, STARTED TO DO ANYTHING, OR PREPARED TO DO ANYTHING TO END YOUR LIFE?: NO
1. SINCE LAST CONTACT, HAVE YOU WISHED YOU WERE DEAD OR WISHED YOU COULD GO TO SLEEP AND NOT WAKE UP?: NO
2. HAVE YOU ACTUALLY HAD ANY THOUGHTS OF KILLING YOURSELF?: NO
2. HAVE YOU ACTUALLY HAD ANY THOUGHTS OF KILLING YOURSELF?: NO
6. HAVE YOU EVER DONE ANYTHING, STARTED TO DO ANYTHING, OR PREPARED TO DO ANYTHING TO END YOUR LIFE?: NO
1. SINCE LAST CONTACT, HAVE YOU WISHED YOU WERE DEAD OR WISHED YOU COULD GO TO SLEEP AND NOT WAKE UP?: NO

## 2023-11-29 NOTE — PROGRESS NOTES
"Azul Roberts is a 63 y.o. female on day 81 of admission presenting with Severe recurrent major depression without psychotic features (CMS/[facility]).      Subjective   Case discussed with treatment team and chart reviewed.    Day to day charting is quite consistent.  Minimal change. No acute disturbances today.  No treatment plan updates today.    Continues to report is \"ok.\"  Often seen relaxing in bed, or in wheelchair near nursing station.    Brief on encounter, no requests, continues to endorse the wait for optimal transition recognizing that home going is ill advised and will subject her to predictable and avoidable decompensation.    We expect her [place] Medicaid to terminate on December 1 and then there is a brief waiting period until [place] Medicaid becomes active at which time patient can transition from the facility.    Objective     Vitals:    11/27/23 1600 11/28/23 0500 11/28/23 1600 11/29/23 0500   BP: 91/73 96/52 116/66 116/80   BP Location: Right arm Left arm Left arm Left arm   Patient Position: Sitting Lying Sitting Lying   Pulse: 92 71 89 74   Resp: 18 18 18 19   Temp: 37 °C (98.6 °F) 36.7 °C (98.1 °F) 37.2 °C (99 °F) 36.1 °C (97 °F)   TempSrc: Temporal Temporal Temporal Temporal   SpO2: 100% 99% 100% 99%   Weight:       Height:            Review of Systems   Psychiatric/Behavioral:  Positive for decreased concentration, dysphoric mood and sleep disturbance.      Psychiatric ROS - Adult  Anxiety: General Anxiety Disorder (KAYLEEN)KAYLEEN Behaviors: difficult to control worry, difficulty concentrating, irritability, restlessness, and sleep disturbance  Depression: anhedonia, appetite increased, concentration, energy, helpless, hopeless, interest, persistent thoughts of death, psychomotor agitation, sleep decreased , suicidal thoughts, and withdrawn  Delirium: negative  Psychosis: negative  Ioana: negative  Safety Issues: passive death wish and suicidal ideation  Psychiatric ROS Comment: as " "noted    Physical Exam  Abdominal:      Comments: Presence of colostomy bag   Psychiatric:         Attention and Perception: Attention and perception normal.         Mood and Affect: Mood is depressed. Affect is flat and inappropriate.         Behavior: Behavior is slowed.     Mental Status Examination  General Appearance:  less disheveled with improved eye contact, . Sitting in common area in wheelchair  Gait/Station: using wheelchair to move about the milieu under her own propulsion  Speech: normal volume, brief repsonses  Mood: \"ok\"  Affect:  blunted ,  Thought Process: less impoverished  Thought Associations: No loosening of associations  Thought Content: improving, linear, logical, more spontaneous and overall increase in content  Perception: No perceptual abnormalities noted  Level of Consciousness: Alert  Orientation: Alert  Attention and Concentration: Mild impairment in attention  Recent Memory: Intact as evidenced by ability to recall details from the past 24 hours   Executive function: Intact  Language: Naming intact  Fund of knowledge: Good  Insight: Fair, as evidenced by increasing participation in discussion about her scenario and symptoms and their improvement  Judgment: Fair, as evidenced by ability to reason through medical decision making, compliance with treatment recommendations, and improving      Psychiatric Risk Assessment  Violence Risk Assessment: major mental illness and substance abuse  Acute Risk of Harm to Others is Considered: moderate   Suicide Risk Assessment: , chronic medical illness, chronic pain, current psychiatric illness, feelings of hopelessness, global insomnia, history of trauma or abuse, life crisis (shame/despair), prior suicide attempt, severe anxiety, substance abuse, and suicidal ideations  Protective Factors against Suicide: adherence to  treatment and marriage/partnership  Acute Risk of Harm to Self is Considered: moderate    Relevant Results  Scheduled " medications  buPROPion XL, 300 mg, oral, Daily  celecoxib, 200 mg, oral, Daily  cholecalciferol, 125 mcg, oral, Daily  DULoxetine, 120 mg, oral, Daily  lidocaine, 1 patch, transdermal, Daily  melatonin, 5 mg, oral, Daily  QUEtiapine, 350 mg, oral, Nightly      Continuous medications     PRN medications  PRN medications: acetaminophen, alum-mag hydroxide-simeth, diphenhydrAMINE, hydrOXYzine pamoate, OLANZapine, OLANZapine zydis, traZODone     Assessment/Plan   Principal Problem:    Severe recurrent major depression without psychotic features (CMS/HCC)  Active Problems:    Colostomy present (CMS/HCC)    Opioid abuse (CMS/HCC)    Bilateral hip pain    Biological -     Orthostatics - confirmed on measurement.  Ongoing efforts to communicate with patient about taking her time when changing position to help prevent falls.    Mouth checks.    Cymbalta 120 mg daily for depression, this is max dose  Wellbutrin xl 300 mg for adjunctive depression treatment  seroquel 350 mg at bedtime  Trazodone 150 mg PRN and melatonin at bedtime for sleep  No adverse side effects of medications noted or reported.  ECG (10/4/23): Normal sinus rhythm. Heart  rate 72 bpm. Qtc 442  11/13/23 - qtc 441    Declined ECT consideration during discussion on 11/4, 11/7.   ECT is not available at this facility and this would include transitioning to another psychiatric hospital for this level of service or pursuing as an outpatient on discharge.    Discussion with patient regarding risk benefits and alternatives and side effects with opportunity to ask questions and questions answered.  Patient given consent to the plan as noted    2. Psychological -     Patient is encouraged to participate with the therapeutic milieu and program and group therapy    3. Sociological -      Patient encouraged to cooperate with social work staff on issues relevant to discharge planning  Pending [place] medicaid and then transition to convalescence nursing care once this  becomes available to patient    15 minutes spent coordinating care for patient on this day    Parts of this chart have been completed using voice recognition software.  Please excuse any errors of transcription.  Despite the medical decision making time stamp, my medical decision making has taken place during the patient's entire visit.  Thought process and reason for plan has been formulated from the time that I saw the patient until the time of disposition and is not specific to one specific moment during their visit and furthermore the medical decision making encompasses the entire chart and not only that represented in this note.        Eddie Cullen, DO

## 2023-11-29 NOTE — PROGRESS NOTES
Occupational Therapy       11/29/23 1240-12:53   OT Last Visit   OT Received On 11/29/23   General   Family/Caregiver Present No   Prior to Session Communication Bedside nurse   Patient Position Received Bed, 2 rail up;Alarm on   Preferred Learning Style verbal   General Comment Patient cleared for therapy and reluctantly agreeable for same   Precautions   Medical Precautions Fall precautions   Pain Assessment   Pain Assessment 0-10   Pain Score 9   Pain Type Chronic pain   Pain Location Hip   Pain Orientation Left   Bed Mobility 1   Bed Mobility 1 Supine to sitting   Level of Assistance 1 Independent   Bed Mobility Comments 1 toward the left side of bed   Bed Mobility 2   Bed Mobility  2 Sitting to supine   Level of Assistance 2 Independent   Therapeutic Exercise   Therapeutic Exercise Performed Yes   Therapeutic Exercise Activity 1 Instructed in and performed 6 exercises with 1# weights x 10 reps each x 2 sets with cues throughout to maximize participation (ie cues for pacing/technique) to increase functional endurance needed for I/ADLs   IP OT Assessment   Evaluation/Treatment Tolerance Patient limited by pain;Other (Comment)  (Patient declined ADLs and mobility at this time despite encouragement and explanation of purpose/benefits)   Medical Staff Made Aware Yes   End of Session Communication Bedside nurse   End of Session Patient Position Bed, 2 rail up;Alarm on   OT Assessment   OT Assessment Results Other (Comment)  (Patient continues to demonstrate deficits in ADLs d/t pain and reluctance to mobilize/decreased insight.)   Barriers to Participation Other (Comment)  (self limiting)   Education   Individual(s) Educated Patient   Education Provided Risk and benefits of OT discussed with patient or other   Home Program Strengthening   Inpatient/Swing Bed or Outpatient   Inpatient/Swing Bed or Outpatient Inpatient   Inpatient Plan   Treatment Interventions Functional transfer training;UE  strengthening/ROM;Endurance training;Patient/family training   OT Frequency 3 times per week   OT Discharge Recommendations Low intensity level of continued care   OT Recommended Transfer Status Stand by assist   OT - OK to Discharge Yes      11/29/23 1240   [facility] Daily Activity   Putting on and taking off regular lower body clothing 3   Bathing (including washing, rinsing, drying) 3   Putting on and taking off regular upper body clothing 3   Toileting, which includes using toilet, bedpan or urinal 3   Taking care of personal grooming such as brushing teeth 3   Eating Meals 4   Daily Activity - Total Score 19

## 2023-11-29 NOTE — NURSING NOTE
SARAN NOTE     Problem:  depression     Behavior:  Pt isolated herself to her room, refused to come out for a snack. Pleasant and cooperative, denied SI    Group Participation: n/a  Appetite/Meals: refused snack       Interventions:  Scheduled medication administered, safety/fall risks reviewed, bed alarm initiated    Response:  Pt is compliant with her medication, asked why she has to have bed alarm on - verbalized understanding     Plan:  Continue monitoring, provide education

## 2023-11-29 NOTE — GROUP NOTE
Group Topic: Music Therapy   Group Date: 11/29/2023  Start Time: 1000  End Time: 1100  Facilitators: Chanda Benton   Department: [facility]    Number of Participants: 9   Group Focus: music therapy  Treatment Modality: Music Therapy  Interventions utilized were other Live music engagement, recorded music listening  Purpose: coping skills, communication skills, and self-care    Name: Azul Roberts YOB: 1960   MR: [id number]      Facilitator: Music Therapist  Level of Participation: active  Quality of Participation: appropriate/pleasant, cooperative, engaged, and supportive  Interactions with others: gave feedback, supportive, asked thoughtful questions, and offered helpful suggestions  Mood/Affect: appropriate, brightens with interaction, and positive  Triggers (if applicable):   Cognition: coherent/clear, insightful, and logical  Progress: Significant  Comments: Pt was very pleasant and integral part of group discussion.  Plan: continue with services

## 2023-11-29 NOTE — PROGRESS NOTES
[facility]: [facility]  PROGRESS NOTE      Azul Roberts is a 63 y.o. female that is being seen  today for follow up at [facility]  Subjective   Patient is being seen for follow-up at [facility] unit.  Patient had a fall and was sent to the ER.  Patient was evaluated in the ER and CT scan of the brain was negative for any bleed.  Patient had a UA done which was positive for leukoesterase.  Culture is pending.  Patient has been at her baseline.  Colostomy has been working well.  Pain has been fairly controlled.    ROS  Negative for fever or chills  Negative for sore throat, ear pain, nasal discharge  Negative for cough, shortness of breath or wheezing  Negative for chest pain, palpitations, swelling of legs  Negative for abdominal pain, constipation, diarrhea, blood in the stools,has colostomy  Negative for urinary complaints  Negative for headache, dizziness, weakness or numbness  Negative for joint pain  Positive for depression or anxiety  All other systems reviewed and were negative  Vitals:    11/29/23 0500   BP: 116/80   Pulse: 74   Resp: 19   Temp: 36.1 °C (97 °F)   SpO2: 99%      Body mass index is 29.87 kg/m².  Physical Exam  Constitutional: Patient does not appear to be in any acute distress  Head and Face: NCAT  Eyes: Normal external exam, EOMI  ENT: Normal external inspection of ears and nose. Oropharynx normal.  Cardiovascular: RRR, S1/S2, no murmurs, rubs, or gallops, radial pulses +2, no edema of extremities  Pulmonary: CTAB, no respiratory distress.  Abdomen: +BS, soft, non-tender, nondistended, no guarding or rebound, no masses noted.colostomy present   MSK: hip joint pain   Skin- No lesions, contusions, or erythema.  Peripheral puslses palpable bilaterally 2+  Neuro: AAO X3, Cranial nerves 2-12 grossly intact,DTR 2+ in all 4 limbs       Diagnostic Results   Lab Results   Component Value Date    GLUCOSE 113 (H) 09/05/2023    CALCIUM 10.6 (H) 09/05/2023     09/05/2023    K  "3.7 09/05/2023    CO2 24 09/05/2023     09/05/2023    BUN 12 09/05/2023    CREATININE 0.72 09/05/2023     Lab Results   Component Value Date    ALT 12 09/05/2023    AST 14 09/05/2023    ALKPHOS 103 09/05/2023    BILITOT 0.4 09/05/2023     Lab Results   Component Value Date    WBC 13.2 (H) 09/05/2023    HGB 13.9 09/05/2023    HCT 43.0 09/05/2023    MCV 81 09/05/2023     09/05/2023     No results found for: \"CHOL\"  No results found for: \"HDL\"  No results found for: \"LDLCALC\"  No results found for: \"TRIG\"  No results found for: \"HGBA1C\"  Other labs not included in the list above were reviewed either before or during this encounter.    History    No past medical history on file.  No past surgical history on file.  No family history on file.  No Known Allergies  No current facility-administered medications on file prior to encounter.     Current Outpatient Medications on File Prior to Encounter   Medication Sig Dispense Refill    acetaminophen (Tylenol) 325 mg tablet Take 2 tablets (650 mg) by mouth 2 times a day.      hydrOXYzine pamoate (Vistaril) 25 mg capsule Take 1 capsule (25 mg) by mouth 2 times a day.      magnesium oxide (Mag-Ox) 400 mg tablet Take 1 tablet (400 mg) by mouth 2 times a day.      melatonin 5 mg tablet Take 1 tablet (5 mg) by mouth once daily at bedtime.      risperiDONE (RisperDAL) 2 mg tablet Take 1 tablet (2 mg) by mouth once daily at bedtime.      traZODone (Desyrel) 150 mg tablet Take 1 tablet (150 mg) by mouth once daily at bedtime.     Scheduled medications  buPROPion XL, 300 mg, oral, Daily  celecoxib, 200 mg, oral, Daily  cholecalciferol, 125 mcg, oral, Daily  citalopram, 30 mg, oral, Daily  lidocaine, 1 patch, transdermal, Daily  melatonin, 5 mg, oral, Nightly  QUEtiapine, 350 mg, oral, Nightly  traZODone, 150 mg, oral, Nightly      Continuous medications     PRN medications  PRN medications: acetaminophen, alum-mag hydroxide-simeth, diphenhydrAMINE, hydrOXYzine pamoate, " OLANZapine, OLANZapine zydis     There is no immunization history on file for this patient.  Patient's medical history was reviewed and updated either before or during this encounter.  ASSESSMENT / PLAN:  Active Hospital Problems    Bilateral hip pain      *Severe recurrent major depression without psychotic features (CMS/HCC)      Colostomy present (CMS/HCC)      Opioid abuse (CMS/HCC)  S/p fall.  Hip x-rays negative CT scan of the brain is negative     Pt. Has been stable.clostomy is working well.        Jay Knox MD

## 2023-11-29 NOTE — GROUP NOTE
Group Topic: Self-Care/Wellness   Group Date: 11/29/2023  Start Time: 1100  End Time: 1150  Facilitators: BRENDA Fernandes   Department: [facility]    Number of Participants: 7   Group Focus: self-awareness  Treatment Modality: Other: Recreation therapy  Interventions utilized were exploration, mental fitness, and patient education  Purpose: coping skills, feelings, and other: elevate mood, enhance self esteem, increase socialization    Name: Azul Roberts YOB: 1960   MR: [id number]      Facilitator: Recreational Therapist  Level of Participation: active  Quality of Participation: appropriate/pleasant, attentive, cooperative, and engaged  Interactions with others: appropriate and gave feedback  Mood/Affect: appropriate, bright, and positive  Triggers (if applicable): n/a  Cognition: coherent/clear  Progress: Moderate  Comments: pt problem is depressed mood.  Pt joins group with little encouragement.  Pleasant, cooperative and appropriate.  Pt makes good eye contact and displays bright affect.  Plan:  continue with services

## 2023-11-29 NOTE — GROUP NOTE
Group Topic: Other   Group Date: 11/29/2023  Start Time: 1515  End Time: 1610  Facilitators: BRENDA Fernandes   Department: [facility]    Number of Participants: 7   Group Focus: other Person, Place or Thing?  Treatment Modality: Other: Recreation therapy  Interventions utilized were group exercise, mental fitness, and reminiscence  Purpose: other: elevate mood, increase attention, increase stimulation, enhance self esteem, stimulate memory    Name: Azul Roberts YOB: 1960   MR: [id number]      Facilitator: Recreational Therapist  Level of Participation: active  Quality of Participation: appropriate/pleasant, attentive, cooperative, and engaged  Interactions with others: appropriate and gave feedback  Mood/Affect: appropriate and brightens with interaction  Triggers (if applicable): n/a  Cognition: coherent/clear  Progress: Significant  Comments: pt problem is depressed mood.  Plan: continue with services

## 2023-11-29 NOTE — NURSING NOTE
SARAN NOTE     Problem:  depression     Behavior:  Pt pleasant and cooperative with staff and care. Pt goes to group therapy and engages with peers, comes out for snacks and makes her needs known.  Group Participation: yes  Appetite/Meals: good       Interventions:  Administered scheduled medications    Response:  Pt continues as above     Plan:  Maintain safety.

## 2023-11-30 PROCEDURE — 2500000001 HC RX 250 WO HCPCS SELF ADMINISTERED DRUGS (ALT 637 FOR MEDICARE OP): Performed by: PSYCHIATRY & NEUROLOGY

## 2023-11-30 PROCEDURE — 2500000002 HC RX 250 W HCPCS SELF ADMINISTERED DRUGS (ALT 637 FOR MEDICARE OP, ALT 636 FOR OP/ED): Performed by: STUDENT IN AN ORGANIZED HEALTH CARE EDUCATION/TRAINING PROGRAM

## 2023-11-30 PROCEDURE — 2500000004 HC RX 250 GENERAL PHARMACY W/ HCPCS (ALT 636 FOR OP/ED): Performed by: PSYCHIATRY & NEUROLOGY

## 2023-11-30 PROCEDURE — 97530 THERAPEUTIC ACTIVITIES: CPT | Mod: GP

## 2023-11-30 PROCEDURE — 1140000001 HC PRIVATE PSYCH ROOM DAILY

## 2023-11-30 PROCEDURE — 99231 SBSQ HOSP IP/OBS SF/LOW 25: CPT | Performed by: PSYCHIATRY & NEUROLOGY

## 2023-11-30 RX ADMIN — BUPROPION HYDROCHLORIDE 300 MG: 300 TABLET, FILM COATED, EXTENDED RELEASE ORAL at 08:15

## 2023-11-30 RX ADMIN — Medication 5 MG: at 17:14

## 2023-11-30 RX ADMIN — DULOXETINE HYDROCHLORIDE 120 MG: 60 CAPSULE, DELAYED RELEASE ORAL at 08:16

## 2023-11-30 RX ADMIN — CELECOXIB 200 MG: 200 CAPSULE ORAL at 15:25

## 2023-11-30 RX ADMIN — Medication 125 MCG: at 08:15

## 2023-11-30 RX ADMIN — QUETIAPINE FUMARATE 350 MG: 300 TABLET ORAL at 21:33

## 2023-11-30 ASSESSMENT — COGNITIVE AND FUNCTIONAL STATUS - GENERAL
STANDING UP FROM CHAIR USING ARMS: A LITTLE
MOBILITY SCORE: 20
WALKING IN HOSPITAL ROOM: A LITTLE
MOVING TO AND FROM BED TO CHAIR: A LITTLE
CLIMB 3 TO 5 STEPS WITH RAILING: A LITTLE

## 2023-11-30 ASSESSMENT — PAIN SCALES - GENERAL: PAINLEVEL_OUTOF10: 0 - NO PAIN

## 2023-11-30 ASSESSMENT — PAIN - FUNCTIONAL ASSESSMENT: PAIN_FUNCTIONAL_ASSESSMENT: 0-10

## 2023-11-30 ASSESSMENT — ENCOUNTER SYMPTOMS
DECREASED CONCENTRATION: 1
SLEEP DISTURBANCE: 1
DYSPHORIC MOOD: 1

## 2023-11-30 NOTE — GROUP NOTE
"Group Topic: Self-Care/Wellness   Group Date: 11/30/2023  Start Time: 1500  End Time: 1600  Facilitators: BRENDA Howard   Department: [facility]IRT[facility]    Number of Participants: 8   Group Focus: coping skills, mindfulness, other healthy living, and social skills  Treatment Modality: Other: Recreation Therapy  Interventions utilized were group exercise and patient education  Purpose: coping skills, insight or knowledge, self-care, and other: increase mental fitness, increase socialization, increase stimulation, enhance mood     Name: Azul Roberts YOB: 1960   MR: [id number]      Facilitator: Recreational Therapist  Level of Participation: did not attend  Quality of Participation:  n/a  Interactions with others:  n/a  Mood/Affect: n/a  Triggers (if applicable): n/a  Cognition:  n/a  Progress: None  Comments: pt problem is delusional thought. Pt declines group, reporting her \"stomach isn't feeling well\" RN aware  Plan: continue with services      "

## 2023-11-30 NOTE — GROUP NOTE
Group Topic: Self-Care/Wellness   Group Date: 11/30/2023  Start Time: 1000  End Time: 1040  Facilitators: DRAGAN Rey   Department: [facility]     Number of Participants: 7   Group Focus: other self care  Treatment Modality: Psychoeducation  Interventions utilized were exploration and patient education  Purpose: self-care    Name: Azul Roberts YOB: 1960   MR: [id number]      Facilitator:   Level of Participation: minimal  Quality of Participation: quiet  Interactions with others: sarcastic  Mood/Affect: flat  Triggers (if applicable): n/a   Cognition: coherent/clear  Progress: Minimal  Comments: limited participation and was sarcastic when doing so.   Plan: continue with services

## 2023-11-30 NOTE — GROUP NOTE
Group Topic: Other   Group Date: 11/30/2023  Start Time: 1040  End Time: 1115  Facilitators: BRENDA Howard   Department: [facility] RE[facility]IRT[facility]    Number of Participants: 7   Group Focus: coping skills and relaxation, laughter   Treatment Modality: Other: Recreation Therapy  Interventions utilized were group exercise, mental fitness, and story telling  Purpose: coping skills and other: reduce stress, improve mood, increase socialization, increase stimulation    Name: Azul Roberts YOB: 1960   MR: [id number]      Facilitator: Recreational Therapist  Level of Participation: active  Quality of Participation: appropriate/pleasant, attentive, cooperative, and engaged  Interactions with others: appropriate  Mood/Affect: appropriate and bright  Triggers (if applicable): n/a  Cognition: coherent/clear  Progress: Moderate  Comments: pt problem is depressed mood.  Plan: continue with services

## 2023-11-30 NOTE — NURSING NOTE
SARAN NOTE     Problem:  depression     Behavior:  Pt in bed. Pleasant and cooperative     Interventions:  Scheduled medication administered, safety/fall risks reviewed, bed alarm initiated     Response:  Pt is compliant with her medication,        Plan:  Cont to monitor throughout shift

## 2023-11-30 NOTE — NURSING NOTE
SARAN NOTE     Problem:  Depression     Behavior:  Pt up and OOB at beginning of shift.  She reports she did not sleep well.  She went to group.  She is taking scheduled medications.  Group Participation: 100%  Appetite/Meals: 100%       Interventions:  Encourage pt to go to group, pt given scheduled medications, encourage patient to perform self care and ADL's, every 15 minute checks, 1:1 interaction.    Response:  Pt did not attend group this afternoon stated that her stomach was cramping.  Pts colostomy bag then burst because of large BM.  Encouraged the patient to drink fluids as the stool was formed.  She stated the cramping has subsided.  She changed her own colostomy bag.  Plan:  Encourage pt to go to group, pt given scheduled medications, encourage patient to perform self care and ADL's, every 15 minute checks, 1:1 interaction.

## 2023-11-30 NOTE — PROGRESS NOTES
Physical Therapy    Physical Therapy Treatment    Patient Name: Azul Roberts  MRN: [id number]  Today's Date: 11/30/2023  Time Calculation  Start Time: 0714  Stop Time: 0724  Time Calculation (min): 10 min       Assessment/Plan   PT Assessment  PT Assessment Results: Decreased strength, Decreased endurance, Impaired balance, Decreased mobility, Decreased cognition, Decreased safety awareness, Impaired judgement  Rehab Prognosis: Good  End of Session Communication: Bedside nurse  Assessment Comment: Cont to require assist x 1 for safe transfers and mobility. Observed self limiting behaviors; appears able to perform more than willing to.  End of Session Patient Position:  (wheelchair; by nurses' station)  PT Plan  Inpatient/Swing Bed or Outpatient: Inpatient  PT Plan  Treatment/Interventions: Bed mobility, Transfer training, Therapeutic activity  PT Plan: Skilled PT  PT Frequency: 3 times per week  PT Discharge Recommendations: Low intensity level of continued care  PT Recommended Transfer Status: Assist x1, Assistive device  PT - OK to Discharge: Yes      General Visit Information:   PT  Visit  PT Received On: 11/30/23  General  Family/Caregiver Present: No  Prior to Session Communication: Bedside nurse  Patient Position Received: Bed, 2 rail up, Alarm off, not on at start of session  Preferred Learning Style: verbal  General Comment: Pt supine in bed upon arrival. Reluctantly agreeable to participate. Agreeable to get OOB to  for breakfast.    Subjective   Precautions:  Precautions  Medical Precautions: Fall precautions  Vital Signs:       Objective   Pain:  Pain Assessment  Pain Assessment: 0-10  Pain Score: 0 - No pain  Cognition:  Cognition  Overall Cognitive Status: Impaired (Flat affect)  Attention:  (able to follow commands, when willing)  Insight: Mild  Impulsive: Mildly  Postural Control:  Postural Control  Posture Comment: flexed posture  Extremity/Trunk Assessments:    Activity Tolerance:  Activity  Tolerance  Endurance: Decreased tolerance for upright activites  Treatments:  Therapeutic Exercise  Therapeutic Exercise Performed:  (Pt declined and refused further ther ex)  Therapeutic Exercise Activity 1: B ankle pumps prior to transferring to .    Therapeutic Activity  Therapeutic Activity 1: Sat on EOB for about 5 minutes prior to transferring to chair.    Bed Mobility 1  Bed Mobility 1: Supine to sitting  Level of Assistance 1: Modified independent  Bed Mobility Comments 1: partial log roll technique       Transfer 1  Transfer From 1: Bed to  Transfer to 1: Wheelchair  Technique 1: Sit to stand, Stand to sit  Transfer Level of Assistance 1: Contact guard  Trials/Comments 1: Amb about 1-2 ft to  at bedside. Pt hunched fwd reaching for arm rests of  for support. Pt declined use of walker and further ambulation    Outcome Measures:  [facility] Basic Mobility  Turning from your back to your side while in a flat bed without using bedrails: None  Moving from lying on your back to sitting on the side of a flat bed without using bedrails: None  Moving to and from bed to chair (including a wheelchair): A little  Standing up from a chair using your arms (e.g. wheelchair or bedside chair): A little  To walk in hospital room: A little  Climbing 3-5 steps with railing: A little  Basic Mobility - Total Score: 20    Education Documentation  Precautions, taught by Lashae Barroso PT at 11/30/2023  9:37 AM.  Learner: Patient  Readiness: Refuses  Method: Explanation  Response: Needs Reinforcement    Mobility Training, taught by Lashae Barroso, PT at 11/30/2023  9:37 AM.  Learner: Patient  Readiness: Refuses  Method: Explanation  Response: Needs Reinforcement    Education Comments  No comments found.        OP EDUCATION:       Encounter Problems       Encounter Problems (Active)       Balance       STG - Maintains dynamic standing balance with upper extremity support (Progressing)       Start:  10/27/23    Expected End:  12/14/23        INTERVENTIONS:  1. Practice standing with minimal support.  2. Educate patient about standing tolerance.  3. Educate patient about independence with gait, transfers, and ADL's.  4. Educate patient about use of assistive device.  5. Educate patient about self-directed care.            Mobility       STG - Patient will ambulate 300 ft with FWW , + turns, distant supervision of 1. (Progressing)       Start:  10/27/23    Expected End:  12/14/23                       Transfers       STG - Patient to transfer to and from sit to supine mod independent (Progressing)       Start:  10/27/23    Expected End:  12/14/23            STG - Patient will transfer sit to and from stand mod independent (Progressing)       Start:  10/27/23    Expected End:  12/14/23

## 2023-11-30 NOTE — CARE PLAN
The patient's goals for the shift include get sleep    The clinical goals for the shift include maintain pt safety    Over the shift, the patient did not make progress toward the following goals. Barriers to progression include . Recommendations to address these barriers include .

## 2023-11-30 NOTE — PROGRESS NOTES
"Azul Roberts is a 63 y.o. female on day 82 of admission presenting with Severe recurrent major depression without psychotic features (CMS/[facility]).      Subjective   Case discussed with treatment team and chart reviewed.    Day to day charting is quite consistent.  Minimal change. No acute disturbances today.  No treatment plan updates today.    Continues to report is \"ok.\"  Often seen relaxing in bed, or in wheelchair near nursing station.    Brief on encounter, no requests, continues to endorse the wait for optimal transition recognizing that home going is ill advised and will subject her to predictable and avoidable decompensation.    We expect her [place] Medicaid to terminate on December 1 and then there is a brief waiting period until [place] Medicaid becomes active at which time patient can transition from the facility.    Documentation that patient continues to require assistance for transfers and mobility.    Noted in morning group to be with limited participation and sarcasm when contributing.  Attended midday group appropriately, and then declined later afternoon group.    Objective     Vitals:    11/29/23 0500 11/29/23 1900 11/30/23 0500 11/30/23 1626   BP: 116/80 102/63 122/85 117/74   BP Location: Left arm Left arm Left arm Left arm   Patient Position: Lying Standing Lying Standing   Pulse: 74 93 76 91   Resp: 19 19 18    Temp: 36.1 °C (97 °F) 36.4 °C (97.5 °F) 36.6 °C (97.9 °F) 36.7 °C (98.1 °F)   TempSrc: Temporal Temporal Temporal Temporal   SpO2: 99% 98% 99% 97%   Weight:       Height:            Review of Systems   Psychiatric/Behavioral:  Positive for decreased concentration, dysphoric mood and sleep disturbance.      Psychiatric ROS - Adult  Anxiety: General Anxiety Disorder (KAYLEEN)KAYLEEN Behaviors: difficult to control worry, difficulty concentrating, irritability, restlessness, and sleep disturbance  Depression: anhedonia, appetite increased, concentration, energy, helpless, hopeless, interest, " "persistent thoughts of death, psychomotor agitation, sleep decreased , suicidal thoughts, and withdrawn  Delirium: negative  Psychosis: negative  Ioana: negative  Safety Issues: passive death wish and suicidal ideation  Psychiatric ROS Comment: as noted    Physical Exam  Abdominal:      Comments: Presence of colostomy bag   Psychiatric:         Attention and Perception: Attention and perception normal.         Mood and Affect: Mood is depressed. Affect is flat and inappropriate.         Behavior: Behavior is slowed.     Mental Status Examination  General Appearance:  less disheveled with improved eye contact, . Sitting in common area in wheelchair  Gait/Station: using wheelchair to move about the milieu under her own propulsion  Speech: normal volume, brief repsonses  Mood: \"ok\"  Affect:  blunted ,  Thought Process: less impoverished  Thought Associations: No loosening of associations  Thought Content: improving, linear, logical, more spontaneous and overall increase in content  Perception: No perceptual abnormalities noted  Level of Consciousness: Alert  Orientation: Alert  Attention and Concentration: Mild impairment in attention  Recent Memory: Intact as evidenced by ability to recall details from the past 24 hours   Executive function: Intact  Language: Naming intact  Fund of knowledge: Good  Insight: Fair, as evidenced by increasing participation in discussion about her scenario and symptoms and their improvement  Judgment: Fair, as evidenced by ability to reason through medical decision making, compliance with treatment recommendations, and improving      Psychiatric Risk Assessment  Violence Risk Assessment: major mental illness and substance abuse  Acute Risk of Harm to Others is Considered: moderate   Suicide Risk Assessment: , chronic medical illness, chronic pain, current psychiatric illness, feelings of hopelessness, global insomnia, history of trauma or abuse, life crisis (shame/despair), prior " suicide attempt, severe anxiety, substance abuse, and suicidal ideations  Protective Factors against Suicide: adherence to  treatment and marriage/partnership  Acute Risk of Harm to Self is Considered: moderate    Relevant Results  Scheduled medications  buPROPion XL, 300 mg, oral, Daily  celecoxib, 200 mg, oral, Daily  cholecalciferol, 125 mcg, oral, Daily  DULoxetine, 120 mg, oral, Daily  lidocaine, 1 patch, transdermal, Daily  melatonin, 5 mg, oral, Daily  QUEtiapine, 350 mg, oral, Nightly      Continuous medications     PRN medications  PRN medications: acetaminophen, alum-mag hydroxide-simeth, diphenhydrAMINE, hydrOXYzine pamoate, OLANZapine, OLANZapine zydis, traZODone     Assessment/Plan   Principal Problem:    Severe recurrent major depression without psychotic features (CMS/HCC)  Active Problems:    Colostomy present (CMS/HCC)    Opioid abuse (CMS/HCC)    Bilateral hip pain    Biological -     Orthostatics - confirmed on measurement.  Ongoing efforts to communicate with patient about taking her time when changing position to help prevent falls.    Mouth checks.    Cymbalta 120 mg daily for depression, this is max dose  Wellbutrin xl 300 mg for adjunctive depression treatment  seroquel 350 mg at bedtime  Trazodone 150 mg PRN and melatonin at bedtime for sleep  No adverse side effects of medications noted or reported.  ECG (10/4/23): Normal sinus rhythm. Heart  rate 72 bpm. Qtc 442  11/13/23 - qtc 441    Declined ECT consideration during discussion on 11/4, 11/7.   ECT is not available at this facility and this would include transitioning to another psychiatric hospital for this level of service or pursuing as an outpatient on discharge.    Discussion with patient regarding risk benefits and alternatives and side effects with opportunity to ask questions and questions answered.  Patient given consent to the plan as noted    2. Psychological -     Patient is encouraged to participate with the therapeutic  milieu and program and group therapy    3. Sociological -      Patient encouraged to cooperate with social work staff on issues relevant to discharge planning  Pending [place] medicaid and then transition to convalescence nursing care once this becomes available to patient    15 minutes spent coordinating care for patient on this day    Parts of this chart have been completed using voice recognition software.  Please excuse any errors of transcription.  Despite the medical decision making time stamp, my medical decision making has taken place during the patient's entire visit.  Thought process and reason for plan has been formulated from the time that I saw the patient until the time of disposition and is not specific to one specific moment during their visit and furthermore the medical decision making encompasses the entire chart and not only that represented in this note.        Eddie Cullen, DO

## 2023-12-01 PROCEDURE — 1140000001 HC PRIVATE PSYCH ROOM DAILY

## 2023-12-01 PROCEDURE — 2500000002 HC RX 250 W HCPCS SELF ADMINISTERED DRUGS (ALT 637 FOR MEDICARE OP, ALT 636 FOR OP/ED): Performed by: STUDENT IN AN ORGANIZED HEALTH CARE EDUCATION/TRAINING PROGRAM

## 2023-12-01 PROCEDURE — 2500000004 HC RX 250 GENERAL PHARMACY W/ HCPCS (ALT 636 FOR OP/ED): Performed by: PSYCHIATRY & NEUROLOGY

## 2023-12-01 PROCEDURE — 99232 SBSQ HOSP IP/OBS MODERATE 35: CPT | Performed by: INTERNAL MEDICINE

## 2023-12-01 PROCEDURE — 2500000001 HC RX 250 WO HCPCS SELF ADMINISTERED DRUGS (ALT 637 FOR MEDICARE OP): Performed by: PSYCHIATRY & NEUROLOGY

## 2023-12-01 PROCEDURE — 99231 SBSQ HOSP IP/OBS SF/LOW 25: CPT | Performed by: PSYCHIATRY & NEUROLOGY

## 2023-12-01 RX ADMIN — QUETIAPINE FUMARATE 350 MG: 300 TABLET ORAL at 20:31

## 2023-12-01 RX ADMIN — Medication 5 MG: at 17:53

## 2023-12-01 RX ADMIN — DULOXETINE HYDROCHLORIDE 120 MG: 60 CAPSULE, DELAYED RELEASE ORAL at 08:26

## 2023-12-01 RX ADMIN — CELECOXIB 200 MG: 200 CAPSULE ORAL at 16:11

## 2023-12-01 RX ADMIN — BUPROPION HYDROCHLORIDE 300 MG: 300 TABLET, FILM COATED, EXTENDED RELEASE ORAL at 08:26

## 2023-12-01 RX ADMIN — Medication 125 MCG: at 08:26

## 2023-12-01 ASSESSMENT — COLUMBIA-SUICIDE SEVERITY RATING SCALE - C-SSRS
6. HAVE YOU EVER DONE ANYTHING, STARTED TO DO ANYTHING, OR PREPARED TO DO ANYTHING TO END YOUR LIFE?: NO
2. HAVE YOU ACTUALLY HAD ANY THOUGHTS OF KILLING YOURSELF?: NO
1. SINCE LAST CONTACT, HAVE YOU WISHED YOU WERE DEAD OR WISHED YOU COULD GO TO SLEEP AND NOT WAKE UP?: NO
1. SINCE LAST CONTACT, HAVE YOU WISHED YOU WERE DEAD OR WISHED YOU COULD GO TO SLEEP AND NOT WAKE UP?: NO
6. HAVE YOU EVER DONE ANYTHING, STARTED TO DO ANYTHING, OR PREPARED TO DO ANYTHING TO END YOUR LIFE?: NO
2. HAVE YOU ACTUALLY HAD ANY THOUGHTS OF KILLING YOURSELF?: NO
1. SINCE LAST CONTACT, HAVE YOU WISHED YOU WERE DEAD OR WISHED YOU COULD GO TO SLEEP AND NOT WAKE UP?: NO
2. HAVE YOU ACTUALLY HAD ANY THOUGHTS OF KILLING YOURSELF?: NO

## 2023-12-01 ASSESSMENT — PAIN SCALES - GENERAL
PAINLEVEL_OUTOF10: 0 - NO PAIN
PAINLEVEL_OUTOF10: 0 - NO PAIN

## 2023-12-01 ASSESSMENT — ENCOUNTER SYMPTOMS
DECREASED CONCENTRATION: 1
SLEEP DISTURBANCE: 1
DYSPHORIC MOOD: 1

## 2023-12-01 NOTE — NURSING NOTE
Pt tearful this evening.  She stated she is depressed because its Rex time and she isnt home for [place].  Allowed patient to ventilate feelings and spent time talking with the patient about her feelings.

## 2023-12-01 NOTE — GROUP NOTE
Group Topic: Art Creative   Group Date: 12/1/2023  Start Time: 1510  End Time: 1600  Facilitators: BRENDA Howard   Department: [facility] RE[facility]IRT[facility]    Number of Participants: 6   Group Focus: other creative arts, relaxation, self-esteem, and social skills, creativity  Treatment Modality: Other: Recreation Therapy  Interventions utilized were other Therapeutic  Art  Purpose: feelings and other: increase stimulation, increase socialization, enhance mood, increase self-confidence,  increase motivation    Name: Azul Roberts YOB: 1960   MR: [id number]      Facilitator: Recreational Therapist  Level of Participation: active  Quality of Participation: appropriate/pleasant, attentive, engaged, and initiates communication  Interactions with others: appropriate and asked thoughtful questions  Mood/Affect: appropriate, brightens with interaction, and positive  Triggers (if applicable): n/a  Cognition: coherent/clear  Progress: Moderate  Comments: pt problem is depressed mood  Plan: continue with services

## 2023-12-01 NOTE — GROUP NOTE
Group Topic: Excercise/Physical    Group Date: 12/1/2023  Start Time: 1000  End Time: 1020  Facilitators: BRENDA Howard   Department: [facility] REHABTH VIRTUAL    Number of Participants: 8   Group Focus: mindfulness and relaxation  Treatment Modality: Other: Recreation Therapy  Interventions utilized were group exercise and other deep breathing  Purpose: coping skills, self-care, and other: improve strength, enhance mood, decrease stress, increase stimulation    Name: Azul Roberts YOB: 1960   MR: [id number]      Facilitator: Recreational Therapist  Level of Participation: active  Quality of Participation: appropriate/pleasant, attentive, cooperative, and engaged  Interactions with others: appropriate  Mood/Affect: appropriate  Triggers (if applicable): n/a  Cognition: coherent/clear  Progress: Moderate  Comments: pt problem is depressed mood  Plan: continue with services

## 2023-12-01 NOTE — NURSING NOTE
SARAN NOTE     Problem:  Depression     Behavior:  Pt is cooperative with care, she reported that she didn't sleep well last night and her mood is not good.  She did get OOB to go to group this morning and was smiling while listening to music and told staff she likes [employer].   Group Participation: AM group  Appetite/Meals: 100%       Interventions:  Pt given scheduled medications, every 15 minute checks, encouraged to go to group, 1:1 interaction with active listening and encouragement.    Response:  Pt went to afternoon group and more alert and smiling and interacting with other patients.     Plan:  Give scheduled medications, every 15 minute checks, encouraged to go to group, 1:1 interaction with active listening.

## 2023-12-01 NOTE — GROUP NOTE
"Group Topic: Reflection   Group Date: 12/1/2023  Start Time: 1020  End Time: 1100  Facilitators: BRENDA Howard   Department: [facility] REHABTH VIRTUAL    Number of Participants: 8   Group Focus: mindfulness, positive emotions, gratitude  Treatment Modality: Other: Recreation Therapy  Interventions utilized were exploration and other gratitude  Purpose: increase stimulation, increase communication skills, enhance mood, feelings    Name: Azul Roberts YOB: 1960   MR: [id number]      Facilitator: Recreational Therapist  Level of Participation: active  Quality of Participation: appropriate/pleasant, attentive, cooperative, and engaged  Interactions with others: appropriate and supportive  Mood/Affect: bright and positive  Triggers (if applicable): n/a  Cognition: coherent/clear  Progress: Moderate  Comments: pt problem is depressed mood. Pt shares easily with staff and peers. Positive attitude though pt shares some feelings of \"never leaving here\".   Plan: continue with services      "

## 2023-12-01 NOTE — NURSING NOTE
SARAN NOTE     Problem:  Depression     Behavior:  Patient is in patient's room laying in bed sleeping. Upon being woken up by this nurse, patient is pleasant and calm. Patient's affect is blunted. Patient is talkative. Patient maintains good eye contact.    Group Participation: N/A  Appetite/Meals: N/A       Interventions:  This nurse completed a shift assessment and administered patient's scheduled night time medications.    Response:  Patient remained pleasant and calm and was cooperative throughout this shift assessment and medication administration.     Plan:  Continue to monitor for depression. Continue to monitor for patient safety.

## 2023-12-01 NOTE — PROGRESS NOTES
"Azul Roberts is a 63 y.o. female on day 83 of admission presenting with Severe recurrent major depression without psychotic features (CMS/formerly [facility]).      Subjective   Case discussed with treatment team and chart reviewed.    Day to day charting is quite consistent.  Minimal change. No acute disturbances today.  No treatment plan updates today.    Continues to report is \"ok.\"  Often seen relaxing in bed, or in wheelchair near nursing station.    Brief on encounter, no requests, continues to endorse the wait for optimal transition recognizing that home going is ill advised and will subject her to predictable and avoidable decompensation.    We expect her [place] Medicaid to terminate on December 1 and then there is a brief waiting period until [place] Medicaid becomes active at which time patient can transition from the facility.    Documentation that patient continues to require assistance for transfers and mobility.      Objective     Vitals:    11/29/23 1900 11/30/23 0500 11/30/23 1626 12/01/23 0529   BP: 102/63 122/85 117/74 116/79   BP Location: Left arm Left arm Left arm Left arm   Patient Position: Standing Lying Standing Lying   Pulse: 93 76 91 80   Resp: 19 18  17   Temp: 36.4 °C (97.5 °F) 36.6 °C (97.9 °F) 36.7 °C (98.1 °F) 36.8 °C (98.2 °F)   TempSrc: Temporal Temporal Temporal Oral   SpO2: 98% 99% 97% 99%   Weight:       Height:            Review of Systems   Psychiatric/Behavioral:  Positive for decreased concentration, dysphoric mood and sleep disturbance.      Psychiatric ROS - Adult  Anxiety: General Anxiety Disorder (KAYLEEN)KAYLEEN Behaviors: difficult to control worry, difficulty concentrating, irritability, restlessness, and sleep disturbance  Depression: anhedonia, appetite increased, concentration, energy, helpless, hopeless, interest, persistent thoughts of death, psychomotor agitation, sleep decreased , suicidal thoughts, and withdrawn  Delirium: negative  Psychosis: negative  Ioana: negative  Safety " "Issues: passive death wish and suicidal ideation  Psychiatric ROS Comment: as noted    Physical Exam  Abdominal:      Comments: Presence of colostomy bag   Psychiatric:         Attention and Perception: Attention and perception normal.         Mood and Affect: Mood is depressed. Affect is flat and inappropriate.         Behavior: Behavior is slowed.     Mental Status Examination  General Appearance:  less disheveled with improved eye contact, . Sitting in common area in wheelchair  Gait/Station: using wheelchair to move about the milieu under her own propulsion  Speech: normal volume, brief repsonses  Mood: \"ok\"  Affect:  blunted ,  Thought Process: less impoverished  Thought Associations: No loosening of associations  Thought Content: improving, linear, logical, more spontaneous and overall increase in content  Perception: No perceptual abnormalities noted  Level of Consciousness: Alert  Orientation: Alert  Attention and Concentration: Mild impairment in attention  Recent Memory: Intact as evidenced by ability to recall details from the past 24 hours   Executive function: Intact  Language: Naming intact  Fund of knowledge: Good  Insight: Fair, as evidenced by increasing participation in discussion about her scenario and symptoms and their improvement  Judgment: Fair, as evidenced by ability to reason through medical decision making, compliance with treatment recommendations, and improving      Psychiatric Risk Assessment  Violence Risk Assessment: major mental illness and substance abuse  Acute Risk of Harm to Others is Considered: moderate   Suicide Risk Assessment: , chronic medical illness, chronic pain, current psychiatric illness, feelings of hopelessness, global insomnia, history of trauma or abuse, life crisis (shame/despair), prior suicide attempt, severe anxiety, substance abuse, and suicidal ideations  Protective Factors against Suicide: adherence to  treatment and marriage/partnership  Acute " Risk of Harm to Self is Considered: moderate    Relevant Results  Scheduled medications  buPROPion XL, 300 mg, oral, Daily  celecoxib, 200 mg, oral, Daily  cholecalciferol, 125 mcg, oral, Daily  DULoxetine, 120 mg, oral, Daily  lidocaine, 1 patch, transdermal, Daily  melatonin, 5 mg, oral, Daily  QUEtiapine, 350 mg, oral, Nightly      Continuous medications     PRN medications  PRN medications: acetaminophen, alum-mag hydroxide-simeth, diphenhydrAMINE, hydrOXYzine pamoate, OLANZapine, OLANZapine zydis, traZODone     Assessment/Plan   Principal Problem:    Severe recurrent major depression without psychotic features (CMS/HCC)  Active Problems:    Colostomy present (CMS/HCC)    Opioid abuse (CMS/HCC)    Bilateral hip pain    Biological -     Orthostatics - confirmed on measurement.  Ongoing efforts to communicate with patient about taking her time when changing position to help prevent falls.    Mouth checks.    Cymbalta 120 mg daily for depression, this is max dose  Wellbutrin xl 300 mg for adjunctive depression treatment  seroquel 350 mg at bedtime  Trazodone 150 mg PRN and melatonin at bedtime for sleep  No adverse side effects of medications noted or reported.  ECG (10/4/23): Normal sinus rhythm. Heart  rate 72 bpm. Qtc 442  11/13/23 - qtc 441    Declined ECT consideration during discussion on 11/4, 11/7.   ECT is not available at this facility and this would include transitioning to another psychiatric hospital for this level of service or pursuing as an outpatient on discharge.    Discussion with patient regarding risk benefits and alternatives and side effects with opportunity to ask questions and questions answered.  Patient given consent to the plan as noted    2. Psychological -     Patient is encouraged to participate with the therapeutic milieu and program and group therapy    3. Sociological -      Patient encouraged to cooperate with social work staff on issues relevant to discharge planning  Pending  [place] medicaid and then transition to convalescence nursing care once this becomes available to patient    15 minutes spent coordinating care for patient on this day    Parts of this chart have been completed using voice recognition software.  Please excuse any errors of transcription.  Despite the medical decision making time stamp, my medical decision making has taken place during the patient's entire visit.  Thought process and reason for plan has been formulated from the time that I saw the patient until the time of disposition and is not specific to one specific moment during their visit and furthermore the medical decision making encompasses the entire chart and not only that represented in this note.        Eddie Cullen, DO

## 2023-12-01 NOTE — PROGRESS NOTES
[facility]: [facility]  PROGRESS NOTE      Azul Roberts is a 63 y.o. female that is being seen  today for follow up at [facility]  Subjective   Patient is being seen for follow-up at [facility] unit.  Patient had a fall and was sent to the ER.  Patient was evaluated in the ER and CT scan of the brain was negative for any bleed.  Patient had a UA done which was positive for leukoesterase.  Culture is pending.  Patient has been at her baseline.  Colostomy has been working well.  Pain has been fairly controlled.    ROS  Negative for fever or chills  Negative for sore throat, ear pain, nasal discharge  Negative for cough, shortness of breath or wheezing  Negative for chest pain, palpitations, swelling of legs  Negative for abdominal pain, constipation, diarrhea, blood in the stools,has colostomy  Negative for urinary complaints  Negative for headache, dizziness, weakness or numbness  Negative for joint pain  Positive for depression or anxiety  All other systems reviewed and were negative  Vitals:    12/01/23 0529   BP: 116/79   Pulse: 80   Resp: 17   Temp: 36.8 °C (98.2 °F)   SpO2: 99%      Body mass index is 29.87 kg/m².  Physical Exam  Constitutional: Patient does not appear to be in any acute distress  Head and Face: NCAT  Eyes: Normal external exam, EOMI  ENT: Normal external inspection of ears and nose. Oropharynx normal.  Cardiovascular: RRR, S1/S2, no murmurs, rubs, or gallops, radial pulses +2, no edema of extremities  Pulmonary: CTAB, no respiratory distress.  Abdomen: +BS, soft, non-tender, nondistended, no guarding or rebound, no masses noted.colostomy present   MSK: hip joint pain   Skin- No lesions, contusions, or erythema.  Peripheral puslses palpable bilaterally 2+  Neuro: AAO X3, Cranial nerves 2-12 grossly intact,DTR 2+ in all 4 limbs       Diagnostic Results   Lab Results   Component Value Date    GLUCOSE 113 (H) 09/05/2023    CALCIUM 10.6 (H) 09/05/2023     09/05/2023     "K 3.7 09/05/2023    CO2 24 09/05/2023     09/05/2023    BUN 12 09/05/2023    CREATININE 0.72 09/05/2023     Lab Results   Component Value Date    ALT 12 09/05/2023    AST 14 09/05/2023    ALKPHOS 103 09/05/2023    BILITOT 0.4 09/05/2023     Lab Results   Component Value Date    WBC 13.2 (H) 09/05/2023    HGB 13.9 09/05/2023    HCT 43.0 09/05/2023    MCV 81 09/05/2023     09/05/2023     No results found for: \"CHOL\"  No results found for: \"HDL\"  No results found for: \"LDLCALC\"  No results found for: \"TRIG\"  No results found for: \"HGBA1C\"  Other labs not included in the list above were reviewed either before or during this encounter.    History    No past medical history on file.  No past surgical history on file.  No family history on file.  No Known Allergies  No current facility-administered medications on file prior to encounter.     Current Outpatient Medications on File Prior to Encounter   Medication Sig Dispense Refill    acetaminophen (Tylenol) 325 mg tablet Take 2 tablets (650 mg) by mouth 2 times a day.      hydrOXYzine pamoate (Vistaril) 25 mg capsule Take 1 capsule (25 mg) by mouth 2 times a day.      magnesium oxide (Mag-Ox) 400 mg tablet Take 1 tablet (400 mg) by mouth 2 times a day.      melatonin 5 mg tablet Take 1 tablet (5 mg) by mouth once daily at bedtime.      risperiDONE (RisperDAL) 2 mg tablet Take 1 tablet (2 mg) by mouth once daily at bedtime.      traZODone (Desyrel) 150 mg tablet Take 1 tablet (150 mg) by mouth once daily at bedtime.     Scheduled medications  buPROPion XL, 300 mg, oral, Daily  celecoxib, 200 mg, oral, Daily  cholecalciferol, 125 mcg, oral, Daily  citalopram, 30 mg, oral, Daily  lidocaine, 1 patch, transdermal, Daily  melatonin, 5 mg, oral, Nightly  QUEtiapine, 350 mg, oral, Nightly  traZODone, 150 mg, oral, Nightly      Continuous medications     PRN medications  PRN medications: acetaminophen, alum-mag hydroxide-simeth, diphenhydrAMINE, hydrOXYzine pamoate, " OLANZapine, OLANZapine zydis     There is no immunization history on file for this patient.  Patient's medical history was reviewed and updated either before or during this encounter.  ASSESSMENT / PLAN:  Active Hospital Problems    Bilateral hip pain      *Severe recurrent major depression without psychotic features (CMS/HCC)      Colostomy present (CMS/HCC)      Opioid abuse (CMS/HCC)  S/p fall.  Hip x-rays negative CT scan of the brain is negative     Pt. Has been stable.clostomy is working well.        Jay Knox MD

## 2023-12-02 PROCEDURE — 97110 THERAPEUTIC EXERCISES: CPT | Mod: GP

## 2023-12-02 PROCEDURE — 2500000004 HC RX 250 GENERAL PHARMACY W/ HCPCS (ALT 636 FOR OP/ED): Performed by: PSYCHIATRY & NEUROLOGY

## 2023-12-02 PROCEDURE — 2500000002 HC RX 250 W HCPCS SELF ADMINISTERED DRUGS (ALT 637 FOR MEDICARE OP, ALT 636 FOR OP/ED): Performed by: STUDENT IN AN ORGANIZED HEALTH CARE EDUCATION/TRAINING PROGRAM

## 2023-12-02 PROCEDURE — 2500000001 HC RX 250 WO HCPCS SELF ADMINISTERED DRUGS (ALT 637 FOR MEDICARE OP): Performed by: PSYCHIATRY & NEUROLOGY

## 2023-12-02 PROCEDURE — 1140000001 HC PRIVATE PSYCH ROOM DAILY

## 2023-12-02 PROCEDURE — 2500000004 HC RX 250 GENERAL PHARMACY W/ HCPCS (ALT 636 FOR OP/ED)

## 2023-12-02 PROCEDURE — 99232 SBSQ HOSP IP/OBS MODERATE 35: CPT

## 2023-12-02 RX ORDER — HYDROXYZINE PAMOATE 25 MG/1
25 CAPSULE ORAL ONCE
Status: COMPLETED | OUTPATIENT
Start: 2023-12-02 | End: 2023-12-02

## 2023-12-02 RX ADMIN — HYDROXYZINE PAMOATE 25 MG: 25 CAPSULE ORAL at 13:27

## 2023-12-02 RX ADMIN — DULOXETINE HYDROCHLORIDE 120 MG: 60 CAPSULE, DELAYED RELEASE ORAL at 08:16

## 2023-12-02 RX ADMIN — HYDROXYZINE PAMOATE 25 MG: 25 CAPSULE ORAL at 16:20

## 2023-12-02 RX ADMIN — Medication 5 MG: at 18:43

## 2023-12-02 RX ADMIN — BUPROPION HYDROCHLORIDE 300 MG: 300 TABLET, FILM COATED, EXTENDED RELEASE ORAL at 08:16

## 2023-12-02 RX ADMIN — QUETIAPINE FUMARATE 350 MG: 300 TABLET ORAL at 20:52

## 2023-12-02 RX ADMIN — CELECOXIB 200 MG: 200 CAPSULE ORAL at 15:22

## 2023-12-02 RX ADMIN — Medication 125 MCG: at 08:16

## 2023-12-02 ASSESSMENT — COLUMBIA-SUICIDE SEVERITY RATING SCALE - C-SSRS
6. HAVE YOU EVER DONE ANYTHING, STARTED TO DO ANYTHING, OR PREPARED TO DO ANYTHING TO END YOUR LIFE?: NO
6. HAVE YOU EVER DONE ANYTHING, STARTED TO DO ANYTHING, OR PREPARED TO DO ANYTHING TO END YOUR LIFE?: NO
1. SINCE LAST CONTACT, HAVE YOU WISHED YOU WERE DEAD OR WISHED YOU COULD GO TO SLEEP AND NOT WAKE UP?: NO
2. HAVE YOU ACTUALLY HAD ANY THOUGHTS OF KILLING YOURSELF?: NO
2. HAVE YOU ACTUALLY HAD ANY THOUGHTS OF KILLING YOURSELF?: NO
1. SINCE LAST CONTACT, HAVE YOU WISHED YOU WERE DEAD OR WISHED YOU COULD GO TO SLEEP AND NOT WAKE UP?: NO

## 2023-12-02 ASSESSMENT — PAIN SCALES - GENERAL
PAINLEVEL_OUTOF10: 9
PAINLEVEL_OUTOF10: 3
PAINLEVEL_OUTOF10: 5 - MODERATE PAIN
PAINLEVEL_OUTOF10: 0 - NO PAIN

## 2023-12-02 ASSESSMENT — COGNITIVE AND FUNCTIONAL STATUS - GENERAL
CLIMB 3 TO 5 STEPS WITH RAILING: A LITTLE
MOVING TO AND FROM BED TO CHAIR: A LITTLE
MOBILITY SCORE: 20
WALKING IN HOSPITAL ROOM: A LITTLE
STANDING UP FROM CHAIR USING ARMS: A LITTLE

## 2023-12-02 ASSESSMENT — ENCOUNTER SYMPTOMS
ACTIVITY CHANGE: 1
DECREASED CONCENTRATION: 1
ARTHRALGIAS: 1
MYALGIAS: 1
WEAKNESS: 1
ALLERGIC/IMMUNOLOGIC NEGATIVE: 1
FATIGUE: 1
NERVOUS/ANXIOUS: 1
APPETITE CHANGE: 1
SLEEP DISTURBANCE: 1
RESPIRATORY NEGATIVE: 1

## 2023-12-02 NOTE — GROUP NOTE
"Group Topic: Other   Group Date: 12/2/2023  Start Time: 1520  End Time: 1610  Facilitators: RANDALL FernandesS   Department: [facility]    Number of Participants: 7   Group Focus: other Person, place or thing?  Treatment Modality: Other: Recreation therapy  Interventions utilized were mental fitness and reminiscence  Purpose: other: elevate mood, increase socialization, increase attention, stimulate memory    Name: Azul Roberts YOB: 1960   MR: [id number]      Facilitator: Recreational Therapist  Level of Participation: minimal  Quality of Participation: withdrawn  Interactions with others:  pt reports feeling \"very anxious\".  Requires encouragement to join group.  Does join, but displays passive participation and only stays for about 10 minutes in session.  Excuses herself to use the restroom and to rest in her room.   Mood/Affect: anxious and tearful  Triggers (if applicable): Pt tearful after visiting with her family.  Cognition: coherent/clear  Progress: Minimal  Comments: pt problem is depressed mood.  Plan: continue with services      "

## 2023-12-02 NOTE — GROUP NOTE
Group Topic: Coping Skills   Group Date: 12/2/2023  Start Time: 1015  End Time: 1115  Facilitators: BRENDA Fernandes   Department: [facility]    Number of Participants: 8   Group Focus: other Positive steps for well being  Treatment Modality: Other: Recreation therapy  Interventions utilized were exploration, mental fitness, patient education, and problem solving  Purpose: coping skills, feelings, insight or knowledge, and other: elevate mood, enhance self esteem    Name: Azul Roberts YOB: 1960   MR: [id number]      Facilitator: Recreational Therapist  Level of Participation: active  Quality of Participation: appropriate/pleasant, attentive, cooperative, engaged, and initiates communication  Interactions with others: appropriate, gave feedback, and offered helpful suggestions  Mood/Affect: appropriate, brightens with interaction, and positive  Triggers (if applicable): n/a  Cognition: coherent/clear and insightful  Progress: Significant  Comments: pt problem is depressed mood.  Pt is pleasant and cooperative, displays good insight into healthy coping skills.  Shares personal insights and experiences from her past easily with staff and peers.  Plan: continue with services

## 2023-12-02 NOTE — PROGRESS NOTES
" REHAB Therapy Assessment & Treatment    Patient Name: Azul Roberts  MRN: [id number]  Today's Date: 12/2/2023      Recreation note : pt is alert and oriented x 3 with some poor insight/judgement.  Attends groups of choice daily and has displayed pleasant, cooperative and calm behaviors and mood.  Can still display slight irritable mood but is able to re-directed out of it quickly.  Pt reports feeling \"better\" but also endorses being frustrated at length of stay.  Is able to state she understands the \"complex\" discharge plan, she \"just wishes it would hurry up\".  During interactions, pt makes good eye contact and appropriate affect.  She jokes with this writer and offers good insight during group.  Pt is eating well and sleeping well.  Will encourage pt to continue to attend groups of choice daily.      "

## 2023-12-02 NOTE — GROUP NOTE
Group Topic: Self-Care/Wellness   Group Date: 12/2/2023  Start Time: 0940  End Time: 1015  Facilitators: BRENDA Fernandes   Department: [facility]    Number of Participants: 8   Group Focus: other What's up?  Treatment Modality: Other: Recreation therapy  Interventions utilized were exploration, mental fitness, orientation, reality testing, and reminiscence  Purpose: other: elevate mood, enhance self esteem, increase socialization, increase alertness, stimulate memory, increase attention    Name: Azul Roberts YOB: 1960   MR: [id number]      Facilitator: Recreational Therapist  Level of Participation: active  Quality of Participation: appropriate/pleasant, attentive, cooperative, and engaged  Interactions with others: appropriate and gave feedback  Mood/Affect: appropriate and brightens with interaction  Triggers (if applicable): n/a  Cognition: coherent/clear  Progress: Moderate  Comments: pt problem is depressed mood.  Plan: continue with services

## 2023-12-02 NOTE — PROGRESS NOTES
"  Azul Roberts is a 63 y.o. female on day 84 of admission presenting with Severe recurrent major depression without psychotic features (CMS/[facility]).      Subjective   Per nursing report, Stephenie has been exhibiting increased signs of depression and anxiety today. She appears more anxious than usual, which she attributes to the lack of a discharge plan she states that the holidays are here and she is unsure if she's ever going to leave this place. Staff have not reported any behavioral issues with Stephenie, and report that she slept well last night. She actively participates in group activities and denies experiencing any auditory or visual hallucinations. Stephenie also denies having suicidal ideation or any thoughts of HI.    Stephenie expresses her fear of \"running out of time\" and yet she states that she feels safe in the facility, although somewhat reluctantly. She reports having an okay appetite but mentions that she does not sleep well in her current environment, even though she spends around 6 to 7 hours in bed. Stephenie finds her bed too small and occasionally uncomfortable. However, she denies experiencing any pain at the moment.       Objective     Last Recorded Vitals  Blood pressure 101/59, pulse 81, temperature 36.8 °C (98.2 °F), temperature source Oral, resp. rate 16, height 1.626 m (5' 4\"), weight 78.9 kg (174 lb), SpO2 95 %.    Review of Systems   Constitutional:  Positive for activity change, appetite change and fatigue.   Respiratory: Negative.     Musculoskeletal:  Positive for arthralgias, gait problem and myalgias.   Allergic/Immunologic: Negative.    Neurological:  Positive for weakness.   Psychiatric/Behavioral:  Positive for decreased concentration and sleep disturbance. The patient is nervous/anxious.        Psychiatric ROS - Adult  Anxiety: General Anxiety Disorder (KAYLEEN)KAYLEEN Behaviors: difficult to control worry, difficulty concentrating, irritability, restlessness, and sleep disturbance  Depression: anhedonia, " "appetite increased, concentration, energy, helpless, hopeless, interest, persistent thoughts of death, psychomotor agitation, sleep decreased , suicidal thoughts, and withdrawn  Delirium: negative  Psychosis: negative  Ioana: negative  Safety Issues: passive death wish and suicidal ideation  Psychiatric ROS Comment: as noted      Mental Status Exam  General Appearance:  less disheveled with improved eye contact, . Sitting in common area in wheelchair  Gait/Station: using wheelchair to move about the milieu under her own propulsion  Speech: normal volume, brief repsonses  Mood: \"ok\"  Affect:  blunted ,  Thought Process: less impoverished  Thought Associations: No loosening of associations  Thought Content: improving, linear, logical, more spontaneous and overall increase in content  Perception: No perceptual abnormalities noted  Level of Consciousness: Alert  Orientation: Alert  Attention and Concentration: Mild impairment in attention  Recent Memory: Intact as evidenced by ability to recall details from the past 24 hours   Executive function: Intact  Language: Naming intact  Fund of knowledge: Good  Insight: Fair, as evidenced by increasing participation in discussion about her scenario and symptoms and their improvement  Judgment: Fair, as evidenced by ability to reason through medical decision making, compliance with treatment recommendations, and improving    Psychiatric Risk Assessment  Violence Risk Assessment: major mental illness and substance abuse  Acute Risk of Harm to Others is Considered: moderate   Suicide Risk Assessment: , chronic medical illness, chronic pain, current psychiatric illness, feelings of hopelessness, global insomnia, history of trauma or abuse, life crisis (shame/despair), prior suicide attempt, severe anxiety, substance abuse, and suicidal ideations  Protective Factors against Suicide: adherence to  treatment and marriage/partnership  Acute Risk of Harm to Self is Considered: " moderate    Current Medications    Scheduled medications  buPROPion XL, 300 mg, oral, Daily  celecoxib, 200 mg, oral, Daily  cholecalciferol, 125 mcg, oral, Daily  DULoxetine, 120 mg, oral, Daily  lidocaine, 1 patch, transdermal, Daily  melatonin, 5 mg, oral, Daily  QUEtiapine, 350 mg, oral, Nightly      Continuous medications     PRN medications  PRN medications: acetaminophen, alum-mag hydroxide-simeth, diphenhydrAMINE, hydrOXYzine pamoate, OLANZapine, OLANZapine zydis, traZODone       Medication efficacy: Yes, decreased anxiety  Patient reporting any side effects? No  Any observed side effects? No      Relevant Results    reviewed    Assessment/Plan   Principal Problem:    Severe recurrent major depression without psychotic features (CMS/HCC)  Active Problems:    Colostomy present (CMS/HCC)    Opioid abuse (CMS/HCC)    Bilateral hip pain    Biological -      Continue Orthostatics - confirmed on measurement.  Ongoing efforts to communicate with patient about taking her time when changing position to help prevent falls.     Continue Mouth checks.     Continue Cymbalta 120 mg daily for depression, this is max dose  Continue Wellbutrin xl 300 mg for adjunctive depression treatment  Continue seroquel 350 mg at bedtime  Continue Trazodone 150 mg PRN and melatonin at bedtime for sleep  No adverse side effects of medications noted or reported.  ECG (10/4/23): Normal sinus rhythm. Heart  rate 72 bpm. Qtc 442  11/13/23 - qtc 441     Declined ECT consideration during discussion on 11/4, 11/7.   ECT is not available at this facility and this would include transitioning to another psychiatric hospital for this level of service or pursuing as an outpatient on discharge.     Discussion with patient regarding risk benefits and alternatives and side effects with opportunity to ask questions and questions answered.  Patient given consent to the plan as noted     2. Psychological -      Patient is encouraged to participate with the  therapeutic milieu and program and group therapy     3. Sociological -       Patient encouraged to cooperate with social work staff on issues relevant to discharge planning  Pending [place] medicaid and then transition to convalescence nursing care once this becomes available to patient    I spent 30 minutes in the professional and overall care of this patient.      Tc Hamm, APRN-CNP

## 2023-12-02 NOTE — NURSING NOTE
SARAN NOTE     Problem:  Anxiety/ depression     Behavior:  Pt came to this nurse shaken up and tearful requesting something for anxiety. Pt visibly shaking and restless.  Group Participation: yes  Appetite/Meals: good       Interventions:  PRN vistaril given at 1327    Response:  Pt had a visit with spouse and was crying during visit, after visit pt asked this nurse if she could get something else for anxiety due to vistaril being ineffective.   Contacted NELIA Montez about something different and he is checking into it now.  Pt sitting in her room not wanting to attend group therapy and sobbing crying.   NELIA Montez put in a one time dose of atarax given at 1620.      Plan:  Maintain pt safety.

## 2023-12-02 NOTE — CARE PLAN
Problem: Balance  Goal: STG - Maintains dynamic standing balance with upper extremity support  Outcome: Progressing     Problem: Transfers  Goal: STG - Patient to transfer to and from sit to supine mod independent  Outcome: Progressing  Goal: STG - Patient will transfer sit to and from stand mod independent  Outcome: Progressing     Problem: Mobility  Goal: STG - Patient will ambulate 300 ft with FWW , + turns, distant supervision of 1.  Outcome: Not Progressing     Problem: Pain  Goal: pt will demonstrate < 4/10 left hip pain during functional mobility/therapy session  Outcome: Not Progressing

## 2023-12-02 NOTE — PROGRESS NOTES
Physical Therapy       12/02/23 [id number]   PT  Visit   PT Received On 12/02/23   General   Reason for Referral impaired mobility   Referred By Dr. Cullen   Past Medical History Relevant to Rehab severe depression, colostomy, bilateral hip pain, opiod abuse   Patient Position Received Up in chair   Precautions   Medical Precautions Fall precautions   Precautions Comment colostomy   Pain Assessment   Pain Assessment 0-10   Pain Score 9   Pain Type Chronic pain   Pain Location Hip   Pain Orientation Left   Strength RLE   R Hip Flexion 4-/5   R Hip Extension 4-/5   R Hip ABduction 4/5   R Hip ADduction 4/5   R Knee Flexion 4/5   R Knee Extension 4/5   R Ankle Dorsiflexion 4/5   R Ankle Plantar Flexion 4/5   Strength LLE   L Hip Flexion 2+/5  (limited by hip pain)   L Hip Extension 2+/5  (limited by hip pain)   L Hip ABduction 3-/5   L Hip ADduction 3-/5   L Knee Flexion 4-/5   L Knee Extension 3+/5   L Ankle Dorsiflexion 4/5   L Ankle Plantar Flexion 4/5   Therapeutic Exercise   Therapeutic Exercise Activity 1 LAQ 2 x 15   Therapeutic Exercise Activity 2 seated hip flex 2 x 15. patient self assists left LE   Therapeutic Exercise Activity 3 hip abd 2 x 15 with green theraband   Therapeutic Exercise Activity 4 hip add/ball squeezes 2 x15   Therapeutic Exercise Activity 5 seated hamstring curls 2 x 15, with green theraband   Ambulation/Gait Training 1   Comments/Distance (ft) 1 patient declines transfers and amb even with encouragement provided and benefiits of therapy explained   Wheelchair Activities   Propulsion Type 1 Manual   Level 1 Level tile   Method 1 Right lower extremity;Left lower extremity   Level of Assistance 1 Independent   Description/Details 1 inlcudes turns. Propels self ad adriana on unit.   PT Assessment   PT Assessment Results Decreased strength;Decreased range of motion;Decreased endurance;Impaired balance;Decreased mobility;Impaired judgement;Decreased safety awareness;Pain   Rehab Prognosis Fair    Evaluation/Treatment Tolerance Patient limited by fatigue;Patient limited by pain   Strengths Support of Caregivers   Barriers to Participation Attitude of self  (self limiting behaviors. Chronic left hip pain limits mobility.)   Assessment Comment Self limiting behaviors and chronic left hip pain limit progress and mobility.   End of Session Patient Position Up in chair;Alarm on   Outpatient Education   Individual(s) Educated Patient   Education Provided   (reviewed benefits of therapy and increasing mobility)   Education Comment self limiting behaviors   PT Plan   Inpatient/Swing Bed or Outpatient Inpatient   PT Plan   Treatment/Interventions Therapeutic exercise;Strengthening   PT Plan Skilled PT   Equipment Recommended upon Discharge Wheeled walker;Other (comment)  (built up right shoe to equalize leg length)   PT - OK to Discharge Yes           12/02/23 1300   [facility] Basic Mobility   Turning from your back to your side while in a flat bed without using bedrails 4   Moving from lying on your back to sitting on the side of a flat bed without using bedrails 4   Moving to and from bed to chair (including a wheelchair) 3   Standing up from a chair using your arms (e.g. wheelchair or bedside chair) 3   To walk in hospital room 3   Climbing 3-5 steps with railing 3   Basic Mobility - Total Score 20     Problem: Balance  Goal: STG - Maintains dynamic standing balance with upper extremity support  Outcome: Progressing     Problem: Transfers  Goal: STG - Patient to transfer to and from sit to supine mod independent  Outcome: Progressing  Goal: STG - Patient will transfer sit to and from stand mod independent  Outcome: Progressing     Problem: Mobility  Goal: STG - Patient will ambulate 300 ft with FWW , + turns, distant supervision of 1.  Outcome: Not Progressing     Problem: Pain  Goal: pt will demonstrate < 4/10 left hip pain during functional mobility/therapy session  Outcome: Not Progressing

## 2023-12-03 PROCEDURE — 99232 SBSQ HOSP IP/OBS MODERATE 35: CPT

## 2023-12-03 PROCEDURE — 1140000001 HC PRIVATE PSYCH ROOM DAILY

## 2023-12-03 PROCEDURE — 2500000005 HC RX 250 GENERAL PHARMACY W/O HCPCS: Performed by: PSYCHIATRY & NEUROLOGY

## 2023-12-03 PROCEDURE — 2500000001 HC RX 250 WO HCPCS SELF ADMINISTERED DRUGS (ALT 637 FOR MEDICARE OP): Performed by: PSYCHIATRY & NEUROLOGY

## 2023-12-03 PROCEDURE — 2500000002 HC RX 250 W HCPCS SELF ADMINISTERED DRUGS (ALT 637 FOR MEDICARE OP, ALT 636 FOR OP/ED): Performed by: STUDENT IN AN ORGANIZED HEALTH CARE EDUCATION/TRAINING PROGRAM

## 2023-12-03 PROCEDURE — 2500000004 HC RX 250 GENERAL PHARMACY W/ HCPCS (ALT 636 FOR OP/ED): Performed by: PSYCHIATRY & NEUROLOGY

## 2023-12-03 RX ADMIN — Medication 125 MCG: at 08:25

## 2023-12-03 RX ADMIN — Medication 5 MG: at 18:06

## 2023-12-03 RX ADMIN — CELECOXIB 200 MG: 200 CAPSULE ORAL at 16:17

## 2023-12-03 RX ADMIN — HYDROXYZINE PAMOATE 25 MG: 25 CAPSULE ORAL at 16:17

## 2023-12-03 RX ADMIN — QUETIAPINE FUMARATE 350 MG: 300 TABLET ORAL at 20:27

## 2023-12-03 RX ADMIN — LIDOCAINE 1 PATCH: 4 PATCH TOPICAL at 08:28

## 2023-12-03 RX ADMIN — BUPROPION HYDROCHLORIDE 300 MG: 300 TABLET, FILM COATED, EXTENDED RELEASE ORAL at 08:25

## 2023-12-03 RX ADMIN — DULOXETINE HYDROCHLORIDE 120 MG: 60 CAPSULE, DELAYED RELEASE ORAL at 08:26

## 2023-12-03 ASSESSMENT — ENCOUNTER SYMPTOMS
ARTHRALGIAS: 1
SLEEP DISTURBANCE: 1
ALLERGIC/IMMUNOLOGIC NEGATIVE: 1
NERVOUS/ANXIOUS: 1
APPETITE CHANGE: 0
AGITATION: 0
FATIGUE: 0
WEAKNESS: 0
ACTIVITY CHANGE: 0
DECREASED CONCENTRATION: 1
MYALGIAS: 1
RESPIRATORY NEGATIVE: 1
HALLUCINATIONS: 0

## 2023-12-03 ASSESSMENT — COLUMBIA-SUICIDE SEVERITY RATING SCALE - C-SSRS
1. SINCE LAST CONTACT, HAVE YOU WISHED YOU WERE DEAD OR WISHED YOU COULD GO TO SLEEP AND NOT WAKE UP?: NO
2. HAVE YOU ACTUALLY HAD ANY THOUGHTS OF KILLING YOURSELF?: NO
1. SINCE LAST CONTACT, HAVE YOU WISHED YOU WERE DEAD OR WISHED YOU COULD GO TO SLEEP AND NOT WAKE UP?: NO
2. HAVE YOU ACTUALLY HAD ANY THOUGHTS OF KILLING YOURSELF?: NO
6. HAVE YOU EVER DONE ANYTHING, STARTED TO DO ANYTHING, OR PREPARED TO DO ANYTHING TO END YOUR LIFE?: NO
6. HAVE YOU EVER DONE ANYTHING, STARTED TO DO ANYTHING, OR PREPARED TO DO ANYTHING TO END YOUR LIFE?: NO
2. HAVE YOU ACTUALLY HAD ANY THOUGHTS OF KILLING YOURSELF?: NO
6. HAVE YOU EVER DONE ANYTHING, STARTED TO DO ANYTHING, OR PREPARED TO DO ANYTHING TO END YOUR LIFE?: NO
1. SINCE LAST CONTACT, HAVE YOU WISHED YOU WERE DEAD OR WISHED YOU COULD GO TO SLEEP AND NOT WAKE UP?: NO

## 2023-12-03 ASSESSMENT — PAIN SCALES - GENERAL
PAINLEVEL_OUTOF10: 3
PAINLEVEL_OUTOF10: 3

## 2023-12-03 ASSESSMENT — PAIN - FUNCTIONAL ASSESSMENT
PAIN_FUNCTIONAL_ASSESSMENT: 0-10
PAIN_FUNCTIONAL_ASSESSMENT: 0-10

## 2023-12-03 NOTE — NURSING NOTE
"SARAN NOTE     Problem:  depression     Behavior:  Pt isolated herself to her room, tearful and upset, stating \"I will never get out of here\", refused to come out for a snack, denied SI.    Group Participation: n/a  Appetite/Meals: refused HS snack       Interventions:  1:1 intervention with active listening and support provided, scheduled medication administered    Response:  Pt is compliant with scheduled medication, able to express her feelings     Plan:  Continue monitoring, provide support  "

## 2023-12-03 NOTE — GROUP NOTE
Group Topic: Spiritual/Devotional/Thought of the Day   Group Date: 12/3/2023  Start Time: 1000  End Time: 1100  Facilitators: BRENDA Fernandes   Department: [facility] REHABTH VIRTUAL    Number of Participants: 9   Group Focus: other Spirituality group  Treatment Modality: Other: Recreation Therapy  Interventions utilized were exploration, problem solving, reminiscence, and support  Purpose: feelings, self-care, and other: elevate mood, stimulate memory, increase socialization    Name: Azul Roberts YOB: 1960   MR: [id number]      Facilitator: Recreational Therapist  Level of Participation: minimal  Quality of Participation: appropriate/pleasant  Interactions with others: appropriate  Mood/Affect: appropriate  Triggers (if applicable): n/a  Cognition: coherent/clear  Progress: Moderate  Comments: pt problem is depressed mood.  Pt is less active with participation today.   Plan: continue with services

## 2023-12-03 NOTE — NURSING NOTE
"BIRP NOTE     Problem:  anxiety     Behavior:  Pt pleasant and cooperative with staff and care. Pt began to become anxious after lunch and visitation. Pt on the phone with her spouse and this nurse overheard pt becoming more and more anxious, stating \"no, have fun go get the party started. Bye.\" Pt asked this nurse for something for her anxiety.  Group Participation: yes  Appetite/Meals: good       Interventions:  Administered PRN vistaril at 1617    Response:  Pt colostomy slightly filled and began to get upset about that but quickly got herself cleaned up and changed her bag.   Reassessment of PRN vistaril given, effective somewhat. Pt continues for a short period of time to be very sad and tearful in her room and began to isolate afterwards.   Plan:  Maintain pt safety   "

## 2023-12-03 NOTE — GROUP NOTE
"Group Topic: Other   Group Date: 12/3/2023  Start Time: 1510  End Time: 1600  Facilitators: BRENDA Fernandes   Department: [facility] REHABTH VIRTUAL    Number of Participants: 8   Group Focus: other Family Fued  Treatment Modality: Other: Recreation therapy  Interventions utilized were mental fitness and reminiscence  Purpose: other: elevate mood, increase stimulation, increase attention, fun, increase socialization    Name: Azul Roberts YOB: 1960   MR: [id number]      Facilitator: Recreational Therapist  Level of Participation: active  Quality of Participation: appropriate/pleasant, attentive, cooperative, and engaged  Interactions with others: appropriate  Mood/Affect: appropriate  Triggers (if applicable): n/a  Cognition: coherent/clear  Progress: Moderate  Comments: pt problem is depressed mood.  Pt is very anxious and reports \"it is because I feel like I am never going to leave\".  Does focus on group game but is extremely tearful right after group.  Plan: continue with services      "

## 2023-12-03 NOTE — GROUP NOTE
"Group Topic: Other   Group Date: 12/3/2023  Start Time: 0930  End Time: 1000  Facilitators: BRENDA Fernandes   Department: [facility] REHABTH VIRTUAL    Number of Participants: 9   Group Focus: other What's up?  Treatment Modality: Other: Recreation therapy  Interventions utilized were mental fitness  Purpose: feelings and other: elevate mood, enhance self esteem, stimulate memory, increase socialization    Name: Azul Roberts YOB: 1960   MR: [id number]      Facilitator: Recreational Therapist  Level of Participation: active  Quality of Participation: cooperative  Interactions with others: gave feedback  Mood/Affect: blunted  Triggers (if applicable): n/a  Cognition: coherent/clear  Progress: Moderate  Comments: pt problem is depressed mood.  Pt reports she is \"having a hard day\".  Expresses much frustration at \"still being here when everyone else gets to go home\".  Identifies understanding of discharge plan, but \"still feels like it may never happen\".  Is more quiet today in group and sits away from the peers at her own table.  Pt does not  join peers at their table, even after encouragement to join. Affect more flat today also.  Plan: continue with services      "

## 2023-12-04 PROCEDURE — 99232 SBSQ HOSP IP/OBS MODERATE 35: CPT | Performed by: INTERNAL MEDICINE

## 2023-12-04 PROCEDURE — 2500000005 HC RX 250 GENERAL PHARMACY W/O HCPCS: Performed by: PSYCHIATRY & NEUROLOGY

## 2023-12-04 PROCEDURE — 2500000002 HC RX 250 W HCPCS SELF ADMINISTERED DRUGS (ALT 637 FOR MEDICARE OP, ALT 636 FOR OP/ED): Performed by: STUDENT IN AN ORGANIZED HEALTH CARE EDUCATION/TRAINING PROGRAM

## 2023-12-04 PROCEDURE — 2500000001 HC RX 250 WO HCPCS SELF ADMINISTERED DRUGS (ALT 637 FOR MEDICARE OP): Performed by: PSYCHIATRY & NEUROLOGY

## 2023-12-04 PROCEDURE — 2500000004 HC RX 250 GENERAL PHARMACY W/ HCPCS (ALT 636 FOR OP/ED): Performed by: PSYCHIATRY & NEUROLOGY

## 2023-12-04 PROCEDURE — 99233 SBSQ HOSP IP/OBS HIGH 50: CPT | Performed by: PSYCHIATRY & NEUROLOGY

## 2023-12-04 PROCEDURE — 1140000001 HC PRIVATE PSYCH ROOM DAILY

## 2023-12-04 RX ADMIN — Medication 5 MG: at 17:32

## 2023-12-04 RX ADMIN — CELECOXIB 200 MG: 200 CAPSULE ORAL at 14:46

## 2023-12-04 RX ADMIN — BUPROPION HYDROCHLORIDE 300 MG: 300 TABLET, FILM COATED, EXTENDED RELEASE ORAL at 08:15

## 2023-12-04 RX ADMIN — LIDOCAINE 1 PATCH: 4 PATCH TOPICAL at 08:15

## 2023-12-04 RX ADMIN — DULOXETINE HYDROCHLORIDE 120 MG: 60 CAPSULE, DELAYED RELEASE ORAL at 08:15

## 2023-12-04 RX ADMIN — TRAZODONE HYDROCHLORIDE 150 MG: 50 TABLET ORAL at 21:13

## 2023-12-04 RX ADMIN — QUETIAPINE FUMARATE 350 MG: 300 TABLET ORAL at 20:43

## 2023-12-04 RX ADMIN — HYDROXYZINE PAMOATE 25 MG: 25 CAPSULE ORAL at 12:40

## 2023-12-04 RX ADMIN — Medication 125 MCG: at 08:15

## 2023-12-04 ASSESSMENT — ENCOUNTER SYMPTOMS
DYSPHORIC MOOD: 1
SLEEP DISTURBANCE: 1
DECREASED CONCENTRATION: 1

## 2023-12-04 ASSESSMENT — PAIN - FUNCTIONAL ASSESSMENT: PAIN_FUNCTIONAL_ASSESSMENT: 0-10

## 2023-12-04 ASSESSMENT — PAIN SCALES - GENERAL: PAINLEVEL_OUTOF10: 0 - NO PAIN

## 2023-12-04 NOTE — NURSING NOTE
Pt states the vistaril and talking to her  helped her anxiety.  Pt no longer rocking in her chair, is sitting quietly in her room, denies any needs. Declines any activity or stimulation - states she just wants to wait for her  to arrive.

## 2023-12-04 NOTE — NURSING NOTE
"LIORP NOTE     Problem:  anxiety     Behavior:  Pt finished visiting with DO who ordered a sitter due to self injurious behaviors and wanting to not live.  Pt requested vistaril for anxiety.  Pt sitting in wheelchair in her room rocking back and forth, crying.  Pt states \"I wish I could just lie down and sleep forever so I could avoid all this.\"      Group Participation: active  Appetite/Meals: appropriate per pt preferences.        Interventions:  Offered conversation, silent presence, support. Vistaril admin per pt request.  Spouse called on phone and pt is speaking with him.     Response:  Pt visibly calmer through conversation with spouse, tearful but no longer crying.      Plan:  Encouragement of pt to express feelings, come outside of room, open blinds if choosing to stay in room.    "

## 2023-12-04 NOTE — PROGRESS NOTES
[facility]: [facility]  PROGRESS NOTE      Azul Roberts is a 63 y.o. female that is being seen  today for follow up at [facility]  Subjective   Patient is being seen for follow-up at [facility] unit.  Patient had a fall and was sent to the ER.  Patient was evaluated in the ER and CT scan of the brain was negative for any bleed.  Patient had a UA done which was positive for leukoesterase.  Culture is pending.  Patient has been at her baseline.  Colostomy has been working well.  Pain has been fairly controlled.    ROS  Negative for fever or chills  Negative for sore throat, ear pain, nasal discharge  Negative for cough, shortness of breath or wheezing  Negative for chest pain, palpitations, swelling of legs  Negative for abdominal pain, constipation, diarrhea, blood in the stools,has colostomy  Negative for urinary complaints  Negative for headache, dizziness, weakness or numbness  Negative for joint pain  Positive for depression or anxiety  All other systems reviewed and were negative  Vitals:    12/04/23 0500   BP: 118/79   Pulse: 81   Resp: 19   Temp: 36.5 °C (97.7 °F)   SpO2: 99%      Body mass index is 29.87 kg/m².  Physical Exam  Constitutional: Patient does not appear to be in any acute distress  Head and Face: NCAT  Eyes: Normal external exam, EOMI  ENT: Normal external inspection of ears and nose. Oropharynx normal.  Cardiovascular: RRR, S1/S2, no murmurs, rubs, or gallops, radial pulses +2, no edema of extremities  Pulmonary: CTAB, no respiratory distress.  Abdomen: +BS, soft, non-tender, nondistended, no guarding or rebound, no masses noted.colostomy present   MSK: hip joint pain   Skin- No lesions, contusions, or erythema.  Peripheral puslses palpable bilaterally 2+  Neuro: AAO X3, Cranial nerves 2-12 grossly intact,DTR 2+ in all 4 limbs       Diagnostic Results   Lab Results   Component Value Date    GLUCOSE 113 (H) 09/05/2023    CALCIUM 10.6 (H) 09/05/2023     09/05/2023     "K 3.7 09/05/2023    CO2 24 09/05/2023     09/05/2023    BUN 12 09/05/2023    CREATININE 0.72 09/05/2023     Lab Results   Component Value Date    ALT 12 09/05/2023    AST 14 09/05/2023    ALKPHOS 103 09/05/2023    BILITOT 0.4 09/05/2023     Lab Results   Component Value Date    WBC 13.2 (H) 09/05/2023    HGB 13.9 09/05/2023    HCT 43.0 09/05/2023    MCV 81 09/05/2023     09/05/2023     No results found for: \"CHOL\"  No results found for: \"HDL\"  No results found for: \"LDLCALC\"  No results found for: \"TRIG\"  No results found for: \"HGBA1C\"  Other labs not included in the list above were reviewed either before or during this encounter.    History    No past medical history on file.  No past surgical history on file.  No family history on file.  No Known Allergies  No current facility-administered medications on file prior to encounter.     Current Outpatient Medications on File Prior to Encounter   Medication Sig Dispense Refill    acetaminophen (Tylenol) 325 mg tablet Take 2 tablets (650 mg) by mouth 2 times a day.      hydrOXYzine pamoate (Vistaril) 25 mg capsule Take 1 capsule (25 mg) by mouth 2 times a day.      magnesium oxide (Mag-Ox) 400 mg tablet Take 1 tablet (400 mg) by mouth 2 times a day.      melatonin 5 mg tablet Take 1 tablet (5 mg) by mouth once daily at bedtime.      risperiDONE (RisperDAL) 2 mg tablet Take 1 tablet (2 mg) by mouth once daily at bedtime.      traZODone (Desyrel) 150 mg tablet Take 1 tablet (150 mg) by mouth once daily at bedtime.     Scheduled medications  buPROPion XL, 300 mg, oral, Daily  celecoxib, 200 mg, oral, Daily  cholecalciferol, 125 mcg, oral, Daily  citalopram, 30 mg, oral, Daily  lidocaine, 1 patch, transdermal, Daily  melatonin, 5 mg, oral, Nightly  QUEtiapine, 350 mg, oral, Nightly  traZODone, 150 mg, oral, Nightly      Continuous medications     PRN medications  PRN medications: acetaminophen, alum-mag hydroxide-simeth, diphenhydrAMINE, hydrOXYzine pamoate, " OLANZapine, OLANZapine zydis     There is no immunization history on file for this patient.  Patient's medical history was reviewed and updated either before or during this encounter.  ASSESSMENT / PLAN:  Active Hospital Problems    Bilateral hip pain      *Severe recurrent major depression without psychotic features (CMS/HCC)      Colostomy present (CMS/HCC)      Opioid abuse (CMS/HCC)  S/p fall.  Hip x-rays negative CT scan of the brain is negative     Pt. Has been stable.clostomy is working well.        Jay Knox MD

## 2023-12-04 NOTE — NURSING NOTE
"Pt playing cards in group room, laughing and joking.  RN was admin meds to another patient and she states \"Why are we giving Viagra here?\" And laughs. She then smiles and says, \"Oh that's the other little blue pill.\"  Pt continues to laugh and play cards with peers, keeping up with the game without any prompting needing, talking about the deal and her hand appropriately.   "

## 2023-12-04 NOTE — PROGRESS NOTES
Azul Roberts is a 63 y.o. female on day 86 of admission presenting with Severe recurrent major depression without psychotic features (CMS/[facility]).    Level 3 note    Subjective   Case discussed with treatment team and chart reviewed.    Today was a much more emotional day for Azul.  In meeting with this provider, patient shared a highly pessimistic circumstance where she feels that she will never be able to leave the facility and because of this she is feeling hopeless helpless and suicidal noting even that she was considering ways to complete suicide in the hospital.  She was observed to be tearful, raising her voice, hitting herself in the head during this discussion.  She declines further medication changes during this discussion.  One-to-one sitter was started as such.    During visitation, patient's  came to visit.  During this discussion, patient's  requested the presence of provider and  to meet with himself and the patient.  The  today reported that he felt confident that he would be able to take the patient home and help transition her to the community.  We spoke expensively regarding the involved factors including that patient has been hospitalized for 86 days pending transition to a nursing home setting due to lack of available services in the community and at home with .  We note that we have forgone discharge home thus far on the premise the patient was not able to be cared for in the home and are concerned about patient transitioning to this setting at this time.  The patient  notes that he feels the patient has been doing better overall but does recognize over the last several days she has presented as more depressed.  Patient continually endorses during the discussion that she feels she will never be able to leave the hospital.  We attempt to offer several descriptions as to the reasons why patient has been hospitalized for the extended duration that  she has and how transitioning appropriately to the community to a nursing home setting to enable for improved physical therapy services is of concern by this primary treatment team.  We also discussed with patient that while her Medicaid is currently in pending status, it may not enable her to receive outpatient services in the community if she was to discharge home at this time and this could prevent necessary follow-up on leaving the hospital.  Additionally we described the patient would be unable to transition to a nursing home from the community and should this level of service be sought after discharge she would have to first come back into the hospital to have this coordinated.  We have encouraged patient and  to consider over the next 24 hours what discharge option they would like and we would work to try and help make sure that this is the most appropriate selection for the patient.  We have offered several reasons why pursuing nursing home placement even if for 1 or 2 weeks to help with physical therapy and rehabilitation while continuing to offer supportive and safe environment would be in her best interest prior to transitioning home.  Patient persists as pessimistic regardless of the options discussed.    Objective     Vitals:    12/02/23 1700 12/03/23 0536 12/03/23 1700 12/04/23 0500   BP: 130/84 122/67 130/87 118/79   BP Location: Left arm Left arm Left arm Left arm   Patient Position: Lying Lying Standing Lying   Pulse: 83 85 93 81   Resp: 18 18 18 19   Temp: 36.6 °C (97.9 °F) 36.6 °C (97.9 °F) 37 °C (98.6 °F) 36.5 °C (97.7 °F)   TempSrc: Temporal Temporal Temporal Temporal   SpO2: 100% 99% 98% 99%   Weight:       Height:            Review of Systems   Psychiatric/Behavioral:  Positive for decreased concentration, dysphoric mood and sleep disturbance.      Psychiatric ROS - Adult  Anxiety: General Anxiety Disorder (KAYLEEN)KAYLEEN Behaviors: difficult to control worry, difficulty concentrating,  "irritability, restlessness, and sleep disturbance  Depression: anhedonia, appetite increased, concentration, energy, helpless, hopeless, interest, persistent thoughts of death, psychomotor agitation, sleep decreased , suicidal thoughts, and withdrawn  Delirium: negative  Psychosis: negative  Ioana: negative  Safety Issues: passive death wish and suicidal ideation  Psychiatric ROS Comment: as noted    Physical Exam  Abdominal:      Comments: Presence of colostomy bag   Psychiatric:         Attention and Perception: Attention and perception normal.         Mood and Affect: Mood is depressed. Affect is flat and inappropriate.         Behavior: Behavior is slowed.     Mental Status Examination  General Appearance:  less disheveled with improved eye contact, . In room, tearful  Gait/Station: using wheelchair to move about the milieu under her own propulsion  Speech: normal volume,   Mood: \"I'm never going to get out of here, I just want to die\"  Affect:  tearful ,  Thought Process: less impoverished  Thought Associations: No loosening of associations  Thought Content: improving, linear, logical, more spontaneous and overall increase in content  Perception: No perceptual abnormalities noted  Level of Consciousness: Alert  Orientation: Alert  Attention and Concentration: Mild impairment in attention  Recent Memory: Intact as evidenced by ability to recall details from the past 24 hours   Executive function: Intact  Language: Naming intact  Fund of knowledge: Good  Insight: Fair, as evidenced by increasing participation in discussion about her scenario and symptoms and their improvement  Judgment: Fair, as evidenced by ability to reason through medical decision making, compliance with treatment recommendations, and improving      Psychiatric Risk Assessment  Violence Risk Assessment: major mental illness and substance abuse  Acute Risk of Harm to Others is Considered: moderate   Suicide Risk Assessment: , chronic " medical illness, chronic pain, current psychiatric illness, feelings of hopelessness, global insomnia, history of trauma or abuse, life crisis (shame/despair), prior suicide attempt, severe anxiety, substance abuse, and suicidal ideations  Protective Factors against Suicide: adherence to  treatment and marriage/partnership  Acute Risk of Harm to Self is Considered: moderate    Relevant Results  Scheduled medications  buPROPion XL, 300 mg, oral, Daily  celecoxib, 200 mg, oral, Daily  cholecalciferol, 125 mcg, oral, Daily  DULoxetine, 120 mg, oral, Daily  lidocaine, 1 patch, transdermal, Daily  melatonin, 5 mg, oral, Daily  QUEtiapine, 350 mg, oral, Nightly      Continuous medications     PRN medications  PRN medications: acetaminophen, alum-mag hydroxide-simeth, diphenhydrAMINE, hydrOXYzine pamoate, OLANZapine, OLANZapine zydis, traZODone     Assessment/Plan   Principal Problem:    Severe recurrent major depression without psychotic features (CMS/HCC)  Active Problems:    Colostomy present (CMS/HCC)    Opioid abuse (CMS/[facility])    Bilateral hip pain    Patient and  now requesting discharge home.  Patient's Medicaid still remains not fully active.  Social work and family with this provider working to coordinate safest transition for patient which may include going home this week.  Level of care recommendation from this treatment team remains nursing home at discharge.    Biological -     Orthostatics - confirmed on measurement.  Ongoing efforts to communicate with patient about taking her time when changing position to help prevent falls.    Mouth checks.    Cymbalta 120 mg daily for depression, this is max dose  Wellbutrin xl 300 mg for adjunctive depression treatment  seroquel 350 mg at bedtime  Trazodone 150 mg PRN and melatonin at bedtime for sleep  No adverse side effects of medications noted or reported.  ECG (10/4/23): Normal sinus rhythm. Heart  rate 72 bpm. Qtc 442  11/13/23 - qtc 441    Declined ECT  consideration during discussion on 11/4, 11/7.   ECT is not available at this facility and this would include transitioning to another psychiatric hospital for this level of service or pursuing as an outpatient on discharge.    Discussion with patient regarding risk benefits and alternatives and side effects with opportunity to ask questions and questions answered.  Patient given consent to the plan as noted    2. Psychological -     Patient is encouraged to participate with the therapeutic milieu and program and group therapy    3. Sociological -      Patient encouraged to cooperate with social work staff on issues relevant to discharge planning  Pending [place] medicaid and then transition to convalescence nursing care once this becomes available to patient    70 minutes spent coordinating care for patient on this day    Parts of this chart have been completed using voice recognition software.  Please excuse any errors of transcription.  Despite the medical decision making time stamp, my medical decision making has taken place during the patient's entire visit.  Thought process and reason for plan has been formulated from the time that I saw the patient until the time of disposition and is not specific to one specific moment during their visit and furthermore the medical decision making encompasses the entire chart and not only that represented in this note.        Eddie Cullen, DO

## 2023-12-04 NOTE — PROGRESS NOTES
"    Azul Roberts is a 63 y.o. female on day 85 of admission presenting with Severe recurrent major depression without psychotic features (CMS/HCC).      Subjective   The patient's overall mood appears to be slightly better than yesterday, but she still reports feeling anxious. She denies feeling depressed but exhibits signs of withdrawal and flat affect. The patient denies experiencing any auditory or visual hallucinations and does not feel agitated or irritable. She mentions feeling a little antsy toward the evening. The patient was advised to ask the nurse for hydroxyzine, a medication scheduled as needed for anxiety relief. She denies having any thoughts of suicide or harming others. The patient also denies experiencing any pain and reports that her appetite and sleep have been \"okay\",  and she estimates around six hours of sleep. According to staff reports, the patient continues to participate in groups and has not displayed any behavioral issues apart from anxiety. Denies AVH.       Objective     Last Recorded Vitals  Blood pressure 130/87, pulse 93, temperature 37 °C (98.6 °F), temperature source Temporal, resp. rate 18, height 1.626 m (5' 4\"), weight 78.9 kg (174 lb), SpO2 98 %.    Review of Systems   Constitutional:  Negative for activity change, appetite change and fatigue.   Respiratory: Negative.     Musculoskeletal:  Positive for arthralgias and myalgias. Negative for gait problem.   Allergic/Immunologic: Negative.    Neurological:  Negative for weakness.   Psychiatric/Behavioral:  Positive for decreased concentration and sleep disturbance. Negative for agitation, behavioral problems, hallucinations, self-injury and suicidal ideas. The patient is nervous/anxious.        Psychiatric ROS - Adult  Anxiety: General Anxiety Disorder (KAYLEEN)KAYLEEN Behaviors: difficult to control worry, difficulty concentrating, irritability, restlessness, and sleep disturbance  Depression: anhedonia, appetite increased, " "concentration, energy, helpless, hopeless, interest, persistent thoughts of death, psychomotor agitation, sleep decreased , suicidal thoughts, and withdrawn  Delirium: negative  Psychosis: negative  Ioana: negative  Safety Issues: passive death wish and suicidal ideation  Psychiatric ROS Comment: as noted      Mental Status Exam  General Appearance:  less disheveled with improved eye contact, . Sitting in common area in wheelchair  Gait/Station: using wheelchair to move about the milieu under her own propulsion  Speech: normal volume, brief repsonses  Mood: \"ok\"  Affect:  blunted ,  Thought Process: less impoverished  Thought Associations: No loosening of associations  Thought Content: improving, linear, logical, more spontaneous and overall increase in content  Perception: No perceptual abnormalities noted  Level of Consciousness: Alert  Orientation: Alert  Attention and Concentration: Mild impairment in attention  Recent Memory: Intact as evidenced by ability to recall details from the past 24 hours   Executive function: Intact  Language: Naming intact  Fund of knowledge: Good  Insight: Fair, as evidenced by increasing participation in discussion about her scenario and symptoms and their improvement  Judgment: Fair, as evidenced by ability to reason through medical decision making, compliance with treatment recommendations, and improving    Psychiatric Risk Assessment  Violence Risk Assessment: major mental illness and substance abuse  Acute Risk of Harm to Others is Considered: moderate   Suicide Risk Assessment: , chronic medical illness, chronic pain, current psychiatric illness, feelings of hopelessness, global insomnia, history of trauma or abuse, life crisis (shame/despair), prior suicide attempt, severe anxiety, substance abuse, and suicidal ideations  Protective Factors against Suicide: adherence to  treatment and marriage/partnership  Acute Risk of Harm to Self is Considered: moderate    Current " Medications    Scheduled medications  buPROPion XL, 300 mg, oral, Daily  celecoxib, 200 mg, oral, Daily  cholecalciferol, 125 mcg, oral, Daily  DULoxetine, 120 mg, oral, Daily  lidocaine, 1 patch, transdermal, Daily  melatonin, 5 mg, oral, Daily  QUEtiapine, 350 mg, oral, Nightly      Continuous medications     PRN medications  PRN medications: acetaminophen, alum-mag hydroxide-simeth, diphenhydrAMINE, hydrOXYzine pamoate, OLANZapine, OLANZapine zydis, traZODone       Medication efficacy: Yes, decreased anxiety  Patient reporting any side effects? No  Any observed side effects? No      Relevant Results    reviewed    Assessment/Plan   Principal Problem:    Severe recurrent major depression without psychotic features (CMS/[facility])  Active Problems:    Colostomy present (CMS/HCC)    Opioid abuse (CMS/[facility])    Bilateral hip pain    Continue plan of care as previously established    Biological -      Continue Orthostatics - confirmed on measurement.  Ongoing efforts to communicate with patient about taking her time when changing position to help prevent falls.     Continue Mouth checks.     Continue Cymbalta 120 mg daily for depression, this is max dose  Continue Wellbutrin xl 300 mg for adjunctive depression treatment  Continue seroquel 350 mg at bedtime  Continue Trazodone 150 mg PRN and melatonin at bedtime for sleep  No adverse side effects of medications noted or reported.  ECG (10/4/23): Normal sinus rhythm. Heart  rate 72 bpm. Qtc 442  11/13/23 - qtc 441     Declined ECT consideration during discussion on 11/4, 11/7.   ECT is not available at this facility and this would include transitioning to another psychiatric hospital for this level of service or pursuing as an outpatient on discharge.     Discussion with patient regarding risk benefits and alternatives and side effects with opportunity to ask questions and questions answered.  Patient given consent to the plan as noted     2. Psychological -      Continue to  encourage the patient to participate with the therapeutic milieu and program and group therapy     3. Sociological -      Continue to encourage the patient to cooperate with social work staff on issues relevant to discharge planning  Pending [place] medicaid and then transition to convalescence nursing care once this becomes available to patient    I spent 25 minutes in the professional and overall care of this patient.      Tc Hamm, APRN-CNP

## 2023-12-04 NOTE — GROUP NOTE
Group Topic: Social Skills   Group Date: 12/4/2023  Start Time: 1510  End Time: 1600  Facilitators: BRENDA Howard   Department: [facility] REHABTH VIRTUAL    Number of Participants: 6   Group Focus: other team building, self-esteem, and social skills  Treatment Modality: Other: Recreation Therapy  Interventions utilized were group exercise, mental fitness, and other    Purpose: communication skills, communication skills, increase stimulation, increase socialization, fun., increase motivation    Name: Azul Roberts YOB: 1960   MR: [id number]      Facilitator: Recreational Therapist  Level of Participation: active  Quality of Participation: appropriate/pleasant, attentive, cooperative, engaged, initiates communication, and motivated  Interactions with others: appropriate and asked thoughtful questions  Mood/Affect: appropriate, bright, and positive  Triggers (if applicable): n/a  Cognition: coherent/clear  Progress: Significant  Comments: pt problem is depressed mood. Pt joins group after increased encouragement as pt in room upset and tearful. Pt engages easily with staff and peers. Smiles and laughes throughout group.   Plan: continue with services

## 2023-12-04 NOTE — PROGRESS NOTES
"LSW contacted [place] [employer] regarding termination of pt's case. LSW was informed that it had not been closed however while on the call the representative put in the request for it to be closed and stated a termination letter would be sent out to pt's current address. LSW will follow up with S as necessary to ensure that it has been closed. LSW will inform pt's  to watch for it to arrive in the mail. Once this has been received it can be forwarded to [facility] so that her application for [place] Medicaid can be further processed. LSW contacted [facility] to report the update. According to the automated system pt's Medicaid is pending. LSW waited on hold for a significant amount of time and confirmed that pt's Medicaid is indeed pending. As well as informed [facility] that the termination letter is expected in approximately the next 7-10 business days.     Pt reported today that she was hopeless with suicidal ideation when meeting with the psychiatrist. Pt's  came to visit during visitation and pt stated that she \"just wants to go home\". Pt reports she does not want to be here anymore and that she is tired of being stuck in her room. LSW and psychiatrist met with pt and her  and encouraged pt to follow through with plan to discharge to a facility as they would be able to assist her in prepare for success at home. Pt's  reports that his sister can be home with pt and that the home is able to accommodate a wheelchair, being that pt has not been using walker or ambulating independently for a significant amount of time. LSW and psychiatrist discussed that if she were to go home outpatient services and home care would need to be put in place. Therefore it will take a few days for arrangements to be made. LSW left messages with [place] outpatient services and a home care agency to see if they will accept Medicaid pending status. LSW will continue to follow and assist.   Nikki Andrews, DRAGAN      "

## 2023-12-04 NOTE — GROUP NOTE
Group Topic: Other   Group Date: 12/4/2023  Start Time: 1100  End Time: 1145  Facilitators: RBENDA Howard   Department: [facility] REHABTH VIRTUAL    Number of Participants: 5   Group Focus: other [facility]  Treatment Modality: Other: Recreation Therapy  Interventions utilized were leisure development  Purpose: other: increase social skills, increase stimulation, increase attention and concentration, increase activity level, elevate mood, eye-hand coordination, following directions    Name: Azul Roberts YOB: 1960   MR: [id number]      Facilitator: Recreational Therapist  Level of Participation: active  Quality of Participation: appropriate/pleasant, attentive, cooperative, engaged, and initiates communication  Interactions with others: appropriate  Mood/Affect: appropriate, bright, and positive  Triggers (if applicable): n/a  Cognition: coherent/clear  Progress: Moderate  Comments: pt problem is depressed mood. Pt engages easily with staff and peers. Pleasant with mood and behaviors. Smiles often.   Plan: continue with services

## 2023-12-04 NOTE — NURSING NOTE
SARAN NOTE     Problem:  depression     Behavior:  Pt isolated herself to her room, refused to come out for a snack, tearful and visually upset. Pt stated that her  was suppose to call back before 8 pm and he never did. Pt is anxious and concerned about her not being able to return home. Denied SI    Group Participation: n/a  Appetite/Meals: refused snack       Interventions:  1:1 intervention with active listening and support provided, scheduled medication administered    Response:  Pt is compliant with her scheduled medication     Plan:  Continue monitoring, adhere to care plan

## 2023-12-04 NOTE — CARE PLAN
The patient's goals for the shift include go home    The clinical goals for the shift include promote rest    Over the shift, the patient did not make progress toward the following goals. Barriers to progression include NA. Recommendations to address these barriers include NA.

## 2023-12-04 NOTE — GROUP NOTE
Group Topic: Music Therapy   Group Date: 12/4/2023  Start Time: 1000  End Time: 1110  Facilitators: Chanda Benton   Department: [facility]    Number of Participants: 9   Group Focus: music therapy  Treatment Modality: Music Therapy  Interventions utilized were other : live music engagement, educational music topics, passive music engagement and listening.  Purpose: communication skills and self-care    Name: Azul Roberts YOB: 1960   MR: [id number]      Facilitator: Music Therapist  Level of Participation: moderate  Quality of Participation: appropriate/pleasant and initiates communication  Interactions with others: appropriate, asked thoughtful questions, and offered helpful suggestions  Mood/Affect: anxious, appropriate, and tearful  Triggers (if applicable):   Cognition: goal directed and logical  Progress: Moderate  Comments: Pt was tearful during session, but pleasant and cooperative. Appropriate communication with other group members as well.  Plan: continue with services

## 2023-12-05 PROCEDURE — 2500000004 HC RX 250 GENERAL PHARMACY W/ HCPCS (ALT 636 FOR OP/ED): Performed by: PSYCHIATRY & NEUROLOGY

## 2023-12-05 PROCEDURE — 2500000002 HC RX 250 W HCPCS SELF ADMINISTERED DRUGS (ALT 637 FOR MEDICARE OP, ALT 636 FOR OP/ED): Performed by: STUDENT IN AN ORGANIZED HEALTH CARE EDUCATION/TRAINING PROGRAM

## 2023-12-05 PROCEDURE — 99232 SBSQ HOSP IP/OBS MODERATE 35: CPT | Performed by: PSYCHIATRY & NEUROLOGY

## 2023-12-05 PROCEDURE — 1140000001 HC PRIVATE PSYCH ROOM DAILY

## 2023-12-05 PROCEDURE — 2500000001 HC RX 250 WO HCPCS SELF ADMINISTERED DRUGS (ALT 637 FOR MEDICARE OP): Performed by: PSYCHIATRY & NEUROLOGY

## 2023-12-05 PROCEDURE — 2500000005 HC RX 250 GENERAL PHARMACY W/O HCPCS: Performed by: PSYCHIATRY & NEUROLOGY

## 2023-12-05 RX ADMIN — CELECOXIB 200 MG: 200 CAPSULE ORAL at 14:35

## 2023-12-05 RX ADMIN — LIDOCAINE 1 PATCH: 4 PATCH TOPICAL at 08:06

## 2023-12-05 RX ADMIN — Medication 125 MCG: at 08:06

## 2023-12-05 RX ADMIN — QUETIAPINE FUMARATE 350 MG: 300 TABLET ORAL at 20:04

## 2023-12-05 RX ADMIN — Medication 5 MG: at 17:03

## 2023-12-05 RX ADMIN — BUPROPION HYDROCHLORIDE 300 MG: 300 TABLET, FILM COATED, EXTENDED RELEASE ORAL at 08:06

## 2023-12-05 RX ADMIN — TRAZODONE HYDROCHLORIDE 150 MG: 50 TABLET ORAL at 21:20

## 2023-12-05 RX ADMIN — DULOXETINE HYDROCHLORIDE 120 MG: 60 CAPSULE, DELAYED RELEASE ORAL at 08:06

## 2023-12-05 ASSESSMENT — PAIN SCALES - GENERAL
PAINLEVEL_OUTOF10: 0 - NO PAIN
PAINLEVEL_OUTOF10: 0 - NO PAIN

## 2023-12-05 ASSESSMENT — PAIN - FUNCTIONAL ASSESSMENT
PAIN_FUNCTIONAL_ASSESSMENT: 0-10
PAIN_FUNCTIONAL_ASSESSMENT: 0-10

## 2023-12-05 ASSESSMENT — ENCOUNTER SYMPTOMS
SLEEP DISTURBANCE: 1
DYSPHORIC MOOD: 1
DECREASED CONCENTRATION: 1

## 2023-12-05 NOTE — GROUP NOTE
Group Topic: Leisure Skills   Group Date: 12/5/2023  Start Time: 1100  End Time: 1150  Facilitators: BRENDA Howard   Department: [facility] REHABTH VIRTUAL    Number of Participants: 7   Group Focus: leisure skills, other  , and social skills  Treatment Modality: Other: Recreation Therapy  Interventions utilized were exploration, group exercise, and mental fitness  Purpose: other: increase socialization, increase stimulation, increase activity level increase direction following.      Name: Azul Roberts YOB: 1960   MR: [id number]      Facilitator: Recreational Therapist  Level of Participation: active  Quality of Participation: appropriate/pleasant, attentive, cooperative, engaged, and initiates communication  Interactions with others: appropriate  Mood/Affect: appropriate, bright, and positive  Triggers (if applicable): n/a  Cognition: coherent/clear and logical  Progress: Moderate  Comments: pt problem is depressed mood. Engages easily with staff and peers without prompting or encouragement needed.   Plan: continue with services

## 2023-12-05 NOTE — GROUP NOTE
Group Topic: Other   Group Date: 12/5/2023  Start Time: 1500  End Time: 1550  Facilitators: BRENDA Howard   Department: [facility] REHABTH VIRTUAL    Number of Participants: 5   Group Focus: Other- Mad Libs, leisure skills, social skills, laughter   Treatment Modality: Other: Recreation Therapy  Interventions utilized were group exercise, mental fitness, and story telling  Purpose: other: increase stimulation, increase socialization, increase motivation, elevate mood    Name: Azul Roberts YOB: 1960   MR: [id number]      Facilitator: Recreational Therapist  Level of Participation: active  Quality of Participation: appropriate/pleasant, attentive, cooperative, engaged, and initiates communication  Interactions with others: appropriate  Mood/Affect: appropriate, bright, and positive  Triggers (if applicable): n/a  Cognition: coherent/clear  Progress: Moderate  Comments: pt problem is depressed mood. Continues to engage easily with staff and peers. Laughs appropriately. Calm and cooperative.   Plan: continue with services

## 2023-12-05 NOTE — NURSING NOTE
SARAN NOTE     Problem:  Depression     Behavior:  Patient is in patient’s room laying in bed awake. Patient is pleasant and calm, but sad and tearful. Patient's affect is blunted. Patient is a little talkative. Patient maintains brief eye contact.    Group Participation: N/A  Appetite/Meals: N/A       Interventions:  This nurse completed a shift assessment and administered patient's scheduled night time medications.    Response:  Patient remained pleasant and calm and was cooperative throughout this shift assessment and medication administration.     Plan:  Continue to monitor for depression. Continue to monitor for patient safety.

## 2023-12-05 NOTE — NURSING NOTE
General Note      At this time of reassessment for the effectiveness of PRN PO traZODone (Desyrel) 150mg given for Sleep at 2113 on 12/4/2023, patient is in patient's room laying in bed sleeping.

## 2023-12-05 NOTE — PROGRESS NOTES
"Azul Roberts is a 63 y.o. female on day 87 of admission presenting with Severe recurrent major depression without psychotic features (CMS/[facility]).    Subjective   Case discussed with treatment team and chart reviewed.    Presenting as less dire today, and still offering pessimistic outcomes reporting \"nothings going to get better anyway, so do whatever you want.\"  This being stated after discussion regarding patients disposition most optimally.  Patient and  discussing/requesting disposition to the community yesterday in lieu of transition to nursing care.  Again reviewed that patients medicaid remains in pending and not active status and beyond acceptance to a nursing facility that accepts this status, patient is essentially without required coverage for most appropriate follow up in the community.  Ultimately, and after ongoing discussion regarding recommended disposition, patient accepting of transition to nursing level of care and  continues to coordinate as such.  This timeline may allow her to transition from the hospital in short order (this week).    Has not had dangerous nor agitated behaviors.  Patient is no longer expressing exacerbated mood features and suicidal ideations and is maintaining safely.  Sitter to be discontinued today as well as such with ongoing close monitoring checks.    Declined therapy today stating, \"I'm not in the mood today.\"  However, did attend recreational therapy groups.      Objective     Vitals:    12/03/23 1700 12/04/23 0500 12/04/23 1600 12/05/23 0500   BP: 130/87 118/79 (!) 126/92 110/65   BP Location: Left arm Left arm Left arm Left arm   Patient Position: Standing Lying Sitting Lying   Pulse: 93 81 91 86   Resp: 18 19  18   Temp: 37 °C (98.6 °F) 36.5 °C (97.7 °F) 37 °C (98.6 °F) 36.6 °C (97.9 °F)   TempSrc: Temporal Temporal Temporal Oral   SpO2: 98% 99% 100% 98%   Weight:       Height:            Review of Systems   Psychiatric/Behavioral:  Positive " "for decreased concentration, dysphoric mood and sleep disturbance.      Psychiatric ROS - Adult  Anxiety: General Anxiety Disorder (KAYLEEN)KAYLEEN Behaviors: difficult to control worry, difficulty concentrating, irritability, restlessness, and sleep disturbance  Depression: anhedonia, appetite increased, concentration, energy, helpless, hopeless, interest, persistent thoughts of death, psychomotor agitation, sleep decreased , suicidal thoughts, and withdrawn  Delirium: negative  Psychosis: negative  Ioana: negative  Safety Issues: passive death wish and suicidal ideation  Psychiatric ROS Comment: as noted    Physical Exam  Abdominal:      Comments: Presence of colostomy bag   Psychiatric:         Attention and Perception: Attention and perception normal.         Mood and Affect: Mood is depressed. Affect is flat and inappropriate.         Behavior: Behavior is slowed.     Mental Status Examination  General Appearance:  less disheveled with improved eye contact, . In room, not tearful, sitter at bedside  Gait/Station: using wheelchair to move about the milieu under her own propulsion  Speech: normal volume,   Mood: \"Im better today, I guess\"  Affect: Constricted,  Thought Process: less impoverished  Thought Associations: No loosening of associations  Thought Content: improving, linear, logical, more spontaneous and overall increase in content  Perception: No perceptual abnormalities noted  Level of Consciousness: Alert  Orientation: Alert  Attention and Concentration: Mild impairment in attention  Recent Memory: Intact as evidenced by ability to recall details from the past 24 hours   Executive function: Intact  Language: Naming intact  Fund of knowledge: Good  Insight: Fair, as evidenced by increasing participation in discussion about her scenario and symptoms and their improvement  Judgment: Fair, as evidenced by ability to reason through medical decision making, compliance with treatment recommendations, and " improving      Psychiatric Risk Assessment  Violence Risk Assessment: major mental illness and substance abuse  Acute Risk of Harm to Others is Considered: moderate   Suicide Risk Assessment: , chronic medical illness, chronic pain, current psychiatric illness, feelings of hopelessness, global insomnia, history of trauma or abuse, life crisis (shame/despair), prior suicide attempt, severe anxiety, substance abuse, and suicidal ideations  Protective Factors against Suicide: adherence to  treatment and marriage/partnership  Acute Risk of Harm to Self is Considered: moderate    Relevant Results  Scheduled medications  buPROPion XL, 300 mg, oral, Daily  celecoxib, 200 mg, oral, Daily  cholecalciferol, 125 mcg, oral, Daily  DULoxetine, 120 mg, oral, Daily  lidocaine, 1 patch, transdermal, Daily  melatonin, 5 mg, oral, Daily  QUEtiapine, 350 mg, oral, Nightly      Continuous medications     PRN medications  PRN medications: acetaminophen, alum-mag hydroxide-simeth, diphenhydrAMINE, hydrOXYzine pamoate, OLANZapine, OLANZapine zydis, traZODone     Assessment/Plan   Principal Problem:    Severe recurrent major depression without psychotic features (CMS/[facility])  Active Problems:    Colostomy present (CMS/[facility])    Opioid abuse (CMS/[facility])    Bilateral hip pain    Patient and  now requesting discharge home.  Patient's Medicaid still remains not fully active.  Social work and family with this provider working to coordinate safest transition for patient which may include going home this week however patient now agreeable to nursing home level of care for discharge.  Level of care recommendation from this treatment team remains nursing home at discharge.    Biological -     Orthostatics - confirmed on measurement.  Ongoing efforts to communicate with patient about taking her time when changing position to help prevent falls.    Mouth checks.    Cymbalta 120 mg daily for depression, this is max dose  Wellbutrin xl 300 mg  for adjunctive depression treatment  seroquel 350 mg at bedtime  Trazodone 150 mg PRN and melatonin at bedtime for sleep  No adverse side effects of medications noted or reported.  ECG (10/4/23): Normal sinus rhythm. Heart  rate 72 bpm. Qtc 442  11/13/23 - qtc 441    Declined ECT consideration during discussion on 11/4, 11/7.   ECT is not available at this facility and this would include transitioning to another psychiatric hospital for this level of service or pursuing as an outpatient on discharge.    Discussion with patient regarding risk benefits and alternatives and side effects with opportunity to ask questions and questions answered.  Patient given consent to the plan as noted    2. Psychological -     Patient is encouraged to participate with the therapeutic milieu and program and group therapy    3. Sociological -      Patient encouraged to cooperate with social work staff on issues relevant to discharge planning  Pending [place] medicaid and then transition to convalescence nursing care once this becomes available to patient    25 minutes spent coordinating care for patient on this day    Parts of this chart have been completed using voice recognition software.  Please excuse any errors of transcription.  Despite the medical decision making time stamp, my medical decision making has taken place during the patient's entire visit.  Thought process and reason for plan has been formulated from the time that I saw the patient until the time of disposition and is not specific to one specific moment during their visit and furthermore the medical decision making encompasses the entire chart and not only that represented in this note.        Eddie Cullen, DO

## 2023-12-05 NOTE — PROGRESS NOTES
LSW and provider met with pt to discuss discharge to [facility]. LSW and provider encouraged pt to continue to follow this plan as pt would not have access to medical care upon discharge being that her insurance is not active. LSW informed pt that she would have more access to things to do at the facility than currently in the hospital. Pt was agreeable to going to a facility under the circumstances although this is not want she wants to do. Pt needs to be able to have improved strength and mobility to be able to return home. As well as pt needs to be able to provide care to herself, being there is limited support at home. LSW will continue to submit referrals to facilities for placement.     Nikki Andrews, DRAGAN

## 2023-12-05 NOTE — NURSING NOTE
General Note      Patient is requesting a medication for sleep for tonight. This nurse administered PRN PO traZODone (Desyrel) 150mg for Sleep at 2113 on 12/4/2023.

## 2023-12-05 NOTE — PROGRESS NOTES
"Physical Therapy                 Therapy Communication Note    Patient Name: Azul Roberts  MRN: [id number]  Today's Date: 12/5/2023     Discipline: Physical Therapy    Missed Visit Reason: Missed Visit Reason: Patient refused    Missed Time: Cancel    Comment: Pt supine in bed and stated, \"I'm not in the mood today.\" Continued to decline therapy despite encouragement.   "

## 2023-12-06 PROCEDURE — 2500000004 HC RX 250 GENERAL PHARMACY W/ HCPCS (ALT 636 FOR OP/ED): Performed by: PSYCHIATRY & NEUROLOGY

## 2023-12-06 PROCEDURE — 2500000001 HC RX 250 WO HCPCS SELF ADMINISTERED DRUGS (ALT 637 FOR MEDICARE OP): Performed by: PSYCHIATRY & NEUROLOGY

## 2023-12-06 PROCEDURE — 99232 SBSQ HOSP IP/OBS MODERATE 35: CPT | Performed by: PSYCHIATRY & NEUROLOGY

## 2023-12-06 PROCEDURE — 2500000005 HC RX 250 GENERAL PHARMACY W/O HCPCS: Performed by: PSYCHIATRY & NEUROLOGY

## 2023-12-06 PROCEDURE — 99232 SBSQ HOSP IP/OBS MODERATE 35: CPT | Performed by: INTERNAL MEDICINE

## 2023-12-06 PROCEDURE — 1140000001 HC PRIVATE PSYCH ROOM DAILY

## 2023-12-06 PROCEDURE — 2500000002 HC RX 250 W HCPCS SELF ADMINISTERED DRUGS (ALT 637 FOR MEDICARE OP, ALT 636 FOR OP/ED): Performed by: STUDENT IN AN ORGANIZED HEALTH CARE EDUCATION/TRAINING PROGRAM

## 2023-12-06 RX ADMIN — DULOXETINE HYDROCHLORIDE 120 MG: 60 CAPSULE, DELAYED RELEASE ORAL at 08:22

## 2023-12-06 RX ADMIN — LIDOCAINE 1 PATCH: 4 PATCH TOPICAL at 08:25

## 2023-12-06 RX ADMIN — CELECOXIB 200 MG: 200 CAPSULE ORAL at 14:39

## 2023-12-06 RX ADMIN — Medication 125 MCG: at 08:22

## 2023-12-06 RX ADMIN — TRAZODONE HYDROCHLORIDE 150 MG: 50 TABLET ORAL at 21:00

## 2023-12-06 RX ADMIN — QUETIAPINE FUMARATE 350 MG: 300 TABLET ORAL at 21:00

## 2023-12-06 RX ADMIN — Medication 5 MG: at 17:40

## 2023-12-06 RX ADMIN — BUPROPION HYDROCHLORIDE 300 MG: 300 TABLET, FILM COATED, EXTENDED RELEASE ORAL at 08:22

## 2023-12-06 ASSESSMENT — PAIN SCALES - GENERAL
PAINLEVEL_OUTOF10: 0 - NO PAIN
PAINLEVEL_OUTOF10: 0 - NO PAIN

## 2023-12-06 ASSESSMENT — PAIN - FUNCTIONAL ASSESSMENT
PAIN_FUNCTIONAL_ASSESSMENT: 0-10
PAIN_FUNCTIONAL_ASSESSMENT: 0-10

## 2023-12-06 ASSESSMENT — ENCOUNTER SYMPTOMS
SLEEP DISTURBANCE: 1
DECREASED CONCENTRATION: 1
DYSPHORIC MOOD: 1

## 2023-12-06 NOTE — PROGRESS NOTES
LSW submitted several referrals to facilities regarding pt's discharge to SNF. [facility] stated that they are able to accept pt based on clinicals but it is the financial piece that they are concerned about. Being that pt needs skilled care and does not have any other insurance to cover the care, they would require 30 days of private pay upfront in order to admit pt. This is being that pt is Medicaid Pending status and requires skilled care. It would be different if pt was admitted just under long term care needs or if she had Medicare as well. Pt's discharge is uncertain at this time being that all options are contingent upon her having active Medicaid, which is uncertain as to when it would be active. The termination letter from [place] needs to be obtain before [place] can proceed further and then they would need to process that information.     Nikki Andrews, RUFUS

## 2023-12-06 NOTE — GROUP NOTE
Group Topic: Music Therapy   Group Date: 12/6/2023  Start Time: 1000  End Time: 1100  Facilitators: Chanda Benton   Department: [facility]    Number of Participants: 9   Group Focus: music therapy  Treatment Modality: Music Therapy  Interventions utilized were other active music engagement, passive music engagement, educational music content  Purpose: coping skills, communication skills, and self-care    Name: Azul Roberts YOB: 1960   MR: [id number]      Facilitator: Music Therapist  Level of Participation: moderate  Quality of Participation: appropriate/pleasant, cooperative, engaged, and supportive  Interactions with others: appropriate, supportive, asked thoughtful questions, and offered helpful suggestions  Mood/Affect: appropriate, brightens with interaction, and tearful  Triggers (if applicable): n/a  Cognition: coherent/clear, goal directed, and insightful  Progress: Moderate  Comments: Pt seemed tearful at times but remained very participative within group. Great interactions with others in the group.  Plan: continue with services

## 2023-12-06 NOTE — NURSING NOTE
General Note      Patient is requesting a medication for sleep for tonight. This nurse administered PRN PO traZODone (Desyrel) 150mg for Sleep at 2120 on 12/5/2023.

## 2023-12-06 NOTE — GROUP NOTE
Group Topic: Reflection   Group Date: 12/5/2023  Start Time: 1000  End Time: 1100  Facilitators: HANY Beverly   Department: [facility]     Number of Participants: 8   Group Focus: communication  Treatment Modality: Interpersonal Therapy  Interventions utilized were leisure development  Purpose: communication skills    Name: Azul Roberts YOB: 1960   MR: [id number]      Facilitator:   Level of Participation: minimal  Quality of Participation: appropriate/pleasant  Interactions with others: appropriate  Mood/Affect: appropriate  Triggers (if applicable): n/a  Cognition: coherent/clear  Progress: Minimal  Comments: Azul did take part in group discussion but was unable to complete activity d/t vision difficulties. Stephenie did share her positive communication styles and believes that she is a 8 or 9 out of 10 for communication.  Plan: continue with services

## 2023-12-06 NOTE — PROGRESS NOTES
"Nutrition Follow up Note    Nutrition Assessment      Pt continues to eat well. No updated wt since 11/8/23.     Nutrition History:  Energy Intake: Good > 75 %    Anthropometrics:  Ht: 162.6 cm (5' 4\"), Wt: 78.9 kg (174 lb), BMI: 29.85  IBW/kg (Dietitian Calculated): 54.55 kg  Adjusted Body Weight (kg): 60.91 kg    Weight Change:  Weight History / % Weight Change: no updated wt since 11/8/23    Nutrition Focused Physical Exam Findings:   Physical Findings (Nutrition Deficiency/Toxicity)  Skin:  (colostomy)    Nutrition Significant Labs:  Lab Results   Component Value Date    WBC 8.8 09/11/2023    HGB 11.9 (L) 09/11/2023    HCT 36.3 09/11/2023     09/11/2023    CHOL 184 09/11/2023    TRIG 99 09/11/2023    HDL 45 (L) 09/11/2023    ALT 15 09/11/2023    AST 15 09/11/2023     11/08/2023    K 4.4 11/08/2023     11/08/2023    CREATININE 0.80 11/08/2023    BUN 21 11/08/2023    CO2 25 11/08/2023    TSH 1.64 09/11/2023    HGBA1C 5.8 09/11/2023       Current Facility-Administered Medications:     acetaminophen (Tylenol) tablet 650 mg, 650 mg, oral, q6h PRN, Eddie Cullen, DO, 650 mg at 11/19/23 1547    alum-mag hydroxide-simeth (Mylanta) 200-200-20 mg/5 mL oral suspension 30 mL, 30 mL, oral, q6h PRN, Eddie Cullen, DO    buPROPion XL (Wellbutrin XL) 24 hr tablet 300 mg, 300 mg, oral, Daily, Eddie Cullen DO, 300 mg at 12/06/23 0822    celecoxib (CeleBREX) capsule 200 mg, 200 mg, oral, Daily, Eddie Cullen DO, 200 mg at 12/05/23 1435    cholecalciferol (Vitamin D-3) capsule 125 mcg, 125 mcg, oral, Daily, Eddie Cullen DO, 125 mcg at 12/06/23 0822    diphenhydrAMINE (Sominex) tablet 25 mg, 25 mg, oral, q8h PRN, Eddie Cullen DO    DULoxetine (Cymbalta) DR capsule 120 mg, 120 mg, oral, Daily, Massimo Barroso MD, 120 mg at 12/06/23 0822    hydrOXYzine pamoate (Vistaril) capsule 25 mg, 25 mg, oral, q8h PRN, Eddie Cullen DO, 25 mg at 12/04/23 1240    lidocaine 4 % patch 1 patch, 1 " patch, transdermal, Daily, Eddie H Zarowitz, DO, 1 patch at 12/06/23 0825    melatonin tablet 5 mg, 5 mg, oral, Daily, Eddie H Zarowitz, DO, 5 mg at 12/05/23 1703    OLANZapine (ZyPREXA) injection 5 mg, 5 mg, intramuscular, q8h PRN, Eddie H Zarowitz, DO    OLANZapine zydis (ZyPREXA) disintegrating tablet 5 mg, 5 mg, oral, q8h PRN, Eddie H Zarowitz, DO    QUEtiapine (SEROquel) tablet 350 mg, 350 mg, oral, Nightly, Eddie H Zarowitz, DO, 350 mg at 12/05/23 2004    traZODone (Desyrel) tablet 150 mg, 150 mg, oral, Nightly PRN, Eddie H Zarowitz, DO, 150 mg at 12/05/23 2120    Dietary Orders (From admission, onward)       Start     Ordered    09/28/23 1750  Adult diet Regular  Diet effective now        Question:  Diet type  Answer:  Regular    09/28/23 1800                  Estimated Needs:   Estimated Energy Needs  Total Energy Estimated Needs (kCal): 1517 kCal  Method for Estimating Needs: 25 kcals/kg ABW    Estimated Protein Needs  Total Protein Estimated Needs (g):  (49-61)  Method for Estimating Needs: 0.8-1 g/kg ABW    Estimated Fluid Needs  Total Fluid Estimated Needs (mL): 1517 mL  Method for Estimating Needs: 1 ml/kcal ABW        Nutrition Diagnosis   Nutrition Diagnosis:  Malnutrition Diagnosis  Patient has Malnutrition Diagnosis: No    Nutrition Diagnosis  Patient has Nutrition Diagnosis: Yes  Diagnosis Status (1): Ongoing  Nutrition Diagnosis 1: Unintended weight loss  Related to (1): unknown etiology  As Evidenced by (1): non-significant wt loss       Nutrition Interventions/Recommendations   Nutrition Interventions and Recommendations:    Nutrition Prescription:  Individualized Nutrition Prescription Provided for : 1517 kcals, 49-61g protein to be provided via diet    Nutrition Interventions:   Food and/or Nutrient Delivery Interventions  Interventions: Meals and snacks  Meals and Snacks: General healthful diet  Goal: provide as ordered  Additional Interventions: recommend obtaining current  wt    Education Documentation  No documentation found.           Nutrition Monitoring and Evaluation   Monitoring/Evaluation:   Food/Nutrient Related History Monitoring  Monitoring and Evaluation Plan: Energy intake  Energy Intake: Estimated energy intake  Criteria: pt to tolerate >/= 50% meals       Time Spent/Follow-up:   Follow Up  Time Spent (min): 10 minutes  Last Date of Nutrition Visit: 12/06/23  Nutrition Follow-Up Needed?: 7-10 days  Follow up Comment: 12/15/23

## 2023-12-06 NOTE — PROGRESS NOTES
Physical Therapy                 Therapy Communication Note    Patient Name: Azul Roberts  MRN: [id number]  Today's Date: 12/6/2023     Discipline: Physical Therapy    Missed Visit Reason: Missed Visit Reason: Patient refused    Missed Time: Attempt    Comment: Patient declined PT despite multiple attempts by therapist,

## 2023-12-06 NOTE — GROUP NOTE
Group Topic: Music Therapy   Group Date: 12/6/2023  Start Time: 1000  End Time: 1100  Facilitators: Chanda Benton   Department: [facility]    Number of Participants: 9   Group Focus: music therapy  Treatment Modality: Music Therapy  Interventions utilized were other active music engagement, passive music engagement, educational music content  Purpose: coping skills, communication skills, and self-care    Name: Azul Roberts YOB: 1960   MR: [id number]      Facilitator: Music Therapist  Level of Participation: moderate  Quality of Participation: appropriate/pleasant, attentive, engaged, and passive  Interactions with others: appropriate and supportive  Mood/Affect: appropriate and closed / guarded  Triggers (if applicable): n/a  Cognition: insightful and logical  Progress: Moderate  Comments: Pt expressed not feeling well during the session and left. Appropriate termination completed.  Plan: patient will be encouraged to utilize music as a coping skill

## 2023-12-06 NOTE — NURSING NOTE
General Note      At this time of reassessment for the effectiveness of PRN PO traZODone (Desyrel) 150mg given for Sleep at 2120 on 12/5/2023, patient is in patient's room laying in bed sleeping.

## 2023-12-06 NOTE — PROGRESS NOTES
Azul Roberts is a 63 y.o. female on day 88 of admission presenting with Severe recurrent major depression without psychotic features (CMS/[facility]).    Subjective   Case discussed with treatment team and chart reviewed.    In bed and withdrawn just prior to encounter.  However, this morning patient was observed attending groups.  She did decline physical and occupational therapy today.  We discussed today with patient medication assisted therapy for opioid use disorder.  Patient reports she has previously been prescribed Suboxone but was no longer linked with the program.  She declines to offer further details as to why she was discontinued on Suboxone.  We offer her initiation of naltrexone with potential conversion to Vivitrol.  Patient declines on discussion.  We discussed that we will allow for a Narcan prescription at discharge.  Patient reports that going to a nursing home will protect her from relapse.  Discussed with patient that she would still benefit from this medication to help maintain sobriety and abstinence from opioids.  She continues to decline.  Social work report are working to investigate nursing home disposition and placement for patient at this time.    Objective     Vitals:    12/04/23 1600 12/05/23 0500 12/05/23 1700 12/06/23 0500   BP: (!) 126/92 110/65 103/68 110/65   BP Location: Left arm Left arm Left arm Left arm   Patient Position: Sitting Lying Sitting Lying   Pulse: 91 86 95 79   Resp:  18 16 18   Temp: 37 °C (98.6 °F) 36.6 °C (97.9 °F) 37 °C (98.6 °F) 36.5 °C (97.7 °F)   TempSrc: Temporal Oral Temporal Oral   SpO2: 100% 98% 96% 97%   Weight:       Height:            Review of Systems   Psychiatric/Behavioral:  Positive for decreased concentration, dysphoric mood and sleep disturbance.      Psychiatric ROS - Adult  Anxiety: General Anxiety Disorder (KAYLEEN)KAYLEEN Behaviors: difficult to control worry, difficulty concentrating, irritability, restlessness, and sleep disturbance  Depression:  "anhedonia, appetite increased, concentration, energy, helpless, hopeless, interest, persistent thoughts of death, psychomotor agitation, sleep decreased , suicidal thoughts, and withdrawn  Delirium: negative  Psychosis: negative  Ioana: negative  Safety Issues: passive death wish and suicidal ideation  Psychiatric ROS Comment: as noted    Physical Exam  Abdominal:      Comments: Presence of colostomy bag   Psychiatric:         Attention and Perception: Attention and perception normal.         Mood and Affect: Mood is depressed. Affect is flat and inappropriate.         Behavior: Behavior is slowed.     Mental Status Examination  General Appearance:  less disheveled with improved eye contact, . In room in bed  Gait/Station: using wheelchair to move about the milieu under her own propulsion  Speech: normal volume,   Mood: \"ok\"  Affect: Constricted,  Thought Process: less impoverished  Thought Associations: No loosening of associations  Thought Content: improving, linear, logical, more spontaneous and overall increase in content  Perception: No perceptual abnormalities noted  Level of Consciousness: Alert  Orientation: Alert  Attention and Concentration: Mild impairment in attention  Recent Memory: Intact as evidenced by ability to recall details from the past 24 hours   Executive function: Intact  Language: Naming intact  Fund of knowledge: Good  Insight: Fair, as evidenced by increasing participation in discussion about her scenario and symptoms and their improvement  Judgment: Fair, as evidenced by ability to reason through medical decision making, compliance with treatment recommendations, and improving      Psychiatric Risk Assessment  Violence Risk Assessment: major mental illness and substance abuse  Acute Risk of Harm to Others is Considered: moderate   Suicide Risk Assessment: , chronic medical illness, chronic pain, current psychiatric illness, feelings of hopelessness, global insomnia, history of " trauma or abuse, life crisis (shame/despair), prior suicide attempt, severe anxiety, substance abuse, and suicidal ideations  Protective Factors against Suicide: adherence to  treatment and marriage/partnership  Acute Risk of Harm to Self is Considered: moderate    Relevant Results  Scheduled medications  buPROPion XL, 300 mg, oral, Daily  celecoxib, 200 mg, oral, Daily  cholecalciferol, 125 mcg, oral, Daily  DULoxetine, 120 mg, oral, Daily  lidocaine, 1 patch, transdermal, Daily  melatonin, 5 mg, oral, Daily  QUEtiapine, 350 mg, oral, Nightly      Continuous medications     PRN medications  PRN medications: acetaminophen, alum-mag hydroxide-simeth, diphenhydrAMINE, hydrOXYzine pamoate, OLANZapine, OLANZapine zydis, traZODone     Assessment/Plan   Principal Problem:    Severe recurrent major depression without psychotic features (CMS/[facility])  Active Problems:    Colostomy present (CMS/[facility])    Opioid abuse (CMS/[facility])    Bilateral hip pain    Patient and  now requesting discharge home.  Patient's Medicaid still remains not fully active.  Social work and family with this provider working to coordinate safest transition for patient which may include going home this week however patient now agreeable to nursing home level of care for discharge.  Level of care recommendation from this treatment team remains nursing home at discharge.    Biological -     Orthostatics - confirmed on measurement.  Ongoing efforts to communicate with patient about taking her time when changing position to help prevent falls.    Mouth checks.    Cymbalta 120 mg daily for depression, this is max dose  Wellbutrin xl 300 mg for adjunctive depression treatment  seroquel 350 mg at bedtime  Trazodone 150 mg PRN and melatonin at bedtime for sleep  No adverse side effects of medications noted or reported.  ECG (10/4/23): Normal sinus rhythm. Heart  rate 72 bpm. Qtc 442  11/13/23 - qtc 441    Declined ECT consideration during discussion on 11/4,  11/7.   ECT is not available at this facility and this would include transitioning to another psychiatric hospital for this level of service or pursuing as an outpatient on discharge.    Declined initiation of naltrexone/vivitrol    Discussion with patient regarding risk benefits and alternatives and side effects with opportunity to ask questions and questions answered.  Patient given consent to the plan as noted    2. Psychological -     Patient is encouraged to participate with the therapeutic milieu and program and group therapy    3. Sociological -      Patient encouraged to cooperate with social work staff on issues relevant to discharge planning  Pending [place] medicaid and then transition to convalescence nursing care once this becomes available to patient    25 minutes spent coordinating care for patient on this day    Parts of this chart have been completed using voice recognition software.  Please excuse any errors of transcription.  Despite the medical decision making time stamp, my medical decision making has taken place during the patient's entire visit.  Thought process and reason for plan has been formulated from the time that I saw the patient until the time of disposition and is not specific to one specific moment during their visit and furthermore the medical decision making encompasses the entire chart and not only that represented in this note.        Eddie Cullen, DO

## 2023-12-06 NOTE — NURSING NOTE
BIRP NOTE     Problem:  Depression     Behavior:  Patient is sitting in the group room socially interacting with other patients and playing a card game with other patients. Patient is pleasant and calm. Patient’s affect is broad range. Patient is talkative. Patient maintains good eye contact.    Group Participation: N/A  Appetite/Meals: N/A       Interventions:  This nurse completed a shift assessment and administered patient's scheduled night time medications.    Response:  Patient remained pleasant and calm and was cooperative throughout this shift assessment and medication administration.     Plan:  Continue to monitor for depression. Continue to monitor for patient safety.

## 2023-12-06 NOTE — PROGRESS NOTES
DRAGAN received a phone call from Vannesa a hospital liaison who thinks that one of their locations in [place] might be a good fit for pt. Vannesa would like to do an onsite today with pt. They are aware of the Medicaid Pending status and would be wiling to work with pt to help her obtain the medicaid and provide care to her in the time being. DRAGAN will work with pt and facility to coordinate possible admission.      DRAGAN Low

## 2023-12-06 NOTE — GROUP NOTE
"Group Topic: Other   Group Date: 12/6/2023  Start Time: 1115  End Time: 1145  Facilitators: BRENDA Fernandes   Department: [facility]    Number of Participants: 5   Group Focus: other What's up?  Treatment Modality: Other: Recreation therapy  Interventions utilized were mental fitness, orientation, and reality testing  Purpose: feelings and other: increase attention, increase stimulation, elevate mood, increase socialization    Name: Azul Roberts YOB: 1960   MR: [id number]      Facilitator: Recreational Therapist  Level of Participation: moderate  Quality of Participation: quiet  Interactions with others: appropriate  Mood/Affect: irritable  Triggers (if applicable): n/a  Cognition: coherent/clear  Progress: Minimal  Comments: pt problem is depressed mood.  Pt continues to express frustration at \"still being here\".  Reports feeling \"like I am never going home\".  Accepts re-direction and assurance that she is be discharged when a safe and appropriate plan has been met.    Plan: continue with services      "

## 2023-12-06 NOTE — GROUP NOTE
Group Topic: Other   Group Date: 12/6/2023  Start Time: 1515  End Time: 1600  Facilitators: BRENDA Fernandes   Department: [facility]    Number of Participants: 6   Group Focus: other Can you name 5?  Treatment Modality: Other: Recreation therapy  Interventions utilized were mental fitness  Purpose: other: increase attention, increase socialization, increase alertness, elevate mood    Name: Azul Roberts YOB: 1960   MR: [id number]      Facilitator: Recreational Therapist  Level of Participation: active  Quality of Participation: appropriate/pleasant, attentive, cooperative, and engaged  Interactions with others: appropriate and gave feedback  Mood/Affect: appropriate and brightens with interaction  Triggers (if applicable): n/a  Cognition: coherent/clear  Progress: Significant  Comments: pt problem is depressed mood.  Plan: continue with services

## 2023-12-06 NOTE — PROGRESS NOTES
[facility]: [facility]  PROGRESS NOTE      Azul Roberts is a 63 y.o. female that is being seen  today for follow up at [facility]  Subjective   Patient is being seen for follow-up at [facility] unit.  Patient had a fall and was sent to the ER.  Patient was evaluated in the ER and CT scan of the brain was negative for any bleed.  Patient had a UA done which was positive for leukoesterase.  Culture is pending.  Patient has been at her baseline.  Colostomy has been working well.  Pain has been fairly controlled.    ROS  Negative for fever or chills  Negative for sore throat, ear pain, nasal discharge  Negative for cough, shortness of breath or wheezing  Negative for chest pain, palpitations, swelling of legs  Negative for abdominal pain, constipation, diarrhea, blood in the stools,has colostomy  Negative for urinary complaints  Negative for headache, dizziness, weakness or numbness  Negative for joint pain  Positive for depression or anxiety  All other systems reviewed and were negative  Vitals:    12/06/23 0500   BP: 110/65   Pulse: 79   Resp: 18   Temp: 36.5 °C (97.7 °F)   SpO2: 97%      Body mass index is 29.87 kg/m².  Physical Exam  Constitutional: Patient does not appear to be in any acute distress  Head and Face: NCAT  Eyes: Normal external exam, EOMI  ENT: Normal external inspection of ears and nose. Oropharynx normal.  Cardiovascular: RRR, S1/S2, no murmurs, rubs, or gallops, radial pulses +2, no edema of extremities  Pulmonary: CTAB, no respiratory distress.  Abdomen: +BS, soft, non-tender, nondistended, no guarding or rebound, no masses noted.colostomy present   MSK: hip joint pain   Skin- No lesions, contusions, or erythema.  Peripheral puslses palpable bilaterally 2+  Neuro: AAO X3, Cranial nerves 2-12 grossly intact,DTR 2+ in all 4 limbs       Diagnostic Results   Lab Results   Component Value Date    GLUCOSE 113 (H) 09/05/2023    CALCIUM 10.6 (H) 09/05/2023     09/05/2023     "K 3.7 09/05/2023    CO2 24 09/05/2023     09/05/2023    BUN 12 09/05/2023    CREATININE 0.72 09/05/2023     Lab Results   Component Value Date    ALT 12 09/05/2023    AST 14 09/05/2023    ALKPHOS 103 09/05/2023    BILITOT 0.4 09/05/2023     Lab Results   Component Value Date    WBC 13.2 (H) 09/05/2023    HGB 13.9 09/05/2023    HCT 43.0 09/05/2023    MCV 81 09/05/2023     09/05/2023     No results found for: \"CHOL\"  No results found for: \"HDL\"  No results found for: \"LDLCALC\"  No results found for: \"TRIG\"  No results found for: \"HGBA1C\"  Other labs not included in the list above were reviewed either before or during this encounter.    History    No past medical history on file.  No past surgical history on file.  No family history on file.  No Known Allergies  No current facility-administered medications on file prior to encounter.     Current Outpatient Medications on File Prior to Encounter   Medication Sig Dispense Refill    acetaminophen (Tylenol) 325 mg tablet Take 2 tablets (650 mg) by mouth 2 times a day.      hydrOXYzine pamoate (Vistaril) 25 mg capsule Take 1 capsule (25 mg) by mouth 2 times a day.      magnesium oxide (Mag-Ox) 400 mg tablet Take 1 tablet (400 mg) by mouth 2 times a day.      melatonin 5 mg tablet Take 1 tablet (5 mg) by mouth once daily at bedtime.      risperiDONE (RisperDAL) 2 mg tablet Take 1 tablet (2 mg) by mouth once daily at bedtime.      traZODone (Desyrel) 150 mg tablet Take 1 tablet (150 mg) by mouth once daily at bedtime.     Scheduled medications  buPROPion XL, 300 mg, oral, Daily  celecoxib, 200 mg, oral, Daily  cholecalciferol, 125 mcg, oral, Daily  citalopram, 30 mg, oral, Daily  lidocaine, 1 patch, transdermal, Daily  melatonin, 5 mg, oral, Nightly  QUEtiapine, 350 mg, oral, Nightly  traZODone, 150 mg, oral, Nightly      Continuous medications     PRN medications  PRN medications: acetaminophen, alum-mag hydroxide-simeth, diphenhydrAMINE, hydrOXYzine pamoate, " OLANZapine, OLANZapine zydis     There is no immunization history on file for this patient.  Patient's medical history was reviewed and updated either before or during this encounter.  ASSESSMENT / PLAN:  Active Hospital Problems    Bilateral hip pain      *Severe recurrent major depression without psychotic features (CMS/HCC)      Colostomy present (CMS/HCC)      Opioid abuse (CMS/HCC)  S/p fall.  Hip x-rays negative CT scan of the brain is negative     Pt. Has been stable.clostomy is working well.        Jay Knox MD

## 2023-12-06 NOTE — PROGRESS NOTES
SARAHW coordinated onsite visit with Vannesa fox liaison from [facility] for SNF placement for pt. LSW provided additional clinical information as requested. Pt met with liaison. Pt was pleasant and cooperative. LSW will wait to hear from Vannesa as to whether she is able to admit to one of their facilities.       Nikki Andrews, DRAGAN

## 2023-12-06 NOTE — NURSING NOTE
BIRP NOTE     Problem:  depression     Behavior:  No behaviors seen  Group Participation: yes  Appetite/Meals: 100x3       Interventions:  Advised to seek staff with any issuesa    Response:  Advised to seek staff with any concerns     Plan  Richard continivalentine to monitor

## 2023-12-07 ENCOUNTER — PHARMACY VISIT (OUTPATIENT)
Dept: PHARMACY | Facility: CLINIC | Age: 63
End: 2023-12-07
Payer: MEDICAID

## 2023-12-07 VITALS
WEIGHT: 174 LBS | TEMPERATURE: 98.6 F | OXYGEN SATURATION: 99 % | HEIGHT: 64 IN | RESPIRATION RATE: 18 BRPM | DIASTOLIC BLOOD PRESSURE: 69 MMHG | HEART RATE: 95 BPM | BODY MASS INDEX: 29.71 KG/M2 | SYSTOLIC BLOOD PRESSURE: 98 MMHG

## 2023-12-07 LAB — SARS-COV-2 RNA RESP QL NAA+PROBE: NOT DETECTED

## 2023-12-07 PROCEDURE — 2500000001 HC RX 250 WO HCPCS SELF ADMINISTERED DRUGS (ALT 637 FOR MEDICARE OP): Performed by: PSYCHIATRY & NEUROLOGY

## 2023-12-07 PROCEDURE — 87635 SARS-COV-2 COVID-19 AMP PRB: CPT | Performed by: PSYCHIATRY & NEUROLOGY

## 2023-12-07 PROCEDURE — 2500000005 HC RX 250 GENERAL PHARMACY W/O HCPCS: Performed by: PSYCHIATRY & NEUROLOGY

## 2023-12-07 PROCEDURE — 99232 SBSQ HOSP IP/OBS MODERATE 35: CPT | Performed by: PSYCHIATRY & NEUROLOGY

## 2023-12-07 PROCEDURE — RXMED WILLOW AMBULATORY MEDICATION CHARGE

## 2023-12-07 PROCEDURE — 2500000002 HC RX 250 W HCPCS SELF ADMINISTERED DRUGS (ALT 637 FOR MEDICARE OP, ALT 636 FOR OP/ED): Performed by: STUDENT IN AN ORGANIZED HEALTH CARE EDUCATION/TRAINING PROGRAM

## 2023-12-07 PROCEDURE — 1140000001 HC PRIVATE PSYCH ROOM DAILY

## 2023-12-07 PROCEDURE — 2500000004 HC RX 250 GENERAL PHARMACY W/ HCPCS (ALT 636 FOR OP/ED): Performed by: PSYCHIATRY & NEUROLOGY

## 2023-12-07 RX ORDER — NALOXONE HYDROCHLORIDE 4 MG/.1ML
SPRAY NASAL
Qty: 2 EACH | Refills: 0 | OUTPATIENT
Start: 2023-12-07 | End: 2023-12-08 | Stop reason: HOSPADM

## 2023-12-07 RX ADMIN — QUETIAPINE FUMARATE 350 MG: 300 TABLET ORAL at 20:45

## 2023-12-07 RX ADMIN — Medication 125 MCG: at 08:17

## 2023-12-07 RX ADMIN — BUPROPION HYDROCHLORIDE 300 MG: 300 TABLET, FILM COATED, EXTENDED RELEASE ORAL at 08:17

## 2023-12-07 RX ADMIN — TRAZODONE HYDROCHLORIDE 150 MG: 50 TABLET ORAL at 20:45

## 2023-12-07 RX ADMIN — Medication 5 MG: at 18:25

## 2023-12-07 RX ADMIN — CELECOXIB 200 MG: 200 CAPSULE ORAL at 14:57

## 2023-12-07 RX ADMIN — DULOXETINE HYDROCHLORIDE 120 MG: 60 CAPSULE, DELAYED RELEASE ORAL at 08:17

## 2023-12-07 RX ADMIN — LIDOCAINE 1 PATCH: 4 PATCH TOPICAL at 08:17

## 2023-12-07 ASSESSMENT — COLUMBIA-SUICIDE SEVERITY RATING SCALE - C-SSRS
2. HAVE YOU ACTUALLY HAD ANY THOUGHTS OF KILLING YOURSELF?: NO
6. HAVE YOU EVER DONE ANYTHING, STARTED TO DO ANYTHING, OR PREPARED TO DO ANYTHING TO END YOUR LIFE?: NO
1. SINCE LAST CONTACT, HAVE YOU WISHED YOU WERE DEAD OR WISHED YOU COULD GO TO SLEEP AND NOT WAKE UP?: NO
1. SINCE LAST CONTACT, HAVE YOU WISHED YOU WERE DEAD OR WISHED YOU COULD GO TO SLEEP AND NOT WAKE UP?: NO
2. HAVE YOU ACTUALLY HAD ANY THOUGHTS OF KILLING YOURSELF?: NO
6. HAVE YOU EVER DONE ANYTHING, STARTED TO DO ANYTHING, OR PREPARED TO DO ANYTHING TO END YOUR LIFE?: NO

## 2023-12-07 ASSESSMENT — PAIN SCALES - GENERAL
PAINLEVEL_OUTOF10: 1
PAINLEVEL_OUTOF10: 0 - NO PAIN
PAINLEVEL_OUTOF10: 0 - NO PAIN

## 2023-12-07 ASSESSMENT — ENCOUNTER SYMPTOMS
DYSPHORIC MOOD: 1
DECREASED CONCENTRATION: 1
SLEEP DISTURBANCE: 1

## 2023-12-07 NOTE — PROGRESS NOTES
"Azul Roberts is a 63 y.o. female on day 89 of admission presenting with Severe recurrent major depression without psychotic features (CMS/[facility]).    Subjective   Case discussed with treatment team and chart reviewed.    Out of bed and participating in the milieu today.  There was concern with some description of malaise that she may have coronavirus and this was tested for and found to be negative today.  Social work advised the patient could potentially transition to facility in [place] tomorrow.  Discussion was held with patient to this extent.  During the discussion patient adamantly reported that she is not suicidal but will probably continue to struggle with her emotions and features of depression.  She smiled during the discussion and reported that she is eager to leave the facility but at times it feels like \"I will never get out of here.\"  She is continuing to maintain appropriate gains since admission.  Her level of functioning is improved significantly.  She is emotionally more stable.  She has been without any expressed desire to harm others throughout the duration of the admission and she is currently persisting without evidence of psychosis nor jensen.  To this extent, her suicide risk is lower than on admission.  She has been hospitalized with reconciliation of her medications, she is participating more and making needs known appropriately.  She has maintained appropriate and safe behaviors throughout the duration of the admission, she is future oriented with desires about being able to leave the facility to resume her usual self and life.  At times she has declined to participate in certain groups or therapies however this seemed to be mostly related to physical discomfort and has otherwise participated when asked of her in an increasing fashion and not otherwise.  Continued admission beyond transition to the suggested facility identified for potential transition tomorrow is likely of limited benefit " "given the gains consolidation and maintenance seen thus far.  Patient is considered discharge appropriate to nursing home care with lower suicide risk.    Objective     Vitals:    12/06/23 0500 12/06/23 1706 12/07/23 0631 12/07/23 0900   BP: 110/65 118/65 102/71 130/80   BP Location: Left arm Right arm Left arm Left arm   Patient Position: Lying Sitting Lying Lying   Pulse: 79 91 75 93   Resp: 18 18 18 18   Temp: 36.5 °C (97.7 °F) 36.8 °C (98.2 °F) 36.7 °C (98.1 °F) 36.8 °C (98.2 °F)   TempSrc: Oral Temporal Temporal Temporal   SpO2: 97% 98% 97% 99%   Weight:       Height:          Results for orders placed or performed during the hospital encounter of 09/09/23 (from the past 96 hour(s))   Sars-CoV-2 PCR, Symptomatic   Result Value Ref Range    Coronavirus 2019, PCR Not Detected Not Detected         Review of Systems   Psychiatric/Behavioral:  Positive for decreased concentration, dysphoric mood and sleep disturbance.      Psychiatric ROS - Adult  as noted    Physical Exam  Abdominal:      Comments: Presence of colostomy bag   Psychiatric:         Attention and Perception: Attention and perception normal.         Speech: Speech normal.         Behavior: Behavior is slowed. Behavior is cooperative.         Thought Content: Thought content normal.         Cognition and Memory: Cognition and memory normal.     Mental Status Examination  General Appearance:  less disheveled with improved eye contact, . In wheelchair in group room  Gait/Station: using wheelchair to move about the milieu under her own propulsion  Speech: normal volume,   Mood: \"ok\"  Affect: Constricted,  Thought Process: less impoverished  Thought Associations: No loosening of associations  Thought Content: improving, linear, logical, more spontaneous and overall increase in content, denies suicidal ideation and states \"I would never try to do that\"  Perception: No perceptual abnormalities noted  Level of Consciousness: Alert  Orientation: " Alert  Attention and Concentration: Mild impairment in attention  Recent Memory: Intact as evidenced by ability to recall details from the past 24 hours   Executive function: Intact  Language: Naming intact  Fund of knowledge: Good  Insight: Fair, as evidenced by increasing participation in discussion about her scenario and symptoms and their improvement  Judgment: Fair, as evidenced by ability to reason through medical decision making, compliance with treatment recommendations, and improving      Psychiatric Risk Assessment  Patient will continue to carry chronic factors related to the presence of mental illness, history of depression, history of substance use, chronic pain, medical comorbidities.  Acutely, she has improved significantly with improvement in her level of functioning reduction in mood instability improvement in depression improvement in participation in the unit reporting she is no longer having acute suicidal thoughts and that she does not intend to act on these.  Additionally, she is future oriented and eager to transition from the facility to the next step so that she can start making progress toward eventually returning home.    Relevant Results  Scheduled medications  buPROPion XL, 300 mg, oral, Daily  celecoxib, 200 mg, oral, Daily  cholecalciferol, 125 mcg, oral, Daily  DULoxetine, 120 mg, oral, Daily  lidocaine, 1 patch, transdermal, Daily  melatonin, 5 mg, oral, Daily  QUEtiapine, 350 mg, oral, Nightly      Continuous medications     PRN medications  PRN medications: acetaminophen, alum-mag hydroxide-simeth, diphenhydrAMINE, hydrOXYzine pamoate, OLANZapine, OLANZapine zydis, traZODone     Assessment/Plan   Principal Problem:    Severe recurrent major depression without psychotic features (CMS/HCC)  Active Problems:    Colostomy present (CMS/HCC)    Opioid abuse (CMS/HCC)    Bilateral hip pain    Biological -     Orthostatics - confirmed on measurement.  Ongoing efforts to communicate with  patient about taking her time when changing position to help prevent falls.    Mouth checks.    Cymbalta 120 mg daily for depression, this is max dose  Wellbutrin xl 300 mg for adjunctive depression treatment  seroquel 350 mg at bedtime  Trazodone 150 mg PRN and melatonin at bedtime for sleep  No adverse side effects of medications noted or reported.  ECG (10/4/23): Normal sinus rhythm. Heart  rate 72 bpm. Qtc 442  11/13/23 - qtc 441    Declined ECT consideration during discussion on 11/4, 11/7.   ECT is not available at this facility and this would include transitioning to another psychiatric hospital for this level of service or pursuing as an outpatient on discharge.    Declined initiation of naltrexone/vivitrol    Discussion with patient regarding risk benefits and alternatives and side effects with opportunity to ask questions and questions answered.  Patient given consent to the plan as noted    2. Psychological -     Patient is encouraged to participate with the therapeutic milieu and program and group therapy    3. Sociological -      Patient encouraged to cooperate with social work staff on issues relevant to discharge planning  Potential transition to facility in [place] tomorrow    30 minutes spent coordinating care for patient on this day    Parts of this chart have been completed using voice recognition software.  Please excuse any errors of transcription.  Despite the medical decision making time stamp, my medical decision making has taken place during the patient's entire visit.  Thought process and reason for plan has been formulated from the time that I saw the patient until the time of disposition and is not specific to one specific moment during their visit and furthermore the medical decision making encompasses the entire chart and not only that represented in this note.        Eddie Cullen, DO

## 2023-12-07 NOTE — PROGRESS NOTES
SARAHW provided clinical updates to Vann[facility] Liaison for [facility] in [place]. SARAHW was informed that pt's Medicaid is now pending. SARAHW contacted S to confirm this. LSW was informed that pt's Medicaid was approved going back to 10/1/23. Pt's Medicaid number is [id number]. LSW will wait for official confirmation that pt is approved at facility and further coordinate discharge, tentative for tomorrow.     DRAGAN Low

## 2023-12-07 NOTE — GROUP NOTE
Group Topic: Other   Group Date: 12/7/2023  Start Time: 1510  End Time: 1600  Facilitators: BRENDA Fernandes   Department: [facility]    Number of Participants: 6   Group Focus: other Name that tune  Treatment Modality: Other: Recreation therapy  Interventions utilized were mental fitness, reminiscence, and story telling  Purpose: feelings and other: elevate mood, increase attention, increase stimulation, increase alertness, increase socialization    Name: Azul Roberts YOB: 1960   MR: [id number]      Facilitator: Recreational Therapist  Level of Participation: active  Quality of Participation: appropriate/pleasant, attentive, cooperative, and engaged  Interactions with others: appropriate and gave feedback  Mood/Affect: appropriate and bright  Triggers (if applicable): n/a  Cognition: coherent/clear  Progress: Significant  Comments: pt problem is depressed mood.  Pt is smiling and singing along with the music being played.  Plan: continue with services

## 2023-12-07 NOTE — NURSING NOTE
SARAN NOTE     Problem:  depression     Behavior:  Pt pleasant and cooperative with staff and care. Pt stayed in her room waiting on her covid results to show in epic and was upset due to how long it took and missed her visitation.  Group Participation: no, unable  Appetite/Meals: good       Interventions:  Administered scheduled medications    Response:  Pt continues as above     Plan:  Maintain pt safety

## 2023-12-07 NOTE — NURSING NOTE
"SARAN NOTE     Problem:  Depression     Behavior:  Pt isolative to room this evening. Using phone appropriately when  called. Compliant with meds, cooperative with care. Pt states she is \"sad,\" states \"I don't think I'll be home for yazmin, I'll still be here.\"  Group Participation: n/a  Appetite/Meals: HS snack provided     Interventions:  1:1 provided with reassurance. 2100-Evening meds given per orders along with PRN trazodone per pt request (approved by Dr ANN). WC within reach. Encouraged to use call light for needs.    Response:  Pt calm, resting in bed. No s/s of distress noted.     Plan:  Will continue to monitor behaviors and effectiveness of interventions while maintaining pt safety.    "

## 2023-12-07 NOTE — PROGRESS NOTES
Physical Therapy                 Therapy Communication Note    Patient Name: Azul Roberts  MRN: [id number]  Today's Date: 12/7/2023     Discipline: Physical Therapy    Missed Visit Reason: Missed Visit Reason: Other (Comment) (Patient is being tested for CoVid+. RN requests PT await results prior to treatment.)    Missed Time: Attempt    Comment:

## 2023-12-08 PROCEDURE — 2500000005 HC RX 250 GENERAL PHARMACY W/O HCPCS: Performed by: PSYCHIATRY & NEUROLOGY

## 2023-12-08 PROCEDURE — 99239 HOSP IP/OBS DSCHRG MGMT >30: CPT | Performed by: PSYCHIATRY & NEUROLOGY

## 2023-12-08 PROCEDURE — 2500000002 HC RX 250 W HCPCS SELF ADMINISTERED DRUGS (ALT 637 FOR MEDICARE OP, ALT 636 FOR OP/ED): Performed by: STUDENT IN AN ORGANIZED HEALTH CARE EDUCATION/TRAINING PROGRAM

## 2023-12-08 PROCEDURE — 2500000001 HC RX 250 WO HCPCS SELF ADMINISTERED DRUGS (ALT 637 FOR MEDICARE OP): Performed by: PSYCHIATRY & NEUROLOGY

## 2023-12-08 RX ORDER — BUPROPION HYDROCHLORIDE 300 MG/1
300 TABLET ORAL DAILY
Start: 2023-12-09 | End: 2024-02-09 | Stop reason: SDUPTHER

## 2023-12-08 RX ORDER — CELECOXIB 200 MG/1
200 CAPSULE ORAL DAILY
Start: 2023-12-08

## 2023-12-08 RX ORDER — LIDOCAINE 560 MG/1
1 PATCH PERCUTANEOUS; TOPICAL; TRANSDERMAL DAILY
Start: 2023-12-09 | End: 2024-02-09 | Stop reason: SDUPTHER

## 2023-12-08 RX ORDER — DULOXETIN HYDROCHLORIDE 60 MG/1
120 CAPSULE, DELAYED RELEASE ORAL DAILY
Start: 2023-12-09 | End: 2024-02-09 | Stop reason: SDUPTHER

## 2023-12-08 RX ORDER — NALOXONE HYDROCHLORIDE 4 MG/.1ML
4 SPRAY NASAL AS NEEDED
Qty: 2 EACH | Refills: 0
Start: 2023-12-08

## 2023-12-08 RX ORDER — QUETIAPINE FUMARATE 50 MG/1
350 TABLET, FILM COATED ORAL NIGHTLY
Start: 2023-12-08 | End: 2023-12-08 | Stop reason: SDUPTHER

## 2023-12-08 RX ORDER — CHOLECALCIFEROL (VITAMIN D3) 125 MCG
125 CAPSULE ORAL DAILY
Start: 2023-12-09

## 2023-12-08 RX ORDER — QUETIAPINE FUMARATE 100 MG/1
350 TABLET, FILM COATED ORAL NIGHTLY
Start: 2023-12-08 | End: 2024-02-09 | Stop reason: SDUPTHER

## 2023-12-08 RX ADMIN — Medication 125 MCG: at 08:24

## 2023-12-08 RX ADMIN — DULOXETINE HYDROCHLORIDE 120 MG: 60 CAPSULE, DELAYED RELEASE ORAL at 08:24

## 2023-12-08 RX ADMIN — ALUMINUM HYDROXIDE, MAGNESIUM HYDROXIDE, AND SIMETHICONE 30 ML: 200; 200; 20 SUSPENSION ORAL at 14:27

## 2023-12-08 RX ADMIN — LIDOCAINE 1 PATCH: 4 PATCH TOPICAL at 08:22

## 2023-12-08 RX ADMIN — BUPROPION HYDROCHLORIDE 300 MG: 300 TABLET, FILM COATED, EXTENDED RELEASE ORAL at 08:24

## 2023-12-08 ASSESSMENT — PAIN DESCRIPTION - ORIENTATION: ORIENTATION: LEFT

## 2023-12-08 ASSESSMENT — PAIN SCALES - GENERAL: PAINLEVEL_OUTOF10: 4

## 2023-12-08 ASSESSMENT — PAIN - FUNCTIONAL ASSESSMENT: PAIN_FUNCTIONAL_ASSESSMENT: 0-10

## 2023-12-08 ASSESSMENT — PAIN DESCRIPTION - LOCATION: LOCATION: HIP

## 2023-12-08 ASSESSMENT — ACTIVITIES OF DAILY LIVING (ADL): LACK_OF_TRANSPORTATION: NO

## 2023-12-08 NOTE — GROUP NOTE
Group Topic: Self Esteem   Group Date: 12/8/2023  Start Time: 1010  End Time: 1100  Facilitators: BRENDA Fernandes   Department: [facility]    Number of Participants: 6   Group Focus: self-esteem  Treatment Modality: Other: Recreation therapy  Interventions utilized were exploration, patient education, and problem solving  Purpose: coping skills, feelings, communication skills, insight or knowledge, and self-worth    Name: Azul Roberts YOB: 1960   MR: [id number]      Facilitator: Recreational Therapist  Level of Participation: active  Quality of Participation: appropriate/pleasant, attentive, and cooperative  Interactions with others: appropriate, gave feedback, and supportive  Mood/Affect: appropriate, brightens with interaction, and positive  Triggers (if applicable): n/a  Cognition: coherent/clear and logical  Progress: Significant  Comments: pt problem is depressed mood.  Plan: continue with services

## 2023-12-08 NOTE — NURSING NOTE
SARAN NOTE     Problem:  Depression     Behavior:  Pt isolative to room this evening. States she did not feel well today, reports having low energy and feelings of sadness. Compliant with meds, cooperative with care.   Group Participation: n/a  Appetite/Meals: HS snack provided.     Interventions:  1:1 provided with reassurance. PRN trazodone given per pt request (Dr SETH christianson) along with scheduled evening meds. Encouraged to use call light for needs. WC within reach.    Response:  Pt resting in bed at this time. No s/s of distress noted.     Plan:  Will continue to monitor behaviors and effectiveness of interventions while maintaining pt safety.

## 2023-12-08 NOTE — CARE PLAN
The patient's goals for the shift include no falls    The clinical goals for the shift include safety    Over the shift, the patient did make progress toward the following goals. Barriers to progression include pain, and some deficit in energy. Recommendations to address these barriers include positive reinforcement & encouragement.      Problem: Fall/Injury  Goal: Not fall by end of shift  Outcome: Met     Problem: Fall/Injury  Goal: Be free from injury by end of the shift  Outcome: Met

## 2023-12-08 NOTE — DISCHARGE SUMMARY
Admit Date: 9/9/2023    Discharge Date: 12/08/23      Reason For Admission:   Active Hospital Problems    Bilateral hip pain      *Severe recurrent major depression without psychotic features (CMS/[facility])      Colostomy present (CMS/[facility])      Opioid abuse (CMS/[facility])          Discharge Diagnosis  Severe recurrent major depression without psychotic features (CMS/[facility])    Issues Requiring Follow-Up  Stoma care, hip pain/ortho, skilled nursing services, eventual community transition    Test Results Pending At Discharge  Pending Labs       No current pending labs.            Hospital Course       90 day admission.    9/10:  63-year-old female with history of depression admitted and exacerbated condition. Emergency department notes as follows:    **  Patient presents with generalized weakness, left hip pain and inability to ambulate. This is been ongoing for a few days.  called EMS today because the patient had not been out of her recliner for a couple of days. He states this is a combination between weakness as well as the patient not wanting to get out of the chair. He states that she does have some psychiatric issues and this is happened before where she does not want to do anything or take care of herself. She has been urinating on herself in the chair. She states that she has not been eating or drinking well. She has had left hip pain. She states that she fell a few months ago and has been having pain ever since. She is taken nothing for the pain. She denies any fevers. She denies nausea or vomiting or abdominal pain.    Physical exam: Vital signs are reviewed. Age appropriate individual in no acute distress. Head is atraumatic. EENT exam shows equal pupils. Extraocular motions are intact. The mucous membranes are moist. Heart is regular rate and rhythm without murmurs. Lungs are clear to auscultation bilaterally. Abdomen soft and nontender. Left hip has no tenderness but she has limited range of motion secondary  "to pain of the left hip joint. Extremities reveal no edema. Peripheral pulses are equal. GCS 15. Strength and sensation are equal bilaterally, but she is diffusely weak. Skin exam reveals no rashes or cyanosis. The patient told nursing that she \"just wants to be left alone.\"  **  63WF with history of reported psychosis brought to ED by EMS.  called EMS due to patient not getting out of chair or care for herself for a few days. Patient had urinated in her chair and would not get up for  to clean her. Medical workup - WBC 13.2, XR L hip shows osteoarthritis, CXR with no acute findings, UA negative, lactate negative, trop negative, BAL <10, UDS + amphetamines and fentanyl.  concerned he could not take care of her and requested psychiatric eval. AURELIO wilkes yesterday recommended inpatient psychiatry.    TODAY: NAEO. VS significant for , /82. Patient lying in bed with  at bedside. Irritable. Provides little on interview and requests interview to be terminated early. States that she is not good because she is stuck in the hospital. States  brought her to the hospital because she couldn't get up but does not provide more information. Stated that she wished she was dead currently, unclear if she was experiencing thoughts of death prior to ED presentation. She denied AVH. Said she did not like her prescribed psychiatric medications. Denied substance use. Not sleeping much.     at bedside provides much of the history. States that they moved to [place] from [place] two weeks ago. Moved because his sister said there was a job opportunity for him here; however there is not. Currently staying at his sister's but she is moving in a few weeks. They will be homeless then. Reports patient has not taken care of herself for the past few days and needs help. States she has not been doing well for the past six months. States she was admitted to a psychiatric hospital in [facility] [place] two months ago after 's found her lying on the side of the road. She was diagnosed with psychosis and prescribed Risperdal, Cymbalta, melatonin, trazodone. Reports she is compliant with meds. States she has had multiple prior hospitalizations, limited to details regarding this. Said that she has a long history of substance use but insisted she is currently sober. Said she last used fentanyl one month ago.     Medical history: Patient has colostomy.  states she started having falls last year. Had fall in September 2022, didn't get medical care after. Apparently had fractured coccyx and developed gangrene resulting the colotomy for management. Reported h/o CHF -  said taken off meds at last hospitalization. h/o arthritis and osteoporosis per .  **  On approach to meet with patient, she is in bed awake staring at the ceiling. She allows for provider to enter her room. Purpose of encounter is discussed with patient to which she responds she does not have anything to talk about with this provider. She states she has nobody to care for her and she wants to leave the hospital. She notes her  dropped her off in the emergency department because he cannot and will not and does not want to take care of her. She endorses feeling hopeless helpless. Discussed further with the patient comments made in the emergency department including ideations about no longer wanting to be alive. She challenges that she ever made the statement saying that she would not make such a comment. She is very brief and limited with interaction and does not provide further history when asked. She asks for this provider to call her  Pierre Braun to ask him to come and pick her up. She agrees to resume previous psychiatric medications including those being filled at [facility] in [place]. She does not wish to answer other questions at this time.    She does not present with features of psychosis nor jensen  "nor catatonia. She is not endorsing thoughts to harm others.    9/11:  Seen in follow-up for depression, reduced self-care. Case discussed with treatment team and chart reviewed. Whereas yesterday patient was resistant to interaction today she is much more willing to engage with this provider. She is less irritable and more willing to discuss. She reports that she feels hopeless and helpless is not sure where she will go on discharge. She describes her mood today as \"lousy\" and also is focused on physical discomfort and pain. Medical team was involved and restarted Celebrex for her. Discussed with patient the need for meeting with social work and providing treatment team to discuss disposition arrangements once stabilized to which patient responded that she does not care because \"nothing matters anyway.\" She is not endorsing thoughts to harm herself however continues to persist with neurovegetative features of depression. She is socially isolated and withdrawn with limited to no engagement in the milieu. She is seen out of bed though walking around with her walker for various brief episodes during the day. She does not present with features of jensen nor psychosis at this time. She makes the request several times for this facility to call her  to come and have her picked up. When patient was asked where she would be taken she responded \"I do not know.\"    9/12:  Seen in follow-up for depression and reduced self-care with suicidal commentary. Case discussed with treatment team and chart reviewed. Patient continuing to report that she is doing poorly today. She uses the word \"miserable\" to describe how she feels. She remains hopeless helpless. She has struggled to engage with social work to start coordinating discharge arrangements. She notes that she is in pain for which she would like fentanyl and she also reports that she is sleeping poorly for which she would also like fentanyl. Discussed with patient this " "is not an option nor appropriate. She laughs and giggles in response to this. We can start working to adjust her medications by increasing the dose of trazodone in the evening. She is agreeable with this. She is also made aware we will further titrate Celexa to target depressive features. She is staying out of her room or sitting in a chair by the nursing station for much of the day attending some groups and meals. She is snacking in between meals reporting that her appetite is getting better. She is able to mobilize her affect briefly during discussion with provider and is quite polite and thankful to this provider of care being delivered. She is encouraged to again engage with social work to determine more appropriate and most appropriate discharge coordinations when she has stabilized.    9/13:  Seen in follow-up for depression and psychosis. Case discussed with treatment team and chart reviewed. Patient reporting that she is exhausted noting that she did not sleep overnight even with the adjustment of trazodone. She is asking if there is \"anything\" this provider can do to help with potentially improving her sleep. She even asks about using Seroquel. Discussion with patient regarding the use of multiple antipsychotics and that she is already on Risperdal and we should not mix these 2 agents. We can try titrating Risperdal tonight however for further mood stabilization which might also help with sleep and if this does not help then we can consider switching her to Seroquel for tomorrow evening. She is agreeable with this plan. She spends much of the day seated in a chair by the nursing station. She presents as withdrawn however and not socially engaging with peers. She is not isolating to her room though. She is mostly sedentary with limited affect mobilization. She continues to endorse depressive features noting that she also has pain. She asks about discharge arrangements she is encouraged to speak with social " "work to this extent and coordinate appropriately they are in. She is not endorsing thoughts to harm others and has limited expression of paranoia at this time when asked. Patient did sign the voluntary form with social work today.    9/14:  Seen in follow-up for mood and thought disorder with depressive features. Case discussed with treatment team and chart reviewed. Patient continues to report limited sleep despite staff recording 9 hours of sleep for patient. As discussed yesterday, we offered the patient that we could cross titrate Seroquel with Risperdal in favor of Seroquel moving forward. Patient reports no incremental improvement in sleep with adjustments 2 nights ago to trazodone and then last night with increase in Risperdal. We will adjust the medication orders as such. We will monitor for EKG QTc prolongation during this as such. She continues to tolerate her other medications fairly well. She reports 9 out of 10 pain today. She continues to sit by the nursing station and chair for much of the day. She is limited engaged with others. She is overheard making commentary about not being given enough food despite being served all meals plus snacks. She offers very little other content related to her psychiatric condition. She continues to endorse feeling \"lousy.\" She does endorse that she is still depressed and she does not yet feel better. She is observed as overly sedentary with neurovegetative features predominating.    9/15:  Seen in follow-up for mood and thought disorder history of substance use. Case discussed with treatment team and chart reviewed. It does seem that the medication changes initiated have been more effective to help with patient getting to sleep however she has rejected going to bed at night. She notes the bed is too small and therefore uncomfortable. She reports there are no modifications that can be offered to assist with this. She is rejecting going to her room at night to sleep as " "such. Staff report the patient appeared so sleepy last night they were concerned she might fall and she continued to reject returning to her room. She is seated by the nursing station today in a chair with a blanket covering her head. She is startled on provider approach for encounter. She presents as highly apathetic and withdrawn disengaged responding \"I do not know\" to most questions asked of her. She becomes slightly irritable as continued questions and inquiries were made. She does accept a beverage and then proceeded to lunch without issue. She certainly continues to demonstrate features of depression and without the ability to initiate appropriate sleep regimen is likely to continue to struggle greatly. We continue to reinforce the need for sleep and opportunities to try sleeping in the bed at night. Affect mobilization is decreased today now considered flat. Per social work, discussion held with  indicates they are trying to remain in the apartment that they are currently living.  has started a new job and believes he will try and take over the lease from his sister so they can live there together at patient's discharge.    9/16:  Patient seen for follow-up of mood and thought disorder, history of substance use. Chart reviewed, patient discussed with nurse. Patient is seen sitting outside the nurses station, head down and eyes closed with a blanket covering her shoulders. She declines to go to her room for discussion, indicates that she would like to stay where she is at. She states she did not sleep at all last night. She states she is depressed and does not want to be here. She reports her mood as \"aggressive\" and \"irritable\". She remains apathetic, withdrawn. She continues to endorse passive SI, denies having a plan. After our discussion she did go back to her room to lay on her bed and appeared to be sleeping.    9/17:  Weekend    9/18:  Patient seen for follow-up of mood and thought " "disorder, history of substance use. Chart reviewed, patient discussed with nurse and in morning team. Slept 9hs- appetite ok. There wa sissue over the weekend with her eating some food brought in bylizzsband unkown t ostaff an dbecoming somnolent afterward- tox screen was negative. Staff is addressing the issue of  providing food to patient which is against guidelines. She is taking meds a sordered. Today her hair looks unwashed and she declines to speak to provider much, Says she just wants tostay in bed. She did attend lunch today.    9/19:  Seen in follow up for depression, suicidal ideation. Case discussed in treatment team and chart reviewed. Azul reports feeling \"the same.\" When was asked how she is feeling, then described,\"I am never going to get out of here if I dont get better.\" When asked if she could further describe, she explained feeling \"depressed\" but struggled to find a description beyond the single word response. With questioning, she went on to endorse feeling hopeless, sad, wanting to cry but feeling unable to, being in pain, with thoughts difficult to manage, not wanting to socialize, wishing she wasnt alive anymore, and with ongoing poor sleep. She does not participate in group and poorly tolerated her  and sister in law during visitation today explaining she did not want to meet with them. When medications were reviewed with her, she accepted more aggressive titration to target persisting symptoms which have not yet improved since admission. With staff encouragement, did accept showering today, and noted that she had been refusing ablutions for several days prior. Patient had also been refusing care for her colostomy including bag replacement. Does endorse increased appetite for which she is requesting snacks and dietary supplements. .    9/20:  Patient seen in follow-up for depression with insomnia and suicidal ideations. Case discussed with treatment team and chart reviewed. " "Azul continues to demonstrate limited improvement. She remains with predominant neurovegetative features is primarily withdrawn and pessimistic impoverished thoughts limited engagement. She continues to describe herself as feeling \"miserable.\" She is perseverative on having access to snacks and does not participate with unit milieu therapy and programming asking for exceptions outside of the normal hours of services offered. She spends much of the day in bed and not attending groups nor emerging. During interactions she is brief limited with averse eye contact. She continues to endorse feeling unwell. She is tolerating taking medications but showing limited improvement thus far. She remains with features consistent with those on admission continuing to require inpatient stabilization prior to discharge eligibility. Nursing reported patient slept a full 8 hours overnight. Patient reports that sleep was poor.    9/21:  Seen in follow up for depression, insomnia. Case discussed in treatment team and chart reviewed. Limited change. Remains disengaged, in bed, predominant neurovegetative symptoms. Reports has no treatment goals and does not \"believe\" that she can get better. Endorses no improvement thus far. Refusing group. Brief single word responses. Demonstrates frustration when inquiries are made, and becomes increasingly intolerant of further encounter. Has no requests today when asked. Goal directed activity is notable for snack times. Declines regular care for ostomy when offered and encouraged by nursing. Continues to endorse poor sleep despite nursing observation of full nights of sleep.    9/22:  Patient seen in follow-up for depression and insomnia with suicidal ideations. Case discussed with treatment team and chart reviewed. Azul continues with limited change. She is disengaged not attending groups nor coming out of her room. On approach she is irritable and dismissive offering 1 or 2 word responses. She " "continues not to do demonstrate the ability to meet the ordinary demands of basic life even within the hospital setting. She requires staff encouragement for basic activities of daily living. She continues to endorse feeling \"hopeless.\" She reports \"nothing matters.\" Reviewed with the patient previous hospitalizations and she notes she was much more functional during these admissions. When asked why she responds \"I do not know.\" Discussed with patient recent medication changes that are likely take more time to become effective for her and to this extent continued hospitalization remains indicated at this time. She interrupted provider continuing to inquire about various details to reports she wished to be left alone. She does continue to report poor sleep despite nursing observing her to be asleep for several continuous hours throughout the night. When patient was asked if she slept better with previous medication regimen to this she responded as well \"it does not matter.\" Attempts by this for provider to discuss that of course this does in fact matter which is why we are discussing to help manage her symptoms is responded to with extended duration silence.    9/23:  Follow up depression, ostomy, opioid abuse. Patient resting in bed at time of assessment, awakens easily, states she is very tired today. Patient states she had difficulty sleeping last night due to pain, states left hip pain currently severe, staff report patient refused lidocaine patch this AM. Patient reports feelings of depression are severe, states she does not feel medications are working and therefore refused medications last night and this mornng. Denies any feelings of anxiety. Patient reports appetite is low, states she does not feel like eating, states she wants to be left alone, growing irritable at continued questioning. Denies any AH/VH, denies SI or HI. Staff report patient refused medications last night and this morning, report patient " "only ate 0-25% of meals yesterday however did eat 100% of breakfast today. Report patient has been more isolative, not going to group therapy, has been irritable, not allowing staff to assist with cleaning/changing ostomy. No aggression, agitation, hallucinations, or SI/SIB noted per staff. Report patient appeared to be sleeping most of the night, was resting with eyes closed in bed for nearly 8 hours. .    9/24:  Weekend    9/25:  Seen in follow-up for depression and insomnia. Case discussed with treatment team and chart reviewed. 15 minutes conversation with patient's  who was curious as to patient's progress and treatment plan. Patient's  also notes in 3 weeks time he will be leaving for 6 weeks to go to [place] to make $10,000 on a job. He reports he is concerned about patient's condition and how he will manage with her if she is out of the hospital at that time. Patient remains with acute features remaining isolated withdrawn disheveled poor activities of daily living completion. She is rocking stereotypically sitting in a chair by the nursing station today. She remains irritable. She is brief with 1 or 2 word responses to most questions asked. She remains dismissive. She is sarcastic at times. She becomes increasingly loud with each response appearing to become more frustrated with the encounter with increased intolerance therein. She reports she \"does not care\" about medication adjustments. She declined medications last evening on the premise \"they do not help.\" She is agreeable to continue to try taking them at least again for tonight. We discussed with her further adjustments to her medication regimen to help treat her more acute features. She reports that she would \"like to go home.\" She reports she feels she will \"never get out of here.\" It is discussed with patient as long as she is continuing to demonstrate acute features with limited completion of activities of daily living, ongoing " outstanding mood features, and lack of improvement, it will be more difficult to promote her to a community lesser restrictive setting. She remains focused on access to food and interrupts this provider to ask when dinner is going to be served.    9/26:  Patient seen in follow-up for depression with suicidal ideations and insomnia. Case discussed with treatment team and chart reviewed. Azul continuing to demonstrate acute features showing hardly any resolve as of yet. We remain extremely concerned patient's condition is not consistent with being able to discharge to the community pending a robust response. She remains disengaged socially isolated with limited completion of activities of daily living. She remains preoccupied with access to food. She did accept medications last evening as she said she would. She continues to be seen at times sitting out by the nursing station rocking in a stereotypic fashion. Nursing reports that she was able to accumulate 8 hours of sleep overnight however patient reports this is inaccurate and that she did not sleep well. She continues to note that the room and the bed itself are too small for her. Patient is observed fitting into the bed easily without this concern being readily appreciated by the treatment team. On approach she is irritable providing brief responses. Speech spontaneity is highly reduced. Inquiry into her current symptoms is responded to with patient reporting she does not wish to keep answering the same questions from day-to-day. Attempts to have open-ended discussion result with patient nonresponse. Patient was encouraged to consider goals at least for today if not for the admission. To this she responded it is too overwhelming of a task and she cannot do so. She did laugh sarcastically when this provider suggested this as a therapeutic task. Social work indicated is not likely patient will meet criteria for nursing home placement. Patient's  is working  during the day and will be traveling for work in the coming weeks. Optimal disposition for this patient includes a supervised setting and at this time this is not available. Continued hospitalization therefore remains the least restrictive setting for ongoing stabilization currently.    9/27:  Seen in follow-up for depression. Case discussed with treatment team and chart reviewed. Patient remaining with limited interval change. Staying in bed most of the day emerging for meals. On approach for discussion she immediately responds irritably that she does not wish to speak. With each inquiry asked and posed to her she sighs heavily in response not giving another verbal or auditory response. We discussed with Azul that we need to be able to have discussion about how she is doing feeling and functioning. To that she again sighs heavily as her only appreciable response. Similar to previous days with each question asked in a subsequent fashion she appears to respond more irritably each time now with heavier sighing. She does not endorse any improvement thus far. She notes that she chooses to simply be left alone. She remains averse to eye contact barely endorsing the presence of provider in her room. She is in bed supine looking over her left shoulder staring out the window not making eye contact during the encounter.    9/28:  Seen in follow-up for depression. Case discussed with treatment team and chart reviewed. Patient remains highly irritable withdrawn and uncooperative. She is not tending to activities of daily living resulting in becoming increasingly malodorous. She is also disallowing for care of her ostomy. Fecal odor is predominant. She remains in bed disengaged and not attending group emerging only for meals and then quickly returning to bed. Overheard earlier in the day yelling at nurses about not wanting to come out of her room. Provider approach and entry to her room: she is in bed and makes very brief eye  "contact. Provider again enters the room with patient's permission to engage for encounter to which patient responds she does not wish to talk. She asks for provider to go away turn off the lights and shut the door. Patient is reminded that daily provider will attempt to meet to discuss how she is functioning. Patient reports the encounter \"is over\" having been described within this note is sufficient in her opinion for today's encounter and she asks the provider to again leave. She is taking the psychiatric medications scheduled for her. She continues to endorse poor sleep to nursing though it is recorded she is sleeping 8 hours an evening. Known intolerances nor side effects noted from the addition of Wellbutrin. Patient continues to suffer from severe symptoms incompatible with discharge from the facility given her level of function being impaired to this extent.    9/29:  Patient seen in follow up for depression. Case discussed with treatment team and chart reviewed. Mostly unchanged, however emerged from room to sit near nursing station today. Less irritable on approach and able to engage for several minutes with less demonstrated intolerance than for the several days prior. ADLs remains poor, particularly caretaking of ostomy and remaining malodorous of feces as such. In chair near nursing station, remains with stereotypic rocking. Breif overall, but more easily engaged than days prior. Endorses no improvement thus far. Accepts discussion regarding expected time for medications to become effective and that she remains well within the usual accepted time frames of improvement still despite nearly 3 weeks hospitalized thus far. Denies thoughts of wanting to harm others, and does not present with evidence of psychosis nor jensen. .    9/30:  Case discussed with treatment team and chart reviewed.  Azul remains highly irritable and becoming easily agitated screaming and yelling swearing.  She reports she does not " "want to live anymore she just wants to be left alone so she can die.  She poorly tolerated visit from her .  She reports she would  like to go home.  Attempts to discuss with her reasons for admission are met with patient responding that she does not care.  Patient continues to escalate and agitate more as the encounter persists.  Patient asks to be left alone multiple times.  Eventually this provider obliges and leaves patient alone.     10/1:  Case discussed with treatment team and chart reviewed.  Azul refused all of her medications yesterday.  She is less irritable today but remaining withdrawn and isolated.  We continued to approach with consideration for her ongoing impairment.  She remains brief with single or double word responses only and does not answer open-ended questions when posed.  She expresses frustration and intolerance of the encounter often sighing, making direct eye contact briefly, and then looking away from provider.  We continue to encourage Azul to accept her medications to give them a chance to become efficacious to help treat her symptoms.  We do note with her that we will discuss with social work reaching her  to consider her progress thus far although we do not yet observe a robust response.  She does not participate in group, her activities of daily living are poor and she is malodorous of feces, she continues to present with acute features consistent with that on admission requiring the stabilization offered by the hospitalization.     10/2:  Case discussed with treatment team and chart reviewed.  Patient has resumed acceptance of medications.  Remains withdrawn, isolative.  Less irritable, but brief.  Being refused by multiple nursing homes on premise of behaviors, and out of state medicaid.  She reports \"nothing to live for\" and wavers when asked about wanting to die or stay alive.  She is not future oriented, and persists with poor ADLs.  Did accept shower from " "nursing today and was seen with increasingly mobilized affect briefly following the shower.  Limited gains to date.  Struggles to participate in discussions related to discharge treatment planning.  Cannot yet safely leave the hospital.       10/3:  Case discussed with treatment team and chart reviewed.  Patient has resumed acceptance of medications and remains withdrawn, isolative but less irritable. Still brief.  Cracked a smile or two while speaking to provider.  When asked how she is doing, responded, \"how does it look?\"  Continues to endorse both emotional and physical discomfort impairing daily functioning.  Describes physical pain as 8/10.  Has no ambitions presently.  Presents as dreary.  Discussed possibility of trying to attend groups tomorrow, and again smiled, and said would consider this.  Provider offered would come and see patient earlier in the day tomorrow before scheduled group time to encourage group attendance, and patient responded, \"ok, ill try.\"  Made aware plan of titrating wellbutrin xl tomorrow to 300 mg daily to further treat depression.     SW contact patient  who notes patient must be functioning independently prior to returning home.  Progress thus far is limited to enable that level of function.       She emerges from her room for meals then quickly returning.  Often requests for door to be closed, and lights turned off.  Appears in bed for hours at a time without position change, and appears to be staring at the side of the bedside table.      10/4:  Case discussed with treatment team and chart reviewed.       Approach patient this morning as had been documented yesterday to encourage group participation.  She responded with a shoulder shrug and a sigh and a negative verbal response.  Reminded patient this was one of the goals for hospitalization today to demonstrate increased participation leading towards increased demonstration of independence.  Patient contested " "participating in group.  Group therapist also attempted to engage patient to this extent.  The therapist noted that the patient yelled at the therapist herself using profanity multiple times.  Patient did emerge from her room briefly and sat outside of the group room for a few minutes but then returned to her room continuing to yell profanity.  Patient continuing to make limited improvements during hospitalization thus far.     Patient's insurance company contesting continued stay despite patient's failure to demonstrate a robust treatment response thus far.  If patient was to discharge at this time she would require near immediate readmission for the same reasons that she is currently in this facility.  This would delay her from receiving the appropriate care necessary to stabilize her and as such continued hospitalization remains indicated at this time.  She is remaining adherent with medications that are being given to her however this is in a setting where medications are brought to her each tablet by itself with encouragement to accept them.  Her self-care remains minimal including that of her colostomy.  There is significant risk for skin infection and poor colostomy care in addition to medication mismanagement if she was to be discharged at this time.  Additionally she continues to endorse mood features reporting that she is depressed and hopeless with nothing to live for.  Discharge is absolutely recommended against for this patient.    10/5:  Case discussed with treatment team and chart reviewed.  Initially refusing group this morning but then emerged and spent about half an hour seated outside the group room door.  Limited engagement for the remainder of the day.  On approach for encounter, she is still in bed as is typically seen.  She is supine staring at the ceiling.  She is less irritable and more easily engaged.  She endorses minimal improvement but notes that she is feeling \"okay\" today.  Previous " "encounters have been with patient reporting that she was far worse than okay.  She does endorse that she thinks she might be starting to feel somewhat better.  While continuing to endorse poor sleep, her overall function and self-care remain poor.  She and her room are malodorous of feces.  She remains brief during encounter with limited spontaneous speech.  She asks no questions when given the opportunity.  She is in agreement with endorsement to continue accepting medications as ordered.     10/6:  Case discussed with treatment team and chart reviewed.  Refusing group even in the context of being offered a special privilege to attend group.  Remains disengaged withdrawn staying in bed.  Limited attention to completing activities of daily living.  Sarcastic in conversation often chuckling and laughing and chortling in response to queries posed to her.  Reports has no intentions of participating and reports that she feels like she will be stuck in the hospital \"forever\" and that she \"will not get better.\"  Remains highly depressed and withdrawn, neurovegetative.  Continues to demonstrate reduced irritability and has endorsed over the last couple days that she feels maybe the medications are starting to help a little bit to offer some level of improvement and relief but this is not recognized by a major change in her level of function at this time.     10/7:  Chart reviewed, discussed case with RN.  Pt laying in bed on her side this morning, irritable, stating, \"I'm fine\".  She replies, \"fine\" or \"ok\" to most questions.  She did not attend groups, remains isolative to room.  Nursing staff report that she is refusing to change her colostomy even with significant encouragement.  Has been cooperative with taking medications.     10/8:  Chart reviewed, discussed case with RN.  Limited change today.  Pt continues to be disengaged.  States, \"I'm ok\" to questions, clipped responses. RN reports that she was able to convince " "Azul to change her colostomy bag yesterday and that she asked for help.       10/9:  Case discussed with treatment team and chart reviewed. No significant changes. States that her mood is \"fine\" and continues to give one word responses to questions during the interview. Pt remains withdrawn staying in bed and completely isolating. She is disengaged and does not want to participate in group therapy or leave her room.  She states that her depression is better compared to when she came in on admission, however still reports that her depression is a \"9/10\" in severity.  Nursing staff this morning reports that patient remains disinterested in changing her colostomy bag. She denies any active thoughts of self harm or harming others today.     10/10:  Case discussed with treatment team and chart reviewed. No significant changes in the level of function.  Azul does however endorse and demonstrate perhaps an increased level of appreciation for the circumstance.  She notes that she has been ill mentally for an extended period of time even well before leaving [place] to come to [place] which has caused major dysfunctions in performing activities of daily living.  She remains anergic, withdrawn, and isolated/self-secluded to her room.  Fathoming participation results in increased irritability and challenging the need for such levels of participation.  However, she does recognize that her current level of functioning is not consistent with independence.  She notes that she does not feel she could transition home at this time and that she has significant additional gains to be made prior to this being considered.  She is open to considering nursing home placement although scoffs at this idea with an audible response when presented to her.  She does not report whether she feels being hospitalized longer or being transitioned to a nursing home would be a better setting for her.  She did shower today after great " encouragement by staff eventually acquiescing but she did not participate in group and became irritable with staff members who were encouraging her to participate in group.  She continues to spend most of the day in bed not leaving her room but for meals.  She while she remains brief during encounter as she is less so she is starting to use sentences in full when speaking to this provider and she has not yelled nor sworn at this provider in several days.  She remains at grave risk for decompensation if discharged currently.  She is not functioning independently to take care of herself including ADLs medications.  Her sedentary presentation requires further stabilization prior to being able to transition to a less restrictive setting.     10/11:  Case discussed with treatment team and chart reviewed.      Improvement being seen today.  Friendlier, more engaged, out of room more.  Seen as supportive to other patients.  Less irritable, notes that she wants to get better but doesn't know how.  Sarcastically funny, but appropriate and on point.  Willing to continue working with team to seek further gains prior to discharge.  Willing to attempt tomorrow to be more engaged as was today.  Endorsed that is having a hard time trusting the treatment team, but open to trying to follow recommendations more to seek said needed improvements.  Remains depressed, wishing to be withdrawn, and self isolating.     Peer reviewer reports continued stay was denied despite findings as noted, and lack of overall progress.  Patient remains acute, lacking independence due to impaired function stemming from exacerbated mental health condition.  It is most unlikely given how long patient had been decompensated that she would have dramatically improved thus far, but we are seeing minimal gains accumulate within the past few days likely significant for upcoming robust response.  She continues to benefit from inpatient level of psychiatric care as  "such while these gains further consolidate.      10/12:  Case discussed with treatment team and chart reviewed.      Continues with gains as recognized yesterday.  Staying out of her room more and sitting by the nursing station during group.  Continues to decline to enter the group room reporting \"I am not a people person.\"  Remains hopeless endorsed by reporting she does not believe things will happen that are typically routine and have been happening.  For example, patient says she does not believe that dinner is coming.  She scoffs and chuckles in response.  She is able to mobilize her affect some more but notes that she does not feel that she is getting better yet.  She continues to endorse poor sleep and we discussed with her that given her daytime energy and activity levels are quite low it is probable that she is not able to sleep at night because she is not expending enough energy in the day and also finds herself napping for significant periods of time during the day.  We encouraged her to stay out of her room as much as possible use of tabletop pedaling machine or go for walks on the unit.  To this as well she scoffs and chuckle sarcastically in response to this provider.  She has no other requests or acute concerns noted today.  She does remain depressed appearing so with limited function as has been described extensively in other charting thus far during her hospitalization.  Hospitalization remaining the least restrictive means at this time while working to transition her to [place] Medicaid for nursing home consideration.  There is no other option at this time reasonable for providing the necessary level of care to maintain her basic needs.    10/13:  Case discussed with treatment team and chart reviewed.      Continues to present with limited motivation to engage in the milieu.  Staying in bed much of the day.  Limited responses and very brief on interaction.  Reports that she feels hopeless and \"does " "not know what to do.\"  Discussed with patient we are seeking transition from [place] to [place] Medicaid such that we may evaluate placement in a convalescence facility.  Patient reports feeling disheartened to understand this but recognizing there is no other optimal setting for her at this time.  Her ability to take care of herself remains limited and there remains grave concern if she was to transition to the community at this time in an independent living arrangement she would quickly destabilize likely requiring readmission.  Without encouragement, she does not participate in activities of daily living, she would not be able to administer her medications to herself reliably, she would have no supervision as her  is not home with her to be able to help with these tasks.  Additionally she continues to endorse feeling depressed hopeless helpless more early humiliated and unable to function.  She has been cleared by physical and occupational therapy not meeting criteria for a skilled nursing setting otherwise.  She remains depressed self isolated anhedonic with predominant neurovegetative presentation.  She remains less irritable and is more willing to engage with provider on approach but again provides very little content and discussion.     Considering a catatonia spectrum to her current presentation.  We will introduce low-dose benzodiazepines to evaluate for response and improvement.    10/14:  Case discussed with treatment team and chart reviewed.      Continues to present with limited motivation to engage in the milieu.  Staying in bed much of the day.  Limited responses and very brief on interaction.     No discernible improvement from Ativan for potentially treating catatonia spectrum.  Patient made aware after an additional 24 hours if nonresponse to Ativan will be discontinued.  She reports understanding.  She contributes little to nothing to the encounter today.  She is more malodorous of " "feces again today.  She is found in bed staring at the wall.  She is without spontaneous speech.  She is delayed in responding to queries made of her.  She has no requests and she has nothing else to offer she reports.  She requests for the lights to be turned off and her door closed.  She is not participating in group.     She is not able to take care of herself and this is a setting required to maintain her basic needs at this time.    10/15:  Case discussed with treatment team and chart reviewed.      Minimal change noted from use of Ativan.  Discussed with patient we will discontinue at this time.  Patient endorses and agrees that it was unhelpful.  Patient also made aware that long-term use of this medication is ill-advised particularly with lack of efficacy from its use.  Patient remains disengaged withdrawn staying in bed.  She reports \"nothing new\" as her mood today.  She continues to chuckle sarcastically when suggestions about engaging in milieu are made to her.  She is more malodorous today.  She is taking medications and continues not to endorse any improvements in her mood.    10/16:  Case discussed with treatment team and chart reviewed.      Presents as largely unchanged.  Staying in room disengaged from milieu not participating with unit function.  Not attending group.  Ativan was unhelpful so it was discontinued yesterday.  No effects of its discontinuation noted.  Discussion with patient today reviewing bipolar disorder history patient denies previous bipolar disorder or symptoms of jensen.  As such, discussed with patient transitioning to SNRI antidepressant medication in lieu of Celexa.  She is agreeable with this.  She maintains a pessimistic attitude answering most questions as though has been asked to solve and answer a much larger task.  She sighs heavily often and provide sarcastic responses in reply.  She is aware we will start the new medication today and titrate as such.  She continues to " "endorse feeling depressed unable to take care of herself with no motivation to get out of bed or do anything.  Despite routine on the unit including daily meals and groups patient reports she does not believe that these will continue to happen.  She is not making threats toward others and unless she is asked if she is having suicidal thoughts does not speak about these spontaneously.    10/17:  Case discussed with treatment team and chart reviewed.      Patient has no significant changes since yesterday. She remains in her room, withdrawn and laying in bed. Upon being asked about her willingness to participate in the interview patient responded \"oh God\" and to \"just get it over with.\" She continues to give short one word responses to most questions and is unwilling to provide more details regarding her mood which she states is \"not good.\" When asked about group, she states that she does not want to participate and wants to remain in her room. She states that she has not noticed any side effects since starting her SNRI yesterday. States that she does not sleep well, and never has. She specifically denied any thoughts of self harm or harming others.      Patient observed out of her room later in the afternoon sitting by the nursing station opposite from the group room.  She remains malodorous of feces likely related to care of her colostomy.  Nursing did report that she appears to be taking better care of her colostomy however still with significant concern for her level of independence to return from the hospital at this functional level.    10/18:  Case discussed with treatment team and chart reviewed.      Patient in her room lying in bed on initial interview today. No significant changes. Continues to report mood as \"not good\" and denies getting any sleep despite nursing reports that patient sleeps at night. Responds to most questions in the interview with one word answers and does not make eye contact. She denied " "wanting to harm herself or others, but with ongoing passive suicidal ideation stating that she would rather not wake up tomorrow if she could choose that versus seeing where she could be in 6 months from now. Continues to deny wanting to participate in group or wanting to sit outside of group.      Provider note:  Perhaps a mild change in her level of activation today.  Patient is seen out of her room sitting in the rocking chairs by the cafeteria.  She presents as calm but still disengaged endorsing severe symptoms.  We discussed titration of medication as has been the plan.  Social work is pending updates from Medicaid regarding transitioning to the [place] Medicaid service.    10/19:  Case discussed with treatment team and chart reviewed.      Patient is noted to be sitting outside of group therapy this morning prior to initial interview.      In the afternoon, patient is seen spending more time outside of her room sitting out in the hallway next to some of the other patients on the unit who are engaged in conversation. Pt also seen asking for assistance with emptying her colostomy bag earlier this afternoon.      On interview today, pt is seen out in the hallway eating snacks. Does not engage with eye contact, but pt appears more minimally more animated today. States that she is \"emotional\" and that she \"wants to go home\" and \"I've had better days.\" She states that she is still agreeable to treatment plan and titration of her Cymbalta and tapering of the Celexa. Is not overall engaged in the interview and continues to answer most questions with one or two words answers, or grunts.     10/20:  Case discussed with treatment team and chart reviewed.      Patient sitting outside of common room for interview.  Requested we go to her room or into the common room which is currently empty, however she declines, appearing to be put out from the suggestion.  She is rocking back and forth in her chair and when asked why she " "states her hip is hurting and Tylenol doesn't help.  She states she needs something stronger, \"an injection of something\".    No significant changes, appears slightly less irritable then past interactions.  Remains disengaged, although a bit more interactive. She reports continued depression which is \"worse\" than it was on admission.  She states this is due to her wanting to go home.  She continues to endorse passive suicidal ideation, denies plan or intent.     10/21 & 22:  Weekend    10/23:  Case discussed with treatment team and chart reviewed.      Patient sitting outside of common room for interview.  She says she continues tofeel depressed and does not want to be alive anymore(no intent or plan).  She states this is due to her wanting to go home. She fell last night and her hip is hurting- she will have an xray today.  She reported to nursing that she was dizzy when stood-says not all the time- BP was lower and nursing is monitoring and encouraging fluids.  She cont to not attend group. She continues to endorse passive suicidal ideation, denies plan or intent.        10/24:  Case discussed with treatment team and chart reviewed.      Patient sitting outside of common area for interview.  She says she continues tofeel depressed.   Nursing reports she slept 8hs with no prns- she says she laid awake(provider told her prns are available as needed but explained wanted to minimize meds that might effect her balance and BP)  She cont to not attend group. She continues to endorse passive suicidal ideation, denies plan or intent.     10/25:  Case discussed with treatment team and chart reviewed.      Patient sitting outside of common area for interview.  She says she continues tofeel depressed.   Nursing reports she slept 9hs with no prns.  She cont to not attend group. She continues to endorse passive suicidal ideation, denies plan or intent.     10/26:  Case discussed with treatment team and chart reviewed.    " "  Patient sitting outside of common area for interview.  She says she continues tofeel depressed.   Nursing reports she slept well with no prns.  She cont to not attend group but is sitting in hallway outside of group room more- less isolating self. She continues to endorse passive suicidal ideation, denies plan or intent. Her  is coming tovisit this weekend.  Xray report came back with report of no fracture in hip- nursing will report result to medical team.    10/27:  Case discussed with treatment team and chart reviewed.      Patient sitting outside of common area for interview.  She says she continues tofeel depressed.   Nursing reports she slept 7hs with no prns.  Appetie ok. She cont to not attend group but is sitting in hallway outside of group room more- less isolating self. She continues to endorse passive suicidal ideation, denies plan or intent. Her  is coming tovisit this weekend.      10/28:  Case discussed with treatment team and chart reviewed.      Met with patient in her room, she states that \"life in general is bad\" and she is upset about being admitted. States the primary team \"has no plan to get [her] out of here\" and does want to speak to me any further.      10/29:  Case discussed with treatment team and chart reviewed.      Continues to refuse PT and OT, not engaging in groups.     On meeting with patient, she states she did not sleep well due to unit noise, and still feels depressed and hopeless. Is amenable to uptitration on duloxetine and for \"anything that could help.\"    10/30:  Case discussed with treatment team and chart reviewed.      Engaging physical and occupational therapy this morning.  However, continues to stay mostly in her room out of the milieu during the day.  She was seen briefly sitting in her wheelchair by the group room this morning but then returned to her room for the afternoon.  She continues to report that her mood is \"shitty.\"  She does appear to be " "with gains since last seen by this provider however.  She is more conversant and smiling during encounter.  She is less irritable.  She is leaving her bedroom door open where she had been keeping it closed continuously before.  Social work is updated that [place] Medicaid has received the appropriate paperwork to process her application but for the statement from [place] that she is no longer eligible for [place] Medicaid.  We continue to work toward placement to convalescence from this facility.  Her medications for depression were adjusted including Cymbalta to 90 mg with her other medications being maintained.  She denies any side effects or intolerances.  She notes she does not recognize any improvements thus far however she is clearly doing better than when she first arrived at the facility.  She is less pessimistic however continues to lack future orientation.  She describes feeling stuck in the current moment and that things will \"never get better.\"    10/31:  Case discussed with treatment team and chart reviewed.      Azul continues to demonstrate gains consolidation and maintenance.  She is out of her room in the wheelchair by the nursing station interacting with the nurses and listening to country music.  She chuckle sarcastically during conversation but appropriately.  She continues to report that she does not feel well, is depressed and feels continues to function in a limited fashion but she is trying to present herself \"in all my glory.\"  We continue to encourage participation to further strengthen her improvements thus far.       She is aware and it continues to be discussed with her that we are waiting for [place] Medicaid to send a letter of termination such that [place] Medicaid can prevail.  [place] indicate this will arrive via snail male to her home address which could take an additional 7 to 10 days.  Pending this, we will then be able to coordinate convalescence nursing " care from this facility.  Patient is continuing to tolerate medications but notes that her sleep is poor.  It was noted to be 7 hours overnight by nursing but patient reports she slept poorly.  We will revert to schedule trazodone at night to further aid with sleep and assess for its efficacy moving forward.  No other medication changes indicated and this was discussed with patient today.     Patient was asked to consider how she would function if discharged home at this time.  She notes she likely would remain sedentary and not taking her medications nor functioning to meet basic needs including cleaning herself nor preparing meals for herself.  This would likely lead to further decompensation and need for readmission for stabilization.  To this extent we discussed with her that continued hospitalization remains indicated at this time.  She does not outwardly express having suicidal ideations but continues to function poorly due to mental health symptoms which have interfered for significant period of time leading to the need for this hospitalization.  Without being stabilized patient will continue to struggle significantly and not be able to return to the community in this condition.    11/1:  Case discussed with treatment team and chart reviewed.      Met with patient after dinner where she quickly consumed her meal including chicken and cherry pie.  Bit by bit, Azul continues to show improvement in function.  Continues with significant hip and back pain, but spent much if not most of today out of her room.  Using wheelchair to move about the unit.  Interacting with staff, more optimistic, no observed hostility, seen smiling, socializing even with other patients.  Plan remains unchanged, and patient remains aware following daily discussion on this topic.  We are seeking further gains to level of independence to return home, else continue to wait for convalescence nursing care through Medicaid when approved.   Patient with no other complaints nor requests today during encounter.  She does continue to endorse feeling hopeless and helpless, still struggling to greatly explain when asked, but relates this more now to her physical discomfort, and notes that she is less on edge, keyed up, and feeling less depressed than when she first arrived.  She did offer that she felt the facility was not going to be able to help her when she got her, but now recognizes some improvements afforded by the admission being the primary intervention to help her recover.    11/2:  Case discussed with treatment team and chart reviewed.      Each day Azul continues to demonstrate improvement more so than the previous days.  She is more engaged today than she had been yesterday or other days.  She continues to increase socialization.  She is staying out of her room more and has been seen wheeling herself around the unit, down the halls, and back to the central area near the nursing station.     She is observed attending group and participating.  She is demonstrating more linear and logical thoughts.  Though she is brief during discussion, she is goal directed without loosening of association.  She is denying acute suicidality.  She does continue to endorse pain but she is working with the therapy teams appropriately to help medicate this.  We continue to await for Medicaid as has been discussed extensively.  Sleep reported as 8 hrs by staff, and patient continues to report is poor related to pain.  Notes that reintroduction of trazodone as scheduled was unhelpful.  As such, noted that would change trazodone back to PRN to reduce potentially unneeded medications.    11/3:  Case discussed with treatment team and chart reviewed.      More irritable today, declining group this morning and afternoon.  Remains improved overall, but not at nor near baseline.  Awaiting optimal discharge.  No requests nor new acute issues today.  Mood is  "\"ok.\"    11/4:  Case discussed with treatment team and chart reviewed.      More withdrawn and pessimistic today.  Reporting does not see the improvement that we the treatment team are seeing from her this far into her admission.  Continues to endorse feeling depressed with positive responses to low energy, lack of interest, feeling hopeless and helpless, feeling sad, feeling demoralized.     No acute complaints offered however.  Maintaining calm behaviors.  Out of her room roaming the unit in her wheelchair this morning listening to group but not entering the group room.  Continuing to await appropriate documentation from [place] Medicaid to transition to [place] and then to a nursing home.  Is very brief during encounter offering limited content with significantly reduced spontaneity.     Patient has declined consideration of ECT when discussed with her.  Can make referral for the transitioning facility to consider this modality of intervention.    11/5:  Case discussed with treatment team and chart reviewed.      Overall this appears to be a fair day for patient.  She did not attend group but remains calm.  She is able to mobilize her affect mildly during interaction.  When posed with the question about feeling if she could get return home she responds, \"I do not know.\"  She is not able to explain nor coordinate in discussion how she would caretake for herself if was to transition to home at this time.  We discussed the differences between pursuing nursing home and returning to the community at this time and she remains with minimal response to this query.  She notes though that her  does have to work so she would be at home alone during the day.  Given the level of encouragement for patient to perform basic tasks in this facility even with this level of care, it is ill-advised for patient to return to a setting where she does not have continuous care available to her.  This is why we are pursuing " "nursing home placement still at this time.  She was observed socializing with other patients earlier in the day and reports that she enjoys this.  Additionally, she recognizes that returning home would mean she will have very limited socialization on a daily basis.  She cannot describe how she would fill her time during the day.  She remains largely unable to participate in optimal discharge planning discussions to this extent and remains with need for ongoing acute services at this level until she can be transitioned to a nursing setting.     Though she has been hospitalized for 57 days, this timeline is not out of an expected round given how decompensated patient was on admission.  The intervention remains hospitalization with a stabilizing environment.  She is benefiting from this environment.  She will decompensate if she does not discharge to a setting that can continue a similar level of care for longer term.    11/6:  Case discussed with treatment team and chart reviewed.      Persists with limited change.  Brief in discussion.  Limited spontaneous content.  Continues to respond \"I do not know\" to most questions asked.  Seen in her room in the afternoon but out in her wheelchair in the milieu in the morning.  Group attendance is limited.  Attends meals.  Grooming and hygiene are fair to poor.    11/7:  Case discussed with treatment team and chart reviewed.      More dysphoric and pessimistic today.  Endorsing ongoing chronic daily intrusive suicidal thoughts and considering plans to overdose and notes she does not have access to this plan in the hospital.  She states this is her baseline and has been this way for an extended period of time.  \"It's not going to get better.\"  She notes she feels she is working as hard as she can to improve and does not feel improved yet enough to be able to go home independently.  However, she does note that she would like to be able to go home.  Discussed with patient she is " not yet showing the improvement that would support this.  She did not participate in group today however was seen in her wheelchair sitting outside of the group room during group time.  She continues not to use the walker and voices concern that she feels the unit is too small and making her feel claustrophobic.  She declines offers to spend time outside in the courtyard.  We encourage her to continue being as active as she can so as to not to decondition.  She is rated as low intensity currently from physical therapy.  She continues to endorse feeling overwhelmed and hopeless.  She provides very limited responses verbally often just a few words at most.  She does not offer open-ended sentence structure for conversation.  Much of the encounter is spent silently with the patient considering her responses before finally offering just a few words.       11/8:  Case discussed with treatment team and chart reviewed.      Reported that patient fell overnight while trying to get to the bathroom.  Patient was sent to the emergency room for workup and returned to the unit without any findings resulting from the fall.  Reports has somewhat of a headache this afternoon but otherwise no change.  Mood remains poor.  Endorsing feeling hopeless and unable to care for herself.     Discussed with patient potential orthostatics related to evening medications and gave patient a technique for getting out of bed at night to use the bathroom that could be safer.  She is encouraged to first set up and then count 3 to 4 breaths after which she could stand up while holding onto a fixed object and continuing to count 3 of 4 breaths and after that she could then move to the bathroom cautiously/slowly.  Patient is also encouraged to use the call light and notify staff prior to getting out of bed in the middle of the night for safety purposes.  She reports understanding.  No other questions or concerns from patient today.    11/9:  Case  "discussed with treatment team and chart reviewed.      Observed in the group room today eating snack and learning how to play Devan.  No acute concerns.  Limited change otherwise.  Continuing to work on coordinating appropriate disposition for patient in an environment where she can continue to consolidate and reconcile additional gains.  Continued endorsement of feeling depressed but notes her mood today is \"okay.\"  Limited evidence of sequelae from falling.       11/10:  Case discussed with treatment team and chart reviewed.      Observed as more social with peers, in the group room, and having stayed out of her room for most of yesterday.  Does not endorse gains when asked.       Nursing report patient as pleasant, cooperative, denying suicidal thoughts.  Patient has recently endorsed daily ongoing intrusive thoughts however with ideations to overdose interfered with by ongoing hospitalization due to lack of access to stockpiled medications.  Varying reports concerning for dissimulation.     Reviewed with nursing need for mouth checks to help maintain absence of cheeked, spit out, and stock piled meds.     Nutrition document 6# weight loss in past month.         11/11:  Case discussed with nursing staff and chart reviewed.      Participated in group this morning. States that her mood has not changed at all, and is amenable to being aggressive with medications. States she is sleeping okay.     SI not as intrusive overall, but still feeling hopeless.    11/12:  Case discussed with nursing staff and chart reviewed.      Reports feeling depressed with no change, but did not notice any adverse effects from duloxetine increase. Re-visited idea of ECT and other options for escalation of therapy, but she still declines ECT.     Passive SI. Denies HI, AVH.       11/13:  Case discussed with treatment team and chart reviewed including weekend provider notes.      Attended morning music group but then declined recreation " "therapy group after and resumed in bed.  On approach, she reports her mood is \"alright.\"  Remains with limited spontaneous interaction.  Answers questions with 1 or 2 word responses.  Tolerating the medication increase from the weekend provider.  No other new or acute changes.  Social work did call patient's  who reports he has not yet received documentation from [place] Medicaid.  This serving as an obstacle from coordinating discharge locally.  Patient will remain hospitalized until either able to function independently due to improvement in her mental health symptoms or is able to go to a nursing convalescence.  Has been requesting as needed medications at night including trazodone to further help with sleep.  She is tolerating these well without resulting side effects the following day.  We will continue to monitor QTc as such.    11/14:  Case discussed with treatment team and chart reviewed.     Azul has been out of her room nearly the entire day.  She is engaging and spontaneous in speech.  She tries to make jokes during discussion as well.  She continues to report that her mood is \"alright.\"  She appears to be graveling sarcastically about her condition.  To some staff she describes that she is not any better however on observation she is clearly functioning and feeling better than when she first arrived at the facility.  We remain at the mercy of [place] Medicaid snail male documentation to coordinate patient's discharge from this facility and social work remains in close contact with  where these documents are due to be mailed.  Patient continues to report poor sleep despite multiple medications being given to assist with sleep.  She is observed nightly sleeping by staff on observation however.     She is currently low-intensity level of care for physical therapy being seen 3 times a week for skilled services.     Overnight nursing reporting that patient endorses a good mood " "and is smiling and making appropriate eye contact while socially engaging another patient.  She is described as pleasant and cooperative and is quoted as saying \"doing better but unable to feel that yet\" patient also denied suicidal thoughts at this nursing staff member.    11/15:  Case discussed with treatment team and chart reviewed.     Minimal change.  Sleep reported to be the same with modification of melatonin.  Maintaining some gains, participating in groups more as has been over the past several days.  Optimal discharge coordination remains focus and social work remains involved in working to facilitate this.    11/16:  Case discussed with treatment team and chart reviewed.     Showing some additional gains today.  Participating in group outside.  More optimistic.  Received information that she thought was bad news but then said in response, \"well there is no reason to cry about it.\"  We learned that patient's [place] Medicaid will not terminate until December 1 after which time the 7 to 10-day waiting before which the letter will be received begins.  Social work is trying to find a local facility to inherit the remainder of the process such that patient could discharge sooner.  We have considered community discharge instead and are concerned if patient was to discharge at this time, local facilities would not provide her outpatient services with out-of-state Medicaid and patient would then be without services for approximately a month relying on the process of transition to [place] medicaid from [place] to proceed without flaws which to date it has not.  We are also remaining concerned that patient would not be able to exist in her own home given her physical frailty, length of time she has been wheelchair bound in the facility, inability to demonstrate that she can go up and down stairs which are required to enter and exit her home.  To this extent we continue to wait for nursing home placement " "for patient.  Her mood is better, she does endorse this, however there remain several obstacles preventing discharge at this time and continue to admission for her safety and ongoing stabilization.       Nursing notes:  Pt is observed sitting in the hallways, eating her snack and interacting with staff. She is pleasant, calm and smiling. Pt is cooperative and compliant with medications. Taking care of her own colostomy tonight.      11/17:  Case discussed with treatment team and chart reviewed.     Minimal day to day change.  Cooperative more, trying to attend group.  Staying out of bed, and going to meals.  Awaiting community resources as has been noted.     Described as going to group, smiling, and participating.      11/18:  Case discussed with treatment team and chart reviewed.     Minimal day to day change.  Cooperative more, trying to attend group but did not attend today.  Is considering going tomorrow.  In bed more today, but out of bed for meals.  Awaiting community resources as has been noted.     Nursing notes:  Pt pleasant and cooperative with staff and care. Pt making needs known and laughing while out of her room.     11/19:  Case discussed with treatment team and chart reviewed.     More optimistic today.  Reports her mood is good and when asked to clarify further she reports \"because I am alive.\"  Patient smiling while saying this.  Continues to sit in wheelchair in the hallway staying out of room more.  Goal-directed activities remain limited but patient is certainly more optimistic as noted.  She does still report that she is depressed and curious about the future but feeling less hopeless as we have continued to hospitalize her to complete the treatment plan.  She notes that nobody else has spent this much time trying to help her.  She denies thoughts of harming others and she persists without features of jensen.  She has not demonstrated overt psychotic features in an extended period of time.   " "  Nursing notes:  Pt pleasant and cooperative with staff and care. Pt out of her room attending group therapy, engaging in conversation with staff about thanksgiving meals. Smiling often.        11/20:  Case discussed with treatment team and chart reviewed.     Continues to smile during interactions and present as jokingly sarcastic.  No acute concerns today.  Patient is more malodorous and nursing work with patient to assess for the colostomy bag fitting.  Patient with no other reported concerns today.  We did learn however from social work the patient was denied by [facility].  Patient participated in physical therapy today to an extent not yet seen by this provider.  She was seen walking down the hallway with their assistance and also lifting weights with them.  She reports this caused significant pain and discomfort in multiple areas of her musculoskeletal system and she finds this untenable.  She is pessimistic about continued physical therapy services however patient is not able to function independently in a home environment without assistive support which is not currently available to her because of this.  She notes she will not engage physically to the extent that it results in pain.     Social work notes:  LSW received response back from clinical information sent that pt is unable to be accepted at [facility] due to clinical and financial reasons. LSW was informed that based on the clinical information pt is a \"risk to self and others\". LSW attempted to get further clarification on what information that they were reviewing, as pt has not been a risk to herself or anyone else. LSW will wait for further response. However at this time pt is unable to be accepted to [place] for further ongoing care. LSW to continue to follow and assist with discharge planning.         Nursing notes:  Pt pleasant and cooperative with staff and care. Pt out of her room attending group therapy, engaging in " "conversation with staff about thanksgiving meals. Smiling often.        11/21:  Case discussed with treatment team and chart reviewed.     Made aware that was declined by [facility].  Patient was visibly upset by learning this information and question, \"what are we going to do now?\".  Discussed with patient the plan remains that we will seek placement in another nursing facility and continue to wait for [place] Medicaid.  She reports her mood is \"okay.\"  She has improved thus far during the admission and she is participating increasingly so with group attention and attendance as well as participating with therapies physical and occupational.  She remains unable to independently function however.  Her  does come to visit her on weekends however is not able to help caretake for her at home during the day.  Left her own at home, she will decompensate and require readmission.  She is very high risk and for this reason continued hospitalization until appropriate disposition can be coordinated remains indicated.     Nursing notes:  Pt has been out of bed, she went to am group, she is medication compliant but still resistant to showering.    Group Participation: yes at am group  Appetite/Meals: 100%     Physical therapy reports:  Self limiting behaviors, requiring direction to transfer under close supervision, remains independent once in wheelchair, decreased activity tolerance for upright activity       11/22:  Case discussed with treatment team and chart reviewed.     No acute disturbances today.  Has persisted with much more significant improvement going on 1 to 2 weeks at this time however she may have likely plateaued from improvement at this time.  She is seen out of her room sitting in group while other patients are playing cards but she did not play.  She makes no specific requests and she maintained calm appropriate behavior.  ADLs are fair.  She continues to propel herself around the " "unit in the wheelchair.  She remains physically frail however and remains a high falls risk.  She is pessimistic about her  coming to visit her for Thanksgiving and does not believe he will do so even if he has the opportunity.     Noted by nursing to not meet the goals on this day.  She is noted in group documentation to participate with little encouragement engaging easily smiling often with appropriate eye contact.    11/23:  Chart reviewed, pt discussed with nursing staff.      Patient seen attending groups today, sitting out in common area.  She reports not sleeping very well, tossing and turning.  She is pleasant, smiles at times during interaction.  Continues to demonstrate poor ability to care for medical and physical needs.     11/24:  Case discussed with treatment team and chart reviewed.     Day to day charting is quite consistent.  Minimal change. No acute disturbances today.  Has persisted with much more significant improvement going on 1 to 2 weeks at this time however she may have likely plateaued from improvement at this time.  She is seen out of her room sitting in group while other patients are playing cards but she did not play.  She makes no specific requests and she maintained calm appropriate behavior.  ADLs are fair.  She continues to propel herself around the unit in the wheelchair.  She remains physically frail however and remains a high falls risk.    11/25:  Case discussed with nursing staff and chart reviewed.     States that she feels \"the same,\" reporting poor sleep and increased appetite. States she is bored here. Day to day charting is quite consistent.  Minimal change. No acute disturbances today.  Has persisted with much more significant improvement going on 1 to 2 weeks at this time however she may have likely plateaued from improvement at this time.  ADLs are fair.  She continues to propel herself around the unit in the wheelchair.  She remains physically frail however and " "remains a high falls risk.    11/26:  Case discussed with nursing staff and chart reviewed.     When asking about her mood, she sarcatically replied \"I'm here.\" Reports no changes. States appetite is still fair. Still not willing to consider ECT. Day to day charting is quite consistent.  Minimal change. No acute disturbances today.  Has persisted with much more significant improvement going on 1 to 2 weeks at this time however she may have likely plateaued from improvement at this time.  ADLs are fair.  She continues to propel herself around the unit in the wheelchair.  She remains physically frail however and remains a high falls risk.    11/27:  Case discussed with treatment team and chart reviewed.     Day to day charting is quite consistent.  Minimal change. No acute disturbances today.       Social work calling  for additional collateral contact.  Plan remains nursing home with patient unable to be independent at home and without home care services available.  Can move about in a wheelchair once placed into chair, however home has steps and is not wide enough to accommodate a wheelchair.   not home during the day to help care take.  Patient would be confined to the spot she was left in continuously.       Attending groups more:  Pt very pleasant and interactive with others. Significant progress since beginning of treatment.    pt joins group with little encouragement.  Pleasant, cooperative and active with participation.  Offers helpful skills that have been effective for her.  Shows good insight into stress coping skills.        Declined physical therapy today on premise she was engaging with peers.    11/28:  Case discussed with treatment team and chart reviewed.     Day to day charting is quite consistent.  Minimal change. No acute disturbances today.  No treatment plan updates today.     Continues to report is \"ok.\"  Often seen relaxing in bed, or in wheelchair near nursing station.     Brief " "on encounter, no requests, continues to endorse the wait for optimal transition recognizing that home going is ill advised and will subject her to predictable and avoidable decompensation.     Attending groups more:  Is reported as saying in group that she will do whatever she can to feel better.  Noted to be with brighter affect making improved eye contact and smiling.  Additionally, she was documented as encouraging other patients to share their own perspectives while being supportive of other patients.    11/29:  Case discussed with treatment team and chart reviewed.     Day to day charting is quite consistent.  Minimal change. No acute disturbances today.  No treatment plan updates today.     Continues to report is \"ok.\"  Often seen relaxing in bed, or in wheelchair near nursing station.     Brief on encounter, no requests, continues to endorse the wait for optimal transition recognizing that home going is ill advised and will subject her to predictable and avoidable decompensation.     We expect her [place] Medicaid to terminate on December 1 and then there is a brief waiting period until [place] Medicaid becomes active at which time patient can transition from the facility.       11/30:  Case discussed with treatment team and chart reviewed.     Day to day charting is quite consistent.  Minimal change. No acute disturbances today.  No treatment plan updates today.     Continues to report is \"ok.\"  Often seen relaxing in bed, or in wheelchair near nursing station.     Brief on encounter, no requests, continues to endorse the wait for optimal transition recognizing that home going is ill advised and will subject her to predictable and avoidable decompensation.     We expect her [place] Medicaid to terminate on December 1 and then there is a brief waiting period until [place] Medicaid becomes active at which time patient can transition from the facility.     Documentation that patient continues to require " "assistance for transfers and mobility.     Noted in morning group to be with limited participation and sarcasm when contributing.  Attended midday group appropriately, and then declined later afternoon group.    12/1:  Case discussed with treatment team and chart reviewed.     Day to day charting is quite consistent.  Minimal change. No acute disturbances today.  No treatment plan updates today.     Continues to report is \"ok.\"  Often seen relaxing in bed, or in wheelchair near nursing station.     Brief on encounter, no requests, continues to endorse the wait for optimal transition recognizing that home going is ill advised and will subject her to predictable and avoidable decompensation.     We expect her [place] Medicaid to terminate on December 1 and then there is a brief waiting period until [place] Medicaid becomes active at which time patient can transition from the facility.     Documentation that patient continues to require assistance for transfers and mobility.    12/2:  Per nursing report, Stephenie has been exhibiting increased signs of depression and anxiety today. She appears more anxious than usual, which she attributes to the lack of a discharge plan she states that the holidays are here and she is unsure if she's ever going to leave this place. Staff have not reported any behavioral issues with Stephenie, and report that she slept well last night. She actively participates in group activities and denies experiencing any auditory or visual hallucinations. Stephenie also denies having suicidal ideation or any thoughts of HI.     Stephenie expresses her fear of \"running out of time\" and yet she states that she feels safe in the facility, although somewhat reluctantly. She reports having an okay appetite but mentions that she does not sleep well in her current environment, even though she spends around 6 to 7 hours in bed. Stephenie finds her bed too small and occasionally uncomfortable. However, she denies experiencing any " "pain at the moment.    12/3:  The patient's overall mood appears to be slightly better than yesterday, but she still reports feeling anxious. She denies feeling depressed but exhibits signs of withdrawal and flat affect. The patient denies experiencing any auditory or visual hallucinations and does not feel agitated or irritable. She mentions feeling a little antsy toward the evening. The patient was advised to ask the nurse for hydroxyzine, a medication scheduled as needed for anxiety relief. She denies having any thoughts of suicide or harming others. The patient also denies experiencing any pain and reports that her appetite and sleep have been \"okay\",  and she estimates around six hours of sleep. According to staff reports, the patient continues to participate in groups and has not displayed any behavioral issues apart from anxiety. Denies AVH.     12/4:  Case discussed with treatment team and chart reviewed.     Today was a much more emotional day for Azul.  In meeting with this provider, patient shared a highly pessimistic circumstance where she feels that she will never be able to leave the facility and because of this she is feeling hopeless helpless and suicidal noting even that she was considering ways to complete suicide in the hospital.  She was observed to be tearful, raising her voice, hitting herself in the head during this discussion.  She declines further medication changes during this discussion.  One-to-one sitter was started as such.     During visitation, patient's  came to visit.  During this discussion, patient's  requested the presence of provider and  to meet with himself and the patient.  The  today reported that he felt confident that he would be able to take the patient home and help transition her to the community.  We spoke expensively regarding the involved factors including that patient has been hospitalized for 86 days pending transition to a " nursing home setting due to lack of available services in the community and at home with .  We note that we have forgone discharge home thus far on the premise the patient was not able to be cared for in the home and are concerned about patient transitioning to this setting at this time.  The patient  notes that he feels the patient has been doing better overall but does recognize over the last several days she has presented as more depressed.  Patient continually endorses during the discussion that she feels she will never be able to leave the hospital.  We attempt to offer several descriptions as to the reasons why patient has been hospitalized for the extended duration that she has and how transitioning appropriately to the community to a nursing home setting to enable for improved physical therapy services is of concern by this primary treatment team.  We also discussed with patient that while her Medicaid is currently in pending status, it may not enable her to receive outpatient services in the community if she was to discharge home at this time and this could prevent necessary follow-up on leaving the hospital.  Additionally we described the patient would be unable to transition to a nursing home from the community and should this level of service be sought after discharge she would have to first come back into the hospital to have this coordinated.  We have encouraged patient and  to consider over the next 24 hours what discharge option they would like and we would work to try and help make sure that this is the most appropriate selection for the patient.  We have offered several reasons why pursuing nursing home placement even if for 1 or 2 weeks to help with physical therapy and rehabilitation while continuing to offer supportive and safe environment would be in her best interest prior to transitioning home.  Patient persists as pessimistic regardless of the options  "discussed.    12/5:  Case discussed with treatment team and chart reviewed.     Presenting as less dire today, and still offering pessimistic outcomes reporting \"nothings going to get better anyway, so do whatever you want.\"  This being stated after discussion regarding patients disposition most optimally.  Patient and  discussing/requesting disposition to the community yesterday in lieu of transition to nursing care.  Again reviewed that patients medicaid remains in pending and not active status and beyond acceptance to a nursing facility that accepts this status, patient is essentially without required coverage for most appropriate follow up in the community.  Ultimately, and after ongoing discussion regarding recommended disposition, patient accepting of transition to nursing level of care and  continues to coordinate as such.  This timeline may allow her to transition from the hospital in short order (this week).     Has not had dangerous nor agitated behaviors.  Patient is no longer expressing exacerbated mood features and suicidal ideations and is maintaining safely.  Sitter to be discontinued today as well as such with ongoing close monitoring checks.     Declined therapy today stating, \"I'm not in the mood today.\"  However, did attend recreational therapy groups.      12/6:  Case discussed with treatment team and chart reviewed.     In bed and withdrawn just prior to encounter.  However, this morning patient was observed attending groups.  She did decline physical and occupational therapy today.  We discussed today with patient medication assisted therapy for opioid use disorder.  Patient reports she has previously been prescribed Suboxone but was no longer linked with the program.  She declines to offer further details as to why she was discontinued on Suboxone.  We offer her initiation of naltrexone with potential conversion to Vivitrol.  Patient declines on discussion.  We discussed " "that we will allow for a Narcan prescription at discharge.  Patient reports that going to a nursing home will protect her from relapse.  Discussed with patient that she would still benefit from this medication to help maintain sobriety and abstinence from opioids.  She continues to decline.  Social work report are working to investigate nursing home disposition and placement for patient at this time.    12/7:  Case discussed with treatment team and chart reviewed.     Out of bed and participating in the milieu today.  There was concern with some description of malaise that she may have coronavirus and this was tested for and found to be negative today.  Social work advised the patient could potentially transition to facility in [place] tomorrow.  Discussion was held with patient to this extent.  During the discussion patient adamantly reported that she is not suicidal but will probably continue to struggle with her emotions and features of depression.  She smiled during the discussion and reported that she is eager to leave the facility but at times it feels like \"I will never get out of here.\"  She is continuing to maintain appropriate gains since admission.  Her level of functioning is improved significantly.  She is emotionally more stable.  She has been without any expressed desire to harm others throughout the duration of the admission and she is currently persisting without evidence of psychosis nor jensen.  To this extent, her suicide risk is lower than on admission.  She has been hospitalized with reconciliation of her medications, she is participating more and making needs known appropriately.  She has maintained appropriate and safe behaviors throughout the duration of the admission, she is future oriented with desires about being able to leave the facility to resume her usual self and life.  At times she has declined to participate in certain groups or therapies however this seemed to be mostly related to " physical discomfort and has otherwise participated when asked of her in an increasing fashion and not otherwise.  Continued admission beyond transition to the suggested facility identified for potential transition tomorrow is likely of limited benefit given the gains consolidation and maintenance seen thus far.  Patient is considered discharge appropriate to nursing home care with lower suicide risk.    12/8 - day of discharge:  Maintaining noted gains and is preoccupied today with coordinating communications with  regarding discharge.  Patient reports she would like the name and address of the facility provided to her  and to herself.  This is provided to her via social work.  Recognizes that she will discharge today has no other acute concerns.  Continues to report her mood is okay but continues also to present as rather pessimistic in appearance.  She does smile at times during the interaction is able to sustain smiling genuinely during the encounter.  She denies thoughts of harming self or others and she does not persist with features of psychosis nor jensen.  Continued hospitalization at this time is likely of limited benefit in terms of further mitigation of acute risk factors.  Patient will persist with chronic risk factors with likely acute exacerbation despite continued stay at this time.  She will benefit from transitioning to the skilled nursing center for intensive services prior to returning to the community with her family.    Discharge Admission Goal Reconciliation    Admission goals met during stay include reconciliation of medications, treatment of target symptoms, gathering of collateral supportive of discharge, and linking with appropriate level of care in the community.      At discharge, patient encouraged to participate in activity as tolerated, go to all follow up appointments, take all medications as directed, outreach social supports, and utilize crisis safety resources when  appropriate.      Risk Assessment at Discharge  Psychiatric:  Patient's psychiatric condition is of lower risk than on admission given the accumulated and maintained gains as described herein.  Functioning now closer to if not at baseline, and patient is further able to identify appropriate and positive coping skills to manage further or recurrent symptoms.      Physical:  Patients physical condition at discharge is reasonable given ability to complete ADLs.      Social:  Social functioning is improved with patient identifying protective factors of family while also demonstrating increased social interactions on the unit and finally demonstrating appropriate problem solving skills for attending aftercare appointments.      Scheduled medications  acetaminophen 325 mg tablet  Commonly known as: Tylenol  Take 2 tablets (650 mg) by mouth 2 times a day.          buPROPion  mg 24 hr tablet  Commonly known as: Wellbutrin XL  Start taking on: December 9, 2023  Take 1 tablet (300 mg) by mouth once daily. Do not crush, chew, or split. Do not start before December 9, 2023.  Last time this was given: December 8, 2023  8:24 AM         celecoxib 200 mg capsule  Commonly known as: CeleBREX  Take 1 capsule (200 mg) by mouth once daily.  Last time this was given: December 7, 2023  2:57 PM         cholecalciferol 125 MCG (5000 UT) capsule  Commonly known as: Vitamin D-3  Start taking on: December 9, 2023  Take 1 capsule (125 mcg) by mouth once daily. Do not start before December 9, 2023.  Last time this was given: December 8, 2023  8:24 AM         DULoxetine 60 mg DR capsule  Commonly known as: Cymbalta  Start taking on: December 9, 2023  Take 2 capsules (120 mg) by mouth once daily. Do not crush or chew. Do not start before December 9, 2023.  Last time this was given: December 8, 2023  8:24 AM         hydrOXYzine pamoate 25 mg capsule  Commonly known as: Vistaril  Take 1 capsule (25 mg) by mouth 2 times a day.  Last time this  "was given: December 4, 2023 12:40 PM         lidocaine 4 % patch  Start taking on: December 9, 2023  Place 1 patch over 12 hours on the skin once daily. Remove & discard patch within 12 hours or as directed by MD. Do not start before December 9, 2023.  Last time this was given: December 8, 2023  8:22 AM         melatonin 5 mg tablet  Take 1 tablet (5 mg) by mouth once daily at bedtime.  Last time this was given: December 7, 2023  6:25 PM         naloxone 4 mg/0.1 mL nasal spray  Commonly known as: Narcan  Administer 1 spray (4 mg) into affected nostril(s) if needed for opioid reversal. May repeat every 2-3 minutes if needed, alternating nostrils, until medical assistance becomes available.         QUEtiapine 100 mg tablet  Commonly known as: SEROquel  Take 3.5 tablets (350 mg) by mouth once daily at bedtime.  Last time this was given: December 7, 2023  8:45 PM         traZODone 150 mg tablet  Commonly known as: Desyrel  Take 1 tablet (150 mg) by mouth once daily at bedtime.  Last time this was given: December 7, 2023  8:45 PM                  Pertinent Physical Exam At Time of Discharge  Physical Exam    Mental Status Exam  General Appearance:  less disheveled with improved eye contact, . In wheelchair in [address]  Gait/Station: using wheelchair to move about the milieu under her own propulsion  Speech: normal volume,   Mood: \"ok\"  Affect: Constricted,  Thought Process: less impoverished  Thought Associations: No loosening of associations  Thought Content: improving, linear, logical, more spontaneous and overall increase in content, denies suicidal ideation and states \"I would never try to do that\"  Perception: No perceptual abnormalities noted  Level of Consciousness: Alert  Orientation: Alert  Attention and Concentration: Mild impairment in attention  Recent Memory: Intact as evidenced by ability to recall details from the past 24 hours   Executive function: Intact  Language: Naming intact  Fund of knowledge: " Good  Insight: Fair, as evidenced by increasing participation in discussion about her scenario and symptoms and their improvement  Judgment: Fair, as evidenced by ability to reason through medical decision making, compliance with treatment recommendations, and improving      Outpatient Appointments/Follow-Ups:  Patient transitioning to nursing home who will further coordinate discharge/community care, patient is aware.    [facility]  [address] [place] [address]  Go in 2 week(s)  Complete intake at [facility] to establish ongoing mental health services, to include psychiatry, counseling, and case management.   Assessments are completed on a walk-in basis, Monday-Friday 1pm-4pm.    Parts of this chart have been completed using voice recognition software.  Please excuse any errors of transcription.  Despite the medical decision making time stamp, my medical decision making has taken place during the patient's entire visit.  Thought process and reason for plan has been formulated from the time that I saw the patient until the time of disposition and is not specific to one specific moment during their visit and furthermore the medical decision making encompasses the entire chart and not only that represented in this note.        Eddie Cullen, DO

## 2023-12-08 NOTE — GROUP NOTE
Group Topic: Other   Group Date: 12/8/2023  Start Time: 0945  End Time: 1010  Facilitators: BRENDA Fernandes   Department: [facility]    Number of Participants: 7   Group Focus: other What's up?  Treatment Modality: Other: Recreation therapy  Interventions utilized were mental fitness  Purpose: feelings, elevate mood, increase socialization, increase stimulation, increase alertness    Name: Azul Roberts YOB: 1960   MR: [id number]      Facilitator: Recreational Therapist  Level of Participation: active  Quality of Participation: appropriate/pleasant, attentive, cooperative, and engaged  Interactions with others: appropriate and gave feedback  Mood/Affect: appropriate  Triggers (if applicable): n/a  Cognition: coherent/clear  Progress: Significant  Comments: pt problem is depressed mood.   Plan: continue with services

## 2023-12-08 NOTE — PROGRESS NOTES
Pt eats and sleeps well. Pt is compliant with her medications. Pt ambulates on unit in wheelchair. Pt is A&Ox3 and is able to make her needs known. Pt attends group and is appropriate with participation. Pt has shown improvement since admission with mood and ability to care for self. Pt still needs encouragement to complete ADL's. Pt is recommended for SNF upon discharge for improvement with mobility and self care. Pt has been accepted at [facility] in [place]. Pt will discharge today and be transported by [facility] through [facility] at 4pm     DRAGAN Rey

## 2023-12-08 NOTE — NURSING NOTE
"Discharge Nursing Note    Discharge date: 12/08/23   Discharge time: 2:52 PM      Discharged to:  skilled nursing facility    Transportation provided by:  [facility]    Responsible person notified of discharge/transfer?  Include name of person if answering \"YES\"  Yes - rn    Patient left with all belongings:  Yes    Patient left with discharge medication list:  Yes    Patient left with discharge instructions:  Yes    Other concerns at discharge:  none    Presentation on discharge:  none   "

## 2023-12-08 NOTE — PROGRESS NOTES
LSW met with pt to show her pictures and a video of the facility. Pt is nervous about coming to somewhere she knows nothing about. LSW provided support and encouragement to pt. LSW encouraged pt to participate in therapy to achieve goal of walking and independence. LSW informed pt that she has active Medicaid and that she will be able to get outpatient follow up services at [place] and home health care if needed upon discharge from SNF.     Transport time changed to 2pm. Pt's  has been notified. He plans to come visit pt before she leaves.       Nikki Andrews, DRAGAN

## 2024-02-09 ENCOUNTER — OFFICE VISIT (OUTPATIENT)
Dept: PRIMARY CARE | Facility: CLINIC | Age: 64
End: 2024-02-09
Payer: MEDICAID

## 2024-02-09 VITALS
HEART RATE: 83 BPM | HEIGHT: 66 IN | SYSTOLIC BLOOD PRESSURE: 110 MMHG | TEMPERATURE: 97.3 F | BODY MASS INDEX: 28.61 KG/M2 | WEIGHT: 178 LBS | OXYGEN SATURATION: 98 % | DIASTOLIC BLOOD PRESSURE: 64 MMHG

## 2024-02-09 DIAGNOSIS — Z93.3 COLOSTOMY PRESENT (MULTI): Chronic | ICD-10-CM

## 2024-02-09 DIAGNOSIS — F33.2 SEVERE RECURRENT MAJOR DEPRESSION WITHOUT PSYCHOTIC FEATURES (MULTI): Primary | ICD-10-CM

## 2024-02-09 PROCEDURE — 99203 OFFICE O/P NEW LOW 30 MIN: CPT | Performed by: FAMILY MEDICINE

## 2024-02-09 RX ORDER — TRAZODONE HYDROCHLORIDE 150 MG/1
150 TABLET ORAL NIGHTLY
Qty: 30 TABLET | Refills: 0 | Status: SHIPPED | OUTPATIENT
Start: 2024-02-09 | End: 2024-03-10

## 2024-02-09 RX ORDER — DULOXETIN HYDROCHLORIDE 60 MG/1
120 CAPSULE, DELAYED RELEASE ORAL DAILY
Qty: 60 CAPSULE | Refills: 0 | Status: SHIPPED | OUTPATIENT
Start: 2024-02-09 | End: 2024-03-10

## 2024-02-09 RX ORDER — QUETIAPINE FUMARATE 100 MG/1
350 TABLET, FILM COATED ORAL NIGHTLY
Qty: 105 TABLET | Refills: 0 | Status: SHIPPED | OUTPATIENT
Start: 2024-02-09 | End: 2024-02-09 | Stop reason: SDUPTHER

## 2024-02-09 RX ORDER — BUPROPION HYDROCHLORIDE 300 MG/1
300 TABLET ORAL DAILY
Qty: 30 TABLET | Refills: 0 | Status: SHIPPED | OUTPATIENT
Start: 2024-02-09 | End: 2024-03-10

## 2024-02-09 RX ORDER — HYDROXYZINE PAMOATE 25 MG/1
25 CAPSULE ORAL 2 TIMES DAILY
Qty: 60 CAPSULE | Refills: 0 | Status: SHIPPED | OUTPATIENT
Start: 2024-02-09 | End: 2024-03-10

## 2024-02-09 NOTE — PATIENT INSTRUCTIONS
1. Severe recurrent major depression without psychotic features (CMS/HCC)  Patient strongly advised to find psychiatry to manage her medications will give a 1 month supply today  Please call the back of your insurance card to find a therapist/psych in your area. You can also try Try [facility] [phone] and [place] [phone]. Try this website as well [email or web address]    - buPROPion XL (Wellbutrin XL) 300 mg 24 hr tablet; Take 1 tablet (300 mg) by mouth once daily. Do not crush, chew, or split.  Dispense: 30 tablet; Refill: 0  - DULoxetine (Cymbalta) 60 mg DR capsule; Take 2 capsules (120 mg) by mouth once daily. Do not crush or chew.  Dispense: 60 capsule; Refill: 0  - hydrOXYzine pamoate (Vistaril) 25 mg capsule; Take 1 capsule (25 mg) by mouth 2 times a day.  Dispense: 60 capsule; Refill: 0  - QUEtiapine (SEROquel) 100 mg tablet; Take 3.5 tablets (350 mg) by mouth once daily at bedtime.  Dispense: 105 tablet; Refill: 0  - traZODone (Desyrel) 150 mg tablet; Take 1 tablet (150 mg) by mouth once daily at bedtime.  Dispense: 30 tablet; Refill: 0    2. Colostomy present (CMS/HCC)  Pt considering removal of colostomy. Will refer to general surgery   - Referral to General Surgery; Future          History and Physical      Subjective:    Meena Lozano is a 38 year old   female with EDC 2020 at 39w2d who is being admitted for elective IOL.  Her current obstetrical history is significant for routine prenatal care, AMA, Hx  delivery on progesterone current pregnancy, variable presentation  OB History    Para Term  AB Living   6 3 2 1 2 2   SAB TAB Ectopic Molar Multiple Live Births   1 1 0 0 0 3   .      Patient Active Problem List   Diagnosis   • Supervision of normal pregnancy   • Multigravida of advanced maternal age in first trimester   • Recuurent UTI--U/A every trmester   • Not immune to rubella   • Current pregnancy with history of pre-term labor in second trimester   • Polyhydramnios      Past Medical History:   Diagnosis Date   • Recurrent UTI      Past Surgical History:   Procedure Laterality Date   • Cosmetic breast augmentation primary     • D and c          Review of Systems: no HA/blurred vision, leaking, bleeding    Objective:  Visit Vitals  /78   Pulse 82   Temp 97.8 °F (36.6 °C) (Oral)   Resp 15   LMP 2019        General:  A & 0  Skin:  No rashes  HEENT:  normal  Lungs:  CTA  Heart:  RRR  Abdomen:  Soft and gravid  Fetal Heart Tones:  Category 1  Uterine Size:  Size=dates  Presentations:  vertex  Pelvic:    Cervix:     Dilation:  1   Effacement:  10   Station:  -3   Consistency:  moderate   Position:  posterior    Lab Review:     Glucose Challenge:  80  GBBS:  Negative  Blood type:  0+    Assessment:     39w2d   Obstetrical history significant for routine prenatal care, AMA,  variable presentation, Hx prior pre-term delivery  OB History    Para Term  AB Living   6 3 2 1 2 2   SAB TAB Ectopic Molar Multiple Live Births   1 1 0 0 0 3   .       Plan:   Risks, benefits, alternatives and possible complications have been discussed in detail with the patient.  Preadmission, admission, and post admission procedures and expectations were  discussed in detail.  All questions answered.  Admission is planned for elective IOL.

## 2024-02-09 NOTE — PROGRESS NOTES
Assessment     ASSESSMENT/PLAN:      Patient Instructions   1. Severe recurrent major depression without psychotic features (CMS/HCC)  Patient strongly advised to find psychiatry to manage her medications will give a 1 month supply today  Please call the back of your insurance card to find a therapist/psych in your area. You can also try Try [facility] Psych[facility] [phone] and [place] [phone]. Try this website as well [email or web address]    - buPROPion XL (Wellbutrin XL) 300 mg 24 hr tablet; Take 1 tablet (300 mg) by mouth once daily. Do not crush, chew, or split.  Dispense: 30 tablet; Refill: 0  - DULoxetine (Cymbalta) 60 mg DR capsule; Take 2 capsules (120 mg) by mouth once daily. Do not crush or chew.  Dispense: 60 capsule; Refill: 0  - hydrOXYzine pamoate (Vistaril) 25 mg capsule; Take 1 capsule (25 mg) by mouth 2 times a day.  Dispense: 60 capsule; Refill: 0  - QUEtiapine (SEROquel) 100 mg tablet; Take 3.5 tablets (350 mg) by mouth once daily at bedtime.  Dispense: 105 tablet; Refill: 0  - traZODone (Desyrel) 150 mg tablet; Take 1 tablet (150 mg) by mouth once daily at bedtime.  Dispense: 30 tablet; Refill: 0    2. Colostomy present (CMS/HCC)  Pt considering removal of colostomy. Will refer to general surgery   - Referral to General Surgery; Future             Signed by: Rosita Yap DO       FUTURE DIRECTION:   []    Subjective   SUBJECTIVE:     HPI : Patient is a 63 y.o. female who presents today for the following:       MDD without psychosis  Follows psychiatry  Required past hospitalization from [facility]  Takes duloxetine 120 mg daily, Vistaril 25 mg twice daily, Seroquel 250 mg nightly, trazodone 150 mg nightly, Wellbutrin 200 mg daily    History of substance abuse   Per chart review, pt has diagnosis of opioid abuse during hospitalization 9/2023    History of colostomy       Review of Systems    History reviewed. No pertinent past medical history.     Past Surgical  History:   Procedure Laterality Date    COLOSTOMY      TONSILLECTOMY          Current Outpatient Medications   Medication Instructions    acetaminophen (TYLENOL) 650 mg, oral, 2 times daily    buPROPion XL (WELLBUTRIN XL) 300 mg, oral, Daily, Do not crush, chew, or split.    celecoxib (CELEBREX) 200 mg, oral, Daily    cholecalciferol (VITAMIN D-3) 125 mcg, oral, Daily    DULoxetine (CYMBALTA) 120 mg, oral, Daily, Do not crush or chew.    hydrOXYzine pamoate (VISTARIL) 25 mg, oral, 2 times daily    lidocaine 4 % patch 1 patch, transdermal, Daily, Remove & discard patch within 12 hours or as directed by MD.    melatonin 5 mg, oral, Nightly    naloxone (NARCAN) 4 mg, nasal, As needed, May repeat every 2-3 minutes if needed, alternating nostrils, until medical assistance becomes available.    QUEtiapine (SEROQUEL) 350 mg, oral, Nightly    traZODone (DESYREL) 150 mg, oral, Nightly        No Known Allergies     Social History     Socioeconomic History    Marital status:      Spouse name: Not on file    Number of children: Not on file    Years of education: Not on file    Highest education level: Not on file   Occupational History    Not on file   Tobacco Use    Smoking status: Every Day     Packs/day: .5     Types: Cigarettes    Smokeless tobacco: Never   Vaping Use    Vaping Use: Never used   Substance and Sexual Activity    Alcohol use: Yes    Drug use: Never    Sexual activity: Not on file   Other Topics Concern    Not on file   Social History Narrative    Not on file     Social Determinants of Health     Financial Resource Strain: High Risk (12/8/2023)    Overall Financial Resource Strain (CARDIA)     Difficulty of Paying Living Expenses: Hard   Food Insecurity: Not on file   Transportation Needs: No Transportation Needs (12/8/2023)    PRAPARE - Transportation     Lack of Transportation (Medical): No     Lack of Transportation (Non-Medical): No   Physical Activity: Not on file   Stress: Not on file   Social  "Connections: Not on file   Intimate Partner Violence: Not on file   Housing Stability: High Risk (12/8/2023)    Housing Stability Vital Sign     Unable to Pay for Housing in the Last Year: Yes     Number of Places Lived in the Last Year: 2     Unstable Housing in the Last Year: No        No family history on file.     Objective     OBJECTIVE:     Vitals:    02/09/24 0905   BP: 110/64   Pulse: 83   Temp: 36.3 °C (97.3 °F)   SpO2: 98%   Weight: 80.7 kg (178 lb)   Height: 1.676 m (5' 6\")        Physical Exam  Constitutional:       Appearance: Normal appearance.   HENT:      Head: Normocephalic.   Cardiovascular:      Rate and Rhythm: Normal rate and regular rhythm.   Pulmonary:      Effort: Pulmonary effort is normal.      Breath sounds: No wheezing or rhonchi.   Musculoskeletal:      Cervical back: Normal range of motion.   Neurological:      Mental Status: She is alert.   Psychiatric:         Mood and Affect: Mood normal.             "

## 2024-05-09 ENCOUNTER — APPOINTMENT (OUTPATIENT)
Dept: PRIMARY CARE | Facility: CLINIC | Age: 64
End: 2024-05-09

## 2024-05-10 ENCOUNTER — APPOINTMENT (OUTPATIENT)
Dept: SURGERY | Facility: CLINIC | Age: 64
End: 2024-05-10

## 2024-05-10 ENCOUNTER — TELEPHONE (OUTPATIENT)
Dept: PRIMARY CARE | Facility: CLINIC | Age: 64
End: 2024-05-10

## 2024-05-10 NOTE — TELEPHONE ENCOUNTER
Dr. ROGERS Referred Pt to General Surgery for removal of colostomy. General Surgery is trying to find Info on patient like who did it and what facility? Please Advise

## 2024-05-13 ENCOUNTER — OFFICE VISIT (OUTPATIENT)
Dept: SURGERY | Facility: CLINIC | Age: 64
End: 2024-05-13
Payer: MEDICAID

## 2024-05-13 VITALS
WEIGHT: 218 LBS | HEIGHT: 66 IN | DIASTOLIC BLOOD PRESSURE: 82 MMHG | SYSTOLIC BLOOD PRESSURE: 118 MMHG | OXYGEN SATURATION: 95 % | BODY MASS INDEX: 35.03 KG/M2 | HEART RATE: 86 BPM

## 2024-05-13 DIAGNOSIS — Z93.3 COLOSTOMY PRESENT (MULTI): Primary | Chronic | ICD-10-CM

## 2024-05-13 DIAGNOSIS — F33.2 SEVERE RECURRENT MAJOR DEPRESSION WITHOUT PSYCHOTIC FEATURES (MULTI): ICD-10-CM

## 2024-05-13 DIAGNOSIS — K59.00 CONSTIPATION, UNSPECIFIED CONSTIPATION TYPE: ICD-10-CM

## 2024-05-13 PROCEDURE — 99205 OFFICE O/P NEW HI 60 MIN: CPT | Performed by: SURGERY

## 2024-05-13 RX ORDER — LORAZEPAM 0.5 MG/1
0.5 TABLET ORAL EVERY 6 HOURS PRN
COMMUNITY

## 2024-05-13 RX ORDER — OXYCODONE AND ACETAMINOPHEN 5; 325 MG/1; MG/1
TABLET ORAL
COMMUNITY

## 2024-05-13 RX ORDER — TIZANIDINE HYDROCHLORIDE 4 MG/1
4 CAPSULE, GELATIN COATED ORAL 3 TIMES DAILY
COMMUNITY

## 2024-05-13 RX ORDER — GABAPENTIN 100 MG/1
100 CAPSULE ORAL 2 TIMES DAILY
COMMUNITY

## 2024-05-13 NOTE — PROGRESS NOTES
GENERAL SURGERY CLINIC NOTE    Azul Bates   1960   [id number]     History Of Present Illness  Azul Bates is a 64 y.o. female who presents to the office for evaluation for colostomy reversal. She has a fairly significant psychiatric and substance abuse history that is only partially summarized in this note. From chart review, she suffered a fall in September 2022 and had a fracture of her coccyx. This was complicated by a sacral decubitus wound that became infected. She was admitted at [facility] in [place] from 10/13/22-12/22/22 with gluteal/sacral necrotizing fasciitis and coccygeal osteomyelitis. She underwent debridement by General Surgery 10/14/22, 10/18/22 and 10/27/22. She underwent laparoscopic diverting loop colostomy 10/19/22 to allow the sacral wound to heal. A rotational flap was performed by Plastic Surgery 12/2/22. She completed a 6 week course of Unasyn. She attempted to commit suicide while she was admitted via opioid overdose. Her  brought fentanyl for both of them to use. She was transitioned to subutex with plans to bridge to suboxone as an outpatient for her history of chronic pain and substance abuse disorder.    She has since moved from [place] to [place] with her . During her time here, she was admitted 9/9/23-12/8/23 for psychosis and major recurrent depression. Please refer to the dc summary for extensive details regarding this admission. In short, she was not caring for herself for a few days and soiling herself in her chair. Her  drug screen was positive for amphetamines and fentanyl. She appeared to have stopped taking suboxone at some point. There are reports of multiple prior suicide attempts.     The patient underwent a left hip replacement in February 2024, probably in [facility] (no records in Epic). She has since been in a nursing facility recovering from this. She can use a walker for very short distances, such as  getting to a restroom, and uses a wheelchair for longer distances. Per the aide, she has likely completed therapy following her hip surgery. She has remained at this facility as she has nowhere else to go (there were reports in her previous admission of impending homelessness). She was ultimately hoping to undergo colostomy reversal, recover from this, and then move to [place]. There are many family members in [place] - ex , children, mother, sibling(s).     Locally, she has a PCP but not a psychiatrist - she claims she never had a referral. As she has been in a facility for a few months, she denies substance abuse or alcohol use. However, she denied drug abuse prior to her admission in late 2023 and her drug screen was positive. Her  has since passed away. The patient has never undergone colonoscopy. She does endorse having difficulty passing stool into her colostomy.    We were able to obtain records from [facility] from [place] regarding the patient's admission in late 2022. The relevant documents will be scanned into [employer].     Past Medical History  She has no past medical history on file.  Chart review and OSH records:   CHF with preserved EF  COPD  chronic pain syndrome  Sciatica  migraine  Hypertension  Arthritis  Osteoporosis  opioid/fentanyl abuse  severe recurrent depression  Bipolar disorder  Hepatitis C - unknown if treated    Surgical History  She has a past surgical history that includes Colostomy and Tonsillectomy.  10/14/22 Dr. Cr: excisional debridement of skin, soft tissue and muscle for necrotizing fasciitis  10/18/22 Dr. Yoder: debridement of sacral wound for necrotizing fasciitis  10/19/22 Dr. Cr: laparoscopic diverting loop colostomy  10/27/22 Dr. Matos: irrigation and debridement of skin, subcutaneous tissue and fascia   12/2/22 Dr. Guan: debridement and rotational flap of perineum  Ovarian cystectomy  Tubal  ligation    Medications  Current Outpatient Medications on File Prior to Visit   Medication Sig Dispense Refill    acetaminophen (Tylenol) 325 mg tablet Take 2 tablets (650 mg) by mouth 2 times a day.      buPROPion XL (Wellbutrin XL) 300 mg 24 hr tablet Take 1 tablet (300 mg) by mouth once daily. Do not crush, chew, or split. 30 tablet 0    celecoxib (CeleBREX) 200 mg capsule Take 1 capsule (200 mg) by mouth once daily. (Patient not taking: Reported on 2/9/2024)      cholecalciferol (Vitamin D-3) 125 MCG (5000 UT) capsule Take 1 capsule (125 mcg) by mouth once daily. Do not start before December 9, 2023. (Patient not taking: Reported on 2/9/2024)      DULoxetine (Cymbalta) 60 mg DR capsule Take 2 capsules (120 mg) by mouth once daily. Do not crush or chew. 60 capsule 0    hydrOXYzine pamoate (Vistaril) 25 mg capsule Take 1 capsule (25 mg) by mouth 2 times a day. 60 capsule 0    lidocaine 4 % patch Place 1 patch over 12 hours on the skin once daily. Remove & discard patch within 12 hours or as directed by MD. 28 patch 0    melatonin 5 mg tablet Take 1 tablet (5 mg) by mouth once daily at bedtime.      naloxone (Narcan) 4 mg/0.1 mL nasal spray Administer 1 spray (4 mg) into affected nostril(s) if needed for opioid reversal. May repeat every 2-3 minutes if needed, alternating nostrils, until medical assistance becomes available. 2 each 0    QUEtiapine (SeroqueL) 300 mg tablet Take 1 tablet (300 mg) by mouth once daily at bedtime. 30 tablet 0    QUEtiapine (SeroqueL) 50 mg tablet Take 1 tablet (50 mg) by mouth once daily at bedtime. Take with 300mg dose 30 tablet 0    traZODone (Desyrel) 150 mg tablet Take 1 tablet (150 mg) by mouth once daily at bedtime. 30 tablet 0     No current facility-administered medications on file prior to visit.       Allergies  Patient has no known allergies.     Social History  She reports that she has been smoking cigarettes. She has never used smokeless tobacco. She reports current  "alcohol use. She reports that she does not use drugs.  Recent history of substance abuse  History of alcohol abuse - unclear if in remission    Family History  No family history on file.  Half brother with leukemia      Review of Systems   Constitutional:  Negative for chills and fever.   Respiratory:  Negative for cough and shortness of breath.    Cardiovascular:  Negative for chest pain and palpitations.   Gastrointestinal:  Positive for constipation. Negative for abdominal pain, blood in stool, diarrhea, nausea and vomiting.   Musculoskeletal:  Positive for arthralgias and gait problem.   Neurological:  Negative for dizziness and headaches.   Psychiatric/Behavioral:  The patient is nervous/anxious.    All other systems reviewed and are negative.      Last Recorded Vitals  Blood pressure 118/82, pulse 86, height 1.676 m (5' 6\"), weight 98.9 kg (218 lb), SpO2 95%.     Physical Exam  Constitutional:       General: She is not in acute distress.     Appearance: Normal appearance. She is ill-appearing.      Comments: Appears older and more frail than age would suggest. Is somewhat tearful.   HENT:      Head: Normocephalic and atraumatic.   Cardiovascular:      Rate and Rhythm: Normal rate and regular rhythm.   Pulmonary:      Effort: Pulmonary effort is normal. No respiratory distress.      Breath sounds: Normal breath sounds.   Abdominal:      General: There is no distension.      Palpations: Abdomen is soft.      Tenderness: There is no abdominal tenderness. There is no guarding.      Comments: L lateral abdomen with colostomy in place, pink and viable. Small amount of hard stool in bag   Musculoskeletal:         General: No swelling.   Skin:     General: Skin is warm and dry.   Neurological:      Mental Status: She is alert and oriented to person, place, and time. Mental status is at baseline.     Relevant Results  None     Assessment and Plan  64 y.o. female with a significant history of substance abuse and " "psychiatric issues requiring admission who in late 2022 underwent a diverting loop colostomy to allow her large sacral wound to heal after treatment for necrotizing fasciitis. The patient is very interested in colostomy reversal. She was tearful during the appointment as she became upset that surgery isn't going to be immediately scheduled. Her desire was to undergo the surgery in the very near future, recover at the same nursing facility she has been staying in (I suspect she does not have housing otherwise), and then move to [place] where there is family. I suggested that a reasonable option would be to consider moving first, and then getting her healthcare sorted and surgery scheduled. However, she became very upset \"I don't want to go to [place] with a colostomy!\" Overall, I am willing to reverse her colostomy, but she will need to complete several tasks, listed below. I emphasized that surgery will not be scheduled anytime soon. The patient is also more than welcome to obtain a second opinion from another local surgeon, as that may or may not result in less time until colostomy reversal.    Psychiatric history  The patient was recently admitted for 3 months with significant psychiatric issues and has a history of suicide attempts, including during an admission in 2022. She needs to see a psychiatrist to manage her medications. She has not had a psychiatrist in the outpatient setting since moving to [place].    Substance abuse disorder and chronic pain  The patient was supposed to take suboxone after her discharge in 2022 and has continued to abuse drugs until her admission in [place] September 2023. I will likely require a Pain Management consultation at our next appointment as we will be considering surgery in the future and there needs a plan in place to manage perioperative narcotic use in the setting of her history.    Constipation  I recommend taking Miralax once a day. The patient was taking a stool softener " once a day and has hard stool in her appliance.    Colonoscopy  The patient has never undergone colonoscopy and will need to complete one prior to any elective colon surgery.     Medical clearance  She will need a PAT appointment with EKG prior to surgery. Depending on her workup above, she may need additional referrals.    Additional  I plan to order a full panel of blood work prior to scheduling a colonoscopy, likely at the next appointment. This includes CBC, BMP, LFTs and a hepatitis panel/hepatitis C viral load. There is a report of hepatitis C diagnosed in 2017 in the [facility] records. I will also obtain a CT A/P to generally evaluate her colon anatomy.    Colostomy reversal  The patient underwent a laparoscopic loop colostomy and the operative report suggests she has enough redundancy that there was no tension in creating this. The colostomy may be able to be reversed locally.    At the conclusion of this appointment, I have sent Miralax daily to her pharmacy and referred her to Psychiatry. I will see her in the office after she has met with Psychiatry and order additional testing/referrals as listed above. The patient and her aide expressed their understanding and all questions were answered.    Maggy Jackson MD, FACS  General Surgery

## 2024-05-15 ENCOUNTER — TELEPHONE (OUTPATIENT)
Dept: SURGERY | Facility: CLINIC | Age: 64
End: 2024-05-15

## 2024-05-15 RX ORDER — POLYETHYLENE GLYCOL 3350 17 G/17G
17 POWDER, FOR SOLUTION ORAL DAILY
Qty: 238 G | Refills: 1 | Status: SHIPPED | OUTPATIENT
Start: 2024-05-15 | End: 2024-07-14

## 2024-05-15 ASSESSMENT — ENCOUNTER SYMPTOMS
CHILLS: 0
ABDOMINAL PAIN: 0
DIZZINESS: 0
FEVER: 0
BLOOD IN STOOL: 0
HEADACHES: 0
DIARRHEA: 0
ARTHRALGIAS: 1
SHORTNESS OF BREATH: 0
CONSTIPATION: 1
NAUSEA: 0
COUGH: 0
PALPITATIONS: 0
NERVOUS/ANXIOUS: 1
VOMITING: 0

## 2024-05-15 NOTE — TELEPHONE ENCOUNTER
----- Message from Maggy Jackson MD sent at 5/15/2024 10:04 AM EDT -----  I have sent a prescription for daily Miralax to this patient's pharmacy. Can you let her facility know so they can administer it? Thanks

## 2024-06-03 ENCOUNTER — TELEPHONE (OUTPATIENT)
Dept: SURGERY | Facility: CLINIC | Age: 64
End: 2024-06-03
Payer: MEDICAID

## 2024-06-03 NOTE — TELEPHONE ENCOUNTER
Patient called about scheduling colostomy reversal procedure. Patient would like to know if the phychiatric evaluation was acceptable to proceed. Patient would like a call back about next steps to the number [phone].

## 2024-06-17 ENCOUNTER — APPOINTMENT (OUTPATIENT)
Dept: SURGERY | Facility: CLINIC | Age: 64
End: 2024-06-17
Payer: MEDICAID

## 2024-06-21 ENCOUNTER — OFFICE VISIT (OUTPATIENT)
Dept: SURGERY | Facility: CLINIC | Age: 64
End: 2024-06-21
Payer: MEDICAID

## 2024-06-21 ENCOUNTER — LAB (OUTPATIENT)
Dept: LAB | Facility: LAB | Age: 64
End: 2024-06-21
Payer: MEDICAID

## 2024-06-21 VITALS
DIASTOLIC BLOOD PRESSURE: 75 MMHG | HEART RATE: 100 BPM | WEIGHT: 220 LBS | BODY MASS INDEX: 32.58 KG/M2 | OXYGEN SATURATION: 95 % | SYSTOLIC BLOOD PRESSURE: 124 MMHG | HEIGHT: 69 IN

## 2024-06-21 DIAGNOSIS — Z93.3 COLOSTOMY PRESENT (MULTI): ICD-10-CM

## 2024-06-21 DIAGNOSIS — Z93.3 COLOSTOMY PRESENT (MULTI): Primary | ICD-10-CM

## 2024-06-21 DIAGNOSIS — K59.00 CONSTIPATION, UNSPECIFIED CONSTIPATION TYPE: ICD-10-CM

## 2024-06-21 DIAGNOSIS — F19.11 DRUG ABUSE IN REMISSION (MULTI): ICD-10-CM

## 2024-06-21 LAB
ALBUMIN SERPL BCP-MCNC: 3.9 G/DL (ref 3.4–5)
ALP SERPL-CCNC: 102 U/L (ref 33–136)
ALT SERPL W P-5'-P-CCNC: 11 U/L (ref 7–45)
ANION GAP SERPL CALC-SCNC: 11 MMOL/L (ref 10–20)
APTT PPP: 36 SECONDS (ref 27–38)
AST SERPL W P-5'-P-CCNC: 12 U/L (ref 9–39)
BILIRUB DIRECT SERPL-MCNC: 0 MG/DL (ref 0–0.3)
BILIRUB SERPL-MCNC: 0.2 MG/DL (ref 0–1.2)
BUN SERPL-MCNC: 19 MG/DL (ref 6–23)
CALCIUM SERPL-MCNC: 9.2 MG/DL (ref 8.6–10.3)
CHLORIDE SERPL-SCNC: 106 MMOL/L (ref 98–107)
CO2 SERPL-SCNC: 25 MMOL/L (ref 21–32)
CREAT SERPL-MCNC: 0.81 MG/DL (ref 0.5–1.05)
EGFRCR SERPLBLD CKD-EPI 2021: 82 ML/MIN/1.73M*2
ERYTHROCYTE [DISTWIDTH] IN BLOOD BY AUTOMATED COUNT: 14.8 % (ref 11.5–14.5)
GLUCOSE SERPL-MCNC: 114 MG/DL (ref 74–99)
HAV IGM SER QL: NONREACTIVE
HBV CORE IGM SER QL: NONREACTIVE
HBV SURFACE AG SERPL QL IA: NONREACTIVE
HCT VFR BLD AUTO: 28.1 % (ref 36–46)
HCV AB SER QL: REACTIVE
HGB BLD-MCNC: 8.1 G/DL (ref 12–16)
INR PPP: 1 (ref 0.9–1.1)
MCH RBC QN AUTO: 23.4 PG (ref 26–34)
MCHC RBC AUTO-ENTMCNC: 28.8 G/DL (ref 32–36)
MCV RBC AUTO: 81 FL (ref 80–100)
NRBC BLD-RTO: 0 /100 WBCS (ref 0–0)
PLATELET # BLD AUTO: 286 X10*3/UL (ref 150–450)
POTASSIUM SERPL-SCNC: 4.4 MMOL/L (ref 3.5–5.3)
PROT SERPL-MCNC: 6.7 G/DL (ref 6.4–8.2)
PROTHROMBIN TIME: 11.7 SECONDS (ref 9.8–12.8)
RBC # BLD AUTO: 3.46 X10*6/UL (ref 4–5.2)
SODIUM SERPL-SCNC: 138 MMOL/L (ref 136–145)
WBC # BLD AUTO: 8.4 X10*3/UL (ref 4.4–11.3)

## 2024-06-21 PROCEDURE — 80053 COMPREHEN METABOLIC PANEL: CPT

## 2024-06-21 PROCEDURE — 99215 OFFICE O/P EST HI 40 MIN: CPT | Performed by: SURGERY

## 2024-06-21 PROCEDURE — 85610 PROTHROMBIN TIME: CPT

## 2024-06-21 PROCEDURE — 80074 ACUTE HEPATITIS PANEL: CPT

## 2024-06-21 PROCEDURE — 82248 BILIRUBIN DIRECT: CPT

## 2024-06-21 PROCEDURE — 85027 COMPLETE CBC AUTOMATED: CPT

## 2024-06-21 PROCEDURE — 85730 THROMBOPLASTIN TIME PARTIAL: CPT

## 2024-06-21 PROCEDURE — 87522 HEPATITIS C REVRS TRNSCRPJ: CPT

## 2024-06-21 PROCEDURE — 36415 COLL VENOUS BLD VENIPUNCTURE: CPT

## 2024-06-21 RX ORDER — SODIUM CHLORIDE 9 MG/ML
20 INJECTION, SOLUTION INTRAVENOUS CONTINUOUS
OUTPATIENT
Start: 2024-06-21

## 2024-06-21 RX ORDER — MELOXICAM 7.5 MG/1
7.5 TABLET ORAL DAILY
COMMUNITY

## 2024-06-21 RX ORDER — BUSPIRONE HYDROCHLORIDE 5 MG/1
5 TABLET ORAL 2 TIMES DAILY
COMMUNITY

## 2024-06-21 RX ORDER — ENEMA 19; 7 G/133ML; G/133ML
1 ENEMA RECTAL DAILY
Qty: 266 ML | Refills: 0 | Status: SHIPPED | OUTPATIENT
Start: 2024-06-21 | End: 2024-06-23

## 2024-06-21 RX ORDER — CALCIUM CARBONATE 300MG(750)
400 TABLET,CHEWABLE ORAL DAILY
COMMUNITY

## 2024-06-21 RX ORDER — POLYETHYLENE GLYCOL 3350, SODIUM SULFATE ANHYDROUS, SODIUM BICARBONATE, SODIUM CHLORIDE, POTASSIUM CHLORIDE 236; 22.74; 6.74; 5.86; 2.97 G/4L; G/4L; G/4L; G/4L; G/4L
4000 POWDER, FOR SOLUTION ORAL ONCE
Qty: 4000 ML | Refills: 0 | Status: SHIPPED | OUTPATIENT
Start: 2024-06-21 | End: 2024-06-21

## 2024-06-21 RX ORDER — CYCLOBENZAPRINE HCL 5 MG
5 TABLET ORAL
COMMUNITY

## 2024-06-21 ASSESSMENT — ENCOUNTER SYMPTOMS
NAUSEA: 0
VOMITING: 0
SHORTNESS OF BREATH: 0
CONSTIPATION: 0
ABDOMINAL PAIN: 0
PALPITATIONS: 0
BLOOD IN STOOL: 0
DIZZINESS: 0
NERVOUS/ANXIOUS: 1
DIARRHEA: 0
HEADACHES: 0
COUGH: 0
FEVER: 0
CHILLS: 0
ARTHRALGIAS: 1

## 2024-06-21 NOTE — PATIENT INSTRUCTIONS
Today:  Get bloodwork completed    Upcoming  Get a CT scan of your abdomen and pelvis to evaluate the anatomy.  See Pain Management as soon as possible given your history of drug abuse and you currently taking narcotic pain medications chronically. They can assist in managing long-term pain medications.  Your colonoscopy is scheduled for 7/24/24. The prep (Golytely liquid, and 2 enemas) was sent to your pharmacy. Drink ALL of the prep the day before the procedure and use 1 enema that evening. The morning before coming in for the procedure, use 1 enema.    After the above has been completed, follow up in the office to discuss the results and whether everything has been completed to proceed with colostomy reversal.

## 2024-06-21 NOTE — PROGRESS NOTES
GENERAL SURGERY CLINIC NOTE    Azul Bates   1960   [id number]     History Of Present Illness  Azul Bates is a 64 y.o. female who presents to the office for follow up for colostomy reversal. She has a fairly significant psychiatric and substance abuse history that is partially summarized in this note. From chart review, she suffered a fall in September 2022 and had a fracture of her coccyx. This was complicated by a sacral decubitus wound that became infected. She was admitted at [facility] in [place] from 10/13/22-12/22/22 with gluteal/sacral necrotizing fasciitis and coccygeal osteomyelitis. She underwent debridement by General Surgery 10/14/22, 10/18/22 and 10/27/22. She underwent laparoscopic diverting loop colostomy 10/19/22 to allow the sacral wound to heal. A rotational flap was performed by Plastic Surgery 12/2/22. She completed a 6 week course of Unasyn. She attempted to commit suicide while she was admitted via opioid overdose. Her  brought fentanyl for both of them to use. She was transitioned to subutex with plans to bridge to suboxone as an outpatient for her history of chronic pain and substance abuse disorder.    She has since moved from [place] to [place] with her . During her time here, she was admitted 9/9/23-12/8/23 for psychosis and major recurrent depression. Please refer to the dc summary for extensive details regarding this admission. In short, she was not caring for herself for a few days and soiling herself in her chair. Her drug screen was positive for amphetamines and fentanyl. She appeared to have stopped taking suboxone at some point. There are reports of multiple prior suicide attempts.     The patient underwent a left hip replacement in February 2024, probably in [facility] (no records in Epic). She has since been in a nursing facility recovering from this. She can use a walker for very short distances, such as  getting to a restroom, and uses a wheelchair for longer distances. She has completed therapy following her hip surgery. She has remained at this facility as she has nowhere else to go (there were reports in her previous admission of impending homelessness). She was ultimately hoping to undergo colostomy reversal, recover from this, and then move to [place]. There are many family members in [place] - ex , children, mother, sibling(s).     Locally, she has a PCP but not a psychiatrist - she claims she never had a referral. As she has been in a facility for a few months, she denies substance abuse or alcohol use. However, she denied drug abuse prior to her admission in late 2023 and her drug screen was positive. Her  has since passed away. The patient has never undergone colonoscopy. She does endorse having difficulty passing stool into her colostomy but states this has improved with taking Miralax daily that I recommended at her last office visit.    Past Medical History  She has no past medical history on file.  Chart review and OSH records:   CHF with preserved EF  COPD  chronic pain syndrome  Sciatica  migraine  Hypertension  Arthritis  Osteoporosis  opioid/fentanyl abuse  severe recurrent depression  Bipolar disorder  Hepatitis C - unknown if treated    Surgical History  She has a past surgical history that includes Colostomy (10/2022); Tonsillectomy; and Total hip arthroplasty (Left, 02/12/2024).  10/14/22 Dr. Cr: excisional debridement of skin, soft tissue and muscle for necrotizing fasciitis  10/18/22 Dr. Yoder: debridement of sacral wound for necrotizing fasciitis  10/19/22 Dr. Cr: laparoscopic diverting loop colostomy  10/27/22 Dr. Matos: irrigation and debridement of skin, subcutaneous tissue and fascia   12/2/22 Dr. Guan: debridement and rotational flap of perineum  Ovarian cystectomy  Tubal ligation    Medications  Current Outpatient Medications on File Prior to Visit   Medication  Sig Dispense Refill    acetaminophen (Tylenol) 325 mg tablet Take 2 tablets (650 mg) by mouth 2 times a day.      buPROPion XL (Wellbutrin XL) 300 mg 24 hr tablet Take 1 tablet (300 mg) by mouth once daily. Do not crush, chew, or split. 30 tablet 0    celecoxib (CeleBREX) 200 mg capsule Take 1 capsule (200 mg) by mouth once daily. (Patient not taking: Reported on 5/13/2024)      cholecalciferol (Vitamin D-3) 125 MCG (5000 UT) capsule Take 1 capsule (125 mcg) by mouth once daily. Do not start before December 9, 2023.      DULoxetine (Cymbalta) 60 mg DR capsule Take 2 capsules (120 mg) by mouth once daily. Do not crush or chew. 60 capsule 0    gabapentin (Neurontin) 100 mg capsule Take 1 capsule (100 mg) by mouth 2 times a day. 3 capsules by mouth      hydrOXYzine pamoate (Vistaril) 25 mg capsule Take 1 capsule (25 mg) by mouth 2 times a day. 60 capsule 0    lidocaine 4 % patch Place 1 patch over 12 hours on the skin once daily. Remove & discard patch within 12 hours or as directed by MD. 28 patch 0    LORazepam (Ativan) 0.5 mg tablet Take 1 tablet (0.5 mg) by mouth every 6 hours if needed for anxiety. Take before MRI appointment      melatonin 5 mg tablet Take 1 tablet (5 mg) by mouth once daily at bedtime.      naloxone (Narcan) 4 mg/0.1 mL nasal spray Administer 1 spray (4 mg) into affected nostril(s) if needed for opioid reversal. May repeat every 2-3 minutes if needed, alternating nostrils, until medical assistance becomes available. 2 each 0    oxyCODONE-acetaminophen (Percocet) 5-325 mg tablet Take by mouth.      polyethylene glycol (Miralax) 17 gram/dose powder Take 17 g by mouth once daily. 238 g 1    QUEtiapine (SeroqueL) 300 mg tablet Take 1 tablet (300 mg) by mouth once daily at bedtime. 30 tablet 0    QUEtiapine (SeroqueL) 50 mg tablet Take 1 tablet (50 mg) by mouth once daily at bedtime. Take with 300mg dose 30 tablet 0    tiZANidine (Zanaflex) 4 mg capsule Take 1 capsule (4 mg) by mouth 3 times a day.    "   traZODone (Desyrel) 150 mg tablet Take 1 tablet (150 mg) by mouth once daily at bedtime. 30 tablet 0     No current facility-administered medications on file prior to visit.       Allergies  Patient has no known allergies.     Social History  She reports that she has been smoking cigarettes. She has never used smokeless tobacco. She reports current alcohol use. She reports that she does not use drugs.  Recent history of substance abuse  History of alcohol abuse - unclear if in remission    Family History  No family history on file.  Half brother with leukemia      Review of Systems   Constitutional:  Negative for chills and fever.   Respiratory:  Negative for cough and shortness of breath.    Cardiovascular:  Negative for chest pain and palpitations.   Gastrointestinal:  Negative for abdominal pain, blood in stool, constipation, diarrhea, nausea and vomiting.   Musculoskeletal:  Positive for arthralgias and gait problem.   Neurological:  Negative for dizziness and headaches.   Psychiatric/Behavioral:  The patient is nervous/anxious.    All other systems reviewed and are negative.      Last Recorded Vitals  Blood pressure 124/75, pulse 100, height 1.74 m (5' 8.5\"), weight 99.8 kg (220 lb), SpO2 95%.     Physical Exam  Constitutional:       General: She is not in acute distress.     Appearance: Normal appearance. She is not ill-appearing.      Comments: Appears older and more frail than age would suggest. Is in better spirits today compared to the 1st office visit   HENT:      Head: Normocephalic and atraumatic.   Cardiovascular:      Rate and Rhythm: Normal rate and regular rhythm.   Pulmonary:      Effort: Pulmonary effort is normal. No respiratory distress.      Breath sounds: Normal breath sounds.   Abdominal:      General: There is no distension.      Palpations: Abdomen is soft.      Tenderness: There is no abdominal tenderness. There is no guarding.      Comments: L lateral abdomen with colostomy in place, " pink and viable. Small amount of formed stool in bag   Musculoskeletal:         General: No swelling.   Skin:     General: Skin is warm and dry.   Neurological:      Mental Status: She is alert and oriented to person, place, and time. Mental status is at baseline.       Relevant Results  None     Assessment and Plan  64 y.o. female with a significant history of substance abuse and psychiatric issues requiring admission who in late 2022 underwent a diverting loop colostomy to allow her large sacral wound to heal after treatment for necrotizing fasciitis. The patient is very interested in colostomy reversal. She was tearful during the first appointment as she became upset that surgery isn't going to be immediately scheduled. Her desire was to undergo the surgery in the very near future, recover at the same nursing facility she has been staying in (she does not have housing otherwise), and then move to [place] where there is family.     Psychiatric history  The patient was recently admitted for 3 months with significant psychiatric issues and has a history of suicide attempts, including during an admission in 2022. There is a psychiatry provider who recently cleared the patient from a psychiatric standpoint to undergo abdominal surgery.     Substance abuse disorder and chronic pain  The patient was supposed to take suboxone after her discharge in 2022 and has continued to abuse drugs until her admission in [place] September 2023. After her hip surgery, she started taking Percocet chronically due to bilateral hand pain from carpal tunnel syndrome (self-reported) and right shoulder pain. I will refer the patient to Pain Management to discuss a plan to manage perioperative narcotic use int he setting of her history.     Constipation  Continue taking Miralax once a day. This seems improved.    Colonoscopy  The risks and benefits of undergoing colonoscopy were discussed. Risks include COVID-19 transmission/infection, allergic  reactions, heart or lung complications, bleeding, infection, injury to surrounding tissue, and perforation of the colon. The patient expressed understanding and provided informed consent for the procedure. A colonoscopy is scheduled for 7/24/24. The Golytely prep and 2 Fleets enemas were sent to her pharmacy.    Medical clearance  She will need a PAT appointment with EKG prior to surgery. Depending on her workup above, she may need additional referrals.    Additional  Today, I have ordered CBC, BMP, coag, LFTs, hepatitis panel. The patient states she was diagnosed with hepatitis C (an OSH record referenced this) and denies undergoing any treatment for it. She will undergo bloodwork today.  CT A/P will be ordered to generally evaluate her colon anatomy.    Colostomy reversal  The patient underwent a laparoscopic loop colostomy and the operative report suggests she has enough redundancy that there was no tension in creating this. The colostomy may be able to be reversed locally.    At the conclusion of this appointment, I have ordered the bloodwork above, to be completed today, CT A/P, placed a referral to Pain Management, and scheduled the colonoscopy for 7/24/24. She will follow up after the above have been completed to discuss the results and potentially schedule surgery. The patient and her aide expressed their understanding and all questions were answered.    Maggy Jackson MD, FACS  General Surgery

## 2024-06-22 LAB
HCV RNA SERPL NAA+PROBE-ACNC: 945 IU/ML
HCV RNA SERPL NAA+PROBE-ACNC: DETECTED K[IU]/ML
HCV RNA SERPL NAA+PROBE-LOG IU: 2.98 LOG IU/ML

## 2024-06-25 ENCOUNTER — TELEPHONE (OUTPATIENT)
Dept: SURGERY | Facility: CLINIC | Age: 64
End: 2024-06-25
Payer: MEDICAID

## 2024-06-25 DIAGNOSIS — B18.2 CHRONIC HEPATITIS C WITHOUT HEPATIC COMA (MULTI): Primary | ICD-10-CM

## 2024-06-25 NOTE — TELEPHONE ENCOUNTER
----- Message from Maggy Jackson MD sent at 6/25/2024 10:11 AM EDT -----  I have referred this patient to Hepatology to discuss her history of untreated hepatitis C. This needs to be addressed prior to any surgery.

## 2024-07-05 ENCOUNTER — HOSPITAL ENCOUNTER (OUTPATIENT)
Dept: RADIOLOGY | Facility: HOSPITAL | Age: 64
Discharge: HOME | End: 2024-07-05
Payer: MEDICAID

## 2024-07-05 DIAGNOSIS — Z93.3 COLOSTOMY PRESENT (MULTI): ICD-10-CM

## 2024-07-05 PROCEDURE — 74177 CT ABD & PELVIS W/CONTRAST: CPT

## 2024-07-05 PROCEDURE — 2550000001 HC RX 255 CONTRASTS: Performed by: SURGERY

## 2024-07-08 ENCOUNTER — TELEPHONE (OUTPATIENT)
Dept: SURGERY | Facility: CLINIC | Age: 64
End: 2024-07-08
Payer: MEDICAID

## 2024-07-08 NOTE — TELEPHONE ENCOUNTER
----- Message from Maggy Jackson MD sent at 7/8/2024  8:11 AM EDT -----  In addition, she should increase her Miralax to twice a day. She looks constipated on the CT scan - she may then not clean out well for her colonoscopy.

## 2024-07-08 NOTE — TELEPHONE ENCOUNTER
----- Message from Maggy Jackson MD sent at 7/8/2024  8:09 AM EDT -----  Her post colonoscopy visit should be after she sees Dr. Sanderson - can you reschedule this? Let her know that her CT scan showed liver scarring, which is probably due to her untreated hepatitis C. That is a problem that Dr. Sanderson will need to address prior to us considering surgery. 30 min, ~1 week after Dr. Sanderson's appointment.

## 2024-07-22 ENCOUNTER — ANESTHESIA EVENT (OUTPATIENT)
Dept: GASTROENTEROLOGY | Facility: HOSPITAL | Age: 64
End: 2024-07-22
Payer: MEDICAID

## 2024-07-24 ENCOUNTER — HOSPITAL ENCOUNTER (OUTPATIENT)
Dept: GASTROENTEROLOGY | Facility: HOSPITAL | Age: 64
Discharge: HOME | End: 2024-07-24
Payer: MEDICAID

## 2024-07-24 ENCOUNTER — ANESTHESIA (OUTPATIENT)
Dept: GASTROENTEROLOGY | Facility: HOSPITAL | Age: 64
End: 2024-07-24
Payer: MEDICAID

## 2024-07-24 VITALS
WEIGHT: 219.36 LBS | HEIGHT: 69 IN | OXYGEN SATURATION: 94 % | SYSTOLIC BLOOD PRESSURE: 142 MMHG | BODY MASS INDEX: 32.49 KG/M2 | DIASTOLIC BLOOD PRESSURE: 94 MMHG | RESPIRATION RATE: 18 BRPM | HEART RATE: 106 BPM

## 2024-07-24 DIAGNOSIS — K59.00 CONSTIPATION, UNSPECIFIED CONSTIPATION TYPE: ICD-10-CM

## 2024-07-24 DIAGNOSIS — Z93.3 COLOSTOMY PRESENT (MULTI): Primary | ICD-10-CM

## 2024-07-24 RX ORDER — SODIUM CHLORIDE 9 MG/ML
20 INJECTION, SOLUTION INTRAVENOUS CONTINUOUS
Status: DISCONTINUED | OUTPATIENT
Start: 2024-07-24 | End: 2024-07-25 | Stop reason: HOSPADM

## 2024-07-24 ASSESSMENT — PAIN SCALES - GENERAL: PAINLEVEL_OUTOF10: 0 - NO PAIN

## 2024-07-24 ASSESSMENT — ENCOUNTER SYMPTOMS
CHILLS: 0
COUGH: 0
NAUSEA: 0
SHORTNESS OF BREATH: 0
BLOOD IN STOOL: 0
CONSTIPATION: 0
ABDOMINAL PAIN: 0
DIARRHEA: 0
VOMITING: 0
HEADACHES: 0
PALPITATIONS: 0
DIZZINESS: 0
FEVER: 0

## 2024-07-24 ASSESSMENT — COLUMBIA-SUICIDE SEVERITY RATING SCALE - C-SSRS
1. IN THE PAST MONTH, HAVE YOU WISHED YOU WERE DEAD OR WISHED YOU COULD GO TO SLEEP AND NOT WAKE UP?: NO
2. HAVE YOU ACTUALLY HAD ANY THOUGHTS OF KILLING YOURSELF?: NO
6. HAVE YOU EVER DONE ANYTHING, STARTED TO DO ANYTHING, OR PREPARED TO DO ANYTHING TO END YOUR LIFE?: NO

## 2024-07-24 ASSESSMENT — PAIN - FUNCTIONAL ASSESSMENT: PAIN_FUNCTIONAL_ASSESSMENT: 0-10

## 2024-07-24 NOTE — H&P
"GENERAL SURGERY HISTORY AND PHYSICAL    Azul Bates   1960   [id number]     History Of Present Illness  Azul Bates \"Cheli" is a 64 y.o. female presenting for colonoscopy today. Please see office note for extensive history and details.    The patient was able to fully finish the prep provided and drank fluids in addition. She urinated when arriving at the hospital and could not provide a urine sample for the drug screen despite trying for half an hour.     Past Medical History  She has no past medical history on file.    Surgical History  She has a past surgical history that includes Colostomy (10/2022); Tonsillectomy; and Total hip arthroplasty (Left, 02/12/2024).    Medications  Current Outpatient Medications on File Prior to Encounter   Medication Sig Dispense Refill    acetaminophen (Tylenol) 325 mg tablet Take 2 tablets (650 mg) by mouth 2 times a day.      buPROPion XL (Wellbutrin XL) 300 mg 24 hr tablet Take 1 tablet (300 mg) by mouth once daily. Do not crush, chew, or split. 30 tablet 0    busPIRone (Buspar) 5 mg tablet Take 1 tablet (5 mg) by mouth 2 times a day.      celecoxib (CeleBREX) 200 mg capsule Take 1 capsule (200 mg) by mouth once daily. (Patient not taking: Reported on 6/21/2024)      cholecalciferol (Vitamin D-3) 125 MCG (5000 UT) capsule Take 1 capsule (125 mcg) by mouth once daily. Do not start before December 9, 2023.      cyclobenzaprine (Flexeril) 5 mg tablet Take 1 tablet (5 mg) by mouth.      DULoxetine (Cymbalta) 60 mg DR capsule Take 2 capsules (120 mg) by mouth once daily. Do not crush or chew. 60 capsule 0    gabapentin (Neurontin) 100 mg capsule Take 1 capsule (100 mg) by mouth 2 times a day. 3 capsules by mouth      hydrOXYzine pamoate (Vistaril) 25 mg capsule Take 1 capsule (25 mg) by mouth 2 times a day. 60 capsule 0    lidocaine 4 % patch Place 1 patch over 12 hours on the skin once daily. Remove & discard patch within 12 hours or as directed by MD. 28 patch 0    LORazepam " (Ativan) 0.5 mg tablet Take 1 tablet (0.5 mg) by mouth every 6 hours if needed for anxiety. Take before MRI appointment      magnesium oxide (Mag-Ox) 400 mg tablet 1 tablet (400 mg) once daily.      melatonin 5 mg tablet Take 1 tablet (5 mg) by mouth once daily at bedtime.      meloxicam (Mobic) 7.5 mg tablet Take 1 tablet (7.5 mg) by mouth once daily.      naloxone (Narcan) 4 mg/0.1 mL nasal spray Administer 1 spray (4 mg) into affected nostril(s) if needed for opioid reversal. May repeat every 2-3 minutes if needed, alternating nostrils, until medical assistance becomes available. 2 each 0    oxyCODONE-acetaminophen (Percocet) 5-325 mg tablet Take by mouth.      QUEtiapine (SeroqueL) 300 mg tablet Take 1 tablet (300 mg) by mouth once daily at bedtime. 30 tablet 0    QUEtiapine (SeroqueL) 50 mg tablet Take 1 tablet (50 mg) by mouth once daily at bedtime. Take with 300mg dose 30 tablet 0    tiZANidine (Zanaflex) 4 mg capsule Take 1 capsule (4 mg) by mouth 3 times a day.      traZODone (Desyrel) 150 mg tablet Take 1 tablet (150 mg) by mouth once daily at bedtime. 30 tablet 0     No current facility-administered medications on file prior to encounter.       Allergies  Patient has no known allergies.     Social History  She reports that she has been smoking cigarettes. She has never used smokeless tobacco. She reports current alcohol use. She reports that she does not use drugs.    Family History  No family history on file.     Review of Systems   Constitutional:  Negative for chills and fever.   Respiratory:  Negative for cough and shortness of breath.    Cardiovascular:  Negative for chest pain and palpitations.   Gastrointestinal:  Negative for abdominal pain, blood in stool, constipation, diarrhea, nausea and vomiting.   Neurological:  Negative for dizziness and headaches.   All other systems reviewed and are negative.      Last Recorded Vitals  Blood pressure (!) 142/94, pulse 106, resp. rate 18, height 1.74 m (5'  "8.5\"), weight 99.5 kg (219 lb 5.7 oz), SpO2 94%.     Physical Exam  Constitutional:       General: She is not in acute distress.     Appearance: Normal appearance. She is not ill-appearing.   HENT:      Head: Normocephalic and atraumatic.   Cardiovascular:      Rate and Rhythm: Normal rate and regular rhythm.   Pulmonary:      Effort: Pulmonary effort is normal. No respiratory distress.      Breath sounds: Normal breath sounds.   Abdominal:      General: There is no distension.      Palpations: Abdomen is soft.      Tenderness: There is no abdominal tenderness. There is no guarding.      Comments: Colostomy in place with thick pasty stool   Musculoskeletal:         General: No swelling.   Skin:     General: Skin is warm and dry.   Neurological:      Mental Status: She is alert and oriented to person, place, and time. Mental status is at baseline.   Psychiatric:         Mood and Affect: Mood normal.         Behavior: Behavior normal.          Relevant Results    CT abdomen pelvis w IV contrast    1.  Postsurgical changes of left lower quadrant loop colostomy. 2. 4.2 x 3.5 x 5.2 cm fluid collection extending along the posterior aspect of the lower sacrum and coccyx. The sterility of this collection can not be determined on CT, consider fluid sampling for further evaluation. 3. Mild hepatomegaly and findings suggestive of hepatic cirrhosis. Recommend correlation with serum LFTs.   I personally reviewed the images/study and I agree with the breast imaging fellow, Emery Fish D.O., findings as stated. This study was interpreted at [place], [place].   MACRO: None   Signed by: Pete Webb 7/6/2024 3:36 PM Dictation workstation:   [id number]      Assessment and Plan  Active Problems:  There are no active Hospital Problems.    64 y.o. female with a colostomy who presents for her first colonoscopy. She was unable to provide a urine sample for the drug screen due to " recently urinating. She understood that skipping the drug screen potentially increased her risks with undergoing sedation. This will be absolutely mandatory for the day of surgery.    The patient did not prep well despite finishing everything. We will have to cancel the colonoscopy today for that alone. We will call the patient to reschedule the colonoscopy and discuss various prep options.    Maggy Jackson MD, [facility]  General Surgery

## 2024-07-24 NOTE — ADDENDUM NOTE
Encounter addended by: Vilma Rajan RN on: 7/24/2024 4:04 PM   Actions taken: Check Out activity completed

## 2024-07-25 DIAGNOSIS — Z93.3 COLOSTOMY PRESENT (MULTI): Primary | ICD-10-CM

## 2024-07-25 RX ORDER — POLYETHYLENE GLYCOL 3350 17 G/17G
238 POWDER, FOR SOLUTION ORAL DAILY
Qty: 476 G | Refills: 0 | Status: SHIPPED | OUTPATIENT
Start: 2024-07-25 | End: 2024-07-27

## 2024-07-25 RX ORDER — ENEMA 19; 7 G/133ML; G/133ML
1 ENEMA RECTAL DAILY
Qty: 266 ML | Refills: 0 | Status: SHIPPED | OUTPATIENT
Start: 2024-07-25 | End: 2024-07-27

## 2024-07-26 ENCOUNTER — TELEPHONE (OUTPATIENT)
Dept: SURGERY | Facility: CLINIC | Age: 64
End: 2024-07-26
Payer: MEDICAID

## 2024-07-26 NOTE — TELEPHONE ENCOUNTER
Spoke with her nurse for today at the facility.  The patient didn't receive her golytely prep or enemas before her 7/24/24 colonoscopy because both of those meds are still at the facility. She is getting the miralax and colace twice daily.  Also the patient has been off the pain medication since 2 weeks prior to the 7/24/24 scheduled colonoscopy.  The nurse stated that the patient has not even been asking for them.  She is taking only gabapentin at this time for pain.

## 2024-08-02 ENCOUNTER — APPOINTMENT (OUTPATIENT)
Dept: SURGERY | Facility: CLINIC | Age: 64
End: 2024-08-02
Payer: MEDICAID

## 2024-08-14 ENCOUNTER — ANESTHESIA (OUTPATIENT)
Dept: GASTROENTEROLOGY | Facility: HOSPITAL | Age: 64
End: 2024-08-14
Payer: MEDICAID

## 2024-08-14 ENCOUNTER — HOSPITAL ENCOUNTER (OUTPATIENT)
Dept: GASTROENTEROLOGY | Facility: HOSPITAL | Age: 64
Discharge: HOME | End: 2024-08-14
Payer: MEDICAID

## 2024-08-14 ENCOUNTER — ANESTHESIA EVENT (OUTPATIENT)
Dept: GASTROENTEROLOGY | Facility: HOSPITAL | Age: 64
End: 2024-08-14
Payer: MEDICAID

## 2024-08-14 VITALS
HEART RATE: 92 BPM | WEIGHT: 219 LBS | RESPIRATION RATE: 20 BRPM | HEIGHT: 68 IN | OXYGEN SATURATION: 95 % | BODY MASS INDEX: 33.19 KG/M2 | TEMPERATURE: 98.7 F | SYSTOLIC BLOOD PRESSURE: 125 MMHG | DIASTOLIC BLOOD PRESSURE: 75 MMHG

## 2024-08-14 DIAGNOSIS — Z12.11 SCREEN FOR COLON CANCER: ICD-10-CM

## 2024-08-14 DIAGNOSIS — Z93.3 COLOSTOMY PRESENT (MULTI): Primary | Chronic | ICD-10-CM

## 2024-08-14 DIAGNOSIS — F11.10 OPIOID ABUSE (MULTI): Chronic | ICD-10-CM

## 2024-08-14 LAB
AMPHETAMINES UR QL SCN: NORMAL
BARBITURATES UR QL SCN: NORMAL
BENZODIAZ UR QL SCN: NORMAL
BZE UR QL SCN: NORMAL
CANNABINOIDS UR QL SCN: NORMAL
FENTANYL+NORFENTANYL UR QL SCN: NORMAL
METHADONE UR QL SCN: NORMAL
OPIATES UR QL SCN: NORMAL
OXYCODONE+OXYMORPHONE UR QL SCN: NORMAL
PCP UR QL SCN: NORMAL

## 2024-08-14 PROCEDURE — 80307 DRUG TEST PRSMV CHEM ANLYZR: CPT | Performed by: SURGERY

## 2024-08-14 PROCEDURE — 45330 DIAGNOSTIC SIGMOIDOSCOPY: CPT | Performed by: SURGERY

## 2024-08-14 PROCEDURE — 2500000004 HC RX 250 GENERAL PHARMACY W/ HCPCS (ALT 636 FOR OP/ED): Performed by: SURGERY

## 2024-08-14 PROCEDURE — 3700000001 HC GENERAL ANESTHESIA TIME - INITIAL BASE CHARGE

## 2024-08-14 PROCEDURE — G0104 CA SCREEN;FLEXI SIGMOIDSCOPE: HCPCS | Performed by: SURGERY

## 2024-08-14 PROCEDURE — 2500000004 HC RX 250 GENERAL PHARMACY W/ HCPCS (ALT 636 FOR OP/ED): Performed by: NURSE ANESTHETIST, CERTIFIED REGISTERED

## 2024-08-14 PROCEDURE — 3700000002 HC GENERAL ANESTHESIA TIME - EACH INCREMENTAL 1 MINUTE

## 2024-08-14 PROCEDURE — 7100000010 HC PHASE TWO TIME - EACH INCREMENTAL 1 MINUTE

## 2024-08-14 PROCEDURE — 7100000009 HC PHASE TWO TIME - INITIAL BASE CHARGE

## 2024-08-14 RX ORDER — SODIUM CHLORIDE, SODIUM LACTATE, POTASSIUM CHLORIDE, CALCIUM CHLORIDE 600; 310; 30; 20 MG/100ML; MG/100ML; MG/100ML; MG/100ML
20 INJECTION, SOLUTION INTRAVENOUS CONTINUOUS
Status: DISCONTINUED | OUTPATIENT
Start: 2024-08-14 | End: 2024-08-15 | Stop reason: HOSPADM

## 2024-08-14 RX ORDER — PROPOFOL 10 MG/ML
INJECTION, EMULSION INTRAVENOUS AS NEEDED
Status: DISCONTINUED | OUTPATIENT
Start: 2024-08-14 | End: 2024-08-14

## 2024-08-14 SDOH — HEALTH STABILITY: MENTAL HEALTH: CURRENT SMOKER: 1

## 2024-08-14 ASSESSMENT — ENCOUNTER SYMPTOMS
DIZZINESS: 0
CHILLS: 0
FEVER: 0
ABDOMINAL PAIN: 0
CONSTIPATION: 0
VOMITING: 0
COUGH: 0
PALPITATIONS: 0
DIARRHEA: 0
SHORTNESS OF BREATH: 0
HEADACHES: 0
BLOOD IN STOOL: 0
NAUSEA: 0

## 2024-08-14 ASSESSMENT — PAIN SCALES - GENERAL
PAINLEVEL_OUTOF10: 0 - NO PAIN

## 2024-08-14 ASSESSMENT — COLUMBIA-SUICIDE SEVERITY RATING SCALE - C-SSRS
2. HAVE YOU ACTUALLY HAD ANY THOUGHTS OF KILLING YOURSELF?: NO
1. IN THE PAST MONTH, HAVE YOU WISHED YOU WERE DEAD OR WISHED YOU COULD GO TO SLEEP AND NOT WAKE UP?: NO
6. HAVE YOU EVER DONE ANYTHING, STARTED TO DO ANYTHING, OR PREPARED TO DO ANYTHING TO END YOUR LIFE?: NO

## 2024-08-14 ASSESSMENT — PAIN - FUNCTIONAL ASSESSMENT
PAIN_FUNCTIONAL_ASSESSMENT: 0-10

## 2024-08-14 NOTE — ANESTHESIA PREPROCEDURE EVALUATION
"Patient: Azul Bates \"Stephenie\"    Procedure Information       Date/Time: 08/14/24 1300    Scheduled providers: Maggy Jackson MD    Procedure: COLONOSCOPY    Location: [facility]            Relevant Problems   Anesthesia (within normal limits)      Neuro   (+) Opioid abuse (Multi)   (+) Severe recurrent major depression without psychotic features (Multi)       Clinical information reviewed:   Tobacco  Allergies  Meds   Med Hx  Surg Hx   Fam Hx  Soc Hx        NPO Detail:  NPO/Void Status  Carbohydrate Drink Given Prior to Surgery? : N  Date of Last Liquid: 08/13/24  Date of Last Solid: 08/12/24  Last Intake Type: GI prep         Physical Exam    Airway  Mallampati: III  TM distance: >3 FB  Neck ROM: full     Cardiovascular - normal exam     Dental   Comments: Poor dentition   Pulmonary - normal exam     Abdominal - normal exam             Anesthesia Plan    History of general anesthesia?: yes  History of complications of general anesthesia?: no    ASA 3     MAC     The patient is a current smoker.  Patient smoked on day of procedure.    Anesthetic plan and risks discussed with patient.  Use of blood products discussed with patient who.    Plan discussed with attending.      "

## 2024-08-14 NOTE — ADDENDUM NOTE
Encounter addended by: Seble Bradshaw RN on: 8/14/2024 3:57 PM   Actions taken: Check Out activity completed

## 2024-08-14 NOTE — H&P
"GENERAL SURGERY HISTORY AND PHYSICAL    Azul Bates   1960   [id number]     History Of Present Illness  Azul Bates \"Cheli" is a 64 y.o. female presenting for her first colonoscopy. She previously presented but was not given her prep by the staff at the facility and had stool in her colostomy bag.     Past Medical History  She has a past medical history of Anxiety, Depression, and Hepatitis C.    Surgical History  She has a past surgical history that includes Colostomy (10/2022); Tonsillectomy; and Total hip arthroplasty (Left, 02/12/2024).    Medications  Current Outpatient Medications on File Prior to Encounter   Medication Sig Dispense Refill    acetaminophen (Tylenol) 325 mg tablet Take 2 tablets (650 mg) by mouth 2 times a day.      buPROPion XL (Wellbutrin XL) 300 mg 24 hr tablet Take 1 tablet (300 mg) by mouth once daily. Do not crush, chew, or split. 30 tablet 0    busPIRone (Buspar) 5 mg tablet Take 1 tablet (5 mg) by mouth 2 times a day.      celecoxib (CeleBREX) 200 mg capsule Take 1 capsule (200 mg) by mouth once daily.      cholecalciferol (Vitamin D-3) 125 MCG (5000 UT) capsule Take 1 capsule (125 mcg) by mouth once daily. Do not start before December 9, 2023.      cyclobenzaprine (Flexeril) 5 mg tablet Take 1 tablet (5 mg) by mouth.      DULoxetine (Cymbalta) 60 mg DR capsule Take 2 capsules (120 mg) by mouth once daily. Do not crush or chew. 60 capsule 0    gabapentin (Neurontin) 100 mg capsule Take 1 capsule (100 mg) by mouth 2 times a day. 3 capsules by mouth      lidocaine 4 % patch Place 1 patch over 12 hours on the skin once daily. Remove & discard patch within 12 hours or as directed by MD. 28 patch 0    LORazepam (Ativan) 0.5 mg tablet Take 1 tablet (0.5 mg) by mouth every 6 hours if needed for anxiety. Take before MRI appointment      magnesium oxide (Mag-Ox) 400 mg tablet 1 tablet (400 mg) once daily.      melatonin 5 mg tablet Take 1 tablet (5 mg) by mouth once daily at bedtime.      " "meloxicam (Mobic) 7.5 mg tablet Take 1 tablet (7.5 mg) by mouth once daily.      naloxone (Narcan) 4 mg/0.1 mL nasal spray Administer 1 spray (4 mg) into affected nostril(s) if needed for opioid reversal. May repeat every 2-3 minutes if needed, alternating nostrils, until medical assistance becomes available. 2 each 0    oxyCODONE-acetaminophen (Percocet) 5-325 mg tablet Take by mouth.      QUEtiapine (SeroqueL) 300 mg tablet Take 1 tablet (300 mg) by mouth once daily at bedtime. 30 tablet 0    QUEtiapine (SeroqueL) 50 mg tablet Take 1 tablet (50 mg) by mouth once daily at bedtime. Take with 300mg dose 30 tablet 0    tiZANidine (Zanaflex) 4 mg capsule Take 1 capsule (4 mg) by mouth 3 times a day.      hydrOXYzine pamoate (Vistaril) 25 mg capsule Take 1 capsule (25 mg) by mouth 2 times a day. 60 capsule 0    traZODone (Desyrel) 150 mg tablet Take 1 tablet (150 mg) by mouth once daily at bedtime. 30 tablet 0     No current facility-administered medications on file prior to encounter.       Allergies  Patient has no known allergies.     Social History  She reports that she has been smoking cigarettes. She has never used smokeless tobacco. She reports current alcohol use. She reports that she does not use drugs.    Family History  Family History   Problem Relation Name Age of Onset    Leukemia Brother          Half-brother        Review of Systems   Constitutional:  Negative for chills and fever.   Respiratory:  Negative for cough and shortness of breath.    Cardiovascular:  Negative for chest pain and palpitations.   Gastrointestinal:  Negative for abdominal pain, blood in stool, constipation, diarrhea, nausea and vomiting.   Neurological:  Negative for dizziness and headaches.   All other systems reviewed and are negative.      Last Recorded Vitals  Blood pressure (!) 142/92, pulse 90, temperature 36.8 °C (98.3 °F), temperature source Temporal, resp. rate 18, height 1.727 m (5' 8\"), weight 99.3 kg (219 lb), SpO2 " 98%.     Physical Exam  Constitutional:       General: She is not in acute distress.     Appearance: Normal appearance. She is not ill-appearing.   HENT:      Head: Normocephalic and atraumatic.   Cardiovascular:      Rate and Rhythm: Normal rate and regular rhythm.   Pulmonary:      Effort: Pulmonary effort is normal. No respiratory distress.      Breath sounds: Normal breath sounds.   Abdominal:      General: There is no distension.      Palpations: Abdomen is soft.      Tenderness: There is no abdominal tenderness. There is no guarding.   Musculoskeletal:         General: No swelling.   Skin:     General: Skin is warm and dry.   Neurological:      Mental Status: She is alert and oriented to person, place, and time. Mental status is at baseline.   Psychiatric:         Mood and Affect: Mood normal.         Behavior: Behavior normal.          Relevant Results  No results found for this or any previous visit (from the past 24 hour(s)).    Procedure Not Performed    Result Date: 7/24/2024  Table formatting from the original result was not included. This procedure was not performed. Procedure: COLONOSCOPY [GI6] Cancel Information  Procedure Not Performed Reason Inadequate Bowel Prep Procedure Not Performed Comments         Assessment and Plan  Active Problems:  There are no active Hospital Problems.    64 y.o. female who presents for colonoscopy.     Maggy Jackson MD, FACS  General Surgery

## 2024-08-15 ENCOUNTER — LAB (OUTPATIENT)
Dept: LAB | Facility: LAB | Age: 64
End: 2024-08-15
Payer: MEDICAID

## 2024-08-15 ENCOUNTER — APPOINTMENT (OUTPATIENT)
Dept: GASTROENTEROLOGY | Facility: CLINIC | Age: 64
End: 2024-08-15
Payer: MEDICAID

## 2024-08-15 VITALS
DIASTOLIC BLOOD PRESSURE: 70 MMHG | WEIGHT: 236 LBS | HEART RATE: 84 BPM | HEIGHT: 68 IN | BODY MASS INDEX: 35.77 KG/M2 | OXYGEN SATURATION: 97 % | SYSTOLIC BLOOD PRESSURE: 130 MMHG

## 2024-08-15 DIAGNOSIS — B18.2 CHRONIC HEPATITIS C WITHOUT HEPATIC COMA (MULTI): ICD-10-CM

## 2024-08-15 LAB
AFP SERPL-MCNC: <4 NG/ML (ref 0–9)
ALBUMIN SERPL BCP-MCNC: 4.1 G/DL (ref 3.4–5)
ALP SERPL-CCNC: 108 U/L (ref 33–136)
ALT SERPL W P-5'-P-CCNC: 32 U/L (ref 7–45)
ANION GAP SERPL CALC-SCNC: 10 MMOL/L (ref 10–20)
AST SERPL W P-5'-P-CCNC: 24 U/L (ref 9–39)
BASOPHILS # BLD AUTO: 0.06 X10*3/UL (ref 0–0.1)
BASOPHILS NFR BLD AUTO: 0.7 %
BILIRUB DIRECT SERPL-MCNC: 0 MG/DL (ref 0–0.3)
BILIRUB SERPL-MCNC: 0.3 MG/DL (ref 0–1.2)
BUN SERPL-MCNC: 18 MG/DL (ref 6–23)
CALCIUM SERPL-MCNC: 9.6 MG/DL (ref 8.6–10.3)
CHLORIDE SERPL-SCNC: 105 MMOL/L (ref 98–107)
CO2 SERPL-SCNC: 27 MMOL/L (ref 21–32)
CREAT SERPL-MCNC: 0.93 MG/DL (ref 0.5–1.05)
EGFRCR SERPLBLD CKD-EPI 2021: 70 ML/MIN/1.73M*2
EOSINOPHIL # BLD AUTO: 0.18 X10*3/UL (ref 0–0.7)
EOSINOPHIL NFR BLD AUTO: 2.2 %
ERYTHROCYTE [DISTWIDTH] IN BLOOD BY AUTOMATED COUNT: 20.7 % (ref 11.5–14.5)
GLUCOSE SERPL-MCNC: 104 MG/DL (ref 74–99)
HAV AB SER QL IA: REACTIVE
HBV CORE AB SER QL: NONREACTIVE
HBV SURFACE AB SER-ACNC: 24.6 MIU/ML
HBV SURFACE AG SERPL QL IA: NONREACTIVE
HCT VFR BLD AUTO: 35.5 % (ref 36–46)
HGB BLD-MCNC: 10.1 G/DL (ref 12–16)
HIV 1+2 AB+HIV1 P24 AG SERPL QL IA: NONREACTIVE
HYPOCHROMIA BLD QL SMEAR: NORMAL
IMM GRANULOCYTES # BLD AUTO: 0.03 X10*3/UL (ref 0–0.7)
IMM GRANULOCYTES NFR BLD AUTO: 0.4 % (ref 0–0.9)
INR PPP: 1.1 (ref 0.9–1.1)
LYMPHOCYTES # BLD AUTO: 2.72 X10*3/UL (ref 1.2–4.8)
LYMPHOCYTES NFR BLD AUTO: 33.2 %
MCH RBC QN AUTO: 23.4 PG (ref 26–34)
MCHC RBC AUTO-ENTMCNC: 28.5 G/DL (ref 32–36)
MCV RBC AUTO: 82 FL (ref 80–100)
MONOCYTES # BLD AUTO: 0.71 X10*3/UL (ref 0.1–1)
MONOCYTES NFR BLD AUTO: 8.7 %
NEUTROPHILS # BLD AUTO: 4.5 X10*3/UL (ref 1.2–7.7)
NEUTROPHILS NFR BLD AUTO: 54.8 %
NRBC BLD-RTO: 0 /100 WBCS (ref 0–0)
OVALOCYTES BLD QL SMEAR: NORMAL
PLATELET # BLD AUTO: 445 X10*3/UL (ref 150–450)
POTASSIUM SERPL-SCNC: 4.4 MMOL/L (ref 3.5–5.3)
PROT SERPL-MCNC: 7.1 G/DL (ref 6.4–8.2)
PROTHROMBIN TIME: 12 SECONDS (ref 9.8–12.8)
RBC # BLD AUTO: 4.31 X10*6/UL (ref 4–5.2)
RBC MORPH BLD: NORMAL
SODIUM SERPL-SCNC: 138 MMOL/L (ref 136–145)
WBC # BLD AUTO: 8.2 X10*3/UL (ref 4.4–11.3)

## 2024-08-15 PROCEDURE — 87389 HIV-1 AG W/HIV-1&-2 AB AG IA: CPT

## 2024-08-15 PROCEDURE — 86706 HEP B SURFACE ANTIBODY: CPT

## 2024-08-15 PROCEDURE — 3008F BODY MASS INDEX DOCD: CPT | Performed by: INTERNAL MEDICINE

## 2024-08-15 PROCEDURE — 87902 NFCT AGT GNTYP ALYS HEP C: CPT

## 2024-08-15 PROCEDURE — 86708 HEPATITIS A ANTIBODY: CPT

## 2024-08-15 PROCEDURE — 99244 OFF/OP CNSLTJ NEW/EST MOD 40: CPT | Performed by: INTERNAL MEDICINE

## 2024-08-15 PROCEDURE — 87521 HEPATITIS C PROBE&RVRS TRNSC: CPT

## 2024-08-15 PROCEDURE — 85025 COMPLETE CBC W/AUTO DIFF WBC: CPT

## 2024-08-15 PROCEDURE — 36415 COLL VENOUS BLD VENIPUNCTURE: CPT

## 2024-08-15 PROCEDURE — 80053 COMPREHEN METABOLIC PANEL: CPT

## 2024-08-15 PROCEDURE — 87340 HEPATITIS B SURFACE AG IA: CPT

## 2024-08-15 PROCEDURE — 82105 ALPHA-FETOPROTEIN SERUM: CPT

## 2024-08-15 PROCEDURE — 86704 HEP B CORE ANTIBODY TOTAL: CPT

## 2024-08-15 PROCEDURE — 85610 PROTHROMBIN TIME: CPT

## 2024-08-15 RX ORDER — IBUPROFEN 800 MG/1
TABLET ORAL
COMMUNITY
Start: 2024-08-11

## 2024-08-15 RX ORDER — DOCUSATE SODIUM 100 MG/1
100 CAPSULE, LIQUID FILLED ORAL 2 TIMES DAILY
COMMUNITY
Start: 2024-02-12

## 2024-08-15 ASSESSMENT — PAIN SCALES - GENERAL
PAIN_LEVEL: 0
PAINLEVEL: 6

## 2024-08-15 NOTE — ANESTHESIA POSTPROCEDURE EVALUATION
"Patient: Azul Bates \"Stephenie\"    Procedure Summary       Date: 08/14/24 Room / Location: [facility]    Anesthesia Start: 1242 Anesthesia Stop: 1310    Procedure: COLONOSCOPY Diagnosis: Screen for colon cancer    Scheduled Providers: Maggy Jackson MD Responsible Provider: DINORAH Vaughn    Anesthesia Type: MAC ASA Status: 3            Anesthesia Type: MAC    Vitals Value Taken Time   /75 08/14/24 1328   Temp 37.1 °C (98.7 °F) 08/14/24 1328   Pulse 92 08/14/24 1328   Resp 20 08/14/24 1328   SpO2 95 % 08/14/24 1328       Anesthesia Post Evaluation    Patient location during evaluation: bedside  Patient participation: complete - patient participated  Level of consciousness: awake and alert  Pain score: 0  Pain management: adequate  Airway patency: patent  Cardiovascular status: acceptable and stable  Respiratory status: acceptable and room air  Hydration status: acceptable  Postoperative Nausea and Vomiting: none        No notable events documented.    "

## 2024-08-15 NOTE — PROGRESS NOTES
HEPATOLOGY NEW PATIENT VISIT    August 15, 2024    Dr. Rosita Yap      Patient Name:   DEMI KENNY  Medical Record Number: [id number]  YOB: 1960    Dear Dr. Yap,    I had the pleasure of seeing Demi Kenny for consultation in the [facility] ([place] satellite office). History and physical examination was performed. Pertinent available laboratory, imaging, pathology results were reviewed.    History of Present Illness:   The patient is a 64 year old white female who is referred for evaluation of hepatitis C.    The patient has an extensive history of medical issues, psychiatric issues and substance abuse.  She fell in September 2022 and developed a sacral decubitus wound that became infected.  She developed necrotizing fasciitis and osteomyelitis she eventually required a loop colostomy in October 2022.  She has had suicide attempts due to severe depression and psychosis.  She has been seen by one of the general surgeons at [place] to discuss reversal of her ostomy.  They were not able to do a colonoscopy due to a poor preparation as well as the patient's inability to provide a urine sample for a urine drug screen.  With her polysubstance abuse history, she was tested for hepatitis C.  She was found to be positive and referred to me for further evaluation.     The patient has no known history of acute hepatitis or jaundice.      She has never had any manifestations of liver disease including no jaundice, ascites, hepatic encephalopathy, or variceal bleeding.  She has never had a liver biopsy.  She has never been treated for hepatitis C.    She does recall a history of anemia and was on iron in the past.  She does not recall being worked up in the past for her anemia and she is no longer taking the iron.  She does report significant fatigue.  She has occasionally noted some blood around her stoma but no blood in the stools, hematuria or hemoptysis.    She  presented today for evaluation.  She denied any specific liver-related complaints.  Again, her main issue is fatigue.    Past Medical/Surgical History:   Severe recurrent major depression without psychotic features (Multi)  Colostomy present (Multi)  Opioid abuse (Multi)  Bilateral hip pain  Hepatitis C  CHF with preserved EF  COPD  chronic pain syndrome  Sciatica  migraine  Hypertension  Arthritis  Osteoporosis  opioid/fentanyl abuse  severe recurrent depression  Bipolar disorder  Hepatitis C - unknown if treated  10/14/22 Dr. Cr: excisional debridement of skin, soft tissue and muscle for necrotizing fasciitis  10/18/22 Dr. Yoder: debridement of sacral wound for necrotizing fasciitis  10/19/22 Dr. Cr: laparoscopic diverting loop colostomy  10/27/22 Dr. Matos: irrigation and debridement of skin, subcutaneous tissue and fascia   12/2/22 Dr. Guan: debridement and rotational flap of perineum  Ovarian cystectomy  Tubal ligation        Family History:   Her brother had leukemia.  There is no known family history of liver disease.  There is no known family history of colon cancer.    Social History:   She has a history of polysubstance abuse.  However, she denies intravenous drug use.  She denied alcohol use.  She is a smoker.  She lives in a skilled nursing facility.  She has had blood transfusions.  She does have tattoos.  She is a .  She does not believe that her children have ever been tested for hepatitis C.    Review of systems: As noted above.  She does have left hip pain and occasionally takes Percocet for that.  She does have peripheral neuropathy.  She does occasionally noticed some blood around her stoma.  She does have fatigue.  She does have issues with depression.  She does get intermittent constipation.  She does have severe carpal tunnel syndrome.  No fever, chills, weight loss, visual changes, auditory changes, shortness of breath, chest pain, abdominal pain, diarrhea, dysuria, hematuria,  or rash.       Medical, Surgical, Family, and Social Histories  Past Medical History:   Diagnosis Date    Anxiety     Depression     Hepatitis C        Past Surgical History:   Procedure Laterality Date    COLOSTOMY  10/2022    TONSILLECTOMY      TOTAL HIP ARTHROPLASTY Left 02/12/2024       Family History   Problem Relation Name Age of Onset    Leukemia Brother          Half-brother       Social History     Socioeconomic History    Marital status:      Spouse name: Not on file    Number of children: Not on file    Years of education: Not on file    Highest education level: Not on file   Occupational History    Not on file   Tobacco Use    Smoking status: Every Day     Current packs/day: 0.50     Types: Cigarettes    Smokeless tobacco: Never   Vaping Use    Vaping status: Never Used   Substance and Sexual Activity    Alcohol use: Yes     Comment: occasional    Drug use: Never    Sexual activity: Not on file   Other Topics Concern    Not on file   Social History Narrative    Not on file     Social Determinants of Health     Financial Resource Strain: High Risk (12/8/2023)    Overall Financial Resource Strain (CARDIA)     Difficulty of Paying Living Expenses: Hard   Food Insecurity: Not on file   Transportation Needs: No Transportation Needs (12/8/2023)    PRAPARE - Transportation     Lack of Transportation (Medical): No     Lack of Transportation (Non-Medical): No   Physical Activity: Not on file   Stress: Not on file   Social Connections: Not on file   Intimate Partner Violence: Not on file   Housing Stability: High Risk (12/8/2023)    Housing Stability Vital Sign     Unable to Pay for Housing in the Last Year: Yes     Number of Places Lived in the Last Year: 2     Unstable Housing in the Last Year: No       Allergies and Current Medications  No Known Allergies  Current Outpatient Medications   Medication Sig Dispense Refill    docusate sodium (Colace) 100 mg capsule Take 1 capsule (100 mg) by mouth twice a  day.      ibuprofen 800 mg tablet       acetaminophen (Tylenol) 325 mg tablet Take 2 tablets (650 mg) by mouth 2 times a day.      buPROPion XL (Wellbutrin XL) 300 mg 24 hr tablet Take 1 tablet (300 mg) by mouth once daily. Do not crush, chew, or split. 30 tablet 0    busPIRone (Buspar) 5 mg tablet Take 1 tablet (5 mg) by mouth 2 times a day.      celecoxib (CeleBREX) 200 mg capsule Take 1 capsule (200 mg) by mouth once daily. (Patient not taking: Reported on 8/15/2024)      cholecalciferol (Vitamin D-3) 125 MCG (5000 UT) capsule Take 1 capsule (125 mcg) by mouth once daily. Do not start before December 9, 2023.      cyclobenzaprine (Flexeril) 5 mg tablet Take 1 tablet (5 mg) by mouth.      DULoxetine (Cymbalta) 60 mg DR capsule Take 2 capsules (120 mg) by mouth once daily. Do not crush or chew. 60 capsule 0    gabapentin (Neurontin) 100 mg capsule Take 1 capsule (100 mg) by mouth 2 times a day. 3 capsules by mouth      hydrOXYzine pamoate (Vistaril) 25 mg capsule Take 1 capsule (25 mg) by mouth 2 times a day. 60 capsule 0    lidocaine 4 % patch Place 1 patch over 12 hours on the skin once daily. Remove & discard patch within 12 hours or as directed by MD. 28 patch 0    LORazepam (Ativan) 0.5 mg tablet Take 1 tablet (0.5 mg) by mouth every 6 hours if needed for anxiety. Take before MRI appointment      magnesium oxide (Mag-Ox) 400 mg tablet 1 tablet (400 mg) once daily.      melatonin 5 mg tablet Take 1 tablet (5 mg) by mouth once daily at bedtime.      meloxicam (Mobic) 7.5 mg tablet Take 1 tablet (7.5 mg) by mouth once daily.      naloxone (Narcan) 4 mg/0.1 mL nasal spray Administer 1 spray (4 mg) into affected nostril(s) if needed for opioid reversal. May repeat every 2-3 minutes if needed, alternating nostrils, until medical assistance becomes available. 2 each 0    oxyCODONE-acetaminophen (Percocet) 5-325 mg tablet Take by mouth.      QUEtiapine (SeroqueL) 300 mg tablet Take 1 tablet (300 mg) by mouth once  "daily at bedtime. 30 tablet 0    QUEtiapine (SeroqueL) 50 mg tablet Take 1 tablet (50 mg) by mouth once daily at bedtime. Take with 300mg dose 30 tablet 0    tiZANidine (Zanaflex) 4 mg capsule Take 1 capsule (4 mg) by mouth 3 times a day.      traZODone (Desyrel) 150 mg tablet Take 1 tablet (150 mg) by mouth once daily at bedtime. 30 tablet 0     No current facility-administered medications for this visit.        Physical Exam  /70   Pulse 84   Ht 1.727 m (5' 8\")   Wt 107 kg (236 lb)   SpO2 97%   BMI 35.88 kg/m²       Physical Examination:   General Appearance: alert and in no acute distress.  She is in a wheelchair.  HEENT: oropharynx without lesions. Anicteric sclerae.   Neck supple, nontender, without adenopathy, thyromegaly, or JVD.   Lungs clear to auscultation and percussion.   Heart RRR without murmurs, rubs, or gallops.   Abdomen: Soft, nontender, bowel sounds positive, without obvious ascites. Liver and spleen not palpable.  She does have an ostomy in the left lower quadrant which appears grossly healthy.  She is overweight centrally.  Extremities full ROM, no atrophy, normal strength. No edema.  Skin no specific lesions.   Neurological exam nonfocal, alert and oriented.  No asterixis.  No spider angiomata, or palmar erythema.     Labs 8/14/2024 urine drug screen negative.    Labs 6/21/2024 hepatitis C  IU/ml, hepatitis B surface antigen negative, hepatitis C antibody positive, protein 6.7, albumin 3.9, alk phos 102, bilirubin 0.2, AST 12, ALT 11, INR 1, glucose 114, sodium 138, creatinine 0.81, WBC 8.4, hemoglobin 8.1, platelets 286.    Labs 1/31/2024 AST 40, ALT 44.    Labs 9/17/2023 urine drug screen negative.    Labs 9/5/2023 hemoglobin 13.9, urine drug screen positive for amphetamines and fentanyl, alcohol negative.    CAT scan with contrast 7/5/2024:  IMPRESSION:  1.  Postsurgical changes of left lower quadrant loop colostomy.  2. 4.2 x 3.5 x 5.2 cm fluid collection extending along " the posterior  aspect of the lower sacrum and coccyx. The sterility of this  collection can not be determined on CT, consider fluid sampling for  further evaluation.  3. Mild hepatomegaly and findings suggestive of hepatic cirrhosis.  Recommend correlation with serum LFTs.                Assessment/Plan:     1. Hepatitis C, naive to therapy    The patient is referred to me for evaluation of hepatitis C.    I discussed with this patient about hepatitis C. We talked about household spread. We talked about sexual spread. We talked about ways to avoid spreading the disease. I explained that sexual partners should be tested, and if negative, they should make a decision about the use of condoms.  We also talked about avoiding sharing razor blades or toothbrushes with anyone. I also advised that alcohol use should be avoided.    I will start by testing HCV-RNA and genotype.    I will stage her disease with a FibroSCAN test. Depending on those results, we can decide whether treatment is most warranted or whether a liver biopsy is most warranted.  There was a concern on the CAT scan for cirrhosis.  Again, we will await the FibroScan.    With the concern for possible cirrhosis, I will also do an AFP and ultrasound.    I will also check for immunity to hepatitis A and B.  If not immune, then vaccines would be warranted. I will also check HIV.    I will do all of our standard pre-hepatitis C treatment labs.    She can follow-up with Dr. Jackson for her GI needs.  She should get colonoscopy and EGD for her anemia.  Her PCP may also want to do further workup for her anemia.    Thank you for allowing me to participate in the care of this patient. Please feel free to contact me with any questions regarding their care.     Sincerely,     Willem Sanderson MD, FAASLD, FACG.   Medical Director, Hepatology  Senior Attending Physician  [facility]  [facility]  Professor of  Medicine  Division of Gastroenterology and Liver Disease  [address] [address]  Phone: [phone]  Fax: [phone].      Cc: Dr. Maggy Jackson  cc: Dr. Huey Sheth      This document was generated with a computerized dictation system.  Because of this, there could be errors in grammar and/or content.

## 2024-08-15 NOTE — ADDENDUM NOTE
Encounter addended by: Niranjan Jerome RN on: 8/15/2024 1:56 PM   Actions taken: Contacts section saved, Flowsheet accepted

## 2024-08-15 NOTE — PATIENT INSTRUCTIONS
Get lab tests.    Get a standard ultrasound of the liver.    Get a FibroScan ultrasound of the liver.    Talk with Dr. Jackson about getting colonoscopy as well as upper endoscopy.    Consider speaking to your children about getting tested for hepatitis C.

## 2024-08-16 ENCOUNTER — PREP FOR PROCEDURE (OUTPATIENT)
Dept: SURGERY | Facility: HOSPITAL | Age: 64
End: 2024-08-16
Payer: MEDICAID

## 2024-08-16 DIAGNOSIS — Z93.3 COLOSTOMY PRESENT (MULTI): Primary | ICD-10-CM

## 2024-08-16 LAB
HCV RNA SERPL NAA+PROBE-ACNC: ABNORMAL IU/ML
HCV RNA SERPL NAA+PROBE-ACNC: DETECTED K[IU]/ML
HCV RNA SERPL NAA+PROBE-LOG IU: 4.16 LOG IU/ML
LABORATORY COMMENT REPORT: ABNORMAL

## 2024-08-19 ENCOUNTER — TELEPHONE (OUTPATIENT)
Dept: SURGERY | Facility: HOSPITAL | Age: 64
End: 2024-08-19

## 2024-08-19 ENCOUNTER — APPOINTMENT (OUTPATIENT)
Dept: SURGERY | Facility: CLINIC | Age: 64
End: 2024-08-19
Payer: MEDICAID

## 2024-08-19 DIAGNOSIS — Z93.3 COLOSTOMY PRESENT (MULTI): Primary | ICD-10-CM

## 2024-08-19 RX ORDER — ENEMA 19; 7 G/133ML; G/133ML
1 ENEMA RECTAL DAILY
Qty: 266 ML | Refills: 0 | Status: SHIPPED | OUTPATIENT
Start: 2024-08-19 | End: 2024-08-21

## 2024-08-19 RX ORDER — POLYETHYLENE GLYCOL 3350 17 G/17G
238 POWDER, FOR SOLUTION ORAL DAILY
Qty: 476 G | Refills: 0 | Status: SHIPPED | OUTPATIENT
Start: 2024-08-19 | End: 2024-08-21

## 2024-08-19 RX ORDER — BISACODYL 5 MG
5 TABLET, DELAYED RELEASE (ENTERIC COATED) ORAL EVERY 6 HOURS
Qty: 8 TABLET | Refills: 0 | Status: SHIPPED | OUTPATIENT
Start: 2024-08-19 | End: 2024-08-21

## 2024-08-19 RX ORDER — SODIUM CHLORIDE, SODIUM LACTATE, POTASSIUM CHLORIDE, CALCIUM CHLORIDE 600; 310; 30; 20 MG/100ML; MG/100ML; MG/100ML; MG/100ML
20 INJECTION, SOLUTION INTRAVENOUS CONTINUOUS
OUTPATIENT
Start: 2024-08-19

## 2024-08-19 NOTE — TELEPHONE ENCOUNTER
Colostomy/colonoscopy update     The patient was scheduled for a colonoscopy on 7/24/24. She was cancelled in preop due to having solid stool in the bag. She also could not produce any urine for a drug screen as she had to void upon arrival to the facility, and could not produce again after reaching the [facility]. We waived the screen at this time, but told her to try and hold her urine in for future screens. Upon further questioning with her facility's staff afterwards, she was never given the Golytely prep at all - it was sitting in a medication room.     She was rescheduled for 8/14/24. The urine drug screen was negative. She was given the Golytely prep and only 1 of the 2 prescribed enemas (just given one the night before). Her distal colon/rectum were fairly dirty. Her proximal colon (via colostomy) was also quite dirty. I discussed with the patient that this was an unacceptable prep for a colonoscopy, as well as for a future colon surgery. The colonoscopy would have to be rescheduled with a 2 day prep (2 days Miralax, 4 doses of Dulcolax each of the two days, and 2 Fleets enemas, not 1). The office spoke with the director of nursing at the patient's facility to discuss our concerns with the inadequate prep. The director will help ensure that the staff administer the prep appropriately. She also discussed that the patient occasionally refuses medications/treatments. We will schedule one last opportunity for a colonoscopy. If she is not prepped well enough or does not complete the prep (whether due to the patient or staff), I will not schedule additional colonoscopies or her colostomy reversal.    Prior to scheduling the colonoscopies, we reached out to her facility to discuss management of her pain regimen, as she is on chronic narcotics. She does not have a Pain Management physician, but her facility physician, Dr. Huey Sheth, is willing to wean her narcotic use currently and manage her pain medications  outside of the immediate perioperative period.     Maggy Jackson MD, [facility]  General Surgery  P: [phone]  O: [phone]

## 2024-08-20 ENCOUNTER — HOSPITAL ENCOUNTER (OUTPATIENT)
Dept: RADIOLOGY | Facility: CLINIC | Age: 64
Discharge: HOME | End: 2024-08-20
Payer: MEDICAID

## 2024-08-20 DIAGNOSIS — B18.2 CHRONIC HEPATITIS C WITHOUT HEPATIC COMA (MULTI): ICD-10-CM

## 2024-08-20 PROCEDURE — 76705 ECHO EXAM OF ABDOMEN: CPT | Performed by: RADIOLOGY

## 2024-08-20 PROCEDURE — 76705 ECHO EXAM OF ABDOMEN: CPT

## 2024-08-30 ENCOUNTER — CLINICAL SUPPORT (OUTPATIENT)
Dept: GASTROENTEROLOGY | Facility: CLINIC | Age: 64
End: 2024-08-30
Payer: MEDICAID

## 2024-08-30 DIAGNOSIS — B18.2 CHRONIC HEPATITIS C WITHOUT HEPATIC COMA (MULTI): ICD-10-CM

## 2024-08-30 PROCEDURE — 91200 LIVER ELASTOGRAPHY: CPT | Performed by: INTERNAL MEDICINE

## 2024-09-06 LAB
ELECTRONICALLY SIGNED BY: NORMAL
HCV GENTYP SERPL SEQ: NORMAL
HEPATITIS C INTERPRETATION: NORMAL

## 2024-09-09 ENCOUNTER — TELEPHONE (OUTPATIENT)
Dept: GASTROENTEROLOGY | Facility: CLINIC | Age: 64
End: 2024-09-09
Payer: MEDICAID

## 2024-09-09 DIAGNOSIS — B18.2 CHRONIC HEPATITIS C WITHOUT HEPATIC COMA (MULTI): ICD-10-CM

## 2024-09-10 RX ORDER — VELPATASVIR AND SOFOSBUVIR 100; 400 MG/1; MG/1
1 TABLET, FILM COATED ORAL DAILY
Qty: 28 TABLET | Refills: 2 | Status: SHIPPED | OUTPATIENT
Start: 2024-09-10

## 2024-09-12 ENCOUNTER — SPECIALTY PHARMACY (OUTPATIENT)
Dept: PHARMACY | Facility: CLINIC | Age: 64
End: 2024-09-12

## 2024-09-16 PROCEDURE — RXMED WILLOW AMBULATORY MEDICATION CHARGE

## 2024-09-19 ENCOUNTER — TELEPHONE (OUTPATIENT)
Dept: GASTROENTEROLOGY | Facility: CLINIC | Age: 64
End: 2024-09-19
Payer: MEDICAID

## 2024-09-20 ENCOUNTER — SPECIALTY PHARMACY (OUTPATIENT)
Dept: PHARMACY | Facility: CLINIC | Age: 64
End: 2024-09-20

## 2024-09-20 NOTE — PROGRESS NOTES
This patient has scheduled their medication delivery with  Specialty Pharmacy.  They should receive their medication 9/20/2024.

## 2024-09-21 ENCOUNTER — PHARMACY VISIT (OUTPATIENT)
Dept: PHARMACY | Facility: CLINIC | Age: 64
End: 2024-09-21
Payer: MEDICAID

## 2024-09-23 ENCOUNTER — TELEPHONE (OUTPATIENT)
Dept: SURGERY | Facility: CLINIC | Age: 64
End: 2024-09-23
Payer: MEDICAID

## 2024-09-23 ENCOUNTER — PREP FOR PROCEDURE (OUTPATIENT)
Dept: SURGERY | Facility: HOSPITAL | Age: 64
End: 2024-09-23
Payer: MEDICAID

## 2024-09-23 NOTE — TELEPHONE ENCOUNTER
EndoscopyTami, called about Diagnostic Colonoscopy 09/25/24. Endoscopy would like an order put in for tox screen if that is necessary for procedure.

## 2024-09-24 ENCOUNTER — SPECIALTY PHARMACY (OUTPATIENT)
Dept: INTERNAL MEDICINE | Facility: HOSPITAL | Age: 64
End: 2024-09-24
Payer: MEDICAID

## 2024-09-24 ENCOUNTER — TELEPHONE (OUTPATIENT)
Dept: GASTROENTEROLOGY | Facility: CLINIC | Age: 64
End: 2024-09-24
Payer: MEDICAID

## 2024-09-24 NOTE — PROGRESS NOTES
"[facility] Clinical Note    Azul Bates \"Stephenie\" is a 64 y.o. female, who is on the specialty pharmacy service for management of:  Hepatology Core.    Azul Bates \"Stephenie\" is taking: sofosbuvir/velpatasvir.    Medication Receipt Date: 9/24/24  Medication Start Date (planned or actual): 9/24/24    Azul was contacted on 9/24/2024 at 1:53 PM for a virtual pharmacy visit with encounter number [id number] from:   [facility]  [address]  VIRTUAL DEPARTMENT  [facility] [address]  Loc: [phone]    Azul was offered a Telemedicine Video visit or Telephone visit.  Azul consented to a telephone visit, which was performed. Spoke with her nurse, Jose Alberto, as well as the patient.    The most recent encounter visit with the referring prescriber Dr. Sanderson on 8/15/24 was reviewed.  Pharmacy will continue to collaborate in the care of this patient with the referring prescriber Dr. Sanderson.    General Assessment      Impression/Plan  IMPRESSION/PLAN:  Is patient high risk (potential patients:  pregnancy, geriatric, pediatric)?  no  Is laboratory follow-up needed? no  Is a clinical intervention needed? no  Next reassessment date? Approx 10/28/24  Additional comments: counseled on spacing out sof/darius by 4 hours from magnesium oxide and milk of magnesia  -nursing home plans to place orders for labwork    Refer to the encounter summary report for documentation details about patient counseling and education.      Medication Adherence    The importance of adherence was discussed with the patient and they were advised to take the medication as prescribed by their provider. Patient was encouraged to call their physician's office if they have a question regarding a missed dose.     QOL/Patient Satisfaction  Rate your quality of life on scale of 1-10: 8  Rate your satisfaction with  Specialty Pharmacy on scale of 1-10: 10 - Completely satisfied      Patient was advised " to contact the pharmacy if there are any changes to their medication list, including prescriptions, OTC medications, herbal products, or supplements. Patient was advised of [facility]'s dispensing process, refill timeline, contact information ([phone]), and patient management follow up. Patient confirmed understanding of education conducted during assessment. All patient questions and concerns were addressed to the best of my ability. Patient was encouraged to contact the specialty pharmacy with any questions or concerns.    Confirmed follow-up outreaches are properly scheduled and reviewed goals of therapy with the patient.        Angélica Ramirez, JesusD    Lab Results   Component Value Date    HCVPCRQUANT 14,500 (H) 08/15/2024    HCVGENO Genotype 3a 08/15/2024    AST 24 08/15/2024    ALT 32 08/15/2024    ALKPHOS 108 08/15/2024    HEPBCAB Nonreactive 08/15/2024      Medication start date: 9/24/24  Therapy completion: 12/17/24  Anticipated SVR date: 3/11/25

## 2024-09-25 ENCOUNTER — HOSPITAL ENCOUNTER (OUTPATIENT)
Dept: GASTROENTEROLOGY | Facility: HOSPITAL | Age: 64
Discharge: HOME | End: 2024-09-25
Payer: MEDICAID

## 2024-09-25 ENCOUNTER — ANESTHESIA EVENT (OUTPATIENT)
Dept: GASTROENTEROLOGY | Facility: HOSPITAL | Age: 64
End: 2024-09-25
Payer: MEDICAID

## 2024-09-25 ENCOUNTER — ANESTHESIA (OUTPATIENT)
Dept: GASTROENTEROLOGY | Facility: HOSPITAL | Age: 64
End: 2024-09-25
Payer: MEDICAID

## 2024-09-25 VITALS
HEART RATE: 82 BPM | BODY MASS INDEX: 34.86 KG/M2 | TEMPERATURE: 97.2 F | SYSTOLIC BLOOD PRESSURE: 134 MMHG | DIASTOLIC BLOOD PRESSURE: 82 MMHG | RESPIRATION RATE: 18 BRPM | OXYGEN SATURATION: 96 % | WEIGHT: 230 LBS | HEIGHT: 68 IN

## 2024-09-25 DIAGNOSIS — K59.00 COLONIC CONSTIPATION: ICD-10-CM

## 2024-09-25 DIAGNOSIS — Z93.3 COLOSTOMY PRESENT (MULTI): ICD-10-CM

## 2024-09-25 DIAGNOSIS — Z93.3 COLOSTOMY STATUS (MULTI): ICD-10-CM

## 2024-09-25 PROCEDURE — 80307 DRUG TEST PRSMV CHEM ANLYZR: CPT | Performed by: SURGERY

## 2024-09-25 PROCEDURE — 2500000004 HC RX 250 GENERAL PHARMACY W/ HCPCS (ALT 636 FOR OP/ED): Performed by: SURGERY

## 2024-09-25 PROCEDURE — 2500000005 HC RX 250 GENERAL PHARMACY W/O HCPCS: Performed by: NURSE ANESTHETIST, CERTIFIED REGISTERED

## 2024-09-25 PROCEDURE — 7100000009 HC PHASE TWO TIME - INITIAL BASE CHARGE

## 2024-09-25 PROCEDURE — 2720000007 HC OR 272 NO HCPCS

## 2024-09-25 PROCEDURE — 3700000001 HC GENERAL ANESTHESIA TIME - INITIAL BASE CHARGE

## 2024-09-25 PROCEDURE — 2500000004 HC RX 250 GENERAL PHARMACY W/ HCPCS (ALT 636 FOR OP/ED): Performed by: NURSE ANESTHETIST, CERTIFIED REGISTERED

## 2024-09-25 PROCEDURE — 7100000010 HC PHASE TWO TIME - EACH INCREMENTAL 1 MINUTE

## 2024-09-25 PROCEDURE — 45385 COLONOSCOPY W/LESION REMOVAL: CPT | Performed by: SURGERY

## 2024-09-25 PROCEDURE — 3700000002 HC GENERAL ANESTHESIA TIME - EACH INCREMENTAL 1 MINUTE

## 2024-09-25 RX ORDER — LIDOCAINE HYDROCHLORIDE 20 MG/ML
INJECTION, SOLUTION EPIDURAL; INFILTRATION; INTRACAUDAL; PERINEURAL AS NEEDED
Status: DISCONTINUED | OUTPATIENT
Start: 2024-09-25 | End: 2024-09-25

## 2024-09-25 RX ORDER — SODIUM CHLORIDE, SODIUM LACTATE, POTASSIUM CHLORIDE, CALCIUM CHLORIDE 600; 310; 30; 20 MG/100ML; MG/100ML; MG/100ML; MG/100ML
20 INJECTION, SOLUTION INTRAVENOUS CONTINUOUS
Status: DISCONTINUED | OUTPATIENT
Start: 2024-09-25 | End: 2024-09-26 | Stop reason: HOSPADM

## 2024-09-25 RX ORDER — PROPOFOL 10 MG/ML
INJECTION, EMULSION INTRAVENOUS AS NEEDED
Status: DISCONTINUED | OUTPATIENT
Start: 2024-09-25 | End: 2024-09-25

## 2024-09-25 SDOH — HEALTH STABILITY: MENTAL HEALTH: CURRENT SMOKER: 1

## 2024-09-25 ASSESSMENT — COLUMBIA-SUICIDE SEVERITY RATING SCALE - C-SSRS
6. HAVE YOU EVER DONE ANYTHING, STARTED TO DO ANYTHING, OR PREPARED TO DO ANYTHING TO END YOUR LIFE?: NO
1. IN THE PAST MONTH, HAVE YOU WISHED YOU WERE DEAD OR WISHED YOU COULD GO TO SLEEP AND NOT WAKE UP?: NO
2. HAVE YOU ACTUALLY HAD ANY THOUGHTS OF KILLING YOURSELF?: NO

## 2024-09-25 ASSESSMENT — ENCOUNTER SYMPTOMS
DIZZINESS: 0
CHILLS: 0
VOMITING: 0
HEADACHES: 0
SHORTNESS OF BREATH: 0
CONSTIPATION: 0
ABDOMINAL PAIN: 0
BLOOD IN STOOL: 0
NAUSEA: 0
COUGH: 0
PALPITATIONS: 0
FEVER: 0
DIARRHEA: 0

## 2024-09-25 ASSESSMENT — PAIN - FUNCTIONAL ASSESSMENT
PAIN_FUNCTIONAL_ASSESSMENT: 0-10

## 2024-09-25 ASSESSMENT — PAIN SCALES - GENERAL
PAINLEVEL_OUTOF10: 0 - NO PAIN
PAIN_LEVEL: 0
PAINLEVEL_OUTOF10: 7

## 2024-09-25 NOTE — H&P
"GENERAL SURGERY HISTORY AND PHYSICAL    Azul Bates   1960   [id number]     History Of Present Illness  Azul Bates \"Cheli" is a 64 y.o. female presenting for colonoscopy prior to possible colostomy reversal. Please see office notes for more details. The urine drug screen is negative today. This is the patient's 3rd/last attempt for colonoscopy. For the 1st attempt, the facility did not provide the patient any bowel prep. For the 2nd attempt, the prep was very poor. The patient has been on CLD for 2 days and took the 2 day Miralax prep. She had an enema last night and one this morning. She states her colostomy output is watery and yellow. The prep above was corroborated by her caregiver from the facility.,      Past Medical History  She has a past medical history of Anxiety, Depression, and Hepatitis C.    Surgical History  She has a past surgical history that includes Colostomy (10/2022); Tonsillectomy; and Total hip arthroplasty (Left, 02/12/2024).    Medications  Current Outpatient Medications on File Prior to Encounter   Medication Sig Dispense Refill    buPROPion XL (Wellbutrin XL) 300 mg 24 hr tablet Take 1 tablet (300 mg) by mouth once daily. Do not crush, chew, or split. 30 tablet 0    busPIRone (Buspar) 5 mg tablet Take 1 tablet (5 mg) by mouth 2 times a day.      cholecalciferol (Vitamin D-3) 125 MCG (5000 UT) capsule Take 1 capsule (125 mcg) by mouth once daily. Do not start before December 9, 2023.      cyclobenzaprine (Flexeril) 5 mg tablet Take 1 tablet (5 mg) by mouth.      docusate sodium (Colace) 100 mg capsule Take 1 capsule (100 mg) by mouth twice a day.      DULoxetine (Cymbalta) 60 mg DR capsule Take 2 capsules (120 mg) by mouth once daily. Do not crush or chew. 60 capsule 0    gabapentin (Neurontin) 100 mg capsule Take 1 capsule (100 mg) by mouth 2 times a day. 3 capsules by mouth      hydrOXYzine pamoate (Vistaril) 25 mg capsule Take 1 capsule (25 mg) by mouth 2 times a day. 60 capsule " 0    lidocaine 4 % patch Place 1 patch over 12 hours on the skin once daily. Remove & discard patch within 12 hours or as directed by MD. 28 patch 0    magnesium oxide (Mag-Ox) 400 mg tablet 1 tablet (400 mg) once daily.      melatonin 5 mg tablet Take 1 tablet (5 mg) by mouth once daily at bedtime.      naloxone (Narcan) 4 mg/0.1 mL nasal spray Administer 1 spray (4 mg) into affected nostril(s) if needed for opioid reversal. May repeat every 2-3 minutes if needed, alternating nostrils, until medical assistance becomes available. 2 each 0    oxyCODONE-acetaminophen (Percocet) 5-325 mg tablet Take by mouth.      QUEtiapine (SeroqueL) 50 mg tablet Take 1 tablet (50 mg) by mouth once daily at bedtime. Take with 300mg dose 30 tablet 0    traZODone (Desyrel) 150 mg tablet Take 1 tablet (150 mg) by mouth once daily at bedtime. 30 tablet 0    acetaminophen (Tylenol) 325 mg tablet Take 2 tablets (650 mg) by mouth 2 times a day.      celecoxib (CeleBREX) 200 mg capsule Take 1 capsule (200 mg) by mouth once daily. (Patient not taking: Reported on 8/15/2024)      ibuprofen 800 mg tablet       LORazepam (Ativan) 0.5 mg tablet Take 1 tablet (0.5 mg) by mouth every 6 hours if needed for anxiety. Take before MRI appointment      meloxicam (Mobic) 7.5 mg tablet Take 1 tablet (7.5 mg) by mouth once daily.      QUEtiapine (SeroqueL) 300 mg tablet Take 1 tablet (300 mg) by mouth once daily at bedtime. (Patient not taking: Reported on 9/25/2024) 30 tablet 0    sofosbuvir-velpatasvir (Epclusa) 400-100 mg tablet Take 1 tablet by mouth once daily. 28 tablet 2    tiZANidine (Zanaflex) 4 mg capsule Take 1 capsule (4 mg) by mouth 3 times a day.       No current facility-administered medications on file prior to encounter.       Allergies  Patient has no known allergies.     Social History  She reports that she has been smoking cigarettes. She has never used smokeless tobacco. She reports current alcohol use. She reports that she does not use  "drugs.    Family History  Family History   Problem Relation Name Age of Onset    Leukemia Brother          Half-brother        Review of Systems   Constitutional:  Negative for chills and fever.   Respiratory:  Negative for cough and shortness of breath.    Cardiovascular:  Negative for chest pain and palpitations.   Gastrointestinal:  Negative for abdominal pain, blood in stool, constipation, diarrhea, nausea and vomiting.   Neurological:  Negative for dizziness and headaches.   All other systems reviewed and are negative.      Last Recorded Vitals  Blood pressure 115/68, pulse 84, temperature 36.6 °C (97.9 °F), temperature source Temporal, resp. rate 16, height 1.727 m (5' 8\"), weight 104 kg (230 lb), SpO2 95%.     Physical Exam  Constitutional:       General: She is not in acute distress.     Appearance: Normal appearance. She is not ill-appearing.   HENT:      Head: Normocephalic and atraumatic.   Cardiovascular:      Rate and Rhythm: Normal rate and regular rhythm.   Pulmonary:      Effort: Pulmonary effort is normal. No respiratory distress.      Breath sounds: Normal breath sounds.   Abdominal:      General: There is no distension.      Palpations: Abdomen is soft.      Tenderness: There is no abdominal tenderness. There is no guarding.      Comments: Colostomy pink and viable with watery yellow liquid in bag, with some mucus noted   Musculoskeletal:         General: No swelling.   Skin:     General: Skin is warm and dry.   Neurological:      Mental Status: She is alert and oriented to person, place, and time. Mental status is at baseline.   Psychiatric:         Mood and Affect: Mood normal.         Behavior: Behavior normal.          Relevant Results  Results for orders placed or performed during the hospital encounter of 09/25/24 (from the past 24 hour(s))   Drug Screen, Urine   Result Value Ref Range    Amphetamine Screen, Urine Presumptive Negative Presumptive Negative    Barbiturate Screen, Urine " Presumptive Negative Presumptive Negative    Benzodiazepines Screen, Urine Presumptive Negative Presumptive Negative    Cannabinoid Screen, Urine Presumptive Negative Presumptive Negative    Cocaine Metabolite Screen, Urine Presumptive Negative Presumptive Negative    Fentanyl Screen, Urine Presumptive Negative Presumptive Negative    Opiate Screen, Urine Presumptive Negative Presumptive Negative    Oxycodone Screen, Urine Presumptive Negative Presumptive Negative    PCP Screen, Urine Presumptive Negative Presumptive Negative    Methadone Screen, Urine Presumptive Negative Presumptive Negative       Liver Elastography (Fibroscan) GI    Result Date: 8/31/2024  HEPATOLOGY ATTENDING UPDATE NOTE I personally reviewed and interpreted the FibroSCAN test. It revealed a median liver stiffness of 15.3 kPa with an IQR of 15% and . This is consistent with stage 4 fibrosis or cirrhosis and mild steatosis. The cirrhosis was confirmed by the US and CT. She is immune to hepatitis A and B. We will go ahead and treat her for HCV. EL Sanderson.       Assessment and Plan  Active Problems:  There are no active Hospital Problems.    64 y.o. female who presents for colonoscopy (last attempt) today. The risks and benefits of undergoing colonoscopy were discussed. Risks include allergic reactions, heart or lung complications, bleeding, infection, injury to surrounding tissue, and perforation of the colon. The patient expressed understanding and provided informed consent for the procedure.     Maggy Jackson MD, [facility]  General Surgery

## 2024-09-25 NOTE — ANESTHESIA POSTPROCEDURE EVALUATION
"Patient: Azul Bates \"Stephenie\"    Procedure Summary       Date: 09/25/24 Room / Location: [facility]    Anesthesia Start: 0954 Anesthesia Stop: 1039    Procedure: COLONOSCOPY Diagnosis:       Colostomy status (Multi)      Colonic constipation    Scheduled Providers: Maggy Jackson MD Responsible Provider: DINORAH Valverde    Anesthesia Type: MAC ASA Status: 3            Anesthesia Type: MAC    Vitals Value Taken Time   /88 09/25/24 1035   Temp 36.2 °C (97.1 °F) 09/25/24 1035   Pulse 84 09/25/24 1035   Resp 18 09/25/24 1035   SpO2 99 % 09/25/24 1035       Anesthesia Post Evaluation    Patient location during evaluation: PACU  Patient participation: complete - patient participated  Level of consciousness: sleepy but conscious  Pain score: 0  Pain management: adequate  Airway patency: patent  Cardiovascular status: acceptable and hemodynamically stable  Respiratory status: acceptable, spontaneous ventilation and face mask  Hydration status: acceptable  Postoperative Nausea and Vomiting: none        There were no known notable events for this encounter.    "

## 2024-09-25 NOTE — ANESTHESIA PREPROCEDURE EVALUATION
"Patient: Azul Bates \"Stephenie\"    Procedure Information       Date/Time: 09/25/24 1015    Scheduled providers: Maggy Jackson MD    Procedure: COLONOSCOPY    Location: [facility]            Relevant Problems   Anesthesia (within normal limits)      Neuro   (+) Opioid abuse   (+) Severe recurrent major depression without psychotic features (Multi)       Clinical information reviewed:   Tobacco  Allergies  Meds   Med Hx  Surg Hx   Fam Hx  Soc Hx        NPO Detail:  NPO/Void Status  Date of Last Liquid: 09/25/24  Time of Last Liquid: 0000  Date of Last Solid: 09/21/24  Time of Last Solid: 1900  Last Intake Type: Clear fluids  Time of Last Void: 0845         Physical Exam    Airway  Mallampati: III  TM distance: >3 FB  Neck ROM: full     Cardiovascular - normal exam     Dental   Comments: Extremely poor dentition, multiple broken teeth throughout mouth, some down to the gums.   Pulmonary - normal exam     Abdominal   (+) obese             Anesthesia Plan    History of general anesthesia?: yes  History of complications of general anesthesia?: no    ASA 3     MAC     The patient is a current smoker.  Patient was previously instructed to abstain from smoking on day of procedure.  Patient smoked on day of procedure.    intravenous induction   Anesthetic plan and risks discussed with patient.    Plan discussed with CRNA.      " negative

## 2024-09-26 ENCOUNTER — TELEPHONE (OUTPATIENT)
Dept: SURGERY | Facility: CLINIC | Age: 64
End: 2024-09-26
Payer: MEDICAID

## 2024-09-26 NOTE — TELEPHONE ENCOUNTER
----- Message from Maggy Jackson sent at 9/25/2024  4:20 PM EDT -----  Her post colonoscopy appointment needs to be scheduled, but hold off on that for right now - just cancel the Monday appointment. She saw Dr. Sanderson for her hepatitis C/possible liver disease and he recommended a fibroscan. That needs to be scheduled and the liver issues have to be discussed with him first.

## 2024-09-26 NOTE — ADDENDUM NOTE
Encounter addended by: Nancy Bonilla RN on: 9/26/2024 12:42 PM   Actions taken: Contacts section saved, Flowsheet accepted

## 2024-09-27 ENCOUNTER — TELEPHONE (OUTPATIENT)
Dept: GASTROENTEROLOGY | Facility: CLINIC | Age: 64
End: 2024-09-27
Payer: MEDICAID

## 2024-09-27 NOTE — TELEPHONE ENCOUNTER
Received call from patient, she reports that she spoke to Dr Jackson's office and they are asking for her to see Dr Sanderson regarding her liver and surgery. Note sent to Dr Sanderson regarding patient starting epclusa and planning for surgery his reponce: Given the fact that she supposedly started the medication on 9/24/2024, she may want to stop the medicine and then just started after surgery (if the surgery will be done very soon).  On the other hand, if surgery will not be for a couple of months, she can essentially complete the course.  My only concern would be if she has her colostomy reversed and then has multiple GI issues and is NPO for many days after the surgery and cannot take her medication.    Sent this recommendation and fibroscan results to Dr Jackson to review.

## 2024-09-30 ENCOUNTER — APPOINTMENT (OUTPATIENT)
Dept: SURGERY | Facility: CLINIC | Age: 64
End: 2024-09-30
Payer: MEDICAID

## 2024-10-03 LAB
LABORATORY COMMENT REPORT: NORMAL
PATH REPORT.FINAL DX SPEC: NORMAL
PATH REPORT.GROSS SPEC: NORMAL
PATH REPORT.RELEVANT HX SPEC: NORMAL
PATH REPORT.TOTAL CANCER: NORMAL

## 2024-10-15 ENCOUNTER — SPECIALTY PHARMACY (OUTPATIENT)
Dept: PHARMACY | Facility: CLINIC | Age: 64
End: 2024-10-15

## 2024-10-24 ENCOUNTER — TELEPHONE (OUTPATIENT)
Dept: GASTROENTEROLOGY | Facility: CLINIC | Age: 64
End: 2024-10-24
Payer: MEDICAID

## 2024-10-29 PROCEDURE — RXMED WILLOW AMBULATORY MEDICATION CHARGE

## 2024-11-04 ENCOUNTER — SPECIALTY PHARMACY (OUTPATIENT)
Dept: INTERNAL MEDICINE | Facility: HOSPITAL | Age: 64
End: 2024-11-04
Payer: MEDICAID

## 2024-11-04 ENCOUNTER — PHARMACY VISIT (OUTPATIENT)
Dept: PHARMACY | Facility: CLINIC | Age: 64
End: 2024-11-04
Payer: MEDICAID

## 2024-11-04 NOTE — PROGRESS NOTES
"[facility] Clinical Note    Azul Bates \"Stephenie\" is a 64 y.o. female, who is on the specialty pharmacy service for management of:  Hepatology Core.    Azul Bates \"Stephenie\" is taking: Epclusa.    Medication Receipt Date: 9/24/24  Medication Start Date (planned or actual): 9/24/24    Azul was contacted on 11/4/2024 at 3:16 PM for a virtual pharmacy visit with encounter number [id number] from:   [facility]  [id number]  VIRTUAL [address]PARTMENT  [facility] [address]  Loc: [phone]    Azul was offered a Telemedicine Video visit or Telephone visit.  Azul consented to a telephone visit, which was performed. Spoke with Chelsea, the nurse at [facility]. Also left a message for the patient in case she has additional concerns/ questions.    The most recent encounter visit with the referring prescriber Dr. Sanderson on 8/15/24  was reviewed.  Pharmacy will continue to collaborate in the care of this patient with the referring prescriber Dr. Sanderson.    General Assessment      Impression/Plan  IMPRESSION/PLAN:  Is patient high risk (potential patients:  pregnancy, geriatric, pediatric)?  no  Is laboratory follow-up needed? Yes, mid-therapy labwork  Is a clinical intervention needed? no  Next reassessment date? End of therapy, 12/23/24  Additional comments: reminded nursing home about completing labwork, provided her with my RightFax    Refer to the encounter summary report for documentation details about patient counseling and education.      Medication Adherence    The importance of adherence was discussed with the patient and they were advised to take the medication as prescribed by their provider. Patient was encouraged to call their physician's office if they have a question regarding a missed dose.     QOL/Patient Satisfaction  Rate your quality of life on scale of 1-10: -- (unable to assess, could not reach patient (spoke with nurse at " [facility])      Patient was advised to contact the pharmacy if there are any changes to their medication list, including prescriptions, OTC medications, herbal products, or supplements. Patient was advised of [facility]'s dispensing process, refill timeline, contact information ([phone]), and patient management follow up. Patient confirmed understanding of education conducted during assessment. All patient questions and concerns were addressed to the best of my ability. Patient was encouraged to contact the specialty pharmacy with any questions or concerns.    Confirmed follow-up outreaches are properly scheduled and reviewed goals of therapy with the patient.        Angélica Ramirez, PharmD

## 2024-11-27 ENCOUNTER — SPECIALTY PHARMACY (OUTPATIENT)
Dept: PHARMACY | Facility: CLINIC | Age: 64
End: 2024-11-27

## 2024-11-27 NOTE — PROGRESS NOTES
I received a message that the patient's Hep C medication requires prior authorization approval, current approval expires 12/7/2024. Called [facility] and the representative stated that the Epclusa was filled at [facility] pharmacy. I spoke to patient and she did confirm that she received a shipment from [facility] pharmacy. Patient received two shipments of her Hep C med for  Specialty pharmacy and one fill from [facility] pharmacy. I attempted to contact a nurse to confirm, but was transferred to a phone that just rang. Clinical pharmacist Angélica sharma

## 2024-12-06 DIAGNOSIS — B18.2 CHRONIC HEPATITIS C WITHOUT HEPATIC COMA (MULTI): Primary | ICD-10-CM

## 2024-12-18 ENCOUNTER — SPECIALTY PHARMACY (OUTPATIENT)
Dept: PHARMACY | Facility: CLINIC | Age: 64
End: 2024-12-18

## 2024-12-18 NOTE — PROGRESS NOTES
This patient was called on 12/18/2024 , to inquire if she had completed he HCV regimen. She stated that she did complete it without any side effects or missed doses. She was informed that her HCV-RNA was not detected. She was reminded to schedule a follow up appointment with Dr. Sanderson's office and to complete SVR labs in March. She verbalized understanding. She didn't have any questions for the pharmacist.

## 2025-01-02 ENCOUNTER — APPOINTMENT (OUTPATIENT)
Facility: CLINIC | Age: 65
End: 2025-01-02
Payer: MEDICAID

## 2025-01-02 VITALS
HEART RATE: 79 BPM | WEIGHT: 263 LBS | SYSTOLIC BLOOD PRESSURE: 139 MMHG | DIASTOLIC BLOOD PRESSURE: 85 MMHG | OXYGEN SATURATION: 95 % | BODY MASS INDEX: 39.99 KG/M2

## 2025-01-02 DIAGNOSIS — B18.2 CHRONIC HEPATITIS C WITHOUT HEPATIC COMA (MULTI): Primary | ICD-10-CM

## 2025-01-02 DIAGNOSIS — K74.60 CIRRHOSIS OF LIVER WITHOUT ASCITES, UNSPECIFIED HEPATIC CIRRHOSIS TYPE (MULTI): ICD-10-CM

## 2025-01-02 PROCEDURE — 99214 OFFICE O/P EST MOD 30 MIN: CPT | Performed by: INTERNAL MEDICINE

## 2025-01-02 NOTE — PROGRESS NOTES
HEPATOLOGY RETURN PATIENT VISIT    January 2, 2025    Dr. Rosita Yap      Patient Name:   DEMI KENNY  Medical Record Number: [id number]  YOB: 1960    Dear Dr. Yap,    I had the pleasure of seeing Demi Kenny for follow-up evaluation in the [facility] ([place] satellite office). History and physical examination was performed. Pertinent available laboratory, imaging, pathology results were reviewed.    History of Present Illness:   The patient is a 64 year old white female who is referred for follow-up evaluation of hepatitis C and presumed cirrhosis.    The patient has an extensive history of medical issues, psychiatric issues and substance abuse.  She fell in September 2022 and developed a sacral decubitus wound that became infected.  She developed necrotizing fasciitis and osteomyelitis she eventually required a loop colostomy in October 2022.  She has had suicide attempts due to severe depression and psychosis.  She has been seen by one of the general surgeons at [place] to discuss reversal of her ostomy.  They were not able to do a colonoscopy due to a poor preparation as well as the patient's inability to provide a urine sample for a urine drug screen.  With her polysubstance abuse history, she was tested for hepatitis C.  She was found to be hepatitis C positive and referred to me for further evaluation.     The patient has no known history of acute hepatitis or jaundice.      She has never had any manifestations of liver disease including no jaundice, ascites, hepatic encephalopathy, or variceal bleeding.  She has never had a liver biopsy.      She does recall a history of anemia and was on iron in the past.  She does not recall being worked up in the past for her anemia and she is no longer taking the iron.  She does report significant fatigue.  She has occasionally noted some blood around her stoma but no blood in the stools, hematuria or  hemoptysis.    She initially saw me in consultation on 8/15/2024.  At that time, I did additional evaluation (see results below).  After that, we were able to get her started on a 12-week course of Epclusa.  She started this on 9/24/2024.  She completed this on 12/23/2024 without significant difficulty.  She is now about 2 weeks post-treatment.    She presented today for follow-up evaluation.  She denied any specific liver-related complaints.  Again, her main issue is fatigue.  She is also very anxious to get her ileostomy reversed.  She has continued to gain some weight.  She mostly uses a wheelchair.  She continues to live at the nursing home.    Past Medical/Surgical History:   Severe recurrent major depression without psychotic features (Multi)  Colostomy present (Multi)  Opioid abuse (Multi)  Bilateral hip pain  Hepatitis C  CHF with preserved EF  COPD  chronic pain syndrome  Sciatica  migraine  Hypertension  Arthritis  Osteoporosis  opioid/fentanyl abuse  severe recurrent depression  Bipolar disorder  Hepatitis C - unknown if treated  10/14/22 Dr. Cr: excisional debridement of skin, soft tissue and muscle for necrotizing fasciitis  10/18/22 Dr. Yoder: debridement of sacral wound for necrotizing fasciitis  10/19/22 Dr. Cr: laparoscopic diverting loop colostomy  10/27/22 Dr. Matos: irrigation and debridement of skin, subcutaneous tissue and fascia   12/2/22 Dr. Guan: debridement and rotational flap of perineum  Ovarian cystectomy  Tubal ligation        Family History:   Her brother had leukemia.  There is no known family history of liver disease.  There is no known family history of colon cancer.    Social History:   She has a history of polysubstance abuse.  However, she denies intravenous drug use.  She denied alcohol use.  She is a smoker.  She lives in a skilled nursing facility.  She has had blood transfusions.  She does have tattoos.  She is a .  She does not believe that her children have  ever been tested for hepatitis C.    Review of systems: As noted above.  She does have left hip pain and takes Percocet for that (5 mg tablets 4 times a day).  She does have peripheral neuropathy.  She continues to get occasional blood around her stoma.  She does have fatigue.  She does have issues with depression.  She does get intermittent constipation.  She does have severe carpal tunnel syndrome.  No fever, chills, weight loss, visual changes, auditory changes, shortness of breath, chest pain, abdominal pain, diarrhea, dysuria, hematuria, or rash.       Medical, Surgical, Family, and Social Histories  Past Medical History:   Diagnosis Date    Anxiety     Depression     Hepatitis C        Past Surgical History:   Procedure Laterality Date    CARPAL TUNNEL RELEASE Bilateral     COLOSTOMY  10/2022    TONSILLECTOMY      TOTAL HIP ARTHROPLASTY Left 02/12/2024       Family History   Problem Relation Name Age of Onset    Leukemia Brother          Half-brother       Social History     Socioeconomic History    Marital status:      Spouse name: Not on file    Number of children: Not on file    Years of education: Not on file    Highest education level: Not on file   Occupational History    Not on file   Tobacco Use    Smoking status: Every Day     Current packs/day: 0.50     Types: Cigarettes    Smokeless tobacco: Never   Vaping Use    Vaping status: Never Used   Substance and Sexual Activity    Alcohol use: Yes     Comment: occasional    Drug use: Never    Sexual activity: Not on file   Other Topics Concern    Not on file   Social History Narrative    Not on file     Social Drivers of Health     Financial Resource Strain: High Risk (12/8/2023)    Overall Financial Resource Strain (CARDIA)     Difficulty of Paying Living Expenses: Hard   Food Insecurity: Not on file   Transportation Needs: No Transportation Needs (12/8/2023)    PRAPARE - Transportation     Lack of Transportation (Medical): No     Lack of  Transportation (Non-Medical): No   Physical Activity: Not on file   Stress: Not on file   Social Connections: Not on file   Intimate Partner Violence: Not on file   Housing Stability: High Risk (12/8/2023)    Housing Stability Vital Sign     Unable to Pay for Housing in the Last Year: Yes     Number of Places Lived in the Last Year: 2     Unstable Housing in the Last Year: No       Allergies and Current Medications  No Known Allergies  Current Outpatient Medications   Medication Sig Dispense Refill    buPROPion XL (Wellbutrin XL) 300 mg 24 hr tablet Take 1 tablet (300 mg) by mouth once daily. Do not crush, chew, or split. 30 tablet 0    busPIRone (Buspar) 5 mg tablet Take 1 tablet (5 mg) by mouth 2 times a day.      celecoxib (CeleBREX) 200 mg capsule Take 1 capsule (200 mg) by mouth once daily.      cholecalciferol (Vitamin D-3) 125 MCG (5000 UT) capsule Take 1 capsule (125 mcg) by mouth once daily. Do not start before December 9, 2023.      cyclobenzaprine (Flexeril) 5 mg tablet Take 1 tablet (5 mg) by mouth.      docusate sodium (Colace) 100 mg capsule Take 1 capsule (100 mg) by mouth twice a day.      DULoxetine (Cymbalta) 60 mg DR capsule Take 2 capsules (120 mg) by mouth once daily. Do not crush or chew. 60 capsule 0    gabapentin (Neurontin) 100 mg capsule Take 1 capsule (100 mg) by mouth 2 times a day. 3 capsules by mouth      hydrOXYzine pamoate (Vistaril) 25 mg capsule Take 1 capsule (25 mg) by mouth 2 times a day. 60 capsule 0    ibuprofen 800 mg tablet       lidocaine 4 % patch Place 1 patch over 12 hours on the skin once daily. Remove & discard patch within 12 hours or as directed by MD. 28 patch 0    magnesium oxide (Mag-Ox) 400 mg tablet 1 tablet (400 mg) once daily.      melatonin 5 mg tablet Take 1 tablet (5 mg) by mouth once daily at bedtime.      meloxicam (Mobic) 7.5 mg tablet Take 1 tablet (7.5 mg) by mouth once daily.      oxyCODONE-acetaminophen (Percocet) 5-325 mg tablet Take by mouth.       QUEtiapine (SeroqueL) 300 mg tablet Take 1 tablet (300 mg) by mouth once daily at bedtime. 30 tablet 0    QUEtiapine (SeroqueL) 50 mg tablet Take 1 tablet (50 mg) by mouth once daily at bedtime. Take with 300mg dose 30 tablet 0    tiZANidine (Zanaflex) 4 mg capsule Take 1 capsule (4 mg) by mouth 3 times a day.      traZODone (Desyrel) 150 mg tablet Take 1 tablet (150 mg) by mouth once daily at bedtime. 30 tablet 0    acetaminophen (Tylenol) 325 mg tablet Take 2 tablets (650 mg) by mouth 2 times a day. (Patient not taking: Reported on 1/2/2025)      LORazepam (Ativan) 0.5 mg tablet Take 1 tablet (0.5 mg) by mouth every 6 hours if needed for anxiety. Take before MRI appointment (Patient not taking: Reported on 1/2/2025)      naloxone (Narcan) 4 mg/0.1 mL nasal spray Administer 1 spray (4 mg) into affected nostril(s) if needed for opioid reversal. May repeat every 2-3 minutes if needed, alternating nostrils, until medical assistance becomes available. 2 each 0    sofosbuvir-velpatasvir (Epclusa) 400-100 mg tablet Take 1 tablet by mouth once daily. (Patient not taking: Reported on 1/2/2025) 28 tablet 2     No current facility-administered medications for this visit.        Physical Exam  /85   Pulse 79   Wt 119 kg (263 lb)   SpO2 95%   BMI 39.99 kg/m²       Physical Examination:   General Appearance: alert and in no acute distress.  She is in a wheelchair.  HEENT: oropharynx without lesions. Anicteric sclerae.   Neck supple, nontender, without adenopathy, thyromegaly, or JVD.   Lungs clear to auscultation and percussion.   Heart RRR without murmurs, rubs, or gallops.   Abdomen: Soft, nontender, bowel sounds positive, without obvious ascites. Liver and spleen not palpable.  She does have an ostomy in the left lower quadrant which appears grossly healthy.  She is overweight centrally.  Extremities full ROM, no atrophy, normal strength. No edema.  Skin no specific lesions.   Neurological exam nonfocal, alert and  oriented.  No asterixis.  No spider angiomata, but she does have bilateral palmar erythema.     Labs 12/16/2024 hepatitis C RNA undetectable.    Labs 12/12/2024 protein 6.7, albumin 3.9, alk phos 86, bilirubin 0.2, AST 12, ALT 8, WBC 7.9, hemoglobin 11.9, platelets 277.    Labs 9/25/2024 urine drug screen negative, hepatitis C genotype 3a, INR 1.1, protein 7.1, albumin 4.1, alk phos 108, bilirubin 0.3, AST 24, ALT 32, WBC 8.2, hemoglobin 10.1, platelets 445, glucose 104, sodium 138, creatinine 0.93, HIV negative, AFP < 4, hepatitis B core antibody negative, hepatitis B surface antigen negative, hepatitis B surface antibody positive, hepatitis A antibody positive, hepatitis C RNA 14,500 IU/ml.    Labs 8/14/2024 urine drug screen negative.    Labs 6/21/2024 hepatitis C  IU/ml, hepatitis B surface antigen negative, hepatitis C antibody positive, protein 6.7, albumin 3.9, alk phos 102, bilirubin 0.2, AST 12, ALT 11, INR 1, glucose 114, sodium 138, creatinine 0.81, WBC 8.4, hemoglobin 8.1, platelets 286.    Labs 1/31/2024 AST 40, ALT 44.    Labs 9/17/2023 urine drug screen negative.    Labs 9/5/2023 hemoglobin 13.9, urine drug screen positive for amphetamines and fentanyl, alcohol negative.    Ultrasound 8/20/2024:  IMPRESSION:  Hepatomegaly with diffusely increased hepatic echogenicity and  superimposed coarsened hepatic echotexture overall suggestive of  cirrhotic morphology. No focal hepatic lesions are seen.      CAT scan with contrast 7/5/2024:  IMPRESSION:  1.  Postsurgical changes of left lower quadrant loop colostomy.  2. 4.2 x 3.5 x 5.2 cm fluid collection extending along the posterior  aspect of the lower sacrum and coccyx. The sterility of this  collection can not be determined on CT, consider fluid sampling for  further evaluation.  3. Mild hepatomegaly and findings suggestive of hepatic cirrhosis.  Recommend correlation with serum LFTs.      Colonoscopy 9/25/2024:  Impression  Subcentimeter polyp in  the ascending colon was removed with hot snare  Normal.  Normal.      FibroScan 8/30/2024 revealed a median liver stiffness of 15.3 kPa with IQR 15% and .          Assessment/Plan:     Hepatitis C, genotype 3a, S/P 12 weeks of Epclusa therapy  Presumed cirrhosis    The patient is referred to me for follow-up evaluation of the above issues.    I again discussed with this patient about hepatitis C. We talked about household spread. We talked about sexual spread. We talked about ways to avoid spreading the disease. I explained that sexual partners should be tested, and if negative, they should make a decision about the use of condoms.  We also talked about avoiding sharing razor blades or toothbrushes with anyone. I also advised that alcohol use should be avoided.    Her imaging studies and FibroScan have been most consistent with cirrhosis.  I will repeat a FibroScan test about 6 months after completing her hepatitis C therapy.  For now, I would treat her as if she could have cirrhosis.      She did complete a 12-week course of Epclusa for her hepatitis C.  Her hepatitis C RNA at the end of therapy was undetectable.  I will repeat labs including hepatitis C RNA and AFP in about 4 months.  I will repeat the FibroScan at that time and have her see me back in about 5 months.  I will also do an ultrasound to screen for hepatocellular carcinoma in about 4 months.    She is immune to hepatitis A and hepatitis B.    She can follow-up with Dr. Jackson for her GI needs.  Her colonoscopy was not overly revealing.  They can decide whether she should get and EGD for her anemia.  Her PCP may also want to do further workup for her anemia.  Personally, I would recommend that Dr. Jackson do an EGD prior to ostomy reversal just to make sure there is no evidence of significant portal hypertension or varices.  Based on her good labs, I do not feel that this will be the case.  Assuming she does not have large varices, I would clear her  from my standpoint for surgery.  Obviously, patients with cirrhosis will be at somewhat higher risk than the average population, but patients with early cirrhosis (Child's A) would be an acceptable risk for surgery.    Thank you for allowing me to participate in the care of this patient. Please feel free to contact me with any questions regarding their care.     Sincerely,     Willem Sanderson MD, FAASLD, FACG.   Medical Director, Hepatology  Senior Attending Physician  [facility]  Professor of Medicine  Division of Gastroenterology and Liver Disease  [place] [id number]  Phone: [phone]  Fax: [phone].      Cc: Dr. Maggy Jackson  cc: Dr. Huey Sheth      This document was generated with a computerized dictation system.  Because of this, there could be errors in grammar and/or content.

## 2025-01-03 ENCOUNTER — TELEPHONE (OUTPATIENT)
Dept: SURGERY | Facility: CLINIC | Age: 65
End: 2025-01-03
Payer: MEDICAID

## 2025-01-03 DIAGNOSIS — Z93.3 COLOSTOMY PRESENT (MULTI): Primary | ICD-10-CM

## 2025-01-03 DIAGNOSIS — K74.60 CIRRHOSIS OF LIVER WITHOUT ASCITES, UNSPECIFIED HEPATIC CIRRHOSIS TYPE (MULTI): ICD-10-CM

## 2025-01-03 NOTE — TELEPHONE ENCOUNTER
----- Message from Maggy Jackson sent at 1/3/2025 11:03 AM EST -----  I am referring Stephenie to GI and to Colorectal Surgery. Her liver disease is a bit too advanced for me to reverse her colostomy - hence Colorectal. Dr. Sanderson recommended an EGD prior to surgery, so I will send her to GI. I called her and let her know this.

## 2025-01-03 NOTE — PROGRESS NOTES
I called the patient this morning to discuss the recommendations after noting that she has completed hepatitis C treatment. An EGD was recommended prior to surgical intervention, in the event she has significant varices. I referred her to GI for this, as I do not intervene on varices. As she has cirrhosis, I recommend referral to Colorectal Surgery for colostomy reversal. I discussed that she is at an elevated risk for perioperative complications with her cirrhosis and I would like to provide her the best possible chance of a satisfactory surgical outcome, which is taking her colostomy down. I have placed these referrals. The patient expressed her understanding and all questions were answered.

## 2025-01-06 ENCOUNTER — TELEPHONE (OUTPATIENT)
Dept: SURGERY | Facility: CLINIC | Age: 65
End: 2025-01-06
Payer: MEDICAID

## 2025-01-06 NOTE — TELEPHONE ENCOUNTER
Patient called for appointment with Dr. Hairston for reversal of colostomy.  Message left for patient that Dr. Jackson has referred you to Dr. Hairston for surgery.  We need to gather your records from the hospital where you had surgery. Please contact us to discuss so we can call for the records.  Phone number provided.      Was referred by Dr. Maggy Jackson.    Patient had multiple surgeries at OSH and will need operative reports and pathology.  Colonoscopy and CT in  records.    Maki Daugherty RN

## 2025-01-13 NOTE — PROGRESS NOTES
Azul Bates is a 64 year old female referred by Dr. Maggy Jackson for evaluation of colostomy reversal.    She has a fairly significant psychiatric and substance abuse history.  She suffered a fall in September 2022 and had a fracture of her coccyx. This was complicated by a sacral decubitus wound that became infected. She was admitted at [facility] in [place] from 10/13/22-12/22/22 with gluteal/sacral necrotizing fasciitis and coccygeal osteomyelitis. She underwent debridement by General Surgery 10/14/22, 10/18/22 and 10/27/22. She underwent laparoscopic diverting loop colostomy 10/19/22 to allow the sacral wound to heal. A rotational flap was performed by Plastic Surgery 12/2/22. She completed a 6 week course of Unasyn. She attempted to commit suicide while she was admitted via opioid overdose. Her  brought fentanyl for both of them to use. She was transitioned to subutex with plans to bridge to suboxone as an outpatient for her history of chronic pain and substance abuse disorder.     She has since moved from [place] to [place] with her . During her time here, she was admitted 9/9/23-12/8/23 for psychosis and major recurrent depression. Please refer to the dc summary for extensive details regarding this admission. In short, she was not caring for herself for a few days and soiling herself in her chair. Her drug screen was positive for amphetamines and fentanyl. She appeared to have stopped taking suboxone at some point. There are reports of multiple prior suicide attempts.      The patient underwent a left hip replacement in February 2024, probably in [facility] (no records in [facility]). Post op she was sent to a nursing facility. She can use a walker for very short distances, such as getting to a restroom, and uses a wheelchair for longer distances. She has completed therapy following her hip surgery. She has remained at this facility as she has nowhere else  to go (there were reports in her previous admission of impending homelessness). She was ultimately hoping to undergo colostomy reversal, recover from this, and then move to [place]. There are many family members in [place] - ex , children, mother, sibling(s).      Locally, she has a PCP but not a psychiatrist - she claims she never had a referral. As she has been in a facility for a few months, she denies substance abuse or alcohol use. However, she denied drug abuse prior to her admission in late 2023 and her drug screen was positive. Her  has since passed away. She does endorse having difficulty passing stool into her colostomy but states this has improved with taking Miralax daily that I recommended at her last office visit.      7/06/2024 CT Abd/Pelvis with contrast  1.  Postsurgical changes of left lower quadrant loop colostomy.  2. 4.2 x 3.5 x 5.2 cm fluid collection extending along the posterior aspect of the lower sacrum and coccyx. The sterility of this collection can not be determined on CT, consider fluid sampling for further evaluation.  3. Mild hepatomegaly and findings suggestive of hepatic cirrhosis. Recommend correlation with serum LFTs.    9/9/25/2024 Colonoscopy to TI  Findings  One sessile polyp measuring smaller than 5 mm in the ascending colon; performed hot snare with complete en bloc removal and retrieved specimen  Not in diagram - rectum and sigmoid colon normal. All photos from the rectal evaluation noted here.  Colostomy: medial opening is the distal lumen towards the rectum, lateral opening is the proximal aspect  A. COLON - ASCENDING POLYP, BIOPSY:  -Tubular adenoma.           Procedural pictures show IC valve and rectal retroflexion              presents today very motivated for ostomy reversal.  States she perseverates on the stoma and it limits her functions.  No history of fecal incontinence.  Sacral wound developed during a depressive period where she was not caring  for herself adequately.  She is currently on medications but not following with active psychiatrist.  She is able to ambulate typically but spends most of her time in a rolling walker.                   Past Medical History  CHF with preserved EF  COPD  chronic pain syndrome  Sciatica  migraine  Hypertension  Arthritis  Osteoporosis  opioid/fentanyl abuse  severe recurrent depression  Bipolar disorder  Hepatitis C - unknown if treated     Surgical History  Tonsillectomy  Total hip arthroplasty (Left, 02/12/2024).  10/14/22 Dr. Cr: excisional debridement of skin, soft tissue and muscle for necrotizing fasciitis  10/18/22 Dr. Yoder: debridement of sacral wound for necrotizing fasciitis  10/19/22 Dr. Cr: laparoscopic diverting loop colostomy  10/27/22 Dr. Matos: irrigation and debridement of skin, subcutaneous tissue and fascia   12/2/22 Dr. Guan: debridement and rotational flap of perineum  Ovarian cystectomy  Tubal ligation         Social History  Smoking:   ETOH:  Drug:      Family History        Review of Systems  Constitutional: Negative for fever, chills, anorexia, weight loss, malaise     ENMT: Negative for nasal discharge, congestion, ear pain, mouth pain, throat pain     Respiratory: Negative for cough, hemoptysis, wheezing, shortness of breath     Cardiac: Negative for chest pain, dyspnea on exertion, orthopnea, palpitations, syncope     Gastrointestinal: Negative for nausea, vomiting, diarrhea, constipation, abdominal pain,     Genitourinary: Negative for discharge, dysuria, flank pain, frequency, hematuria     Musculoskeletal: Negative for decreased ROM, pain, swelling, weakness     Neurological: Negative for dizziness, confusion, headache, seizures, syncope     Psychiatric: Negative for mood changes, anxiety, hallucinations, sleep changes, suicidal ideas     Skin: Negative for mass, pain, itching, rash, ulcer     Endocrine: Negative for heat intolerance, cold intolerance, excessive sweating,  polyuria, excess thirst     Hematologic/Lymph: Negative for anemia, bruising, easy bleeding, night sweats, petechiae, history of DVT/PE or cancer     Allergic/Immunologic: Negative for anaphylaxis, itchy/ teary eyes, itching, sneezing, swelling    Physical Exam  Constitutional: Well developed, awake/alert/oriented x3, no distress, alert and cooperative             Eyes: Sclera anicteric, no conjunctival inflammation, conjugate gaze    ENMT: mucous membranes moist, no apparent injury,            Head/Neck: Neck supple, no apparent injury, No JVD, trachea midline, no bruits              Respiratory/Thorax: Patent airways, CTAB, normal breath sounds with good chest expansion, thorax symmetric         Cardiovascular: Regular, rate and rhythm, no murmurs, normal S1 and S2         Gastrointestinal: loop colostomy left upper quadrant pink patent with moderate parastomal hernia, exam limited by abdominal obesity, nondistended, soft, non-tender, no rebound tenderness or guarding, no masses palpable, no organomegaly, +BS, no bruits               Extremities: normal extremities, no cyanosis edema, contusions or wounds, 2+ femoral pulses B/L              Neurological: alert and oriented x3, normal strength, Normal gait          Lymphatic: No palpable inguinal lymphadenopathy   Psychological: Appropriate mood and behavior         Skin: Warm and dry, no lesions, no rashes                Anorectal:  Sacral tissue flap intact and well-healed    Impression:  History of diverting colostomy for severe sacral wound status post flap.      Plan:      Sacral wound completely healed  No baseline fecal incontinence  Cleared for surgery by hepatology, needs preoperative EGD to assess for varices, ongoing chronic anemia, no signs of ascites on prior imaging.    Counseled on smoking cessation  Discussed maintaining diverting colostomy to prevent wound issues in the future and as her ambulation is somewhat limited.  She is very clear that  reversal would give her the best quality of life and she does not want to maintain her stoma.   Tentative plan for takedown of diverting loop colostomy with colocolonic anastomosis             Risks and benefits of surgery were discussed with the patient including, but not limited to: conversion to an open procedure, infection, bleeding, wound healing issues, anastomotic leak or stricture, damage to surrounding structures, change in bowel habits, ostomy, incontinence, sacral wound recurrence

## 2025-02-11 ENCOUNTER — OFFICE VISIT (OUTPATIENT)
Dept: SURGERY | Facility: CLINIC | Age: 65
End: 2025-02-11
Payer: MEDICAID

## 2025-02-11 VITALS
HEART RATE: 83 BPM | WEIGHT: 258 LBS | SYSTOLIC BLOOD PRESSURE: 144 MMHG | DIASTOLIC BLOOD PRESSURE: 80 MMHG | TEMPERATURE: 96.8 F | OXYGEN SATURATION: 95 % | BODY MASS INDEX: 39.23 KG/M2

## 2025-02-11 DIAGNOSIS — D64.9 ANEMIA, UNSPECIFIED TYPE: ICD-10-CM

## 2025-02-11 DIAGNOSIS — Z93.3 COLOSTOMY PRESENT (MULTI): ICD-10-CM

## 2025-02-11 PROCEDURE — 99215 OFFICE O/P EST HI 40 MIN: CPT | Performed by: SURGERY

## 2025-02-11 PROCEDURE — 99205 OFFICE O/P NEW HI 60 MIN: CPT | Performed by: SURGERY

## 2025-02-11 ASSESSMENT — PAIN SCALES - GENERAL: PAINLEVEL_OUTOF10: 6

## 2025-02-11 NOTE — NURSING NOTE
"Ostomy/Wound Care Consult     Visit Date: 2/11/2025      Patient Name: Stephenie Bates         MRN: [id number]           YOB: 1960     [facility] nursing visit outcome: Pt here to see Dr. Hairston for evaluation of stoma closure     WO next scheduled visit/plan: TBD    Stoma Type: Loop Colostomy  Diameter: 1 3/8\"  Location: LUQ  Protrusion: Protrudes slightly  Mucosal Condition and Color: Moist, Red  Mucocutaneous Junction: Intact  Peristomal Skin: Clear, intact  Location of Skin Impairment: n/a  Peristomal Contour: Rounded  Supportive Tissue: Semi-Soft  Character of Output: Formed Stool  Emptying Frequency: varies  Removed/Current Pouching System: Convatec ActiveLife 1-piece with clip (no other accessories)  Current Wearing Time: 2 days    Recommendations:   Skin Care: n/a  Pouching System: same  Wear Time: same Days    Supplier:  unknown, resides in nursing facility    Time Increment: 30 minutes    June VIGILN, RN, CWOCN  2/11/2025  2:03 PM        "

## 2025-02-12 ENCOUNTER — TELEPHONE (OUTPATIENT)
Facility: CLINIC | Age: 65
End: 2025-02-12
Payer: MEDICAID

## 2025-02-20 ENCOUNTER — ANESTHESIA EVENT (OUTPATIENT)
Dept: OPERATING ROOM | Facility: HOSPITAL | Age: 65
End: 2025-02-20
Payer: MEDICAID

## 2025-02-24 ENCOUNTER — HOSPITAL ENCOUNTER (OUTPATIENT)
Dept: OPERATING ROOM | Facility: HOSPITAL | Age: 65
Discharge: HOME | End: 2025-02-24
Payer: MEDICAID

## 2025-02-24 ENCOUNTER — HOSPITAL ENCOUNTER (OUTPATIENT)
Dept: CARDIOLOGY | Facility: HOSPITAL | Age: 65
Discharge: HOME | End: 2025-02-24
Payer: MEDICAID

## 2025-02-24 ENCOUNTER — ANESTHESIA (OUTPATIENT)
Dept: OPERATING ROOM | Facility: HOSPITAL | Age: 65
End: 2025-02-24
Payer: MEDICAID

## 2025-02-24 DIAGNOSIS — D64.9 ANEMIA, UNSPECIFIED TYPE: ICD-10-CM

## 2025-02-24 LAB
AMPHETAMINES UR QL SCN: ABNORMAL
ANION GAP SERPL CALC-SCNC: 9 MMOL/L (ref 10–20)
ATRIAL RATE: 77 BPM
BARBITURATES UR QL SCN: ABNORMAL
BENZODIAZ UR QL SCN: ABNORMAL
BUN SERPL-MCNC: 14 MG/DL (ref 6–23)
BZE UR QL SCN: ABNORMAL
CALCIUM SERPL-MCNC: 9.7 MG/DL (ref 8.6–10.3)
CANNABINOIDS UR QL SCN: ABNORMAL
CHLORIDE SERPL-SCNC: 106 MMOL/L (ref 98–107)
CO2 SERPL-SCNC: 26 MMOL/L (ref 21–32)
CREAT SERPL-MCNC: 1.08 MG/DL (ref 0.5–1.05)
EGFRCR SERPLBLD CKD-EPI 2021: 58 ML/MIN/1.73M*2
ERYTHROCYTE [DISTWIDTH] IN BLOOD BY AUTOMATED COUNT: 14.2 % (ref 11.5–14.5)
FENTANYL+NORFENTANYL UR QL SCN: ABNORMAL
GLUCOSE SERPL-MCNC: 118 MG/DL (ref 74–99)
HCT VFR BLD AUTO: 38.1 % (ref 36–46)
HGB BLD-MCNC: 12.2 G/DL (ref 12–16)
MCH RBC QN AUTO: 29 PG (ref 26–34)
MCHC RBC AUTO-ENTMCNC: 32 G/DL (ref 32–36)
MCV RBC AUTO: 91 FL (ref 80–100)
METHADONE UR QL SCN: ABNORMAL
NRBC BLD-RTO: 0 /100 WBCS (ref 0–0)
OPIATES UR QL SCN: ABNORMAL
OXYCODONE+OXYMORPHONE UR QL SCN: ABNORMAL
P AXIS: 10 DEGREES
PCP UR QL SCN: ABNORMAL
PLATELET # BLD AUTO: 245 X10*3/UL (ref 150–450)
POTASSIUM SERPL-SCNC: 4.3 MMOL/L (ref 3.5–5.3)
PR INTERVAL: 170 MS
Q ONSET: 249 MS
QRS COUNT: 12 BEATS
QRS DURATION: 118 MS
QT INTERVAL: 403 MS
QTC CALCULATION(BAZETT): 457 MS
QTC FREDERICIA: 437 MS
R AXIS: 16 DEGREES
RBC # BLD AUTO: 4.2 X10*6/UL (ref 4–5.2)
SODIUM SERPL-SCNC: 137 MMOL/L (ref 136–145)
T AXIS: 54 DEGREES
T OFFSET: 451 MS
VENTRICULAR RATE: 77 BPM
WBC # BLD AUTO: 9.5 X10*3/UL (ref 4.4–11.3)

## 2025-02-24 PROCEDURE — 93010 ELECTROCARDIOGRAM REPORT: CPT | Performed by: INTERNAL MEDICINE

## 2025-02-24 PROCEDURE — 3700000002 HC GENERAL ANESTHESIA TIME - EACH INCREMENTAL 1 MINUTE: Performed by: NURSE ANESTHETIST, CERTIFIED REGISTERED

## 2025-02-24 PROCEDURE — 7100000001 HC RECOVERY ROOM TIME - INITIAL BASE CHARGE: Performed by: NURSE ANESTHETIST, CERTIFIED REGISTERED

## 2025-02-24 PROCEDURE — 7100000009 HC PHASE TWO TIME - INITIAL BASE CHARGE: Performed by: NURSE ANESTHETIST, CERTIFIED REGISTERED

## 2025-02-24 PROCEDURE — 80307 DRUG TEST PRSMV CHEM ANLYZR: CPT | Performed by: ANESTHESIOLOGY

## 2025-02-24 PROCEDURE — 2500000004 HC RX 250 GENERAL PHARMACY W/ HCPCS (ALT 636 FOR OP/ED): Performed by: NURSE ANESTHETIST, CERTIFIED REGISTERED

## 2025-02-24 PROCEDURE — 3600000007 HC OR TIME - EACH INCREMENTAL 1 MINUTE - PROCEDURE LEVEL TWO: Performed by: NURSE ANESTHETIST, CERTIFIED REGISTERED

## 2025-02-24 PROCEDURE — 36415 COLL VENOUS BLD VENIPUNCTURE: CPT | Performed by: ANESTHESIOLOGY

## 2025-02-24 PROCEDURE — 7100000010 HC PHASE TWO TIME - EACH INCREMENTAL 1 MINUTE: Performed by: NURSE ANESTHETIST, CERTIFIED REGISTERED

## 2025-02-24 PROCEDURE — 2500000004 HC RX 250 GENERAL PHARMACY W/ HCPCS (ALT 636 FOR OP/ED): Performed by: ANESTHESIOLOGY

## 2025-02-24 PROCEDURE — 3700000001 HC GENERAL ANESTHESIA TIME - INITIAL BASE CHARGE: Performed by: NURSE ANESTHETIST, CERTIFIED REGISTERED

## 2025-02-24 PROCEDURE — 3600000002 HC OR TIME - INITIAL BASE CHARGE - PROCEDURE LEVEL TWO: Performed by: NURSE ANESTHETIST, CERTIFIED REGISTERED

## 2025-02-24 PROCEDURE — 93005 ELECTROCARDIOGRAM TRACING: CPT

## 2025-02-24 PROCEDURE — 85027 COMPLETE CBC AUTOMATED: CPT | Performed by: ANESTHESIOLOGY

## 2025-02-24 PROCEDURE — 43235 EGD DIAGNOSTIC BRUSH WASH: CPT | Performed by: INTERNAL MEDICINE

## 2025-02-24 PROCEDURE — 7100000002 HC RECOVERY ROOM TIME - EACH INCREMENTAL 1 MINUTE: Performed by: NURSE ANESTHETIST, CERTIFIED REGISTERED

## 2025-02-24 PROCEDURE — 80048 BASIC METABOLIC PNL TOTAL CA: CPT | Performed by: ANESTHESIOLOGY

## 2025-02-24 RX ORDER — METOCLOPRAMIDE HYDROCHLORIDE 5 MG/ML
10 INJECTION INTRAMUSCULAR; INTRAVENOUS ONCE
Status: COMPLETED | OUTPATIENT
Start: 2025-02-24 | End: 2025-02-24

## 2025-02-24 RX ORDER — ALBUTEROL SULFATE 0.83 MG/ML
2.5 SOLUTION RESPIRATORY (INHALATION) ONCE
Status: DISCONTINUED | OUTPATIENT
Start: 2025-02-24 | End: 2025-02-24 | Stop reason: HOSPADM

## 2025-02-24 RX ORDER — SODIUM CHLORIDE 0.9 % (FLUSH) 0.9 %
SYRINGE (ML) INJECTION CONTINUOUS PRN
Status: DISCONTINUED | OUTPATIENT
Start: 2025-02-24 | End: 2025-02-24

## 2025-02-24 RX ORDER — PROPOFOL 10 MG/ML
INJECTION, EMULSION INTRAVENOUS AS NEEDED
Status: DISCONTINUED | OUTPATIENT
Start: 2025-02-24 | End: 2025-02-24

## 2025-02-24 RX ORDER — SODIUM CHLORIDE, SODIUM LACTATE, POTASSIUM CHLORIDE, CALCIUM CHLORIDE 600; 310; 30; 20 MG/100ML; MG/100ML; MG/100ML; MG/100ML
20 INJECTION, SOLUTION INTRAVENOUS CONTINUOUS
Status: DISCONTINUED | OUTPATIENT
Start: 2025-02-24 | End: 2025-02-25 | Stop reason: HOSPADM

## 2025-02-24 RX ORDER — FENTANYL CITRATE 50 UG/ML
INJECTION, SOLUTION INTRAMUSCULAR; INTRAVENOUS AS NEEDED
Status: DISCONTINUED | OUTPATIENT
Start: 2025-02-24 | End: 2025-02-24

## 2025-02-24 RX ORDER — FAMOTIDINE 10 MG/ML
20 INJECTION, SOLUTION INTRAVENOUS ONCE
Status: COMPLETED | OUTPATIENT
Start: 2025-02-24 | End: 2025-02-24

## 2025-02-24 RX ORDER — LIDOCAINE HCL/PF 100 MG/5ML
SYRINGE (ML) INTRAVENOUS AS NEEDED
Status: DISCONTINUED | OUTPATIENT
Start: 2025-02-24 | End: 2025-02-24

## 2025-02-24 RX ADMIN — METOCLOPRAMIDE 10 MG: 5 INJECTION, SOLUTION INTRAMUSCULAR; INTRAVENOUS at 10:27

## 2025-02-24 RX ADMIN — PROPOFOL 60 MG: 10 INJECTION, EMULSION INTRAVENOUS at 11:03

## 2025-02-24 RX ADMIN — FENTANYL CITRATE 50 MCG: 50 INJECTION INTRAMUSCULAR; INTRAVENOUS at 11:04

## 2025-02-24 RX ADMIN — FAMOTIDINE 20 MG: 10 INJECTION, SOLUTION INTRAVENOUS at 10:26

## 2025-02-24 RX ADMIN — LIDOCAINE HYDROCHLORIDE 50 MG: 20 INJECTION, SOLUTION INTRAVENOUS at 11:03

## 2025-02-24 RX ADMIN — FENTANYL CITRATE 50 MCG: 50 INJECTION INTRAMUSCULAR; INTRAVENOUS at 11:03

## 2025-02-24 RX ADMIN — Medication: at 11:11

## 2025-02-24 RX ADMIN — LIDOCAINE HYDROCHLORIDE 50 MG: 20 INJECTION, SOLUTION INTRAVENOUS at 11:04

## 2025-02-24 SDOH — HEALTH STABILITY: MENTAL HEALTH: CURRENT SMOKER: 1

## 2025-02-24 ASSESSMENT — COLUMBIA-SUICIDE SEVERITY RATING SCALE - C-SSRS
6. HAVE YOU EVER DONE ANYTHING, STARTED TO DO ANYTHING, OR PREPARED TO DO ANYTHING TO END YOUR LIFE?: NO
2. HAVE YOU ACTUALLY HAD ANY THOUGHTS OF KILLING YOURSELF?: NO
1. IN THE PAST MONTH, HAVE YOU WISHED YOU WERE DEAD OR WISHED YOU COULD GO TO SLEEP AND NOT WAKE UP?: NO

## 2025-02-24 ASSESSMENT — PAIN - FUNCTIONAL ASSESSMENT
PAIN_FUNCTIONAL_ASSESSMENT: 0-10

## 2025-02-24 ASSESSMENT — PAIN SCALES - GENERAL
PAINLEVEL_OUTOF10: 0 - NO PAIN
PAIN_LEVEL: 0
PAINLEVEL_OUTOF10: 0 - NO PAIN

## 2025-02-24 NOTE — ANESTHESIA PREPROCEDURE EVALUATION
"Patient: Azul Bates \"Stephenie\"    Procedure Information       Date/Time: 02/24/25 1230    Scheduled providers: Anthony Murrell MD    Procedure: EGD    Location: [facility] OR            Relevant Problems   Anesthesia (within normal limits)      Neuro   (+) Opioid abuse   (+) Severe recurrent major depression without psychotic features (Multi)       Clinical information reviewed:   Tobacco  Allergies  Meds   Med Hx  Surg Hx   Fam Hx  Soc Hx        NPO Detail:  NPO/Void Status  Date of Last Liquid: 02/24/25  Date of Last Solid: 02/23/25         Physical Exam    Airway  Mallampati: III  TM distance: >3 FB  Neck ROM: full     Cardiovascular - normal exam  Rhythm: regular  Rate: normal     Dental   Comments: UPPER MISSING.  POOR DENTAL CARIES   Pulmonary - normal exam  Breath sounds clear to auscultation     Abdominal   (+) obese  Abdomen: soft         Anesthesia Plan    History of general anesthesia?: yes  History of complications of general anesthesia?: no    ASA 2     MAC     The patient is a current smoker.  Patient did not smoke on day of procedure.    intravenous induction   Anesthetic plan and risks discussed with patient.  Use of blood products discussed with patient who consented to blood products.    Plan discussed with CRNA.  "

## 2025-02-24 NOTE — ANESTHESIA POSTPROCEDURE EVALUATION
"Patient: Azul Bates \"Stephenie\"    Procedure Summary       Date: 02/24/25 Room / Location: [facility] OR    Anesthesia Start: 1054 Anesthesia Stop: 1122    Procedure: EGD Diagnosis: Anemia, unspecified type    Scheduled Providers: Anthony Murrell MD Responsible Provider: DINORAH Longoria    Anesthesia Type: MAC ASA Status: 2            Anesthesia Type: MAC    Vitals Value Taken Time   /71 02/24/25 1210   Temp 36.8 °C (98.3 °F) 02/24/25 1115   Pulse 87 02/24/25 1212   Resp STABLE 02/24/25 1213   SpO2 95 % 02/24/25 1212   Vitals shown include unfiled device data.    Anesthesia Post Evaluation    Patient location during evaluation: PACU  Patient participation: complete - patient participated  Level of consciousness: awake and alert  Pain score: 0  Pain management: adequate  Airway patency: patent  Cardiovascular status: hemodynamically stable  Respiratory status: room air  Hydration status: acceptable  Postoperative Nausea and Vomiting: none      There were no known notable events for this encounter.    "

## 2025-02-24 NOTE — H&P
Pre-sedation Evaluation:  Sedation Necessary For: Analgesia  Sedation to be Managed By: Anesthesia (Monitored Anesthesia Care/MAC)    History of Present Illness and Indication for Procedure      Stephenie Bates is a 64 y.o. female with a history of CHF, COPD, chronic pain, hepatitis C, migraines, HTN, depression, bipolar, and OA who presents for EGD to screen for varices.    She has a history of a fall in September 2022 and developed a sacral decubitus wound that became infected.  She developed necrotizing fasciitis and osteomyelitis and eventually required a loop ileostomy in October 2022.  She has been hoping to have her ostomy reversed.     Prior to reversal she was found to have hepatitis C. This was treated with 12 weeks of Epclusa and additional evaluation by hepatology revealed presumed cirrhosis. EGD was recommended to screen for varices.      NPO guidelines met: Yes           Review of Systems  Constitutional:  Negative for chills, fever and unexpected weight change.   HENT:  Negative for trouble swallowing.    Respiratory:  Negative for shortness of breath.    Cardiovascular:  Negative for chest pain.   Gastrointestinal:  As above.   Skin:  Negative for color change.       I performed a complete 10 point review of systems and it is negative except as noted in HPI or above. All other systems have been reviewed and are negative.      Patient Active Problem List   Diagnosis    Severe recurrent major depression without psychotic features (Multi)    Colostomy present (Multi)    Opioid abuse    Bilateral hip pain       Past Medical History:  She has a past medical history of Anxiety, CHF (congestive heart failure), Cirrhosis (Multi), Depression, Hepatitis C, and Hypertension.    Past Surgical History:  She has a past surgical history that includes Colostomy (10/2022); Tonsillectomy; Total hip arthroplasty (Left, 02/12/2024); and Carpal tunnel release (Bilateral).      Social History:  She reports that she has been  smoking cigarettes. She has never used smokeless tobacco. She reports that she does not currently use alcohol. She reports that she does not currently use drugs after having used the following drugs: Fentanyl and Cocaine.    Family History:  Family History   Problem Relation Name Age of Onset    Leukemia Brother          Half-brother        Allergies:  Patient has no known allergies.    Current Medications  Current Outpatient Medications on File Prior to Encounter   Medication Sig Dispense Refill    acetaminophen (Tylenol) 325 mg tablet Take 2 tablets (650 mg) by mouth 2 times a day.      buPROPion XL (Wellbutrin XL) 300 mg 24 hr tablet Take 1 tablet (300 mg) by mouth once daily. Do not crush, chew, or split. 30 tablet 0    busPIRone (Buspar) 5 mg tablet Take 1 tablet (5 mg) by mouth 2 times a day.      cholecalciferol (Vitamin D-3) 125 MCG (5000 UT) capsule Take 1 capsule (125 mcg) by mouth once daily. Do not start before December 9, 2023.      cyclobenzaprine (Flexeril) 5 mg tablet Take 1 tablet (5 mg) by mouth.      docusate sodium (Colace) 100 mg capsule Take 1 capsule (100 mg) by mouth twice a day.      gabapentin (Neurontin) 100 mg capsule Take 1 capsule (100 mg) by mouth 2 times a day. 3 capsules by mouth      hydrOXYzine pamoate (Vistaril) 25 mg capsule Take 1 capsule (25 mg) by mouth 2 times a day. 60 capsule 0    magnesium oxide (Mag-Ox) 400 mg tablet 1 tablet (400 mg) once daily.      melatonin 5 mg tablet Take 1 tablet (5 mg) by mouth once daily at bedtime.      oxyCODONE-acetaminophen (Percocet) 5-325 mg tablet Take by mouth.      QUEtiapine (SeroqueL) 50 mg tablet Take 1 tablet (50 mg) by mouth once daily at bedtime. Take with 300mg dose 30 tablet 0    tiZANidine (Zanaflex) 4 mg capsule Take 1 capsule (4 mg) by mouth 3 times a day.      traZODone (Desyrel) 150 mg tablet Take 1 tablet (150 mg) by mouth once daily at bedtime. 30 tablet 0    celecoxib (CeleBREX) 200 mg capsule Take 1 capsule (200 mg) by  "mouth once daily.      DULoxetine (Cymbalta) 60 mg DR capsule Take 2 capsules (120 mg) by mouth once daily. Do not crush or chew. 60 capsule 0    ibuprofen 800 mg tablet       lidocaine 4 % patch Place 1 patch over 12 hours on the skin once daily. Remove & discard patch within 12 hours or as directed by MD. 28 patch 0    LORazepam (Ativan) 0.5 mg tablet Take 1 tablet (0.5 mg) by mouth every 6 hours if needed for anxiety. Take before MRI appointment (Patient not taking: Reported on 1/2/2025)      meloxicam (Mobic) 7.5 mg tablet Take 1 tablet (7.5 mg) by mouth once daily.      naloxone (Narcan) 4 mg/0.1 mL nasal spray Administer 1 spray (4 mg) into affected nostril(s) if needed for opioid reversal. May repeat every 2-3 minutes if needed, alternating nostrils, until medical assistance becomes available. (Patient not taking: Reported on 2/11/2025) 2 each 0    QUEtiapine (SeroqueL) 300 mg tablet Take 1 tablet (300 mg) by mouth once daily at bedtime. 30 tablet 0    sofosbuvir-velpatasvir (Epclusa) 400-100 mg tablet Take 1 tablet by mouth once daily. (Patient not taking: Reported on 1/2/2025) 28 tablet 2     No current facility-administered medications on file prior to encounter.         Last Recorded Vitals  Blood pressure 111/79, pulse 76, temperature 36.6 °C (97.8 °F), resp. rate 17, height 1.727 m (5' 8\"), weight 117 kg (258 lb), SpO2 100%.      Physical Exam  Vitals reviewed.   Constitutional:       General: She is not in acute distress.     Appearance: She is not ill-appearing.   HENT:      Mouth/Throat:      Comments: Mallampati: III  Cardiovascular:      Rate and Rhythm: Normal rate and regular rhythm.      Heart sounds: Normal heart sounds. No murmur heard.  Pulmonary:      Effort: Pulmonary effort is normal. No respiratory distress.      Breath sounds: Normal breath sounds. No wheezing.   Abdominal:      General: Bowel sounds are normal.      Palpations: Abdomen is soft.      Tenderness: There is no abdominal " tenderness.   Skin:     General: Skin is warm and dry.   Neurological:      General: No focal deficit present.      Mental Status: She is alert and oriented to person, place, and time.   Psychiatric:         Mood and Affect: Mood normal.         Behavior: Behavior normal.         Thought Content: Thought content normal.         Judgment: Judgment normal.              Assessment/Plan       EGD in OR with MAC sedation, ASA 3        Level of Sedation: Moderate Sedation  (Sedation medications to be delivered via monitored anesthesia care (MAC).     This evaluation serves as my H&P.     Outpatient medication list and allergies have been reviewed.  Pre-procedure/dmitri procedure antibiotics not needed.     Pre-procedure evaluation completed by physician.           Anthony Murrell MD

## 2025-02-24 NOTE — ADDENDUM NOTE
Encounter addended by: Samanta Key RN on: 2/24/2025 2:07 PM   Actions taken: MAR administration accepted, Flowsheet macro applied, Flowsheet accepted

## 2025-02-25 VITALS
HEART RATE: 86 BPM | RESPIRATION RATE: 12 BRPM | TEMPERATURE: 98.2 F | HEIGHT: 68 IN | BODY MASS INDEX: 39.1 KG/M2 | DIASTOLIC BLOOD PRESSURE: 51 MMHG | SYSTOLIC BLOOD PRESSURE: 93 MMHG | OXYGEN SATURATION: 90 % | WEIGHT: 258 LBS

## 2025-02-25 NOTE — ADDENDUM NOTE
Encounter addended by: Melia Garrett RN on: 2/25/2025 6:13 PM   Actions taken: Contacts section saved, Flowsheet accepted

## 2025-03-03 ENCOUNTER — TELEPHONE (OUTPATIENT)
Dept: SURGERY | Facility: CLINIC | Age: 65
End: 2025-03-03
Payer: MEDICAID

## 2025-03-03 DIAGNOSIS — Z93.3 COLOSTOMY IN PLACE (MULTI): ICD-10-CM

## 2025-03-03 NOTE — TELEPHONE ENCOUNTER
Second attempt to reach patient to schedule stoma closure with Dr. Hairston.  Message left inviting her to reach me at [phone].  Maki Daugherty RN

## 2025-03-03 NOTE — TELEPHONE ENCOUNTER
Attempted to reach patient to return her call to schedule Colostomy closure with Dr. Hairston.  Message left that I was returning her call.  She can reach me at [phone].    When she returns the call will advise that she still needs to get all the blood work ordered by GI (hepatology).  Will need pre-op INR and albumin and Urine drug screen.  Maki Daugherty RN

## 2025-03-04 DIAGNOSIS — Z93.3 COLOSTOMY PRESENT (MULTI): Primary | ICD-10-CM

## 2025-03-04 RX ORDER — METRONIDAZOLE 250 MG/1
TABLET ORAL
Qty: 3 TABLET | Refills: 0 | Status: SHIPPED | OUTPATIENT
Start: 2025-03-04

## 2025-03-04 RX ORDER — NEOMYCIN SULFATE 500 MG/1
TABLET ORAL
Qty: 6 TABLET | Refills: 0 | Status: SHIPPED | OUTPATIENT
Start: 2025-03-04

## 2025-03-04 RX ORDER — CHLORHEXIDINE GLUCONATE ORAL RINSE 1.2 MG/ML
SOLUTION DENTAL
Qty: 120 ML | Refills: 0 | Status: SHIPPED | OUTPATIENT
Start: 2025-03-04

## 2025-03-05 ENCOUNTER — TELEPHONE (OUTPATIENT)
Dept: SURGERY | Facility: CLINIC | Age: 65
End: 2025-03-05
Payer: MEDICAID

## 2025-03-05 NOTE — TELEPHONE ENCOUNTER
Ms. Bates's pre-op prescriptions were faxed to [phone].  She is currently at a [facility] [phone] ([facility]) and they will dispense her pre-op medications.  Maki Daugherty RN

## 2025-03-11 ENCOUNTER — APPOINTMENT (OUTPATIENT)
Facility: CLINIC | Age: 65
End: 2025-03-11
Payer: MEDICAID

## 2025-03-19 ENCOUNTER — CLINICAL SUPPORT (OUTPATIENT)
Dept: PREADMISSION TESTING | Facility: HOSPITAL | Age: 65
End: 2025-03-19
Payer: MEDICAID

## 2025-03-19 DIAGNOSIS — Z93.3 COLOSTOMY IN PLACE (MULTI): ICD-10-CM

## 2025-03-19 RX ORDER — QUETIAPINE FUMARATE 50 MG/1
75 TABLET, FILM COATED ORAL NIGHTLY
COMMUNITY

## 2025-03-19 RX ORDER — ONDANSETRON 4 MG/1
4 TABLET, FILM COATED ORAL EVERY 6 HOURS PRN
COMMUNITY

## 2025-03-19 RX ORDER — POLYETHYLENE GLYCOL 3350 17 G/17G
17 POWDER, FOR SOLUTION ORAL 2 TIMES DAILY
COMMUNITY

## 2025-03-19 RX ORDER — BISACODYL 5 MG
5 TABLET, DELAYED RELEASE (ENTERIC COATED) ORAL DAILY PRN
COMMUNITY

## 2025-03-19 RX ORDER — ROPINIROLE 1 MG/1
1 TABLET, FILM COATED ORAL NIGHTLY
COMMUNITY
Start: 2025-03-12

## 2025-03-19 RX ORDER — NICOTINE 7MG/24HR
1 PATCH, TRANSDERMAL 24 HOURS TRANSDERMAL
COMMUNITY

## 2025-03-19 RX ORDER — FERROUS SULFATE 325(65) MG
325 TABLET, DELAYED RELEASE (ENTERIC COATED) ORAL 2 TIMES DAILY
COMMUNITY

## 2025-03-19 RX ORDER — ADHESIVE BANDAGE
30 BANDAGE TOPICAL DAILY PRN
COMMUNITY

## 2025-03-19 RX ORDER — TRAZODONE HYDROCHLORIDE 100 MG/1
200 TABLET ORAL NIGHTLY
COMMUNITY

## 2025-03-19 RX ORDER — BUPROPION HYDROCHLORIDE 450 MG/1
450 TABLET, FILM COATED, EXTENDED RELEASE ORAL DAILY
COMMUNITY

## 2025-03-19 RX ORDER — VIT C/E/ZN/COPPR/LUTEIN/ZEAXAN 250MG-90MG
500 CAPSULE ORAL DAILY
COMMUNITY

## 2025-03-19 RX ORDER — MENTHOL 40 MG/ML
1 GEL TOPICAL AS NEEDED
COMMUNITY

## 2025-03-20 DIAGNOSIS — Z93.3 COLOSTOMY IN PLACE (MULTI): Primary | ICD-10-CM

## 2025-03-20 RX ORDER — CEFAZOLIN SODIUM 2 G/100ML
2 INJECTION, SOLUTION INTRAVENOUS ONCE
OUTPATIENT
Start: 2025-03-20 | End: 2025-03-20

## 2025-03-20 RX ORDER — HEPARIN SODIUM 5000 [USP'U]/ML
5000 INJECTION, SOLUTION INTRAVENOUS; SUBCUTANEOUS ONCE
OUTPATIENT
Start: 2025-03-20 | End: 2025-03-20

## 2025-03-20 RX ORDER — METRONIDAZOLE 500 MG/100ML
500 INJECTION, SOLUTION INTRAVENOUS ONCE
OUTPATIENT
Start: 2025-03-20 | End: 2025-03-20

## 2025-03-26 ENCOUNTER — APPOINTMENT (OUTPATIENT)
Dept: LAB | Facility: HOSPITAL | Age: 65
End: 2025-03-26
Payer: MEDICAID

## 2025-03-26 ENCOUNTER — PRE-ADMISSION TESTING (OUTPATIENT)
Dept: PREADMISSION TESTING | Facility: HOSPITAL | Age: 65
End: 2025-03-26
Payer: MEDICAID

## 2025-03-26 VITALS
SYSTOLIC BLOOD PRESSURE: 124 MMHG | WEIGHT: 257.94 LBS | RESPIRATION RATE: 18 BRPM | HEIGHT: 68 IN | HEART RATE: 80 BPM | BODY MASS INDEX: 39.09 KG/M2 | OXYGEN SATURATION: 97 % | TEMPERATURE: 97.9 F | DIASTOLIC BLOOD PRESSURE: 74 MMHG

## 2025-03-26 DIAGNOSIS — Z01.818 PREOP TESTING: ICD-10-CM

## 2025-03-26 DIAGNOSIS — Z01.818 ENCOUNTER FOR OTHER PREPROCEDURAL EXAMINATION: Primary | ICD-10-CM

## 2025-03-26 DIAGNOSIS — F11.10 OPIOID ABUSE: Primary | ICD-10-CM

## 2025-03-26 DIAGNOSIS — F11.10 OPIOID ABUSE, UNCOMPLICATED (MULTI): ICD-10-CM

## 2025-03-26 LAB
ALBUMIN SERPL BCP-MCNC: 4.4 G/DL (ref 3.4–5)
ALP SERPL-CCNC: 97 U/L (ref 33–136)
ALT SERPL W P-5'-P-CCNC: 12 U/L (ref 7–45)
AMPHETAMINES UR QL SCN: ABNORMAL
ANION GAP SERPL CALC-SCNC: 10 MMOL/L (ref 10–20)
AST SERPL W P-5'-P-CCNC: 12 U/L (ref 9–39)
BARBITURATES UR QL SCN: ABNORMAL
BASOPHILS # BLD AUTO: 0.06 X10*3/UL (ref 0–0.1)
BASOPHILS NFR BLD AUTO: 0.5 %
BENZODIAZ UR QL SCN: ABNORMAL
BILIRUB SERPL-MCNC: 0.3 MG/DL (ref 0–1.2)
BUN SERPL-MCNC: 14 MG/DL (ref 6–23)
BZE UR QL SCN: ABNORMAL
CALCIUM SERPL-MCNC: 10.1 MG/DL (ref 8.6–10.3)
CANNABINOIDS UR QL SCN: ABNORMAL
CHLORIDE SERPL-SCNC: 101 MMOL/L (ref 98–107)
CO2 SERPL-SCNC: 31 MMOL/L (ref 21–32)
CREAT SERPL-MCNC: 1.08 MG/DL (ref 0.5–1.05)
EGFRCR SERPLBLD CKD-EPI 2021: 58 ML/MIN/1.73M*2
EOSINOPHIL # BLD AUTO: 0.51 X10*3/UL (ref 0–0.7)
EOSINOPHIL NFR BLD AUTO: 4.3 %
ERYTHROCYTE [DISTWIDTH] IN BLOOD BY AUTOMATED COUNT: 14.2 % (ref 11.5–14.5)
FENTANYL+NORFENTANYL UR QL SCN: ABNORMAL
GLUCOSE SERPL-MCNC: 69 MG/DL (ref 74–99)
HCT VFR BLD AUTO: 38.1 % (ref 36–46)
HGB BLD-MCNC: 12.3 G/DL (ref 12–16)
IMM GRANULOCYTES # BLD AUTO: 0.06 X10*3/UL (ref 0–0.7)
IMM GRANULOCYTES NFR BLD AUTO: 0.5 % (ref 0–0.9)
LYMPHOCYTES # BLD AUTO: 2.9 X10*3/UL (ref 1.2–4.8)
LYMPHOCYTES NFR BLD AUTO: 24.4 %
MCH RBC QN AUTO: 29.6 PG (ref 26–34)
MCHC RBC AUTO-ENTMCNC: 32.3 G/DL (ref 32–36)
MCV RBC AUTO: 92 FL (ref 80–100)
METHADONE UR QL SCN: ABNORMAL
MONOCYTES # BLD AUTO: 0.96 X10*3/UL (ref 0.1–1)
MONOCYTES NFR BLD AUTO: 8.1 %
NEUTROPHILS # BLD AUTO: 7.41 X10*3/UL (ref 1.2–7.7)
NEUTROPHILS NFR BLD AUTO: 62.2 %
NRBC BLD-RTO: 0 /100 WBCS (ref 0–0)
OPIATES UR QL SCN: ABNORMAL
OXYCODONE+OXYMORPHONE UR QL SCN: ABNORMAL
PCP UR QL SCN: ABNORMAL
PLATELET # BLD AUTO: 299 X10*3/UL (ref 150–450)
POTASSIUM SERPL-SCNC: 5.1 MMOL/L (ref 3.5–5.3)
PROT SERPL-MCNC: 7.4 G/DL (ref 6.4–8.2)
RBC # BLD AUTO: 4.15 X10*6/UL (ref 4–5.2)
SODIUM SERPL-SCNC: 137 MMOL/L (ref 136–145)
WBC # BLD AUTO: 11.9 X10*3/UL (ref 4.4–11.3)

## 2025-03-26 PROCEDURE — 36415 COLL VENOUS BLD VENIPUNCTURE: CPT

## 2025-03-26 PROCEDURE — 80361 OPIATES 1 OR MORE: CPT

## 2025-03-26 PROCEDURE — 80365 DRUG SCREENING OXYCODONE: CPT

## 2025-03-26 PROCEDURE — 85025 COMPLETE CBC W/AUTO DIFF WBC: CPT

## 2025-03-26 PROCEDURE — 99204 OFFICE O/P NEW MOD 45 MIN: CPT | Performed by: NURSE PRACTITIONER

## 2025-03-26 PROCEDURE — 80053 COMPREHEN METABOLIC PANEL: CPT

## 2025-03-26 PROCEDURE — 80307 DRUG TEST PRSMV CHEM ANLYZR: CPT

## 2025-03-26 RX ORDER — LORATADINE 10 MG/1
10 TABLET ORAL DAILY PRN
COMMUNITY

## 2025-03-26 RX ORDER — DOCUSATE SODIUM 100 MG/1
100 CAPSULE, LIQUID FILLED ORAL 2 TIMES DAILY
COMMUNITY

## 2025-03-26 NOTE — H&P (VIEW-ONLY)
"[facility]/PAT Evaluation       Name: Azul Bates (Azul Bates \"Stephenie\")  /Age: 1960/65 y.o.     In-Person         Date of Consult: 3/26/2025    Referring Provider:  Dr. Vincent Foss    Date, Surgery, and Length:  2025; Closure of Loop Colostomy; 120 minutes    Patient presents to [facility] for perioperative risk assessment prior to scheduled surgery.  She has a fairly significant psychiatric and substance abuse history.  She suffered a fall in 2022 and had a fracture of her coccyx. This was complicated by a sacral decubitus wound that became infected. She was admitted at [facility] in [place] from 10/13/22-22 with gluteal/sacral necrotizing fasciitis and coccygeal osteomyelitis. She underwent debridement by General Surgery 10/14/22, 10/18/22 and 10/27/22. She underwent laparoscopic diverting loop colostomy 10/19/22 to allow the sacral wound to heal. A rotational flap was performed by Plastic Surgery 22. Her wound has completely healed and she would like to proceed with closure of loop colostomy.       This note was created in part upon personal review of patient's medical records.        Pt denies any past history of anesthetic complications such as PONV, awareness, prolonged sedation, dental damage, aspiration, cardiac arrest, difficult intubation, difficult I.V. access or unexpected hospital admissions. No history of malignant hyperthermia and or pseudocholinesterase deficiency.    History of blood transfusions.    The patient  IS NOT a [employer] and will accept blood and blood products if medically indicated.     Type and screen not sent.      Past Medical History:   Diagnosis Date    Altered mental status     Anemia     previously on iron - WNL on 25 H/H 12.2/38.1    Anxiety     Arthritis     Asthma     Atrophy of muscle of multiple sites     Bipolar disorder     Chronic pain syndrome     Chronic viral hepatitis C (Multi)     s/p " Epclusa 12 week course (9/24/24-12/23/24)    Cirrhosis (Multi)     follows with Dr. Sanderson - EGD 2/24/25 - WNL no varices CT 7/5/24: mild hepatomegaly and findings suggestive of hepatic cirrhosis.  LFTs WNL 8/2024    Colostomy in place (Multi)     COPD (chronic obstructive pulmonary disease) (Multi)     Depression     Elevated serum creatinine     2/24/25 creat 1.08, GFR 58    Hepatitis C     HL (hearing loss)     left ear    Hypertension     Opioid abuse     fentanyl    Osteoporosis     Peripheral neuropathy     Psychosis (Multi)     admitted 9/2023    Sacral decubitus ulcer 2022    after coccyx fx, gluteal/sacral necrotizing faciitis and coccygeal osteomyelitis - s/p lap diverting loop colostomy to let ulcer heal, now completely healed per notes    Sciatica     Suicide attempt (Multi)     multiple    Tobacco use        Past Surgical History:   Procedure Laterality Date    ABCESS DRAINAGE      sacral wound    CARPAL TUNNEL RELEASE Bilateral     COLOSTOMY  10/2022    OVARIAN CYST REMOVAL      TONSILLECTOMY      TOTAL HIP ARTHROPLASTY Left 02/12/2024    TUBAL LIGATION         Patient  has no history on file for sexual activity.    Family History   Problem Relation Name Age of Onset    Leukemia Brother          Half-brother    Colon polyps Mother Claudia        No Known Allergies    Current Outpatient Medications   Medication Instructions    acetaminophen (TYLENOL) 650 mg, 2 times daily    bisacodyl (DULCOLAX) 5 mg, Daily PRN    buPROPion XL (FORFIVO XL) 450 mg, Daily    buPROPion XL (WELLBUTRIN XL) 300 mg, oral, Daily, Do not crush, chew, or split.    busPIRone (BUSPAR) 5 mg, 2 times daily    celecoxib (CELEBREX) 200 mg, oral, Daily    chlorhexidine (Peridex) 0.12 % solution Rinse mouth with 15 ml after toothbrushing the night before surgery and on the morning of surgery.  Expectorate after rinsing.  Do not swallow.    cholecalciferol (VITAMIN D-3) 125 mcg, oral, Daily    cyanocobalamin (VITAMIN B-12) 500 mcg, Daily     cyclobenzaprine (FLEXERIL) 5 mg, Daily    docusate sodium (COLACE) 100 mg, oral, 2 times daily    DULoxetine (CYMBALTA) 120 mg, oral, Daily, Do not crush or chew.    ferrous sulfate 325 mg, 2 times daily    gabapentin (NEURONTIN) 100 mg, 4 times daily    hydrOXYzine pamoate (VISTARIL) 25 mg, oral, 2 times daily    ibuprofen 800 mg tablet Take 1 tablet (800 mg) by mouth every 8 hours if needed.    lidocaine 4 % patch 1 patch, transdermal, Daily, Remove & discard patch within 12 hours or as directed by MD.    loratadine (CLARITIN) 10 mg, oral, Daily PRN    magnesium hydroxide (Milk of Magnesia) 400 mg/5 mL suspension 30 mL, Daily PRN    magnesium oxide (MAG-OX) 400 mg, Daily    melatonin 5 mg, Nightly    meloxicam (MOBIC) 7.5 mg, Daily    menthol (Biofreeze, menthol,) 4 % gel gel 1 Application, As needed    metroNIDAZOLE (Flagyl) 250 mg tablet Take one tablet at 6p, 7p, and 11p the night before surgery.    naloxone (NARCAN) 4 mg, nasal, As needed, May repeat every 2-3 minutes if needed, alternating nostrils, until medical assistance becomes available.    neomycin (Mycifradin) 500 mg tablet Take two tabs by mouth at 6p, 7pm, and 11p the night before surgery.    nicotine (Nicoderm CQ) 7 mg/24 hr patch 1 patch, transdermal, Every 24 hours PRN    ondansetron (ZOFRAN) 4 mg, Every 6 hours PRN    oxyCODONE-acetaminophen (Percocet) 5-325 mg tablet 1 tablet, Every 4 hours PRN    polyethylene glycol (GLYCOLAX, MIRALAX) 17 g, 2 times daily    QUEtiapine (SEROQUEL) 300 mg, oral, Nightly    QUEtiapine (SEROQUEL) 50 mg, oral, Nightly, Take with 300mg dose    QUEtiapine (SEROQUEL) 75 mg, Nightly    rOPINIRole (REQUIP) 1 mg, Nightly    sodium chloride (Ocean) 0.65 % nasal spray 2 sprays, As needed    tiZANidine (ZANAFLEX) 4 mg, 3 times daily PRN    traZODone (DESYREL) 150 mg, oral, Nightly    traZODone (DESYREL) 200 mg, Nightly        Social History     Substance and Sexual Activity   Alcohol Use Not Currently     Social History  "    Substance and Sexual Activity   Drug Use Not Currently    Types: Fentanyl, Cocaine     Social History     Tobacco Use   Smoking Status Every Day    Current packs/day: 0.50    Types: Cigarettes   Smokeless Tobacco Never         PAT ROS:   Constitutional:   neg    Neuro/Psych:   neg    Eyes:   neg     use of corrective lenses  Ears:   neg    Nose:   neg    Mouth:    Poor dentalation   neg    Throat:   neg    Neck:   neg    Cardio:   neg    Respiratory:   neg    Endocrine:   neg    GI:   neg     no nausea  :   neg    Musculoskeletal:    Uses wheelchair daily   arthralgias   myalgias  Hematologic:    history of blood transfusion  Skin:  neg        Physical Exam  Vitals reviewed. Physical exam within normal limits.        PAT AIRWAY:   Airway:     Mallampati::  II    Neck ROM::  Full  normal        Visit Vitals  /74   Pulse 80   Temp 36.6 °C (97.9 °F) (Temporal)   Resp 18   Ht 1.721 m (5' 7.75\")   Wt 117 kg (257 lb 15 oz)   SpO2 97%   BMI 39.51 kg/m²   Smoking Status Every Day   BSA 2.37 m²       Patient is a 65 year old female scheduled for Closure of Loop Colostomy with Dr. Vincent Franco on 4/9/2025.    Patient is at acceptable risk to proceed with planned surgical procedure. Further cardiac risk stratification deferred at this time.This patient is HIGH risk candidate undergoing MODERATE risk procedure, patient is medically optimized for surgery.    Plan      Neuro:  No neurologic diagnosis, however, the patient is at increased risk for perioperative delirium secondary to  age, depression, polypharmacy  Patient is at increased risk for perioperative CVA secondary to  HTN, increased age    History of opioid abuse- still on oxycodone Q4 PRN   Significant psychiatric history with previous suicide attempts       Cardiovascular:      RCRI: 0 Risk of Mace: 3.9% VTE      Patient denies any chest pain, tightness, heaviness, pressure, radiating pain, palpitations, irregular heartbeats, lightheadedness, cough, " congestion, shortness of breath, PERRIN, PND, near syncope, weight loss or gain.    Fair functional capacity    Encounter Date: 02/24/25   ECG 12 lead   Result Value    Ventricular Rate 77    Atrial Rate 77    MD Interval 170    QRS Duration 118    QT Interval 403    QTC Calculation(Bazett) 457    P Axis 10    R Axis 16    T Axis 54    QRS Count 12    Q Onset 249    T Offset 451    QTC Fredericia 437    Narrative    Sinus rhythm  Nonspecific intraventricular conduction delay  Low voltage, precordial leads    Confirmed by Andrew Godoy ([id number]) on 2/24/2025 1:41:48 PM         Pulmonary:  No pulmonary diagnosis, however patient is at increased risk of perioperative complications secondary to  age > 60, Tobacco abuse, obesity, heart failure  Stop Bang score is 3 placing patient at intermediate risk for SEA  ARISCAT: <26 points, 1.6% risk of in-hospital postoperative pulmonary complication  PRODIGY: High risk for opioid induced respiratory depression  Pumonary toilet education discussed, patient also provided deep breathing exercises and incentive spirometry educational handout      Patient has history of nicotine dependency. Smoking cessation education was provided to the patient.Cessation encouraged. - Physiological and physical aspects of tobacco addiction as well as strategies for quitting were discussed. - Counseling was given focusing on the harmful effects of this addiction especially given the patient's medical condition(s) which will be worsened because of the chemicals in tobacco      Renal/endo:  Recommendations to avoid nephrotoxic drugs and carefully monitor fluid status to maintain euvolemia. Use dose adjusted medications as needed for the underlying level of renal function.        GI/:  urine tox   +opiate on tox screen patient currently taking oxycodone      Heme:  Patient instructed to ambulate as soon as possible postoperatively to decrease thromboembolic risk.    Initiate mechanical DVT prophylaxis  as soon as possible and initiate chemical prophylaxis when deemed safe from a bleeding standpoint post surgery.        Caprini=5; high; 1.8% VTE        Risk assessment complete.  Patient is scheduled for a INTERMEDIATE surgical risk procedure. IS considered medically optimized for the planned procedure.        Labs/testing obtained in PAT on 3/26/2025: CBC,CMP urine tox  Lab Results   Component Value Date    WBC 11.9 (H) 03/26/2025    HGB 12.3 03/26/2025    HCT 38.1 03/26/2025    MCV 92 03/26/2025     03/26/2025     Lab Results   Component Value Date    GLUCOSE 69 (L) 03/26/2025    CALCIUM 10.1 03/26/2025     03/26/2025    K 5.1 03/26/2025    CO2 31 03/26/2025     03/26/2025    BUN 14 03/26/2025    CREATININE 1.08 (H) 03/26/2025     Lab Results   Component Value Date    ALT 12 03/26/2025    AST 12 03/26/2025    ALKPHOS 97 03/26/2025    BILITOT 0.3 03/26/2025           Follow up/communication: none      Preoperative medication instructions were provided and reviewed with the patient.  Any additional testing or evaluation was explained to the patient.  Nothing by mouth instructions were discussed and patient's questions were answered prior to conclusion to this encounter.  Patient verbalized understanding of preoperative instructions given in preadmission testing; discharge instructions available in EMR.    This note was dictated with speech recognition.  Minor errors may have been detected during use of speech recognition.        NP Attestation: I was present with the Nurse Practitioner student who participated in the documentation of this note. I have personally seen and re-examined the patient and performed the medical decision-making components (assessment and plan of care). I have reviewed the Nurse Practitioner student documentation and verified the findings in the note as written with additions or exceptions as stated in the body of this note.    Toma Valadez, CNP

## 2025-03-26 NOTE — PREPROCEDURE INSTRUCTIONS
Medication List            Accurate as of March 26, 2025 11:08 AM. Always use your most recent med list.                acetaminophen 325 mg tablet  Commonly known as: Tylenol  Medication Adjustments for Surgery: Take/Use as prescribed     Biofreeze (menthol) 4 % gel gel  Generic drug: menthol  Medication Adjustments for Surgery: Do Not take on the morning of surgery     bisacodyl 5 mg EC tablet  Commonly known as: Dulcolax  Medication Adjustments for Surgery: Do Not take on the morning of surgery     * buPROPion  mg 24 hr tablet  Commonly known as: Forfivo XL  Medication Adjustments for Surgery: Take/Use as prescribed     * buPROPion  mg 24 hr tablet  Commonly known as: Wellbutrin XL  Take 1 tablet (300 mg) by mouth once daily. Do not crush, chew, or split.  Medication Adjustments for Surgery: Take/Use as prescribed     busPIRone 5 mg tablet  Commonly known as: Buspar  Medication Adjustments for Surgery: Take/Use as prescribed     celecoxib 200 mg capsule  Commonly known as: CeleBREX  Take 1 capsule (200 mg) by mouth once daily.  Additional Medication Adjustments for Surgery: Take last dose 7 days before surgery     chlorhexidine 0.12 % solution  Commonly known as: Peridex  Rinse mouth with 15 ml after toothbrushing the night before surgery and on the morning of surgery.  Expectorate after rinsing.  Do not swallow.  Medication Adjustments for Surgery: Take/Use as prescribed     cholecalciferol 125 mcg (5,000 units) capsule  Commonly known as: Vitamin D-3  Take 1 capsule (125 mcg) by mouth once daily. Do not start before December 9, 2023.  Additional Medication Adjustments for Surgery: Take last dose 7 days before surgery     cyanocobalamin 500 mcg tablet  Commonly known as: Vitamin B-12  Additional Medication Adjustments for Surgery: Take last dose 7 days before surgery     cyclobenzaprine 5 mg tablet  Commonly known as: Flexeril  Medication Adjustments for Surgery: Take/Use as prescribed      DULoxetine 60 mg DR capsule  Commonly known as: Cymbalta  Take 2 capsules (120 mg) by mouth once daily. Do not crush or chew.  Medication Adjustments for Surgery: Take/Use as prescribed     ferrous sulfate 325 (65 Fe) mg EC tablet  Medication Adjustments for Surgery: Do Not take on the morning of surgery     gabapentin 400 mg capsule  Commonly known as: Neurontin  Medication Adjustments for Surgery: Take/Use as prescribed     hydrOXYzine pamoate 25 mg capsule  Commonly known as: Vistaril  Take 1 capsule (25 mg) by mouth 2 times a day.  Medication Adjustments for Surgery: Take/Use as prescribed     ibuprofen 800 mg tablet  Additional Medication Adjustments for Surgery: Take last dose 7 days before surgery     lidocaine 4 % patch  Place 1 patch over 12 hours on the skin once daily. Remove & discard patch within 12 hours or as directed by MD.  Medication Adjustments for Surgery: Do Not take on the morning of surgery     magnesium hydroxide 400 mg/5 mL suspension  Commonly known as: Milk of Magnesia  Medication Adjustments for Surgery: Do Not take on the morning of surgery     magnesium oxide 400 mg tablet  Commonly known as: Mag-Ox  Additional Medication Adjustments for Surgery: Take last dose 7 days before surgery     melatonin 5 mg tablet  Medication Adjustments for Surgery: Take/Use as prescribed     meloxicam 7.5 mg tablet  Commonly known as: Mobic  Additional Medication Adjustments for Surgery: Take last dose 7 days before surgery     metroNIDAZOLE 250 mg tablet  Commonly known as: Flagyl  Take one tablet at 6p, 7p, and 11p the night before surgery.  Medication Adjustments for Surgery: Take/Use as prescribed     naloxone 4 mg/0.1 mL nasal spray  Commonly known as: Narcan  Administer 1 spray (4 mg) into affected nostril(s) if needed for opioid reversal. May repeat every 2-3 minutes if needed, alternating nostrils, until medical assistance becomes available.  Medication Adjustments for Surgery: Take/Use as  prescribed     neomycin 500 mg tablet  Commonly known as: Mycifradin  Take two tabs by mouth at 6p, 7pm, and 11p the night before surgery.  Medication Adjustments for Surgery: Take/Use as prescribed     nicotine 7 mg/24 hr patch  Commonly known as: Nicoderm CQ  Medication Adjustments for Surgery: Do Not take on the morning of surgery     ondansetron 4 mg tablet  Commonly known as: Zofran  Medication Adjustments for Surgery: Take/Use as prescribed     oxyCODONE-acetaminophen 5-325 mg tablet  Commonly known as: Percocet  Medication Adjustments for Surgery: Take/Use as prescribed     polyethylene glycol 17 gram packet  Commonly known as: Glycolax, Miralax  Medication Adjustments for Surgery: Do Not take on the morning of surgery     * SeroqueL 50 mg tablet  Generic drug: QUEtiapine  Medication Adjustments for Surgery: Take/Use as prescribed     * QUEtiapine 300 mg tablet  Commonly known as: SeroqueL  Take 1 tablet (300 mg) by mouth once daily at bedtime.  Medication Adjustments for Surgery: Take/Use as prescribed     * QUEtiapine 50 mg tablet  Commonly known as: SeroqueL  Take 1 tablet (50 mg) by mouth once daily at bedtime. Take with 300mg dose  Medication Adjustments for Surgery: Take/Use as prescribed     rOPINIRole 1 mg tablet  Commonly known as: Requip  Medication Adjustments for Surgery: Take/Use as prescribed     sodium chloride 0.65 % nasal spray  Commonly known as: Ocean  Medication Adjustments for Surgery: Do Not take on the morning of surgery     tiZANidine 4 mg capsule  Commonly known as: Zanaflex  Medication Adjustments for Surgery: Take/Use as prescribed     * traZODone 100 mg tablet  Commonly known as: Desyrel  Medication Adjustments for Surgery: Take/Use as prescribed     * traZODone 150 mg tablet  Commonly known as: Desyrel  Take 1 tablet (150 mg) by mouth once daily at bedtime.  Medication Adjustments for Surgery: Take/Use as prescribed           * This list has 7 medication(s) that are the same as  other medications prescribed for you. Read the directions carefully, and ask your doctor or other care provider to review them with you.                      Preoperative Deep Breathing Exercises  Why it is important to do deep breathing exercises before my surgery?  Deep breathing exercises strengthen your breathing muscles.  This helps you to recover after your surgery and decreases the chance of breathing complications.  How are the deep breathing exercises done?  Sit straight with your back supported.  Breathe in deeply and slowly through your nose. Your lower rib cage should expand and your abdomen may move forward.  Hold that breath for 3 to 5 seconds.  Breathe out through pursed lips, slowly and completely.  Rest and repeat 10 times every hour while awake.  Rest longer if you become dizzy or lightheaded.        CONTACT SURGEON'S OFFICE IF YOU DEVELOP:  * Fever = 100.4 F   * New respiratory symptoms (e.g. cough, shortness of breath, respiratory distress, sore throat)  * Recent loss of taste or smell  *Flu like symptoms such as headache, fatigue or gastrointestinal symptoms  * You develop any open sores, shingles, burning or painful urination   AND/OR:  * You no longer wish to have the surgery.  * Any other personal circumstances change that may lead to the need to cancel or defer this surgery.  *You were admitted to any hospital within one week of your planned procedure.    SMOKING:  *Quitting smoking can make a huge difference to your health and recovery from surgery.    *If you need help with quitting, call 7-575-QUIT-NOW.    THE DAY OF SURGERY:  *Do not eat any food after midnight the night before your surgery.   *YOU MUST drink 14 OUNCES of clear liquids TWO hours before your instructed ARRIVAL TIME to the hospital. This includes water, black tea/coffee (no milk or cream), apple juice, clear broth and electrolyte drinks (Gatorade).  Please avoid clear liquids that are red in color.   *You may chew  gum/mints up to TWO hours before your surgery/procedure.    SURGICAL TIME:  *You will be contacted between 2 p.m. and 6 p.m. the business day before your surgery with your arrival time.  *If you haven't received a call by 6pm, call [phone].  *Scheduled surgery times may change and you will be notified if this occurs-check your personal voicemail for any updates.    ON THE MORNING OF SURGERY:  *Wear comfortable, loose fitting clothing.   *Do not use moisturizers, creams, lotions or perfume.  *All jewelry and valuables should be left at home.  *Prosthetic devices such as contact lenses, hearing aids, dentures, eyelash extensions, hairpins and body piercing must be removed before surgery.    BRING WITH YOU:  *Photo ID and insurance card  *Current list of medications and allergies  *Pacemaker/Defibrillator/Heart stent cards  *CPAP machine and mask  *Slings/splints/crutches  *Copy of your complete Advanced Directive/DHPOA-if applicable  *Neurostimulator implant remote    PARKING AND ARRIVAL:  *Check in at the Main Entrance desk and let them know you are here for surgery.  *You will be directed to the 2nd floor surgical waiting area.    IF YOU ARE HAVING OUTPATIENT/SAME DAY SURGERY:  *A responsible adult MUST accompany you at the time of discharge and stay with you for 24 hours after your surgery.  *You may NOT drive yourself home after surgery.  *You may use a taxi or ride sharing service ([employer], Uber) to return home ONLY if you are accompanied by a friend or family member.  *Instructions for resuming your medications will be provided by your surgeon.

## 2025-03-26 NOTE — CPM/PAT H&P
"[facility]/PAT Evaluation       Name: Azul Bates (Azul Bates \"Stephenie\")  /Age: 1960/65 y.o.     In-Person         Date of Consult: 3/26/2025    Referring Provider:  Dr. Vincent Foss    Date, Surgery, and Length:  2025; Closure of Loop Colostomy; 120 minutes    Patient presents to [facility] for perioperative risk assessment prior to scheduled surgery.  She has a fairly significant psychiatric and substance abuse history.  She suffered a fall in 2022 and had a fracture of her coccyx. This was complicated by a sacral decubitus wound that became infected. She was admitted at [facility] in [place] from 10/13/22-22 with gluteal/sacral necrotizing fasciitis and coccygeal osteomyelitis. She underwent debridement by General Surgery 10/14/22, 10/18/22 and 10/27/22. She underwent laparoscopic diverting loop colostomy 10/19/22 to allow the sacral wound to heal. A rotational flap was performed by Plastic Surgery 22. Her wound has completely healed and she would like to proceed with closure of loop colostomy.       This note was created in part upon personal review of patient's medical records.        Pt denies any past history of anesthetic complications such as PONV, awareness, prolonged sedation, dental damage, aspiration, cardiac arrest, difficult intubation, difficult I.V. access or unexpected hospital admissions. No history of malignant hyperthermia and or pseudocholinesterase deficiency.    History of blood transfusions.    The patient  IS NOT a [employer]ondoism and will accept blood and blood products if medically indicated.     Type and screen not sent.      Past Medical History:   Diagnosis Date    Altered mental status     Anemia     previously on iron - WNL on 25 H/H 12.2/38.1    Anxiety     Arthritis     Asthma     Atrophy of muscle of multiple sites     Bipolar disorder     Chronic pain syndrome     Chronic viral hepatitis C (Multi)     s/p " Epclusa 12 week course (9/24/24-12/23/24)    Cirrhosis (Multi)     follows with Dr. Sanderson - EGD 2/24/25 - WNL no varices CT 7/5/24: mild hepatomegaly and findings suggestive of hepatic cirrhosis.  LFTs WNL 8/2024    Colostomy in place (Multi)     COPD (chronic obstructive pulmonary disease) (Multi)     Depression     Elevated serum creatinine     2/24/25 creat 1.08, GFR 58    Hepatitis C     HL (hearing loss)     left ear    Hypertension     Opioid abuse     fentanyl    Osteoporosis     Peripheral neuropathy     Psychosis (Multi)     admitted 9/2023    Sacral decubitus ulcer 2022    after coccyx fx, gluteal/sacral necrotizing faciitis and coccygeal osteomyelitis - s/p lap diverting loop colostomy to let ulcer heal, now completely healed per notes    Sciatica     Suicide attempt (Multi)     multiple    Tobacco use        Past Surgical History:   Procedure Laterality Date    ABCESS DRAINAGE      sacral wound    CARPAL TUNNEL RELEASE Bilateral     COLOSTOMY  10/2022    OVARIAN CYST REMOVAL      TONSILLECTOMY      TOTAL HIP ARTHROPLASTY Left 02/12/2024    TUBAL LIGATION         Patient  has no history on file for sexual activity.    Family History   Problem Relation Name Age of Onset    Leukemia Brother          Half-brother    Colon polyps Mother Claudia        No Known Allergies    Current Outpatient Medications   Medication Instructions    acetaminophen (TYLENOL) 650 mg, 2 times daily    bisacodyl (DULCOLAX) 5 mg, Daily PRN    buPROPion XL (FORFIVO XL) 450 mg, Daily    buPROPion XL (WELLBUTRIN XL) 300 mg, oral, Daily, Do not crush, chew, or split.    busPIRone (BUSPAR) 5 mg, 2 times daily    celecoxib (CELEBREX) 200 mg, oral, Daily    chlorhexidine (Peridex) 0.12 % solution Rinse mouth with 15 ml after toothbrushing the night before surgery and on the morning of surgery.  Expectorate after rinsing.  Do not swallow.    cholecalciferol (VITAMIN D-3) 125 mcg, oral, Daily    cyanocobalamin (VITAMIN B-12) 500 mcg, Daily     cyclobenzaprine (FLEXERIL) 5 mg, Daily    docusate sodium (COLACE) 100 mg, oral, 2 times daily    DULoxetine (CYMBALTA) 120 mg, oral, Daily, Do not crush or chew.    ferrous sulfate 325 mg, 2 times daily    gabapentin (NEURONTIN) 100 mg, 4 times daily    hydrOXYzine pamoate (VISTARIL) 25 mg, oral, 2 times daily    ibuprofen 800 mg tablet Take 1 tablet (800 mg) by mouth every 8 hours if needed.    lidocaine 4 % patch 1 patch, transdermal, Daily, Remove & discard patch within 12 hours or as directed by MD.    loratadine (CLARITIN) 10 mg, oral, Daily PRN    magnesium hydroxide (Milk of Magnesia) 400 mg/5 mL suspension 30 mL, Daily PRN    magnesium oxide (MAG-OX) 400 mg, Daily    melatonin 5 mg, Nightly    meloxicam (MOBIC) 7.5 mg, Daily    menthol (Biofreeze, menthol,) 4 % gel gel 1 Application, As needed    metroNIDAZOLE (Flagyl) 250 mg tablet Take one tablet at 6p, 7p, and 11p the night before surgery.    naloxone (NARCAN) 4 mg, nasal, As needed, May repeat every 2-3 minutes if needed, alternating nostrils, until medical assistance becomes available.    neomycin (Mycifradin) 500 mg tablet Take two tabs by mouth at 6p, 7pm, and 11p the night before surgery.    nicotine (Nicoderm CQ) 7 mg/24 hr patch 1 patch, transdermal, Every 24 hours PRN    ondansetron (ZOFRAN) 4 mg, Every 6 hours PRN    oxyCODONE-acetaminophen (Percocet) 5-325 mg tablet 1 tablet, Every 4 hours PRN    polyethylene glycol (GLYCOLAX, MIRALAX) 17 g, 2 times daily    QUEtiapine (SEROQUEL) 300 mg, oral, Nightly    QUEtiapine (SEROQUEL) 50 mg, oral, Nightly, Take with 300mg dose    QUEtiapine (SEROQUEL) 75 mg, Nightly    rOPINIRole (REQUIP) 1 mg, Nightly    sodium chloride (Ocean) 0.65 % nasal spray 2 sprays, As needed    tiZANidine (ZANAFLEX) 4 mg, 3 times daily PRN    traZODone (DESYREL) 150 mg, oral, Nightly    traZODone (DESYREL) 200 mg, Nightly        Social History     Substance and Sexual Activity   Alcohol Use Not Currently     Social History  "    Substance and Sexual Activity   Drug Use Not Currently    Types: Fentanyl, Cocaine     Social History     Tobacco Use   Smoking Status Every Day    Current packs/day: 0.50    Types: Cigarettes   Smokeless Tobacco Never         PAT ROS:   Constitutional:   neg    Neuro/Psych:   neg    Eyes:   neg     use of corrective lenses  Ears:   neg    Nose:   neg    Mouth:    Poor dentalation   neg    Throat:   neg    Neck:   neg    Cardio:   neg    Respiratory:   neg    Endocrine:   neg    GI:   neg     no nausea  :   neg    Musculoskeletal:    Uses wheelchair daily   arthralgias   myalgias  Hematologic:    history of blood transfusion  Skin:  neg        Physical Exam  Vitals reviewed. Physical exam within normal limits.        PAT AIRWAY:   Airway:     Mallampati::  II    Neck ROM::  Full  normal        Visit Vitals  /74   Pulse 80   Temp 36.6 °C (97.9 °F) (Temporal)   Resp 18   Ht 1.721 m (5' 7.75\")   Wt 117 kg (257 lb 15 oz)   SpO2 97%   BMI 39.51 kg/m²   Smoking Status Every Day   BSA 2.37 m²       Patient is a 65 year old female scheduled for Closure of Loop Colostomy with Dr. Vincent Franco on 4/9/2025.    Patient is at acceptable risk to proceed with planned surgical procedure. Further cardiac risk stratification deferred at this time.This patient is HIGH risk candidate undergoing MODERATE risk procedure, patient is medically optimized for surgery.    Plan      Neuro:  No neurologic diagnosis, however, the patient is at increased risk for perioperative delirium secondary to  age, depression, polypharmacy  Patient is at increased risk for perioperative CVA secondary to  HTN, increased age    History of opioid abuse- still on oxycodone Q4 PRN   Significant psychiatric history with previous suicide attempts       Cardiovascular:      RCRI: 0 Risk of Mace: 3.9% VTE      Patient denies any chest pain, tightness, heaviness, pressure, radiating pain, palpitations, irregular heartbeats, lightheadedness, cough, " congestion, shortness of breath, PERRIN, PND, near syncope, weight loss or gain.    Fair functional capacity    Encounter Date: 02/24/25   ECG 12 lead   Result Value    Ventricular Rate 77    Atrial Rate 77    WY Interval 170    QRS Duration 118    QT Interval 403    QTC Calculation(Bazett) 457    P Axis 10    R Axis 16    T Axis 54    QRS Count 12    Q Onset 249    T Offset 451    QTC Fredericia 437    Narrative    Sinus rhythm  Nonspecific intraventricular conduction delay  Low voltage, precordial leads    Confirmed by Andrew Godoy ([id number]) on 2/24/2025 1:41:48 PM         Pulmonary:  No pulmonary diagnosis, however patient is at increased risk of perioperative complications secondary to  age > 60, Tobacco abuse, obesity, heart failure  Stop Bang score is 3 placing patient at intermediate risk for SEA  ARISCAT: <26 points, 1.6% risk of in-hospital postoperative pulmonary complication  PRODIGY: High risk for opioid induced respiratory depression  Pumonary toilet education discussed, patient also provided deep breathing exercises and incentive spirometry educational handout      Patient has history of nicotine dependency. Smoking cessation education was provided to the patient.Cessation encouraged. - Physiological and physical aspects of tobacco addiction as well as strategies for quitting were discussed. - Counseling was given focusing on the harmful effects of this addiction especially given the patient's medical condition(s) which will be worsened because of the chemicals in tobacco      Renal/endo:  Recommendations to avoid nephrotoxic drugs and carefully monitor fluid status to maintain euvolemia. Use dose adjusted medications as needed for the underlying level of renal function.        GI/:  urine tox   +opiate on tox screen patient currently taking oxycodone      Heme:  Patient instructed to ambulate as soon as possible postoperatively to decrease thromboembolic risk.    Initiate mechanical DVT prophylaxis  as soon as possible and initiate chemical prophylaxis when deemed safe from a bleeding standpoint post surgery.        Caprini=5; high; 1.8% VTE        Risk assessment complete.  Patient is scheduled for a INTERMEDIATE surgical risk procedure. IS considered medically optimized for the planned procedure.        Labs/testing obtained in PAT on 3/26/2025: CBC,CMP urine tox  Lab Results   Component Value Date    WBC 11.9 (H) 03/26/2025    HGB 12.3 03/26/2025    HCT 38.1 03/26/2025    MCV 92 03/26/2025     03/26/2025     Lab Results   Component Value Date    GLUCOSE 69 (L) 03/26/2025    CALCIUM 10.1 03/26/2025     03/26/2025    K 5.1 03/26/2025    CO2 31 03/26/2025     03/26/2025    BUN 14 03/26/2025    CREATININE 1.08 (H) 03/26/2025     Lab Results   Component Value Date    ALT 12 03/26/2025    AST 12 03/26/2025    ALKPHOS 97 03/26/2025    BILITOT 0.3 03/26/2025           Follow up/communication: none      Preoperative medication instructions were provided and reviewed with the patient.  Any additional testing or evaluation was explained to the patient.  Nothing by mouth instructions were discussed and patient's questions were answered prior to conclusion to this encounter.  Patient verbalized understanding of preoperative instructions given in preadmission testing; discharge instructions available in EMR.    This note was dictated with speech recognition.  Minor errors may have been detected during use of speech recognition.        NP Attestation: I was present with the Nurse Practitioner student who participated in the documentation of this note. I have personally seen and re-examined the patient and performed the medical decision-making components (assessment and plan of care). I have reviewed the Nurse Practitioner student documentation and verified the findings in the note as written with additions or exceptions as stated in the body of this note.    Toma Valadez, CNP

## 2025-03-27 ENCOUNTER — TELEPHONE (OUTPATIENT)
Dept: PREADMISSION TESTING | Facility: HOSPITAL | Age: 65
End: 2025-03-27

## 2025-03-27 LAB
6MAM UR CFM-MCNC: <25 NG/ML
CODEINE UR CFM-MCNC: <50 NG/ML
HYDROCODONE CTO UR CFM-MCNC: <25 NG/ML
HYDROMORPHONE UR CFM-MCNC: <25 NG/ML
MORPHINE UR CFM-MCNC: <50 NG/ML
NORHYDROCODONE UR CFM-MCNC: <25 NG/ML
NOROXYCODONE UR CFM-MCNC: >1000 NG/ML
OXYCODONE UR CFM-MCNC: 1612 NG/ML
OXYMORPHONE UR CFM-MCNC: 277 NG/ML

## 2025-03-27 ASSESSMENT — ENCOUNTER SYMPTOMS
EYES NEGATIVE: 1
CONSTITUTIONAL NEGATIVE: 1
ENDOCRINE NEGATIVE: 1
NEUROLOGICAL NEGATIVE: 1
NAUSEA: 0
MYALGIAS: 1
GASTROINTESTINAL NEGATIVE: 1
NECK NEGATIVE: 1
ARTHRALGIAS: 1
CARDIOVASCULAR NEGATIVE: 1
RESPIRATORY NEGATIVE: 1

## 2025-04-09 ENCOUNTER — ANESTHESIA (OUTPATIENT)
Dept: OPERATING ROOM | Facility: HOSPITAL | Age: 65
End: 2025-04-09
Payer: MEDICAID

## 2025-04-09 ENCOUNTER — APPOINTMENT (OUTPATIENT)
Dept: GASTROENTEROLOGY | Facility: HOSPITAL | Age: 65
End: 2025-04-09
Payer: MEDICAID

## 2025-04-09 ENCOUNTER — ANESTHESIA EVENT (OUTPATIENT)
Dept: OPERATING ROOM | Facility: HOSPITAL | Age: 65
End: 2025-04-09
Payer: MEDICAID

## 2025-04-09 ENCOUNTER — HOSPITAL ENCOUNTER (INPATIENT)
Facility: HOSPITAL | Age: 65
LOS: 4 days | Discharge: HOME | End: 2025-04-13
Attending: SURGERY | Admitting: SURGERY
Payer: MEDICAID

## 2025-04-09 DIAGNOSIS — Z93.3 COLOSTOMY IN PLACE (MULTI): Primary | ICD-10-CM

## 2025-04-09 PROCEDURE — 7100000002 HC RECOVERY ROOM TIME - EACH INCREMENTAL 1 MINUTE: Performed by: SURGERY

## 2025-04-09 PROCEDURE — 2500000002 HC RX 250 W HCPCS SELF ADMINISTERED DRUGS (ALT 637 FOR MEDICARE OP, ALT 636 FOR OP/ED): Performed by: ANESTHESIOLOGY

## 2025-04-09 PROCEDURE — 3700000002 HC GENERAL ANESTHESIA TIME - EACH INCREMENTAL 1 MINUTE: Performed by: SURGERY

## 2025-04-09 PROCEDURE — 2500000004 HC RX 250 GENERAL PHARMACY W/ HCPCS (ALT 636 FOR OP/ED): Mod: JZ | Performed by: ANESTHESIOLOGY

## 2025-04-09 PROCEDURE — 2720000007 HC OR 272 NO HCPCS: Performed by: SURGERY

## 2025-04-09 PROCEDURE — 0DBE0ZZ EXCISION OF LARGE INTESTINE, OPEN APPROACH: ICD-10-PCS | Performed by: SURGERY

## 2025-04-09 PROCEDURE — 3600000008 HC OR TIME - EACH INCREMENTAL 1 MINUTE - PROCEDURE LEVEL THREE: Performed by: SURGERY

## 2025-04-09 PROCEDURE — 2500000005 HC RX 250 GENERAL PHARMACY W/O HCPCS: Performed by: SURGERY

## 2025-04-09 PROCEDURE — 2500000001 HC RX 250 WO HCPCS SELF ADMINISTERED DRUGS (ALT 637 FOR MEDICARE OP): Performed by: SURGERY

## 2025-04-09 PROCEDURE — 2500000004 HC RX 250 GENERAL PHARMACY W/ HCPCS (ALT 636 FOR OP/ED): Performed by: ANESTHESIOLOGIST ASSISTANT

## 2025-04-09 PROCEDURE — 44620 REPAIR BOWEL OPENING: CPT | Performed by: SURGERY

## 2025-04-09 PROCEDURE — 2500000001 HC RX 250 WO HCPCS SELF ADMINISTERED DRUGS (ALT 637 FOR MEDICARE OP): Performed by: ANESTHESIOLOGY

## 2025-04-09 PROCEDURE — 7100000001 HC RECOVERY ROOM TIME - INITIAL BASE CHARGE: Performed by: SURGERY

## 2025-04-09 PROCEDURE — A4649 SURGICAL SUPPLIES: HCPCS | Performed by: SURGERY

## 2025-04-09 PROCEDURE — 3700000001 HC GENERAL ANESTHESIA TIME - INITIAL BASE CHARGE: Performed by: SURGERY

## 2025-04-09 PROCEDURE — 2500000004 HC RX 250 GENERAL PHARMACY W/ HCPCS (ALT 636 FOR OP/ED): Performed by: SURGERY

## 2025-04-09 PROCEDURE — 1100000001 HC PRIVATE ROOM DAILY

## 2025-04-09 PROCEDURE — 2500000002 HC RX 250 W HCPCS SELF ADMINISTERED DRUGS (ALT 637 FOR MEDICARE OP, ALT 636 FOR OP/ED): Performed by: SURGERY

## 2025-04-09 PROCEDURE — 3600000003 HC OR TIME - INITIAL BASE CHARGE - PROCEDURE LEVEL THREE: Performed by: SURGERY

## 2025-04-09 PROCEDURE — 88304 TISSUE EXAM BY PATHOLOGIST: CPT | Mod: TC,AHULAB,WESLAB | Performed by: SURGERY

## 2025-04-09 RX ORDER — SODIUM CHLORIDE 9 MG/ML
40 INJECTION, SOLUTION INTRAVENOUS CONTINUOUS
Status: DISCONTINUED | OUTPATIENT
Start: 2025-04-09 | End: 2025-04-10

## 2025-04-09 RX ORDER — QUETIAPINE FUMARATE 300 MG/1
300 TABLET, FILM COATED ORAL NIGHTLY
Status: DISCONTINUED | OUTPATIENT
Start: 2025-04-09 | End: 2025-04-09

## 2025-04-09 RX ORDER — ONDANSETRON HYDROCHLORIDE 2 MG/ML
INJECTION, SOLUTION INTRAVENOUS AS NEEDED
Status: DISCONTINUED | OUTPATIENT
Start: 2025-04-09 | End: 2025-04-09

## 2025-04-09 RX ORDER — PROMETHAZINE HYDROCHLORIDE 25 MG/1
25 TABLET ORAL EVERY 6 HOURS PRN
Status: DISCONTINUED | OUTPATIENT
Start: 2025-04-09 | End: 2025-04-13 | Stop reason: HOSPADM

## 2025-04-09 RX ORDER — GLYCOPYRROLATE 0.2 MG/ML
0.2 INJECTION INTRAMUSCULAR; INTRAVENOUS ONCE
Status: DISCONTINUED | OUTPATIENT
Start: 2025-04-09 | End: 2025-04-09 | Stop reason: HOSPADM

## 2025-04-09 RX ORDER — KETOROLAC TROMETHAMINE 15 MG/ML
15 INJECTION, SOLUTION INTRAMUSCULAR; INTRAVENOUS ONCE AS NEEDED
Status: DISCONTINUED | OUTPATIENT
Start: 2025-04-09 | End: 2025-04-09 | Stop reason: HOSPADM

## 2025-04-09 RX ORDER — OXYCODONE HYDROCHLORIDE 5 MG/1
5 TABLET ORAL EVERY 4 HOURS PRN
Status: DISCONTINUED | OUTPATIENT
Start: 2025-04-09 | End: 2025-04-13 | Stop reason: HOSPADM

## 2025-04-09 RX ORDER — ONDANSETRON HYDROCHLORIDE 2 MG/ML
4 INJECTION, SOLUTION INTRAVENOUS EVERY 8 HOURS PRN
Status: DISCONTINUED | OUTPATIENT
Start: 2025-04-09 | End: 2025-04-09 | Stop reason: SDUPTHER

## 2025-04-09 RX ORDER — TIZANIDINE 4 MG/1
4 TABLET ORAL EVERY 6 HOURS PRN
Status: DISCONTINUED | OUTPATIENT
Start: 2025-04-09 | End: 2025-04-12

## 2025-04-09 RX ORDER — OXYCODONE HYDROCHLORIDE 5 MG/1
10 TABLET ORAL EVERY 4 HOURS PRN
Status: DISCONTINUED | OUTPATIENT
Start: 2025-04-09 | End: 2025-04-13 | Stop reason: HOSPADM

## 2025-04-09 RX ORDER — HYDROXYZINE HYDROCHLORIDE 25 MG/1
25 TABLET, FILM COATED ORAL 2 TIMES DAILY
Status: DISCONTINUED | OUTPATIENT
Start: 2025-04-09 | End: 2025-04-13 | Stop reason: HOSPADM

## 2025-04-09 RX ORDER — DULOXETIN HYDROCHLORIDE 60 MG/1
60 CAPSULE, DELAYED RELEASE ORAL DAILY
COMMUNITY

## 2025-04-09 RX ORDER — ENOXAPARIN SODIUM 100 MG/ML
40 INJECTION SUBCUTANEOUS EVERY 24 HOURS
Status: DISCONTINUED | OUTPATIENT
Start: 2025-04-09 | End: 2025-04-13 | Stop reason: HOSPADM

## 2025-04-09 RX ORDER — MIDAZOLAM HYDROCHLORIDE 1 MG/ML
INJECTION, SOLUTION INTRAMUSCULAR; INTRAVENOUS AS NEEDED
Status: DISCONTINUED | OUTPATIENT
Start: 2025-04-09 | End: 2025-04-09

## 2025-04-09 RX ORDER — ALBUTEROL SULFATE 0.83 MG/ML
2.5 SOLUTION RESPIRATORY (INHALATION) ONCE AS NEEDED
Status: DISCONTINUED | OUTPATIENT
Start: 2025-04-09 | End: 2025-04-09 | Stop reason: HOSPADM

## 2025-04-09 RX ORDER — ONDANSETRON HYDROCHLORIDE 2 MG/ML
4 INJECTION, SOLUTION INTRAVENOUS EVERY 8 HOURS PRN
Status: DISCONTINUED | OUTPATIENT
Start: 2025-04-09 | End: 2025-04-13 | Stop reason: HOSPADM

## 2025-04-09 RX ORDER — ONDANSETRON 4 MG/1
4 TABLET, ORALLY DISINTEGRATING ORAL ONCE AS NEEDED
Status: DISCONTINUED | OUTPATIENT
Start: 2025-04-09 | End: 2025-04-13 | Stop reason: HOSPADM

## 2025-04-09 RX ORDER — LIDOCAINE HYDROCHLORIDE 20 MG/ML
INJECTION, SOLUTION INFILTRATION; PERINEURAL AS NEEDED
Status: DISCONTINUED | OUTPATIENT
Start: 2025-04-09 | End: 2025-04-09

## 2025-04-09 RX ORDER — METHOCARBAMOL 100 MG/ML
INJECTION, SOLUTION INTRAMUSCULAR; INTRAVENOUS AS NEEDED
Status: DISCONTINUED | OUTPATIENT
Start: 2025-04-09 | End: 2025-04-09

## 2025-04-09 RX ORDER — OXYCODONE HYDROCHLORIDE 5 MG/1
5 TABLET ORAL ONCE AS NEEDED
Status: DISCONTINUED | OUTPATIENT
Start: 2025-04-09 | End: 2025-04-10

## 2025-04-09 RX ORDER — ACETAMINOPHEN 325 MG/1
650 TABLET ORAL EVERY 4 HOURS PRN
Status: DISCONTINUED | OUTPATIENT
Start: 2025-04-09 | End: 2025-04-12

## 2025-04-09 RX ORDER — NORETHINDRONE AND ETHINYL ESTRADIOL 0.5-0.035
50 KIT ORAL ONCE
Status: DISCONTINUED | OUTPATIENT
Start: 2025-04-09 | End: 2025-04-09 | Stop reason: HOSPADM

## 2025-04-09 RX ORDER — METRONIDAZOLE 500 MG/100ML
500 INJECTION, SOLUTION INTRAVENOUS ONCE
Status: COMPLETED | OUTPATIENT
Start: 2025-04-09 | End: 2025-04-09

## 2025-04-09 RX ORDER — GLYCOPYRROLATE 0.2 MG/ML
INJECTION INTRAMUSCULAR; INTRAVENOUS AS NEEDED
Status: DISCONTINUED | OUTPATIENT
Start: 2025-04-09 | End: 2025-04-09

## 2025-04-09 RX ORDER — LIDOCAINE HYDROCHLORIDE 10 MG/ML
0.1 INJECTION, SOLUTION INFILTRATION; PERINEURAL ONCE
Status: DISCONTINUED | OUTPATIENT
Start: 2025-04-09 | End: 2025-04-09 | Stop reason: HOSPADM

## 2025-04-09 RX ORDER — HEPARIN SODIUM 5000 [USP'U]/ML
5000 INJECTION, SOLUTION INTRAVENOUS; SUBCUTANEOUS ONCE
Status: COMPLETED | OUTPATIENT
Start: 2025-04-09 | End: 2025-04-09

## 2025-04-09 RX ORDER — ONDANSETRON HYDROCHLORIDE 2 MG/ML
4 INJECTION, SOLUTION INTRAVENOUS ONCE AS NEEDED
Status: DISCONTINUED | OUTPATIENT
Start: 2025-04-09 | End: 2025-04-09 | Stop reason: HOSPADM

## 2025-04-09 RX ORDER — BUSPIRONE HYDROCHLORIDE 15 MG/1
15 TABLET ORAL 2 TIMES DAILY
Status: DISCONTINUED | OUTPATIENT
Start: 2025-04-09 | End: 2025-04-13 | Stop reason: HOSPADM

## 2025-04-09 RX ORDER — NALOXONE HYDROCHLORIDE 0.4 MG/ML
0.2 INJECTION, SOLUTION INTRAMUSCULAR; INTRAVENOUS; SUBCUTANEOUS EVERY 5 MIN PRN
Status: DISCONTINUED | OUTPATIENT
Start: 2025-04-09 | End: 2025-04-13 | Stop reason: HOSPADM

## 2025-04-09 RX ORDER — MEPERIDINE HYDROCHLORIDE 25 MG/ML
12.5 INJECTION INTRAMUSCULAR; INTRAVENOUS; SUBCUTANEOUS EVERY 10 MIN PRN
Status: DISCONTINUED | OUTPATIENT
Start: 2025-04-09 | End: 2025-04-09 | Stop reason: HOSPADM

## 2025-04-09 RX ORDER — CETIRIZINE HYDROCHLORIDE 10 MG/1
10 TABLET ORAL DAILY
Status: DISCONTINUED | OUTPATIENT
Start: 2025-04-09 | End: 2025-04-13 | Stop reason: HOSPADM

## 2025-04-09 RX ORDER — LIDOCAINE 560 MG/1
1 PATCH PERCUTANEOUS; TOPICAL; TRANSDERMAL DAILY
Status: DISCONTINUED | OUTPATIENT
Start: 2025-04-09 | End: 2025-04-13 | Stop reason: HOSPADM

## 2025-04-09 RX ORDER — HYDROMORPHONE HYDROCHLORIDE 0.2 MG/ML
0.2 INJECTION INTRAMUSCULAR; INTRAVENOUS; SUBCUTANEOUS EVERY 2 HOUR PRN
Status: DISCONTINUED | OUTPATIENT
Start: 2025-04-09 | End: 2025-04-12

## 2025-04-09 RX ORDER — BUPROPION HYDROCHLORIDE 300 MG/1
300 TABLET ORAL DAILY
Status: DISCONTINUED | OUTPATIENT
Start: 2025-04-09 | End: 2025-04-09 | Stop reason: SDUPTHER

## 2025-04-09 RX ORDER — QUETIAPINE FUMARATE 50 MG/1
75 TABLET, FILM COATED ORAL NIGHTLY
Status: DISCONTINUED | OUTPATIENT
Start: 2025-04-09 | End: 2025-04-13 | Stop reason: HOSPADM

## 2025-04-09 RX ORDER — PROMETHAZINE HYDROCHLORIDE 25 MG/1
25 SUPPOSITORY RECTAL EVERY 12 HOURS PRN
Status: DISCONTINUED | OUTPATIENT
Start: 2025-04-09 | End: 2025-04-13 | Stop reason: HOSPADM

## 2025-04-09 RX ORDER — PROPOFOL 10 MG/ML
INJECTION, EMULSION INTRAVENOUS AS NEEDED
Status: DISCONTINUED | OUTPATIENT
Start: 2025-04-09 | End: 2025-04-09

## 2025-04-09 RX ORDER — CEFAZOLIN SODIUM 2 G/50ML
2 SOLUTION INTRAVENOUS ONCE
Status: COMPLETED | OUTPATIENT
Start: 2025-04-09 | End: 2025-04-09

## 2025-04-09 RX ORDER — CYCLOBENZAPRINE HCL 5 MG
5 TABLET ORAL DAILY
Status: DISCONTINUED | OUTPATIENT
Start: 2025-04-09 | End: 2025-04-13 | Stop reason: HOSPADM

## 2025-04-09 RX ORDER — ROCURONIUM BROMIDE 10 MG/ML
INJECTION, SOLUTION INTRAVENOUS AS NEEDED
Status: DISCONTINUED | OUTPATIENT
Start: 2025-04-09 | End: 2025-04-09

## 2025-04-09 RX ORDER — DIPHENHYDRAMINE HYDROCHLORIDE 50 MG/ML
12.5 INJECTION, SOLUTION INTRAMUSCULAR; INTRAVENOUS ONCE AS NEEDED
Status: DISCONTINUED | OUTPATIENT
Start: 2025-04-09 | End: 2025-04-09 | Stop reason: HOSPADM

## 2025-04-09 RX ORDER — PANTOPRAZOLE SODIUM 40 MG/10ML
20 INJECTION, POWDER, LYOPHILIZED, FOR SOLUTION INTRAVENOUS
Status: DISCONTINUED | OUTPATIENT
Start: 2025-04-09 | End: 2025-04-13 | Stop reason: HOSPADM

## 2025-04-09 RX ORDER — ALBUTEROL SULFATE 0.83 MG/ML
2.5 SOLUTION RESPIRATORY (INHALATION) ONCE
Status: COMPLETED | OUTPATIENT
Start: 2025-04-09 | End: 2025-04-09

## 2025-04-09 RX ORDER — CYCLOBENZAPRINE HCL 5 MG
10 TABLET ORAL NIGHTLY
COMMUNITY

## 2025-04-09 RX ORDER — DULOXETIN HYDROCHLORIDE 60 MG/1
60 CAPSULE, DELAYED RELEASE ORAL DAILY
Status: DISCONTINUED | OUTPATIENT
Start: 2025-04-09 | End: 2025-04-13 | Stop reason: HOSPADM

## 2025-04-09 RX ORDER — LABETALOL HYDROCHLORIDE 5 MG/ML
10 INJECTION, SOLUTION INTRAVENOUS ONCE AS NEEDED
Status: DISCONTINUED | OUTPATIENT
Start: 2025-04-09 | End: 2025-04-09 | Stop reason: HOSPADM

## 2025-04-09 RX ORDER — FENTANYL CITRATE 50 UG/ML
INJECTION, SOLUTION INTRAMUSCULAR; INTRAVENOUS AS NEEDED
Status: DISCONTINUED | OUTPATIENT
Start: 2025-04-09 | End: 2025-04-09

## 2025-04-09 RX ORDER — METOCLOPRAMIDE 10 MG/1
10 TABLET ORAL ONCE
Status: COMPLETED | OUTPATIENT
Start: 2025-04-09 | End: 2025-04-09

## 2025-04-09 RX ORDER — ACETAMINOPHEN 500 MG
5 TABLET ORAL NIGHTLY PRN
Status: DISCONTINUED | OUTPATIENT
Start: 2025-04-09 | End: 2025-04-13 | Stop reason: HOSPADM

## 2025-04-09 RX ORDER — HYDRALAZINE HYDROCHLORIDE 20 MG/ML
10 INJECTION INTRAMUSCULAR; INTRAVENOUS EVERY 30 MIN PRN
Status: DISCONTINUED | OUTPATIENT
Start: 2025-04-09 | End: 2025-04-09 | Stop reason: HOSPADM

## 2025-04-09 RX ORDER — ONDANSETRON 4 MG/1
4 TABLET, ORALLY DISINTEGRATING ORAL EVERY 8 HOURS PRN
Status: DISCONTINUED | OUTPATIENT
Start: 2025-04-09 | End: 2025-04-09 | Stop reason: SDUPTHER

## 2025-04-09 RX ORDER — GABAPENTIN 400 MG/1
400 CAPSULE ORAL 4 TIMES DAILY
Status: DISCONTINUED | OUTPATIENT
Start: 2025-04-09 | End: 2025-04-13 | Stop reason: HOSPADM

## 2025-04-09 RX ORDER — TRAZODONE HYDROCHLORIDE 100 MG/1
200 TABLET ORAL NIGHTLY
Status: DISCONTINUED | OUTPATIENT
Start: 2025-04-09 | End: 2025-04-13 | Stop reason: HOSPADM

## 2025-04-09 RX ORDER — BUPIVACAINE HYDROCHLORIDE AND EPINEPHRINE 5; 5 MG/ML; UG/ML
INJECTION, SOLUTION EPIDURAL; INTRACAUDAL; PERINEURAL AS NEEDED
Status: DISCONTINUED | OUTPATIENT
Start: 2025-04-09 | End: 2025-04-09 | Stop reason: HOSPADM

## 2025-04-09 RX ORDER — SODIUM CHLORIDE 0.9 G/100ML
INJECTION, SOLUTION IRRIGATION AS NEEDED
Status: DISCONTINUED | OUTPATIENT
Start: 2025-04-09 | End: 2025-04-09 | Stop reason: HOSPADM

## 2025-04-09 RX ORDER — MIDAZOLAM HYDROCHLORIDE 1 MG/ML
1 INJECTION, SOLUTION INTRAMUSCULAR; INTRAVENOUS ONCE AS NEEDED
Status: DISCONTINUED | OUTPATIENT
Start: 2025-04-09 | End: 2025-04-09 | Stop reason: HOSPADM

## 2025-04-09 RX ORDER — DEXMEDETOMIDINE HYDROCHLORIDE 100 UG/ML
INJECTION, SOLUTION INTRAVENOUS AS NEEDED
Status: DISCONTINUED | OUTPATIENT
Start: 2025-04-09 | End: 2025-04-09

## 2025-04-09 RX ORDER — ACETAMINOPHEN 10 MG/ML
INJECTION, SOLUTION INTRAVENOUS AS NEEDED
Status: DISCONTINUED | OUTPATIENT
Start: 2025-04-09 | End: 2025-04-09

## 2025-04-09 RX ORDER — ROPINIROLE 1 MG/1
1 TABLET, FILM COATED ORAL NIGHTLY
Status: DISCONTINUED | OUTPATIENT
Start: 2025-04-09 | End: 2025-04-13 | Stop reason: HOSPADM

## 2025-04-09 RX ORDER — FAMOTIDINE 20 MG/1
20 TABLET, FILM COATED ORAL ONCE
Status: COMPLETED | OUTPATIENT
Start: 2025-04-09 | End: 2025-04-09

## 2025-04-09 RX ORDER — HYDROMORPHONE HYDROCHLORIDE 0.2 MG/ML
0.1 INJECTION INTRAMUSCULAR; INTRAVENOUS; SUBCUTANEOUS EVERY 5 MIN PRN
Status: DISCONTINUED | OUTPATIENT
Start: 2025-04-09 | End: 2025-04-09 | Stop reason: HOSPADM

## 2025-04-09 RX ADMIN — MIDAZOLAM 1 MG: 1 INJECTION INTRAMUSCULAR; INTRAVENOUS at 12:58

## 2025-04-09 RX ADMIN — FENTANYL CITRATE 50 MCG: 50 INJECTION, SOLUTION INTRAMUSCULAR; INTRAVENOUS at 16:06

## 2025-04-09 RX ADMIN — ROCURONIUM BROMIDE 10 MG: 10 INJECTION, SOLUTION INTRAVENOUS at 15:49

## 2025-04-09 RX ADMIN — ONDANSETRON HYDROCHLORIDE 4 MG: 2 INJECTION INTRAMUSCULAR; INTRAVENOUS at 15:43

## 2025-04-09 RX ADMIN — ROCURONIUM BROMIDE 20 MG: 10 INJECTION, SOLUTION INTRAVENOUS at 14:25

## 2025-04-09 RX ADMIN — CEFAZOLIN SODIUM 2 G: 2 SOLUTION INTRAVENOUS at 13:18

## 2025-04-09 RX ADMIN — SODIUM CHLORIDE 40 ML/HR: 900 INJECTION, SOLUTION INTRAVENOUS at 22:14

## 2025-04-09 RX ADMIN — FENTANYL CITRATE 50 MCG: 50 INJECTION, SOLUTION INTRAMUSCULAR; INTRAVENOUS at 12:58

## 2025-04-09 RX ADMIN — CYCLOBENZAPRINE HYDROCHLORIDE 5 MG: 5 TABLET, FILM COATED ORAL at 22:14

## 2025-04-09 RX ADMIN — HEPARIN SODIUM 5000 UNITS: 5000 INJECTION, SOLUTION INTRAVENOUS; SUBCUTANEOUS at 10:51

## 2025-04-09 RX ADMIN — DEXMEDETOMIDINE HYDROCHLORIDE 20 MCG: 100 INJECTION, SOLUTION, CONCENTRATE INTRAVENOUS at 16:07

## 2025-04-09 RX ADMIN — SUGAMMADEX 200 MG: 100 INJECTION, SOLUTION INTRAVENOUS at 15:58

## 2025-04-09 RX ADMIN — OXYCODONE HYDROCHLORIDE 10 MG: 5 TABLET ORAL at 22:15

## 2025-04-09 RX ADMIN — GLYCOPYRROLATE 0.2 MG: 0.2 INJECTION INTRAMUSCULAR; INTRAVENOUS at 12:58

## 2025-04-09 RX ADMIN — HYDROXYZINE HYDROCHLORIDE 25 MG: 25 TABLET, FILM COATED ORAL at 22:14

## 2025-04-09 RX ADMIN — MIDAZOLAM 1 MG: 1 INJECTION INTRAMUSCULAR; INTRAVENOUS at 13:05

## 2025-04-09 RX ADMIN — PROPOFOL 50 MG: 10 INJECTION, EMULSION INTRAVENOUS at 15:44

## 2025-04-09 RX ADMIN — FENTANYL CITRATE 50 MCG: 50 INJECTION, SOLUTION INTRAMUSCULAR; INTRAVENOUS at 13:05

## 2025-04-09 RX ADMIN — ROPINIROLE HYDROCHLORIDE 1 MG: 1 TABLET, FILM COATED ORAL at 22:14

## 2025-04-09 RX ADMIN — HYDROMORPHONE HYDROCHLORIDE 0.2 MG: 0.2 INJECTION, SOLUTION INTRAMUSCULAR; INTRAVENOUS; SUBCUTANEOUS at 17:04

## 2025-04-09 RX ADMIN — PROPOFOL 150 MG: 10 INJECTION, EMULSION INTRAVENOUS at 13:10

## 2025-04-09 RX ADMIN — ROCURONIUM BROMIDE 80 MG: 10 INJECTION, SOLUTION INTRAVENOUS at 13:10

## 2025-04-09 RX ADMIN — FENTANYL CITRATE 50 MCG: 50 INJECTION, SOLUTION INTRAMUSCULAR; INTRAVENOUS at 15:32

## 2025-04-09 RX ADMIN — METHOCARBAMOL 500 MG: 100 INJECTION INTRAMUSCULAR; INTRAVENOUS at 15:14

## 2025-04-09 RX ADMIN — HYDROMORPHONE HYDROCHLORIDE 0.5 MG: 0.5 INJECTION, SOLUTION INTRAMUSCULAR; INTRAVENOUS; SUBCUTANEOUS at 16:45

## 2025-04-09 RX ADMIN — LIDOCAINE HYDROCHLORIDE 50 MG: 20 INJECTION, SOLUTION INFILTRATION; PERINEURAL at 13:10

## 2025-04-09 RX ADMIN — SODIUM CHLORIDE, POTASSIUM CHLORIDE, SODIUM LACTATE AND CALCIUM CHLORIDE: 600; 310; 30; 20 INJECTION, SOLUTION INTRAVENOUS at 13:00

## 2025-04-09 RX ADMIN — METRONIDAZOLE 500 MG: 500 INJECTION, SOLUTION INTRAVENOUS at 13:18

## 2025-04-09 RX ADMIN — BUSPIRONE HYDROCHLORIDE 15 MG: 15 TABLET ORAL at 22:59

## 2025-04-09 RX ADMIN — QUETIAPINE FUMARATE 75 MG: 50 TABLET ORAL at 22:29

## 2025-04-09 RX ADMIN — GABAPENTIN 400 MG: 400 CAPSULE ORAL at 22:29

## 2025-04-09 RX ADMIN — METHOCARBAMOL 500 MG: 100 INJECTION INTRAMUSCULAR; INTRAVENOUS at 14:19

## 2025-04-09 RX ADMIN — ALBUTEROL SULFATE 2.5 MG: 2.5 SOLUTION RESPIRATORY (INHALATION) at 10:51

## 2025-04-09 RX ADMIN — TRAZODONE HYDROCHLORIDE 200 MG: 100 TABLET ORAL at 22:14

## 2025-04-09 RX ADMIN — FENTANYL CITRATE 100 MCG: 50 INJECTION, SOLUTION INTRAMUSCULAR; INTRAVENOUS at 13:52

## 2025-04-09 RX ADMIN — METOCLOPRAMIDE 10 MG: 10 TABLET ORAL at 10:51

## 2025-04-09 RX ADMIN — ACETAMINOPHEN 1000 MG: 10 INJECTION INTRAVENOUS at 14:28

## 2025-04-09 RX ADMIN — FAMOTIDINE 20 MG: 20 TABLET, FILM COATED ORAL at 10:51

## 2025-04-09 SDOH — ECONOMIC STABILITY: FOOD INSECURITY: HOW HARD IS IT FOR YOU TO PAY FOR THE VERY BASICS LIKE FOOD, HOUSING, MEDICAL CARE, AND HEATING?: NOT HARD AT ALL

## 2025-04-09 SDOH — HEALTH STABILITY: PHYSICAL HEALTH
HOW OFTEN DO YOU NEED TO HAVE SOMEONE HELP YOU WHEN YOU READ INSTRUCTIONS, PAMPHLETS, OR OTHER WRITTEN MATERIAL FROM YOUR DOCTOR OR PHARMACY?: OFTEN

## 2025-04-09 SDOH — ECONOMIC STABILITY: FOOD INSECURITY: WITHIN THE PAST 12 MONTHS, YOU WORRIED THAT YOUR FOOD WOULD RUN OUT BEFORE YOU GOT THE MONEY TO BUY MORE.: NEVER TRUE

## 2025-04-09 SDOH — ECONOMIC STABILITY: FOOD INSECURITY: WITHIN THE PAST 12 MONTHS, THE FOOD YOU BOUGHT JUST DIDN'T LAST AND YOU DIDN'T HAVE MONEY TO GET MORE.: NEVER TRUE

## 2025-04-09 SDOH — SOCIAL STABILITY: SOCIAL INSECURITY
ASK PARENT OR GUARDIAN: ARE THERE TIMES WHEN YOU, YOUR CHILD(REN), OR ANY MEMBER OF YOUR HOUSEHOLD FEEL UNSAFE, HARMED, OR THREATENED AROUND PERSONS WITH WHOM YOU KNOW OR LIVE?: NO

## 2025-04-09 SDOH — SOCIAL STABILITY: SOCIAL INSECURITY: HAVE YOU HAD ANY THOUGHTS OF HARMING ANYONE ELSE?: NO

## 2025-04-09 SDOH — SOCIAL STABILITY: SOCIAL NETWORK: HOW OFTEN DO YOU GET TOGETHER WITH FRIENDS OR RELATIVES?: TWICE A WEEK

## 2025-04-09 SDOH — SOCIAL STABILITY: SOCIAL INSECURITY: WITHIN THE LAST YEAR, HAVE YOU BEEN AFRAID OF YOUR PARTNER OR EX-PARTNER?: NO

## 2025-04-09 SDOH — SOCIAL STABILITY: SOCIAL INSECURITY: WERE YOU ABLE TO COMPLETE ALL THE BEHAVIORAL HEALTH SCREENINGS?: YES

## 2025-04-09 SDOH — SOCIAL STABILITY: SOCIAL INSECURITY: ARE THERE ANY APPARENT SIGNS OF INJURIES/BEHAVIORS THAT COULD BE RELATED TO ABUSE/NEGLECT?: NO

## 2025-04-09 SDOH — HEALTH STABILITY: MENTAL HEALTH: HOW OFTEN DO YOU HAVE SIX OR MORE DRINKS ON ONE OCCASION?: NEVER

## 2025-04-09 SDOH — SOCIAL STABILITY: SOCIAL INSECURITY
WITHIN THE LAST YEAR, HAVE YOU BEEN RAPED OR FORCED TO HAVE ANY KIND OF SEXUAL ACTIVITY BY YOUR PARTNER OR EX-PARTNER?: NO

## 2025-04-09 SDOH — SOCIAL STABILITY: SOCIAL NETWORK: HOW OFTEN DO YOU ATTEND MEETINGS OF THE CLUBS OR ORGANIZATIONS YOU BELONG TO?: PATIENT DECLINED

## 2025-04-09 SDOH — ECONOMIC STABILITY: INCOME INSECURITY: IN THE PAST 12 MONTHS HAS THE ELECTRIC, GAS, OIL, OR WATER COMPANY THREATENED TO SHUT OFF SERVICES IN YOUR HOME?: NO

## 2025-04-09 SDOH — SOCIAL STABILITY: SOCIAL INSECURITY: ABUSE: ADULT

## 2025-04-09 SDOH — SOCIAL STABILITY: SOCIAL INSECURITY: WITHIN THE LAST YEAR, HAVE YOU BEEN HUMILIATED OR EMOTIONALLY ABUSED IN OTHER WAYS BY YOUR PARTNER OR EX-PARTNER?: NO

## 2025-04-09 SDOH — SOCIAL STABILITY: SOCIAL INSECURITY
WITHIN THE LAST YEAR, HAVE YOU BEEN KICKED, HIT, SLAPPED, OR OTHERWISE PHYSICALLY HURT BY YOUR PARTNER OR EX-PARTNER?: NO

## 2025-04-09 SDOH — HEALTH STABILITY: MENTAL HEALTH
DO YOU FEEL STRESS - TENSE, RESTLESS, NERVOUS, OR ANXIOUS, OR UNABLE TO SLEEP AT NIGHT BECAUSE YOUR MIND IS TROUBLED ALL THE TIME - THESE DAYS?: TO SOME EXTENT

## 2025-04-09 SDOH — ECONOMIC STABILITY: HOUSING INSECURITY: IN THE LAST 12 MONTHS, WAS THERE A TIME WHEN YOU WERE NOT ABLE TO PAY THE MORTGAGE OR RENT ON TIME?: NO

## 2025-04-09 SDOH — HEALTH STABILITY: MENTAL HEALTH: HOW OFTEN DO YOU HAVE A DRINK CONTAINING ALCOHOL?: NEVER

## 2025-04-09 SDOH — HEALTH STABILITY: PHYSICAL HEALTH: ON AVERAGE, HOW MANY MINUTES DO YOU ENGAGE IN EXERCISE AT THIS LEVEL?: 0 MIN

## 2025-04-09 SDOH — HEALTH STABILITY: PHYSICAL HEALTH: ON AVERAGE, HOW MANY DAYS PER WEEK DO YOU ENGAGE IN MODERATE TO STRENUOUS EXERCISE (LIKE A BRISK WALK)?: 0 DAYS

## 2025-04-09 SDOH — SOCIAL STABILITY: SOCIAL NETWORK
IN A TYPICAL WEEK, HOW MANY TIMES DO YOU TALK ON THE PHONE WITH FAMILY, FRIENDS, OR NEIGHBORS?: MORE THAN THREE TIMES A WEEK

## 2025-04-09 SDOH — ECONOMIC STABILITY: HOUSING INSECURITY: AT ANY TIME IN THE PAST 12 MONTHS, WERE YOU HOMELESS OR LIVING IN A SHELTER (INCLUDING NOW)?: NO

## 2025-04-09 SDOH — SOCIAL STABILITY: SOCIAL INSECURITY: DO YOU FEEL ANYONE HAS EXPLOITED OR TAKEN ADVANTAGE OF YOU FINANCIALLY OR OF YOUR PERSONAL PROPERTY?: NO

## 2025-04-09 SDOH — SOCIAL STABILITY: SOCIAL INSECURITY: ARE YOU MARRIED, WIDOWED, DIVORCED, SEPARATED, NEVER MARRIED, OR LIVING WITH A PARTNER?: WIDOWED

## 2025-04-09 SDOH — SOCIAL STABILITY: SOCIAL NETWORK
DO YOU BELONG TO ANY CLUBS OR ORGANIZATIONS SUCH AS CHURCH GROUPS, UNIONS, FRATERNAL OR ATHLETIC GROUPS, OR SCHOOL GROUPS?: PATIENT DECLINED

## 2025-04-09 SDOH — SOCIAL STABILITY: SOCIAL NETWORK: HOW OFTEN DO YOU ATTEND CHURCH OR RELIGIOUS SERVICES?: PATIENT DECLINED

## 2025-04-09 SDOH — ECONOMIC STABILITY: TRANSPORTATION INSECURITY: IN THE PAST 12 MONTHS, HAS LACK OF TRANSPORTATION KEPT YOU FROM MEDICAL APPOINTMENTS OR FROM GETTING MEDICATIONS?: NO

## 2025-04-09 SDOH — SOCIAL STABILITY: SOCIAL INSECURITY: DOES ANYONE TRY TO KEEP YOU FROM HAVING/CONTACTING OTHER FRIENDS OR DOING THINGS OUTSIDE YOUR HOME?: NO

## 2025-04-09 SDOH — SOCIAL STABILITY: SOCIAL INSECURITY: ARE YOU OR HAVE YOU BEEN THREATENED OR ABUSED PHYSICALLY, EMOTIONALLY, OR SEXUALLY BY ANYONE?: NO

## 2025-04-09 SDOH — SOCIAL STABILITY: SOCIAL INSECURITY: HAVE YOU HAD THOUGHTS OF HARMING ANYONE ELSE?: NO

## 2025-04-09 SDOH — HEALTH STABILITY: MENTAL HEALTH: HOW MANY DRINKS CONTAINING ALCOHOL DO YOU HAVE ON A TYPICAL DAY WHEN YOU ARE DRINKING?: PATIENT DOES NOT DRINK

## 2025-04-09 SDOH — ECONOMIC STABILITY: HOUSING INSECURITY: DO YOU FEEL UNSAFE GOING BACK TO THE PLACE WHERE YOU LIVE?: NO

## 2025-04-09 SDOH — SOCIAL STABILITY: SOCIAL INSECURITY: DO YOU FEEL UNSAFE GOING BACK TO THE PLACE WHERE YOU ARE LIVING?: NO

## 2025-04-09 SDOH — ECONOMIC STABILITY: HOUSING INSECURITY: IN THE PAST 12 MONTHS, HOW MANY TIMES HAVE YOU MOVED WHERE YOU WERE LIVING?: 0

## 2025-04-09 SDOH — SOCIAL STABILITY: SOCIAL INSECURITY: HAS ANYONE EVER THREATENED TO HURT YOUR FAMILY OR YOUR PETS?: NO

## 2025-04-09 SDOH — HEALTH STABILITY: MENTAL HEALTH: CURRENT SMOKER: 1

## 2025-04-09 ASSESSMENT — COGNITIVE AND FUNCTIONAL STATUS - GENERAL
TURNING FROM BACK TO SIDE WHILE IN FLAT BAD: A LITTLE
HELP NEEDED FOR BATHING: A LOT
STANDING UP FROM CHAIR USING ARMS: A LOT
PATIENT BASELINE BEDBOUND: NO
STANDING UP FROM CHAIR USING ARMS: A LITTLE
MOVING TO AND FROM BED TO CHAIR: A LITTLE
DRESSING REGULAR LOWER BODY CLOTHING: A LOT
MOBILITY SCORE: 14
TOILETING: A LITTLE
MOVING TO AND FROM BED TO CHAIR: A LOT
TOILETING: A LOT
DRESSING REGULAR UPPER BODY CLOTHING: A LITTLE
MOVING FROM LYING ON BACK TO SITTING ON SIDE OF FLAT BED WITH BEDRAILS: A LITTLE
MOBILITY SCORE: 17
DAILY ACTIVITIY SCORE: 16
WALKING IN HOSPITAL ROOM: A LOT
DAILY ACTIVITIY SCORE: 19
DRESSING REGULAR LOWER BODY CLOTHING: A LOT
WALKING IN HOSPITAL ROOM: A LITTLE
CLIMB 3 TO 5 STEPS WITH RAILING: A LOT
MOVING FROM LYING ON BACK TO SITTING ON SIDE OF FLAT BED WITH BEDRAILS: A LITTLE
HELP NEEDED FOR BATHING: A LITTLE
DRESSING REGULAR UPPER BODY CLOTHING: A LOT
CLIMB 3 TO 5 STEPS WITH RAILING: A LOT
TURNING FROM BACK TO SIDE WHILE IN FLAT BAD: A LITTLE

## 2025-04-09 ASSESSMENT — COLUMBIA-SUICIDE SEVERITY RATING SCALE - C-SSRS
1. IN THE PAST MONTH, HAVE YOU WISHED YOU WERE DEAD OR WISHED YOU COULD GO TO SLEEP AND NOT WAKE UP?: NO
6. HAVE YOU EVER DONE ANYTHING, STARTED TO DO ANYTHING, OR PREPARED TO DO ANYTHING TO END YOUR LIFE?: NO
2. HAVE YOU ACTUALLY HAD ANY THOUGHTS OF KILLING YOURSELF?: NO

## 2025-04-09 ASSESSMENT — PAIN DESCRIPTION - DESCRIPTORS: DESCRIPTORS: ACHING

## 2025-04-09 ASSESSMENT — PATIENT HEALTH QUESTIONNAIRE - PHQ9
1. LITTLE INTEREST OR PLEASURE IN DOING THINGS: SEVERAL DAYS
2. FEELING DOWN, DEPRESSED OR HOPELESS: SEVERAL DAYS
SUM OF ALL RESPONSES TO PHQ9 QUESTIONS 1 & 2: 2

## 2025-04-09 ASSESSMENT — PAIN SCALES - GENERAL
PAIN_LEVEL: 2
PAINLEVEL_OUTOF10: 0 - NO PAIN
PAINLEVEL_OUTOF10: 7
PAINLEVEL_OUTOF10: 0 - NO PAIN
PAINLEVEL_OUTOF10: 10 - WORST POSSIBLE PAIN
PAINLEVEL_OUTOF10: 0 - NO PAIN
PAINLEVEL_OUTOF10: 6
PAINLEVEL_OUTOF10: 0 - NO PAIN
PAINLEVEL_OUTOF10: 0 - NO PAIN
PAINLEVEL_OUTOF10: 10 - WORST POSSIBLE PAIN
PAINLEVEL_OUTOF10: 9

## 2025-04-09 ASSESSMENT — LIFESTYLE VARIABLES
AUDIT-C TOTAL SCORE: 0
HOW MANY STANDARD DRINKS CONTAINING ALCOHOL DO YOU HAVE ON A TYPICAL DAY: PATIENT DOES NOT DRINK
HOW OFTEN DO YOU HAVE A DRINK CONTAINING ALCOHOL: NEVER
SUBSTANCE_ABUSE_PAST_12_MONTHS: NO
AUDIT-C TOTAL SCORE: 0
SKIP TO QUESTIONS 9-10: 1
HOW OFTEN DO YOU HAVE 6 OR MORE DRINKS ON ONE OCCASION: NEVER
SKIP TO QUESTIONS 9-10: 1
PRESCIPTION_ABUSE_PAST_12_MONTHS: NO
AUDIT-C TOTAL SCORE: 0

## 2025-04-09 ASSESSMENT — PAIN DESCRIPTION - ORIENTATION: ORIENTATION: LEFT

## 2025-04-09 ASSESSMENT — PAIN - FUNCTIONAL ASSESSMENT
PAIN_FUNCTIONAL_ASSESSMENT: 0-10
PAIN_FUNCTIONAL_ASSESSMENT: 0-10
PAIN_FUNCTIONAL_ASSESSMENT: CPOT (CRITICAL CARE PAIN OBSERVATION TOOL)
PAIN_FUNCTIONAL_ASSESSMENT: 0-10
PAIN_FUNCTIONAL_ASSESSMENT: 0-10
PAIN_FUNCTIONAL_ASSESSMENT: CPOT (CRITICAL CARE PAIN OBSERVATION TOOL)
PAIN_FUNCTIONAL_ASSESSMENT: 0-10

## 2025-04-09 ASSESSMENT — ACTIVITIES OF DAILY LIVING (ADL)
ASSISTIVE_DEVICE: WHEELCHAIR;EYEGLASSES
FEEDING YOURSELF: NEEDS ASSISTANCE
DRESSING YOURSELF: NEEDS ASSISTANCE
PATIENT'S MEMORY ADEQUATE TO SAFELY COMPLETE DAILY ACTIVITIES?: UNABLE TO ASSESS
JUDGMENT_ADEQUATE_SAFELY_COMPLETE_DAILY_ACTIVITIES: UNABLE TO ASSESS
WALKS IN HOME: DEPENDENT
ADEQUATE_TO_COMPLETE_ADL: YES
HEARING - LEFT EAR: DIFFICULTY WITH NOISE
GROOMING: NEEDS ASSISTANCE
LACK_OF_TRANSPORTATION: NO
TOILETING: NEEDS ASSISTANCE
HEARING - RIGHT EAR: DIFFICULTY WITH NOISE
BATHING: NEEDS ASSISTANCE

## 2025-04-09 NOTE — ANESTHESIA POSTPROCEDURE EVALUATION
"Patient: Azul Bates \"Stephenie\"    Procedure Summary       Date: 04/09/25 Room / Location: [facility] OR 11 / Virtual [facility] OR    Anesthesia Start: 1258 Anesthesia Stop: 1610    Procedure: Loop Colostomy Closure (Abdomen) Diagnosis:       Colostomy in place (Multi)      (Colostomy in place (Multi) [Z93.3])    Surgeons: Vincent Hairston MD Responsible Provider: Florian Palacio MD    Anesthesia Type: general, other ASA Status: 3            Anesthesia Type: general, other    Vitals Value Taken Time   /77 04/09/25 1731   Temp 35.5 °C (95.9 °F) 04/09/25 1606   Pulse 94 04/09/25 1738   Resp 18 04/09/25 1738   SpO2 96 % 04/09/25 1738   Vitals shown include unfiled device data.    Anesthesia Post Evaluation    Patient location during evaluation: PACU  Patient participation: complete - patient participated  Level of consciousness: sleepy but conscious  Pain score: 2  Pain management: adequate  Multimodal analgesia pain management approach  Airway patency: patent  Cardiovascular status: acceptable  Respiratory status: acceptable  Hydration status: acceptable  Postoperative Nausea and Vomiting: none        No notable events documented.    "

## 2025-04-09 NOTE — CARE PLAN
The patient's goals for the shift include rest and drink coffee    The clinical goals for the shift include manage pain

## 2025-04-09 NOTE — OP NOTE
"Loop Colostomy Closure Operative Note     Date: 2025  OR Location: [facility] OR    Name: Azul Bates \"Stephenie\", : 1960, Age: 65 y.o., MRN: [id number], Sex: female    Diagnosis  Pre-op Diagnosis      * Colostomy in place (Multi) [Z93.3] Post-op Diagnosis     * Colostomy in place (Multi) [Z93.3]     Procedures  Loop Colostomy Closure  24985 - HI CLOSURE ENTEROSTOMY LG/SMALL INTESTINE      Surgeons      * Vincent Hairston - Primary    Resident/Fellow/Other Assistant:  Surgeons and Role:  * No surgeons found with a matching role *    Staff:   Circulator: Yeni Mccracken Person: Yuko  Surgical Assistant: Kevin    Anesthesia Staff: Anesthesiologist: Juan Leija DO; Florian Palacio MD  C-AA: KOBE Lagos    Procedure Summary  Anesthesia: General  ASA: III  Estimated Blood Loss: 30 mL  Intra-op Medications: Administrations occurring from 1105 to 1235 on 25:  * No intraprocedure medications in log *           Anesthesia Record               Intraprocedure I/O Totals          Intake    LR bolus 1100.00 mL    Total Intake 1100 mL       Output    Est. Blood Loss 30 mL    Total Output 30 mL       Net    Net Volume 1070 mL          Specimen:   ID Type Source Tests Collected by Time   1 : END COLOSTOMY Tissue COLOSTOMY (STOMA) SURGICAL PATHOLOGY EXAM Vincent Hairston MD 2025 1429                 Drains and/or Catheters: * None in log *    Tourniquet Times:         Implants:     Findings: Large parastomal hernia with fatty mesentery    Indications: Azul Bates \"Stephenie\" is an 65 y.o. female who is having surgery for Colostomy in place (Multi) [Z93.3].  History of diverting loop colostomy for sacral wound    The patient was seen in the preoperative area. The risks, benefits, complications, treatment options, non-operative alternatives, expected recovery and outcomes were discussed with the patient. The possibilities of reaction to medication, pulmonary aspiration, injury to surrounding structures, " bleeding, recurrent infection, the need for additional procedures, failure to diagnose a condition, and creating a complication requiring transfusion or operation were discussed with the patient. The patient concurred with the proposed plan, giving informed consent.  The site of surgery was properly noted/marked if necessary per policy. The patient has been actively warmed in preoperative area. Preoperative antibiotics have been ordered and given within 1 hours of incision. Venous thrombosis prophylaxis have been ordered including bilateral sequential compression devices and chemical prophylaxis    Procedure Details:   Patient was identified in the preoperative area and transported to the operating room.  They were placed supine on the or table. General anesthesia was induced. Abdomen was clipped, prepped, draped in usual sterile fashion. Time-out was performed confirming patient, procedure, perioperative criteria. Circular incision was made with 15 blade through the mucocutaneous junction. The dermis was divided with electrocautery. Through a combination of sharp and blunt dissection both loops of the colostomy were freed from the surrounding fascia.  This was somewhat difficult secondary to the large parastomal hernia sac and the patient's abdominal pannus. There were some intra-abdominal adhesions which were able to be divided from the ostomy site. Interloop adhesions were divided.  Finger sweep was able to be completed without significant surrounding adhesions. Both proximal and distal limbs were able to be extracorporealized for the anastomosis.   Mesenteric windows were made and the mesentery was sequentially clamped and tied.  Either end of the sigmoid colon just proximal and distal to the loop colostomy was sharply divided.  An end to end colocolonic handsewn anastomosis was created with an inner layer of running 3-0 PDS and an outer layer of interrupted 3-0 Vicryl Lembert suture.  Once completed this was  palpably patent and circumferentially intact.  The small mesenteric defect was closed with 2-0 vicryl. The specimen was handed off as diverting loop colostomy. The wound bed was irrigated and confirmed hemostatic. Gloves were changed.  The hernia sac was circumferentially divided and the fascial edges of the anterior rectus sheath were cleaned.  Fascia was closed with multiple interrupted 0 PDS figure-of-eight sutures. Subcutaneous tissues were reapproximated with 3 0 Vicryl. The circular skin defect was partially closed with a 2-0 Vicryl pursestring followed by quarter-inch Curity packing in the wound. A total of 20 cubic centimeters 1% lidocaine with epinephrine were used surrounding the incision. 4x4s and ABD were placed over top. Prior to completion of case all counts were confirmed correct. Patient was awakened transferred to the PACU in stable condition.   Complications:  None; patient tolerated the procedure well.    Disposition: PACU - hemodynamically stable.  Condition: stable     Attending Attestation: I was present and scrubbed for the entire procedure.    Vincent Hairston  Phone Number: [phone]

## 2025-04-09 NOTE — NURSING NOTE
Patient arrived to the floor recently,. Abdominal binder in place. Bed alarm on, bed in lowest position.

## 2025-04-09 NOTE — SIGNIFICANT EVENT
"Patient de sated to low 60s while sleeping, patient sternal rubbed and verbally encouraged to take deep breaths. Patient aroused and able to increase oxygen to 96% on 10L NRB. Patient will wake up as if she is agitated and loudly state \"I hurt so bad please help me\" Patient re-oriented during these times and quickly falls back to sleep after verbal reassurance. Patient has snoring RR when falling back asleep.   "

## 2025-04-09 NOTE — SIGNIFICANT EVENT
Assumed care of the pt. Pt c/o severe pain. Asked about WC status. Has no disease process but states she just does not walk well.  Addressing pain issues and decreasing O2 application.    Show Levator Superior Units: Yes Masseter Units: 0 Show Lcl Units: No Post-Care Instructions: Patient instructed to not lie down for 4 hours and limit physical activity for 24 hours. Additional Area 3 Location: platysmal bands Detail Level: Detailed Additional Area 1 Location: obicularis oris Forehead Units: 16 Dilution (U/0.1 Cc): 1 Show Inventory Tab: Show Additional Area 2 Location: [facility] Consent: Written consent obtained. Risks include but not limited to lid/brow ptosis, bruising, swelling, diplopia, temporary effect, incomplete chemical denervation.

## 2025-04-09 NOTE — SIGNIFICANT EVENT
Patient arrives to PACU, patient is intermittently awake. Patient will wake up, shake head, state she is in pain and hurts, then go back to sleeping with snoring respirations. Patient has non-re breather in place at this time. Patient has abdominal binder in place. Patient has one incison per OR where original colostomy was, reports there Iofoam in place, with 4x4 , paper tape and abdominal binder.

## 2025-04-09 NOTE — ANESTHESIA PREPROCEDURE EVALUATION
"Patient: Azul Bates \"Stephenie\"    Procedure Information       Date/Time: 04/09/25 1105    Procedure: Loop Colostomy Closure (Abdomen)    Location: [facility] OR 11 / [facility] OR    Surgeons: Vincent Hairston MD            Relevant Problems   Neuro   (+) Opioid abuse   (+) Severe recurrent major depression without psychotic features (Multi)       Clinical information reviewed:   Tobacco  Allergies  Meds   Med Hx  Surg Hx  OB Status           NPO Detail:  NPO/Void Status  Carbohydrate Drink Given Prior to Surgery? : N  Date of Last Liquid: 04/08/25  Time of Last Liquid: 2200  Date of Last Solid: 04/08/25  Time of Last Solid: 2200  Time of Last Void: 0900         Physical Exam    Airway  Mallampati: II  TM distance: >3 FB     Cardiovascular   Rhythm: regular  Rate: normal     Dental - normal exam     Pulmonary   Breath sounds clear to auscultation     Abdominal   Abdomen: soft             Anesthesia Plan    History of general anesthesia?: unknown/emergency  History of complications of general anesthesia?: no    ASA 3     general and other   There is reasoning for not using neuraxial anesthesia or a peripheral nerve block.  (Labs acceptable, EKG sr IVCD non-specific, GETA in past. Hx fent use noted  Pt. Fell asleep while I was attempting to discuss with her the anesthetic plan and expectations.)  The patient is a current smoker.  Education provided regarding risk of obstructive sleep apnea. (in appropriate circumstances)  intravenous induction   Anesthetic plan and risks discussed with patient.    Plan discussed with CRNA, CAA and attending.      "

## 2025-04-09 NOTE — ANESTHESIA PROCEDURE NOTES
Airway  Date/Time: 4/9/2025 1:14 PM  Urgency: elective    Airway not difficult    Staffing  Performed: CAA   Authorized by: Juan Leija DO    Performed by: KOBE Lagos  Patient location during procedure: OR    Indications and Patient Condition  Indications for airway management: anesthesia  Spontaneous Ventilation: absent  Sedation level: deep  Preoxygenated: yes  Patient position: sniffing  Mask difficulty assessment: 1 - vent by mask    Final Airway Details  Final airway type: endotracheal airway      Successful airway: ETT  Cuffed: yes   Successful intubation technique: direct laryngoscopy  Endotracheal tube insertion site: oral  Blade: Con  Blade size: #4  ETT size (mm): 7.5  Cormack-Lehane Classification: grade I - full view of glottis  Placement verified by: chest auscultation   Measured from: lips  ETT to lips (cm): 22  Number of attempts at approach: 1  Ventilation between attempts: BVM    Additional Comments  Easy by CAA, no stylet used

## 2025-04-10 LAB
ANION GAP SERPL CALCULATED.3IONS-SCNC: 9 MMOL/L (ref 10–20)
BUN SERPL-MCNC: 13 MG/DL (ref 6–23)
CALCIUM SERPL-MCNC: 8.7 MG/DL (ref 8.6–10.3)
CHLORIDE SERPL-SCNC: 106 MMOL/L (ref 98–107)
CO2 SERPL-SCNC: 25 MMOL/L (ref 21–32)
CREAT SERPL-MCNC: 1.08 MG/DL (ref 0.5–1.05)
EGFRCR SERPLBLD CKD-EPI 2021: 58 ML/MIN/1.73M*2
ERYTHROCYTE [DISTWIDTH] IN BLOOD BY AUTOMATED COUNT: 14.2 % (ref 11.5–14.5)
GLUCOSE SERPL-MCNC: 123 MG/DL (ref 74–99)
HCT VFR BLD AUTO: 38.9 % (ref 36–46)
HGB BLD-MCNC: 12.2 G/DL (ref 12–16)
MCH RBC QN AUTO: 29.8 PG (ref 26–34)
MCHC RBC AUTO-ENTMCNC: 31.4 G/DL (ref 32–36)
MCV RBC AUTO: 95 FL (ref 80–100)
NRBC BLD-RTO: 0 /100 WBCS (ref 0–0)
PLATELET # BLD AUTO: 269 X10*3/UL (ref 150–450)
POTASSIUM SERPL-SCNC: 4.4 MMOL/L (ref 3.5–5.3)
RBC # BLD AUTO: 4.1 X10*6/UL (ref 4–5.2)
SODIUM SERPL-SCNC: 136 MMOL/L (ref 136–145)
WBC # BLD AUTO: 10.5 X10*3/UL (ref 4.4–11.3)

## 2025-04-10 PROCEDURE — 97162 PT EVAL MOD COMPLEX 30 MIN: CPT | Mod: GP

## 2025-04-10 PROCEDURE — 2500000001 HC RX 250 WO HCPCS SELF ADMINISTERED DRUGS (ALT 637 FOR MEDICARE OP): Performed by: SURGERY

## 2025-04-10 PROCEDURE — 85027 COMPLETE CBC AUTOMATED: CPT | Performed by: SURGERY

## 2025-04-10 PROCEDURE — 2500000005 HC RX 250 GENERAL PHARMACY W/O HCPCS: Performed by: SURGERY

## 2025-04-10 PROCEDURE — 1100000001 HC PRIVATE ROOM DAILY

## 2025-04-10 PROCEDURE — 97166 OT EVAL MOD COMPLEX 45 MIN: CPT | Mod: GO

## 2025-04-10 PROCEDURE — 2500000002 HC RX 250 W HCPCS SELF ADMINISTERED DRUGS (ALT 637 FOR MEDICARE OP, ALT 636 FOR OP/ED): Performed by: SURGERY

## 2025-04-10 PROCEDURE — 80048 BASIC METABOLIC PNL TOTAL CA: CPT | Performed by: SURGERY

## 2025-04-10 PROCEDURE — 2500000004 HC RX 250 GENERAL PHARMACY W/ HCPCS (ALT 636 FOR OP/ED): Performed by: SURGERY

## 2025-04-10 PROCEDURE — 36415 COLL VENOUS BLD VENIPUNCTURE: CPT | Performed by: SURGERY

## 2025-04-10 RX ADMIN — BUSPIRONE HYDROCHLORIDE 15 MG: 15 TABLET ORAL at 20:01

## 2025-04-10 RX ADMIN — GABAPENTIN 400 MG: 400 CAPSULE ORAL at 20:01

## 2025-04-10 RX ADMIN — HYDROXYZINE HYDROCHLORIDE 25 MG: 25 TABLET, FILM COATED ORAL at 10:14

## 2025-04-10 RX ADMIN — OXYCODONE HYDROCHLORIDE 10 MG: 5 TABLET ORAL at 11:52

## 2025-04-10 RX ADMIN — OXYCODONE HYDROCHLORIDE 10 MG: 5 TABLET ORAL at 20:01

## 2025-04-10 RX ADMIN — ROPINIROLE HYDROCHLORIDE 1 MG: 1 TABLET, FILM COATED ORAL at 20:02

## 2025-04-10 RX ADMIN — LIDOCAINE 4% 1 PATCH: 40 PATCH TOPICAL at 10:16

## 2025-04-10 RX ADMIN — OXYCODONE HYDROCHLORIDE 10 MG: 5 TABLET ORAL at 15:55

## 2025-04-10 RX ADMIN — GABAPENTIN 400 MG: 400 CAPSULE ORAL at 13:41

## 2025-04-10 RX ADMIN — HYDROXYZINE HYDROCHLORIDE 25 MG: 25 TABLET, FILM COATED ORAL at 20:01

## 2025-04-10 RX ADMIN — GABAPENTIN 400 MG: 400 CAPSULE ORAL at 05:35

## 2025-04-10 RX ADMIN — OXYCODONE HYDROCHLORIDE 10 MG: 5 TABLET ORAL at 05:34

## 2025-04-10 RX ADMIN — CETIRIZINE HYDROCHLORIDE 10 MG: 10 TABLET, FILM COATED ORAL at 10:16

## 2025-04-10 RX ADMIN — QUETIAPINE FUMARATE 75 MG: 50 TABLET ORAL at 20:01

## 2025-04-10 RX ADMIN — CYCLOBENZAPRINE HYDROCHLORIDE 5 MG: 5 TABLET, FILM COATED ORAL at 10:15

## 2025-04-10 RX ADMIN — BUSPIRONE HYDROCHLORIDE 15 MG: 15 TABLET ORAL at 10:14

## 2025-04-10 RX ADMIN — ENOXAPARIN SODIUM 40 MG: 40 INJECTION SUBCUTANEOUS at 17:12

## 2025-04-10 RX ADMIN — TRAZODONE HYDROCHLORIDE 200 MG: 100 TABLET ORAL at 20:02

## 2025-04-10 RX ADMIN — GABAPENTIN 400 MG: 400 CAPSULE ORAL at 17:12

## 2025-04-10 RX ADMIN — BUPROPION HYDROCHLORIDE 450 MG: 300 TABLET, EXTENDED RELEASE ORAL at 10:14

## 2025-04-10 RX ADMIN — HYDROMORPHONE HYDROCHLORIDE 0.2 MG: 0.2 INJECTION, SOLUTION INTRAMUSCULAR; INTRAVENOUS; SUBCUTANEOUS at 03:42

## 2025-04-10 RX ADMIN — PANTOPRAZOLE SODIUM 20 MG: 40 INJECTION, POWDER, FOR SOLUTION INTRAVENOUS at 10:14

## 2025-04-10 RX ADMIN — DULOXETINE HYDROCHLORIDE 60 MG: 60 CAPSULE, DELAYED RELEASE ORAL at 10:14

## 2025-04-10 ASSESSMENT — COGNITIVE AND FUNCTIONAL STATUS - GENERAL
DAILY ACTIVITIY SCORE: 15
DAILY ACTIVITIY SCORE: 19
CLIMB 3 TO 5 STEPS WITH RAILING: TOTAL
MOBILITY SCORE: 17
CLIMB 3 TO 5 STEPS WITH RAILING: A LOT
TOILETING: A LOT
TURNING FROM BACK TO SIDE WHILE IN FLAT BAD: A LOT
MOVING FROM LYING ON BACK TO SITTING ON SIDE OF FLAT BED WITH BEDRAILS: A LITTLE
HELP NEEDED FOR BATHING: A LITTLE
HELP NEEDED FOR BATHING: A LOT
WALKING IN HOSPITAL ROOM: A LOT
STANDING UP FROM CHAIR USING ARMS: A LOT
DRESSING REGULAR LOWER BODY CLOTHING: TOTAL
PERSONAL GROOMING: A LITTLE
DAILY ACTIVITIY SCORE: 18
MOVING FROM LYING ON BACK TO SITTING ON SIDE OF FLAT BED WITH BEDRAILS: A LITTLE
TURNING FROM BACK TO SIDE WHILE IN FLAT BAD: A LITTLE
PERSONAL GROOMING: A LITTLE
TURNING FROM BACK TO SIDE WHILE IN FLAT BAD: A LITTLE
TURNING FROM BACK TO SIDE WHILE IN FLAT BAD: A LITTLE
TOILETING: A LITTLE
DRESSING REGULAR UPPER BODY CLOTHING: A LITTLE
MOBILITY SCORE: 17
WALKING IN HOSPITAL ROOM: A LITTLE
MOVING FROM LYING ON BACK TO SITTING ON SIDE OF FLAT BED WITH BEDRAILS: A LITTLE
MOBILITY SCORE: 17
TOILETING: A LITTLE
CLIMB 3 TO 5 STEPS WITH RAILING: A LOT
DRESSING REGULAR LOWER BODY CLOTHING: A LOT
TOILETING: A LITTLE
HELP NEEDED FOR BATHING: A LITTLE
MOVING TO AND FROM BED TO CHAIR: A LITTLE
STANDING UP FROM CHAIR USING ARMS: A LITTLE
WALKING IN HOSPITAL ROOM: A LITTLE
DRESSING REGULAR LOWER BODY CLOTHING: A LOT
WALKING IN HOSPITAL ROOM: A LITTLE
STANDING UP FROM CHAIR USING ARMS: A LITTLE
MOBILITY SCORE: 12
MOVING FROM LYING ON BACK TO SITTING ON SIDE OF FLAT BED WITH BEDRAILS: A LITTLE
DRESSING REGULAR UPPER BODY CLOTHING: A LITTLE
STANDING UP FROM CHAIR USING ARMS: A LITTLE
DAILY ACTIVITIY SCORE: 17
MOVING TO AND FROM BED TO CHAIR: A LITTLE
MOVING TO AND FROM BED TO CHAIR: A LOT
EATING MEALS: A LITTLE
DRESSING REGULAR UPPER BODY CLOTHING: A LITTLE
CLIMB 3 TO 5 STEPS WITH RAILING: A LOT
DRESSING REGULAR LOWER BODY CLOTHING: A LOT
MOVING TO AND FROM BED TO CHAIR: A LITTLE
PERSONAL GROOMING: A LITTLE
DRESSING REGULAR UPPER BODY CLOTHING: A LITTLE
HELP NEEDED FOR BATHING: A LITTLE

## 2025-04-10 ASSESSMENT — PAIN DESCRIPTION - DESCRIPTORS
DESCRIPTORS: ACHING
DESCRIPTORS: ACHING;DISCOMFORT
DESCRIPTORS: ACHING
DESCRIPTORS: ACHING
DESCRIPTORS: ACHING;DISCOMFORT;SQUEEZING
DESCRIPTORS: ACHING;DISCOMFORT
DESCRIPTORS: ACHING;DISCOMFORT

## 2025-04-10 ASSESSMENT — PAIN DESCRIPTION - LOCATION
LOCATION: ABDOMEN

## 2025-04-10 ASSESSMENT — ACTIVITIES OF DAILY LIVING (ADL)
BATHING_ASSISTANCE: MODERATE
ADL_ASSISTANCE: NEEDS ASSISTANCE
ADL_ASSISTANCE: NEEDS ASSISTANCE

## 2025-04-10 ASSESSMENT — PAIN SCALES - GENERAL
PAINLEVEL_OUTOF10: 8
PAINLEVEL_OUTOF10: 7
PAINLEVEL_OUTOF10: 8
PAINLEVEL_OUTOF10: 6
PAINLEVEL_OUTOF10: 9
PAINLEVEL_OUTOF10: 8
PAINLEVEL_OUTOF10: 5 - MODERATE PAIN
PAINLEVEL_OUTOF10: 6
PAINLEVEL_OUTOF10: 8
PAINLEVEL_OUTOF10: 9
PAINLEVEL_OUTOF10: 10 - WORST POSSIBLE PAIN
PAINLEVEL_OUTOF10: 5 - MODERATE PAIN

## 2025-04-10 ASSESSMENT — PAIN DESCRIPTION - ORIENTATION
ORIENTATION: MID;LEFT
ORIENTATION: MID

## 2025-04-10 NOTE — PROGRESS NOTES
"Azul Bates \"Cheli" is a 65 y.o. female on day 1 of admission presenting with Colostomy in place (Multi).    Subjective   Pain as expected, some appetite       Objective     Physical Exam  Constitutional:       Appearance: Normal appearance.   HENT:      Head: Normocephalic.   Cardiovascular:      Rate and Rhythm: Normal rate and regular rhythm.      Pulses: Normal pulses.   Pulmonary:      Effort: Pulmonary effort is normal.   Abdominal:      General: Bowel sounds are normal.      Palpations: Abdomen is soft.      Comments: Old stoma site packing removed   Musculoskeletal:         General: Normal range of motion.   Skin:     General: Skin is warm.   Neurological:      General: No focal deficit present.      Mental Status: She is alert.   Psychiatric:         Mood and Affect: Mood normal.         Behavior: Behavior normal.         Last Recorded Vitals  Blood pressure 127/66, pulse 99, temperature 37 °C (98.6 °F), temperature source Oral, resp. rate 17, height 1.72 m (5' 7.72\"), weight 120 kg (264 lb 15.9 oz), SpO2 95%.  Intake/Output last 3 Shifts:  I/O last 3 completed shifts:  In: 1622 (13.5 mL/kg) [P.O.:240; I.V.:282 (2.3 mL/kg); IV Piggyback:1100]  Out: 30 (0.2 mL/kg) [Blood:30]  Weight: 120.2 kg     Relevant Results                              Assessment/Plan   Assessment & Plan  Colostomy in place (Multi)    POD 1 s/p loop colostomy reversal  Advance diet as tolerated  Cap ivf  Ambulate/IS/pulm toilet  PT/OT  Home meds  Dvt ppx in house  Discharge planning next 1-2 days once tolerating diet and ambulating         I spent 15 minutes in the professional and overall care of this patient.      Vincent Hairston MD      "

## 2025-04-10 NOTE — NURSING NOTE
Bed side shift report received. Patient resting comfortably. Reports no current concerns at this time. Dressing to abdomen with some old shadowing. Abdominal binder in place.  Call light within reach. This nurse assumed care. Bed alarm active. Bed in lowest position.

## 2025-04-10 NOTE — CARE PLAN
The patient's goals for the shift include reduce pain    The clinical goals for the shift include pain managemnt    Over the shift, the patient did not make progress toward the following goals. Barriers to progression include increase in pain. Recommendations to address these barriers include adequate pain management.

## 2025-04-10 NOTE — PROGRESS NOTES
"Physical Therapy    Physical Therapy Evaluation    Patient Name: Azul Bates \"Stephenie\"  MRN: [id number]  Department: [address]  Room: [address] [place] [address]  Today's Date: 4/10/2025   Time Calculation  Start Time: 0805  Stop Time: 0825  Time Calculation (min): 20 min    Assessment/Plan   PT Assessment  PT Assessment Results: Decreased strength, Decreased range of motion, Decreased endurance, Impaired balance, Decreased mobility, Decreased coordination, Impaired judgement, Decreased safety awareness, Impaired vision, Obesity, Decreased skin integrity, Pain  Rehab Prognosis: Good  Barriers to Discharge Home: No anticipated barriers  Evaluation/Treatment Tolerance: Patient limited by pain  Medical Staff Made Aware: Yes  Strengths: Support of Caregivers  Barriers to Participation: Comorbidities  End of Session Communication: Bedside nurse  Assessment Comment: The patient is a 65 y.o. female admitted to the hospital for colostomy closure completed on 4/9/25. The patient currently requires modA for bed mobility, transfers, and ambulation with HHA. The patient would continue to benefit from skilled therapy services to address functional deficits.  End of Session Patient Position: Bed, 3 rail up, Alarm on  IP OR SWING BED PT PLAN  Inpatient or Swing Bed: Inpatient  PT Plan  Treatment/Interventions: Bed mobility, Transfer training, Gait training, Balance training, Neuromuscular re-education, Strengthening, Endurance training, Range of motion, Therapeutic exercise, Therapeutic activity, Home exercise program, Wheelchair management  PT Plan: Ongoing PT  PT Frequency: 4 times per week  PT Discharge Recommendations: Low intensity level of continued care  Equipment Recommended upon Discharge: Wheeled walker, Wheelchair  PT Recommended Transfer Status: Assist x1  PT - OK to Discharge: Yes    Subjective   General Visit Information:  General  Reason for Referral: mobility impairment due to colostomy take down  Referred By: Vincent Hairston MD  Past " Medical History Relevant to Rehab: anemia, anx, bipolar disorder, chronic pain syndrome, Hep C, colostomy, cirrhosis, sacral decubitus ulcer, COPD, multiple SI attempts, dep, HTN, opioid abuse  Family/Caregiver Present: No  Co-Treatment: OT  Co-Treatment Reason: to optimize pt outcomes  Prior to Session Communication: Bedside nurse  Patient Position Received: Bed, 3 rail up, Alarm on  Preferred Learning Style: verbal, visual  General Comment: The patient is a 65 y.o. female admitted to the hospital s/p colostomy closure completed on 4-9-25.  Home Living:  Home Living  Type of Home: Long Term Care facility  Home Adaptive Equipment: Walker rolling or standard, Wheelchair-manual  Home Layout: One level  Home Access: Level entry  Bathroom Shower/Tub: Walk-in shower  Bathroom Toilet: Handicapped height  Bathroom Equipment: Grab bars in shower, Shower chair with back  Home Living Comments: Pt is a resident at [facility] facility  Prior Level of Function:  Prior Function Per Pt/Caregiver Report  Level of [place]: Needs assistance with ADLs, Needs assistance with homemaking  Receives Help From: Personal care attendant  ADL Assistance: Needs assistance (set-up for bathing and dressing; intermittent assist with LB; set-up for toileting)  Homemaking Assistance: Needs assistance (completed by facility)  Ambulatory Assistance: Needs assistance (RW for short household distances; WC for most of the day; supervision for transfers)  Hand Dominance: Left  Prior Function Comments: pt has been at resident at [facility] for approx. 1 year; limited ambulation with RW; mainly WC level; able to self propel  Precautions:  Precautions  Hearing/Visual Limitations: glasses  Medical Precautions: Abdominal precautions, Fall precautions  Precautions Comment: abdominal binder in place       Vital Signs Comment: pt on room air     Objective   Pain:  Pain Assessment  Pain Assessment: 0-10  0-10 (Numeric) Pain Score: 8  Pain Type: Acute pain,  Surgical pain  Pain Location: Abdomen  Pain Orientation: Mid  Pain Interventions: Repositioned, Ambulation/increased activity  Response to Interventions: Content/relaxed  Cognition:  Cognition  Overall Cognitive Status: Within Functional Limits  Orientation Level: Oriented X4  Following Commands: Follows one step commands without difficulty  Insight: Moderate  Impulsive: Within functional limits    General Assessments:  General Observation  General Observation: pleasant; agreeable to mobility     Activity Tolerance  Endurance: Decreased tolerance for upright activites  Activity Tolerance Comments: limited due to pain  Rate of Perceived Exertion (RPE): 5/10    Sensation  Sensation Comment: pt denies paresthesia    Strength  Strength Comments: BLE grossly 3+/5  Coordination  Coordination Comment: increased time and effort for mobility    Postural Control  Posture Comment: mild forward head posture    Static Sitting Balance  Static Sitting-Balance Support: Feet supported, Bilateral upper extremity supported  Static Sitting-Level of Assistance: Close supervision  Static Sitting-Comment/Number of Minutes: EOB for approx. 5 min    Static Standing Balance  Static Standing-Balance Support: Bilateral upper extremity supported (HHA)  Static Standing-Level of Assistance: Moderate assistance  Static Standing-Comment/Number of Minutes: forward flexed posture  Dynamic Standing Balance  Dynamic Standing-Balance Support: Bilateral upper extremity supported (HHA)  Dynamic Standing-Level of Assistance: Moderate assistance  Dynamic Standing-Comments: ModA for steps; shuffled-like gait  Functional Assessments:  Bed Mobility  Bed Mobility: Yes  Bed Mobility 1  Bed Mobility 1: Supine to sitting  Level of Assistance 1: Moderate assistance  Bed Mobility Comments 1: assist with trunk elevation; use of bed rails; assist with scooting with draw sheet; HOB to 30 deg  Bed Mobility 2  Bed Mobility  2: Sitting to supine  Level of Assistance 2:  Moderate assistance  Bed Mobility Comments 2: modA for trunk down and BLE into supine. VCs for safe sequencingt    Transfers  Transfer: Yes  Transfer 1  Transfer From 1: Bed to, Stand to  Transfer to 1: Stand, Commode-drop arm  Technique 1: Sit to stand, Stand to sit  Transfer Level of Assistance 1: Hand held assistance, Moderate assistance  Trials/Comments 1: VCs for safe hand placement  Transfers 2  Transfer From 2: Commode-drop arm to, Stand to  Transfer to 2: Stand, Bed  Technique 2: Sit to stand, Stand to sit  Transfer Level of Assistance 2: Hand held assistance, Moderate assistance  Trials/Comments 2: modA for trunk elevation    Ambulation/Gait Training  Ambulation/Gait Training Performed: Yes  Ambulation/Gait Training 1  Surface 1: Level tile  Device 1: No device  Assistance 1: Hand held assistance, Moderate assistance  Quality of Gait 1: Shuffling gait  Comments/Distance (ft) 1: 3ftx2 with modA and HHA; VCs for safe sequencing and for directional change towards bed; shuffled steps.  Extremity/Trunk Assessments:  RLE   RLE :  (grossly 3+/5)  LLE   LLE :  (grossly 3+/5)  Outcome Measures:  [facility] Basic Mobility  Turning from your back to your side while in a flat bed without using bedrails: A little  Moving from lying on your back to sitting on the side of a flat bed without using bedrails: A lot  Moving to and from bed to chair (including a wheelchair): A lot  Standing up from a chair using your arms (e.g. wheelchair or bedside chair): A lot  To walk in hospital room: A lot  Climbing 3-5 steps with railing: Total  Basic Mobility - Total Score: 12    Encounter Problems       Encounter Problems (Active)       PT Problem       Strength (Progressing)       Start:  04/10/25    Expected End:  05/10/25       The patient will demonstrate an overall strength of >4/5 in BLE to assist with completion of functional mobility.           Functional Mobility (Progressing)       Start:  04/10/25    Expected End:  05/10/25        The patient will complete functional mobility (bed mobility, transfers, etc.) at a close supervision level with LRAD by DC.           Ambulation (Progressing)       Start:  04/10/25    Expected End:  05/10/25       The patient will be able to ambulate at a close supervision level for >10ftx1 with RW.          Balance (Progressing)       Start:  04/10/25    Expected End:  05/10/25       The patient will demonstrate good dynamic standing balance during activity with LRAD.          WC management (Progressing)       Start:  04/10/25    Expected End:  05/10/25       The patient will be able to self-propel WC 30ftx1 on an even surface.             Pain - Adult              Education Documentation  Mobility Training, taught by Lucita Hart PT at 4/10/2025 10:54 AM.  Learner: Patient  Readiness: Acceptance  Method: Explanation  Response: Verbalizes Understanding  Comment: educated pt on PT POC    Education Comments  No comments found.

## 2025-04-10 NOTE — PROGRESS NOTES
"Occupational Therapy    Evaluation    Patient Name: Azul Bates \"Stephenie\"  MRN: [id number]  Department: [address]  Room: [address] [place] [address]  Today's Date: 4/10/2025  Time Calculation  Start Time: 0806  Stop Time: 0833  Time Calculation (min): 27 min        Assessment:  OT Assessment: Pt presents on eval with generalized weakness, abdominal precautions, decreased activity tolerance, and impaired standing balance affecting self-care and functional transfers/mobility. Pt will benefit from continued skilled OT to address these deficits and facilitate returning to functional baseline.  Prognosis: Good  Barriers to Discharge Home: Physical needs  Physical Needs: 24hr mobility assistance needed, Intermittent ADL assistance needed, High falls risk due to function or environment  Evaluation/Treatment Tolerance: Patient limited by pain  End of Session Communication: Bedside nurse  End of Session Patient Position: Bed, 3 rail up, Alarm on (Needs in reach.)  OT Assessment Results: Decreased ADL status, Decreased upper extremity range of motion, Decreased upper extremity strength, Decreased endurance, Decreased functional mobility, Decreased trunk control for functional activities  Strengths: Support of Caregivers  Barriers to Participation: Comorbidities    Plan:  Treatment Interventions: ADL retraining, Functional transfer training, UE strengthening/ROM, Endurance training, Patient/family training, Equipment evaluation/education, Neuromuscular reeducation, Compensatory technique education  OT Frequency: 4 times per week  OT Discharge Recommendations: Moderate intensity level of continued care  Equipment Recommended upon Discharge: Wheeled walker, Wheelchair  OT Recommended Transfer Status: Moderate assist  OT - OK to Discharge: Yes      Subjective     General:  General  Reason for Referral: s/p abdominal surgery, impaired ADL's  Referred By: Vincent Hairston MD  Past Medical History Relevant to Rehab: anemia, anx, bipolar disorder, chronic " pain syndrome, Hep C, colostomy, cirrhosis, sacral decubitus ulcer, COPD, multiple SI attempts, dep, HTN, opioid abuse  Family/Caregiver Present: No  Co-Treatment: PT  Co-Treatment Reason: to optimize pt outcomes  Prior to Session Communication: Bedside nurse  Patient Position Received: Bed, 3 rail up, Alarm on  General Comment: The patient is a 65 y.o. female admitted to the hospital s/p colostomy closure completed on 4-9-25.    Precautions:  Hearing/Visual Limitations: glasses  Medical Precautions: Fall precautions  Post-Surgical Precautions: Abdominal surgery precautions  Precautions Comment: abdominal binder in place    Pain:  Pain Assessment  Pain Assessment: 0-10  0-10 (Numeric) Pain Score: 8  Pain Type: Surgical pain, Acute pain  Pain Location: Abdomen  Pain Orientation: Mid  Pain Interventions: Repositioned, Ambulation/increased activity, Other (Comment) (Nurse aware.)    Objective     Cognition:  Overall Cognitive Status: Within Functional Limits  Orientation Level: Oriented X4    Home Living:  Type of Home: Long Term Care facility  Lives With: Other (Comment) (care staff)  Home Adaptive Equipment: Walker rolling or standard, Wheelchair-manual  Home Layout: One level  Home Access: Level entry  Bathroom Shower/Tub: Walk-in shower  Bathroom Toilet: Handicapped height  Bathroom Equipment: Grab bars in shower, Shower chair with back  Home Living Comments: Pt is a resident at [facility] facility    Prior Function:  Level of [place]: Needs assistance with ADLs, Needs assistance with homemaking  Receives Help From: Personal care attendant  ADL Assistance: Needs assistance (set-up for bathing and dressing; intermittent assist with LB; set-up for toileting)  Homemaking Assistance: Needs assistance (completed by facility)  Ambulatory Assistance: Needs assistance (RW for short household distances; W/C for most of the day; supervision for transfers)  Hand Dominance: Left  Prior Function Comments: pt has been at  resident at LT for approx. 1 year; limited ambulation with RW; mainly WC level; able to self propel    ADL:  Eating Assistance: Independent  Grooming Assistance: Minimal  Grooming Deficit: Other (Comment) (in bed or seated only)  Bathing Assistance: Moderate  UE Dressing Assistance: Minimal  LE Dressing Assistance: Total  LE Dressing Deficit: Don/doff R sock, Don/doff L sock (due to abdominal precautions)  Toileting Assistance with Device: Moderate  Toileting Deficit: Perineal hygiene, Clothing management down, Clothing management up, Steadying, Bedside commode    Activity Tolerance:  Activity Tolerance Comments: limited due to pain    Bed Mobility/Transfers: Bed Mobility  Bed Mobility:  (Pt completed log roll and supine<>sit in bed with mod A for trunk.)    Transfers  Transfer:  (Pt completed sit<>stand with mod A for balance/safety and verbal cues for sequencing.)    Functional Mobility:  Functional Mobility  Functional Mobility Performed:  (Pt able to take a few steps to the bedside commode with mod A for balance/safety and increased time to complete.)    Standing Balance:  Static Standing Balance  Static Standing-Balance Support: Right upper extremity supported  Static Standing-Level of Assistance: Moderate assistance     Sensation:  Sensation Comment: pt denies paresthesias    Strength:  Strength Comments: RUE 2/5 shoulder, 3+/5 distally (LUE 3+/5)    Coordination:  Coordination Comment: BONNIE's WFL grossly     Hand Function:  Gross Grasp: Functional  Coordination: Functional      Outcome Measures:[facility] Daily Activity  Putting on and taking off regular lower body clothing: Total  Bathing (including washing, rinsing, drying): A lot  Putting on and taking off regular upper body clothing: A little  Toileting, which includes using toilet, bedpan or urinal: A lot  Taking care of personal grooming such as brushing teeth: A little  Eating Meals: None  Daily Activity - Total Score: 15    Education  Documentation  Precautions, taught by Kimani Verdugo Jr., OT at 4/10/2025 12:48 PM.  Learner: Patient  Readiness: Acceptance  Method: Explanation  Response: Verbalizes Understanding  Comment: Education and verbal cues provided throughout eval.    ADL Training, taught by Kimani Verdugo Jr., OT at 4/10/2025 12:48 PM.  Learner: Patient  Readiness: Acceptance  Method: Explanation  Response: Verbalizes Understanding  Comment: Education and verbal cues provided throughout eval.    Education Comments  No comments found.    Goals:  Encounter Problems       Encounter Problems (Active)       OT Goals       Pt will complete all grooming tasks with setup at W/C level. (Progressing)       Start:  04/10/25    Expected End:  05/10/25            Pt will complete UB dressing/bathing with setup and LB dressing/bathing with close S using adaptive equipment as needed for abdominal precautiuons. (Progressing)       Start:  04/10/25    Expected End:  05/10/25            Pt will complete all toileting tasks with close S. (Progressing)       Start:  04/10/25    Expected End:  05/10/25            Pt will complete all functional transfers and mobility with close S using a RW or no device. (Progressing)       Start:  04/10/25    Expected End:  05/10/25

## 2025-04-10 NOTE — CARE PLAN
The patient's goals for the shift include reduce pain    The clinical goals for the shift include pain managemnt

## 2025-04-10 NOTE — NURSING NOTE
Bed side shift report received. Patient resting comfortably. Reports she would like pain medications when available. Call light within reach. This nurse assumed care. Bed alarm active. Bed in lowest position.

## 2025-04-10 NOTE — CARE PLAN
The patient's goals for the shift include reduce pain    The clinical goals for the shift include pain management. safety, tolerate advanced diet.    Over the shift, the patient did not make progress toward the following goals. Barriers to progression include pain Recommendations to address these barriers include manage pain.

## 2025-04-11 LAB
ANION GAP SERPL CALCULATED.3IONS-SCNC: 10 MMOL/L (ref 10–20)
BUN SERPL-MCNC: 9 MG/DL (ref 6–23)
CALCIUM SERPL-MCNC: 8.8 MG/DL (ref 8.6–10.3)
CHLORIDE SERPL-SCNC: 104 MMOL/L (ref 98–107)
CO2 SERPL-SCNC: 28 MMOL/L (ref 21–32)
CREAT SERPL-MCNC: 1.09 MG/DL (ref 0.5–1.05)
EGFRCR SERPLBLD CKD-EPI 2021: 57 ML/MIN/1.73M*2
ERYTHROCYTE [DISTWIDTH] IN BLOOD BY AUTOMATED COUNT: 14.1 % (ref 11.5–14.5)
GLUCOSE SERPL-MCNC: 134 MG/DL (ref 74–99)
HCT VFR BLD AUTO: 37.3 % (ref 36–46)
HGB BLD-MCNC: 11.4 G/DL (ref 12–16)
MCH RBC QN AUTO: 29.3 PG (ref 26–34)
MCHC RBC AUTO-ENTMCNC: 30.6 G/DL (ref 32–36)
MCV RBC AUTO: 96 FL (ref 80–100)
NRBC BLD-RTO: 0 /100 WBCS (ref 0–0)
PLATELET # BLD AUTO: 244 X10*3/UL (ref 150–450)
POTASSIUM SERPL-SCNC: 4.7 MMOL/L (ref 3.5–5.3)
RBC # BLD AUTO: 3.89 X10*6/UL (ref 4–5.2)
SODIUM SERPL-SCNC: 137 MMOL/L (ref 136–145)
WBC # BLD AUTO: 11.4 X10*3/UL (ref 4.4–11.3)

## 2025-04-11 PROCEDURE — 2500000004 HC RX 250 GENERAL PHARMACY W/ HCPCS (ALT 636 FOR OP/ED): Performed by: SURGERY

## 2025-04-11 PROCEDURE — 2500000001 HC RX 250 WO HCPCS SELF ADMINISTERED DRUGS (ALT 637 FOR MEDICARE OP): Performed by: SURGERY

## 2025-04-11 PROCEDURE — 1100000001 HC PRIVATE ROOM DAILY

## 2025-04-11 PROCEDURE — 2500000002 HC RX 250 W HCPCS SELF ADMINISTERED DRUGS (ALT 637 FOR MEDICARE OP, ALT 636 FOR OP/ED): Performed by: SURGERY

## 2025-04-11 PROCEDURE — 99024 POSTOP FOLLOW-UP VISIT: CPT | Performed by: STUDENT IN AN ORGANIZED HEALTH CARE EDUCATION/TRAINING PROGRAM

## 2025-04-11 PROCEDURE — 97116 GAIT TRAINING THERAPY: CPT | Mod: GP,CQ

## 2025-04-11 PROCEDURE — 36415 COLL VENOUS BLD VENIPUNCTURE: CPT | Performed by: SURGERY

## 2025-04-11 PROCEDURE — 80048 BASIC METABOLIC PNL TOTAL CA: CPT | Performed by: SURGERY

## 2025-04-11 PROCEDURE — 85027 COMPLETE CBC AUTOMATED: CPT | Performed by: SURGERY

## 2025-04-11 PROCEDURE — 2500000005 HC RX 250 GENERAL PHARMACY W/O HCPCS: Performed by: SURGERY

## 2025-04-11 PROCEDURE — 97110 THERAPEUTIC EXERCISES: CPT | Mod: GP,CQ

## 2025-04-11 RX ADMIN — BUSPIRONE HYDROCHLORIDE 15 MG: 15 TABLET ORAL at 21:29

## 2025-04-11 RX ADMIN — TRAZODONE HYDROCHLORIDE 200 MG: 100 TABLET ORAL at 21:29

## 2025-04-11 RX ADMIN — LIDOCAINE 4% 1 PATCH: 40 PATCH TOPICAL at 08:59

## 2025-04-11 RX ADMIN — DULOXETINE HYDROCHLORIDE 60 MG: 60 CAPSULE, DELAYED RELEASE ORAL at 08:58

## 2025-04-11 RX ADMIN — ENOXAPARIN SODIUM 40 MG: 40 INJECTION SUBCUTANEOUS at 17:12

## 2025-04-11 RX ADMIN — OXYCODONE HYDROCHLORIDE 10 MG: 5 TABLET ORAL at 21:29

## 2025-04-11 RX ADMIN — PANTOPRAZOLE SODIUM 20 MG: 40 INJECTION, POWDER, FOR SOLUTION INTRAVENOUS at 08:57

## 2025-04-11 RX ADMIN — CYCLOBENZAPRINE HYDROCHLORIDE 5 MG: 5 TABLET, FILM COATED ORAL at 08:59

## 2025-04-11 RX ADMIN — ACETAMINOPHEN 650 MG: 325 TABLET ORAL at 12:35

## 2025-04-11 RX ADMIN — CETIRIZINE HYDROCHLORIDE 10 MG: 10 TABLET, FILM COATED ORAL at 08:58

## 2025-04-11 RX ADMIN — BUSPIRONE HYDROCHLORIDE 15 MG: 15 TABLET ORAL at 08:59

## 2025-04-11 RX ADMIN — BUPROPION HYDROCHLORIDE 450 MG: 300 TABLET, EXTENDED RELEASE ORAL at 08:58

## 2025-04-11 RX ADMIN — GABAPENTIN 400 MG: 400 CAPSULE ORAL at 17:12

## 2025-04-11 RX ADMIN — OXYCODONE HYDROCHLORIDE 10 MG: 5 TABLET ORAL at 01:10

## 2025-04-11 RX ADMIN — OXYCODONE HYDROCHLORIDE 10 MG: 5 TABLET ORAL at 09:08

## 2025-04-11 RX ADMIN — OXYCODONE HYDROCHLORIDE 10 MG: 5 TABLET ORAL at 17:12

## 2025-04-11 RX ADMIN — OXYCODONE HYDROCHLORIDE 10 MG: 5 TABLET ORAL at 05:15

## 2025-04-11 RX ADMIN — OXYCODONE HYDROCHLORIDE 10 MG: 5 TABLET ORAL at 13:15

## 2025-04-11 RX ADMIN — HYDROXYZINE HYDROCHLORIDE 25 MG: 25 TABLET, FILM COATED ORAL at 08:58

## 2025-04-11 RX ADMIN — TIZANIDINE 4 MG: 4 TABLET ORAL at 21:35

## 2025-04-11 RX ADMIN — HYDROXYZINE HYDROCHLORIDE 25 MG: 25 TABLET, FILM COATED ORAL at 21:30

## 2025-04-11 RX ADMIN — QUETIAPINE FUMARATE 75 MG: 50 TABLET ORAL at 21:32

## 2025-04-11 RX ADMIN — TIZANIDINE 4 MG: 4 TABLET ORAL at 12:35

## 2025-04-11 RX ADMIN — GABAPENTIN 400 MG: 400 CAPSULE ORAL at 21:30

## 2025-04-11 RX ADMIN — GABAPENTIN 400 MG: 400 CAPSULE ORAL at 06:17

## 2025-04-11 RX ADMIN — GABAPENTIN 400 MG: 400 CAPSULE ORAL at 12:32

## 2025-04-11 RX ADMIN — ROPINIROLE HYDROCHLORIDE 1 MG: 1 TABLET, FILM COATED ORAL at 21:29

## 2025-04-11 ASSESSMENT — PAIN SCALES - GENERAL
PAINLEVEL_OUTOF10: 0 - NO PAIN
PAINLEVEL_OUTOF10: 8
PAINLEVEL_OUTOF10: 8
PAINLEVEL_OUTOF10: 0 - NO PAIN
PAINLEVEL_OUTOF10: 7
PAINLEVEL_OUTOF10: 8
PAINLEVEL_OUTOF10: 3
PAINLEVEL_OUTOF10: 8
PAINLEVEL_OUTOF10: 8
PAINLEVEL_OUTOF10: 5 - MODERATE PAIN
PAINLEVEL_OUTOF10: 9

## 2025-04-11 ASSESSMENT — COGNITIVE AND FUNCTIONAL STATUS - GENERAL
TURNING FROM BACK TO SIDE WHILE IN FLAT BAD: A LITTLE
DRESSING REGULAR UPPER BODY CLOTHING: A LITTLE
TOILETING: A LITTLE
WALKING IN HOSPITAL ROOM: A LITTLE
WALKING IN HOSPITAL ROOM: A LOT
TURNING FROM BACK TO SIDE WHILE IN FLAT BAD: A LITTLE
STANDING UP FROM CHAIR USING ARMS: A LITTLE
MOVING FROM LYING ON BACK TO SITTING ON SIDE OF FLAT BED WITH BEDRAILS: A LITTLE
MOVING FROM LYING ON BACK TO SITTING ON SIDE OF FLAT BED WITH BEDRAILS: A LITTLE
MOVING TO AND FROM BED TO CHAIR: A LITTLE
CLIMB 3 TO 5 STEPS WITH RAILING: A LOT
MOBILITY SCORE: 16
CLIMB 3 TO 5 STEPS WITH RAILING: TOTAL
MOBILITY SCORE: 16
HELP NEEDED FOR BATHING: A LITTLE
STANDING UP FROM CHAIR USING ARMS: A LITTLE
MOVING TO AND FROM BED TO CHAIR: A LITTLE
DRESSING REGULAR LOWER BODY CLOTHING: A LITTLE
DAILY ACTIVITIY SCORE: 20

## 2025-04-11 ASSESSMENT — PAIN DESCRIPTION - ORIENTATION
ORIENTATION: RIGHT
ORIENTATION: MID
ORIENTATION: MID
ORIENTATION: LEFT;RIGHT
ORIENTATION: RIGHT
ORIENTATION: MID
ORIENTATION: MID

## 2025-04-11 ASSESSMENT — PAIN DESCRIPTION - LOCATION
LOCATION: INCISION
LOCATION: ABDOMEN
LOCATION: ABDOMEN
LOCATION: SHOULDER
LOCATION: INCISION
LOCATION: SHOULDER
LOCATION: ABDOMEN

## 2025-04-11 ASSESSMENT — PAIN DESCRIPTION - DESCRIPTORS
DESCRIPTORS: ACHING;DISCOMFORT;SQUEEZING
DESCRIPTORS: ACHING;DISCOMFORT
DESCRIPTORS: ACHING;DISCOMFORT;SQUEEZING
DESCRIPTORS: ACHING
DESCRIPTORS: ACHING;DISCOMFORT;NAGGING

## 2025-04-11 ASSESSMENT — PAIN SCALES - PAIN ASSESSMENT IN ADVANCED DEMENTIA (PAINAD): TOTALSCORE: MEDICATION (SEE MAR)

## 2025-04-11 ASSESSMENT — PAIN - FUNCTIONAL ASSESSMENT
PAIN_FUNCTIONAL_ASSESSMENT: 0-10
PAIN_FUNCTIONAL_ASSESSMENT: FLACC (FACE, LEGS, ACTIVITY, CRY, CONSOLABILITY)
PAIN_FUNCTIONAL_ASSESSMENT: 0-10
PAIN_FUNCTIONAL_ASSESSMENT: FLACC (FACE, LEGS, ACTIVITY, CRY, CONSOLABILITY)
PAIN_FUNCTIONAL_ASSESSMENT: 0-10
PAIN_FUNCTIONAL_ASSESSMENT: FLACC (FACE, LEGS, ACTIVITY, CRY, CONSOLABILITY)
PAIN_FUNCTIONAL_ASSESSMENT: 0-10

## 2025-04-11 NOTE — NURSING NOTE
VSS, pt complaining of pain in abd, reassessed under dsg and no bleeding or abnormality noted , pt is not due until 0000 for more pain medication , bed locked and low position. Belongings in reach. Call light in reach. Plan of care ongoing.

## 2025-04-11 NOTE — PROGRESS NOTES
"Physical Therapy    Physical Therapy Treatment    Patient Name: Azul Bates \"Stephenie\"  MRN: [id number]  Department: [address]  Room: [address] [place] [address]  Today's Date: 4/11/2025  Time Calculation  Start Time: 1142  Stop Time: 1213  Time Calculation (min): 31 min         Assessment/Plan   PT Assessment  Rehab Prognosis: Good  Barriers to Discharge Home: No anticipated barriers  End of Session Communication: Bedside nurse  Assessment Comment: Pt progressing steadily towards stated goals and requiring less assist for functional mobility. Pt would continue to benefit from skilled therapy services to address functional deficits to maximize safety and independence.  End of Session Patient Position: Bed, 3 rail up, Alarm on  PT Plan  Inpatient/Swing Bed or Outpatient: Inpatient  PT Plan  Treatment/Interventions: Bed mobility, Transfer training, Gait training, Balance training, Neuromuscular re-education, Strengthening, Endurance training, Range of motion, Therapeutic exercise, Therapeutic activity, Home exercise program, Wheelchair management  PT Plan: Ongoing PT  PT Frequency: 4 times per week  PT Discharge Recommendations: Low intensity level of continued care  Equipment Recommended upon Discharge: Wheeled walker, Wheelchair  PT Recommended Transfer Status: Assist x1  PT - OK to Discharge: Yes      General Visit Information:   PT  Visit  PT Received On: 04/11/25  General  Reason for Referral: mobility impairment due to colostomy take down  Referred By: Vincent Hairston MD  Past Medical History Relevant to Rehab: anemia, anx, bipolar disorder, chronic pain syndrome, Hep C, colostomy, cirrhosis, sacral decubitus ulcer, COPD, multiple SI attempts, dep, HTN, opioid abuse  Family/Caregiver Present: No  Prior to Session Communication: Bedside nurse  Patient Position Received: Up in chair, Alarm off, not on at start of session  General Comment: Pt cleared for PT by nursing. Pt agreeable to PT services.    Subjective "   Precautions:  Precautions  Medical Precautions: Fall precautions  Post-Surgical Precautions: Abdominal surgery precautions      Objective   Pain:  Pain Assessment  Pain Assessment: 0-10  0-10 (Numeric) Pain Score: 8  Pain Type: Surgical pain  Pain Location: Abdomen  Pain Interventions: Ambulation/increased activity, Repositioned  Cognition:  Cognition  Overall Cognitive Status: Within Functional Limits  Orientation Level: Oriented X4     Postural Control:  Static Sitting Balance  Static Sitting-Balance Support: Feet supported, Bilateral upper extremity supported  Static Sitting-Level of Assistance: Distant supervision  Static Sitting-Comment/Number of Minutes: good seated static balance  Static Standing Balance  Static Standing-Balance Support: Bilateral upper extremity supported  Static Standing-Level of Assistance: Close supervision  Static Standing-Comment/Number of Minutes: good static stand balance    Treatments:  Therapeutic Exercise  Therapeutic Exercise Performed: Yes  Therapeutic Exercise Activity 1: Supine B x15: SLR, hip abd, QS, heel slide, ankle pumps    Bed Mobility  Bed Mobility: Yes  Bed Mobility 1  Bed Mobility 1: Sitting to supine, Log roll  Level of Assistance 1: Minimum assistance  Bed Mobility Comments 1: Cate to assist with clearing BLE over EOB. Cues for hand placement and weight shifting, and sequencing for log roll technique.    Ambulation/Gait Training  Ambulation/Gait Training Performed: Yes  Ambulation/Gait Training 1  Surface 1: Level tile  Device 1: Rolling walker  Assistance 1: Close supervision  Quality of Gait 1: Shuffling gait, Narrow base of support, Diminished heel strike  Comments/Distance (ft) 1: 25 feet, 10 feet. Brief standing rest breaks throughout for fatigue mgmt. Slow rosendo, reciprocal gait. Pt able to navigate directional turns with increased time with no LOB.  Transfers  Transfer: Yes  Transfer 1  Transfer From 1: Sit to, Stand to  Transfer to 1: Stand,  Sit  Technique 1: Sit to stand, Stand to sit  Transfer Device 1: Walker  Transfer Level of Assistance 1: Contact guard  Trials/Comments 1: Cues for ant scooting, safe hand placement, and eccentric control. CGA for safety and stability upon standing with tactile cues for safe LE placement.    Outcome Measures:  [facility] Basic Mobility  Turning from your back to your side while in a flat bed without using bedrails: A little  Moving from lying on your back to sitting on the side of a flat bed without using bedrails: A little  Moving to and from bed to chair (including a wheelchair): A little  Standing up from a chair using your arms (e.g. wheelchair or bedside chair): A little  To walk in hospital room: A little  Climbing 3-5 steps with railing: Total  Basic Mobility - Total Score: 16    Education Documentation  Mobility Training, taught by Alta Rod PTA at 4/11/2025  1:47 PM.  Learner: Patient  Readiness: Acceptance  Method: Explanation, Demonstration  Response: Verbalizes Understanding, Demonstrated Understanding  Comment: POC, continued mobility, calling for assistance    Education Comments  No comments found.        Encounter Problems       Encounter Problems (Active)       PT Problem       Strength (Progressing)       Start:  04/10/25    Expected End:  05/10/25       The patient will demonstrate an overall strength of >4/5 in BLE to assist with completion of functional mobility.           Functional Mobility (Progressing)       Start:  04/10/25    Expected End:  05/10/25       The patient will complete functional mobility (bed mobility, transfers, etc.) at a close supervision level with LRAD by DC.           Ambulation (Progressing)       Start:  04/10/25    Expected End:  05/10/25       The patient will be able to ambulate at a close supervision level for >10ftx1 with RW.          Balance (Progressing)       Start:  04/10/25    Expected End:  05/10/25       The patient will demonstrate good dynamic standing  balance during activity with LRAD.          WC management (Not Progressing)       Start:  04/10/25    Expected End:  05/10/25       The patient will be able to self-propel WC 30ftx1 on an even surface.             Pain - Adult

## 2025-04-11 NOTE — PROGRESS NOTES
Pt admitted from [facility] where she is a long term care resident, clinicals sent to facility for review and confirmation pt has bedhold.    Update: [facility] confirms pt has a bedhold, can return whenever pt is medically cleared for dc.     04/11/25 1129   Discharge Planning   Expected Discharge Disposition Inter  ([facility])

## 2025-04-11 NOTE — CARE PLAN
The patient's goals for the shift include reduce pain    The clinical goals for the shift include manage pain    Problem: Fall/Injury  Goal: Not fall by end of shift  Outcome: Progressing  Goal: Be free from injury by end of the shift  Outcome: Progressing  Goal: Verbalize understanding of personal risk factors for fall in the hospital  Outcome: Progressing  Goal: Verbalize understanding of risk factor reduction measures to prevent injury from fall in the home  Outcome: Progressing  Goal: Use assistive devices by end of the shift  Outcome: Progressing  Goal: Pace activities to prevent fatigue by end of the shift  Outcome: Progressing     Problem: Pain - Adult  Goal: Verbalizes/displays adequate comfort level or baseline comfort level  Outcome: Progressing     Problem: Safety - Adult  Goal: Free from fall injury  Outcome: Progressing     Problem: Discharge Planning  Goal: Discharge to home or other facility with appropriate resources  Outcome: Progressing     Problem: Chronic Conditions and Co-morbidities  Goal: Patient's chronic conditions and co-morbidity symptoms are monitored and maintained or improved  Outcome: Progressing     Problem: Nutrition  Goal: Nutrient intake appropriate for maintaining nutritional needs  Outcome: Progressing     Problem: Skin  Goal: Decreased wound size/increased tissue granulation at next dressing change  Outcome: Progressing  Goal: Participates in plan/prevention/treatment measures  Outcome: Progressing  Goal: Prevent/manage excess moisture  Outcome: Progressing  Goal: Prevent/minimize sheer/friction injuries  Outcome: Progressing  Goal: Promote/optimize nutrition  Outcome: Progressing  Goal: Promote skin healing  Outcome: Progressing     Problem: Pain  Goal: Takes deep breaths with improved pain control throughout the shift  Outcome: Progressing  Goal: Turns in bed with improved pain control throughout the shift  Outcome: Progressing  Goal: Walks with improved pain control  throughout the shift  Outcome: Progressing  Goal: Performs ADL's with improved pain control throughout shift  Outcome: Progressing  Goal: Participates in PT with improved pain control throughout the shift  Outcome: Progressing  Goal: Free from opioid side effects throughout the shift  Outcome: Progressing  Goal: Free from acute confusion related to pain meds throughout the shift  Outcome: Progressing

## 2025-04-11 NOTE — PROGRESS NOTES
"Azul Bates \"Cheli" is a 65 y.o. female on day 2 of admission presenting with Colostomy in place (Multi).    Subjective   Just finishing breakfast. No nausea. No flatus or BM.       Objective     Physical Exam  Constitutional:       Appearance: Normal appearance.   HENT:      Head: Normocephalic.   Cardiovascular:      Rate and Rhythm: Normal rate and regular rhythm.      Pulses: Normal pulses.   Pulmonary:      Effort: Pulmonary effort is normal.   Abdominal:      General: Bowel sounds are normal.      Palpations: Abdomen is soft.      Comments: Dry dressing over stoma site in LLQ   Musculoskeletal:         General: Normal range of motion.   Skin:     General: Skin is warm.   Neurological:      General: No focal deficit present.      Mental Status: She is alert.   Psychiatric:         Mood and Affect: Mood normal.         Behavior: Behavior normal.         Last Recorded Vitals  Blood pressure 113/60, pulse 94, temperature 36.8 °C (98.2 °F), temperature source Oral, resp. rate 20, height 1.72 m (5' 7.72\"), weight 119 kg (262 lb 2 oz), SpO2 93%.  Intake/Output last 3 Shifts:  I/O last 3 completed shifts:  In: 3102 (26.1 mL/kg) [P.O.:2820; I.V.:282 (2.4 mL/kg)]  Out: 2825 (23.8 mL/kg) [Urine:2825 (0.7 mL/kg/hr)]  Weight: 118.9 kg     Relevant Results  Results for orders placed or performed during the hospital encounter of 04/09/25 (from the past 24 hours)   CBC   Result Value Ref Range    WBC 11.4 (H) 4.4 - 11.3 x10*3/uL    nRBC 0.0 0.0 - 0.0 /100 WBCs    RBC 3.89 (L) 4.00 - 5.20 x10*6/uL    Hemoglobin 11.4 (L) 12.0 - 16.0 g/dL    Hematocrit 37.3 36.0 - 46.0 %    MCV 96 80 - 100 fL    MCH 29.3 26.0 - 34.0 pg    MCHC 30.6 (L) 32.0 - 36.0 g/dL    RDW 14.1 11.5 - 14.5 %    Platelets 244 150 - 450 x10*3/uL   Basic Metabolic Panel   Result Value Ref Range    Glucose 134 (H) 74 - 99 mg/dL    Sodium 137 136 - 145 mmol/L    Potassium 4.7 3.5 - 5.3 mmol/L    Chloride 104 98 - 107 mmol/L    Bicarbonate 28 21 - 32 mmol/L    " Anion Gap 10 10 - 20 mmol/L    Urea Nitrogen 9 6 - 23 mg/dL    Creatinine 1.09 (H) 0.50 - 1.05 mg/dL    eGFR 57 (L) >60 mL/min/1.73m*2    Calcium 8.8 8.6 - 10.3 mg/dL     No results found.               Assessment/Plan   Assessment & Plan  Colostomy in place (Multi)     s/p loop colostomy reversal 4/9/25  Cont regular diet  Ambulate/IS/pulm toilet  PT/OT  Home meds  Dvt ppx in house  Discharge once RBF        June Winn MD

## 2025-04-11 NOTE — PROGRESS NOTES
"Occupational Therapy                 Therapy Communication Note    Patient Name: Azul Bates \"Stephenie\"  MRN: [id number]  Department: [address]  Room: [address] [place], [place] [address]  Today's Date: 4/11/2025     Discipline: Occupational Therapy    OT Missed Visit: Yes     Missed Visit Reason: Patient sleeping (Per nurse, pt with increased R shoulder pain and upon arrival pt was falling in and out of sleep unable to participate.)    Missed Time: Attempt      "

## 2025-04-12 LAB
ANION GAP SERPL CALCULATED.3IONS-SCNC: 10 MMOL/L (ref 10–20)
BUN SERPL-MCNC: 11 MG/DL (ref 6–23)
CALCIUM SERPL-MCNC: 8 MG/DL (ref 8.6–10.3)
CHLORIDE SERPL-SCNC: 102 MMOL/L (ref 98–107)
CO2 SERPL-SCNC: 26 MMOL/L (ref 21–32)
CREAT SERPL-MCNC: 1.2 MG/DL (ref 0.5–1.05)
EGFRCR SERPLBLD CKD-EPI 2021: 51 ML/MIN/1.73M*2
ERYTHROCYTE [DISTWIDTH] IN BLOOD BY AUTOMATED COUNT: 13.9 % (ref 11.5–14.5)
GLUCOSE SERPL-MCNC: 131 MG/DL (ref 74–99)
HCT VFR BLD AUTO: 31.5 % (ref 36–46)
HGB BLD-MCNC: 9.9 G/DL (ref 12–16)
MCH RBC QN AUTO: 29.9 PG (ref 26–34)
MCHC RBC AUTO-ENTMCNC: 31.4 G/DL (ref 32–36)
MCV RBC AUTO: 95 FL (ref 80–100)
NRBC BLD-RTO: 0 /100 WBCS (ref 0–0)
PLATELET # BLD AUTO: 253 X10*3/UL (ref 150–450)
POTASSIUM SERPL-SCNC: 3.8 MMOL/L (ref 3.5–5.3)
RBC # BLD AUTO: 3.31 X10*6/UL (ref 4–5.2)
SODIUM SERPL-SCNC: 134 MMOL/L (ref 136–145)
WBC # BLD AUTO: 10.2 X10*3/UL (ref 4.4–11.3)

## 2025-04-12 PROCEDURE — 2500000002 HC RX 250 W HCPCS SELF ADMINISTERED DRUGS (ALT 637 FOR MEDICARE OP, ALT 636 FOR OP/ED): Performed by: SURGERY

## 2025-04-12 PROCEDURE — 2500000005 HC RX 250 GENERAL PHARMACY W/O HCPCS: Performed by: SURGERY

## 2025-04-12 PROCEDURE — 2500000002 HC RX 250 W HCPCS SELF ADMINISTERED DRUGS (ALT 637 FOR MEDICARE OP, ALT 636 FOR OP/ED): Performed by: STUDENT IN AN ORGANIZED HEALTH CARE EDUCATION/TRAINING PROGRAM

## 2025-04-12 PROCEDURE — 85027 COMPLETE CBC AUTOMATED: CPT | Performed by: SURGERY

## 2025-04-12 PROCEDURE — 2500000004 HC RX 250 GENERAL PHARMACY W/ HCPCS (ALT 636 FOR OP/ED): Performed by: SURGERY

## 2025-04-12 PROCEDURE — 2500000001 HC RX 250 WO HCPCS SELF ADMINISTERED DRUGS (ALT 637 FOR MEDICARE OP): Performed by: SURGERY

## 2025-04-12 PROCEDURE — 2500000004 HC RX 250 GENERAL PHARMACY W/ HCPCS (ALT 636 FOR OP/ED): Performed by: STUDENT IN AN ORGANIZED HEALTH CARE EDUCATION/TRAINING PROGRAM

## 2025-04-12 PROCEDURE — 36415 COLL VENOUS BLD VENIPUNCTURE: CPT | Performed by: SURGERY

## 2025-04-12 PROCEDURE — 2500000001 HC RX 250 WO HCPCS SELF ADMINISTERED DRUGS (ALT 637 FOR MEDICARE OP): Performed by: STUDENT IN AN ORGANIZED HEALTH CARE EDUCATION/TRAINING PROGRAM

## 2025-04-12 PROCEDURE — 99024 POSTOP FOLLOW-UP VISIT: CPT | Performed by: STUDENT IN AN ORGANIZED HEALTH CARE EDUCATION/TRAINING PROGRAM

## 2025-04-12 PROCEDURE — 1100000001 HC PRIVATE ROOM DAILY

## 2025-04-12 PROCEDURE — 80048 BASIC METABOLIC PNL TOTAL CA: CPT | Performed by: SURGERY

## 2025-04-12 RX ORDER — ACETAMINOPHEN 325 MG/1
650 TABLET ORAL EVERY 6 HOURS
Status: DISCONTINUED | OUTPATIENT
Start: 2025-04-12 | End: 2025-04-13 | Stop reason: HOSPADM

## 2025-04-12 RX ORDER — TIZANIDINE 4 MG/1
4 TABLET ORAL EVERY 8 HOURS
Status: DISCONTINUED | OUTPATIENT
Start: 2025-04-12 | End: 2025-04-13 | Stop reason: HOSPADM

## 2025-04-12 RX ADMIN — ACETAMINOPHEN 650 MG: 325 TABLET ORAL at 21:48

## 2025-04-12 RX ADMIN — PANTOPRAZOLE SODIUM 20 MG: 40 INJECTION, POWDER, FOR SOLUTION INTRAVENOUS at 08:10

## 2025-04-12 RX ADMIN — TRAZODONE HYDROCHLORIDE 200 MG: 100 TABLET ORAL at 21:46

## 2025-04-12 RX ADMIN — ENOXAPARIN SODIUM 40 MG: 40 INJECTION SUBCUTANEOUS at 19:00

## 2025-04-12 RX ADMIN — QUETIAPINE FUMARATE 75 MG: 50 TABLET ORAL at 21:45

## 2025-04-12 RX ADMIN — CYCLOBENZAPRINE HYDROCHLORIDE 5 MG: 5 TABLET, FILM COATED ORAL at 08:11

## 2025-04-12 RX ADMIN — OXYCODONE HYDROCHLORIDE 10 MG: 5 TABLET ORAL at 19:01

## 2025-04-12 RX ADMIN — GABAPENTIN 400 MG: 400 CAPSULE ORAL at 06:08

## 2025-04-12 RX ADMIN — OXYCODONE HYDROCHLORIDE 10 MG: 5 TABLET ORAL at 08:10

## 2025-04-12 RX ADMIN — LIDOCAINE 4% 1 PATCH: 40 PATCH TOPICAL at 08:11

## 2025-04-12 RX ADMIN — GABAPENTIN 400 MG: 400 CAPSULE ORAL at 12:32

## 2025-04-12 RX ADMIN — BUSPIRONE HYDROCHLORIDE 15 MG: 15 TABLET ORAL at 21:45

## 2025-04-12 RX ADMIN — GABAPENTIN 400 MG: 400 CAPSULE ORAL at 21:45

## 2025-04-12 RX ADMIN — BUSPIRONE HYDROCHLORIDE 15 MG: 15 TABLET ORAL at 08:11

## 2025-04-12 RX ADMIN — OXYCODONE HYDROCHLORIDE 10 MG: 5 TABLET ORAL at 23:38

## 2025-04-12 RX ADMIN — ROPINIROLE HYDROCHLORIDE 1 MG: 1 TABLET, FILM COATED ORAL at 21:46

## 2025-04-12 RX ADMIN — HYDROXYZINE HYDROCHLORIDE 25 MG: 25 TABLET, FILM COATED ORAL at 21:45

## 2025-04-12 RX ADMIN — CETIRIZINE HYDROCHLORIDE 10 MG: 10 TABLET, FILM COATED ORAL at 08:10

## 2025-04-12 RX ADMIN — ACETAMINOPHEN 650 MG: 325 TABLET ORAL at 14:38

## 2025-04-12 RX ADMIN — SODIUM CHLORIDE 500 ML: 9 INJECTION, SOLUTION INTRAVENOUS at 01:03

## 2025-04-12 RX ADMIN — TIZANIDINE 4 MG: 4 TABLET ORAL at 14:38

## 2025-04-12 RX ADMIN — DULOXETINE HYDROCHLORIDE 60 MG: 60 CAPSULE, DELAYED RELEASE ORAL at 08:10

## 2025-04-12 RX ADMIN — GABAPENTIN 400 MG: 400 CAPSULE ORAL at 19:00

## 2025-04-12 RX ADMIN — TIZANIDINE 4 MG: 4 TABLET ORAL at 21:45

## 2025-04-12 RX ADMIN — OXYCODONE HYDROCHLORIDE 10 MG: 5 TABLET ORAL at 12:32

## 2025-04-12 RX ADMIN — HYDROXYZINE HYDROCHLORIDE 25 MG: 25 TABLET, FILM COATED ORAL at 08:10

## 2025-04-12 RX ADMIN — BUPROPION HYDROCHLORIDE 450 MG: 300 TABLET, EXTENDED RELEASE ORAL at 08:10

## 2025-04-12 ASSESSMENT — COGNITIVE AND FUNCTIONAL STATUS - GENERAL
DRESSING REGULAR UPPER BODY CLOTHING: A LITTLE
MOVING TO AND FROM BED TO CHAIR: A LITTLE
DRESSING REGULAR LOWER BODY CLOTHING: A LITTLE
TOILETING: A LITTLE
DAILY ACTIVITIY SCORE: 18
DRESSING REGULAR UPPER BODY CLOTHING: A LITTLE
TOILETING: A LITTLE
WALKING IN HOSPITAL ROOM: A LITTLE
PERSONAL GROOMING: A LITTLE
EATING MEALS: A LITTLE
MOBILITY SCORE: 19
DAILY ACTIVITIY SCORE: 22
STANDING UP FROM CHAIR USING ARMS: A LITTLE
WALKING IN HOSPITAL ROOM: A LITTLE
MOBILITY SCORE: 19
STANDING UP FROM CHAIR USING ARMS: A LITTLE
MOVING TO AND FROM BED TO CHAIR: A LITTLE
CLIMB 3 TO 5 STEPS WITH RAILING: A LOT
CLIMB 3 TO 5 STEPS WITH RAILING: A LOT
HELP NEEDED FOR BATHING: A LITTLE

## 2025-04-12 ASSESSMENT — PAIN - FUNCTIONAL ASSESSMENT
PAIN_FUNCTIONAL_ASSESSMENT: 0-10

## 2025-04-12 ASSESSMENT — PAIN DESCRIPTION - DESCRIPTORS
DESCRIPTORS: ACHING

## 2025-04-12 ASSESSMENT — PAIN SCALES - GENERAL
PAINLEVEL_OUTOF10: 8
PAINLEVEL_OUTOF10: 3
PAINLEVEL_OUTOF10: 3
PAINLEVEL_OUTOF10: 7
PAINLEVEL_OUTOF10: 8
PAINLEVEL_OUTOF10: 7
PAINLEVEL_OUTOF10: 3

## 2025-04-12 ASSESSMENT — PAIN DESCRIPTION - ORIENTATION: ORIENTATION: LOWER

## 2025-04-12 ASSESSMENT — PAIN DESCRIPTION - LOCATION: LOCATION: ABDOMEN

## 2025-04-12 NOTE — NURSING NOTE
% Am rounds, bedside shift report, assumed care, pt states she is passing gas but has not had a bm. Oxygen on RA, placed back on 2L (on overnight) and encouraged and demonstrated IS.   Continue to take 800 mg ibuprofen, up to 3 times daily, take with food. Can alternate with Norco for severe pain. Do not exceed 3000 mg tylenol daily from all sources. Can use ice for swelling and topical oragel for pain.    Follow up with dentist as soon as possible, Return to ED for worsening facial swelling, development of fever, or facial redness.

## 2025-04-12 NOTE — PROGRESS NOTES
"Azul Bates \"Cheli" is a 65 y.o. female on day 3 of admission presenting with Colostomy in place (Multi).    Subjective   Sitting on commode. Flatus, no BM   Low BP and O2 requirement overnight after pain meds given. Fluid bolus given       Objective     Physical Exam  Constitutional:       Appearance: Normal appearance.   HENT:      Head: Normocephalic.   Cardiovascular:      Rate and Rhythm: Normal rate and regular rhythm.      Pulses: Normal pulses.   Pulmonary:      Effort: Pulmonary effort is normal.   Abdominal:      General: Bowel sounds are normal.      Palpations: Abdomen is soft.      Comments: Dry dressing over stoma site in LLQ   Musculoskeletal:         General: Normal range of motion.   Skin:     General: Skin is warm.   Neurological:      General: No focal deficit present.      Mental Status: She is alert.   Psychiatric:         Mood and Affect: Mood normal.         Behavior: Behavior normal.       Last Recorded Vitals  Blood pressure 112/64, pulse 90, temperature 36.6 °C (97.9 °F), temperature source Axillary, resp. rate 16, height 1.72 m (5' 7.72\"), weight 118 kg (261 lb 3.9 oz), SpO2 91%.  Intake/Output last 3 Shifts:  I/O last 3 completed shifts:  In: 1460 (12.3 mL/kg) [P.O.:960; IV Piggyback:500]  Out: 1625 (13.7 mL/kg) [Urine:1625 (0.4 mL/kg/hr)]  Weight: 118.5 kg     Relevant Results  Results for orders placed or performed during the hospital encounter of 04/09/25 (from the past 24 hours)   Basic Metabolic Panel   Result Value Ref Range    Glucose 131 (H) 74 - 99 mg/dL    Sodium 134 (L) 136 - 145 mmol/L    Potassium 3.8 3.5 - 5.3 mmol/L    Chloride 102 98 - 107 mmol/L    Bicarbonate 26 21 - 32 mmol/L    Anion Gap 10 10 - 20 mmol/L    Urea Nitrogen 11 6 - 23 mg/dL    Creatinine 1.20 (H) 0.50 - 1.05 mg/dL    eGFR 51 (L) >60 mL/min/1.73m*2    Calcium 8.0 (L) 8.6 - 10.3 mg/dL   CBC   Result Value Ref Range    WBC 10.2 4.4 - 11.3 x10*3/uL    nRBC 0.0 0.0 - 0.0 /100 WBCs    RBC 3.31 (L) 4.00 - 5.20 " x10*6/uL    Hemoglobin 9.9 (L) 12.0 - 16.0 g/dL    Hematocrit 31.5 (L) 36.0 - 46.0 %    MCV 95 80 - 100 fL    MCH 29.9 26.0 - 34.0 pg    MCHC 31.4 (L) 32.0 - 36.0 g/dL    RDW 13.9 11.5 - 14.5 %    Platelets 253 150 - 450 x10*3/uL     No results found.               Assessment/Plan   Assessment & Plan  Colostomy in place (Multi)     s/p loop colostomy reversal 4/9/25  Cont regular diet  Ambulate/IS/pulm toilet  PT/OT  Home meds  Dvt ppx in house  Discharge once RBF    Cre slightly up today. Fluid given overnight.   She is also only getting 10mg oxy for pain. Will schedule tylenol and muscle relaxer for multimodal approach        June Winn MD

## 2025-04-12 NOTE — CARE PLAN
The patient's goals for the shift include reduce pain    The clinical goals for the shift include manage pain, stable VS/labs    Over the shift, the patient did not make progress toward the following goals. Barriers to progression include recent ostomy reversal. Recommendations to address these barriers include pain management.

## 2025-04-13 VITALS
HEART RATE: 69 BPM | BODY MASS INDEX: 39.96 KG/M2 | SYSTOLIC BLOOD PRESSURE: 101 MMHG | DIASTOLIC BLOOD PRESSURE: 60 MMHG | TEMPERATURE: 97.9 F | RESPIRATION RATE: 18 BRPM | HEIGHT: 68 IN | WEIGHT: 263.67 LBS | OXYGEN SATURATION: 92 %

## 2025-04-13 LAB
ANION GAP SERPL CALCULATED.3IONS-SCNC: 9 MMOL/L (ref 10–20)
BUN SERPL-MCNC: 10 MG/DL (ref 6–23)
CALCIUM SERPL-MCNC: 8.9 MG/DL (ref 8.6–10.3)
CHLORIDE SERPL-SCNC: 103 MMOL/L (ref 98–107)
CO2 SERPL-SCNC: 28 MMOL/L (ref 21–32)
CREAT SERPL-MCNC: 0.99 MG/DL (ref 0.5–1.05)
EGFRCR SERPLBLD CKD-EPI 2021: 64 ML/MIN/1.73M*2
GLUCOSE SERPL-MCNC: 119 MG/DL (ref 74–99)
HOLD SPECIMEN: NORMAL
POTASSIUM SERPL-SCNC: 4 MMOL/L (ref 3.5–5.3)
SODIUM SERPL-SCNC: 136 MMOL/L (ref 136–145)

## 2025-04-13 PROCEDURE — 2500000004 HC RX 250 GENERAL PHARMACY W/ HCPCS (ALT 636 FOR OP/ED): Performed by: SURGERY

## 2025-04-13 PROCEDURE — 2500000001 HC RX 250 WO HCPCS SELF ADMINISTERED DRUGS (ALT 637 FOR MEDICARE OP): Performed by: STUDENT IN AN ORGANIZED HEALTH CARE EDUCATION/TRAINING PROGRAM

## 2025-04-13 PROCEDURE — 36415 COLL VENOUS BLD VENIPUNCTURE: CPT | Performed by: STUDENT IN AN ORGANIZED HEALTH CARE EDUCATION/TRAINING PROGRAM

## 2025-04-13 PROCEDURE — 99024 POSTOP FOLLOW-UP VISIT: CPT | Performed by: STUDENT IN AN ORGANIZED HEALTH CARE EDUCATION/TRAINING PROGRAM

## 2025-04-13 PROCEDURE — 2500000001 HC RX 250 WO HCPCS SELF ADMINISTERED DRUGS (ALT 637 FOR MEDICARE OP): Performed by: SURGERY

## 2025-04-13 PROCEDURE — 2500000002 HC RX 250 W HCPCS SELF ADMINISTERED DRUGS (ALT 637 FOR MEDICARE OP, ALT 636 FOR OP/ED): Performed by: STUDENT IN AN ORGANIZED HEALTH CARE EDUCATION/TRAINING PROGRAM

## 2025-04-13 PROCEDURE — 2500000002 HC RX 250 W HCPCS SELF ADMINISTERED DRUGS (ALT 637 FOR MEDICARE OP, ALT 636 FOR OP/ED): Performed by: SURGERY

## 2025-04-13 PROCEDURE — 80048 BASIC METABOLIC PNL TOTAL CA: CPT | Performed by: STUDENT IN AN ORGANIZED HEALTH CARE EDUCATION/TRAINING PROGRAM

## 2025-04-13 PROCEDURE — 2500000005 HC RX 250 GENERAL PHARMACY W/O HCPCS: Performed by: SURGERY

## 2025-04-13 RX ADMIN — TIZANIDINE 4 MG: 4 TABLET ORAL at 05:41

## 2025-04-13 RX ADMIN — ACETAMINOPHEN 650 MG: 325 TABLET ORAL at 08:01

## 2025-04-13 RX ADMIN — GABAPENTIN 400 MG: 400 CAPSULE ORAL at 05:41

## 2025-04-13 RX ADMIN — GABAPENTIN 400 MG: 400 CAPSULE ORAL at 12:21

## 2025-04-13 RX ADMIN — DULOXETINE HYDROCHLORIDE 60 MG: 60 CAPSULE, DELAYED RELEASE ORAL at 08:02

## 2025-04-13 RX ADMIN — LIDOCAINE 4% 1 PATCH: 40 PATCH TOPICAL at 08:02

## 2025-04-13 RX ADMIN — CYCLOBENZAPRINE HYDROCHLORIDE 5 MG: 5 TABLET, FILM COATED ORAL at 08:02

## 2025-04-13 RX ADMIN — OXYCODONE HYDROCHLORIDE 10 MG: 5 TABLET ORAL at 04:27

## 2025-04-13 RX ADMIN — BUPROPION HYDROCHLORIDE 450 MG: 300 TABLET, EXTENDED RELEASE ORAL at 08:01

## 2025-04-13 RX ADMIN — BUSPIRONE HYDROCHLORIDE 15 MG: 15 TABLET ORAL at 08:02

## 2025-04-13 RX ADMIN — HYDROXYZINE HYDROCHLORIDE 25 MG: 25 TABLET, FILM COATED ORAL at 08:02

## 2025-04-13 RX ADMIN — OXYCODONE HYDROCHLORIDE 10 MG: 5 TABLET ORAL at 12:21

## 2025-04-13 RX ADMIN — PANTOPRAZOLE SODIUM 20 MG: 40 INJECTION, POWDER, FOR SOLUTION INTRAVENOUS at 08:03

## 2025-04-13 RX ADMIN — CETIRIZINE HYDROCHLORIDE 10 MG: 10 TABLET, FILM COATED ORAL at 08:02

## 2025-04-13 ASSESSMENT — COGNITIVE AND FUNCTIONAL STATUS - GENERAL
MOBILITY SCORE: 21
DAILY ACTIVITIY SCORE: 22
WALKING IN HOSPITAL ROOM: A LITTLE
STANDING UP FROM CHAIR USING ARMS: A LITTLE
CLIMB 3 TO 5 STEPS WITH RAILING: A LITTLE
DRESSING REGULAR LOWER BODY CLOTHING: A LITTLE
TOILETING: A LITTLE

## 2025-04-13 ASSESSMENT — PAIN - FUNCTIONAL ASSESSMENT
PAIN_FUNCTIONAL_ASSESSMENT: 0-10

## 2025-04-13 ASSESSMENT — PAIN DESCRIPTION - DESCRIPTORS: DESCRIPTORS: ACHING

## 2025-04-13 ASSESSMENT — PAIN DESCRIPTION - ORIENTATION
ORIENTATION: LOWER
ORIENTATION: LOWER

## 2025-04-13 ASSESSMENT — PAIN SCALES - GENERAL
PAINLEVEL_OUTOF10: 3
PAINLEVEL_OUTOF10: 4
PAINLEVEL_OUTOF10: 7
PAINLEVEL_OUTOF10: 3
PAINLEVEL_OUTOF10: 3
PAINLEVEL_OUTOF10: 7

## 2025-04-13 ASSESSMENT — PAIN DESCRIPTION - LOCATION
LOCATION: ABDOMEN
LOCATION: ABDOMEN

## 2025-04-13 NOTE — DISCHARGE INSTRUCTIONS
Keep incisions clean and dry.  You can shower and let soap and water run over the incisions.  No soaking in a tub or pool until seen in clinic for follow-up.      Take the pain medication as prescribed.  You should take Tylenol first and if still having pain, then use the medications that were prescribed.  You can use a heat or an ice pack over the incisions for additional relief.    No lifting more than 10 pounds.  This will prevent the hernia from forming at the incisions.    Your appetite may be decreased.  Make sure you are drinking enough liquids to stay hydrated until your appetite returns to normal.  You do not need to force food if you are not feeling hungry.    Your bowel movements may be erratic and abnormal for you in the first few weeks to month after surgery.  You should be able to have bowel movements and eat without having nausea or vomiting. The number, frequency, consistency, color etc. of the stool may change until your bowels settle into their new normal.    If you have any questions, please call the clinic

## 2025-04-13 NOTE — PROGRESS NOTES
Pts LTC facility made aware of dc time, dc information sent to them via [employer].      04/13/25 1205   Discharge Planning   Expected Discharge Disposition Inter  ([facility])   Does the patient need discharge transport arranged? Yes   RoundTrip coordination needed? Yes   Has discharge transport been arranged? Yes   What day is the transport expected? 04/13/25   What time is the transport expected? [id number]

## 2025-04-13 NOTE — CARE PLAN
The patient's goals for the shift include reduce pain    The clinical goals for the shift include manage pain    Over the shift, the patient did not make progress toward the following goals. Barriers to progression include chronic pain. Recommendations to address these barriers include pain management.

## 2025-04-13 NOTE — PROGRESS NOTES
Pt can return to her LTC facility whenever medically cleared, no precert needed to return. Facility stating hospital can set up transport for the pt, requesting AVS and DC MAR at dc which this worker will send when available.      04/13/25 0904   Discharge Planning   Expected Discharge Disposition Inter  ([facility])

## 2025-04-13 NOTE — CARE PLAN
Pt to follow breastfeeding education   The patient's goals for the shift include reduce pain    The clinical goals for the shift include pain management, stable VS/labs

## 2025-04-13 NOTE — DISCHARGE SUMMARY
Discharge Diagnosis  Colostomy in place (Multi)    Issues Requiring Follow-Up  na    Test Results Pending At Discharge  Pending Labs       Order Current Status    Surgical Pathology Exam In process            Hospital Course   Patient admitted for colostomy reversal which was performed without incident.  Discharged back to facility on postop day 4 tolerating a diet and having antegrade bowel function    Pertinent Physical Exam At Time of Discharge  Physical Exam  Abdominal:      Palpations: Abdomen is soft.      Comments: Ostomy site with dry dressing over top         Home Medications     Medication List      CHANGE how you take these medications     * DULoxetine 60 mg DR capsule; Commonly known as: Cymbalta; What   changed: Another medication with the same name was changed. Make sure you   understand how and when to take each.   * DULoxetine 60 mg DR capsule; Commonly known as: Cymbalta; Take 2   capsules (120 mg) by mouth once daily. Do not crush or chew.; What   changed: how much to take   SeroqueL 50 mg tablet; Generic drug: QUEtiapine; What changed: Another   medication with the same name was removed. Continue taking this   medication, and follow the directions you see here.   traZODone 100 mg tablet; Commonly known as: Desyrel; What changed:   Another medication with the same name was removed. Continue taking this   medication, and follow the directions you see here.  * This list has 2 medication(s) that are the same as other medications   prescribed for you. Read the directions carefully, and ask your doctor or   other care provider to review them with you.     CONTINUE taking these medications     bisacodyl 5 mg EC tablet; Commonly known as: Dulcolax   * buPROPion  mg 24 hr tablet; Commonly known as: Forfivo XL   * buPROPion  mg 24 hr tablet; Commonly known as: Wellbutrin XL;   Take 1 tablet (300 mg) by mouth once daily. Do not crush, chew, or split.   busPIRone 5 mg tablet; Commonly known as:  Buspar   celecoxib 200 mg capsule; Commonly known as: CeleBREX; Take 1 capsule   (200 mg) by mouth once daily.   cholecalciferol 125 mcg (5,000 units) capsule; Commonly known as:   Vitamin D-3; Take 1 capsule (125 mcg) by mouth once daily. Do not start   before December 9, 2023.   cyanocobalamin 500 mcg tablet; Commonly known as: Vitamin B-12   * cyclobenzaprine 5 mg tablet; Commonly known as: Flexeril   * cyclobenzaprine 5 mg tablet; Commonly known as: Flexeril   docusate sodium 100 mg capsule; Commonly known as: Colace   ferrous sulfate 325 (65 Fe) mg EC tablet   gabapentin 400 mg capsule; Commonly known as: Neurontin   hydrOXYzine pamoate 25 mg capsule; Commonly known as: Vistaril; Take 1   capsule (25 mg) by mouth 2 times a day.   magnesium oxide 400 mg tablet; Commonly known as: Mag-Ox   meloxicam 7.5 mg tablet; Commonly known as: Mobic   metroNIDAZOLE 250 mg tablet; Commonly known as: Flagyl; Take one tablet   at 6p, 7p, and 11p the night before surgery.   neomycin 500 mg tablet; Commonly known as: Mycifradin; Take two tabs by   mouth at 6p, 7pm, and 11p the night before surgery.   oxyCODONE-acetaminophen 5-325 mg tablet; Commonly known as: Percocet   polyethylene glycol 17 gram packet; Commonly known as: Glycolax, Miralax   rOPINIRole 1 mg tablet; Commonly known as: Requip   sodium chloride 0.65 % nasal spray; Commonly known as: Ocean   tiZANidine 4 mg capsule; Commonly known as: Zanaflex  * This list has 4 medication(s) that are the same as other medications   prescribed for you. Read the directions carefully, and ask your doctor or   other care provider to review them with you.     STOP taking these medications     Biofreeze (menthol) 4 % gel gel; Generic drug: menthol   chlorhexidine 0.12 % solution; Commonly known as: Peridex   ibuprofen 800 mg tablet   lidocaine 4 % patch   loratadine 10 mg tablet; Commonly known as: Claritin   magnesium hydroxide 400 mg/5 mL suspension; Commonly known as: Milk of    Magnesia   naloxone 4 mg/0.1 mL nasal spray; Commonly known as: Narcan   nicotine 7 mg/24 hr patch; Commonly known as: Nicoderm CQ   ondansetron 4 mg tablet; Commonly known as: Zofran     ASK your doctor about these medications     acetaminophen 325 mg tablet; Commonly known as: Tylenol       Outpatient Follow-Up  Future Appointments   Date Time Provider Department Center   5/2/2025 10:00 [address] [facility]TR[id number]   5/5/2025 11:30 AM MG GASTRO [facility] [id number] FIBROSCAN [id number] [facility]   6/3/2025  2:00 PM Willem Sanderson MD [id number] [facility]   6/12/2025 10:20 AM Willem Sanderson MD [id number] Lawrence Winn MD

## 2025-04-14 ENCOUNTER — TELEPHONE (OUTPATIENT)
Dept: SURGERY | Facility: CLINIC | Age: 65
End: 2025-04-14
Payer: MEDICAID

## 2025-04-14 NOTE — TELEPHONE ENCOUNTER
Post op call.  She is s/p 4/09/2025 Stoma closure.  She is doing well.  She is c/o pain at the closure site.  Taking Percocet for pain which is helping.  She asked for something in addition to the Oxycodone. I advised Ibuprofen 400 mg every four hours.  She had two large loose stools today.  Denies rectal bleeding.  Appetite is fair.  Denies n/v.  Denies fever/chills.  Normal post op.    I invited Stephenie to call if she needs anything prior to the next appointment.  Maki Daugherty RN

## 2025-04-21 ENCOUNTER — APPOINTMENT (OUTPATIENT)
Dept: GASTROENTEROLOGY | Facility: CLINIC | Age: 65
End: 2025-04-21
Payer: MEDICAID

## 2025-04-24 NOTE — PROGRESS NOTES
Azul Bates is a 65 year old female who is s/p colostomy creation for history of severe sacral wound status post flap.  Her sacral wound has completely healed and she presented for stoma closure.    9/9/25/2024 Colonoscopy to TI  Findings  One sessile polyp measuring smaller than 5 mm in the ascending colon; performed hot snare with complete en bloc removal and retrieved specimen  Not in diagram - rectum and sigmoid colon normal. All photos from the rectal evaluation noted here.  Colostomy: medial opening is the distal lumen towards the rectum, lateral opening is the proximal aspect  A. COLON - ASCENDING POLYP, BIOPSY:  -Tubular adenoma.                                                                                                                      4/09/2025 Operation:  Loop Colostomy Closure     She is feeling well.  Presents to the appointment in a wheel chair.  She notes that she is up and about.   She is a bit constipated and taking some Colace.  Wound is healed.  No signs of infection.  Tolerating her diet.    Visit Vitals  /76   Pulse 75   Temp 35.8 °C (96.4 °F)   Wt 113 kg (250 lb)   SpO2 94%   BMI 38.33 kg/m²   OB Status Postmenopausal   Smoking Status Every Day   BSA 2.32 m²       Past Medical History  CHF with preserved EF  COPD  chronic pain syndrome  Sciatica  migraine  Hypertension  Arthritis  Osteoporosis  opioid/fentanyl abuse  severe recurrent depression  Bipolar disorder  Hepatitis C - unknown if treated     Surgical History  Tonsillectomy  Total hip arthroplasty (Left, 02/12/2024).  10/14/22 Dr. Cr: excisional debridement of skin, soft tissue and muscle for necrotizing fasciitis  10/18/22 Dr. Yoder: debridement of sacral wound for necrotizing fasciitis  10/19/22 Dr. Cr: laparoscopic diverting loop colostomy  10/27/22 Dr. Matos: irrigation and debridement of skin, subcutaneous tissue and fascia   12/2/22 Dr. Guan: debridement and rotational flap of perineum  -Ovarian  cystectomy  -Tubal ligation                   Review of Systems  Constitutional: Negative for fever, chills, anorexia, weight loss, malaise             ENMT: Negative for nasal discharge, congestion, ear pain, mouth pain, throat pain                 Respiratory: Negative for cough, hemoptysis, wheezing, shortness of breath                Cardiac: Negative for chest pain, dyspnea on exertion, orthopnea, palpitations, syncope         Gastrointestinal: Negative for nausea, vomiting, diarrhea, constipation, abdominal pain,      Genitourinary: Negative for discharge, dysuria, flank pain, frequency, hematuria          Musculoskeletal: Negative for decreased ROM, pain, swelling, weakness          Neurological: Negative for dizziness, confusion, headache, seizures, syncope               Psychiatric: Negative for mood changes, anxiety, hallucinations, sleep changes, suicidal ideas        Skin: Negative for mass, pain, itching, rash, ulcer            Endocrine: Negative for heat intolerance, cold intolerance, excessive sweating, polyuria, excess thirst         Hematologic/Lymph: Negative for anemia, bruising, easy bleeding, night sweats, petechiae, history of DVT/PE or cancer               Allergic/Immunologic: Negative for anaphylaxis, itchy/ teary eyes, itching, sneezing, swelling     Physical Exam  Constitutional: Well developed, awake/alert/oriented x3, no distress, alert and cooperative             Eyes: Sclera anicteric, no conjunctival inflammation, conjugate gaze    ENMT: mucous membranes moist, no apparent injury,            Head/Neck: Neck supple, no apparent injury, No JVD, trachea midline, no bruits              Respiratory/Thorax: Patent airways, CTAB, normal breath sounds with good chest expansion, thorax symmetric         Cardiovascular: Regular, rate and rhythm, no murmurs, normal S1 and S2         Gastrointestinal: Ostomy reversal site healing as expected nondistended, soft, non-tender, no rebound tenderness  or guarding, no masses palpable, no organomegaly, +BS, no bruits               Extremities: normal extremities, no cyanosis edema, contusions or wounds, 2+ femoral pulses B/L              Neurological: alert and oriented x3, normal strength, Normal gait          Lymphatic: No palpable inguinal lymphadenopathy   Psychological: Appropriate mood and behavior         Skin: Warm and dry, no lesions, no rashes                Anorectal:  Sacral tissue flap intact and well-healed     Impression:  History of diverting colostomy for severe sacral wound status post flap.    Plan:   Discussed fiber for constipation  Ongoing lifting restrictions  Follow-up as needed

## 2025-05-02 ENCOUNTER — APPOINTMENT (OUTPATIENT)
Dept: RADIOLOGY | Facility: CLINIC | Age: 65
End: 2025-05-02
Payer: MEDICAID

## 2025-05-05 ENCOUNTER — CLINICAL SUPPORT (OUTPATIENT)
Dept: GASTROENTEROLOGY | Facility: CLINIC | Age: 65
End: 2025-05-05
Payer: MEDICAID

## 2025-05-05 ENCOUNTER — APPOINTMENT (OUTPATIENT)
Dept: SURGERY | Facility: CLINIC | Age: 65
End: 2025-05-05
Payer: MEDICAID

## 2025-05-05 DIAGNOSIS — K74.60 CIRRHOSIS OF LIVER WITHOUT ASCITES, UNSPECIFIED HEPATIC CIRRHOSIS TYPE (MULTI): ICD-10-CM

## 2025-05-05 DIAGNOSIS — B18.2 CHRONIC HEPATITIS C WITHOUT HEPATIC COMA (MULTI): ICD-10-CM

## 2025-05-05 PROCEDURE — 91200 LIVER ELASTOGRAPHY: CPT | Performed by: INTERNAL MEDICINE

## 2025-05-06 ENCOUNTER — OFFICE VISIT (OUTPATIENT)
Dept: SURGERY | Facility: CLINIC | Age: 65
End: 2025-05-06
Payer: MEDICAID

## 2025-05-06 VITALS
SYSTOLIC BLOOD PRESSURE: 118 MMHG | OXYGEN SATURATION: 94 % | WEIGHT: 250 LBS | DIASTOLIC BLOOD PRESSURE: 76 MMHG | TEMPERATURE: 96.4 F | BODY MASS INDEX: 38.33 KG/M2 | HEART RATE: 75 BPM

## 2025-05-06 DIAGNOSIS — Z93.3 COLOSTOMY PRESENT (MULTI): Primary | Chronic | ICD-10-CM

## 2025-05-06 PROCEDURE — 99211 OFF/OP EST MAY X REQ PHY/QHP: CPT | Performed by: SURGERY

## 2025-05-06 ASSESSMENT — PAIN SCALES - GENERAL: PAINLEVEL_OUTOF10: 0-NO PAIN

## 2025-05-08 ENCOUNTER — APPOINTMENT (OUTPATIENT)
Dept: RADIOLOGY | Facility: CLINIC | Age: 65
End: 2025-05-08
Payer: MEDICAID

## 2025-05-09 NOTE — PROGRESS NOTES
Occupational Therapy                 Therapy Communication Note    Patient Name: Azul Roberts  MRN: [id number]  Today's Date: 12/6/2023     Discipline: Occupational Therapy    Missed Visit Reason: Missed Visit Reason: Patient refused (Therapist unable to encourge pt to participate despite multiple attempts.)    Missed Time: Attempt    Comment:   English

## 2025-05-21 ENCOUNTER — HOSPITAL ENCOUNTER (OUTPATIENT)
Dept: RADIOLOGY | Facility: CLINIC | Age: 65
Discharge: HOME | End: 2025-05-21
Payer: MEDICAID

## 2025-05-21 DIAGNOSIS — B18.2 CHRONIC HEPATITIS C WITHOUT HEPATIC COMA (MULTI): ICD-10-CM

## 2025-05-21 DIAGNOSIS — K74.60 CIRRHOSIS OF LIVER WITHOUT ASCITES, UNSPECIFIED HEPATIC CIRRHOSIS TYPE (MULTI): ICD-10-CM

## 2025-05-21 PROCEDURE — 76705 ECHO EXAM OF ABDOMEN: CPT

## 2025-06-03 ENCOUNTER — APPOINTMENT (OUTPATIENT)
Dept: GASTROENTEROLOGY | Facility: CLINIC | Age: 65
End: 2025-06-03
Payer: MEDICAID

## 2025-06-12 ENCOUNTER — APPOINTMENT (OUTPATIENT)
Facility: CLINIC | Age: 65
End: 2025-06-12
Payer: MEDICAID

## 2025-06-12 VITALS
HEIGHT: 68 IN | SYSTOLIC BLOOD PRESSURE: 142 MMHG | OXYGEN SATURATION: 95 % | DIASTOLIC BLOOD PRESSURE: 80 MMHG | BODY MASS INDEX: 40.62 KG/M2 | WEIGHT: 268 LBS | HEART RATE: 93 BPM

## 2025-06-12 DIAGNOSIS — B18.2 CHRONIC HEPATITIS C WITHOUT HEPATIC COMA (MULTI): Primary | ICD-10-CM

## 2025-06-12 DIAGNOSIS — K74.60 CIRRHOSIS OF LIVER WITHOUT ASCITES, UNSPECIFIED HEPATIC CIRRHOSIS TYPE (MULTI): ICD-10-CM

## 2025-06-12 PROCEDURE — 99214 OFFICE O/P EST MOD 30 MIN: CPT | Performed by: INTERNAL MEDICINE

## 2025-06-12 PROCEDURE — 3008F BODY MASS INDEX DOCD: CPT | Performed by: INTERNAL MEDICINE

## 2025-06-12 ASSESSMENT — PAIN SCALES - GENERAL: PAINLEVEL_OUTOF10: 5

## 2025-06-12 NOTE — PROGRESS NOTES
HEPATOLOGY RETURN PATIENT VISIT    June 12, 2025    Dr. Rosita Yap      Patient Name:   DEMI KENNY  Medical Record Number: [id number]  YOB: 1960    Dear Dr. Yap,    I had the pleasure of seeing Demi Kenny for follow-up evaluation in the [facility] ([place] satellite office). History and physical examination was performed. Pertinent available laboratory, imaging, pathology results were reviewed.    History of Present Illness:   The patient is a 65 year old white female who is referred for follow-up evaluation of hepatitis C and presumed cirrhosis.    The patient has an extensive history of medical issues, psychiatric issues and substance abuse.  She fell in September 2022 and developed a sacral decubitus wound that became infected.  She developed necrotizing fasciitis and osteomyelitis she eventually required a loop colostomy in October 2022.  She has had suicide attempts due to severe depression and psychosis.  She has been seen by one of the general surgeons at [place] to discuss reversal of her ostomy.  They were not able to do a colonoscopy due to a poor preparation as well as the patient's inability to provide a urine sample for a urine drug screen.  With her polysubstance abuse history, she was tested for hepatitis C.  She was found to be hepatitis C positive and referred to me for further evaluation.     The patient has no known history of acute hepatitis or jaundice.      She has never had any manifestations of liver disease including no jaundice, ascites, hepatic encephalopathy, or variceal bleeding.  She has never had a liver biopsy.      She does recall a history of anemia and was on iron in the past.  She does not recall being worked up in the past for her anemia and she is no longer taking the iron.  She does report significant fatigue.  She has occasionally noted some blood around her stoma but no blood in the stools, hematuria or  hemoptysis.    She initially saw me in consultation on 8/15/2024.  At that time, I did additional evaluation (see results below).  After that, we were able to get her started on a 12-week course of Epclusa.  She started this on 9/24/2024.  She completed this on 12/23/2024 without significant difficulty, other than feeling somewhat dehydrated while taking the medication.  She did not miss any of the doses.  She is now about 6 months post-treatment.    She did have her ileostomy closed on 4/9/2025 without complications.    She presented today for follow-up evaluation.  She denied any specific liver-related complaints.  Again, her main issue continues to be fatigue.  She has continued to gain some weight.  She mostly uses a wheelchair.  She continues to live at the nursing home.    Past Medical/Surgical History:   Severe recurrent major depression without psychotic features (Multi)  Colostomy present (Multi)  Opioid abuse (Multi)  Bilateral hip pain  Hepatitis C  CHF with preserved EF  COPD  chronic pain syndrome  Sciatica  migraine  Hypertension  Arthritis  Osteoporosis  opioid/fentanyl abuse  severe recurrent depression  Bipolar disorder  Hepatitis C - unknown if treated  10/14/22 Dr. Cr: excisional debridement of skin, soft tissue and muscle for necrotizing fasciitis  10/18/22 Dr. Yoder: debridement of sacral wound for necrotizing fasciitis  10/19/22 Dr. Cr: laparoscopic diverting loop colostomy  10/27/22 Dr. Matos: irrigation and debridement of skin, subcutaneous tissue and fascia   12/2/22 Dr. Guan: debridement and rotational flap of perineum  Ovarian cystectomy  Tubal ligation        Family History:   Her brother had leukemia.  There is no known family history of liver disease.  There is no known family history of colon cancer.    Social History:   She has a history of polysubstance abuse.  However, she denies intravenous drug use.  She denied alcohol use.  She is a smoker.  She lives in a skilled  nursing facility.  She has had blood transfusions.  She does have tattoos.  She is a .  She does not believe that her children have ever been tested for hepatitis C.  She does not seem overly interested in discussing this with them.  She has not seen them in many years.    Review of systems: As noted above.  She does have left hip pain and takes Percocet for that (5 mg tablets 4 times a day).  She does have peripheral neuropathy.  She has had no GI bleeding.  She does have fatigue.  She does have issues with depression.  She does get intermittent constipation.  She does have severe carpal tunnel syndrome.  No fever, chills, weight loss, visual changes, auditory changes, shortness of breath, chest pain, abdominal pain, diarrhea, dysuria, hematuria, or rash.       Medical, Surgical, Family, and Social Histories  Medical History[1]    Surgical History[2]    Family History[3]    Social History     Socioeconomic History    Marital status:      Spouse name: Not on file    Number of children: Not on file    Years of education: Not on file    Highest education level: Not on file   Occupational History    Not on file   Tobacco Use    Smoking status: Every Day     Current packs/day: 0.50     Types: Cigarettes    Smokeless tobacco: Never   Vaping Use    Vaping status: Never Used   Substance and Sexual Activity    Alcohol use: Not Currently    Drug use: Not Currently     Types: Fentanyl, Cocaine    Sexual activity: Not on file   Other Topics Concern    Not on file   Social History Narrative    Not on file     Social Drivers of Health     Financial Resource Strain: Low Risk  (4/9/2025)    Overall Financial Resource Strain (CARDIA)     Difficulty of Paying Living Expenses: Not hard at all   Food Insecurity: No Food Insecurity (4/9/2025)    Hunger Vital Sign     Worried About Running Out of Food in the Last Year: Never true     Ran Out of Food in the Last Year: Never true   Transportation Needs: No Transportation Needs  "(4/9/2025)    PRAPARE - Transportation     Lack of Transportation (Medical): No     Lack of Transportation (Non-Medical): No   Physical Activity: Inactive (4/9/2025)    Exercise Vital Sign     Days of Exercise per Week: 0 days     Minutes of Exercise per Session: 0 min   Stress: Stress Concern Present (4/9/2025)    [employer] - Occupational Stress Questionnaire     Feeling of Stress : To some extent   Social Connections: Unknown (4/9/2025)    Social Connection and Isolation Panel [NHANES]     Frequency of Communication with Friends and Family: More than three times a week     Frequency of Social Gatherings with Friends and Family: Twice a week     Attends Denominational Services: Patient declined     Active Member of Clubs or Organizations: Patient declined     Attends Club or Organization Meetings: Patient declined     Marital Status:    Intimate Partner Violence: Not At Risk (4/9/2025)    Humiliation, Afraid, Rape, and Kick questionnaire     Fear of Current or Ex-Partner: No     Emotionally Abused: No     Physically Abused: No     Sexually Abused: No   Housing Stability: Low Risk  (4/9/2025)    Housing Stability Vital Sign     Unable to Pay for Housing in the Last Year: No     Number of Times Moved in the Last Year: 0     Homeless in the Last Year: No       Allergies and Current Medications  Allergies[4]  Current Medications[5]     Physical Exam  /80   Pulse 93   Ht 1.727 m (5' 8\")   Wt 122 kg (268 lb)   SpO2 95%   BMI 40.75 kg/m²       Physical Examination:   General Appearance: alert and in no acute distress.  She is in a wheelchair.  HEENT: oropharynx without lesions. Anicteric sclerae.   Neck supple, nontender, without adenopathy, thyromegaly, or JVD.   Lungs clear to auscultation and percussion.   Heart RRR without murmurs, rubs, or gallops.   Abdomen: Soft, nontender, bowel sounds positive, without obvious ascites. Liver and spleen not palpable.  The ostomy surgical " site has healed up well.  She is overweight centrally.  Extremities full ROM, no atrophy, normal strength. No edema.  Skin no specific lesions.   Neurological exam nonfocal, alert and oriented.  No asterixis.  No spider angiomata, but she does have bilateral palmar erythema.     Labs 4/13/2025 glucose 119, sodium 136, creatinine 0.99.    Labs 4/12/2025 WBC 10.2, hemoglobin 9.9, platelets 253.    Labs 3/26/2025 protein 7.4, albumin 4.4, alk phos 97, bilirubin 0.3, AST 12, ALT 12.    Labs 12/16/2024 hepatitis C RNA undetectable.    Labs 12/12/2024 protein 6.7, albumin 3.9, alk phos 86, bilirubin 0.2, AST 12, ALT 8, WBC 7.9, hemoglobin 11.9, platelets 277.    Labs 9/25/2024 urine drug screen negative, hepatitis C genotype 3a, INR 1.1, protein 7.1, albumin 4.1, alk phos 108, bilirubin 0.3, AST 24, ALT 32, WBC 8.2, hemoglobin 10.1, platelets 445, glucose 104, sodium 138, creatinine 0.93, HIV negative, AFP < 4, hepatitis B core antibody negative, hepatitis B surface antigen negative, hepatitis B surface antibody positive, hepatitis A antibody positive, hepatitis C RNA 14,500 IU/ml.    Labs 8/14/2024 urine drug screen negative.    Labs 6/21/2024 hepatitis C  IU/ml, hepatitis B surface antigen negative, hepatitis C antibody positive, protein 6.7, albumin 3.9, alk phos 102, bilirubin 0.2, AST 12, ALT 11, INR 1, glucose 114, sodium 138, creatinine 0.81, WBC 8.4, hemoglobin 8.1, platelets 286.    Labs 1/31/2024 AST 40, ALT 44.    Labs 9/17/2023 urine drug screen negative.    Labs 9/5/2023 hemoglobin 13.9, urine drug screen positive for amphetamines and fentanyl, alcohol negative.    Ultrasound 5/21/2025:  IMPRESSION:  1. Hepatomegaly with heterogenous echotexture likely underlying  diffuse hepatocellular dysfunction/cirrhosis without gross lesions.          Ultrasound 8/20/2024:  IMPRESSION:  Hepatomegaly with diffusely increased hepatic echogenicity and  superimposed coarsened hepatic echotexture overall suggestive  of  cirrhotic morphology. No focal hepatic lesions are seen.      CAT scan with contrast 7/5/2024:  IMPRESSION:  1.  Postsurgical changes of left lower quadrant loop colostomy.  2. 4.2 x 3.5 x 5.2 cm fluid collection extending along the posterior  aspect of the lower sacrum and coccyx. The sterility of this  collection can not be determined on CT, consider fluid sampling for  further evaluation.  3. Mild hepatomegaly and findings suggestive of hepatic cirrhosis.  Recommend correlation with serum LFTs.      EGD 2/24/2025:     Impression  The esophagus appeared normal.  The stomach appeared normal.  The duodenal bulb, 1st part of the duodenum and 2nd part of the duodenum appeared normal.       Colonoscopy 9/25/2024:  Impression  Subcentimeter polyp in the ascending colon was removed with hot snare  Normal.  Normal.      FibroScan 5/5/2025 revealed a median liver stiffness of 12.1 kPa with IQR 22% and     FibroScan 8/30/2024 revealed a median liver stiffness of 15.3 kPa with IQR 15% and .          Assessment/Plan:     Hepatitis C, genotype 3a, S/P 12 weeks of Epclusa therapy  Presumed cirrhosis based on 8/2024 FibroScan test  Stage 3 fibrosis with severe steatosis on 5/2025 FibroScan test    The patient is referred to me for follow-up evaluation of the above issues.    I again discussed with this patient about hepatitis C. We talked about household spread. We talked about sexual spread. We talked about ways to avoid spreading the disease. I explained that sexual partners should be tested, and if negative, they should make a decision about the use of condoms.  We also talked about avoiding sharing razor blades or toothbrushes with anyone. I also advised that alcohol use should be avoided.    Her imaging studies and FibroScan have been most consistent with cirrhosis.  I did repeat a FibroScan test about 6 months after completing her hepatitis C therapy.  It did show stage 3 fibrosis in 5/2025. For now, I  would treat her as if she could have cirrhosis.  I do not feel that liver biopsy would change our plan.    She did complete a 12-week course of Epclusa for her hepatitis C.  Her hepatitis C RNA at the end of therapy was undetectable.  I will check her hepatitis C RNA now to see if she is a sustained virologic responder (SVR) and would thus be considered cured of her infection.  This does not mean that her fibrosis/cirrhosis would be cured.    She is immune to hepatitis A and hepatitis B.    Luckily, her EGD in February 2025 did not show any varices.    She can get labs every 3 months, AFP and ultrasound every 6 months and see me back in 7 months.    Thank you for allowing me to participate in the care of this patient. Please feel free to contact me with any questions regarding their care.     Sincerely,     Willem Sanderson MD, NICKIE, FAASLD, FACG.   Medical Director, Hepatology  Senior Attending Physician  [facility]  [facility]  Professor of Medicine  Division of Gastroenterology and Liver Disease  [facility] [employer]  [address] [place], [place] [address]  Phone: [phone]  Fax: [phone].      Cc: Dr. Maggy Jackson  cc: Dr. Huey Sheth      This document was generated with a computerized dictation system.  Because of this, there could be errors in grammar and/or content.           [1]   Past Medical History:  Diagnosis Date    Altered mental status     Anemia     previously on iron - WNL on 2/24/25 H/H 12.2/38.1    Anxiety     Arthritis     Asthma     Atrophy of muscle of multiple sites     Bipolar disorder     Chronic pain syndrome     Chronic viral hepatitis C (Multi)     s/p Epclusa 12 week course (9/24/24-12/23/24)    Cirrhosis (Multi)     follows with Dr. Sanderson - EGD 2/24/25 - WNL no varices CT 7/5/24: mild hepatomegaly and findings suggestive of hepatic cirrhosis.  LFTs WNL 8/2024    Colostomy in place  (Multi)     COPD (chronic obstructive pulmonary disease) (Multi)     Depression     Elevated serum creatinine     2/24/25 creat 1.08, GFR 58    Hepatitis C     HL (hearing loss)     left ear    Hypertension     Opioid abuse     fentanyl    Osteoporosis     Peripheral neuropathy     Psychosis (Multi)     admitted 9/2023    Sacral decubitus ulcer 2022    after coccyx fx, gluteal/sacral necrotizing faciitis and coccygeal osteomyelitis - s/p lap diverting loop colostomy to let ulcer heal, now completely healed per notes    Sciatica     Suicide attempt (Multi)     multiple    Tobacco use    [2]   Past Surgical History:  Procedure Laterality Date    ABCESS DRAINAGE      sacral wound    CARPAL TUNNEL RELEASE Bilateral     COLOSTOMY  10/2022    OVARIAN CYST REMOVAL      TONSILLECTOMY      TOTAL HIP ARTHROPLASTY Left 02/12/2024    TUBAL LIGATION     [3]   Family History  Problem Relation Name Age of Onset    Leukemia Brother          Half-brother    Colon polyps Mother Claudia    [4] No Known Allergies  [5]   Current Outpatient Medications   Medication Sig Dispense Refill    acetaminophen (Tylenol) 325 mg tablet Take 2 tablets (650 mg) by mouth 2 times a day.      bisacodyl (Dulcolax) 5 mg EC tablet Take 1 tablet (5 mg) by mouth once daily as needed for constipation. Do not crush, chew, or split.      buPROPion XL (Forfivo XL) 450 mg 24 hr tablet Take 1 tablet (450 mg) by mouth once daily. Do not crush, chew, or split.      buPROPion XL (Wellbutrin XL) 300 mg 24 hr tablet Take 1 tablet (300 mg) by mouth once daily. Do not crush, chew, or split. 30 tablet 0    busPIRone (Buspar) 5 mg tablet Take 3 tablets (15 mg) by mouth 2 times a day.      celecoxib (CeleBREX) 200 mg capsule Take 1 capsule (200 mg) by mouth once daily.      cholecalciferol (Vitamin D-3) 125 MCG (5000 UT) capsule Take 1 capsule (125 mcg) by mouth once daily. Do not start before December 9, 2023.      cyanocobalamin (Vitamin B-12) 500 mcg tablet Take 1 tablet  (500 mcg) by mouth once daily.      cyclobenzaprine (Flexeril) 5 mg tablet Take 1 tablet (5 mg) by mouth once daily.      cyclobenzaprine (Flexeril) 5 mg tablet Take 2 tablets (10 mg) by mouth once daily at bedtime.      docusate sodium (Colace) 100 mg capsule Take 1 capsule (100 mg) by mouth 2 times a day.      DULoxetine (Cymbalta) 60 mg DR capsule Take 2 capsules (120 mg) by mouth once daily. Do not crush or chew. (Patient taking differently: Take 1 capsule (60 mg) by mouth once daily. Do not crush or chew.) 60 capsule 0    DULoxetine (Cymbalta) 60 mg DR capsule Take 1 capsule (60 mg) by mouth once daily. Do not crush or chew.      ferrous sulfate 325 (65 Fe) mg EC tablet Take 1 tablet by mouth 2 times a day. Do not crush, chew, or split.      gabapentin (Neurontin) 400 mg capsule Take 100 mg by mouth 4 times a day. 3 capsules by mouth      hydrOXYzine pamoate (Vistaril) 25 mg capsule Take 1 capsule (25 mg) by mouth 2 times a day. 60 capsule 0    magnesium oxide (Mag-Ox) 400 mg tablet 1 tablet (400 mg) once daily.      meloxicam (Mobic) 7.5 mg tablet Take 1 tablet (7.5 mg) by mouth once daily.      neomycin (Mycifradin) 500 mg tablet Take two tabs by mouth at 6p, 7pm, and 11p the night before surgery. 6 tablet 0    oxyCODONE-acetaminophen (Percocet) 5-325 mg tablet Take 1 tablet by mouth every 4 hours if needed.      QUEtiapine (SeroqueL) 50 mg tablet Take 1.5 tablets (75 mg) by mouth once daily at bedtime.      rOPINIRole (Requip) 1 mg tablet Take 1 tablet (1 mg) by mouth once daily at bedtime.      sodium chloride (Ocean) 0.65 % nasal spray Administer 2 sprays into each nostril if needed for congestion.      tiZANidine (Zanaflex) 4 mg capsule Take 1 capsule (4 mg) by mouth 3 times a day as needed.      traZODone (Desyrel) 100 mg tablet Take 2 tablets (200 mg) by mouth once daily at bedtime.       No current facility-administered medications for this visit.

## 2025-06-12 NOTE — PATIENT INSTRUCTIONS
Get labs today, in 3 months and in 6 months.    Get an ultrasound of the liver in 6 months.    See me back in clinic in about 7 months.

## (undated) DEVICE — ELECTRODE, ELECTROSURGICAL, BLADE, INSULATED, ENT/IMA, STERILE

## (undated) DEVICE — DRAPE, SHEET, ENDOSCOPY, GENERAL, FENESTRATED, ARMBOARD COVER, 98 X 123.5 IN, DISPOSABLE, LF, STERILE

## (undated) DEVICE — SUTURE, SILK, 3-0, 18 IN SH/CR, BLACK

## (undated) DEVICE — SPONGE, LAP, XRAY DECT, 18IN X 18IN, W/LOOP, STERILE

## (undated) DEVICE — GLOVE, SURGICAL, PROTEXIS PI , 7.5, PF, LF

## (undated) DEVICE — SUTURE, VICRYL, 3-0,18 IN, SH, UNDYED

## (undated) DEVICE — SUTURE, VICRYL, 0, 18 IN, UNDYED

## (undated) DEVICE — DRAPE, SHEET, LARGE, 70 X 85IN, STERILE

## (undated) DEVICE — PACKING, IODOFORM, CURITY, 0.25 IN X 5 YD, STERILE

## (undated) DEVICE — Device

## (undated) DEVICE — SUTURE, PDS II, 3-0, 27 IN, SH, VIL, MONO, LF

## (undated) DEVICE — GLOVE, SURGICAL, PROTEXIS PI , 8.0, PF, LF

## (undated) DEVICE — DRESSING, ABD PAD, TENDERSORB, 7.5 X 8 IN, NS

## (undated) DEVICE — GLOVE, SURGICAL, PROTEXIS MICRO, 8.0, PF, LATEX

## (undated) DEVICE — GLOVE, SURGICAL, PROTEXIS PI BLUE W/NEUTHERA, 8.0, PF, LF

## (undated) DEVICE — POSITIONING, THE PINK PAD, PIGAZZI SYSTEM

## (undated) DEVICE — SUTURE, VICRYL PLUS 3-0, SH, 27IN

## (undated) DEVICE — GOWN, ASTOUND, XL

## (undated) DEVICE — SUTURE, PDS II, 0, 27 IN, CT1, VIOLET

## (undated) DEVICE — SUTURE, VICRYL, 2-0, 27 IN, SH, UNDYED

## (undated) DEVICE — SUTURE, MONOCRYL, 4-0, 27 IN, PS-2, UNDYED